# Patient Record
Sex: FEMALE | Race: WHITE | NOT HISPANIC OR LATINO | Employment: OTHER | URBAN - METROPOLITAN AREA
[De-identification: names, ages, dates, MRNs, and addresses within clinical notes are randomized per-mention and may not be internally consistent; named-entity substitution may affect disease eponyms.]

---

## 2017-01-31 DIAGNOSIS — F43.9 SITUATIONAL STRESS: ICD-10-CM

## 2017-01-31 RX ORDER — CITALOPRAM 20 MG/1
TABLET, FILM COATED ORAL
Qty: 90 TABLET | Refills: 3 | Status: SHIPPED | OUTPATIENT
Start: 2017-01-31 | End: 2018-02-15 | Stop reason: SDUPTHER

## 2017-02-08 RX ORDER — AMLODIPINE BESYLATE 5 MG/1
5 TABLET ORAL NIGHTLY
COMMUNITY
End: 2017-07-07 | Stop reason: SDUPTHER

## 2017-02-21 ENCOUNTER — PATIENT MESSAGE (OUTPATIENT)
Dept: RESEARCH | Facility: HOSPITAL | Age: 82
End: 2017-02-21

## 2017-03-06 ENCOUNTER — LAB VISIT (OUTPATIENT)
Dept: LAB | Facility: HOSPITAL | Age: 82
End: 2017-03-06
Attending: INTERNAL MEDICINE
Payer: MEDICARE

## 2017-03-06 DIAGNOSIS — F32.89 OTHER DEPRESSION: ICD-10-CM

## 2017-03-06 DIAGNOSIS — I70.0 AORTIC ATHEROSCLEROSIS: ICD-10-CM

## 2017-03-06 DIAGNOSIS — E78.00 PURE HYPERCHOLESTEROLEMIA: ICD-10-CM

## 2017-03-06 DIAGNOSIS — I10 HTN (HYPERTENSION), BENIGN: ICD-10-CM

## 2017-03-06 DIAGNOSIS — I25.10 CORONARY ARTERY DISEASE INVOLVING NATIVE CORONARY ARTERY OF NATIVE HEART WITHOUT ANGINA PECTORIS: ICD-10-CM

## 2017-03-06 DIAGNOSIS — R63.0 ANOREXIA: ICD-10-CM

## 2017-03-06 LAB
ALBUMIN SERPL BCP-MCNC: 3.7 G/DL
ALP SERPL-CCNC: 68 U/L
ALT SERPL W/O P-5'-P-CCNC: 28 U/L
ANION GAP SERPL CALC-SCNC: 7 MMOL/L
AST SERPL-CCNC: 25 U/L
BASOPHILS # BLD AUTO: 0.04 K/UL
BASOPHILS NFR BLD: 0.6 %
BILIRUB SERPL-MCNC: 0.8 MG/DL
BUN SERPL-MCNC: 13 MG/DL
CALCIUM SERPL-MCNC: 9.6 MG/DL
CHLORIDE SERPL-SCNC: 103 MMOL/L
CHOLEST/HDLC SERPL: 2.8 {RATIO}
CO2 SERPL-SCNC: 29 MMOL/L
CREAT SERPL-MCNC: 0.8 MG/DL
DIFFERENTIAL METHOD: ABNORMAL
EOSINOPHIL # BLD AUTO: 0.1 K/UL
EOSINOPHIL NFR BLD: 2.1 %
ERYTHROCYTE [DISTWIDTH] IN BLOOD BY AUTOMATED COUNT: 13.2 %
EST. GFR  (AFRICAN AMERICAN): >60 ML/MIN/1.73 M^2
EST. GFR  (NON AFRICAN AMERICAN): >60 ML/MIN/1.73 M^2
GLUCOSE SERPL-MCNC: 96 MG/DL
HCT VFR BLD AUTO: 40.3 %
HDL/CHOLESTEROL RATIO: 35.4 %
HDLC SERPL-MCNC: 175 MG/DL
HDLC SERPL-MCNC: 62 MG/DL
HGB BLD-MCNC: 13.4 G/DL
LDLC SERPL CALC-MCNC: 93.2 MG/DL
LYMPHOCYTES # BLD AUTO: 1.3 K/UL
LYMPHOCYTES NFR BLD: 20.8 %
MCH RBC QN AUTO: 31.6 PG
MCHC RBC AUTO-ENTMCNC: 33.3 %
MCV RBC AUTO: 95 FL
MONOCYTES # BLD AUTO: 0.5 K/UL
MONOCYTES NFR BLD: 8.1 %
NEUTROPHILS # BLD AUTO: 4.3 K/UL
NEUTROPHILS NFR BLD: 68.4 %
NONHDLC SERPL-MCNC: 113 MG/DL
PLATELET # BLD AUTO: 250 K/UL
PMV BLD AUTO: 10.6 FL
POTASSIUM SERPL-SCNC: 4.2 MMOL/L
PROT SERPL-MCNC: 6.9 G/DL
RBC # BLD AUTO: 4.24 M/UL
SODIUM SERPL-SCNC: 139 MMOL/L
TRIGL SERPL-MCNC: 99 MG/DL
WBC # BLD AUTO: 6.31 K/UL

## 2017-03-06 PROCEDURE — 80061 LIPID PANEL: CPT

## 2017-03-06 PROCEDURE — 80053 COMPREHEN METABOLIC PANEL: CPT

## 2017-03-06 PROCEDURE — 85025 COMPLETE CBC W/AUTO DIFF WBC: CPT

## 2017-03-06 PROCEDURE — 36415 COLL VENOUS BLD VENIPUNCTURE: CPT | Mod: PO

## 2017-03-07 ENCOUNTER — OFFICE VISIT (OUTPATIENT)
Dept: INTERNAL MEDICINE | Facility: CLINIC | Age: 82
End: 2017-03-07
Payer: MEDICARE

## 2017-03-07 VITALS
WEIGHT: 135.56 LBS | SYSTOLIC BLOOD PRESSURE: 110 MMHG | DIASTOLIC BLOOD PRESSURE: 60 MMHG | HEIGHT: 64 IN | BODY MASS INDEX: 23.14 KG/M2 | HEART RATE: 68 BPM

## 2017-03-07 DIAGNOSIS — I10 HTN (HYPERTENSION), BENIGN: Primary | ICD-10-CM

## 2017-03-07 DIAGNOSIS — I70.0 AORTIC ATHEROSCLEROSIS: ICD-10-CM

## 2017-03-07 DIAGNOSIS — F32.5 DEPRESSION, MAJOR, IN REMISSION: ICD-10-CM

## 2017-03-07 PROCEDURE — 1126F AMNT PAIN NOTED NONE PRSNT: CPT | Mod: S$GLB,,, | Performed by: INTERNAL MEDICINE

## 2017-03-07 PROCEDURE — 1159F MED LIST DOCD IN RCRD: CPT | Mod: S$GLB,,, | Performed by: INTERNAL MEDICINE

## 2017-03-07 PROCEDURE — 99214 OFFICE O/P EST MOD 30 MIN: CPT | Mod: S$GLB,,, | Performed by: INTERNAL MEDICINE

## 2017-03-07 PROCEDURE — 1160F RVW MEDS BY RX/DR IN RCRD: CPT | Mod: S$GLB,,, | Performed by: INTERNAL MEDICINE

## 2017-03-07 PROCEDURE — 99499 UNLISTED E&M SERVICE: CPT | Mod: S$GLB,,, | Performed by: INTERNAL MEDICINE

## 2017-03-07 PROCEDURE — 99999 PR PBB SHADOW E&M-EST. PATIENT-LVL III: CPT | Mod: PBBFAC,,, | Performed by: INTERNAL MEDICINE

## 2017-03-07 PROCEDURE — 1157F ADVNC CARE PLAN IN RCRD: CPT | Mod: S$GLB,,, | Performed by: INTERNAL MEDICINE

## 2017-03-10 NOTE — PROGRESS NOTES
Subjective:       Patient ID: Elen Peters is a 87 y.o. female.    Chief Complaint: Hypertension    Hypertension   This is a chronic problem. The problem is unchanged. The problem is controlled. Pertinent negatives include no chest pain, orthopnea, palpitations or shortness of breath. The current treatment provides significant improvement. There are no compliance problems.      Review of Systems   Respiratory: Negative for shortness of breath.    Cardiovascular: Negative for chest pain, palpitations and orthopnea.   Psychiatric/Behavioral: Negative for dysphoric mood. The patient is not nervous/anxious.        Objective:      Physical Exam   Constitutional: She is oriented to person, place, and time. She appears well-developed and well-nourished. No distress.   HENT:   Head: Normocephalic and atraumatic.   Mouth/Throat: Oropharynx is clear and moist.   Eyes: Conjunctivae are normal. Pupils are equal, round, and reactive to light.   Neck: Normal range of motion. Neck supple.   Cardiovascular: Normal rate, regular rhythm and normal heart sounds.    Pulmonary/Chest: Effort normal and breath sounds normal. She has no wheezes.   Abdominal: Soft. Bowel sounds are normal. There is no tenderness.   Musculoskeletal: Normal range of motion. She exhibits no edema or tenderness.   Neurological: She is alert and oriented to person, place, and time. No cranial nerve deficit.   Skin: No erythema.   Psychiatric: She has a normal mood and affect.   Vitals reviewed.      Assessment:       1. HTN (hypertension), benign    2. Depression, major, in remission    3. Aortic atherosclerosis        Plan:       Elen was seen today for hypertension.    Diagnoses and all orders for this visit:    HTN (hypertension), benign    Depression, major, in remission    Aortic atherosclerosis        Return in about 6 months (around 9/7/2017) for F/U APPOINTMENT WITH ME.

## 2017-04-07 ENCOUNTER — HOSPITAL ENCOUNTER (OUTPATIENT)
Dept: RADIOLOGY | Facility: HOSPITAL | Age: 82
Discharge: HOME | End: 2017-04-07
Attending: OBSTETRICS & GYNECOLOGY
Payer: MEDICARE

## 2017-04-07 ENCOUNTER — OFFICE VISIT (OUTPATIENT)
Dept: OBSTETRICS AND GYNECOLOGY | Facility: CLINIC | Age: 82
End: 2017-04-07
Payer: MEDICARE

## 2017-04-07 VITALS
BODY MASS INDEX: 23.63 KG/M2 | DIASTOLIC BLOOD PRESSURE: 70 MMHG | WEIGHT: 133.38 LBS | HEIGHT: 63 IN | SYSTOLIC BLOOD PRESSURE: 138 MMHG

## 2017-04-07 DIAGNOSIS — Z12.39 BREAST CANCER SCREENING: ICD-10-CM

## 2017-04-07 DIAGNOSIS — N95.2 ATROPHIC VAGINITIS: ICD-10-CM

## 2017-04-07 DIAGNOSIS — Z01.419 WELL FEMALE EXAM WITH ROUTINE GYNECOLOGICAL EXAM: Primary | ICD-10-CM

## 2017-04-07 DIAGNOSIS — R30.0 DYSURIA: ICD-10-CM

## 2017-04-07 LAB
BILIRUB UR QL STRIP: NEGATIVE
CLARITY UR REFRACT.AUTO: CLEAR
COLOR UR AUTO: YELLOW
GLUCOSE UR QL STRIP: NEGATIVE
HGB UR QL STRIP: NEGATIVE
KETONES UR QL STRIP: NEGATIVE
LEUKOCYTE ESTERASE UR QL STRIP: NEGATIVE
NITRITE UR QL STRIP: NEGATIVE
PH UR STRIP: 7 [PH] (ref 5–8)
PROT UR QL STRIP: NEGATIVE
SP GR UR STRIP: 1.01 (ref 1–1.03)
URN SPEC COLLECT METH UR: NORMAL
UROBILINOGEN UR STRIP-ACNC: NEGATIVE EU/DL

## 2017-04-07 PROCEDURE — 77067 SCR MAMMO BI INCL CAD: CPT | Mod: 26,,, | Performed by: RADIOLOGY

## 2017-04-07 PROCEDURE — 99999 PR PBB SHADOW E&M-EST. PATIENT-LVL III: CPT | Mod: PBBFAC,,, | Performed by: OBSTETRICS & GYNECOLOGY

## 2017-04-07 PROCEDURE — G0101 CA SCREEN;PELVIC/BREAST EXAM: HCPCS | Mod: S$GLB,,, | Performed by: OBSTETRICS & GYNECOLOGY

## 2017-04-07 PROCEDURE — 77067 SCR MAMMO BI INCL CAD: CPT | Mod: TC

## 2017-04-07 RX ORDER — ESTRADIOL 0.1 MG/G
1 CREAM VAGINAL
Qty: 42.5 G | Refills: 5 | Status: SHIPPED | OUTPATIENT
Start: 2017-04-10 | End: 2017-04-12

## 2017-04-07 NOTE — PROGRESS NOTES
SUBJECTIVE:   87 y.o. female   for routine gyn exam. No LMP recorded. Patient is postmenopausal..  She reports dysuria and vaginal burning.  No HRT.  No PMB.          Past Medical History:   Diagnosis Date    Abnormal stress test 2015    Arthritis     Bladder cancer     Cataract     Coronary artery disease involving native coronary artery 2016    Depression     GERD (gastroesophageal reflux disease)     HTN (hypertension), benign 2015    Hx of bladder infections     Hyperlipemia     OP (osteoporosis)     Renal cancer     Syncope 2016    Upper back pain 2015    Ureteral cancer     Villous adenoma of colon 2013     Past Surgical History:   Procedure Laterality Date    APPENDECTOMY      BACK SURGERY      CATARACT EXTRACTION W/  INTRAOCULAR LENS IMPLANT Left 3/16/15    kristy    CATARACT EXTRACTION W/  INTRAOCULAR LENS IMPLANT Right 4/6/15    kristy    COLONOSCOPY  10/21/2013    CYSTOSCOPY      NEPHRECTOMY      L    TONSILLECTOMY, ADENOIDECTOMY       Social History     Social History    Marital status:      Spouse name: N/A    Number of children: N/A    Years of education: N/A     Occupational History    retired      Social History Main Topics    Smoking status: Never Smoker    Smokeless tobacco: Never Used    Alcohol use Yes      Comment: vodka with orange juice; 1 glass of red wine 1 x month    Drug use: No    Sexual activity: Not Currently     Other Topics Concern    Not on file     Social History Narrative     Family History   Problem Relation Age of Onset    Leukemia Mother     Heart disease Mother     Heart attack Mother     Cancer Mother      leukemia    Goiter Mother     Leukemia Father     Cancer Father      leukemia    Heart disease Maternal Grandfather     No Known Problems Brother     No Known Problems Son     No Known Problems Son     No Known Problems Son     Cancer Son      throat    No Known Problems Son     No Known  Problems Son     No Known Problems Maternal Aunt     No Known Problems Maternal Uncle     No Known Problems Paternal Aunt     No Known Problems Paternal Uncle     No Known Problems Maternal Grandmother     No Known Problems Paternal Grandmother     No Known Problems Paternal Grandfather     No Known Problems Sister     Amblyopia Neg Hx     Blindness Neg Hx     Cataracts Neg Hx     Diabetes Neg Hx     Glaucoma Neg Hx     Hypertension Neg Hx     Macular degeneration Neg Hx     Retinal detachment Neg Hx     Strabismus Neg Hx     Stroke Neg Hx     Thyroid disease Neg Hx     Breast cancer Neg Hx      OB History    Para Term  AB SAB TAB Ectopic Multiple Living   6 6        6      # Outcome Date GA Lbr Candido/2nd Weight Sex Delivery Anes PTL Lv   6 Para            5 Para            4 Para            3 Para            2 Para            1 Para                     Current Outpatient Prescriptions   Medication Sig Dispense Refill    amlodipine (NORVASC) 5 MG tablet Take 5 mg by mouth every evening.      aspirin (ECOTRIN) 81 MG EC tablet Take 81 mg by mouth once daily.      atorvastatin (LIPITOR) 40 MG tablet Take 40 mg by mouth once daily. 1/2 tab daily      calcium-magnesium-zinc Tab Take by mouth 3 (three) times daily. 1 Tablet Oral Every day      carvedilol (COREG) 6.25 MG tablet Take 1 tablet (6.25 mg total) by mouth 2 (two) times daily with meals. One by mouth twice daily or as directed 180 tablet 3    citalopram (CELEXA) 20 MG tablet TAKE ONE TABLET BY MOUTH EVERY DAY 90 tablet 3    [START ON 4/10/2017] estradiol (ESTRACE) 0.01 % (0.1 mg/gram) vaginal cream Place 1 g vaginally twice a week. BIN# [id number] PCN# CN GRP# [id number] ID# [id number] 42.5 g 5    mv-min-folic acid-lutein (THERAGRAN-M ADVANCED 50 PLUS) 0.4-250 mg-mcg Tab Take by mouth. 1 Tablet Oral Every day      nitroGLYCERIN (NITROSTAT) 0.4 MG SL tablet Place 1 tablet (0.4 mg total) under the tongue every 5 (five) minutes  "as needed for Chest pain. 25 tablet 3    omega-3 fatty acids 1,000 mg Cap Take 1 capsule by mouth once daily. 2  Capsule Oral Every day      zolpidem (AMBIEN) 5 MG Tab TAKE 1 TABLET BY MOUTH EVERY NIGHT AT BEDTIME 30 tablet 3     No current facility-administered medications for this visit.      Allergies: Sulfa (sulfonamide antibiotics) and Codeine     ROS:  Constitutional: no weight loss, weight gain, fever, fatigue  Eyes:  No vision changes, glasses/contacts  ENT/Mouth: No ulcers, sinus problems, ears ringing, headache  Cardiovascular: No inability to lie flat, chest pain, exercise intolerance, swelling, heart palpitations  Respiratory: No wheezing, coughing blood, shortness of breath, or cough  Gastrointestinal: No diarrhea, bloody stool, nausea/vomiting, constipation, gas, hemorrhoids  Genitourinary: No blood in urine, painful urination, urgency of urination, frequency of urination, incomplete emptying, incontinence, abnormal bleeding, painful periods, heavy periods, vaginal discharge, vaginal odor, painful intercourse, sexual problems, bleeding after intercourse.  Musculoskeletal: No muscle weakness  Skin/Breast: No painful breasts, nipple discharge, masses, rash, ulcers  Neurological: No passing out, seizures, numbness, headache  Endocrine: No diabetes, hypothyroid, hyperthyroid, hot flashes, hair loss, abnormal hair growth, ance  Psychiatric: No depression, crying  Hematologic: No bruises, bleeding, swollen lymph nodes, anemia.      OBJECTIVE:   The patient appears well, alert, oriented x 3, in no distress.  /70  Ht 5' 3" (1.6 m)  Wt 60.5 kg (133 lb 6.1 oz)  BMI 23.63 kg/m2  NECK: no thyromegaly, trachea midline  SKIN: no acne, striae, hirsutism  CHEST: CTAB  CV: RRR  BREAST EXAM: breasts appear normal, no suspicious masses, no skin or nipple changes or axillary nodes  ABDOMEN: no hernias, masses, or hepatosplenomegaly  GENITALIA: normal external genitalia, no erythema, no discharge  URETHRA: " normal urethra, normal urethral meatus  VAGINA: Normal, atrophic  CERVIX: no lesions or cervical motion tenderness  UTERUS: normal size, contour, position, consistency, mobility, non-tender  ADNEXA: no mass, fullness, tenderness      ASSESSMENT:   1. Well female exam with routine gynecological exam     2. Dysuria  Urinalysis    Urine culture   3. Atrophic vaginitis         PLAN:   Orders Placed This Encounter    Urine culture    Urinalysis    estradiol (ESTRACE) 0.01 % (0.1 mg/gram) vaginal cream     Discussed atrophic vaginitis, recurrent UTI, dysuria.  Trial of Estrace cream. Discussed WHI.   Return to clinic in 1 year

## 2017-04-07 NOTE — MR AVS SNAPSHOT
[place] [facility]   [facility]  [place] [address]  Phone: [phone]                  Elen Peters   2017 1:30 PM   Office Visit    Description:  Female : 1929   Provider:  Lynne Duarte MD   Department:  [place] [facility]           Reason for Visit     Well Woman     Urinary Tract Infection                To Do List           Future Appointments        Provider Department Dept Phone    2017 2:00 PM Remi Weaver MD [facility] [phone]      Goals (5 Years of Data)     None      [facility]     [facility] On Call Nurse Care Line -  Assistance  Unless otherwise directed by your provider, please contact [facility], our nurse care line that is available for  assistance.     Registered nurses in the [facility] On Call Center provide: appointment scheduling, clinical advisement, health education, and other advisory services.  Call: [phone] (toll free)               Medications           Message regarding Medications     Verify the changes and/or additions to your medication regime listed below are the same as discussed with your clinician today.  If any of these changes or additions are incorrect, please notify your healthcare provider.             Verify that the below list of medications is an accurate representation of the medications you are currently taking.  If none reported, the list may be blank. If incorrect, please contact your healthcare provider. Carry this list with you in case of emergency.           Current Medications     amlodipine (NORVASC) 5 MG tablet Take 5 mg by mouth every evening.    aspirin (ECOTRIN) 81 MG EC tablet Take 81 mg by mouth once daily.    atorvastatin (LIPITOR) 40 MG tablet Take 40 mg by mouth once daily. 1/2 tab daily    calcium-magnesium-zinc Tab Take by mouth 3 (three) times daily. 1 Tablet Oral Every day    carvedilol (COREG) 6.25 MG tablet Take 1 tablet (6.25 mg total) by mouth 2  "(two) times daily with meals. One by mouth twice daily or as directed    citalopram (CELEXA) 20 MG tablet TAKE ONE TABLET BY MOUTH EVERY DAY    mv-min-folic acid-lutein (THERAGRAN-M ADVANCED 50 PLUS) 0.4-250 mg-mcg Tab Take by mouth. 1 Tablet Oral Every day    nitroGLYCERIN (NITROSTAT) 0.4 MG SL tablet Place 1 tablet (0.4 mg total) under the tongue every 5 (five) minutes as needed for Chest pain.    omega-3 fatty acids 1,000 mg Cap Take 1 capsule by mouth once daily. 2  Capsule Oral Every day    zolpidem (AMBIEN) 5 MG Tab TAKE 1 TABLET BY MOUTH EVERY NIGHT AT BEDTIME           Clinical Reference Information           Your Vitals Were     BP Height Weight BMI       138/70 5' 3" (1.6 m) 60.5 kg (133 lb 6.1 oz) 23.63 kg/m2       Blood Pressure          Most Recent Value    BP  138/70      Allergies as of 4/7/2017     Sulfa (Sulfonamide Antibiotics)    Codeine      Immunizations Administered on Date of Encounter - 4/7/2017     None      Language Assistance Services     ATTENTION: Language assistance services are available, free of charge. Please call [phone].      ATENCIÓN: Si jackie mahmood, tiene a adam disposición servicios gratuitos de asistencia lingüística. Llame al [phone].     ARANZA Ý: N?u b?n nói Ti?ng Vi?t, có các d?ch v? h? tr? ngôn ng? mi?n phí dành cho b?n. G?i s? [phone].         [facility] complies with applicable Federal civil rights laws and does not discriminate on the basis of race, color, national origin, age, disability, or sex.        "

## 2017-04-08 LAB — BACTERIA UR CULT: NO GROWTH

## 2017-04-12 ENCOUNTER — INITIAL CONSULT (OUTPATIENT)
Dept: CARDIOLOGY | Facility: CLINIC | Age: 82
End: 2017-04-12
Payer: MEDICARE

## 2017-04-12 VITALS
WEIGHT: 132.25 LBS | DIASTOLIC BLOOD PRESSURE: 70 MMHG | HEART RATE: 60 BPM | HEIGHT: 63 IN | BODY MASS INDEX: 23.43 KG/M2 | SYSTOLIC BLOOD PRESSURE: 130 MMHG

## 2017-04-12 DIAGNOSIS — R60.0 EDEMA OF LOWER EXTREMITY, UNSPECIFIED LATERALITY: ICD-10-CM

## 2017-04-12 DIAGNOSIS — I87.2 VENOUS INSUFFICIENCY: Primary | ICD-10-CM

## 2017-04-12 PROCEDURE — 1160F RVW MEDS BY RX/DR IN RCRD: CPT | Mod: S$GLB,,, | Performed by: INTERNAL MEDICINE

## 2017-04-12 PROCEDURE — 1125F AMNT PAIN NOTED PAIN PRSNT: CPT | Mod: S$GLB,,, | Performed by: INTERNAL MEDICINE

## 2017-04-12 PROCEDURE — 99213 OFFICE O/P EST LOW 20 MIN: CPT | Mod: S$GLB,,, | Performed by: INTERNAL MEDICINE

## 2017-04-12 PROCEDURE — 99999 PR PBB SHADOW E&M-EST. PATIENT-LVL III: CPT | Mod: PBBFAC,,, | Performed by: INTERNAL MEDICINE

## 2017-04-12 PROCEDURE — 1159F MED LIST DOCD IN RCRD: CPT | Mod: S$GLB,,, | Performed by: INTERNAL MEDICINE

## 2017-04-12 PROCEDURE — 1157F ADVNC CARE PLAN IN RCRD: CPT | Mod: S$GLB,,, | Performed by: INTERNAL MEDICINE

## 2017-04-12 NOTE — LETTER
April 12, 2017      Davidson Cartagena MD  [address]  [place] [facility] [address]           [facility]  [address]  [facility] [address]  Phone: [phone]          Patient: Elen Peters   MR Number: [id number]   YOB: 1929   Date of Visit: 4/12/2017       Dear Dr. Davidson Cartagena:    Thank you for referring Elen Peters to me for evaluation. Attached you will find relevant portions of my assessment and plan of care.    If you have questions, please do not hesitate to call me. I look forward to following Elen Peters along with you.    Sincerely,    Sayfransico Weaver MD    Enclosure  CC:  No Recipients    If you would like to receive this communication electronically, please contact [email or web address] or [phone] to request more information on [employer] Link access.    For providers and/or their staff who would like to refer a patient to [facility], please contact us through our one-stop-shop provider referral line, [facility], [place], at [phone].    If you feel you have received this communication in error or would no longer like to receive these types of communications, please e-mail [email or web address]

## 2017-04-12 NOTE — MR AVS SNAPSHOT
[facility]  [address]  [facility] [address]  Phone: [phone]                  Elen Peters   2017 2:00 PM   Initial consult    Description:  Female : 1929   Provider:  Remi Weaver MD   Department:  [facility]           Reason for Visit     Phlebitis           Diagnoses this Visit        Comments    Venous insufficiency    -  Primary     Edema of lower extremity, unspecified laterality                To Do List           Goals (5 Years of Data)     None      Follow-Up and Disposition     Return in about 6 months (around 10/12/2017).      [facility]     [facility] On Call Nurse Care Line -  Assistance  Unless otherwise directed by your provider, please contact [facility], our nurse care line that is available for  assistance.     Registered nurses in the [facility] Center provide: appointment scheduling, clinical advisement, health education, and other advisory services.  Call: [phone] (toll free)               Medications           Message regarding Medications     Verify the changes and/or additions to your medication regime listed below are the same as discussed with your clinician today.  If any of these changes or additions are incorrect, please notify your healthcare provider.        STOP taking these medications     estradiol (ESTRACE) 0.01 % (0.1 mg/gram) vaginal cream Place 1 g vaginally twice a week. BIN# [id number] PCN# CN GRP# [id number] ID# [id number]           Verify that the below list of medications is an accurate representation of the medications you are currently taking.  If none reported, the list may be blank. If incorrect, please contact your healthcare provider. Carry this list with you in case of emergency.           Current Medications     amlodipine (NORVASC) 5 MG tablet Take 5 mg by mouth every evening.    aspirin (ECOTRIN) 81 MG EC tablet Take 81 mg by mouth once daily.    atorvastatin  "(LIPITOR) 40 MG tablet Take 40 mg by mouth once daily. 1/2 tab daily    calcium-magnesium-zinc Tab Take by mouth 3 (three) times daily. 1 Tablet Oral Every day    carvedilol (COREG) 6.25 MG tablet Take 1 tablet (6.25 mg total) by mouth 2 (two) times daily with meals. One by mouth twice daily or as directed    citalopram (CELEXA) 20 MG tablet TAKE ONE TABLET BY MOUTH EVERY DAY    mv-min-folic acid-lutein (THERAGRAN-M ADVANCED 50 PLUS) 0.4-250 mg-mcg Tab Take by mouth. 1 Tablet Oral Every day    omega-3 fatty acids 1,000 mg Cap Take 1 capsule by mouth once daily. 2  Capsule Oral Every day    zolpidem (AMBIEN) 5 MG Tab TAKE 1 TABLET BY MOUTH EVERY NIGHT AT BEDTIME    nitroGLYCERIN (NITROSTAT) 0.4 MG SL tablet Place 1 tablet (0.4 mg total) under the tongue every 5 (five) minutes as needed for Chest pain.           Clinical Reference Information           Your Vitals Were     BP Pulse Height Weight BMI    130/70 (BP Location: Left arm, Patient Position: Sitting, BP Method: Manual) 60 5' 3" (1.6 m) 60 kg (132 lb 4.4 oz) 23.43 kg/m2      Blood Pressure          Most Recent Value    BP  130/70      Allergies as of 4/12/2017     Sulfa (Sulfonamide Antibiotics)    Codeine      Immunizations Administered on Date of Encounter - 4/12/2017     None      Language Assistance Services     ATTENTION: Language assistance services are available, free of charge. Please call [phone].      ATENCIÓN: Si habla ela, tiene a adam disposición servicios gratuitos de asistencia lingüística. Llame al [phone].     [facility] Ý: N?u b?n nói Ti?ng Vi?t, có các d?ch v? h? tr? ngôn ng? mi?n phí dành cho b?n. G?i s? [phone].         [facility] complies with applicable Federal civil rights laws and does not discriminate on the basis of race, color, national origin, age, disability, or sex.        "

## 2017-04-12 NOTE — PROGRESS NOTES
Subjective:    Patient ID:  Elen Peters is a 87 y.o. Y.o.female who presents for evaluation of venous stasis.      HPI: 87 year old female with PMH of HTN presents today for evaluation of chronic leg edema, that' started for the first time last year when she was in [place] for vacation 2016, she reports  assocated with redness, when she came home the swelling improves, few months later she has been having leg swelling on and off associated with leg swelling in [place] on and off with burning sensation.. She can walk with limitation. She has been suing the compression stocking on bust days only.    2016.  Impression:   Right Leg:  Color flow evaluation of the lower extremity demonstrates fully compressible and patent veins. There is no evidence of venous thrombosis in the deep or superficial veins.    Left Leg:  Color flow evaluation of the lower extremity demonstrates fully compressible and patent veins. There is no evidence of venous thrombosis in the deep or superficial veins.    Past Medical History:   Diagnosis Date    Abnormal stress test 2015    Arthritis     Bladder cancer     Cataract     Coronary artery disease involving native coronary artery 2016    Depression     GERD (gastroesophageal reflux disease)     HTN (hypertension), benign 2015    Hx of bladder infections     Hyperlipemia     OP (osteoporosis)     Renal cancer     Syncope 2016    Upper back pain 2015    Ureteral cancer     Villous adenoma of colon 2013       indicated that her mother is . She indicated that her father is . She indicated that the status of her sister is unknown. She indicated that her brother is . She indicated that the status of her maternal grandmother is unknown. She indicated that her maternal grandfather is . She indicated that the status of her paternal grandmother is unknown. She indicated that the status of her paternal grandfather is  unknown. She indicated that all of her six sons are alive. She indicated that the status of her maternal aunt is unknown. She indicated that the status of her maternal uncle is unknown. She indicated that the status of her paternal aunt is unknown. She indicated that the status of her paternal uncle is unknown. She indicated that the status of her neg hx is unknown.     Social History     Social History    Marital status:      Spouse name: N/A    Number of children: N/A    Years of education: N/A     Occupational History    retired      Social History Main Topics    Smoking status: Never Smoker    Smokeless tobacco: Never Used    Alcohol use Yes      Comment: vodka with orange juice; 1 glass of red wine 1 x month    Drug use: No    Sexual activity: Not Currently     Other Topics Concern    Not on file     Social History Narrative       Current Outpatient Prescriptions   Medication Sig    amlodipine (NORVASC) 5 MG tablet Take 5 mg by mouth every evening.    aspirin (ECOTRIN) 81 MG EC tablet Take 81 mg by mouth once daily.    atorvastatin (LIPITOR) 40 MG tablet Take 40 mg by mouth once daily. 1/2 tab daily    calcium-magnesium-zinc Tab Take by mouth 3 (three) times daily. 1 Tablet Oral Every day    carvedilol (COREG) 6.25 MG tablet Take 1 tablet (6.25 mg total) by mouth 2 (two) times daily with meals. One by mouth twice daily or as directed    citalopram (CELEXA) 20 MG tablet TAKE ONE TABLET BY MOUTH EVERY DAY    mv-min-folic acid-lutein (THERAGRAN-M ADVANCED 50 PLUS) 0.4-250 mg-mcg Tab Take by mouth. 1 Tablet Oral Every day    omega-3 fatty acids 1,000 mg Cap Take 1 capsule by mouth once daily. 2  Capsule Oral Every day    zolpidem (AMBIEN) 5 MG Tab TAKE 1 TABLET BY MOUTH EVERY NIGHT AT BEDTIME    nitroGLYCERIN (NITROSTAT) 0.4 MG SL tablet Place 1 tablet (0.4 mg total) under the tongue every 5 (five) minutes as needed for Chest pain.     No current facility-administered medications for this  visit.        CMP  Sodium   Date Value Ref Range Status   03/06/2017 139 136 - 145 mmol/L Final     Potassium   Date Value Ref Range Status   03/06/2017 4.2 3.5 - 5.1 mmol/L Final     Chloride   Date Value Ref Range Status   03/06/2017 103 95 - 110 mmol/L Final     CO2   Date Value Ref Range Status   03/06/2017 29 23 - 29 mmol/L Final     Glucose   Date Value Ref Range Status   03/06/2017 96 70 - 110 mg/dL Final     BUN, Bld   Date Value Ref Range Status   03/06/2017 13 8 - 23 mg/dL Final     Creatinine   Date Value Ref Range Status   03/06/2017 0.8 0.5 - 1.4 mg/dL Final     Calcium   Date Value Ref Range Status   03/06/2017 9.6 8.7 - 10.5 mg/dL Final     Total Protein   Date Value Ref Range Status   03/06/2017 6.9 6.0 - 8.4 g/dL Final     Albumin   Date Value Ref Range Status   03/06/2017 3.7 3.5 - 5.2 g/dL Final     Total Bilirubin   Date Value Ref Range Status   03/06/2017 0.8 0.1 - 1.0 mg/dL Final     Comment:     For infants and newborns, interpretation of results should be based  on gestational age, weight and in agreement with clinical  observations.  Premature Infant recommended reference ranges:  Up to 24 hours.............<8.0 mg/dL  Up to 48 hours............<12.0 mg/dL  3-5 days..................<15.0 mg/dL  6-29 days.................<15.0 mg/dL       Alkaline Phosphatase   Date Value Ref Range Status   03/06/2017 68 55 - 135 U/L Final     AST   Date Value Ref Range Status   03/06/2017 25 10 - 40 U/L Final     ALT   Date Value Ref Range Status   03/06/2017 28 10 - 44 U/L Final     Anion Gap   Date Value Ref Range Status   03/06/2017 7 (L) 8 - 16 mmol/L Final     eGFR if    Date Value Ref Range Status   03/06/2017 >60.0 >60 mL/min/1.73 m^2 Final     eGFR if non    Date Value Ref Range Status   03/06/2017 >60.0 >60 mL/min/1.73 m^2 Final     Comment:     Calculation used to obtain the estimated glomerular filtration  rate (eGFR) is the CKD-EPI equation. Since race is unknown  "  in our information system, the eGFR values for   -American and Non--American patients are given   for each creatinine result.         Lab Results   Component Value Date    WBC 6.31 03/06/2017    HGB 13.4 03/06/2017    HCT 40.3 03/06/2017    MCV 95 03/06/2017     03/06/2017       Review of Systems   Constitution: Negative for decreased appetite, fever and weight gain.   HENT: Negative.    Cardiovascular: Positive for leg swelling. Negative for chest pain, claudication and cyanosis.   Respiratory: Negative for cough, shortness of breath and wheezing.    Skin: Positive for color change. Negative for dry skin, itching, rash and suspicious lesions.   Musculoskeletal: Negative for arthritis, back pain, joint swelling and muscle weakness.   Gastrointestinal: Negative.    Genitourinary: Negative.    Neurological: Negative.  Negative for loss of balance, numbness and paresthesias.        Objective:   /70 (BP Location: Left arm, Patient Position: Sitting, BP Method: Manual)  Pulse 60  Ht 5' 3" (1.6 m)  Wt 60 kg (132 lb 4.4 oz)  BMI 23.43 kg/m2  Physical Exam   Constitutional: She is oriented to person, place, and time. She appears well-developed and well-nourished. No distress.   HENT:   Head: Normocephalic and atraumatic.   Eyes: Conjunctivae and EOM are normal. Pupils are equal, round, and reactive to light.   Neck: Normal range of motion. Neck supple. No JVD present.   Cardiovascular: Normal rate, regular rhythm and normal heart sounds.  Exam reveals no gallop and no friction rub.    No murmur heard.  Pulmonary/Chest: Effort normal and breath sounds normal. No respiratory distress. She has no wheezes.   Abdominal: Soft. Bowel sounds are normal.   Musculoskeletal: Normal range of motion. She exhibits no edema or tenderness.   Neurological: She is alert and oriented to person, place, and time.   Skin: Skin is warm. No rash noted. She is not diaphoretic. No erythema. No pallor.       "   .  Assessment:       1. Venous insufficiency     2. Edema of lower extremity, unspecified laterality          Plan:       1. Compression stocking 15-20 mmHg.  2. Walking exercise.  3. Suggest switching amlodipine to a different BP medications.  4. Gabapentin trial in future.    Remi Weaver

## 2017-05-10 ENCOUNTER — LAB VISIT (OUTPATIENT)
Dept: LAB | Facility: HOSPITAL | Age: 82
End: 2017-05-10
Attending: INTERNAL MEDICINE
Payer: MEDICARE

## 2017-05-10 DIAGNOSIS — E78.00 PURE HYPERCHOLESTEROLEMIA: ICD-10-CM

## 2017-05-10 DIAGNOSIS — I25.10 CORONARY ARTERY DISEASE INVOLVING NATIVE CORONARY ARTERY OF NATIVE HEART WITHOUT ANGINA PECTORIS: ICD-10-CM

## 2017-05-10 DIAGNOSIS — R63.0 ANOREXIA: ICD-10-CM

## 2017-05-10 DIAGNOSIS — I70.0 AORTIC ATHEROSCLEROSIS: ICD-10-CM

## 2017-05-10 DIAGNOSIS — I10 HTN (HYPERTENSION), BENIGN: ICD-10-CM

## 2017-05-10 DIAGNOSIS — F32.89 OTHER DEPRESSION: ICD-10-CM

## 2017-05-10 LAB
ALBUMIN SERPL BCP-MCNC: 3.6 G/DL
ALP SERPL-CCNC: 77 U/L
ALT SERPL W/O P-5'-P-CCNC: 18 U/L
ANION GAP SERPL CALC-SCNC: 9 MMOL/L
AST SERPL-CCNC: 19 U/L
BASOPHILS # BLD AUTO: 0.02 K/UL
BASOPHILS NFR BLD: 0.4 %
BILIRUB SERPL-MCNC: 0.8 MG/DL
BUN SERPL-MCNC: 16 MG/DL
CALCIUM SERPL-MCNC: 9.6 MG/DL
CHLORIDE SERPL-SCNC: 102 MMOL/L
CHOLEST/HDLC SERPL: 2.6 {RATIO}
CO2 SERPL-SCNC: 28 MMOL/L
CREAT SERPL-MCNC: 0.8 MG/DL
DIFFERENTIAL METHOD: ABNORMAL
EOSINOPHIL # BLD AUTO: 0.2 K/UL
EOSINOPHIL NFR BLD: 2.7 %
ERYTHROCYTE [DISTWIDTH] IN BLOOD BY AUTOMATED COUNT: 14.1 %
EST. GFR  (AFRICAN AMERICAN): >60 ML/MIN/1.73 M^2
EST. GFR  (NON AFRICAN AMERICAN): >60 ML/MIN/1.73 M^2
GLUCOSE SERPL-MCNC: 97 MG/DL
HCT VFR BLD AUTO: 37.9 %
HDL/CHOLESTEROL RATIO: 38.9 %
HDLC SERPL-MCNC: 162 MG/DL
HDLC SERPL-MCNC: 63 MG/DL
HGB BLD-MCNC: 12.6 G/DL
LDLC SERPL CALC-MCNC: 86 MG/DL
LYMPHOCYTES # BLD AUTO: 1.5 K/UL
LYMPHOCYTES NFR BLD: 26.9 %
MCH RBC QN AUTO: 31.7 PG
MCHC RBC AUTO-ENTMCNC: 33.2 %
MCV RBC AUTO: 96 FL
MONOCYTES # BLD AUTO: 0.5 K/UL
MONOCYTES NFR BLD: 9.1 %
NEUTROPHILS # BLD AUTO: 3.4 K/UL
NEUTROPHILS NFR BLD: 60.7 %
NONHDLC SERPL-MCNC: 99 MG/DL
PLATELET # BLD AUTO: 253 K/UL
PMV BLD AUTO: 10.2 FL
POTASSIUM SERPL-SCNC: 4.4 MMOL/L
PROT SERPL-MCNC: 6.8 G/DL
RBC # BLD AUTO: 3.97 M/UL
SODIUM SERPL-SCNC: 139 MMOL/L
TRIGL SERPL-MCNC: 65 MG/DL
WBC # BLD AUTO: 5.62 K/UL

## 2017-05-10 PROCEDURE — 80053 COMPREHEN METABOLIC PANEL: CPT

## 2017-05-10 PROCEDURE — 80061 LIPID PANEL: CPT

## 2017-05-10 PROCEDURE — 85025 COMPLETE CBC W/AUTO DIFF WBC: CPT

## 2017-05-10 PROCEDURE — 36415 COLL VENOUS BLD VENIPUNCTURE: CPT | Mod: PO

## 2017-06-07 ENCOUNTER — OFFICE VISIT (OUTPATIENT)
Dept: OPTOMETRY | Facility: CLINIC | Age: 82
End: 2017-06-07
Payer: MEDICARE

## 2017-06-07 ENCOUNTER — OFFICE VISIT (OUTPATIENT)
Dept: INTERNAL MEDICINE | Facility: CLINIC | Age: 82
End: 2017-06-07
Payer: MEDICARE

## 2017-06-07 VITALS
BODY MASS INDEX: 23.12 KG/M2 | RESPIRATION RATE: 18 BRPM | HEIGHT: 63 IN | WEIGHT: 130.5 LBS | HEART RATE: 68 BPM | SYSTOLIC BLOOD PRESSURE: 132 MMHG | DIASTOLIC BLOOD PRESSURE: 60 MMHG

## 2017-06-07 DIAGNOSIS — I25.10 CORONARY ARTERY DISEASE INVOLVING NATIVE CORONARY ARTERY OF NATIVE HEART WITHOUT ANGINA PECTORIS: ICD-10-CM

## 2017-06-07 DIAGNOSIS — I95.1 AUTONOMIC POSTURAL HYPOTENSION: ICD-10-CM

## 2017-06-07 DIAGNOSIS — E78.2 MIXED HYPERLIPIDEMIA: ICD-10-CM

## 2017-06-07 DIAGNOSIS — H52.223 REGULAR ASTIGMATISM OF BOTH EYES: ICD-10-CM

## 2017-06-07 DIAGNOSIS — I70.0 AORTIC ATHEROSCLEROSIS: ICD-10-CM

## 2017-06-07 DIAGNOSIS — H26.493 POSTERIOR CAPSULAR OPACIFICATION NON VISUALLY SIGNIFICANT OF BOTH EYES: Primary | ICD-10-CM

## 2017-06-07 DIAGNOSIS — Z00.00 ENCOUNTER FOR PREVENTIVE HEALTH EXAMINATION: Primary | ICD-10-CM

## 2017-06-07 DIAGNOSIS — Z98.41 S/P CATARACT SURGERY, RIGHT: ICD-10-CM

## 2017-06-07 DIAGNOSIS — Z98.42 S/P CATARACT SURGERY, LEFT: ICD-10-CM

## 2017-06-07 DIAGNOSIS — I10 HTN (HYPERTENSION), BENIGN: ICD-10-CM

## 2017-06-07 DIAGNOSIS — K21.9 GASTROESOPHAGEAL REFLUX DISEASE WITHOUT ESOPHAGITIS: ICD-10-CM

## 2017-06-07 DIAGNOSIS — M81.0 OSTEOPOROSIS, UNSPECIFIED OSTEOPOROSIS TYPE, UNSPECIFIED PATHOLOGICAL FRACTURE PRESENCE: ICD-10-CM

## 2017-06-07 DIAGNOSIS — Z96.1 PSEUDOPHAKIA OF BOTH EYES: ICD-10-CM

## 2017-06-07 DIAGNOSIS — F32.5 DEPRESSION, MAJOR, IN REMISSION: ICD-10-CM

## 2017-06-07 PROCEDURE — 99999 PR PBB SHADOW E&M-EST. PATIENT-LVL IV: CPT | Mod: PBBFAC,,, | Performed by: NURSE PRACTITIONER

## 2017-06-07 PROCEDURE — 99999 PR PBB SHADOW E&M-EST. PATIENT-LVL III: CPT | Mod: PBBFAC,,, | Performed by: OPTOMETRIST

## 2017-06-07 PROCEDURE — G0439 PPPS, SUBSEQ VISIT: HCPCS | Mod: S$GLB,,, | Performed by: NURSE PRACTITIONER

## 2017-06-07 PROCEDURE — 99499 UNLISTED E&M SERVICE: CPT | Mod: S$GLB,,, | Performed by: NURSE PRACTITIONER

## 2017-06-07 PROCEDURE — 92015 DETERMINE REFRACTIVE STATE: CPT | Mod: S$GLB,,, | Performed by: OPTOMETRIST

## 2017-06-07 PROCEDURE — 92014 COMPRE OPH EXAM EST PT 1/>: CPT | Mod: S$GLB,,, | Performed by: OPTOMETRIST

## 2017-06-07 NOTE — PATIENT INSTRUCTIONS
S/P cataract surgery in both eyes.    Mild to moderate clouding of intact posterior lens capsule in each eye.  No compelling need for YAG laser posterior capsulotomy in either eye at this time.  Astigmatic refractive error in each eye, with resultant monovision-type effect.  Very satisfactory correctable VA in each eye,   New spectacle lens Rx issued for use as desired, but Mrs. Peters perceives no need for spectacle lenses, as she is pleased with her unaided VA at distance and at near without correction.  Recheck in one year - or prior if any problems in the interim.

## 2017-06-07 NOTE — PATIENT INSTRUCTIONS
Counseling and Referral of Other Preventative  (Italic type indicates deductible and co-insurance are waived)    Patient Name: Elen Peters  Today's Date: 6/7/2017      SERVICE LIMITATIONS RECOMMENDATION    Vaccines    · Pneumococcal (once after 65)    · Influenza (annually)    · Hepatitis B (if medium/high risk)    · Prevnar 13      Hepatitis B medium/high risk factors:       - End-stage renal disease       - Hemophiliacs who received Factor VII or         IX concentrates       - Clients of institutions for the mentally             retarded       - Persons who live in the same house as          a HepB carrier       - Homosexual men       - Illicit injectable drug abusers     Pneumococcal: Done, no repeat [id number]/21/2014     Influenza: Done, repeat in one year09/22/2016     Hepatitis B: N/A     Prevnar 13: Done, no repeat necessary   08/1/2015    Mammogram (biennial age 50-74)  Annually (age 40 or over)  Last done 04/07/2017, recommend to repeat every 1  years    Pap (up to age 70 and after 70 if unknown history or abnormal study last 10 years)    N/A     The USPSTF recommends against screening for cervical cancer in women older than age 65 years who have had adequate prior screening and are not otherwise at high risk for cervical cancer.      Colorectal cancer screening (to age 75)    · Fecal occult blood test (annual)  · Flexible sigmoidoscopy (5y)  · Screening colonoscopy (10y)  · Barium enema   N/A     Last done 10/21/2013    Diabetes self-management training (no USPSTF recommendations)  Requires referral by treating physician for patient with diabetes or renal disease. 10 hours of initial DSMT sessions of no less than 30 minutes each in a continuous 12-month period. 2 hours of follow-up DSMT in subsequent years.  N/A    Bone mass measurements (age 65 & older, biennial)  Requires diagnosis related to osteoporosis or estrogen deficiency. Biennial benefit unless patient has history of long-term  glucocorticoid  Last done 04/07/2009, recommend to repeat every 2  years    Glaucoma screening (no USPSTF recommendation)  Diabetes mellitus, family history   , age 50 or over    American, age 65 or over  Last done 06/06/2016, recommend to repeat every 1  years    Medical nutrition therapy for diabetes or renal disease (no recommended schedule)  Requires referral by treating physician for patient with diabetes or renal disease or kidney transplant within the past 3 years.  Can be provided in same year as diabetes self-management training (DSMT), and CMS recommends medical nutrition therapy take place after DSMT. Up to 3 hours for initial year and 2 hours in subsequent years.  N/A    Cardiovascular screening blood tests (every 5 years)  · Fasting lipid panel  Order as a panel if possible  Last done 05/10/2017, recommend to repeat every 5  years    Diabetes screening tests (at least every 3 years, Medicare covers annually or at 6-month intervals for prediabetic patients)  · Fasting blood sugar (FBS) or glucose tolerance test (GTT)  Patient must be diagnosed with one of the following:       - Hypertension       - Dyslipidemia       - Obesity (BMI 30kg/m2)       - Previous elevated impaired FBS or GTT       ... or any two of the following:       - Overweight (BMI 25 but <30)       - Family history of diabetes       - Age 65 or older       - History of gestational diabetes or birth of baby weighing more than 9 pounds  Last done 05/10/2017, recommend to repeat every 3  years    Abdominal aortic aneurysm screening (once)  · Sonogram   Limited to patients who meet one of the following criteria:       - Men who are 65-75 years old and have smoked more than 100 cigarette in their lifetime       - Anyone with a family history of abdominal aortic aneurysm       - Anyone recommended for screening by the USPSTF  N/A    HIV screening (annually for increased risk patients)  · HIV-1 and HIV-2 by EIA, or  JUDY, rapid antibody test or oral mucosa transudate  Patients must be at increased risk for HIV infection per USPSTF guidelines or pregnant. Tests covered annually for patient at increased risk or as requested by the patient. Pregnant patients may receive up to 3 tests during pregnancy.  Risks discussed, screening is not recommended    Smoking cessation counseling (up to 8 sessions per year)  Patients must be asymptomatic of tobacco-related conditions to receive as a preventative service.  Non-smoker    Subsequent annual wellness visit  At least 12 months since last AWV  Return in one year     The following information is provided to all patients.  This information is to help you find resources for any of the problems found today that may be affecting your health:                Living healthy guide: www.[facility]      Understanding Diabetes: www.diabetes.org      Eating healthy: www.cdc.gov/healthyweight      CDC home safety checklist: www.cdc.gov/steadi/patient.html      Agency on Aging: www.goea.[place].[facility]      Alcoholics anonymous (AA): www.aa.org      Physical Activity: www.[place].nih.gov/af3cuow      Tobacco use: www.quitwithusla.org

## 2017-06-07 NOTE — PROGRESS NOTES
"Elen Peters presented for a  Medicare AWV and comprehensive Health Risk Assessment today. The following components were reviewed and updated:    · Medical history  · Family History  · Social history  · Allergies and Current Medications  · Health Risk Assessment  · Health Maintenance  · Care Team     ** See Completed Assessments for Annual Wellness Visit within the encounter summary.**       The following assessments were completed:  · Living Situation  · Depression Screening  · Timed Get Up and Go  · Whisper Test  · Cognitive Function Screening        · Nutrition Screening  · ADL Screening  · PAQ Screening    Vitals:    06/07/17 0952   BP: 132/60   BP Location: Right arm   Patient Position: Sitting   BP Method: Manual   Pulse: 68   Resp: 18   Weight: 59.2 kg (130 lb 8.2 oz)   Height: 5' 3" (1.6 m)     Body mass index is 23.12 kg/m².  Physical Exam   Constitutional: She is oriented to person, place, and time. She appears well-developed and well-nourished.   HENT:   Head: Normocephalic and atraumatic.   Mouth/Throat: Oropharynx is clear and moist.   Eyes: Pupils are equal, round, and reactive to light.   Neck: Normal range of motion. Neck supple.   Cardiovascular: Normal rate, regular rhythm, normal heart sounds and intact distal pulses.  Exam reveals no gallop and no friction rub.    No murmur heard.  Bilateral +1 ankle edema   Pulmonary/Chest: Effort normal and breath sounds normal. No respiratory distress. She has no wheezes.   Abdominal: Soft. Bowel sounds are normal. She exhibits no distension. There is no tenderness.   Musculoskeletal: Normal range of motion.   Neurological: She is alert and oriented to person, place, and time.   Skin: Skin is warm and dry.   Psychiatric: She has a normal mood and affect. Her behavior is normal.   Nursing note and vitals reviewed.        Diagnoses and health risks identified today and associated recommendations/orders:    1. Encounter for preventive health examination      2. " Osteoporosis, unspecified osteoporosis type, unspecified pathological fracture presence  Stable and controlled. Continue current treatment plan as previously prescribed by PCP.     - DXA Bone Density Spine And Hip; Future    3. Aortic atherosclerosis  Stable and controlled on daily statin and aspirin. Continue current treatment plan as previously prescribed by PCP.       4. Depression, major, in remission  Stable and controlled. Continue current treatment plan as previously prescribed by PCP.       5. HTN (hypertension), benign  Stable and controlled. Continue current treatment plan as previously prescribed by Cardiology; followed along with PCP.       6. Coronary artery disease involving native coronary artery of native heart without angina pectoris  Stable and controlled. Continue current treatment plan as previously prescribed by Cardiology.       7. Mixed hyperlipidemia  Stable and controlled. Continue current treatment plan as previously prescribed by Cardiology; followed along with PCP.       8. Gastroesophageal reflux disease without esophagitis  Stable and controlled. Continue current treatment plan as previously prescribed by PCP.       9. Autonomic postural hypotension  Stable and controlled. Continue current treatment plan as previously prescribed by Cardiology.         Provided Elen with a 5-10 year written screening schedule and personal prevention plan. Recommendations were developed using the USPSTF age appropriate recommendations. Education, counseling, and referrals were provided as needed. After Visit Summary printed and given to patient which includes a list of additional screenings\tests needed.    Return for HRA VISIT IN 1 YEAR.    FERNANDO Rodrigues

## 2017-06-07 NOTE — PROGRESS NOTES
"HPI     Concerns About Ocular Health    Additional comments: Eye exam and refraction.  General eye examination.     No ocular/vision problem.  Does not wear glasses at all 2/2 monovision   effect following cataract surgery            Comments   Patient's age: 87y.o. WF  Occupation: Re[employer]red  Approximate date of last eye examination: 06/06/16  Name of last eye doctor seen: Dr. Graham   City/State: [facility]  Wears glasses? No  Wears CLs?:  No  Headaches?  No  Eye pain/discomfort?  No                                                                                     Flashes?  No  Floaters?  No  Diplopia/Double vision?  No  Patient's Ocular History:         Any eye surgeries? Cataract SX OU - Monovision          Any eye injury?  No         Any treatment for eye disease?  No  Family history of eye disease?  No  Significant patient medical history:         1. Diabetes?  No   2. HBP?  No              3. Other (describe): none reported   ! OTC eyedrops currently using:  None   ! Prescription eye meds currently using:  None   ! Any history of allergy/adverse reaction to any eye meds used   previously?  No    ! Any history of allergy/adverse reaction to eyedrops used during prior   eye exam(s)? No    ! Any history of allergy/adverse reaction to Novacaine or similar meds?   No   ! Any history of allergy/adverse reaction to Epinephrine or similar meds?   No    ! Patient okay with use of anesthetic eyedrops to check eye pressure?    Yes        ! Patient okay with use of eyedrops to dilate pupils today?  Yes   !  Allergies/Medications/Medical History/Family History reviewed today?    Yes      PD =   65/61  Desired reading distance =  14"                                                                  Last edited by Tony Graham, OD on 6/7/2017 11:26 AM. (History)            Assessment /Plan     For exam results, see Encounter Report.    1. Posterior capsular opacification non visually significant of both eyes     2. S/P " cataract surgery, left     3. S/P cataract surgery, right     4. Pseudophakia of both eyes     5. Regular astigmatism of both eyes                  S/P cataract surgery in both eyes.    Mild to moderate clouding of intact posterior lens capsule in each eye.  No compelling need for YAG laser posterior capsulotomy in either eye at this time.  Astigmatic refractive error in each eye, with resultant monovision-type effect.  Very satisfactory correctable VA in each eye,   New spectacle lens Rx issued for use as desired, but Mrs. Peters perceives no need for spectacle lenses, as she is pleased with her unaided VA at distance and at near without correction.  Recheck in one year - or prior if any problems in the interim.

## 2017-06-08 ENCOUNTER — TELEPHONE (OUTPATIENT)
Dept: CARDIOLOGY | Facility: CLINIC | Age: 82
End: 2017-06-08

## 2017-06-08 NOTE — TELEPHONE ENCOUNTER
Returned pt's call. She wanted to clarify directions on Carvedilol and chol med. All questions were answered.

## 2017-06-08 NOTE — TELEPHONE ENCOUNTER
----- Message from Melita Cuba sent at 6/8/2017  8:11 AM CDT -----  Contact: patient  Please call pt at [phone]. Need to discuss medication concern and only want to speak to you     Thank you

## 2017-06-09 ENCOUNTER — TELEPHONE (OUTPATIENT)
Dept: INTERNAL MEDICINE | Facility: CLINIC | Age: 82
End: 2017-06-09

## 2017-06-09 NOTE — TELEPHONE ENCOUNTER
----- Message from Medina Jackson MA sent at 6/9/2017  7:44 AM CDT -----  Contact: self - [phone]  Requesting to speak with the MA regarding her feet burning and getting an appt with Dr Treadwell as soon as possible. Please call. Thanks!

## 2017-06-09 NOTE — TELEPHONE ENCOUNTER
Patient was just seen by cardiology and vascular lab was all studies normal. I see in the note she was told to wear support stocking daily which she admits she has not been doing. These sx's have been going on since last year. I explained that  She must try wearing these on a daily basis she agreed and will call next week to let me know if there is any improvement.

## 2017-06-12 ENCOUNTER — APPOINTMENT (OUTPATIENT)
Dept: RADIOLOGY | Facility: CLINIC | Age: 82
End: 2017-06-12
Attending: NURSE PRACTITIONER
Payer: MEDICARE

## 2017-06-12 DIAGNOSIS — M81.0 OSTEOPOROSIS, UNSPECIFIED OSTEOPOROSIS TYPE, UNSPECIFIED PATHOLOGICAL FRACTURE PRESENCE: ICD-10-CM

## 2017-06-12 PROCEDURE — 77080 DXA BONE DENSITY AXIAL: CPT | Mod: TC

## 2017-06-12 PROCEDURE — 77080 DXA BONE DENSITY AXIAL: CPT | Mod: 26,,, | Performed by: INTERNAL MEDICINE

## 2017-06-30 RX ORDER — CARVEDILOL 6.25 MG/1
6.25 TABLET ORAL 2 TIMES DAILY WITH MEALS
Qty: 180 TABLET | Refills: 3 | Status: SHIPPED | OUTPATIENT
Start: 2017-06-30 | End: 2018-06-25 | Stop reason: SDUPTHER

## 2017-06-30 RX ORDER — ATORVASTATIN CALCIUM 40 MG/1
40 TABLET, FILM COATED ORAL DAILY
Qty: 90 TABLET | Refills: 3 | Status: SHIPPED | OUTPATIENT
Start: 2017-06-30 | End: 2017-07-05 | Stop reason: SDUPTHER

## 2017-07-05 RX ORDER — ATORVASTATIN CALCIUM 40 MG/1
TABLET, FILM COATED ORAL
Qty: 90 TABLET | Refills: 3 | Status: SHIPPED | OUTPATIENT
Start: 2017-07-05 | End: 2018-08-11 | Stop reason: SDUPTHER

## 2017-07-07 RX ORDER — AMLODIPINE BESYLATE 5 MG/1
5 TABLET ORAL NIGHTLY
Qty: 90 TABLET | Refills: 3 | Status: SHIPPED | OUTPATIENT
Start: 2017-07-07 | End: 2017-09-07 | Stop reason: DRUGHIGH

## 2017-07-12 ENCOUNTER — TELEPHONE (OUTPATIENT)
Dept: INTERNAL MEDICINE | Facility: CLINIC | Age: 82
End: 2017-07-12

## 2017-07-12 DIAGNOSIS — M81.0 OSTEOPOROSIS, UNSPECIFIED OSTEOPOROSIS TYPE, UNSPECIFIED PATHOLOGICAL FRACTURE PRESENCE: Primary | ICD-10-CM

## 2017-07-12 NOTE — TELEPHONE ENCOUNTER
----- Message from FERNANDO Gagnon sent at 6/20/2017  9:29 AM CDT -----  Mariel Peters was seen for an HRA visit recently.  A DEXA screening was ordered as a part of this assessment.  I have forwarded the results to you for your review and for further treatment should you deem it appropriate.    Thank you for allowing me to participate in the care of this patient.    Sincerely,    FERNANDO Rodrigues    ----- Message -----  From: Interface, Rad Results In  Sent: 6/20/2017   9:28 AM  To: FERNANDO Gagnon

## 2017-07-12 NOTE — TELEPHONE ENCOUNTER
----- Message from Naima Torres sent at 7/12/2017 12:40 PM CDT -----  Contact: Self/ [phone]   Type: Returning a call    Who left a message? Dr. Treadwell     When did the practice call? Today     Comments: pt stated she miss a call from the office. Please call and advise     Thank you

## 2017-07-12 NOTE — TELEPHONE ENCOUNTER
Mrs Peters said you tried to call her she has a 2:00 appt today at the dentist can you please call he later?

## 2017-08-02 ENCOUNTER — HOSPITAL ENCOUNTER (OUTPATIENT)
Dept: UROLOGY | Facility: HOSPITAL | Age: 82
Discharge: HOME | End: 2017-08-02
Attending: UROLOGY
Payer: MEDICARE

## 2017-08-02 VITALS
DIASTOLIC BLOOD PRESSURE: 67 MMHG | TEMPERATURE: 98 F | BODY MASS INDEX: 23.04 KG/M2 | WEIGHT: 130.06 LBS | RESPIRATION RATE: 16 BRPM | HEIGHT: 63 IN | HEART RATE: 59 BPM | SYSTOLIC BLOOD PRESSURE: 144 MMHG

## 2017-08-02 DIAGNOSIS — C67.9 MALIGNANT NEOPLASM OF URINARY BLADDER, UNSPECIFIED SITE: ICD-10-CM

## 2017-08-02 PROCEDURE — 52000 CYSTOURETHROSCOPY: CPT | Mod: ,,, | Performed by: UROLOGY

## 2017-08-02 PROCEDURE — 52000 CYSTOURETHROSCOPY: CPT

## 2017-08-02 RX ORDER — DOXYCYCLINE HYCLATE 100 MG
100 TABLET ORAL
Status: COMPLETED | OUTPATIENT
Start: 2017-08-02 | End: 2017-08-02

## 2017-08-02 RX ORDER — LIDOCAINE HYDROCHLORIDE 20 MG/ML
10 JELLY TOPICAL
Status: COMPLETED | OUTPATIENT
Start: 2017-08-02 | End: 2017-08-02

## 2017-08-02 RX ADMIN — Medication 100 MG: at 10:08

## 2017-08-02 RX ADMIN — LIDOCAINE HYDROCHLORIDE 10 ML: 20 JELLY TOPICAL at 09:08

## 2017-08-02 NOTE — PATIENT INSTRUCTIONS
What to Expect After a Cystoscopy  For the next 24-48 hours, you may feel a mild burning when you urinate. This burning is normal and expected. Drink plenty of water to dilute the urine to help relieve the burning sensation. You may also see a small amount of blood in your urine after the procedure.    Unless you are already taking antibiotics, you may be given an antibiotic after the test to prevent infection.    Signs and Symptoms to Report  Call the [facility] at [phone] if you develop any of the following:  · Fever of 101 degrees or higher  · Chills or persistent bleeding  · Inability to urinate

## 2017-08-02 NOTE — PROCEDURES
Procedure: Flexible cysto-uretheroscopy    Pre Procedure Diagnosis:urothelial cell CA    Post Procedure Diagnosis:Normal cystoscopy    Surgeon: Gracia Becerra MD    Anesthesia: 2% uro-jet lidocaine jelly for local analgesia    Flexible cysto-urethroscopy was performed after consent was obtained.  The risks and benefits were explained.    2% lidocaine urojet was used for local analgesia.  The genitalia was prepped and draped in the sterile fashion with betadine.    The flexible scope was advanced into the urethra and into the bladder.  Bilateral ureteral orifice were evaluated and noted to be normal with clear efflux.  The bladder was completely surveyed in a systematic fashion.   No bladder tumors or lesions were seen. Trabeculations noted. Scar from previous surgery  No strictures were noted.    The patient tolerated the procedure well without complication.    They will follow up in 1 year with renal ultrasound and cystoscopy

## 2017-08-02 NOTE — H&P
Patient ID: Elen Peters is a 87 y.o. female.     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  Last cysto was 7/16. Ultrasound 6/16. All showed no recurrence of cancer.      No gross hematuria.   No weight loss.   No LUTS.     Past Medical History:   Diagnosis Date    Abnormal stress test 11/18/2015    Arthritis     Bladder cancer     Cataract     Coronary artery disease involving native coronary artery 1/6/2016    Depression     GERD (gastroesophageal reflux disease)     HTN (hypertension), benign 11/18/2015    Hx of bladder infections     Hyperlipemia     OP (osteoporosis)     Positive cardiac stress test 1/6/2016    Renal cancer     Syncope 1/6/2016    Upper back pain 12/1/2015    Ureteral cancer     Venous insufficiency 2017    Villous adenoma of colon 5/31/2013       Past Surgical History:   Procedure Laterality Date    APPENDECTOMY      BACK SURGERY      CATARACT EXTRACTION W/  INTRAOCULAR LENS IMPLANT Left 3/16/15    kristy    CATARACT EXTRACTION W/  INTRAOCULAR LENS IMPLANT Right 4/6/15    kristy    COLONOSCOPY  10/21/2013    CYSTOSCOPY      NEPHRECTOMY      L    TONSILLECTOMY, ADENOIDECTOMY         Family History   Problem Relation Age of Onset    Leukemia Mother     Heart disease Mother     Heart attack Mother     Cancer Mother      leukemia    Goiter Mother     Leukemia Father     Cancer Father      leukemia    Heart disease Maternal Grandfather     No Known Problems Brother     No Known Problems Son     No Known Problems Son     No Known Problems Son     Cancer Son      throat    No Known Problems Son     No Known Problems Son     No Known Problems Maternal Aunt     No Known Problems Maternal Uncle     No Known Problems Paternal Aunt     No Known Problems Paternal Uncle     No Known Problems Maternal Grandmother     No Known  Problems Paternal Grandmother     No Known Problems Paternal Grandfather     No Known Problems Sister     Amblyopia Neg Hx     Blindness Neg Hx     Cataracts Neg Hx     Diabetes Neg Hx     Glaucoma Neg Hx     Hypertension Neg Hx     Macular degeneration Neg Hx     Retinal detachment Neg Hx     Strabismus Neg Hx     Stroke Neg Hx     Thyroid disease Neg Hx     Breast cancer Neg Hx        Social History     Social History    Marital status:      Spouse name: N/A    Number of children: N/A    Years of education: N/A     Occupational History    retired      Social History Main Topics    Smoking status: Never Smoker    Smokeless tobacco: Never Used    Alcohol use Yes      Comment: vodka with orange juice; 1 glass of red wine 1 x month    Drug use: No    Sexual activity: Not Currently     Other Topics Concern    Not on file     Social History Narrative    No narrative on file       Allergies:  Sulfa (sulfonamide antibiotics) and Codeine    Medications:    Current Outpatient Prescriptions:     amlodipine (NORVASC) 5 MG tablet, Take 1 tablet (5 mg total) by mouth every evening., Disp: 90 tablet, Rfl: 3    aspirin (ECOTRIN) 81 MG EC tablet, Take 81 mg by mouth once daily., Disp: , Rfl:     atorvastatin (LIPITOR) 40 MG tablet, Take one tablet daily or as directed., Disp: 90 tablet, Rfl: 3    calcium-magnesium-zinc Tab, Take 2 tablets by mouth once daily at 6am. 1 Tablet Oral Every day, Disp: , Rfl:     carvedilol (COREG) 6.25 MG tablet, Take 1 tablet (6.25 mg total) by mouth 2 (two) times daily with meals. One by mouth twice daily or as directed, Disp: 180 tablet, Rfl: 3    citalopram (CELEXA) 20 MG tablet, TAKE ONE TABLET BY MOUTH EVERY DAY, Disp: 90 tablet, Rfl: 3    mv-min-folic acid-lutein (THERAGRAN-M ADVANCED 50 PLUS) 0.4-250 mg-mcg Tab, Take by mouth. 1 Tablet Oral Every day, Disp: , Rfl:     omega-3 fatty acids 1,000 mg Cap, Take 1 capsule by mouth once daily. 2  Capsule Oral  Every day, Disp: , Rfl:     zolpidem (AMBIEN) 5 MG Tab, TAKE 1 TABLET BY MOUTH EVERY NIGHT AT BEDTIME, Disp: 30 tablet, Rfl: 3    nitroGLYCERIN (NITROSTAT) 0.4 MG SL tablet, Place 1 tablet (0.4 mg total) under the tongue every 5 (five) minutes as needed for Chest pain., Disp: 25 tablet, Rfl: 3  No current facility-administered medications for this encounter.      Review of Systems   Constitutional: Negative for fever, chills and unexpected weight change.   HENT: Negative for nosebleeds.   Eyes: Negative for visual disturbance.   Respiratory: Negative for chest tightness.   Cardiovascular: Negative for chest pain.   Gastrointestinal: Negative for diarrhea.   Genitourinary: Negative for dysuria, urgency, frequency, hematuria and nocturia.   Musculoskeletal: Negative for myalgias.   Skin: Negative for rash.   Neurological:  Negative for seizures.   Hematological: Does not bruise/bleed easily.   Psychiatric/Behavioral: Negative for behavioral problems.       Objective:      Physical Exam   Vitals reviewed.  Constitutional: She is oriented to person, place, and time. She appears well-developed and well-nourished.   HENT:   Head: Normocephalic and atraumatic.   Eyes: No scleral icterus.   Cardiovascular: Normal rate and regular rhythm.   Pulmonary/Chest: Effort normal. No respiratory distress.   Abdominal: She exhibits no mass.   Musculoskeletal: She exhibits no tenderness.   Lymphadenopathy:   She has no cervical adenopathy.   Neurological: She is alert and oriented to person, place, and time.   Skin: Skin is warm and dry. No rash noted.     Skin tenting   Psychiatric: She has a normal mood and affect.     urine dip trace blood  Assessment:       1. Bladder cancer        Plan:     F/u 1 year with cysto, renal ultrasound.

## 2017-08-09 ENCOUNTER — HOSPITAL ENCOUNTER (OUTPATIENT)
Dept: RADIOLOGY | Facility: HOSPITAL | Age: 82
Discharge: HOME | End: 2017-08-09
Attending: UROLOGY
Payer: MEDICARE

## 2017-08-09 DIAGNOSIS — C67.9 MALIGNANT NEOPLASM OF URINARY BLADDER, UNSPECIFIED SITE: ICD-10-CM

## 2017-08-09 PROCEDURE — 76770 US EXAM ABDO BACK WALL COMP: CPT | Mod: 26,,, | Performed by: RADIOLOGY

## 2017-08-09 PROCEDURE — 76770 US EXAM ABDO BACK WALL COMP: CPT | Mod: TC

## 2017-08-11 ENCOUNTER — TELEPHONE (OUTPATIENT)
Dept: INTERNAL MEDICINE | Facility: CLINIC | Age: 82
End: 2017-08-11

## 2017-08-11 RX ORDER — ALENDRONATE SODIUM 70 MG/1
70 TABLET ORAL
Qty: 4 TABLET | Refills: 11 | Status: SHIPPED | OUTPATIENT
Start: 2017-08-11 | End: 2018-05-29 | Stop reason: SDUPTHER

## 2017-08-11 NOTE — TELEPHONE ENCOUNTER
----- Message from Naima Torres sent at 8/11/2017  8:57 AM CDT -----  Contact: Self/ [phone] after 12   Pt want to speak with someone in the office about receiving a doctor certificate to receive some insurance. Please call and advise     Thank you

## 2017-08-11 NOTE — TELEPHONE ENCOUNTER
Pt willing to try Fosamax-  Sent    Might need to change amlodipine due to leg swelling-  Will discuss at next visit-    Afraid to change meds because she feels so well

## 2017-08-14 ENCOUNTER — PATIENT MESSAGE (OUTPATIENT)
Dept: UROLOGY | Facility: CLINIC | Age: 82
End: 2017-08-14

## 2017-09-01 ENCOUNTER — TELEPHONE (OUTPATIENT)
Dept: INTERNAL MEDICINE | Facility: CLINIC | Age: 82
End: 2017-09-01

## 2017-09-01 DIAGNOSIS — R60.0 BILATERAL LEG EDEMA: Primary | ICD-10-CM

## 2017-09-01 DIAGNOSIS — Z51.81 MEDICATION MONITORING ENCOUNTER: ICD-10-CM

## 2017-09-01 NOTE — TELEPHONE ENCOUNTER
----- Message from Hal Salmon sent at 8/31/2017  3:47 PM CDT -----  Contact: self [phone]  Patient would like a call back from the office in regards to an appointment with her heart doctor   . Please call and advise , Thanks !

## 2017-09-05 NOTE — TELEPHONE ENCOUNTER
Should she see a new cardiologist was told to follow up the Dr is no longer here burning feet edema in ankles. Dr Cartagena said he doesn't handle this,

## 2017-09-06 NOTE — TELEPHONE ENCOUNTER
Spoke with patient stockings do not help and she exercises every day. Will try the change in medication.

## 2017-09-06 NOTE — TELEPHONE ENCOUNTER
They recommended compression stockings and exercise.  We can change her BP med (might help) as another alternative.

## 2017-09-07 RX ORDER — LISINOPRIL 10 MG/1
10 TABLET ORAL DAILY
Qty: 30 TABLET | Refills: 12 | Status: SHIPPED | OUTPATIENT
Start: 2017-09-07 | End: 2017-10-18 | Stop reason: SDUPTHER

## 2017-09-08 ENCOUNTER — TELEPHONE (OUTPATIENT)
Dept: INTERNAL MEDICINE | Facility: CLINIC | Age: 82
End: 2017-09-08

## 2017-09-08 ENCOUNTER — TELEPHONE (OUTPATIENT)
Dept: CARDIOLOGY | Facility: CLINIC | Age: 82
End: 2017-09-08

## 2017-09-08 NOTE — TELEPHONE ENCOUNTER
Dr. Cartagena, The pt says that she called Dr. Minor about her swollen and red ankles and he stopped amlodipine and started her on lisinopril 10 mg daily - wanted you to know. Also says that she reviewed her med list with Dr. Treadwell's nurse and the dose for carvedilol on her list is 6.25 mg twice a day - says that she is sure that you told her to take half of that dose - but is calling to make sure. Instructions say as directed. Please advise. Thanks, Lia

## 2017-09-08 NOTE — TELEPHONE ENCOUNTER
Patient notified to stop the amlodipine and start lisinopril patient repeated the directions and expressed understanding. Notify us if any problems prior to appt in Oct.

## 2017-09-08 NOTE — TELEPHONE ENCOUNTER
"Message given from Dr. Cartagena, - "all fine - can increase lisinopril if needed. Also if BP typically above 135/can also add back 1/2 amlodipine at night". Dr. Cartagena's message given to the pt - says that her BP has been good, but will call with any concerns.   "

## 2017-09-08 NOTE — TELEPHONE ENCOUNTER
----- Message from Suki Uribe MA sent at 9/8/2017  1:38 PM CDT -----  Contact: patient called  Lia please call the patient at home she need clarification on her medication Carvedilol. Last visit was on 11- . Thank you.

## 2017-09-11 ENCOUNTER — TELEPHONE (OUTPATIENT)
Dept: CARDIOLOGY | Facility: HOSPITAL | Age: 82
End: 2017-09-11

## 2017-09-11 ENCOUNTER — TELEPHONE (OUTPATIENT)
Dept: CARDIOLOGY | Facility: CLINIC | Age: 82
End: 2017-09-11

## 2017-09-11 NOTE — TELEPHONE ENCOUNTER
Edema better //70   Don't know what else to do.Said there is 2 new vasc Dr's 1 in [facility] on [facility] for the burning feet

## 2017-09-11 NOTE — TELEPHONE ENCOUNTER
Note      Patient notified to stop the amlodipine and start lisinopril patient repeated the directions and expressed understanding. Notify us if any problems prior to appt in Oct.              ----- Message from Naima Torres sent at 9/11/2017  3:43 PM CDT -----  Contact: Self/ [phone]   Pt want to let the doctor know her blood pressure went up. She want to talk to someone about it. Please call and advise     Thank you

## 2017-09-11 NOTE — TELEPHONE ENCOUNTER
Pt said she made a mistake,she meant to call her PCP and she was on the other line with her PCP when I called her so she said never mind.

## 2017-09-11 NOTE — TELEPHONE ENCOUNTER
----- Message from Noa Ng MA sent at 9/11/2017  2:17 PM CDT -----  Contact: self  Pt. was told to call if b/p goes over 135. Pt took b/p this pm 153/70,pulse 63. No other symptoms. States feet are burning. Please call [phone]  pt. Last visit 11-2-9-16.

## 2017-09-12 NOTE — TELEPHONE ENCOUNTER
----- Message from Li Christiansen RN sent at 9/11/2017  3:39 PM CDT -----  Regarding: Vascular Medicine Patient  Patient is past patient of Dr. Khalil looking for sooner appt with Dr. Price.    Thanks!  Li

## 2017-09-15 RX ORDER — ZOLPIDEM TARTRATE 5 MG/1
5 TABLET ORAL NIGHTLY
Qty: 30 TABLET | Refills: 3 | Status: SHIPPED | OUTPATIENT
Start: 2017-09-15 | End: 2018-11-17 | Stop reason: SDUPTHER

## 2017-09-18 ENCOUNTER — OFFICE VISIT (OUTPATIENT)
Dept: CARDIOLOGY | Facility: CLINIC | Age: 82
End: 2017-09-18
Payer: MEDICARE

## 2017-09-18 ENCOUNTER — TELEPHONE (OUTPATIENT)
Dept: INTERNAL MEDICINE | Facility: CLINIC | Age: 82
End: 2017-09-18

## 2017-09-18 VITALS
SYSTOLIC BLOOD PRESSURE: 150 MMHG | HEART RATE: 60 BPM | DIASTOLIC BLOOD PRESSURE: 70 MMHG | BODY MASS INDEX: 23.37 KG/M2 | WEIGHT: 127 LBS | HEIGHT: 62 IN

## 2017-09-18 DIAGNOSIS — M79.605 PAIN IN BOTH LOWER EXTREMITIES: Primary | ICD-10-CM

## 2017-09-18 DIAGNOSIS — I87.2 VENOUS INSUFFICIENCY OF BOTH LOWER EXTREMITIES: ICD-10-CM

## 2017-09-18 DIAGNOSIS — I25.10 CORONARY ARTERY DISEASE INVOLVING NATIVE CORONARY ARTERY OF NATIVE HEART WITHOUT ANGINA PECTORIS: ICD-10-CM

## 2017-09-18 DIAGNOSIS — E78.2 MIXED HYPERLIPIDEMIA: ICD-10-CM

## 2017-09-18 DIAGNOSIS — I10 HTN (HYPERTENSION), BENIGN: ICD-10-CM

## 2017-09-18 DIAGNOSIS — I70.0 AORTIC ATHEROSCLEROSIS: ICD-10-CM

## 2017-09-18 DIAGNOSIS — M79.89 LEG SWELLING: ICD-10-CM

## 2017-09-18 DIAGNOSIS — M79.604 PAIN IN BOTH LOWER EXTREMITIES: Primary | ICD-10-CM

## 2017-09-18 PROCEDURE — 3008F BODY MASS INDEX DOCD: CPT | Mod: S$GLB,,, | Performed by: INTERNAL MEDICINE

## 2017-09-18 PROCEDURE — 99999 PR PBB SHADOW E&M-EST. PATIENT-LVL III: CPT | Mod: PBBFAC,,, | Performed by: INTERNAL MEDICINE

## 2017-09-18 PROCEDURE — 1159F MED LIST DOCD IN RCRD: CPT | Mod: S$GLB,,, | Performed by: INTERNAL MEDICINE

## 2017-09-18 PROCEDURE — 99214 OFFICE O/P EST MOD 30 MIN: CPT | Mod: S$GLB,,, | Performed by: INTERNAL MEDICINE

## 2017-09-18 PROCEDURE — 99499 UNLISTED E&M SERVICE: CPT | Mod: S$GLB,,, | Performed by: INTERNAL MEDICINE

## 2017-09-18 PROCEDURE — 1126F AMNT PAIN NOTED NONE PRSNT: CPT | Mod: S$GLB,,, | Performed by: INTERNAL MEDICINE

## 2017-09-18 NOTE — TELEPHONE ENCOUNTER
----- Message from Carmen Terrell sent at 9/18/2017 10:07 AM CDT -----  Contact: Patient [phone]  Patient was calling to give you an update on her visit this morning with cardiology.    Please call and advise.    Thank You

## 2017-09-18 NOTE — PROGRESS NOTES
Subjective:   Patient ID:  Elen Peters is a 88 y.o. female who presents for follow up of limb pain; Chronic Venous Insufficiency; and Hypertension      HPI:     Elen Peters 88 y.o. female is here follow up and feeling well without any new complaints. She continues to complaint of bilateral leg pain with ambulation. She has edema as well after a long day. Overall edema is better after she stopped taking norvasc. Venous ultrasound in 2016 when her problem started did not reveal any VTE. There was no mention reflux disease. She wears stockings but continues to have pain. No narcotics. No ulcers.         Care team:      Dr. LILI Treadwell          Patient Active Problem List    Diagnosis Date Noted    Pain in both lower extremities 2017    Anorexia 2016    Leg swelling     Autonomic postural hypotension 2016    Coronary artery disease involving native coronary artery 2016    Gait instability 2015    HTN (hypertension), benign 2015    Aortic atherosclerosis 2015    Nuclear sclerosis 2015    Depression, major, in remission 2014     doing much better on Celexa-  now making managememt plans for ill       Cortical senile cataract - Both Eyes 2013    GERD (gastroesophageal reflux disease)     Mixed hyperlipidemia     OP (osteoporosis)            Right Arm BP - Sittin/70  Left Arm BP - Sittin/72        LABS    LAST HbA1c  Lab Results   Component Value Date    HGBA1C 5.8 2010       Lipid panel  Lab Results   Component Value Date    CHOL 162 05/10/2017    CHOL 175 2017    CHOL 174 2016     Lab Results   Component Value Date    HDL 63 05/10/2017    HDL 62 2017    HDL 68 2016     Lab Results   Component Value Date    LDLCALC 86.0 05/10/2017    LDLCALC 93.2 2017    LDLCALC 91.4 2016     Lab Results   Component Value Date    TRIG 65 05/10/2017    TRIG 99 2017    TRIG 73  11/23/2016     Lab Results   Component Value Date    CHOLHDL 38.9 05/10/2017    CHOLHDL 35.4 03/06/2017    CHOLHDL 39.1 11/23/2016            Review of Systems   Constitution: Negative for diaphoresis, weakness, night sweats, weight gain and weight loss.   HENT: Negative for congestion.    Eyes: Negative for blurred vision, discharge and double vision.   Cardiovascular: Positive for claudication and leg swelling. Negative for chest pain, cyanosis, dyspnea on exertion, irregular heartbeat, near-syncope, orthopnea, palpitations, paroxysmal nocturnal dyspnea and syncope.   Respiratory: Negative for cough, shortness of breath and wheezing.    Endocrine: Negative for cold intolerance, heat intolerance and polyphagia.   Hematologic/Lymphatic: Negative for adenopathy and bleeding problem. Does not bruise/bleed easily.   Skin: Negative for dry skin and nail changes.   Musculoskeletal: Negative for arthritis, back pain, falls, joint pain, myalgias and neck pain.   Gastrointestinal: Negative for bloating, abdominal pain, change in bowel habit and constipation.   Genitourinary: Negative for bladder incontinence, dysuria, flank pain, genital sores and missed menses.   Neurological: Positive for paresthesias (feet). Negative for aphonia, brief paralysis, difficulty with concentration and dizziness.   Psychiatric/Behavioral: Negative for altered mental status and memory loss. The patient does not have insomnia.    Allergic/Immunologic: Negative for environmental allergies.       Objective:   Physical Exam   Constitutional: She is oriented to person, place, and time. She appears well-developed and well-nourished. She is not intubated.   HENT:   Head: Normocephalic and atraumatic.   Right Ear: External ear normal.   Left Ear: External ear normal.   Mouth/Throat: Oropharynx is clear and moist.   Eyes: Conjunctivae and EOM are normal. Pupils are equal, round, and reactive to light. Right eye exhibits no discharge. Left eye exhibits  no discharge. No scleral icterus.   Neck: Normal range of motion. Neck supple. Normal carotid pulses, no hepatojugular reflux and no JVD present. Carotid bruit is not present. No tracheal deviation present. No thyromegaly present.   Cardiovascular: Normal rate, regular rhythm, S1 normal and S2 normal.   No extrasystoles are present. PMI is not displaced.  Exam reveals no gallop, no S3, no distant heart sounds, no friction rub and no midsystolic click.    No murmur heard.  Pulses:       Carotid pulses are 2+ on the right side, and 2+ on the left side.       Radial pulses are 2+ on the right side, and 2+ on the left side.        Femoral pulses are 2+ on the right side, and 2+ on the left side.       Popliteal pulses are 2+ on the right side, and 2+ on the left side.        Dorsalis pedis pulses are 1+ on the right side, and 1+ on the left side.        Posterior tibial pulses are 1+ on the right side, and 1+ on the left side.   Pulmonary/Chest: Effort normal and breath sounds normal. No accessory muscle usage or stridor. No apnea, no tachypnea and no bradypnea. She is not intubated. No respiratory distress. She has no decreased breath sounds. She has no wheezes. She has no rales. She exhibits no tenderness and no bony tenderness.   Abdominal: She exhibits no distension, no pulsatile liver, no abdominal bruit, no ascites, no pulsatile midline mass and no mass. There is no tenderness. There is no rebound and no guarding.   Musculoskeletal: Normal range of motion. She exhibits no edema or tenderness.   Lymphadenopathy:     She has no cervical adenopathy.   Neurological: She is alert and oriented to person, place, and time. She has normal reflexes. No cranial nerve deficit. Coordination normal.   Skin: Skin is warm. No rash noted. No erythema. No pallor.   Psychiatric: She has a normal mood and affect. Her behavior is normal. Judgment and thought content normal.       Assessment:     1. Pain in both lower extremities    2.  Mixed hyperlipidemia    3. Aortic atherosclerosis    4. HTN (hypertension), benign    5. Coronary artery disease involving native coronary artery of native heart without angina pectoris    6. Leg swelling    7. Venous insufficiency of both lower extremities        Plan:         Obtain a dedicated venous reflux ultrasound study   If she has superficial reflux disease she will benefit from EVLT or sclerotherapy      Compression stockings 20-30 mmHg  PCP and primary cardiology will continue to manage HTN       Continue with current medical plan and lifestyle changes.  Return sooner for concerns or questions. If symptoms persist go to the ED  I have reviewed all pertinent data on this patient       I have reviewed the patient's medical history in detail and updated the computerized patient record.    Orders Placed This Encounter   Procedures    US Lower Extremity Veins Bilateral Insuf     Standing Status:   Future     Standing Expiration Date:   9/18/2018     Order Specific Question:   May the Radiologist modify the order per protocol to meet the clinical needs of the patient?     Answer:   Yes       Follow up as scheduled. Return sooner for concerns or questions            She expressed verbal understanding and agreed with the plan        Patient's Medications   New Prescriptions    No medications on file   Previous Medications    ALENDRONATE (FOSAMAX) 70 MG TABLET    Take 1 tablet (70 mg total) by mouth every 7 days.    ASPIRIN (ECOTRIN) 81 MG EC TABLET    Take 81 mg by mouth once daily.    ATORVASTATIN (LIPITOR) 40 MG TABLET    Take one tablet daily or as directed.    CALCIUM-MAGNESIUM-ZINC TAB    Take 2 tablets by mouth once daily at 6am. 1 Tablet Oral Every day    CARVEDILOL (COREG) 6.25 MG TABLET    Take 1 tablet (6.25 mg total) by mouth 2 (two) times daily with meals. One by mouth twice daily or as directed    CITALOPRAM (CELEXA) 20 MG TABLET    TAKE ONE TABLET BY MOUTH EVERY DAY    LISINOPRIL 10 MG TABLET     Take 1 tablet (10 mg total) by mouth once daily.    MV-MIN-FOLIC ACID-LUTEIN (THERAGRAN-M ADVANCED 50 PLUS) 0.4-250 MG-MCG TAB    Take by mouth. 1 Tablet Oral Every day    NITROGLYCERIN (NITROSTAT) 0.4 MG SL TABLET    Place 1 tablet (0.4 mg total) under the tongue every 5 (five) minutes as needed for Chest pain.    OMEGA-3 FATTY ACIDS 1,000 MG CAP    Take 1 capsule by mouth once daily. 2  Capsule Oral Every day    ZOLPIDEM (AMBIEN) 5 MG TAB    Take 1 tablet (5 mg total) by mouth nightly.   Modified Medications    No medications on file   Discontinued Medications    No medications on file

## 2017-09-18 NOTE — PATIENT INSTRUCTIONS
Understanding Chronic Venous Insufficiency  Problems with the veins in the legs may lead to chronic venous insufficiency (CVI). CVI means that there is a long-term problem with the veins not being able to pump blood back to your heart. When this happens, blood stays in the legs and causes swelling and aching.   Two problems that may lead to chronic venous insufficiency are:  · Damaged valves. Valves keep blood flowing from the legs through the blood vessels and back to the heart. When the valves are damaged, blood does not flow as well.   · Deep vein thrombosis (DVT). Blood clots may form in the deep veins of the legs. This may cause pain, redness, and swelling in the legs. It may also block the flow of blood back to the heart. Seek immediate medical care if you have these symptoms.  · A blood clot in the leg can also break off and travel to the lungs. This is called pulmonary embolism (PE). In the lungs, the clot can cut off the flow of blood. This may cause chest pain, trouble breathing, sweating, a fast heartbeat, coughing (may cough up blood), and fainting. It is a medical emergency and may cause death. Call 911 if you have these symptoms.  · Healthcare providers call the two conditions, DVT and PE, venous thromboembolism (VTE).  CVI cant be cured, but you can control leg swelling to reduce the likelihood of ulcers (sores).  Recognizing the symptoms  Be aware of the following:  · If you stand or sit with your feet down for long periods, your legs may ache or feel heavy.  · Swollen ankles are possibly the most common symptom of CVI.  · As swelling increases, the skin over your ankles may show red spots or a brownish tinge. The skin may feel leathery or scaly, and may start to itch.  · If swelling is not controlled, an ulcer (open wound) may form.  What you can do  Reduce your risk of developing ulcers by doing the following:  · Increase blood flow back to your heart by elevating your legs, exercising daily,  and wearing elastic stockings.  · Boost blood flow in your legs by losing excess weight.  · If you must stand or sit in one place for a period of time, keep your blood moving by wiggling your toes, shifting your body position, and rising up on the balls of your feet.    Date Last Reviewed: 5/1/2016  © 0050-7414 [employer]. [address] [place] [address], [place] [address]. All rights reserved. This information is not intended as a substitute for professional medical care. Always follow your healthcare professional's instructions.

## 2017-09-18 NOTE — TELEPHONE ENCOUNTER
----- Message from Carmen Terrell sent at 9/18/2017 10:07 AM CDT -----  Contact: Patient [phone]  Patient was calling to give you an update on her visit this morning with cardiology.    Please call and advise.    Thank You

## 2017-09-22 ENCOUNTER — HOSPITAL ENCOUNTER (OUTPATIENT)
Dept: RADIOLOGY | Facility: HOSPITAL | Age: 82
Discharge: HOME | End: 2017-09-22
Attending: INTERNAL MEDICINE
Payer: MEDICARE

## 2017-09-22 DIAGNOSIS — M79.605 PAIN IN BOTH LOWER EXTREMITIES: ICD-10-CM

## 2017-09-22 DIAGNOSIS — I87.2 VENOUS INSUFFICIENCY OF BOTH LOWER EXTREMITIES: ICD-10-CM

## 2017-09-22 DIAGNOSIS — M79.604 PAIN IN BOTH LOWER EXTREMITIES: ICD-10-CM

## 2017-09-22 PROCEDURE — 93970 EXTREMITY STUDY: CPT | Mod: 26,,, | Performed by: RADIOLOGY

## 2017-09-22 PROCEDURE — 93970 EXTREMITY STUDY: CPT | Mod: TC

## 2017-09-29 ENCOUNTER — TELEPHONE (OUTPATIENT)
Dept: CARDIOLOGY | Facility: CLINIC | Age: 82
End: 2017-09-29

## 2017-09-29 NOTE — TELEPHONE ENCOUNTER
----- Message from Radha Blunt sent at 9/29/2017  8:19 AM CDT -----  Contact: [phone]/ self   Pt requesting ultrasound test results done 09/22/127. Please advise

## 2017-10-02 NOTE — PROGRESS NOTES
There was no evidence of either superficial venous reflux or deep venous thrombosis        Good news        Thanks        ZN

## 2017-10-09 ENCOUNTER — TELEPHONE (OUTPATIENT)
Dept: CARDIOLOGY | Facility: CLINIC | Age: 82
End: 2017-10-09

## 2017-10-09 NOTE — TELEPHONE ENCOUNTER
Returned pt's call. She req. appt in Nov ONLY WITH Dr. Cartagena. Does notb wantb anyone else. Appts were made and mailed.

## 2017-10-09 NOTE — TELEPHONE ENCOUNTER
----- Message from Melita Cuba sent at 10/9/2017 10:38 AM CDT -----  Contact: patient  Attn Lynda  Please call pt at [phone]. Patient has medical questions and also need an appt with Dr Cartagena only in Nov 2017 (recall letter).    Thank you

## 2017-10-10 DIAGNOSIS — I10 HYPERTENSION, UNSPECIFIED TYPE: Primary | ICD-10-CM

## 2017-10-10 DIAGNOSIS — E78.2 MIXED HYPERLIPIDEMIA: Primary | ICD-10-CM

## 2017-10-16 ENCOUNTER — LAB VISIT (OUTPATIENT)
Dept: LAB | Facility: HOSPITAL | Age: 82
End: 2017-10-16
Attending: INTERNAL MEDICINE
Payer: MEDICARE

## 2017-10-16 DIAGNOSIS — Z51.81 MEDICATION MONITORING ENCOUNTER: ICD-10-CM

## 2017-10-16 DIAGNOSIS — I10 HTN (HYPERTENSION), BENIGN: ICD-10-CM

## 2017-10-16 DIAGNOSIS — R63.0 ANOREXIA: ICD-10-CM

## 2017-10-16 DIAGNOSIS — E78.00 PURE HYPERCHOLESTEROLEMIA: ICD-10-CM

## 2017-10-16 DIAGNOSIS — E78.2 MIXED HYPERLIPIDEMIA: ICD-10-CM

## 2017-10-16 DIAGNOSIS — F32.89 OTHER DEPRESSION: ICD-10-CM

## 2017-10-16 DIAGNOSIS — I10 HYPERTENSION, UNSPECIFIED TYPE: ICD-10-CM

## 2017-10-16 DIAGNOSIS — I25.10 CORONARY ARTERY DISEASE INVOLVING NATIVE CORONARY ARTERY OF NATIVE HEART WITHOUT ANGINA PECTORIS: ICD-10-CM

## 2017-10-16 LAB
ALBUMIN SERPL BCP-MCNC: 3.7 G/DL
ALP SERPL-CCNC: 64 U/L
ALT SERPL W/O P-5'-P-CCNC: 15 U/L
ANION GAP SERPL CALC-SCNC: 7 MMOL/L
ANION GAP SERPL CALC-SCNC: 7 MMOL/L
AST SERPL-CCNC: 20 U/L
BASOPHILS # BLD AUTO: 0.04 K/UL
BASOPHILS NFR BLD: 0.7 %
BILIRUB SERPL-MCNC: 0.8 MG/DL
BUN SERPL-MCNC: 14 MG/DL
BUN SERPL-MCNC: 14 MG/DL
CALCIUM SERPL-MCNC: 9.6 MG/DL
CALCIUM SERPL-MCNC: 9.6 MG/DL
CHLORIDE SERPL-SCNC: 101 MMOL/L
CHLORIDE SERPL-SCNC: 101 MMOL/L
CHOLEST SERPL-MCNC: 153 MG/DL
CHOLEST/HDLC SERPL: 2.7 {RATIO}
CO2 SERPL-SCNC: 29 MMOL/L
CO2 SERPL-SCNC: 29 MMOL/L
CREAT SERPL-MCNC: 0.8 MG/DL
CREAT SERPL-MCNC: 0.8 MG/DL
DIFFERENTIAL METHOD: ABNORMAL
EOSINOPHIL # BLD AUTO: 0.1 K/UL
EOSINOPHIL NFR BLD: 2.3 %
ERYTHROCYTE [DISTWIDTH] IN BLOOD BY AUTOMATED COUNT: 13.6 %
EST. GFR  (AFRICAN AMERICAN): >60 ML/MIN/1.73 M^2
EST. GFR  (AFRICAN AMERICAN): >60 ML/MIN/1.73 M^2
EST. GFR  (NON AFRICAN AMERICAN): >60 ML/MIN/1.73 M^2
EST. GFR  (NON AFRICAN AMERICAN): >60 ML/MIN/1.73 M^2
GLUCOSE SERPL-MCNC: 91 MG/DL
GLUCOSE SERPL-MCNC: 91 MG/DL
HCT VFR BLD AUTO: 39.1 %
HDLC SERPL-MCNC: 56 MG/DL
HDLC SERPL: 36.6 %
HGB BLD-MCNC: 12.8 G/DL
IMM GRANULOCYTES # BLD AUTO: 0.02 K/UL
IMM GRANULOCYTES NFR BLD AUTO: 0.3 %
LDLC SERPL CALC-MCNC: 81 MG/DL
LYMPHOCYTES # BLD AUTO: 1.2 K/UL
LYMPHOCYTES NFR BLD: 19.4 %
MCH RBC QN AUTO: 31.2 PG
MCHC RBC AUTO-ENTMCNC: 32.7 G/DL
MCV RBC AUTO: 95 FL
MONOCYTES # BLD AUTO: 0.5 K/UL
MONOCYTES NFR BLD: 7.6 %
NEUTROPHILS # BLD AUTO: 4.2 K/UL
NEUTROPHILS NFR BLD: 69.7 %
NONHDLC SERPL-MCNC: 97 MG/DL
NRBC BLD-RTO: 0 /100 WBC
PLATELET # BLD AUTO: 226 K/UL
PMV BLD AUTO: 11.2 FL
POTASSIUM SERPL-SCNC: 4.1 MMOL/L
POTASSIUM SERPL-SCNC: 4.1 MMOL/L
PROT SERPL-MCNC: 6.8 G/DL
RBC # BLD AUTO: 4.1 M/UL
SODIUM SERPL-SCNC: 137 MMOL/L
SODIUM SERPL-SCNC: 137 MMOL/L
TRIGL SERPL-MCNC: 80 MG/DL
TSH SERPL DL<=0.005 MIU/L-ACNC: 1.48 UIU/ML
WBC # BLD AUTO: 6.04 K/UL

## 2017-10-16 PROCEDURE — 80061 LIPID PANEL: CPT

## 2017-10-16 PROCEDURE — 85025 COMPLETE CBC W/AUTO DIFF WBC: CPT

## 2017-10-16 PROCEDURE — 36415 COLL VENOUS BLD VENIPUNCTURE: CPT | Mod: PO

## 2017-10-16 PROCEDURE — 84443 ASSAY THYROID STIM HORMONE: CPT

## 2017-10-16 PROCEDURE — 80053 COMPREHEN METABOLIC PANEL: CPT

## 2017-10-17 ENCOUNTER — OFFICE VISIT (OUTPATIENT)
Dept: INTERNAL MEDICINE | Facility: CLINIC | Age: 82
End: 2017-10-17
Payer: MEDICARE

## 2017-10-17 ENCOUNTER — HOSPITAL ENCOUNTER (OUTPATIENT)
Dept: CARDIOLOGY | Facility: CLINIC | Age: 82
Discharge: HOME | End: 2017-10-17
Payer: MEDICARE

## 2017-10-17 VITALS
SYSTOLIC BLOOD PRESSURE: 120 MMHG | HEART RATE: 72 BPM | HEIGHT: 62 IN | BODY MASS INDEX: 24.18 KG/M2 | DIASTOLIC BLOOD PRESSURE: 68 MMHG | WEIGHT: 131.38 LBS

## 2017-10-17 DIAGNOSIS — M79.89 LEG SWELLING: Primary | ICD-10-CM

## 2017-10-17 DIAGNOSIS — T50.905D ADVERSE EFFECT OF DRUG, SUBSEQUENT ENCOUNTER: ICD-10-CM

## 2017-10-17 DIAGNOSIS — I25.10 CORONARY ARTERY DISEASE INVOLVING NATIVE CORONARY ARTERY OF NATIVE HEART WITHOUT ANGINA PECTORIS: ICD-10-CM

## 2017-10-17 DIAGNOSIS — I10 HYPERTENSION, UNSPECIFIED TYPE: ICD-10-CM

## 2017-10-17 DIAGNOSIS — E78.00 PURE HYPERCHOLESTEROLEMIA: ICD-10-CM

## 2017-10-17 DIAGNOSIS — M25.511 CHRONIC RIGHT SHOULDER PAIN: ICD-10-CM

## 2017-10-17 DIAGNOSIS — I70.0 AORTIC ATHEROSCLEROSIS: ICD-10-CM

## 2017-10-17 DIAGNOSIS — I10 HTN (HYPERTENSION), BENIGN: ICD-10-CM

## 2017-10-17 DIAGNOSIS — G89.29 CHRONIC RIGHT SHOULDER PAIN: ICD-10-CM

## 2017-10-17 PROCEDURE — 99999 PR PBB SHADOW E&M-EST. PATIENT-LVL III: CPT | Mod: PBBFAC,,, | Performed by: INTERNAL MEDICINE

## 2017-10-17 PROCEDURE — 99213 OFFICE O/P EST LOW 20 MIN: CPT | Mod: 25,S$GLB,, | Performed by: INTERNAL MEDICINE

## 2017-10-17 PROCEDURE — 20605 DRAIN/INJ JOINT/BURSA W/O US: CPT | Mod: RT,S$GLB,, | Performed by: INTERNAL MEDICINE

## 2017-10-17 PROCEDURE — 93000 ELECTROCARDIOGRAM COMPLETE: CPT | Mod: S$GLB,,, | Performed by: INTERNAL MEDICINE

## 2017-10-17 PROCEDURE — 99499 UNLISTED E&M SERVICE: CPT | Mod: S$GLB,,, | Performed by: INTERNAL MEDICINE

## 2017-10-17 RX ADMIN — TRIAMCINOLONE ACETONIDE 40 MG: 40 INJECTION, SUSPENSION INTRA-ARTICULAR; INTRAMUSCULAR at 10:10

## 2017-10-17 NOTE — PROGRESS NOTES
Subjective:   Patient ID:  Elen Peters is a 88 y.o. female who presents for follow-up of CVD    HPI: Patient is here for evaluation and treatment of hypertension/Dys/possible CAD-I was her  Edis's long-term doc.   The patient has no chest pain, SOB, TIA, palpitations, syncope or pre-syncope.  Patient currently exercises many times per week.BPs as low as 120s but most 140-160      Review of Systems   Constitution: Negative for chills, decreased appetite, diaphoresis, fever, weakness, malaise/fatigue, night sweats, weight gain and weight loss.   HENT: Negative for congestion, hoarse voice, nosebleeds, sore throat and tinnitus.    Eyes: Negative for blurred vision, double vision, vision loss in left eye, vision loss in right eye, visual disturbance and visual halos.   Cardiovascular: Negative for chest pain, claudication, cyanosis, dyspnea on exertion, irregular heartbeat, leg swelling, near-syncope, orthopnea, palpitations, paroxysmal nocturnal dyspnea and syncope.   Respiratory: Negative for cough, hemoptysis, shortness of breath, sleep disturbances due to breathing, snoring, sputum production and wheezing.    Endocrine: Negative for cold intolerance, heat intolerance, polydipsia, polyphagia and polyuria.   Hematologic/Lymphatic: Negative for adenopathy and bleeding problem. Does not bruise/bleed easily.   Skin: Negative for color change, dry skin, flushing, itching, nail changes, poor wound healing, rash, skin cancer, suspicious lesions and unusual hair distribution.   Musculoskeletal: Negative for arthritis, back pain, falls, gout, joint pain, joint swelling, muscle cramps, muscle weakness, myalgias and stiffness.   Gastrointestinal: Negative for abdominal pain, anorexia, change in bowel habit, constipation, diarrhea, dysphagia, heartburn, hematemesis, hematochezia, melena and vomiting.   Genitourinary: Negative for decreased libido, dysuria, hematuria, hesitancy and urgency.   Neurological: Negative for  "excessive daytime sleepiness, dizziness, focal weakness, headaches, light-headedness, loss of balance, numbness, paresthesias, seizures, sensory change, tremors and vertigo.   Psychiatric/Behavioral: Negative for altered mental status, depression, hallucinations, memory loss, substance abuse and suicidal ideas. The patient does not have insomnia and is not nervous/anxious.    Allergic/Immunologic: Negative for environmental allergies and hives.       Objective: BP (!) 160/72   Pulse 66   Ht 5' 3.5" (1.613 m)   Wt 60.7 kg (133 lb 13.1 oz)   BMI 23.33 kg/m²      Physical Exam   Constitutional: She is oriented to person, place, and time. She appears well-developed and well-nourished.   HENT:   Head: Normocephalic.   Eyes: EOM are normal. Pupils are equal, round, and reactive to light.   Neck: Normal range of motion. Normal carotid pulses, no hepatojugular reflux and no JVD present. Carotid bruit is not present. No thyromegaly present.   Cardiovascular: Normal rate, regular rhythm, normal heart sounds and intact distal pulses.  Exam reveals no gallop and no friction rub.    No murmur heard.  Pulmonary/Chest: Effort normal and breath sounds normal. No tachypnea. No respiratory distress. She has no wheezes. She has no rales. She exhibits no tenderness.   Abdominal: Soft. Bowel sounds are normal. She exhibits no distension and no mass. There is no tenderness. There is no rebound and no guarding.   Musculoskeletal: Normal range of motion. She exhibits no edema or tenderness.   Lymphadenopathy:     She has no cervical adenopathy.   Neurological: She is alert and oriented to person, place, and time. No cranial nerve deficit. Coordination normal.   Skin: Skin is warm. No rash noted. No erythema.   Psychiatric: She has a normal mood and affect. Her behavior is normal. Judgment and thought content normal.       Assessment:     1. HTN (hypertension), benign    2. Depression, major, in remission    3. Aortic atherosclerosis  "   4. Autonomic postural hypotension    5. Gait instability    6. Gastroesophageal reflux disease without esophagitis        Plan:   Discussed diet , achieving and maintaining ideal body weight, and exercise.   We reviewed meds in detail.  Reassured-discussed goals, options, plan.  Increase Lisinopril to 20 mg ( later could increase to 40 and/or raise carvedilol to whole and a half    Elen was seen today for hypertension.    Diagnoses and all orders for this visit:    HTN (hypertension), benign  -     lisinopril (PRINIVIL,ZESTRIL) 20 MG Tab; Take 1 tablet (20 mg total) by mouth once daily.  -     Lipid panel; Future; Expected date: 10/18/2018  -     Comprehensive metabolic panel; Future; Expected date: 10/18/2018  -     TSH; Future; Expected date: 10/18/2018  -     EKG 12-lead; Future; Expected date: 10/18/2018    Depression, major, in remission    Aortic atherosclerosis  -     Lipid panel; Future; Expected date: 10/18/2018  -     Comprehensive metabolic panel; Future; Expected date: 10/18/2018  -     TSH; Future; Expected date: 10/18/2018  -     EKG 12-lead; Future; Expected date: 10/18/2018    Autonomic postural hypotension    Gait instability    Gastroesophageal reflux disease without esophagitis            Return in about 1 year (around 10/18/2018) for with lab and ECG.

## 2017-10-18 ENCOUNTER — OFFICE VISIT (OUTPATIENT)
Dept: CARDIOLOGY | Facility: CLINIC | Age: 82
End: 2017-10-18
Payer: MEDICARE

## 2017-10-18 VITALS
SYSTOLIC BLOOD PRESSURE: 160 MMHG | HEIGHT: 64 IN | BODY MASS INDEX: 22.85 KG/M2 | WEIGHT: 133.81 LBS | DIASTOLIC BLOOD PRESSURE: 72 MMHG | HEART RATE: 66 BPM

## 2017-10-18 DIAGNOSIS — K21.9 GASTROESOPHAGEAL REFLUX DISEASE WITHOUT ESOPHAGITIS: ICD-10-CM

## 2017-10-18 DIAGNOSIS — I95.1 AUTONOMIC POSTURAL HYPOTENSION: ICD-10-CM

## 2017-10-18 DIAGNOSIS — R26.81 GAIT INSTABILITY: ICD-10-CM

## 2017-10-18 DIAGNOSIS — F32.5 DEPRESSION, MAJOR, IN REMISSION: ICD-10-CM

## 2017-10-18 DIAGNOSIS — I70.0 AORTIC ATHEROSCLEROSIS: ICD-10-CM

## 2017-10-18 DIAGNOSIS — I10 HTN (HYPERTENSION), BENIGN: Primary | ICD-10-CM

## 2017-10-18 PROCEDURE — 99999 PR PBB SHADOW E&M-EST. PATIENT-LVL III: CPT | Mod: PBBFAC,,, | Performed by: INTERNAL MEDICINE

## 2017-10-18 PROCEDURE — 99214 OFFICE O/P EST MOD 30 MIN: CPT | Mod: S$GLB,,, | Performed by: INTERNAL MEDICINE

## 2017-10-18 PROCEDURE — 99499 UNLISTED E&M SERVICE: CPT | Mod: S$GLB,,, | Performed by: INTERNAL MEDICINE

## 2017-10-18 NOTE — PATIENT INSTRUCTIONS
Discussed diet , achieving and maintaining ideal body weight, and exercise.   We reviewed meds in detail.  Reassured-discussed goals, options, plan.  Increase Lisinopril to 20 mg ( later could increase to 40 and/or raise carvedilol to whole and a half

## 2017-10-19 RX ORDER — TRIAMCINOLONE ACETONIDE 40 MG/ML
40 INJECTION, SUSPENSION INTRA-ARTICULAR; INTRAMUSCULAR
Status: DISCONTINUED | OUTPATIENT
Start: 2017-10-17 | End: 2017-10-19 | Stop reason: HOSPADM

## 2017-10-19 NOTE — PROGRESS NOTES
Subjective:       Patient ID: Elen Peters is a 88 y.o. female.    Chief Complaint: Hypertension (6 mth fu) and Shoulder Pain (R)    Hypertension   This is a chronic problem. The problem is unchanged. The problem is controlled (leg edema resolved after cessation of amlodipine). Pertinent negatives include no chest pain or shortness of breath. The current treatment provides significant improvement. There are no compliance problems.    Shoulder Pain    The pain is present in the right shoulder. This is a chronic problem. The current episode started more than 1 month ago. The problem occurs constantly. The problem has been waxing and waning. The quality of the pain is described as aching. Associated symptoms include a limited range of motion and stiffness.     Review of Systems   Respiratory: Negative for shortness of breath.    Cardiovascular: Negative for chest pain.   Musculoskeletal: Positive for stiffness.       Objective:      Physical Exam   Constitutional: She is oriented to person, place, and time. She appears well-developed and well-nourished. No distress.   HENT:   Head: Normocephalic and atraumatic.   Mouth/Throat: Oropharynx is clear and moist.   Eyes: Conjunctivae are normal. Pupils are equal, round, and reactive to light.   Neck: Normal range of motion. Neck supple.   Cardiovascular: Normal rate, regular rhythm and normal heart sounds.    Pulmonary/Chest: Effort normal and breath sounds normal. She has no wheezes.   Abdominal: Soft. Bowel sounds are normal. There is no tenderness.   Musculoskeletal: She exhibits no edema.        Right shoulder: She exhibits decreased range of motion and tenderness.   Neurological: She is alert and oriented to person, place, and time. No cranial nerve deficit.   Skin: No erythema.   Psychiatric: She has a normal mood and affect.   Vitals reviewed.      Assessment:       1. Leg swelling    2. Adverse effect of drug, subsequent encounter    3. Hypertension, unspecified  type    4. Chronic right shoulder pain        Plan:       Elen was seen today for hypertension and shoulder pain.    Diagnoses and all orders for this visit:    Leg swelling    Adverse effect of drug, subsequent encounter  Comments:  swelling resolved with removal of amlodipine    Hypertension, unspecified type    Chronic right shoulder pain  Comments:  shoulder injection   Orders:  -     Intermediate Joint Aspiration/Injection  -     triamcinolone acetonide injection 40 mg; Inject 40 mg into the articular space.        Return in about 6 months (around 4/17/2018) for F/U APPOINTMENT WITH ME.

## 2017-10-19 NOTE — PROCEDURES
Intermediate Joint Aspiration/Injection  Date/Time: 10/17/2017 10:00 AM  Performed by: JD DEL ROSARIO  Authorized by: JD DEL ROSARIO     Consent Done?:  Yes (Verbal)  Indications:  Pain  Site marked: The procedure site was marked    Timeout: Prior to procedure the correct patient, procedure, and site was verified      Location:  Shoulder  Site:  R acromioclavicular  Prep: Patient was prepped and draped in usual sterile fashion    Ultrasonic Guidance for needle placement: No  Needle size:  20 G  Medications:  40 mg triamcinolone acetonide 40 mg/mL  Patient tolerance:  Patient tolerated the procedure well with no immediate complications

## 2017-11-10 ENCOUNTER — OFFICE VISIT (OUTPATIENT)
Dept: INTERNAL MEDICINE | Facility: CLINIC | Age: 82
End: 2017-11-10
Payer: MEDICARE

## 2017-11-10 ENCOUNTER — TELEPHONE (OUTPATIENT)
Dept: INTERNAL MEDICINE | Facility: CLINIC | Age: 82
End: 2017-11-10

## 2017-11-10 VITALS
BODY MASS INDEX: 23.2 KG/M2 | SYSTOLIC BLOOD PRESSURE: 154 MMHG | DIASTOLIC BLOOD PRESSURE: 66 MMHG | HEART RATE: 70 BPM | HEIGHT: 63 IN | WEIGHT: 130.94 LBS

## 2017-11-10 DIAGNOSIS — R19.7 DIARRHEA, UNSPECIFIED TYPE: Primary | ICD-10-CM

## 2017-11-10 PROCEDURE — 99999 PR PBB SHADOW E&M-EST. PATIENT-LVL III: CPT | Mod: PBBFAC,,, | Performed by: FAMILY MEDICINE

## 2017-11-10 PROCEDURE — 99213 OFFICE O/P EST LOW 20 MIN: CPT | Mod: S$GLB,,, | Performed by: FAMILY MEDICINE

## 2017-11-10 NOTE — TELEPHONE ENCOUNTER
----- Message from Leslee Galeana sent at 11/10/2017  6:49 AM CST -----  Contact: pt  Pt need her dr or nurse to call her said she been having diareah since last Monday

## 2017-11-10 NOTE — PROGRESS NOTES
"S/  [facility] visit  PCP Jim Treadwell MD  C/o watery diarrhea for a week, started when in [place] visiting new grandchild. No antibiotics in recent past per pt  Had blood tests 10/16/17 with normal CMP incl K=4.1  No pain, just annoyance with the watery diarrhea and wants a medication to take  She other wise feels good  When seen 10/17/16 by PCP her weight was 131 lbs    O/  BP (!) 154/66   Pulse 70   Ht 5' 3" (1.6 m)   Wt 59.4 kg (130 lb 15.3 oz)   BMI 23.20 kg/m²    NAD, does not look sick or ill appearing  Tongue - appears dry - but weight is very lcose to what it was 3 weeks ago when she saw her PCP  Cor s1s2  Lungs CTA  Abd benign  no pedal edema      Elen was seen today for diarrhea.    Diagnoses and all orders for this visit:    Diarrhea, unspecified type - but temporally related to going to [place] last week to see her grandchild who is in  at times, so RSV is possible  - we discussed that given her age we could check BMP to assure the diarrhea has not messed up her electrolytes, but given how good she looks and similar weight to lst month, pt declines and I agree it does not appear critical to do so.   - we discussed getting stool tests done, but pt prefers nto doing it unless our treatments today are not working  - oral rehydration is most important. tongue is dry. Water loss thru diarrhea need replacement. She should drink a gallon a day for 2 days, then at least 2 quarts a day of fluids. We discussed fluid choices.   - we discussed food. Traditional BRAT diet for a few days but already had it for a week. She should have some protein by this time, does nto like meat, but can have peanut butter on bread.  - she want a medication for diarrea. We discussed options including lomotil and pepto bismol. She prefers the latter and will get a 240cc bottle of regular strength pepto bismol and use 30cc q 30min x 8 doses. She understands this turns the stool black.  - f/u if not resolving for CMP, stool " studies

## 2017-11-16 RX ORDER — OMEPRAZOLE 40 MG/1
CAPSULE, DELAYED RELEASE ORAL
Qty: 90 CAPSULE | Refills: 3 | Status: SHIPPED | OUTPATIENT
Start: 2017-11-16 | End: 2018-04-19

## 2017-11-22 DIAGNOSIS — I10 HTN (HYPERTENSION), BENIGN: ICD-10-CM

## 2017-11-22 DIAGNOSIS — I10 HYPERTENSION, UNSPECIFIED TYPE: Primary | ICD-10-CM

## 2017-11-22 RX ORDER — TRIAMTERENE AND HYDROCHLOROTHIAZIDE 37.5; 25 MG/1; MG/1
1 CAPSULE ORAL EVERY MORNING
Qty: 30 CAPSULE | Refills: 11 | Status: SHIPPED | OUTPATIENT
Start: 2017-11-22 | End: 2017-11-22 | Stop reason: DRUGHIGH

## 2017-11-22 RX ORDER — TRIAMTERENE/HYDROCHLOROTHIAZID 37.5-25 MG
TABLET ORAL
Qty: 90 TABLET | Refills: 11 | Status: SHIPPED | OUTPATIENT
Start: 2017-11-22 | End: 2017-12-02 | Stop reason: SINTOL

## 2017-11-22 RX ORDER — TRIAMTERENE/HYDROCHLOROTHIAZID 37.5-25 MG
0.5 TABLET ORAL DAILY
Qty: 15 TABLET | Refills: 11 | Status: SHIPPED | OUTPATIENT
Start: 2017-11-22 | End: 2017-11-28 | Stop reason: SDUPTHER

## 2017-11-22 NOTE — TELEPHONE ENCOUNTER
Pt states since her meds was changed last Monday  Lisinopril 20 g everyday, her BP has been going up. Today 175/85 pulse 75 and yesterday 200/85.  Please advise.

## 2017-11-22 NOTE — TELEPHONE ENCOUNTER
Patient notified of Dr Cartagena recommendations.BP way too high-let's increase the lisinopril to 40 mg plus add HCTZ/Triamterene 27/37.5 tab and take .5 pill in the am . Get chem 7 on this in 2 weeks. Labs scheduled for 12/7/17. Pt verbalized knowledge.

## 2017-11-22 NOTE — TELEPHONE ENCOUNTER
----- Message from Melita Cuba sent at 11/22/2017 10:56 AM CST -----  Contact: patient  Please call pt at [phone] or [phone]. Patient having some BP issues (staying in the high numbers) since started the Lisinopril 20 mg. Last seen Dr Cartagena 10/18/17    Thank you

## 2017-11-24 ENCOUNTER — TELEPHONE (OUTPATIENT)
Dept: CARDIOLOGY | Facility: CLINIC | Age: 82
End: 2017-11-24

## 2017-11-24 NOTE — TELEPHONE ENCOUNTER
----- Message from Suki Uribe MA sent at 11/24/2017  9:31 AM CST -----  Contact: patient called  Lia please call the patient at [phone] she need to talk to you about a medication she said that was going to be call in. Last visit was on 10 - . Thank you.

## 2017-11-24 NOTE — TELEPHONE ENCOUNTER
Spoke with the pt - called [facility] - they have the prescription Maxide ready for the pt - was prescribed for the pt on 11-22-17 per Dr. Cartagena. Pt will take and call the office with any questions or concerns.

## 2017-11-28 ENCOUNTER — TELEPHONE (OUTPATIENT)
Dept: CARDIOLOGY | Facility: CLINIC | Age: 82
End: 2017-11-28

## 2017-11-28 NOTE — TELEPHONE ENCOUNTER
Patient called to report elevated blood pressure. Today 162/91, yesterday 159/71, day before 151/74, Sat 156/67, and Fri  181/89. Takes two 20 mg Lisinopril tablets at night and is taking half tablet of maxzide daily. Wants to know what to do.

## 2017-11-28 NOTE — TELEPHONE ENCOUNTER
----- Message from Suki Uribe MA sent at 11/28/2017 12:32 PM CST -----  Contact: patient called  Lynda the patient would like to talk to you about her blood pressure 162/91. She want to talk to to you only. Thank you.

## 2017-12-02 ENCOUNTER — HOSPITAL ENCOUNTER (EMERGENCY)
Facility: OTHER | Age: 82
Discharge: HOME | End: 2017-12-02
Attending: EMERGENCY MEDICINE
Payer: MEDICARE

## 2017-12-02 VITALS
TEMPERATURE: 98 F | SYSTOLIC BLOOD PRESSURE: 158 MMHG | RESPIRATION RATE: 16 BRPM | DIASTOLIC BLOOD PRESSURE: 76 MMHG | WEIGHT: 130 LBS | OXYGEN SATURATION: 99 % | HEART RATE: 62 BPM | HEIGHT: 64 IN | BODY MASS INDEX: 22.2 KG/M2

## 2017-12-02 DIAGNOSIS — E87.1 HYPONATREMIA: Primary | ICD-10-CM

## 2017-12-02 LAB
ALBUMIN SERPL BCP-MCNC: 3.8 G/DL
ALP SERPL-CCNC: 60 U/L
ALT SERPL W/O P-5'-P-CCNC: 19 U/L
ANION GAP SERPL CALC-SCNC: 7 MMOL/L
AST SERPL-CCNC: 22 U/L
BASOPHILS # BLD AUTO: 0.01 K/UL
BASOPHILS NFR BLD: 0.2 %
BILIRUB SERPL-MCNC: 0.7 MG/DL
BILIRUB UR QL STRIP: NEGATIVE
BUN SERPL-MCNC: 14 MG/DL
CALCIUM SERPL-MCNC: 9.4 MG/DL
CHLORIDE SERPL-SCNC: 91 MMOL/L
CLARITY UR: CLEAR
CO2 SERPL-SCNC: 28 MMOL/L
COLOR UR: YELLOW
CREAT SERPL-MCNC: 0.7 MG/DL
DIFFERENTIAL METHOD: ABNORMAL
EOSINOPHIL # BLD AUTO: 0.2 K/UL
EOSINOPHIL NFR BLD: 2.4 %
ERYTHROCYTE [DISTWIDTH] IN BLOOD BY AUTOMATED COUNT: 13 %
EST. GFR  (AFRICAN AMERICAN): >60 ML/MIN/1.73 M^2
EST. GFR  (NON AFRICAN AMERICAN): >60 ML/MIN/1.73 M^2
GLUCOSE SERPL-MCNC: 92 MG/DL
GLUCOSE UR QL STRIP: NEGATIVE
HCT VFR BLD AUTO: 37.6 %
HGB BLD-MCNC: 12.9 G/DL
HGB UR QL STRIP: ABNORMAL
KETONES UR QL STRIP: NEGATIVE
LEUKOCYTE ESTERASE UR QL STRIP: NEGATIVE
LYMPHOCYTES # BLD AUTO: 1.5 K/UL
LYMPHOCYTES NFR BLD: 23.9 %
MCH RBC QN AUTO: 31.4 PG
MCHC RBC AUTO-ENTMCNC: 34.3 G/DL
MCV RBC AUTO: 92 FL
MONOCYTES # BLD AUTO: 0.6 K/UL
MONOCYTES NFR BLD: 9.8 %
NEUTROPHILS # BLD AUTO: 4 K/UL
NEUTROPHILS NFR BLD: 63.5 %
NITRITE UR QL STRIP: NEGATIVE
PH UR STRIP: 7 [PH] (ref 5–8)
PLATELET # BLD AUTO: 250 K/UL
PMV BLD AUTO: 10.1 FL
POTASSIUM SERPL-SCNC: 4.1 MMOL/L
PROT SERPL-MCNC: 6.9 G/DL
PROT UR QL STRIP: NEGATIVE
RBC # BLD AUTO: 4.11 M/UL
SODIUM SERPL-SCNC: 126 MMOL/L
SP GR UR STRIP: 1.01 (ref 1–1.03)
URN SPEC COLLECT METH UR: ABNORMAL
UROBILINOGEN UR STRIP-ACNC: NEGATIVE EU/DL
WBC # BLD AUTO: 6.31 K/UL

## 2017-12-02 PROCEDURE — 96374 THER/PROPH/DIAG INJ IV PUSH: CPT

## 2017-12-02 PROCEDURE — 81003 URINALYSIS AUTO W/O SCOPE: CPT

## 2017-12-02 PROCEDURE — 93010 ELECTROCARDIOGRAM REPORT: CPT | Mod: ,,, | Performed by: INTERNAL MEDICINE

## 2017-12-02 PROCEDURE — 93005 ELECTROCARDIOGRAM TRACING: CPT

## 2017-12-02 PROCEDURE — 85025 COMPLETE CBC W/AUTO DIFF WBC: CPT

## 2017-12-02 PROCEDURE — 63600175 PHARM REV CODE 636 W HCPCS: Performed by: EMERGENCY MEDICINE

## 2017-12-02 PROCEDURE — 80053 COMPREHEN METABOLIC PANEL: CPT

## 2017-12-02 PROCEDURE — 25000003 PHARM REV CODE 250: Performed by: EMERGENCY MEDICINE

## 2017-12-02 PROCEDURE — 99284 EMERGENCY DEPT VISIT MOD MDM: CPT | Mod: 25

## 2017-12-02 RX ORDER — ONDANSETRON 4 MG/1
4 TABLET, FILM COATED ORAL EVERY 6 HOURS
Qty: 12 TABLET | Refills: 0 | Status: SHIPPED | OUTPATIENT
Start: 2017-12-02 | End: 2018-10-22

## 2017-12-02 RX ORDER — ONDANSETRON 2 MG/ML
4 INJECTION INTRAMUSCULAR; INTRAVENOUS
Status: COMPLETED | OUTPATIENT
Start: 2017-12-02 | End: 2017-12-02

## 2017-12-02 RX ADMIN — SODIUM CHLORIDE 500 ML: 0.9 INJECTION, SOLUTION INTRAVENOUS at 02:12

## 2017-12-02 RX ADMIN — ONDANSETRON 4 MG: 2 INJECTION INTRAMUSCULAR; INTRAVENOUS at 02:12

## 2017-12-02 NOTE — ED PROVIDER NOTES
"Encounter Date: 12/2/2017    SCRIBE #1 NOTE: I, Oralia Joseph, am scribing for, and in the presence of,  Dr. Berrios. I have scribed the entire note.       History     Chief Complaint   Patient presents with    Nausea     pt with nausea and diarrhea x one mth. pt with food poisioning 3 weeks ago,.     Time seen by provider: 1:34 PM    This is a 88 y.o. female who presents with complaint of nausea for several weeks, since food poisoning incident. She was seen in this ED and was given Pepto Bismol with some relief from diarrhea, but pt states she has been "unwell" since. She still has loose bowels every morning and a lack of appetite since the food poisoning. She also c/o a sore throat, dry cough, and dizziness when getting up. She admits that she hit her head on the door 3 days ago, leaving a bruise, but denies any visual disturbance, HA, numbness, speech difficulty, weakness, and LOC. She also denies any urinary symptoms, vomiting, abdominal pain, blood in stool, fever, chill, rhinorrhea, and SOB. She sees a cardiologist, Dr. Davidson Cartagena, and a PCP, Dr. Treadwell. Pt went to urgent care and was told to come to the ED.      The history is provided by the patient and a friend.     Review of patient's allergies indicates:   Allergen Reactions    Sulfa (sulfonamide antibiotics)      Unknown as child    Amlodipine Swelling    Codeine      Other reaction(s): Unknown     Past Medical History:   Diagnosis Date    Abnormal stress test 11/18/2015    Arthritis     Bladder cancer     Cataract     Coronary artery disease involving native coronary artery 1/6/2016    Depression     GERD (gastroesophageal reflux disease)     HTN (hypertension), benign 11/18/2015    Hx of bladder infections     Hyperlipemia     OP (osteoporosis)     Positive cardiac stress test 1/6/2016    Renal cancer     Syncope 1/6/2016    Upper back pain 12/1/2015    Ureteral cancer     Venous insufficiency 2017    Villous adenoma of colon " 5/31/2013     Past Surgical History:   Procedure Laterality Date    APPENDECTOMY      BACK SURGERY      CATARACT EXTRACTION W/  INTRAOCULAR LENS IMPLANT Left 3/16/15    kristy    CATARACT EXTRACTION W/  INTRAOCULAR LENS IMPLANT Right 4/6/15    kristy    COLONOSCOPY  10/21/2013    CYSTOSCOPY      NEPHRECTOMY      L    TONSILLECTOMY, ADENOIDECTOMY       Family History   Problem Relation Age of Onset    Leukemia Mother     Heart disease Mother     Heart attack Mother     Cancer Mother      leukemia    Goiter Mother     Leukemia Father     Cancer Father      leukemia    Heart disease Maternal Grandfather     No Known Problems Brother     No Known Problems Son     No Known Problems Son     No Known Problems Son     Cancer Son      throat    No Known Problems Son     No Known Problems Son     No Known Problems Maternal Aunt     No Known Problems Maternal Uncle     No Known Problems Paternal Aunt     No Known Problems Paternal Uncle     No Known Problems Maternal Grandmother     No Known Problems Paternal Grandmother     No Known Problems Paternal Grandfather     No Known Problems Sister     Amblyopia Neg Hx     Blindness Neg Hx     Cataracts Neg Hx     Diabetes Neg Hx     Glaucoma Neg Hx     Hypertension Neg Hx     Macular degeneration Neg Hx     Retinal detachment Neg Hx     Strabismus Neg Hx     Stroke Neg Hx     Thyroid disease Neg Hx     Breast cancer Neg Hx      Social History   Substance Use Topics    Smoking status: Never Smoker    Smokeless tobacco: Never Used    Alcohol use No     Review of Systems   Constitutional: Positive for appetite change (decreased). Negative for chills and fever.   HENT: Positive for sore throat. Negative for congestion and rhinorrhea.         Forehead contusion.   Eyes: Negative for photophobia and visual disturbance.   Respiratory: Positive for cough. Negative for shortness of breath.    Cardiovascular: Negative for chest pain.    Gastrointestinal: Positive for diarrhea and nausea. Negative for abdominal pain, blood in stool and vomiting.   Endocrine: Negative for polyuria.   Genitourinary: Negative for decreased urine volume, difficulty urinating, dysuria and hematuria.   Musculoskeletal: Negative for back pain.   Skin: Negative for rash.   Allergic/Immunologic: Negative for immunocompromised state.   Neurological: Positive for dizziness. Negative for weakness.   Hematological: Does not bruise/bleed easily.   Psychiatric/Behavioral: Negative for confusion.       Physical Exam     Initial Vitals [12/02/17 1202]   BP Pulse Resp Temp SpO2   (!) 169/73 (!) 59 18 97.6 °F (36.4 °C) 98 %      MAP       105         Physical Exam    Nursing note and vitals reviewed.  Constitutional: She appears well-developed and well-nourished. She is not diaphoretic. No distress.   HENT:   Head: Normocephalic.   Right Ear: External ear normal.   Left Ear: External ear normal.   Contusion to the forehead.   Eyes: Right eye exhibits no discharge. Left eye exhibits no discharge.   Neck: Normal range of motion. Neck supple.   Cardiovascular: Normal rate, regular rhythm and normal heart sounds. Exam reveals no gallop and no friction rub.    No murmur heard.  Pulmonary/Chest: Breath sounds normal. No respiratory distress. She has no wheezes. She has no rhonchi. She has no rales.   Abdominal: Soft. Bowel sounds are normal. She exhibits no distension. There is no tenderness. There is no rebound and no guarding.   Musculoskeletal: Normal range of motion. She exhibits no edema or tenderness.   Lymphadenopathy:     She has no cervical adenopathy.   Neurological: She is alert and oriented to person, place, and time. She has normal strength. No sensory deficit.   Skin: Skin is warm and dry. No rash noted. No erythema.   Psychiatric: She has a normal mood and affect. Her behavior is normal.         ED Course   Procedures  Labs Reviewed   CBC W/ AUTO DIFFERENTIAL - Abnormal;  Notable for the following:        Result Value    MCH 31.4 (*)     All other components within normal limits   COMPREHENSIVE METABOLIC PANEL - Abnormal; Notable for the following:     Sodium 126 (*)     Chloride 91 (*)     Anion Gap 7 (*)     All other components within normal limits   URINALYSIS - Abnormal; Notable for the following:     Occult Blood UA Trace (*)     All other components within normal limits     EKG Readings: (Independently Interpreted)   SB at a rate of 56. No ischemic changes.       X-Rays:   Independently Interpreted Readings:   Chest X-Ray: No focal infiltrates. Mild cardiomegaly.       Imaging Results          X-Ray Chest PA And Lateral (Final result)  Result time 12/02/17 14:14:47    Final result by Padilla Richardson MD (12/02/17 14:14:47)                 Impression:        No acute cardiothoracic disease evident.       Electronically signed by: Dr. Padilla Richardson MD  Date:     12/02/17  Time:    14:14              Narrative:    TWO VIEW CHEST:     Comparison: 06/29/2016    Findings:    The cardiac silhouette and the pulmonary vasculature are normal in size.  The mediastinal and hilar contours are unremarkable.  There is no pneumothorax.  No pleural effusion.  The lungs are clear.  No acute bony abnormality.                             CT Head Without Contrast (Final result)  Result time 12/02/17 14:20:13    Final result by Stephie Posadas MD (12/02/17 14:20:13)                 Impression:        1.  No acute intracranial findings identified.    2.  Senescent changes as above.      Electronically signed by: STEPHIE POSADAS MD, MD  Date:     12/02/17  Time:    14:20              Narrative:    COMPARISON: Maxillofacial CT 5/12/08    FINDINGS: No evidence of hemorrhage, mass, midline shift or acute major vascular territory infarct.  Gray white interface appears intact.  There is age appropriate mild generalized cerebral atrophy.  There are patchy and confluent low attenuation changes throughout  the subcortical and periventricular white matter, nonspecific but can be seen with chronic small vessel ischemic disease.   The ventricles are midline without distortion by mass effect.  No extra-axial hemorrhage or mass. Skull base  vascular and bilateral basal ganglia calcifications are noted.     The extracranial structures are within normal limits.  Calvarium is intact.  Mild patchy mucosal thickening of the bilateral ethmoid air cells and right sphenoid sinus. Remaining imaged paranasal sinuses are clear.  Mastoid air cells are clear. Prior surgical change of the bilateral globes noted.                              Medical Decision Making:   Initial Assessment:   I will do a general workup for nausea with labs, urine, and chest xray. For the head injury and nausea, I will also do a head CT.  Independently Interpreted Test(s):   I have ordered and independently interpreted X-rays - see prior notes.  I have ordered and independently interpreted EKG Reading(s) - see prior notes  Clinical Tests:   Lab Tests: Ordered and Reviewed  Radiological Study: Ordered and Reviewed  Medical Tests: Ordered and Reviewed  ED Management:  3:30 PM - Head CT was negative, but low sodium of 126. I wonder if this is causing the symptoms. Also may be secondary to starting lisinopril 2 months ago and gradually increasing. I will check urine and discuss with Dr. Blum.    3:47 PM - I reviewed findings with pt. She has been taking hydrochlorothiazide for the past week. We will discontinue that and ask her to follow up with her doctors with a repeat sodium this week. I will send a message to Dr. Cartagena and Dr. Treadwell through [facility].            Scribe Attestation:   Scribe #1: I performed the above scribed service and the documentation accurately describes the services I performed. I attest to the accuracy of the note.    Attending Attestation:           Physician Attestation for Scribe:  Physician Attestation Statement for Scribe #1: I,  Dr. Berrios, reviewed documentation, as scribed by Oralia Joseph in my presence, and it is both accurate and complete.                 ED Course      Clinical Impression:     1. Hyponatremia        Disposition:   Disposition: Discharged  Condition: Stable                        Vinayak Berrios MD  12/02/17 [id number]

## 2017-12-02 NOTE — ED TRIAGE NOTES
"Pt states she had food poisoning about a month ago, states she was given pepto-bismol at another facility a month ago, "but her nausea never went away."  Denies any vomiting.  Reports "slight lower abd pain," worsening since yesterday.  "

## 2017-12-04 ENCOUNTER — TELEPHONE (OUTPATIENT)
Dept: INTERNAL MEDICINE | Facility: CLINIC | Age: 82
End: 2017-12-04

## 2017-12-04 DIAGNOSIS — E87.1 HYPONATREMIA: Primary | ICD-10-CM

## 2017-12-04 NOTE — TELEPHONE ENCOUNTER
----- Message from Siva Bess sent at 12/4/2017 10:34 AM CST -----  Contact: patient  Patient called in requesting to speak with Dr. Treadwell or Dr. Treadwell's nurse. Please contact patient at [phone]. Thank You.

## 2017-12-04 NOTE — TELEPHONE ENCOUNTER
----- Message from Vinayak Berrios MD sent at 12/2/2017  3:46 PM CST -----  Regarding: Hyponatremia  Chip,    I saw Ms Peters today with nausea. Her sodium was a bit low at 126.  I suspect this is due to starting HCTZ last week.      My plan was for her to stop this medication and contact you for a suitable replacement.  She has been taking Lisinopril.  She asked me to remind you that she has had significant leg edema when she takes Norvasc.    I also hope that she could get her sodium rechecked this week.  Maybe either you or Jim could arrange for this?    Thanks,    Vinayak

## 2017-12-04 NOTE — TELEPHONE ENCOUNTER
patient went to the ER very confused about the medications very upset. Appt booked with her for tomorrow see if the Dr can straighten this out

## 2017-12-05 ENCOUNTER — OFFICE VISIT (OUTPATIENT)
Dept: INTERNAL MEDICINE | Facility: CLINIC | Age: 82
End: 2017-12-05
Payer: MEDICARE

## 2017-12-05 VITALS
BODY MASS INDEX: 22.17 KG/M2 | SYSTOLIC BLOOD PRESSURE: 120 MMHG | DIASTOLIC BLOOD PRESSURE: 70 MMHG | HEIGHT: 64 IN | WEIGHT: 129.88 LBS | HEART RATE: 64 BPM

## 2017-12-05 DIAGNOSIS — I10 HYPERTENSION, UNSPECIFIED TYPE: ICD-10-CM

## 2017-12-05 DIAGNOSIS — E87.1 HYPONATREMIA: Primary | ICD-10-CM

## 2017-12-05 PROCEDURE — 99999 PR PBB SHADOW E&M-EST. PATIENT-LVL III: CPT | Mod: PBBFAC,,, | Performed by: INTERNAL MEDICINE

## 2017-12-05 PROCEDURE — 99213 OFFICE O/P EST LOW 20 MIN: CPT | Mod: S$GLB,,, | Performed by: INTERNAL MEDICINE

## 2017-12-05 PROCEDURE — 99499 UNLISTED E&M SERVICE: CPT | Mod: S$GLB,,, | Performed by: INTERNAL MEDICINE

## 2017-12-06 ENCOUNTER — LAB VISIT (OUTPATIENT)
Dept: LAB | Facility: HOSPITAL | Age: 82
End: 2017-12-06
Attending: INTERNAL MEDICINE
Payer: MEDICARE

## 2017-12-06 DIAGNOSIS — I10 HYPERTENSION, UNSPECIFIED TYPE: ICD-10-CM

## 2017-12-06 LAB
ANION GAP SERPL CALC-SCNC: 4 MMOL/L
BUN SERPL-MCNC: 10 MG/DL
CALCIUM SERPL-MCNC: 9.3 MG/DL
CHLORIDE SERPL-SCNC: 99 MMOL/L
CO2 SERPL-SCNC: 30 MMOL/L
CREAT SERPL-MCNC: 0.8 MG/DL
EST. GFR  (AFRICAN AMERICAN): >60 ML/MIN/1.73 M^2
EST. GFR  (NON AFRICAN AMERICAN): >60 ML/MIN/1.73 M^2
GLUCOSE SERPL-MCNC: 112 MG/DL
POTASSIUM SERPL-SCNC: 4.5 MMOL/L
SODIUM SERPL-SCNC: 133 MMOL/L

## 2017-12-06 PROCEDURE — 80048 BASIC METABOLIC PNL TOTAL CA: CPT

## 2017-12-06 PROCEDURE — 36415 COLL VENOUS BLD VENIPUNCTURE: CPT | Mod: PO

## 2017-12-13 ENCOUNTER — TELEPHONE (OUTPATIENT)
Dept: CARDIOLOGY | Facility: CLINIC | Age: 82
End: 2017-12-13

## 2017-12-14 NOTE — PROGRESS NOTES
Results of recent labs given to patient. She has not taken b/p lately but daughter will check in am. She will monitor for the next 4 days and call with readings on Monday.

## 2017-12-14 NOTE — TELEPHONE ENCOUNTER
Results given to patient. She has not taken b/p lately but daughter is going to her house tomorrow to check I. She will monitor for the next 4 days and call with readings on Monday 12/18.    Davidson Cartagena MD               Tell her sodium still slightly low but much better-how is BP?

## 2017-12-19 NOTE — PROGRESS NOTES
Subjective:       Patient ID: Elen Peters is a 88 y.o. female.    Chief Complaint: ER follow up and Fall (last week fell hit eye on cabinet door.)    Pt seen in ED- fell at home.  LAbs revelaed low sodium.      Fall   The accident occurred 5 to 7 days ago. The fall occurred while walking. The volume of blood lost was minimal. The point of impact was the face.     Review of Systems    Objective:      Physical Exam   Constitutional: She is oriented to person, place, and time. She appears well-developed and well-nourished. No distress.   HENT:   Head: Normocephalic and atraumatic.   Mouth/Throat: Oropharynx is clear and moist.   Eyes: Conjunctivae are normal. Pupils are equal, round, and reactive to light.   Neck: Normal range of motion. Neck supple.   Cardiovascular: Normal rate, regular rhythm and normal heart sounds.    Pulmonary/Chest: Effort normal and breath sounds normal. She has no wheezes.   Abdominal: Soft. Bowel sounds are normal. There is no tenderness.   Musculoskeletal: Normal range of motion. She exhibits no edema or tenderness.   Neurological: She is alert and oriented to person, place, and time. No cranial nerve deficit.   Skin: No erythema.   Psychiatric: She has a normal mood and affect.   Vitals reviewed.      Assessment:       1. Hyponatremia    2. Hypertension, unspecified type        Plan:       Elen was seen today for er follow up and fall.    Diagnoses and all orders for this visit:    Hyponatremia  Comments:  liklely effect of diuretic    Hypertension, unspecified type  Comments:  well-controlled on current regimen-  edema resolved with removal of amlodipine        Return in about 6 months (around 6/5/2018) for F/U APPOINTMENT WITH ME, WITH LAB BEFORE.

## 2018-02-07 ENCOUNTER — TELEPHONE (OUTPATIENT)
Dept: INTERNAL MEDICINE | Facility: CLINIC | Age: 83
End: 2018-02-07

## 2018-02-07 DIAGNOSIS — M25.511 RIGHT SHOULDER PAIN, UNSPECIFIED CHRONICITY: Primary | ICD-10-CM

## 2018-02-07 NOTE — TELEPHONE ENCOUNTER
Pt c/o R shoulder pain that Dr Treadwell has injected in the past. Still having issues. Ref to orthop per Dr Treadwell. appt booked and mailed

## 2018-02-07 NOTE — TELEPHONE ENCOUNTER
----- Message from Kristin Franklin sent at 2/7/2018 11:39 AM CST -----  Contact: [phone]  Patient would like to get medical advice.  Symptoms (please be specific):  Right shoulder pain  How long has patient had these symptoms:  Off and on

## 2018-02-14 ENCOUNTER — HOSPITAL ENCOUNTER (OUTPATIENT)
Dept: RADIOLOGY | Facility: HOSPITAL | Age: 83
Discharge: HOME | End: 2018-02-14
Attending: PHYSICIAN ASSISTANT
Payer: MEDICARE

## 2018-02-14 ENCOUNTER — OFFICE VISIT (OUTPATIENT)
Dept: ORTHOPEDICS | Facility: CLINIC | Age: 83
End: 2018-02-14
Payer: MEDICARE

## 2018-02-14 DIAGNOSIS — M25.511 ACUTE PAIN OF RIGHT SHOULDER: ICD-10-CM

## 2018-02-14 PROCEDURE — 3008F BODY MASS INDEX DOCD: CPT | Mod: S$GLB,,, | Performed by: PHYSICIAN ASSISTANT

## 2018-02-14 PROCEDURE — 1159F MED LIST DOCD IN RCRD: CPT | Mod: S$GLB,,, | Performed by: PHYSICIAN ASSISTANT

## 2018-02-14 PROCEDURE — 99203 OFFICE O/P NEW LOW 30 MIN: CPT | Mod: 25,S$GLB,, | Performed by: PHYSICIAN ASSISTANT

## 2018-02-14 PROCEDURE — 20610 DRAIN/INJ JOINT/BURSA W/O US: CPT | Mod: RT,S$GLB,, | Performed by: PHYSICIAN ASSISTANT

## 2018-02-14 PROCEDURE — 99999 PR PBB SHADOW E&M-EST. PATIENT-LVL III: CPT | Mod: PBBFAC,,, | Performed by: PHYSICIAN ASSISTANT

## 2018-02-14 PROCEDURE — 73030 X-RAY EXAM OF SHOULDER: CPT | Mod: TC,RT

## 2018-02-14 PROCEDURE — 73030 X-RAY EXAM OF SHOULDER: CPT | Mod: 26,RT,, | Performed by: RADIOLOGY

## 2018-02-14 RX ORDER — METHYLPREDNISOLONE ACETATE 80 MG/ML
80 INJECTION, SUSPENSION INTRA-ARTICULAR; INTRALESIONAL; INTRAMUSCULAR; SOFT TISSUE
Status: COMPLETED | OUTPATIENT
Start: 2018-02-14 | End: 2018-02-14

## 2018-02-14 RX ADMIN — METHYLPREDNISOLONE ACETATE 80 MG: 80 INJECTION, SUSPENSION INTRA-ARTICULAR; INTRALESIONAL; INTRAMUSCULAR; SOFT TISSUE at 02:02

## 2018-02-14 NOTE — PROGRESS NOTES
Subjective:      Patient ID: Elen Peters is a 88 y.o. female.    Chief Complaint: No chief complaint on file.    HPI  88 year old female presents with chief complaint of right shoulder pain x 5 months. She is RHD and doesn't recall trauma. Pain is worse with certain movements. She takes aleve prn with mild relief. She received a shoulder injection last October by her PCP but she is unsure if it helped. No PT has been done.   Review of Systems   Constitution: Negative for chills, fever and night sweats.   Cardiovascular: Negative for chest pain.   Respiratory: Negative for cough and shortness of breath.    Hematologic/Lymphatic: Does not bruise/bleed easily.   Skin: Negative for color change.   Gastrointestinal: Negative for heartburn.   Genitourinary: Negative for dysuria.   Neurological: Negative for numbness and paresthesias.   Psychiatric/Behavioral: Negative for altered mental status.   Allergic/Immunologic: Negative for persistent infections.         Objective:            General    Vitals reviewed.  Constitutional: She is oriented to person, place, and time. She appears well-developed and well-nourished.   Cardiovascular: Normal rate.    Neurological: She is alert and oriented to person, place, and time.         Right Shoulder Exam     Range of Motion   Active Abduction: normal   Forward Flexion: normal   External Rotation 0 degrees: normal   Internal Rotation 0 degrees: normal     Tests & Signs   Hawkin's test: positive  Impingement: positive  Speed's Test: positive    Other   Sensation: normal    Comments:  Mild pain with empty can test.     Muscle Strength   Right Upper Extremity   Shoulder Abduction: 5/5   Supraspinatus: 5/5/5   Biceps: 4/5/5     Vascular Exam     Right Pulses      Radial:                    2+          X-ray: ordered and reviewed by myself. Bones: No fracture.  No lytic or blastic lesion.  Cystic change noted at the rotator cuff footprint.  Osteopenia noted.  Joints: No evidence for  glenohumeral dislocation.  Mild acromioclavicular arthrosis noted.  Soft tissues: Unremarkable         Assessment:       Encounter Diagnosis   Name Primary?    Bursitis/tendonitis, shoulder- right Yes          Plan:       Discussed treatment options with patient. She would like to try another injection. She will take aleve BID x 2 weeks then prn. If pain does not improve, she will call for PT order. RTC prn.     PROCEDURE:  I have explained the risks, benefits, and alternatives of the procedure in detail.  The patient voices understanding and all questions have been answered.  The patient agrees to proceed as planned. So after I performed a sterile prep of the skin in the normal fashion the right shoulder is injected from the anterior approach using a 22 gauge needle with a combination of 4cc 1% plain lidocaine and 80 mg of depo medrol. The patient is cautioned and immediate relief of pain is secondary to the local anesthetic and will be temporary.  After the anesthetic wears off there may be a increase in pain that may last for a few hours or a few days and they should use ice to help alleviate this flair up of pain.

## 2018-02-14 NOTE — LETTER
February 14, 2018      Jim Treadwell MD  [address]  [facility] [address]           [facility]  [address]  [facility] [address]  Phone: [phone]          Patient: Elen ePters   MR Number: [id number]   YOB: 1929   Date of Visit: 2/14/2018       Dear Dr. Jim Treadwell:    Thank you for referring Elen Peters to me for evaluation. Attached you will find relevant portions of my assessment and plan of care.    If you have questions, please do not hesitate to call me. I look forward to following Elen Peters along with you.    Sincerely,    Candice Giles PA-C    Enclosure  CC:  No Recipients    If you would like to receive this communication electronically, please contact [email or web address] or [phone] to request more information on [employer] access.    For providers and/or their staff who would like to refer a patient to [facility], please contact us through our one-stop-shop provider referral line, [facility], at [phone].    If you feel you have received this communication in error or would no longer like to receive these types of communications, please e-mail [email or web address]

## 2018-02-15 DIAGNOSIS — F43.9 SITUATIONAL STRESS: ICD-10-CM

## 2018-02-15 RX ORDER — CITALOPRAM 20 MG/1
TABLET, FILM COATED ORAL
Qty: 90 TABLET | Refills: 3 | Status: SHIPPED | OUTPATIENT
Start: 2018-02-15 | End: 2018-05-29 | Stop reason: SDUPTHER

## 2018-03-21 DIAGNOSIS — I10 HTN (HYPERTENSION), BENIGN: ICD-10-CM

## 2018-03-22 ENCOUNTER — TELEPHONE (OUTPATIENT)
Dept: CARDIOLOGY | Facility: CLINIC | Age: 83
End: 2018-03-22

## 2018-03-22 DIAGNOSIS — I10 HYPERTENSION, UNSPECIFIED TYPE: Primary | ICD-10-CM

## 2018-03-22 RX ORDER — TRIAMTERENE/HYDROCHLOROTHIAZID 37.5-25 MG
1 TABLET ORAL DAILY
COMMUNITY
End: 2018-10-22

## 2018-03-23 ENCOUNTER — NURSE TRIAGE (OUTPATIENT)
Dept: ADMINISTRATIVE | Facility: CLINIC | Age: 83
End: 2018-03-23

## 2018-03-23 ENCOUNTER — HOSPITAL ENCOUNTER (EMERGENCY)
Facility: OTHER | Age: 83
Discharge: HOME | End: 2018-03-23
Attending: EMERGENCY MEDICINE
Payer: MEDICARE

## 2018-03-23 ENCOUNTER — TELEPHONE (OUTPATIENT)
Dept: INTERNAL MEDICINE | Facility: CLINIC | Age: 83
End: 2018-03-23

## 2018-03-23 VITALS
BODY MASS INDEX: 20.38 KG/M2 | HEART RATE: 58 BPM | TEMPERATURE: 98 F | HEIGHT: 63 IN | SYSTOLIC BLOOD PRESSURE: 151 MMHG | WEIGHT: 115 LBS | DIASTOLIC BLOOD PRESSURE: 65 MMHG | RESPIRATION RATE: 15 BRPM | OXYGEN SATURATION: 95 %

## 2018-03-23 DIAGNOSIS — R07.9 CHEST PAIN: ICD-10-CM

## 2018-03-23 DIAGNOSIS — R42 DIZZINESS: Primary | ICD-10-CM

## 2018-03-23 LAB
ANION GAP SERPL CALC-SCNC: 8 MMOL/L
BASOPHILS # BLD AUTO: 0.04 K/UL
BASOPHILS NFR BLD: 0.6 %
BUN SERPL-MCNC: 16 MG/DL
CALCIUM SERPL-MCNC: 10.2 MG/DL
CHLORIDE SERPL-SCNC: 97 MMOL/L
CO2 SERPL-SCNC: 31 MMOL/L
CREAT SERPL-MCNC: 0.7 MG/DL
DIFFERENTIAL METHOD: ABNORMAL
EOSINOPHIL # BLD AUTO: 0.1 K/UL
EOSINOPHIL NFR BLD: 1.5 %
ERYTHROCYTE [DISTWIDTH] IN BLOOD BY AUTOMATED COUNT: 13.8 %
EST. GFR  (AFRICAN AMERICAN): >60 ML/MIN/1.73 M^2
EST. GFR  (NON AFRICAN AMERICAN): >60 ML/MIN/1.73 M^2
GLUCOSE SERPL-MCNC: 88 MG/DL
HCT VFR BLD AUTO: 38.8 %
HGB BLD-MCNC: 13.2 G/DL
LYMPHOCYTES # BLD AUTO: 1.8 K/UL
LYMPHOCYTES NFR BLD: 25.2 %
MCH RBC QN AUTO: 32.2 PG
MCHC RBC AUTO-ENTMCNC: 34 G/DL
MCV RBC AUTO: 95 FL
MONOCYTES # BLD AUTO: 0.6 K/UL
MONOCYTES NFR BLD: 8.9 %
NEUTROPHILS # BLD AUTO: 4.5 K/UL
NEUTROPHILS NFR BLD: 63.7 %
PLATELET # BLD AUTO: 232 K/UL
PMV BLD AUTO: 10.4 FL
POTASSIUM SERPL-SCNC: 4 MMOL/L
RBC # BLD AUTO: 4.1 M/UL
SODIUM SERPL-SCNC: 136 MMOL/L
TROPONIN I SERPL DL<=0.01 NG/ML-MCNC: 0.01 NG/ML
TROPONIN I SERPL DL<=0.01 NG/ML-MCNC: 0.01 NG/ML
WBC # BLD AUTO: 7.1 K/UL

## 2018-03-23 PROCEDURE — 25000003 PHARM REV CODE 250: Performed by: EMERGENCY MEDICINE

## 2018-03-23 PROCEDURE — 80048 BASIC METABOLIC PNL TOTAL CA: CPT

## 2018-03-23 PROCEDURE — 85025 COMPLETE CBC W/AUTO DIFF WBC: CPT

## 2018-03-23 PROCEDURE — 99284 EMERGENCY DEPT VISIT MOD MDM: CPT | Mod: 25

## 2018-03-23 PROCEDURE — 84484 ASSAY OF TROPONIN QUANT: CPT | Mod: 91

## 2018-03-23 PROCEDURE — 93010 ELECTROCARDIOGRAM REPORT: CPT | Mod: ,,, | Performed by: INTERNAL MEDICINE

## 2018-03-23 PROCEDURE — 93005 ELECTROCARDIOGRAM TRACING: CPT

## 2018-03-23 RX ORDER — ASPIRIN 325 MG
325 TABLET ORAL
Status: COMPLETED | OUTPATIENT
Start: 2018-03-23 | End: 2018-03-23

## 2018-03-23 RX ADMIN — ASPIRIN 325 MG ORAL TABLET 325 MG: 325 PILL ORAL at 02:03

## 2018-03-23 NOTE — ED NOTES
Rounding completed on pt. Restroom needs addressed. Pt escorted to restroom. Bed in lowest, locked position, side rail sup x 2. Call light within reach. Call light within reach. Denies chest pain.

## 2018-03-23 NOTE — ED NOTES
Pt resting in stretcher in NAD with even, unlabored respirations. Pt denies any chest pain at this time. Pt reporting continued bilateral leg heaviness, no swelling or discoloration noted. Pt ambulated to restroom with no assistance needed but RN at side because patient reporting unsteadiness on feet. Pt and family updated on plan of care. Pt requesting something to eat and drink. Will discuss with MD.

## 2018-03-23 NOTE — TELEPHONE ENCOUNTER
Patient called yesterday and reported elevated b/p ( 150/ 68 - 168/85 - pulse 60 ). C/o fatigue and legs aching after short walks around the mall. Dr. Cartagena was notified and he instructed that patient start Triamterene/HCTZ 37.5 / 25 mg one half  tablet daily and do chem7 in 6 weeks. Patient agreed and new Rx was called in to her pharmacy.

## 2018-03-23 NOTE — TELEPHONE ENCOUNTER
Chest Pain     DEJA Peralta Staff 37 minutes ago (12:36 PM)      Ms. Peters states she is experiencing active chest pain that feels like heartburn and dizziness. Patient advised to call 911 due to risk factors. Ms. Peters has refused the 911 disposition. She is requesting to speak with Dr. Gilmore. Please advise.     Thank you   (Routing comment)         Marifer De Los Santos RN  Elen Peters 55 minutes ago (12:17 PM)      Marifer De Los Santos RN 55 minutes ago (12:17 PM)               Reason for Disposition   Chest pain lasting longer than 5 minutes and ANY of the following:* Over 50 years old* Over 30 years old and at least one cardiac risk factor (i.e., high blood pressure, diabetes, high cholesterol, obesity, smoker or strong family history of heart disease)* Pain is crushing, pressure-like, or heavy * Took nitroglycerin and chest pain was not relieved* History of heart disease (i.e., angina, heart attack, bypass surgery, angioplasty, CHF)    Protocols used: ST CHEST PAIN-A-OH     Ms. Peters states she is experiencing active chest pain that feels like heartburn and dizziness. Patient advised to call 911 due to risk factors. Ms. Peters has refused the 911 disposition. She is requesting to speak with Dr. Treadwell.         Documentation       Elen Peters [phone]  Joie Lujan 57 minutes ago (12:15 PM)         Disposition     Call  Now       What would patient/caregiver have done without intervention? Would have attempted to contact the Provider   Patient/Caregiver understands and will follow disposition? No, wishes to speak with PCP

## 2018-03-23 NOTE — ED NOTES
Patient is alert and oriented x4. Friend is at bedside. Patient reports her legs feel heavy. Patient ambulated independently and was able to get into bed with out assistance. Sensation is normal bilaterally. Denies any pain at this time. Call light in reach and educated on the use.

## 2018-03-23 NOTE — TELEPHONE ENCOUNTER
Reason for Disposition   Chest pain lasting longer than 5 minutes and ANY of the following:* Over 50 years old* Over 30 years old and at least one cardiac risk factor (i.e., high blood pressure, diabetes, high cholesterol, obesity, smoker or strong family history of heart disease)* Pain is crushing, pressure-like, or heavy * Took nitroglycerin and chest pain was not relieved* History of heart disease (i.e., angina, heart attack, bypass surgery, angioplasty, CHF)    Protocols used:  CHEST PAIN-A-OH    Ms. Peters states she is experiencing active chest pain that feels like heartburn and dizziness. Patient advised to call 911 due to risk factors. Ms. Peters has refused the 911 disposition. She is requesting to speak with Dr. Treadwell.

## 2018-03-23 NOTE — ED PROVIDER NOTES
"Encounter Date: 3/23/2018    SCRIBE #1 NOTE: I, Shabana García, am scribing for, and in the presence of, Dr. Diaz.       History     Chief Complaint   Patient presents with    Chest Pain     Chest pain, SOB, dizziness, fullness in ears and heaviness in legs, all started around [id number]     Time seen by provider: 2:22 PM    This is a 88 y.o. Female, with history of HTN, who presents with complaint of bilateral lower extremity heaviness that began approximately five hours ago. As the patient was walking through [place], she began to feel as if "weights were attached to her legs." The patient reports dizziness that is worsened by walking, SOB, heavy chest pain that is worsened with deep breaths, and ear pressure. She denies fever, chills, palpitations, diaphoresis, lower extremity swelling, lower extremity pain, numbness, weakness, cough, headache or abdominal pain. The patient notes that she has experienced the lower extremity heaviness, dizziness, and SOB that she says is due to anxiety, before, but it has never been this severe. She is compliant with her HTN medications.      The history is provided by the patient and a friend.     Review of patient's allergies indicates:   Allergen Reactions    Sulfa (sulfonamide antibiotics)      Unknown as child    Amlodipine Swelling    Codeine      Other reaction(s): Unknown    Maxzide [triamterene-hydrochlorothiazid]      Past Medical History:   Diagnosis Date    Abnormal stress test 11/18/2015    Arthritis     Bladder cancer     Cataract     Coronary artery disease involving native coronary artery 1/6/2016    Depression     GERD (gastroesophageal reflux disease)     HTN (hypertension), benign 11/18/2015    Hx of bladder infections     Hyperlipemia     OP (osteoporosis)     Positive cardiac stress test 1/6/2016    Renal cancer     Syncope 1/6/2016    Upper back pain 12/1/2015    Ureteral cancer     Venous insufficiency 2017    Pierre " adenoma of colon 5/31/2013     Past Surgical History:   Procedure Laterality Date    APPENDECTOMY      BACK SURGERY      CATARACT EXTRACTION W/  INTRAOCULAR LENS IMPLANT Left 3/16/15    kristy    CATARACT EXTRACTION W/  INTRAOCULAR LENS IMPLANT Right 4/6/15    kristy    COLONOSCOPY  10/21/2013    CYSTOSCOPY      NEPHRECTOMY      L    TONSILLECTOMY, ADENOIDECTOMY       Family History   Problem Relation Age of Onset    Leukemia Mother     Heart disease Mother     Heart attack Mother     Cancer Mother      leukemia    Goiter Mother     Leukemia Father     Cancer Father      leukemia    Heart disease Maternal Grandfather     No Known Problems Brother     No Known Problems Son     No Known Problems Son     No Known Problems Son     Cancer Son      throat    No Known Problems Son     No Known Problems Son     No Known Problems Maternal Aunt     No Known Problems Maternal Uncle     No Known Problems Paternal Aunt     No Known Problems Paternal Uncle     No Known Problems Maternal Grandmother     No Known Problems Paternal Grandmother     No Known Problems Paternal Grandfather     No Known Problems Sister     Amblyopia Neg Hx     Blindness Neg Hx     Cataracts Neg Hx     Diabetes Neg Hx     Glaucoma Neg Hx     Hypertension Neg Hx     Macular degeneration Neg Hx     Retinal detachment Neg Hx     Strabismus Neg Hx     Stroke Neg Hx     Thyroid disease Neg Hx     Breast cancer Neg Hx      Social History   Substance Use Topics    Smoking status: Never Smoker    Smokeless tobacco: Never Used    Alcohol use No     Review of Systems   Constitutional: Negative for chills, diaphoresis and fever.   HENT: Negative for sore throat.         Positive for ear pressure.   Respiratory: Positive for shortness of breath. Negative for cough.    Cardiovascular: Positive for chest pain (Pressure.). Negative for palpitations and leg swelling.   Gastrointestinal: Negative for abdominal pain and nausea.    Genitourinary: Negative for dysuria.   Musculoskeletal: Negative for back pain.        Negative for pain to the bilateral LE.   Skin: Negative for rash.   Neurological: Positive for dizziness. Negative for weakness, numbness and headaches.   Hematological: Does not bruise/bleed easily.   Psychiatric/Behavioral: The patient is nervous/anxious.        Physical Exam     Initial Vitals [03/23/18 1345]   BP Pulse Resp Temp SpO2   (!) 187/81 62 18 97.9 °F (36.6 °C) 99 %      MAP       116.33         Physical Exam    Nursing note and vitals reviewed.  Constitutional: She appears well-developed and well-nourished. She is not diaphoretic. No distress.   HENT:   Head: Normocephalic and atraumatic.   Mouth/Throat: Oropharynx is clear and moist.   Eyes: Conjunctivae are normal. Pupils are equal, round, and reactive to light.   Neck: Normal range of motion. Neck supple.   Cardiovascular: Normal rate, regular rhythm, normal heart sounds and intact distal pulses.   Pulmonary/Chest: Breath sounds normal. No respiratory distress. She has no wheezes. She has no rhonchi. She has no rales.   Abdominal: Soft. Bowel sounds are normal. She exhibits no distension. There is no tenderness.   Musculoskeletal: Normal range of motion. She exhibits no edema or tenderness.   Lymphadenopathy:     She has no cervical adenopathy.   Neurological: She is alert and oriented to person, place, and time. She has normal strength.   Skin: Skin is warm and dry.   Psychiatric: She has a normal mood and affect. Thought content normal.         ED Course   Procedures  Labs Reviewed   CBC W/ AUTO DIFFERENTIAL - Abnormal; Notable for the following:        Result Value    MCH 32.2 (*)     All other components within normal limits   BASIC METABOLIC PANEL - Abnormal; Notable for the following:     CO2 31 (*)     All other components within normal limits   TROPONIN I   TROPONIN I      Imaging Results          X-Ray Chest AP Portable (Final result)  Result time  "03/23/18 15:05:46    Final result by Davis Barnett MD (03/23/18 15:05:46)                 Impression:      Stable chronic findings, no acute cardiopulmonary process.      Electronically signed by: Davis Barnett MD  Date:    03/23/2018  Time:    15:05             Narrative:    EXAMINATION:  XR CHEST AP PORTABLE    CLINICAL HISTORY:  Chest pain, unspecified    TECHNIQUE:  Single frontal view of the chest was performed.    COMPARISON:  12/02/2017    FINDINGS:  The cardiomediastinal silhouette is enlarged, similar to the previous exam.  There is no pleural effusion.  The trachea is midline.  The lungs are symmetrically expanded bilaterally with coarse interstitial attenuation, similar to the previous exam noting biapical pleural thickening or scarring, and scattered regions of bandlike atelectasis.  No large focal consolidation seen.  There is no pneumothorax.  The osseous structures are remarkable for degenerative changes.                                EKG Readings: (Independently Interpreted)   Sinus Bradycardic at a rate of 59 bpm. Normal interval. Narrow QRS. No acute ST-T abnormalities. No change when compared to EKG from 12/2/2017.       X-Rays:   Independently Interpreted Readings:   Chest X-Ray: No acute findings.     Medical Decision Making:   Clinical Tests:   Lab Tests: Ordered and Reviewed  Radiological Study: Ordered and Reviewed  Medical Tests: Ordered and Reviewed  ED Management:  88-year-old female presents with leg heaviness, shortness of breath and dizziness after walking around today.  Patient states she's had a history of this for quite a while.  She presented to the emergency department today because she "told her family about it and then made me come in".  Differential could include ACS, electrolyte disturbances, dehydration, age.  We'll get labs, EKG and a chest x-ray.  No focal neurological deficits to suggest stroke.  As the heaviness is in the bilateral legs and there is no pain I do " not suspect DVT is no indication for ultrasound.    4:10 PM upon reevaluation patient is a symptomatic.  I updated her as well as her son who is at bedside on the normal blood work at this time.  We'll get a repeat troponin and if negative I feel comfortable that the patient be safely discharged home.    6:40 PM repeat troponin is normal.  At this point I feel comfortable discharge the patient home.  She is been a symptomatic in the emergency department.  She has been up ambulating with no issues.  I have discussed the patient and with shared medical decision making she also I would like to go home.  Return precautions given.    Additional MDM:   Heart Score:    History:          Slightly suspicious.  ECG:             Normal  Age:               >65 years  Risk factors: 1-2 risk factors  Troponin:       Less than or equal to normal limit  Final Score: 3             Scribe Attestation:   Scribe #1: I performed the above scribed service and the documentation accurately describes the services I performed. I attest to the accuracy of the note.    Attending Attestation:           Physician Attestation for Scribe:  Physician Attestation Statement for Scribe #1: I, Dr. Diaz, reviewed documentation, as scribed by Shabana García in my presence, and it is both accurate and complete.                    Clinical Impression:     1. Dizziness    2. Chest pain                                 Kervin Diaz, DO  03/23/18 [id number]

## 2018-04-02 ENCOUNTER — PES CALL (OUTPATIENT)
Dept: ADMINISTRATIVE | Facility: CLINIC | Age: 83
End: 2018-04-02

## 2018-04-04 ENCOUNTER — TELEPHONE (OUTPATIENT)
Dept: OBSTETRICS AND GYNECOLOGY | Facility: CLINIC | Age: 83
End: 2018-04-04

## 2018-04-04 DIAGNOSIS — Z12.31 VISIT FOR SCREENING MAMMOGRAM: Primary | ICD-10-CM

## 2018-04-11 ENCOUNTER — HOSPITAL ENCOUNTER (OUTPATIENT)
Dept: RADIOLOGY | Facility: OTHER | Age: 83
Discharge: HOME | End: 2018-04-11
Attending: OBSTETRICS & GYNECOLOGY
Payer: MEDICARE

## 2018-04-11 ENCOUNTER — OFFICE VISIT (OUTPATIENT)
Dept: OBSTETRICS AND GYNECOLOGY | Facility: CLINIC | Age: 83
End: 2018-04-11
Attending: OBSTETRICS & GYNECOLOGY
Payer: MEDICARE

## 2018-04-11 VITALS — HEIGHT: 59 IN | BODY MASS INDEX: 25.52 KG/M2 | WEIGHT: 126.56 LBS

## 2018-04-11 DIAGNOSIS — R42 DIZZINESS: ICD-10-CM

## 2018-04-11 DIAGNOSIS — Z01.419 WELL FEMALE EXAM WITH ROUTINE GYNECOLOGICAL EXAM: Primary | ICD-10-CM

## 2018-04-11 DIAGNOSIS — Z12.31 VISIT FOR SCREENING MAMMOGRAM: ICD-10-CM

## 2018-04-11 PROCEDURE — 77067 SCR MAMMO BI INCL CAD: CPT | Mod: 26,,, | Performed by: INTERNAL MEDICINE

## 2018-04-11 PROCEDURE — G0101 CA SCREEN;PELVIC/BREAST EXAM: HCPCS | Mod: S$GLB,,, | Performed by: OBSTETRICS & GYNECOLOGY

## 2018-04-11 PROCEDURE — 77063 BREAST TOMOSYNTHESIS BI: CPT | Mod: 26,,, | Performed by: INTERNAL MEDICINE

## 2018-04-11 PROCEDURE — 99999 PR PBB SHADOW E&M-EST. PATIENT-LVL III: CPT | Mod: PBBFAC,,, | Performed by: OBSTETRICS & GYNECOLOGY

## 2018-04-11 PROCEDURE — 77067 SCR MAMMO BI INCL CAD: CPT | Mod: TC

## 2018-04-14 NOTE — PROGRESS NOTES
SUBJECTIVE:   88 y.o. female   for gyn exam. No LMP recorded. Patient is postmenopausal..  Denies PMB.  No HRT.  She reports dizziness.  She went to the ED last month for this.  She has also seen her PCP.  She recently fell and hit her breast and nose.  She lives alone.  States her son is concerned about her.         Past Medical History:   Diagnosis Date    Abnormal stress test 2015    Arthritis     Bladder cancer     Cataract     Coronary artery disease involving native coronary artery 2016    Depression     GERD (gastroesophageal reflux disease)     HTN (hypertension), benign 2015    Hx of bladder infections     Hyperlipemia     OP (osteoporosis)     Positive cardiac stress test 2016    Renal cancer     Syncope 2016    Upper back pain 2015    Ureteral cancer     Venous insufficiency 2017    Villous adenoma of colon 2013     Past Surgical History:   Procedure Laterality Date    APPENDECTOMY      BACK SURGERY      CATARACT EXTRACTION W/  INTRAOCULAR LENS IMPLANT Left 3/16/15    kristy    CATARACT EXTRACTION W/  INTRAOCULAR LENS IMPLANT Right 4/6/15    kristy    COLONOSCOPY  10/21/2013    CYSTOSCOPY      NEPHRECTOMY      L    TONSILLECTOMY, ADENOIDECTOMY       Social History     Social History    Marital status:      Spouse name: N/A    Number of children: N/A    Years of education: N/A     Occupational History    retired      Social History Main Topics    Smoking status: Never Smoker    Smokeless tobacco: Never Used    Alcohol use No    Drug use: No    Sexual activity: Not Currently     Other Topics Concern    Not on file     Social History Narrative    No narrative on file     Family History   Problem Relation Age of Onset    Leukemia Mother     Heart disease Mother     Heart attack Mother     Cancer Mother      leukemia    Goiter Mother     Leukemia Father     Cancer Father      leukemia    Heart disease Maternal Grandfather      No Known Problems Brother     No Known Problems Son     No Known Problems Son     No Known Problems Son     Cancer Son      throat    No Known Problems Son     No Known Problems Son     No Known Problems Maternal Aunt     No Known Problems Maternal Uncle     No Known Problems Paternal Aunt     No Known Problems Paternal Uncle     No Known Problems Maternal Grandmother     No Known Problems Paternal Grandmother     No Known Problems Paternal Grandfather     No Known Problems Sister     Amblyopia Neg Hx     Blindness Neg Hx     Cataracts Neg Hx     Diabetes Neg Hx     Glaucoma Neg Hx     Hypertension Neg Hx     Macular degeneration Neg Hx     Retinal detachment Neg Hx     Strabismus Neg Hx     Stroke Neg Hx     Thyroid disease Neg Hx     Breast cancer Neg Hx      OB History    Para Term  AB Living   6 6 6     6   SAB TAB Ectopic Multiple Live Births                  # Outcome Date GA Lbr Candido/2nd Weight Sex Delivery Anes PTL Lv   6 Term            5 Term            4 Term            3 Term            2 Term            1 Term                     Current Outpatient Prescriptions   Medication Sig Dispense Refill    alendronate (FOSAMAX) 70 MG tablet Take 1 tablet (70 mg total) by mouth every 7 days. 4 tablet 11    aspirin (ECOTRIN) 81 MG EC tablet Take 81 mg by mouth once daily.      atorvastatin (LIPITOR) 40 MG tablet Take one tablet daily or as directed. (Patient taking differently: Pt taking one pill every other day and 1/2 pill every other day.) 90 tablet 3    calcium-magnesium-zinc Tab Take 2 tablets by mouth once daily at 6am. 1 Tablet Oral Every day      carvedilol (COREG) 6.25 MG tablet Take 1 tablet (6.25 mg total) by mouth 2 (two) times daily with meals. One by mouth twice daily or as directed (Patient taking differently: Take 6.25 mg by mouth 2 (two) times daily with meals. Pt taking 1/2 pill twice a day.) 180 tablet 3    citalopram (CELEXA) 20 MG tablet TAKE 1  TABLET BY MOUTH EVERY DAY 90 tablet 3    INV lisinopril (PRINIVIL,ZESTRIL) 20 MG Tab Take 2 tablets (40 mg total) by mouth once daily. 180 tablet 3    mv-min-folic acid-lutein (THERAGRAN-M ADVANCED 50 PLUS) 0.4-250 mg-mcg Tab Take by mouth. 1 Tablet Oral Every day      omega-3 fatty acids 1,000 mg Cap Take 1 capsule by mouth once daily. 2  Capsule Oral Every day      omeprazole (PRILOSEC) 40 MG capsule TAKE 1 CAPSULE ONE TIME DAILY 90 capsule 3    ondansetron (ZOFRAN) 4 MG tablet Take 1 tablet (4 mg total) by mouth every 6 (six) hours. 12 tablet 0    triamterene-hydrochlorothiazide 37.5-25 mg (MAXZIDE-25) 37.5-25 mg per tablet Take 1 tablet by mouth once daily. One half to one a day or as directed per Dr. Cartagena      zolpidem (AMBIEN) 5 MG Tab Take 1 tablet (5 mg total) by mouth nightly. 30 tablet 3    nitroGLYCERIN (NITROSTAT) 0.4 MG SL tablet Place 1 tablet (0.4 mg total) under the tongue every 5 (five) minutes as needed for Chest pain. 25 tablet 3     No current facility-administered medications for this visit.      Allergies: Sulfa (sulfonamide antibiotics); Amlodipine; Codeine; and Maxzide [triamterene-hydrochlorothiazid]     ROS:  Constitutional: no weight loss, weight gain, fever, fatigue  Eyes:  No vision changes, glasses/contacts  ENT/Mouth: No ulcers, sinus problems, ears ringing, headache  Cardiovascular: No inability to lie flat, chest pain, exercise intolerance, swelling, heart palpitations  Respiratory: No wheezing, coughing blood, shortness of breath, or cough  Gastrointestinal: No diarrhea, bloody stool, nausea/vomiting, constipation, gas, hemorrhoids  Genitourinary: No blood in urine, painful urination, urgency of urination, frequency of urination, incomplete emptying, incontinence, abnormal bleeding, painful periods, heavy periods, vaginal discharge, vaginal odor, painful intercourse, sexual problems, bleeding after intercourse.  Musculoskeletal: No muscle weakness  Skin/Breast: No painful  "breasts, nipple discharge, masses, rash, ulcers  Neurological: No passing out, seizures, numbness, headache  Endocrine: No diabetes, hypothyroid, hyperthyroid, hot flashes, hair loss, abnormal hair growth, ance  Psychiatric: No depression, crying  Hematologic: +bruises, no bleeding, swollen lymph nodes, anemia.      OBJECTIVE:   The patient appears well, alert, oriented x 3, in no distress.  Ht 4' 11" (1.499 m)   Wt 57.4 kg (126 lb 8.7 oz)   BMI 25.56 kg/m²   NECK: no thyromegaly, trachea midline  SKIN: no acne, striae, hirsutism  CHEST: CTAB  CV: RRR  BREAST EXAM: breasts appear normal, no suspicious masses, no skin or nipple changes or axillary nodes.  Bruise to left breast  ABDOMEN: no hernias, masses, or hepatosplenomegaly  GENITALIA: normal external genitalia, no erythema, no discharge  URETHRA: normal urethra, normal urethral meatus  VAGINA: Normal  CERVIX: no lesions or cervical motion tenderness  UTERUS: normal size, contour, position, consistency, mobility, non-tender  ADNEXA: no mass, fullness, tenderness      ASSESSMENT:   1. Well female exam with routine gynecological exam     2. Dizziness         PLAN:       Discussed dizziness.  May see ENT and PCP for further evaluation and tx.    Lives alone.  Refuses to wear a monitor on her neck.  Has cell phone. Discussed itouch watch that syncs with phone.  Could wear for safety.  Return to clinic in 1 year  "

## 2018-04-19 ENCOUNTER — OFFICE VISIT (OUTPATIENT)
Dept: INTERNAL MEDICINE | Facility: CLINIC | Age: 83
End: 2018-04-19
Payer: MEDICARE

## 2018-04-19 VITALS
WEIGHT: 129 LBS | TEMPERATURE: 99 F | SYSTOLIC BLOOD PRESSURE: 108 MMHG | HEIGHT: 59 IN | DIASTOLIC BLOOD PRESSURE: 54 MMHG | HEART RATE: 60 BPM | BODY MASS INDEX: 26 KG/M2

## 2018-04-19 DIAGNOSIS — R07.9 CHEST PAIN DUE TO GERD: ICD-10-CM

## 2018-04-19 DIAGNOSIS — K21.9 CHEST PAIN DUE TO GERD: ICD-10-CM

## 2018-04-19 DIAGNOSIS — I10 HTN (HYPERTENSION), BENIGN: Primary | ICD-10-CM

## 2018-04-19 DIAGNOSIS — R07.9 CHEST PAIN, UNSPECIFIED TYPE: ICD-10-CM

## 2018-04-19 DIAGNOSIS — F32.5 DEPRESSION, MAJOR, IN REMISSION: ICD-10-CM

## 2018-04-19 DIAGNOSIS — I70.0 AORTIC ATHEROSCLEROSIS: ICD-10-CM

## 2018-04-19 PROCEDURE — 99499 UNLISTED E&M SERVICE: CPT | Mod: S$GLB,,, | Performed by: INTERNAL MEDICINE

## 2018-04-19 PROCEDURE — 99999 PR PBB SHADOW E&M-EST. PATIENT-LVL III: CPT | Mod: PBBFAC,,, | Performed by: INTERNAL MEDICINE

## 2018-04-19 PROCEDURE — 99214 OFFICE O/P EST MOD 30 MIN: CPT | Mod: S$GLB,,, | Performed by: INTERNAL MEDICINE

## 2018-04-19 RX ORDER — ESOMEPRAZOLE MAGNESIUM 40 MG/1
40 CAPSULE, DELAYED RELEASE ORAL
Qty: 90 CAPSULE | Refills: 3 | Status: SHIPPED | OUTPATIENT
Start: 2018-04-19 | End: 2019-05-22 | Stop reason: SDUPTHER

## 2018-04-19 RX ORDER — ESOMEPRAZOLE MAGNESIUM 40 MG/1
40 CAPSULE, DELAYED RELEASE ORAL
Qty: 30 CAPSULE | Refills: 11 | Status: SHIPPED | OUTPATIENT
Start: 2018-04-19 | End: 2018-04-19 | Stop reason: SDUPTHER

## 2018-04-24 NOTE — PROGRESS NOTES
Subjective:       Patient ID: Elen Peters is a 88 y.o. female.    Chief Complaint: Hyperlipidemia; Hypertension; Diarrhea; and Cough    Hyperlipidemia   This is a chronic problem. The problem is controlled. Recent lipid tests were reviewed and are normal. Pertinent negatives include no chest pain or shortness of breath. The current treatment provides significant improvement of lipids. There are no compliance problems.    Hypertension   This is a chronic problem. The problem is controlled. Pertinent negatives include no chest pain, palpitations or shortness of breath. The current treatment provides significant improvement. There are no compliance problems.      Review of Systems   Constitutional: Negative for fatigue.   HENT: Positive for postnasal drip. Negative for sore throat.    Eyes: Negative for visual disturbance.   Respiratory: Negative for shortness of breath.    Cardiovascular: Negative for chest pain and palpitations.   Gastrointestinal: Positive for diarrhea (rare). Negative for abdominal pain.   Genitourinary: Negative for dysuria, frequency and hematuria.   Musculoskeletal: Negative for joint swelling.   Skin: Negative for rash.   Neurological: Negative for speech difficulty and weakness.   Psychiatric/Behavioral: Negative for dysphoric mood.       Objective:      Physical Exam   Constitutional: She is oriented to person, place, and time. She appears well-developed and well-nourished. No distress.   HENT:   Head: Normocephalic and atraumatic.   Mouth/Throat: Oropharynx is clear and moist.   Eyes: Conjunctivae are normal. Pupils are equal, round, and reactive to light.   Neck: Normal range of motion. Neck supple.   Cardiovascular: Normal rate, regular rhythm and normal heart sounds.    Pulmonary/Chest: Effort normal and breath sounds normal. She has no wheezes.   Abdominal: Soft. Bowel sounds are normal. There is no tenderness.   Musculoskeletal: Normal range of motion. She exhibits no edema or  tenderness.   Neurological: She is alert and oriented to person, place, and time. No cranial nerve deficit.   Skin: No erythema.   Psychiatric: She has a normal mood and affect.   Vitals reviewed.      Assessment:       1. HTN (hypertension), benign    2. Aortic atherosclerosis    3. Depression, major, in remission    4. Chest pain, unspecified type    5. Chest pain due to GERD        Plan:       Elen was seen today for hyperlipidemia, hypertension, diarrhea and cough.    Diagnoses and all orders for this visit:    HTN (hypertension), benign    Aortic atherosclerosis    Depression, major, in remission    Chest pain, unspecified type  Comments:  now resolved-  ED visit reviewed-  all symptoms resolved-  likely GERD-  will try Nexium for awhile  Orders:  -     esomeprazole (NEXIUM) 40 MG capsule; Take 1 capsule (40 mg total) by mouth before breakfast.    Chest pain due to GERD  -     esomeprazole (NEXIUM) 40 MG capsule; Take 1 capsule (40 mg total) by mouth before breakfast.    Other orders  -     Discontinue: esomeprazole (NEXIUM) 40 MG capsule; Take 1 capsule (40 mg total) by mouth before breakfast.        Follow-up in about 6 months (around 10/19/2018) for F/U APPOINTMENT WITH ME.

## 2018-05-01 ENCOUNTER — OFFICE VISIT (OUTPATIENT)
Dept: OTOLARYNGOLOGY | Facility: CLINIC | Age: 83
End: 2018-05-01
Payer: MEDICARE

## 2018-05-01 VITALS — SYSTOLIC BLOOD PRESSURE: 145 MMHG | DIASTOLIC BLOOD PRESSURE: 64 MMHG | TEMPERATURE: 97 F

## 2018-05-01 DIAGNOSIS — Z97.4 WEARS HEARING AID IN BOTH EARS: ICD-10-CM

## 2018-05-01 DIAGNOSIS — H61.23 BILATERAL IMPACTED CERUMEN: ICD-10-CM

## 2018-05-01 DIAGNOSIS — K08.109 MISSING TEETH, ACQUIRED: ICD-10-CM

## 2018-05-01 DIAGNOSIS — M26.4 MALOCCLUSION: ICD-10-CM

## 2018-05-01 DIAGNOSIS — H92.01 OTALGIA, RIGHT EAR: Primary | ICD-10-CM

## 2018-05-01 PROCEDURE — 99213 OFFICE O/P EST LOW 20 MIN: CPT | Mod: 25,S$GLB,, | Performed by: OTOLARYNGOLOGY

## 2018-05-01 PROCEDURE — 99999 PR PBB SHADOW E&M-EST. PATIENT-LVL II: CPT | Mod: PBBFAC,,, | Performed by: OTOLARYNGOLOGY

## 2018-05-01 PROCEDURE — 69210 REMOVE IMPACTED EAR WAX UNI: CPT | Mod: S$GLB,,, | Performed by: OTOLARYNGOLOGY

## 2018-05-01 NOTE — PATIENT INSTRUCTIONS
Cerumen impactions removed from both aecs  TMJ literature provided  Soft diet/mild heat/NSAID use may help; avoid gum chewing  Monitor hearing yearly; audiometry not performed today

## 2018-05-01 NOTE — PROGRESS NOTES
Subjective:       Patient ID: Elen Peters is a 88 y.o. female.    Chief Complaint: No chief complaint on file.    HPI: Ms. Peters is an 88-year-old  female wearing elegant BTE hearing aids.  [place]  She indicates some recent right periauricular pain requiring 2 Aleve pills for pain control periodically.  She had  2 episodes in 2 weeks.   She sought help from her dentist who ruled out any significant right inferior dentition  problems.  She admits to a nightly  ritual of cleaning her teeth with a device.  She does not wear dentures.  She does chew gum.  Her last audiometric study was completed in October 2016; it indicated a sloping mild to moderate to severe bilateral 1 through 8K sensorineural hearing loss.  Her SRT scores measured 15 dB for the right ear versus 20 for the left with essentially 80% discrimination scores indicated for both ears tested 85 dB hearing levels.      Past Medical History:   Diagnosis Date    Abnormal stress test 11/18/2015    Arthritis     Bladder cancer     Cataract     Coronary artery disease involving native coronary artery 1/6/2016    Depression     GERD (gastroesophageal reflux disease)     HTN (hypertension), benign 11/18/2015    Hx of bladder infections     Hyperlipemia     OP (osteoporosis)     Positive cardiac stress test 1/6/2016    Renal cancer     Syncope 1/6/2016    Upper back pain 12/1/2015    Ureteral cancer     Venous insufficiency 2017    Villous adenoma of colon 5/31/2013    ALLERGIES: Sulfa, codeine, Maxzide, amlodipine  Current Outpatient Prescriptions on File Prior to Visit   Medication Sig Dispense Refill    alendronate (FOSAMAX) 70 MG tablet Take 1 tablet (70 mg total) by mouth every 7 days. 4 tablet 11    aspirin (ECOTRIN) 81 MG EC tablet Take 81 mg by mouth once daily.      atorvastatin (LIPITOR) 40 MG tablet Take one tablet daily or as directed. (Patient taking differently: Pt taking one pill every other  day and 1/2 pill every other day.) 90 tablet 3    calcium-magnesium-zinc Tab Take 2 tablets by mouth once daily at 6am. 1 Tablet Oral Every day      carvedilol (COREG) 6.25 MG tablet Take 1 tablet (6.25 mg total) by mouth 2 (two) times daily with meals. One by mouth twice daily or as directed (Patient taking differently: Take 6.25 mg by mouth 2 (two) times daily with meals. ) 180 tablet 3    citalopram (CELEXA) 20 MG tablet TAKE 1 TABLET BY MOUTH EVERY DAY 90 tablet 3    esomeprazole (NEXIUM) 40 MG capsule Take 1 capsule (40 mg total) by mouth before breakfast. 90 capsule 3    INV lisinopril (PRINIVIL,ZESTRIL) 20 MG Tab Take 2 tablets (40 mg total) by mouth once daily. 180 tablet 3    mv-min-folic acid-lutein (THERAGRAN-M ADVANCED 50 PLUS) 0.4-250 mg-mcg Tab Take by mouth. 1 Tablet Oral Every day      omega-3 fatty acids 1,000 mg Cap Take 1 capsule by mouth once daily. 2  Capsule Oral Every day      ondansetron (ZOFRAN) 4 MG tablet Take 1 tablet (4 mg total) by mouth every 6 (six) hours. 12 tablet 0    triamterene-hydrochlorothiazide 37.5-25 mg (MAXZIDE-25) 37.5-25 mg per tablet Take 1 tablet by mouth once daily. One half to one a day or as directed per Dr. Cartagena      zolpidem (AMBIEN) 5 MG Tab Take 1 tablet (5 mg total) by mouth nightly. 30 tablet 3    nitroGLYCERIN (NITROSTAT) 0.4 MG SL tablet Place 1 tablet (0.4 mg total) under the tongue every 5 (five) minutes as needed for Chest pain. 25 tablet 3     No current facility-administered medications on file prior to visit.          Review of Systems        Objective:     Blood pressure 145/64 pulse 63 temperature 97.4   Gen.: Alert and oriented lady in no acute distress   Both ears were examined under the microscope in the micro-procedure room; the patient removes her hearing aids for the examinations an ear cleaning procedures.    Physical Exam   HENT:   Head:       Ears:    Mouth/Throat:           Assessment:       1. Otalgia, right ear ; probable TMJ  sx   2. Malocclusion    3. Bilateral impacted cerumen    4. Wears hearing aid in both ears    5. Missing teeth, acquired        Plan:     Cerumen impactions removed from both aecs  TMJ literature provided  Soft diet/mild heat/NSAID use may help; avoid gum chewing  Monitor hearing yearly; audiometry not performed today

## 2018-05-02 ENCOUNTER — LAB VISIT (OUTPATIENT)
Dept: LAB | Facility: HOSPITAL | Age: 83
End: 2018-05-02
Payer: MEDICARE

## 2018-05-02 DIAGNOSIS — I10 HYPERTENSION, UNSPECIFIED TYPE: ICD-10-CM

## 2018-05-02 LAB
ANION GAP SERPL CALC-SCNC: 5 MMOL/L
BUN SERPL-MCNC: 13 MG/DL
CALCIUM SERPL-MCNC: 9.9 MG/DL
CHLORIDE SERPL-SCNC: 96 MMOL/L
CO2 SERPL-SCNC: 30 MMOL/L
CREAT SERPL-MCNC: 0.8 MG/DL
EST. GFR  (AFRICAN AMERICAN): >60 ML/MIN/1.73 M^2
EST. GFR  (NON AFRICAN AMERICAN): >60 ML/MIN/1.73 M^2
GLUCOSE SERPL-MCNC: 92 MG/DL
POTASSIUM SERPL-SCNC: 4.5 MMOL/L
SODIUM SERPL-SCNC: 131 MMOL/L

## 2018-05-02 PROCEDURE — 36415 COLL VENOUS BLD VENIPUNCTURE: CPT

## 2018-05-02 PROCEDURE — 80048 BASIC METABOLIC PNL TOTAL CA: CPT

## 2018-05-04 ENCOUNTER — PATIENT MESSAGE (OUTPATIENT)
Dept: CARDIOLOGY | Facility: CLINIC | Age: 83
End: 2018-05-04

## 2018-05-04 ENCOUNTER — TELEPHONE (OUTPATIENT)
Dept: CARDIOLOGY | Facility: CLINIC | Age: 83
End: 2018-05-04

## 2018-05-21 ENCOUNTER — TELEPHONE (OUTPATIENT)
Dept: INTERNAL MEDICINE | Facility: CLINIC | Age: 83
End: 2018-05-21

## 2018-05-21 NOTE — TELEPHONE ENCOUNTER
----- Message from Lizzette Gomes sent at 5/21/2018 11:36 AM CDT -----  Contact: [phone]  Pt would like a call from nurse. Pt did not want to get in to great detail on what the call is regarding.

## 2018-05-29 DIAGNOSIS — M81.0 OSTEOPOROSIS, UNSPECIFIED OSTEOPOROSIS TYPE, UNSPECIFIED PATHOLOGICAL FRACTURE PRESENCE: ICD-10-CM

## 2018-05-29 DIAGNOSIS — F43.9 SITUATIONAL STRESS: ICD-10-CM

## 2018-05-29 RX ORDER — ALENDRONATE SODIUM 70 MG/1
70 TABLET ORAL
Qty: 4 TABLET | Refills: 11 | Status: SHIPPED | OUTPATIENT
Start: 2018-05-29 | End: 2018-10-22 | Stop reason: SINTOL

## 2018-05-29 RX ORDER — CITALOPRAM 20 MG/1
20 TABLET, FILM COATED ORAL DAILY
Qty: 90 TABLET | Refills: 3 | Status: SHIPPED | OUTPATIENT
Start: 2018-05-29 | End: 2019-07-29 | Stop reason: SDUPTHER

## 2018-05-29 NOTE — TELEPHONE ENCOUNTER
----- Message from Sharmin Villalobos sent at 5/29/2018  2:12 PM CDT -----  Contact: Danya @ [facility] [phone]   She's calling in regards to the renewal request for the following scripts alendronate (FOSAMAX) 70 MG tablet, citalopram (CELEXA) 20 MG tablet she said that a request was sent out three times last week and they always sent one out on 05/29/2018 and she would like to know if you have received it and if they're going to be approved or denied? She would like a call back at the number above.

## 2018-06-18 ENCOUNTER — OFFICE VISIT (OUTPATIENT)
Dept: PODIATRY | Facility: CLINIC | Age: 83
End: 2018-06-18
Payer: MEDICARE

## 2018-06-18 ENCOUNTER — TELEPHONE (OUTPATIENT)
Dept: PODIATRY | Facility: CLINIC | Age: 83
End: 2018-06-18

## 2018-06-18 VITALS — HEIGHT: 59 IN | BODY MASS INDEX: 25.8 KG/M2 | WEIGHT: 128 LBS

## 2018-06-18 DIAGNOSIS — B35.1 ONYCHOMYCOSIS DUE TO DERMATOPHYTE: Primary | ICD-10-CM

## 2018-06-18 PROCEDURE — 99203 OFFICE O/P NEW LOW 30 MIN: CPT | Mod: S$GLB,,,

## 2018-06-18 PROCEDURE — 99999 PR PBB SHADOW E&M-EST. PATIENT-LVL III: CPT | Mod: PBBFAC,,,

## 2018-06-18 NOTE — TELEPHONE ENCOUNTER
----- Message from Maris OLEKSANDR Hinojosa sent at 6/18/2018 10:11 AM CDT -----  Contact: PT  PT is calling regarding losing a toe nail and saying there's a thick skin over her toe and wants to get it checked out.     Callback: [phone]

## 2018-06-18 NOTE — TELEPHONE ENCOUNTER
Called pat back and she said her toenail is loose and there is some thick stuff underneath and she would like to have somebody to look at it. Scheduled appt to see Dr. Rangel in [place]

## 2018-06-18 NOTE — PROGRESS NOTES
Subjective:       Patient ID: Elen Peters is a 88 y.o. female.    Chief Complaint: Nail Problem (Left great toe)    HPI  Elen is a 88 y.o. female who presents to the clinic complaining of thick and discolored toenails on the left foot, states the great toenail fell off and is now yellow and thick. Elen is inquiring about treatment options.      Past Medical History:   Diagnosis Date    Abnormal stress test 11/18/2015    Arthritis     Bladder cancer     Cataract     Coronary artery disease involving native coronary artery 1/6/2016    Depression     GERD (gastroesophageal reflux disease)     HTN (hypertension), benign 11/18/2015    Hx of bladder infections     Hyperlipemia     OP (osteoporosis)     Positive cardiac stress test 1/6/2016    Renal cancer     Syncope 1/6/2016    Upper back pain 12/1/2015    Ureteral cancer     Venous insufficiency 2017    Villous adenoma of colon 5/31/2013       Past Surgical History:   Procedure Laterality Date    APPENDECTOMY      BACK SURGERY      CATARACT EXTRACTION W/  INTRAOCULAR LENS IMPLANT Left 3/16/15    kristy    CATARACT EXTRACTION W/  INTRAOCULAR LENS IMPLANT Right 4/6/15    kristy    COLONOSCOPY  10/21/2013    CYSTOSCOPY      NEPHRECTOMY      L    TONSILLECTOMY, ADENOIDECTOMY         Family History   Problem Relation Age of Onset    Leukemia Mother     Heart disease Mother     Heart attack Mother     Cancer Mother         leukemia    Goiter Mother     Leukemia Father     Cancer Father         leukemia    Heart disease Maternal Grandfather     No Known Problems Brother     No Known Problems Son     No Known Problems Son     No Known Problems Son     Cancer Son         throat    No Known Problems Son     No Known Problems Son     No Known Problems Maternal Aunt     No Known Problems Maternal Uncle     No Known Problems Paternal Aunt     No Known Problems Paternal Uncle     No Known Problems Maternal Grandmother     No Known  Problems Paternal Grandmother     No Known Problems Paternal Grandfather     No Known Problems Sister     Amblyopia Neg Hx     Blindness Neg Hx     Cataracts Neg Hx     Diabetes Neg Hx     Glaucoma Neg Hx     Hypertension Neg Hx     Macular degeneration Neg Hx     Retinal detachment Neg Hx     Strabismus Neg Hx     Stroke Neg Hx     Thyroid disease Neg Hx     Breast cancer Neg Hx        Social History     Social History    Marital status:      Spouse name: N/A    Number of children: N/A    Years of education: N/A     Occupational History    retired      Social History Main Topics    Smoking status: Never Smoker    Smokeless tobacco: Never Used    Alcohol use No    Drug use: No    Sexual activity: Not Currently     Other Topics Concern    None     Social History Narrative    None       Current Outpatient Prescriptions   Medication Sig Dispense Refill    alendronate (FOSAMAX) 70 MG tablet Take 1 tablet (70 mg total) by mouth every 7 days. 4 tablet 11    aspirin (ECOTRIN) 81 MG EC tablet Take 81 mg by mouth once daily.      atorvastatin (LIPITOR) 40 MG tablet Take one tablet daily or as directed. (Patient taking differently: Pt taking one pill every other day and 1/2 pill every other day.) 90 tablet 3    calcium-magnesium-zinc Tab Take 2 tablets by mouth once daily at 6am. 1 Tablet Oral Every day      carvedilol (COREG) 6.25 MG tablet Take 1 tablet (6.25 mg total) by mouth 2 (two) times daily with meals. One by mouth twice daily or as directed (Patient taking differently: Take 6.25 mg by mouth 2 (two) times daily with meals. ) 180 tablet 3    citalopram (CELEXA) 20 MG tablet Take 1 tablet (20 mg total) by mouth once daily. 90 tablet 3    esomeprazole (NEXIUM) 40 MG capsule Take 1 capsule (40 mg total) by mouth before breakfast. 90 capsule 3    INV lisinopril (PRINIVIL,ZESTRIL) 20 MG Tab Take 2 tablets (40 mg total) by mouth once daily. 180 tablet 3    mv-min-folic acid-lutein  (THERAGRAN-M ADVANCED 50 PLUS) 0.4-250 mg-mcg Tab Take by mouth. 1 Tablet Oral Every day      omega-3 fatty acids 1,000 mg Cap Take 1 capsule by mouth once daily. 2  Capsule Oral Every day      ondansetron (ZOFRAN) 4 MG tablet Take 1 tablet (4 mg total) by mouth every 6 (six) hours. 12 tablet 0    triamterene-hydrochlorothiazide 37.5-25 mg (MAXZIDE-25) 37.5-25 mg per tablet Take 1 tablet by mouth once daily. One half to one a day or as directed per Dr. Cartagena      zolpidem (AMBIEN) 5 MG Tab Take 1 tablet (5 mg total) by mouth nightly. 30 tablet 3    nitroGLYCERIN (NITROSTAT) 0.4 MG SL tablet Place 1 tablet (0.4 mg total) under the tongue every 5 (five) minutes as needed for Chest pain. 25 tablet 3     No current facility-administered medications for this visit.        Review of patient's allergies indicates:   Allergen Reactions    Sulfa (sulfonamide antibiotics)      Unknown as child    Amlodipine Swelling    Codeine      Other reaction(s): Unknown    Maxzide [triamterene-hydrochlorothiazid]        Review of Systems  ROS:  Constitution: Negative for chills, fever, weakness and malaise/fatigue.   HEENT: Negative for headaches.   Cardiovascular: Negative for chest pain and claudication.   Respiratory: Negative for cough and shortness of breath.   Musculoskeletal: - foot pain.  Negative for muscle cramps and muscle weakness.   Gastrointestinal: Negative for nausea and vomiting.   Neurological: Negative for numbness and paresthesias.   Dermatological: Negative for wound.        Objective:      Physical Exam  Constitutional:  Patient is oriented to person, place, and time. Vital signs are normal.  Appears well-developed and well-nourished.     Vascular:  Dorsalis pedis pulses are 2+ on the right side, and 2+ on the left side.   Posterior tibial pulses are 2+ on the right side, and 2+ on the left side.   + digital hair growth, capillary fill time to all toes <3 seconds, no swelling    Skin/Dermatological:  Skin  is warm and intact.  No cyanosis or clubbing.  No rashes noted.  No open wounds.   Left great toenails is     thickened, yellow and discolored with subungual debris.     Musculoskeletal:      Full unrestricted range of motion of midtarsal, subtalar joints.  (--)   restriction of ankle joint dorsiflexion or plantarflexion.   Forefoot to rearfoot well  aligned.   Negative anterior drawer and talar tilt, squeeze test.  No tenderness to   ankle/pedal tendons or ligaments.  No crepitus. No assymmetries.        Neurological:  No deficits to sharp/dull, light touch or vibratory sensation.   Muscle strength to tibialis anterior, extensor hallucis longus, extensor digitorum longus, peroneal muscles, flexor hallucis/digotorum longus, posterior tibial and gastrosoleal complex is 5/5, normal tone without assymmetry   Patellar reflexes are 2+ on the right side and 2+ on the left side.  Achilles reflexes are 2+ on the right side and 2+ on the left side.          Assessment:       1. Onychomycosis due to dermatophyte        Plan:       Onychomycosis due to dermatophyte          Onychomycosis:Keep feet clean and dry.  Vinegar soaks weekly (2 parts vinegar/1 part warm water).   Avoid cotton socks.  Powders to shoes, antiperspirants to feet daily.  Lysol spray to shoes twice weekly.   Kerasal to left great toenail daily.  RTc 3 months.

## 2018-06-25 RX ORDER — CARVEDILOL 6.25 MG/1
6.25 TABLET ORAL 2 TIMES DAILY WITH MEALS
Qty: 180 TABLET | Refills: 3 | Status: SHIPPED | OUTPATIENT
Start: 2018-06-25 | End: 2019-01-23

## 2018-08-01 ENCOUNTER — HOSPITAL ENCOUNTER (OUTPATIENT)
Dept: UROLOGY | Facility: HOSPITAL | Age: 83
Discharge: HOME | End: 2018-08-01
Attending: UROLOGY
Payer: MEDICARE

## 2018-08-01 ENCOUNTER — PATIENT MESSAGE (OUTPATIENT)
Dept: UROLOGY | Facility: CLINIC | Age: 83
End: 2018-08-01

## 2018-08-01 ENCOUNTER — HOSPITAL ENCOUNTER (OUTPATIENT)
Dept: RADIOLOGY | Facility: HOSPITAL | Age: 83
Discharge: HOME | End: 2018-08-01
Attending: UROLOGY
Payer: MEDICARE

## 2018-08-01 VITALS
HEIGHT: 61 IN | TEMPERATURE: 99 F | BODY MASS INDEX: 24.55 KG/M2 | DIASTOLIC BLOOD PRESSURE: 91 MMHG | WEIGHT: 130.06 LBS | RESPIRATION RATE: 18 BRPM | SYSTOLIC BLOOD PRESSURE: 195 MMHG | HEART RATE: 71 BPM

## 2018-08-01 DIAGNOSIS — C67.9 MALIGNANT NEOPLASM OF URINARY BLADDER, UNSPECIFIED SITE: Primary | ICD-10-CM

## 2018-08-01 DIAGNOSIS — C67.9 MALIGNANT NEOPLASM OF URINARY BLADDER, UNSPECIFIED SITE: ICD-10-CM

## 2018-08-01 PROCEDURE — 76770 US EXAM ABDO BACK WALL COMP: CPT | Mod: 26,,, | Performed by: RADIOLOGY

## 2018-08-01 PROCEDURE — 52000 CYSTOURETHROSCOPY: CPT | Mod: ,,, | Performed by: UROLOGY

## 2018-08-01 PROCEDURE — 76770 US EXAM ABDO BACK WALL COMP: CPT | Mod: TC

## 2018-08-01 PROCEDURE — 52000 CYSTOURETHROSCOPY: CPT

## 2018-08-01 RX ORDER — LIDOCAINE HYDROCHLORIDE 20 MG/ML
JELLY TOPICAL
Status: COMPLETED | OUTPATIENT
Start: 2018-08-01 | End: 2018-08-01

## 2018-08-01 RX ORDER — DOXYCYCLINE HYCLATE 100 MG
100 TABLET ORAL
Status: COMPLETED | OUTPATIENT
Start: 2018-08-01 | End: 2018-08-01

## 2018-08-01 RX ADMIN — LIDOCAINE HYDROCHLORIDE: 20 JELLY TOPICAL at 10:08

## 2018-08-01 RX ADMIN — Medication 100 MG: at 11:08

## 2018-08-01 NOTE — H&P
Patient ID: Elen Peters is a 88 y.o. female.     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  Last cysto was 8/17. Ultrasound today, pending . All showed no recurrence of cancer.      No gross hematuria.   No weight loss.   No LUTS.          Past Medical History:   Diagnosis Date    Abnormal stress test 11/18/2015    Arthritis      Bladder cancer      Cataract      Coronary artery disease involving native coronary artery 1/6/2016    Depression      GERD (gastroesophageal reflux disease)      HTN (hypertension), benign 11/18/2015    Hx of bladder infections      Hyperlipemia      OP (osteoporosis)      Positive cardiac stress test 1/6/2016    Renal cancer      Syncope 1/6/2016    Upper back pain 12/1/2015    Ureteral cancer      Venous insufficiency 2017    Villous adenoma of colon 5/31/2013               Past Surgical History:   Procedure Laterality Date    APPENDECTOMY        BACK SURGERY        CATARACT EXTRACTION W/  INTRAOCULAR LENS IMPLANT Left 3/16/15     kristy    CATARACT EXTRACTION W/  INTRAOCULAR LENS IMPLANT Right 4/6/15     kristy    COLONOSCOPY   10/21/2013    CYSTOSCOPY        NEPHRECTOMY         L    TONSILLECTOMY, ADENOIDECTOMY                    Family History   Problem Relation Age of Onset    Leukemia Mother      Heart disease Mother      Heart attack Mother      Cancer Mother         leukemia    Goiter Mother      Leukemia Father      Cancer Father         leukemia    Heart disease Maternal Grandfather      No Known Problems Brother      No Known Problems Son      No Known Problems Son      No Known Problems Son      Cancer Son         throat    No Known Problems Son      No Known Problems Son      No Known Problems Maternal Aunt      No Known Problems Maternal Uncle      No Known Problems Paternal Aunt      No Known  Problems Paternal Uncle      No Known Problems Maternal Grandmother      No Known Problems Paternal Grandmother      No Known Problems Paternal Grandfather      No Known Problems Sister      Amblyopia Neg Hx      Blindness Neg Hx      Cataracts Neg Hx      Diabetes Neg Hx      Glaucoma Neg Hx      Hypertension Neg Hx      Macular degeneration Neg Hx      Retinal detachment Neg Hx      Strabismus Neg Hx      Stroke Neg Hx      Thyroid disease Neg Hx      Breast cancer Neg Hx           Social History   Social History            Social History    Marital status:        Spouse name: N/A    Number of children: N/A    Years of education: N/A           Occupational History    retired               Social History Main Topics    Smoking status: Never Smoker    Smokeless tobacco: Never Used    Alcohol use Yes         Comment: vodka with orange juice; 1 glass of red wine 1 x month    Drug use: No    Sexual activity: Not Currently           Other Topics Concern    Not on file          Social History Narrative    No narrative on file            Allergies:  Sulfa (sulfonamide antibiotics) and Codeine     Medications:     Current Outpatient Prescriptions:     amlodipine (NORVASC) 5 MG tablet, Take 1 tablet (5 mg total) by mouth every evening., Disp: 90 tablet, Rfl: 3    aspirin (ECOTRIN) 81 MG EC tablet, Take 81 mg by mouth once daily., Disp: , Rfl:     atorvastatin (LIPITOR) 40 MG tablet, Take one tablet daily or as directed., Disp: 90 tablet, Rfl: 3    calcium-magnesium-zinc Tab, Take 2 tablets by mouth once daily at 6am. 1 Tablet Oral Every day, Disp: , Rfl:     carvedilol (COREG) 6.25 MG tablet, Take 1 tablet (6.25 mg total) by mouth 2 (two) times daily with meals. One by mouth twice daily or as directed, Disp: 180 tablet, Rfl: 3    citalopram (CELEXA) 20 MG tablet, TAKE ONE TABLET BY MOUTH EVERY DAY, Disp: 90 tablet, Rfl: 3    mv-min-folic acid-lutein (THERAGRAN-M ADVANCED 50 PLUS)  0.4-250 mg-mcg Tab, Take by mouth. 1 Tablet Oral Every day, Disp: , Rfl:     omega-3 fatty acids 1,000 mg Cap, Take 1 capsule by mouth once daily. 2  Capsule Oral Every day, Disp: , Rfl:     zolpidem (AMBIEN) 5 MG Tab, TAKE 1 TABLET BY MOUTH EVERY NIGHT AT BEDTIME, Disp: 30 tablet, Rfl: 3    nitroGLYCERIN (NITROSTAT) 0.4 MG SL tablet, Place 1 tablet (0.4 mg total) under the tongue every 5 (five) minutes as needed for Chest pain., Disp: 25 tablet, Rfl: 3  No current facility-administered medications for this encounter.      Review of Systems   Constitutional: Negative for fever, chills and unexpected weight change.   HENT: Negative for nosebleeds.   Eyes: Negative for visual disturbance.   Respiratory: Negative for chest tightness.   Cardiovascular: Negative for chest pain.   Gastrointestinal: Negative for diarrhea.   Genitourinary: Negative for dysuria, urgency, frequency, hematuria and nocturia.   Musculoskeletal: Negative for myalgias.   Skin: Negative for rash.   Neurological:  Negative for seizures.   Hematological: Does not bruise/bleed easily.   Psychiatric/Behavioral: Negative for behavioral problems.      Objective:      Physical Exam   Vitals reviewed.  Constitutional: She is oriented to person, place, and time. She appears well-developed and well-nourished.   HENT:   Head: Normocephalic and atraumatic.   Eyes: No scleral icterus.   Cardiovascular: Normal rate and regular rhythm.   Pulmonary/Chest: Effort normal. No respiratory distress.   Abdominal: She exhibits no mass.   Musculoskeletal: She exhibits no tenderness.   Lymphadenopathy:   She has no cervical adenopathy.   Neurological: She is alert and oriented to person, place, and time.   Skin: Skin is warm and dry. No rash noted.   Psychiatric: She has a normal mood and affect.       Assessment:      1. Bladder cancer       Plan:     F/u 1 year with cysto, renal ultrasound.

## 2018-08-01 NOTE — PROCEDURES
Procedure: Flexible cysto-uretheroscopy    Pre Procedure Diagnosis: bladder cancer    Post Procedure Diagnosis: same    Surgeon: Gracia Becerra MD    Anesthesia: 2% uro-jet lidocaine jelly for local analgesia    Flexible cysto-urethroscopy was performed after consent was obtained.  The risks and benefits were explained.    2% lidocaine urojet was used for local analgesia.  The genitalia was prepped and draped in the sterile fashion with betadine.    The flexible scope was advanced into the urethra and into the bladder.  Bilateral ureteral orifice were evaluated and noted to be normal with clear efflux.  The bladder was completely surveyed in a systematic fashion.   No bladder tumors or lesions were seen.  No strictures were noted.    The patient tolerated the procedure well without complication.    They will follow up in 1 year with cysto, renal ultrasound  Will f/u report from u/s today

## 2018-08-01 NOTE — PATIENT INSTRUCTIONS
What to Expect After a Cystoscopy  For the next 24-48 hours, you may feel a mild burning when you urinate. This burning is normal and expected. Drink plenty of water to dilute the urine to help relieve the burning sensation. You may also see a small amount of blood in your urine after the procedure.    Unless you are already taking antibiotics, you may be given an antibiotic after the test to prevent infection.    Signs and Symptoms to Report  Call the [facility] at [phone] if you develop any of the following:  · Fever of 101 degrees or higher  · Chills or persistent bleeding  · Inability to urinate

## 2018-08-02 ENCOUNTER — TELEPHONE (OUTPATIENT)
Dept: CARDIOLOGY | Facility: CLINIC | Age: 83
End: 2018-08-02

## 2018-08-02 NOTE — TELEPHONE ENCOUNTER
----- Message from Argenis Rodrigez sent at 8/2/2018  8:49 AM CDT -----  Contact: pt  Pt would like to talk to the nurse in ref to her blood pressure is going up and down.  LOV 10/18/17 Dr. Cartagena    Thanks

## 2018-08-02 NOTE — TELEPHONE ENCOUNTER
NIMO Mari, I spoke with the pt - says her BP has been running 140-160 and HR 60-70.  Reviewed her cardiac meds - is taking 6.25 mg twice a day, lisinopril two 20 mg tablets at night, and says has not been taking triamterene-HCTZ 37.5-25 mg one half to one tab daily - says that she thought that medication was for her bladder. Instructed the pt to take one half tablet and call med next Wednesday with daily BP readings. LV 10-18-17. Pt has a f/u scheduled with you on 10-22-18. Thanks, Lia

## 2018-08-09 NOTE — TELEPHONE ENCOUNTER
Physical Therapy Evaluation    Visit Count: 1  Plan of Care Dates: Initial: 8/9/2018 Through: 9/20/2018  Insurance Information: physical and speech therapy combined cap of $2010 per calendar year  Next Referring Provider Visit: 08/20/2018    Referred by: Josesito Garza MD  Medical Diagnosis (from order): R42 Dizziness   Treatment Diagnosis: Lightheadedness  Insurance: 1. AARP MEDICARE COMPLETE  2.     Date of Onset: June- end of the month   Diagnosis Precautions: none  Chart reviewed: Relevant co-morbidities, allergies, tests and medications:   Past Medical History:   Diagnosis Date   • CVA (cerebral infarction) 9/14    right frontal - arm weakness only   • HTN (hypertension)    • Hyperlipidemia    • PVD (peripheral vascular disease) (CMS/[facility])    Past history of benign paroxymal positional vertigo     SUBJECTIVE   Description of Problem/Mechanism of Injury: \"Increased dizziness since increase in dosage of lisinopril. Changes in laying down to sitting and from sitting to standing all make me feel lightheaded. No dizziness. I feel like my head is disconnected, like a cold when I have once in a while.\"    Current Symptoms:   Pain: Patient reports pain is not an issue/concern  Auditory Symptoms: None, some tinnitus    Affected Ear: Right  Visual Symptoms: some changes in the left eye because of torn retina  Corrective Lenses: cataract surgery  Recent change in medication: Yes   Vertigo Medication: None  Migraines/Headache/eye strain: Yes  History of concussion: No  Description of dizziness: Lightheadedness   Duration of Symptoms: 30-60 minutes    Function:  Limitations and exacerbating factors (patient reported): difficulty with transfers including low surfaces, transition from a period of sitting, stairs, looking down  Prior level (patient reported): independent with all activities of daily living and instrumental activities of daily living, pain free    Prior Treatment: outpatient physical therapy in the past year  Results of recent labs given to patient by phone and through [facility].    Notes recorded by Davidson Cartagena MD on 5/2/2018 at 3:42 PM CDT  Labs stable -sodiun low so reduce water intake by about a glass less per day   for current condition. Hospitalization, home health services or skilled nursing facility in the last 30 days: No, per patient.    Home Environment/Social Support: Patient lives with significant other.  Patient has assistance as needed from family/friends.      Safety:  Do you feel safe at home, work and/or school? yes, per patient  Falls: No    Patient Goals/Concerns:  \"To see what is going on.\"    OBJECTIVE   Posture/Observation:  Ocular alignment: slight right head tilt due to right eye slightly higher than the left  Cervical positioning/posture: Head forward posture    Special Tests:  • Modified Vertebral Artery Screen for vertebral artery insufficiency: negative bilaterally  • Alar Ligament Test (neutral, flexion, extension) for ligamentous instability at C1/C2: negative  • Sharp-Juan Carlos Test for transverse ligament instability: negative  • Compression/distraction in sitting for cervicogenic dizziness: Negative for provocation of symptoms of dizziness/nausea     Range of Motion (degrees)     Cervical                     Date Norm Initial   Flexion 80-90 40   Extension 70 42   Lateral Flexion Left  20-45    Lateral Flexion Right  20-45    Rotation Left  70-90    Rotation Right  70-90    standard testing positions unless otherwise noted; Key: ranges are reported in active range of motion unless noted as AA=active assistive or P=passive range of motion, * denotes pain   Comments: All motions within functional/normal limits except as noted.      Oculomotor Exam Result Comment   Spontaneous Nystagmus absent    Gaze Holding Nystagmus         Left absent        Right absent        Up absent        Down absent    Smooth Pursuits normal    Saccade normal      Vestibular Exam:   Vestibular Ocular Reflex: Positive    Positional Exam: (for nystagmus)   Hallpike-Perry Test   (anterior and posterior canal) Result       Left Negative       Right Negative   Roll Test (horizontal canal)        Left Negative       Right Negative    Comments:      Outcome Measures: (Outcome Scoring)  Dizziness Handicap Inventory: 20 (scored 0-100, higher score indicates higher impairment)      Initial Treatment   Initial evaluation completed.    ASSESSMENT   67 year old female presents to therapy with significant decline in prior level of function due to signs and symptoms consistent with lack of symptoms with testing at this time. She reports symptoms with transition of positions and was asked to monitor for orthostatic hypertension. At this time, no findings seen to treat.     Outcome:    Benefit from skilled therapy: no  Rehab potential is good due to positive factors age and negative factors not apparent at this time    Predicted patient presentation: Low (stable) - Patient comorbidities and complexities, as defined above, will have little effect on progress for prescribed plan of care.   Plan of care below to: improve balance/proprioception to address functional limitations listed above    Goals:  To be obtained by end of this plan of care:  1. Patient independent with modified and progressed home exercise program.  2. Symptoms: Complete elimination of dizziness/vertigo symptoms with previously provocative movements.    PLAN   Frequency/Duration: patient seen one time for dizziness but no symptoms seen, no further appointments indicated. She may return for 2 x week for 2 weeks if symptoms return and appropriate to treat  Skilled training and instruction for the following interventions:  Manual Therapy (19226)  Neuromuscular Re-Education (80634)  Therapeutic Activity (70332)  Therapeutic Exercise (62387)  Canalith Repositioning (93286)    The plan of care and goals were established with the patient who concurs.  Patient has been given attendance policy at time of initial evaluation.    Patient Education:  Who will be receiving education: patient  Are they ready to learn: yes  Preferred learning style: written, verbal, demonstration  Barriers to learning:  no barriers apparent at this time   Result of initial outlined education: Verbalizes understanding and Needs reinforcement    THERAPY DAILY BILLING   Primary Insurance: AARP MEDICARE COMPLETE  Secondary Insurance:     Evaluation Procedures:  Physical Therapy Evaluation: Low Complexity    Timed Procedures:  No timed codes were used on this date of service    Untimed Procedures:  No untimed codes were used on this date of service    Total Treatment Time: 40 minutes      G-Code:  G-Code Score ABN form  One time eval and D/C: report current, goal and discharge status with C modifier  : Current Change/Maintain Body Position Limitation,  CI - 1% to 19% impaired, limited or restricted  : Goal Change/Maintain Body Position Limitation,  CI - 1% to 19% impaired, limited or restricted  : D/C Change/Maintain Body Position Limitation,  CI - 1% to 19% impaired, limited or restricted  Modifier based on outcome measure(s)/functional testing/clinical judgement as listed above    The referring provider's electronic or written signature on the evaluation authorizes the therapy plan of care and certifies the need for these services, furnished under this plan of care while under their care.  Electronically sent for referring provider signature

## 2018-08-12 RX ORDER — ATORVASTATIN CALCIUM 40 MG/1
TABLET, FILM COATED ORAL
Qty: 90 TABLET | Refills: 3 | Status: SHIPPED | OUTPATIENT
Start: 2018-08-12 | End: 2019-12-20

## 2018-08-13 ENCOUNTER — TELEPHONE (OUTPATIENT)
Dept: INTERNAL MEDICINE | Facility: CLINIC | Age: 83
End: 2018-08-13

## 2018-08-13 NOTE — TELEPHONE ENCOUNTER
Pt states that she was diagnosed with TMJ by an ENT doctor. Over the weekend she had a flair up of the TMJ pain and has been using tylenol and aleve. She states that she is afraid to eat because she doesn't want another episode. She also states that she was not told how to manage the pain except to avoid chewing gum.

## 2018-08-13 NOTE — TELEPHONE ENCOUNTER
----- Message from Phu Michaels sent at 8/13/2018 10:47 AM CDT -----  Contact: Patient [phone]  Patient requesting a call back from office requesting to speak with Nurse stating is very important will discuss when get a call back would like one today if possible please.    Please call an advise  Thank you

## 2018-08-22 ENCOUNTER — TELEPHONE (OUTPATIENT)
Dept: INTERNAL MEDICINE | Facility: CLINIC | Age: 83
End: 2018-08-22

## 2018-08-22 NOTE — TELEPHONE ENCOUNTER
----- Message from Carissa Badillo sent at 8/22/2018  9:48 AM CDT -----  Contact: Elen Peters [phone]  Elen states after she eats her stomach never settles. Elen would like a call back.

## 2018-08-24 ENCOUNTER — HOSPITAL ENCOUNTER (OUTPATIENT)
Dept: RADIOLOGY | Facility: HOSPITAL | Age: 83
Discharge: HOME | End: 2018-08-24
Attending: INTERNAL MEDICINE
Payer: MEDICARE

## 2018-08-24 ENCOUNTER — OFFICE VISIT (OUTPATIENT)
Dept: INTERNAL MEDICINE | Facility: CLINIC | Age: 83
End: 2018-08-24
Payer: MEDICARE

## 2018-08-24 VITALS
OXYGEN SATURATION: 98 % | HEIGHT: 63 IN | BODY MASS INDEX: 21.71 KG/M2 | HEART RATE: 58 BPM | DIASTOLIC BLOOD PRESSURE: 72 MMHG | WEIGHT: 122.56 LBS | SYSTOLIC BLOOD PRESSURE: 128 MMHG | TEMPERATURE: 99 F

## 2018-08-24 DIAGNOSIS — R10.13 EPIGASTRIC PAIN: ICD-10-CM

## 2018-08-24 DIAGNOSIS — R30.0 DYSURIA: Primary | ICD-10-CM

## 2018-08-24 LAB
BILIRUB SERPL-MCNC: ABNORMAL MG/DL
BLOOD URINE, POC: ABNORMAL
COLOR, POC UA: YELLOW
GLUCOSE UR QL STRIP: NORMAL
KETONES UR QL STRIP: ABNORMAL
LEUKOCYTE ESTERASE URINE, POC: ABNORMAL
NITRITE, POC UA: ABNORMAL
PH, POC UA: 7
PROTEIN, POC: ABNORMAL
SPECIFIC GRAVITY, POC UA: 1
UROBILINOGEN, POC UA: NORMAL

## 2018-08-24 PROCEDURE — 76700 US EXAM ABDOM COMPLETE: CPT | Mod: TC

## 2018-08-24 PROCEDURE — 81002 URINALYSIS NONAUTO W/O SCOPE: CPT | Mod: S$GLB,,, | Performed by: INTERNAL MEDICINE

## 2018-08-24 PROCEDURE — 76700 US EXAM ABDOM COMPLETE: CPT | Mod: 26,,, | Performed by: RADIOLOGY

## 2018-08-24 PROCEDURE — 99214 OFFICE O/P EST MOD 30 MIN: CPT | Mod: 25,S$GLB,, | Performed by: INTERNAL MEDICINE

## 2018-08-24 PROCEDURE — 99999 PR PBB SHADOW E&M-EST. PATIENT-LVL IV: CPT | Mod: PBBFAC,,, | Performed by: INTERNAL MEDICINE

## 2018-08-24 RX ORDER — DICYCLOMINE HYDROCHLORIDE 10 MG/1
10 CAPSULE ORAL
Qty: 120 CAPSULE | Refills: 3 | Status: SHIPPED | OUTPATIENT
Start: 2018-08-24 | End: 2018-09-23

## 2018-08-24 NOTE — PROGRESS NOTES
Subjective:       Patient ID: Elen Peters is a 89 y.o. female.    Chief Complaint: Abdominal Pain ([place] no Appetite, UTI)    complains of epigastric cramping after eating anything, orange juice being the worst offender.  Has mild nausea but no vomiting.  Takes Nexium daily and has very little appetite      Review of Systems   Constitutional: Negative for activity change, chills, fatigue and fever.   HENT: Negative for congestion, ear pain, nosebleeds, postnasal drip, sinus pressure and sore throat.    Eyes: Negative.  Negative for visual disturbance.   Respiratory: Negative for cough, chest tightness, shortness of breath and wheezing.    Cardiovascular: Negative for chest pain.   Gastrointestinal: Negative for abdominal pain, diarrhea, nausea and vomiting.   Genitourinary: Negative for difficulty urinating, dysuria, frequency and urgency.   Musculoskeletal: Negative for arthralgias and neck stiffness.   Skin: Negative for rash.   Neurological: Negative for dizziness, weakness and headaches.   Psychiatric/Behavioral: Negative for sleep disturbance. The patient is not nervous/anxious.        Objective:      Physical Exam   Constitutional: She is oriented to person, place, and time. She appears well-developed and well-nourished.  Non-toxic appearance. No distress.   HENT:   Head: Normocephalic and atraumatic.   Right Ear: Tympanic membrane, external ear and ear canal normal.   Left Ear: Tympanic membrane, external ear and ear canal normal.   Eyes: EOM are normal. Pupils are equal, round, and reactive to light. No scleral icterus.   Neck: Normal range of motion. Neck supple. No thyromegaly present.   Cardiovascular: Normal rate, regular rhythm and normal heart sounds.   Pulmonary/Chest: Effort normal and breath sounds normal.   Abdominal: Soft. Bowel sounds are normal. She exhibits no mass. There is no tenderness. There is no rebound.   Musculoskeletal: Normal range of motion.   Lymphadenopathy:     She has no cervical  adenopathy.   Neurological: She is alert and oriented to person, place, and time. She has normal reflexes. She displays normal reflexes. No cranial nerve deficit. She exhibits normal muscle tone. Coordination normal.   Skin: Skin is warm and dry.   Psychiatric: She has a normal mood and affect. Her behavior is normal.       Assessment:       1. Dysuria    2. Epigastric pain        Plan:   Elen was seen today for abdominal pain.    Diagnoses and all orders for this visit:    Dysuria  -     POCT urine dipstick without microscope    Epigastric pain  -     CBC auto differential; Future  -     Comprehensive metabolic panel; Future  -     Amylase; Future  -     Lipase; Future  -     US Abdomen Complete; Future    Other orders  -     dicyclomine (BENTYL) 10 MG capsule; Take 1 capsule (10 mg total) by mouth 4 (four) times daily before meals and nightly.

## 2018-08-28 ENCOUNTER — NURSE TRIAGE (OUTPATIENT)
Dept: ADMINISTRATIVE | Facility: CLINIC | Age: 83
End: 2018-08-28

## 2018-08-28 ENCOUNTER — TELEPHONE (OUTPATIENT)
Dept: INTERNAL MEDICINE | Facility: CLINIC | Age: 83
End: 2018-08-28

## 2018-08-28 NOTE — TELEPHONE ENCOUNTER
Annette Khan RN 22 minutes ago (10:03 AM)               Reason for Disposition   Taking a medicine that could cause dizziness (e.g., blood pressure medications, diuretics)    Protocols used: ST DIZZINESS-A-OH     Pt c/o dizziness. States she is wondering if it is from bentyl.  Please call pt to advise.          Documentation

## 2018-08-28 NOTE — TELEPHONE ENCOUNTER
Reason for Disposition   Taking a medicine that could cause dizziness (e.g., blood pressure medications, diuretics)    Protocols used: ST DIZZINESS-A-OH    Pt c/o dizziness. States she is wondering if it is from bentyl.  Please call pt to advise.

## 2018-08-28 NOTE — TELEPHONE ENCOUNTER
Possible, although she has had dizziness before-  Hold the bentyl for 1 week and see if symptoms improve.  Notify of progress next week

## 2018-08-30 ENCOUNTER — OFFICE VISIT (OUTPATIENT)
Dept: INTERNAL MEDICINE | Facility: CLINIC | Age: 83
End: 2018-08-30
Payer: MEDICARE

## 2018-08-30 VITALS
DIASTOLIC BLOOD PRESSURE: 64 MMHG | WEIGHT: 130.06 LBS | OXYGEN SATURATION: 94 % | BODY MASS INDEX: 23.04 KG/M2 | HEART RATE: 60 BPM | HEIGHT: 63 IN | SYSTOLIC BLOOD PRESSURE: 110 MMHG | TEMPERATURE: 99 F

## 2018-08-30 DIAGNOSIS — H81.02 VERTIGO, LABYRINTHINE, LEFT: Primary | ICD-10-CM

## 2018-08-30 PROCEDURE — 99999 PR PBB SHADOW E&M-EST. PATIENT-LVL III: CPT | Mod: PBBFAC,,, | Performed by: INTERNAL MEDICINE

## 2018-08-30 PROCEDURE — 99213 OFFICE O/P EST LOW 20 MIN: CPT | Mod: S$GLB,,, | Performed by: INTERNAL MEDICINE

## 2018-08-30 RX ORDER — MECLIZINE HCL 12.5 MG 12.5 MG/1
12.5 TABLET ORAL 3 TIMES DAILY PRN
Qty: 20 TABLET | Refills: 0 | Status: SHIPPED | OUTPATIENT
Start: 2018-08-30 | End: 2018-10-22

## 2018-08-31 NOTE — PROGRESS NOTES
Subjective:       Patient ID: Elen Peters is a 89 y.o. female.    Chief Complaint: Dizziness    Complains of momentary dizziness when she rises for sitting to standing.  No pain, visual disturbance, .  She says she has no appetite and is losing weight.  Wants to see a nutritionist.  Says she is lonely      Review of Systems   Constitutional: Negative for activity change, chills, fatigue and fever.   HENT: Negative for congestion, ear pain, nosebleeds, postnasal drip, sinus pressure and sore throat.    Eyes: Negative.  Negative for visual disturbance.   Respiratory: Negative for cough, chest tightness, shortness of breath and wheezing.    Cardiovascular: Negative for chest pain.   Gastrointestinal: Negative for abdominal pain, diarrhea, nausea and vomiting.   Genitourinary: Negative for difficulty urinating, dysuria, frequency and urgency.   Musculoskeletal: Negative for arthralgias and neck stiffness.   Skin: Negative for rash.   Neurological: Negative for dizziness, weakness and headaches.   Psychiatric/Behavioral: Negative for sleep disturbance. The patient is not nervous/anxious.        Objective:      Physical Exam   Constitutional: She is oriented to person, place, and time. She appears well-developed and well-nourished.  Non-toxic appearance. No distress.   HENT:   Head: Normocephalic and atraumatic.   Right Ear: Tympanic membrane, external ear and ear canal normal.   Left Ear: Tympanic membrane, external ear and ear canal normal.   Eyes: Pupils are equal, round, and reactive to light. No scleral icterus. Right eye exhibits nystagmus. Left eye exhibits nystagmus.       Neck: Normal range of motion. Neck supple. No thyromegaly present.   Cardiovascular: Normal rate, regular rhythm and normal heart sounds.   Pulmonary/Chest: Effort normal and breath sounds normal.   Abdominal: Soft. Bowel sounds are normal. She exhibits no mass. There is no tenderness. There is no rebound.   Musculoskeletal: Normal range of  motion.   Lymphadenopathy:     She has no cervical adenopathy.   Neurological: She is alert and oriented to person, place, and time. She has normal reflexes. She displays normal reflexes. No cranial nerve deficit. She exhibits normal muscle tone. Coordination normal.   Skin: Skin is warm and dry.   Psychiatric: She has a normal mood and affect. Her behavior is normal.       Assessment:       1. Vertigo, labyrinthine, left        Plan:   Elen was seen today for dizziness.    Diagnoses and all orders for this visit:    Vertigo, labyrinthine, left    Other orders  -     meclizine (ANTIVERT) 12.5 mg tablet; Take 1 tablet (12.5 mg total) by mouth 3 (three) times daily as needed.

## 2018-09-04 ENCOUNTER — TELEPHONE (OUTPATIENT)
Dept: INTERNAL MEDICINE | Facility: CLINIC | Age: 83
End: 2018-09-04

## 2018-09-04 NOTE — TELEPHONE ENCOUNTER
----- Message from Angely Jones MD sent at 8/30/2018  7:33 PM CDT -----  Ivana - please help her with the loss of appetite  Thanks

## 2018-09-04 NOTE — TELEPHONE ENCOUNTER
..Patient referred by Dr. Jones for Health coaching (increase appetite).  Called patient and gave an explanation about Health Coaching program and invited participation.   Patient states she would like to participate.  Set appointment for 9/5/18 at 1300.   Gave direct contact number to call if he/ she has any questions [phone].   Ivana Fernandez RN  Health

## 2018-09-05 ENCOUNTER — CLINICAL SUPPORT (OUTPATIENT)
Dept: INTERNAL MEDICINE | Facility: CLINIC | Age: 83
End: 2018-09-05
Payer: MEDICARE

## 2018-09-05 VITALS — BODY MASS INDEX: 22.85 KG/M2 | WEIGHT: 129 LBS

## 2018-09-05 NOTE — PROGRESS NOTES
Date:  9/5/18                    Time: 1300  Initial Health  Contact - [x]Clinic visit  []  Phone Visit  ASSEMENT: Information obtained through combination of chart review prior to seeing patient, patient self-report.   Primary Referral diagnosis/reason for referral:    Increase appetite       (See physician progress note for complete list of diagnoses.)  PCP:    Dr. Treadwell    Referent :  [] PCP       [] Transition Navigator    []  BELINDA    []  ELVIN  [x]  Other: Dr. Jones     Supports:    6 sons and 22 grandchildren      Preferred Learning Style  (may be a combination)  [x]  Visual (pictures/ video)     [] Auditory/ Listening   [x]  Reading    [x]  Hands-on/ Demonstration   []  1:1    []  Group        [x]  Alone       []  No preference   []  Combination  []  Other:         Presenting issues from patients point of view:  [x]  Improve nutrition/increase healthy choices.                    [x]  Improve /maintain current functioning.  [x]  Obtain and maintain healthy blood pressure.                  []  Stop smoking.  []  Improve weight management.                                       []  Lower cholesterol.  []  Improve blood glucose management.                            []  Improve breathing.  [x]  Increase energy level.                                                      [x]  Increase/maintain independence.   []  Improve sleep.                                                                [x]  Decrease stress.  []  Improve pain management.                                             [x]  Improve mood.  []  Other:                  Pt. Education Level:     BS music     Disease specific education services attended:  Drug related classes for grandchildren       Patient Employed:  []yes    [x] No  Where _____________________  Days and Hours:                Current Self-help Behaviors  []  Checks glucose level        times a day.  [x]  Tries to modify meals so more healthy.  [x]  Exercises by   Swimming aerobic  classes 3 x week     [x]  Takes BP    1 day    times a       (insert frequency)  [x]  Weighs self    1-2 x week     (how often)  [x]  Takes all medications as prescribed.  [x]  Rarely misses health care appointments.  []  Sleeps 8 hours without waking more than twice.  [x]  Consistently wears/uses functioning aids as recommended  (ie:  Contacts [] , glasses [] , hearing  Aid [x] , prosthesis [] ,  Walker [] ,  Wheelchair []  etc.)  [x]  Regularly engages in stress management activities I.e.: read, mass  []  Quit smoking   [x]  Purposefully educates self about health issues.  []  Pt did bring glucose log with him/her.  []  Other  (explain)           []  Comments:       []  Reported Blood Sugar Readings:          Health screenings   Last PCP visit:   18                          MM18                                     DEXA: 17                       Colon: 10/21/13   Lipids: 10/16/17   CMP: 18   HgA1C: 2/24/10 (5.8)             Eye Exam:   17      Strengths:  [x]  Confident                       [x]  Determined/persistent           [x]  Enthusiastic         [x]  Good problem solving           [x]  Good self-control                   [x]  Hard working        [x]  Hopeful/optimistic                  [x]  Intelligent                                [x]  Self aware  []  Creative                                 [x]  Flexible          [x]  Sense of humor                     [x]  Open/willing          [x]  Spiritual                                  [x] Values health    [x]  Successful overcoming difficult challenges in the past.     [x]  Pos support network- Friends  [x]   Health literate (The degree to which individuals have the capacity to obtain, process, and    understand basic health information and services needed to make appropriate health decisions.- Dept. of Health and Human services.)  []   Other Comments:             Change Markers  Scale 0-10 with 10 representing most Ready 0  least ready, 10 most important 0 least important 10 most confident 0 least confident.     [x] NA at this time.   Readiness:     ;  Importance:     ;  Confidence:        INTERVENTIONS:  [x] Given an explanation about health coaching program and invited participation.  [x] Listened reflectively; provided support, encouragement, and validation; respected  and maintained patient self-direction; explored pros/cons of change; personalized health risks of maintaining the status quo; and facilitated recognition of discrepancy between current behaviors and patients goals and values.  [x] Assessed stage of change and employed appropriate motivational interviewing strategies to facilitate movement toward the next stage of change and healthy behavior modification.  [x] Normalized occurrence of relapse, helped patient recognize outcome of new self-learning as a result of the relapse such as triggers, and helped focus on factors to reduce chances of returning to previous behaviors .  [x] Used readiness scale ratings to guide assessment of importance and confidence of successfully reaching goals.  [x] Assisted patient to develop goal, action plan, and address potential barriers to goal     achievement.  [] Patient was given a new (insert glucose meter or other supplies), taught to use, and      successfully demonstrated ability to carry out essential steps of process.    [] Rx for ( monitor/supplies, pen needles, etc.) was obtained.  [x] Provided educational review, written materials/handouts (Meal Planning Counting Carbs, Healthy Plate, 10 Rules for Eating Safely for Life, 10 Tips to Cutting Your Cravings, ).   [x] Topics discussed/provided:    GI of foods and how it affects your metabolism and helps you burn fat, carb counting    [x] Facilitated completion of needed exams and screenings by reviewing Diabetic/Health Maintenance Report Card with patient.  [x] Provided pt with my business card.  [x] Informed of/reviewed how to  access health  and after hours assistance.  [x] Discussed, planned, and scheduled future contacts.  [x]Challenges/Barriers identified discussed as identified by patient.  [x] Needs identified by this Health :    Education and support     [] Other:            For additional care management support patient was referred to:        []  Community resources for                        []  Medication assistance programs               []  Dietician              []  Diabetes  [employer]                                        []  Mental health services                           []                  []  Diabetes [place]                                              []  Home health services                                []  Complex case management              []  PCP/covering provider due to                 []  Emergency Dept.                                  []  GYN              []  Optometrist/ Ophthalmologist                          []  Podiatrist                                                      [x]  N/A        []  Other:           RESPONSE/IMPRESSION  Behavior is consistent with the following stage of change:  []  Pre-contemplation  []  Contemplation  [x]  Preparation  []  Action  []  Maintenance  []  Relapse    Level of participation:  []  Refused to participate  []  Guarded / resistant  []  Passive participant  [x]  Active participant/interactive  [x]  Interested/receptive  [x]  Self-motivated  []  Other          Goal developed by patient today:   Menus for the week 10/10  Get protein drinks 10/10    Plan (needed actions to accomplish goal):          [x] Pt will work on action plan as stated above.        [x] Pt will follow up with Health  on   9/19/18 at 1300.     [x] Health  will remain available.  [x] Health  will maintain regular contacts with patient to educate, support, encourage; help problem-solve; assist with development of self-advocacy/increasing independent health  management; and provide resources as needed.  [] Pt has declined Health  Program at this time. Provided pt with Health  Program information should they decide to participate at a later time.        [] Pt to facilitate contact with Health        [] Health  to facilitate contact with patient.        [] Other:          Notes:   Met with patient she is 89 y/o female referred for loss of appetite.   She is interested in improving her nutrition, increasing healthy choices and maintaining her current function.   She goes to gym swimming water aerobics 3 x week.   She states she lost her  few years ago and so is just her in house alone. Feels her nutrition maybe lacking.   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  (6 sons and has 22 grandchildren some of them come on Sundays  )  Goals set by patient and will continue to work with her to assist to meet her goals.     Best Method of Contact:  [] email:   [x] Phone:   [] Mail  [] Office     Ivana Fernandez RN

## 2018-09-18 ENCOUNTER — OFFICE VISIT (OUTPATIENT)
Dept: PODIATRY | Facility: CLINIC | Age: 83
End: 2018-09-18
Payer: MEDICARE

## 2018-09-18 VITALS
WEIGHT: 128 LBS | BODY MASS INDEX: 22.68 KG/M2 | HEIGHT: 63 IN | DIASTOLIC BLOOD PRESSURE: 65 MMHG | SYSTOLIC BLOOD PRESSURE: 146 MMHG | HEART RATE: 69 BPM

## 2018-09-18 DIAGNOSIS — B35.1 ONYCHOMYCOSIS DUE TO DERMATOPHYTE: Primary | ICD-10-CM

## 2018-09-18 PROCEDURE — 99213 OFFICE O/P EST LOW 20 MIN: CPT | Mod: PBBFAC,PN

## 2018-09-18 PROCEDURE — 99999 PR PBB SHADOW E&M-EST. PATIENT-LVL III: CPT | Mod: PBBFAC,,,

## 2018-09-18 PROCEDURE — 99213 OFFICE O/P EST LOW 20 MIN: CPT | Mod: S$PBB,,,

## 2018-09-18 PROCEDURE — 1101F PT FALLS ASSESS-DOCD LE1/YR: CPT | Mod: CPTII,,,

## 2018-09-18 NOTE — PROGRESS NOTES
Subjective:       Patient ID: Elen Peters is a 89 y.o. female.    Chief Complaint: Follow-up (Nail fungus)    HPI  Elen is a 89 y.o. female who presents to the clinic complaining of thick and discolored toenails on the left foot, states the great toenail fell off and is now yellow and thick. Elen is inquiring about treatment options.      Past Medical History:   Diagnosis Date    Abnormal stress test 11/18/2015    Arthritis     Bladder cancer     Cataract     Coronary artery disease involving native coronary artery 1/6/2016    Depression     GERD (gastroesophageal reflux disease)     HTN (hypertension), benign 11/18/2015    Hx of bladder infections     Hyperlipemia     OP (osteoporosis)     Positive cardiac stress test 1/6/2016    Renal cancer     Syncope 1/6/2016    Upper back pain 12/1/2015    Ureteral cancer     Venous insufficiency 2017    Villous adenoma of colon 5/31/2013       Past Surgical History:   Procedure Laterality Date    APPENDECTOMY      BACK SURGERY      CATARACT EXTRACTION W/  INTRAOCULAR LENS IMPLANT Left 3/16/15    kristy    CATARACT EXTRACTION W/  INTRAOCULAR LENS IMPLANT Right 4/6/15    kristy    COLONOSCOPY  10/21/2013    COLONOSCOPY N/A 10/21/2013    [facility]    CYSTOSCOPY      INSERTION-INTRAOCULAR LENS (IOL) Right 4/6/2015    [facility] [place]    INSERTION-INTRAOCULAR LENS (IOL) Left 3/16/2015    [facility] [place]    NEPHRECTOMY      L    PHACOEMULSIFICATION-ASPIRATION-CATARACT Right 4/6/2015    [facility] [place]    PHACOEMULSIFICATION-ASPIRATION-CATARACT Left 3/16/2015    [facility] [place]    TONSILLECTOMY, ADENOIDECTOMY         Family History   Problem Relation Age of Onset    Leukemia Mother     Heart disease Mother     Heart attack Mother     Cancer Mother         leukemia    Goiter Mother     Leukemia  Father     Cancer Father         leukemia    Heart disease Maternal Grandfather     No Known Problems Brother     No Known Problems Son     No Known Problems Son     No Known Problems Son     Cancer Son         throat    No Known Problems Son     No Known Problems Son     No Known Problems Maternal Aunt     No Known Problems Maternal Uncle     No Known Problems Paternal Aunt     No Known Problems Paternal Uncle     No Known Problems Maternal Grandmother     No Known Problems Paternal Grandmother     No Known Problems Paternal Grandfather     No Known Problems Sister     Amblyopia Neg Hx     Blindness Neg Hx     Cataracts Neg Hx     Diabetes Neg Hx     Glaucoma Neg Hx     Hypertension Neg Hx     Macular degeneration Neg Hx     Retinal detachment Neg Hx     Strabismus Neg Hx     Stroke Neg Hx     Thyroid disease Neg Hx     Breast cancer Neg Hx        Social History     Socioeconomic History    Marital status:      Spouse name: None    Number of children: None    Years of education: None    Highest education level: None   Social Needs    Financial resource strain: None    Food insecurity - worry: None    Food insecurity - inability: None    Transportation needs - medical: None    Transportation needs - non-medical: None   Occupational History    Occupation: retired   Tobacco Use    Smoking status: Never Smoker    Smokeless tobacco: Never Used   Substance and Sexual Activity    Alcohol use: No    Drug use: No    Sexual activity: Not Currently   Other Topics Concern    Are you pregnant or think you may be? Not Asked    Breast-feeding Not Asked   Social History Narrative    None       Current Outpatient Medications   Medication Sig Dispense Refill    alendronate (FOSAMAX) 70 MG tablet Take 1 tablet (70 mg total) by mouth every 7 days. 4 tablet 11    aspirin (ECOTRIN) 81 MG EC tablet Take 81 mg by mouth once daily.      atorvastatin (LIPITOR) 40 MG tablet TAKE 1  TABLET EVERY DAY OR AS DIRECTED 90 tablet 3    calcium-magnesium-zinc Tab Take 2 tablets by mouth once daily at 6am. 1 Tablet Oral Every day      carvedilol (COREG) 6.25 MG tablet Take 1 tablet (6.25 mg total) by mouth 2 (two) times daily with meals. One by mouth twice daily or as directed 180 tablet 3    citalopram (CELEXA) 20 MG tablet Take 1 tablet (20 mg total) by mouth once daily. 90 tablet 3    dicyclomine (BENTYL) 10 MG capsule Take 1 capsule (10 mg total) by mouth 4 (four) times daily before meals and nightly. 120 capsule 3    esomeprazole (NEXIUM) 40 MG capsule Take 1 capsule (40 mg total) by mouth before breakfast. 90 capsule 3    INV lisinopril (PRINIVIL,ZESTRIL) 20 MG Tab Take 2 tablets (40 mg total) by mouth once daily. 180 tablet 3    meclizine (ANTIVERT) 12.5 mg tablet Take 1 tablet (12.5 mg total) by mouth 3 (three) times daily as needed. 20 tablet 0    mv-min-folic acid-lutein (THERAGRAN-M ADVANCED 50 PLUS) 0.4-250 mg-mcg Tab Take by mouth. 1 Tablet Oral Every day      omega-3 fatty acids 1,000 mg Cap Take 1 capsule by mouth once daily. 2  Capsule Oral Every day      ondansetron (ZOFRAN) 4 MG tablet Take 1 tablet (4 mg total) by mouth every 6 (six) hours. 12 tablet 0    triamterene-hydrochlorothiazide 37.5-25 mg (MAXZIDE-25) 37.5-25 mg per tablet Take 1 tablet by mouth once daily. One half to one a day or as directed per Dr. Cartagena      zolpidem (AMBIEN) 5 MG Tab Take 1 tablet (5 mg total) by mouth nightly. 30 tablet 3    nitroGLYCERIN (NITROSTAT) 0.4 MG SL tablet Place 1 tablet (0.4 mg total) under the tongue every 5 (five) minutes as needed for Chest pain. 25 tablet 3     No current facility-administered medications for this visit.        Review of patient's allergies indicates:   Allergen Reactions    Sulfa (sulfonamide antibiotics)      Unknown as child    Amlodipine Swelling    Codeine      Other reaction(s): Unknown    Maxzide [triamterene-hydrochlorothiazid]        Review of  Systems  ROS:  Constitution: Negative for chills, fever, weakness and malaise/fatigue.   HEENT: Negative for headaches.   Cardiovascular: Negative for chest pain and claudication.   Respiratory: Negative for cough and shortness of breath.   Musculoskeletal: - foot pain.  Negative for muscle cramps and muscle weakness.   Gastrointestinal: Negative for nausea and vomiting.   Neurological: Negative for numbness and paresthesias.   Dermatological: Negative for wound.        Objective:      Physical Exam  Constitutional:  Patient is oriented to person, place, and time. Vital signs are normal.  Appears well-developed and well-nourished.     Vascular:  Dorsalis pedis pulses are 2+ on the right side, and 2+ on the left side.   Posterior tibial pulses are 2+ on the right side, and 2+ on the left side.   + digital hair growth, capillary fill time to all toes <3 seconds, no swelling    Skin/Dermatological:  Skin is warm and intact.  No cyanosis or clubbing.  No rashes noted.  No open wounds.   B/l great thickened, yellow and discolored with subungual debris.     Musculoskeletal:      Full unrestricted range of motion of midtarsal, subtalar joints.  (--)   restriction of ankle joint dorsiflexion or plantarflexion.   Forefoot to rearfoot well  aligned.   Negative anterior drawer and talar tilt, squeeze test.  No tenderness to   ankle/pedal tendons or ligaments.  No crepitus. No assymmetries.        Neurological:  No deficits to sharp/dull, light touch or vibratory sensation.   Muscle strength to tibialis anterior, extensor hallucis longus, extensor digitorum longus, peroneal muscles, flexor hallucis/digotorum longus, posterior tibial and gastrosoleal complex is 5/5, normal tone without assymmetry   Patellar reflexes are 2+ on the right side and 2+ on the left side.  Achilles reflexes are 2+ on the right side and 2+ on the left side.          Assessment:       1. Onychomycosis due to dermatophyte        Plan:       Onychomycosis  due to dermatophyte          Onychomycosis:Keep feet clean and dry.  Vinegar soaks weekly (2 parts vinegar/1 part warm water).   Avoid cotton socks.  Powders to shoes, antiperspirants to feet daily.  Lysol spray to shoes twice weekly.   Kerasal to left great toenail daily.  RTC 3 months.

## 2018-09-26 ENCOUNTER — IMMUNIZATION (OUTPATIENT)
Dept: INTERNAL MEDICINE | Facility: CLINIC | Age: 83
End: 2018-09-26
Payer: MEDICARE

## 2018-09-26 ENCOUNTER — OFFICE VISIT (OUTPATIENT)
Dept: INTERNAL MEDICINE | Facility: CLINIC | Age: 83
End: 2018-09-26
Payer: MEDICARE

## 2018-09-26 VITALS
HEART RATE: 64 BPM | DIASTOLIC BLOOD PRESSURE: 70 MMHG | OXYGEN SATURATION: 99 % | HEIGHT: 63 IN | HEIGHT: 63 IN | BODY MASS INDEX: 22.68 KG/M2 | SYSTOLIC BLOOD PRESSURE: 120 MMHG | HEART RATE: 60 BPM | WEIGHT: 128 LBS | BODY MASS INDEX: 22.73 KG/M2 | WEIGHT: 128.31 LBS | DIASTOLIC BLOOD PRESSURE: 60 MMHG | SYSTOLIC BLOOD PRESSURE: 136 MMHG

## 2018-09-26 DIAGNOSIS — I95.1 AUTONOMIC POSTURAL HYPOTENSION: ICD-10-CM

## 2018-09-26 DIAGNOSIS — F32.5 DEPRESSION, MAJOR, IN REMISSION: ICD-10-CM

## 2018-09-26 DIAGNOSIS — I10 HTN (HYPERTENSION), BENIGN: ICD-10-CM

## 2018-09-26 DIAGNOSIS — Z85.528 PERSONAL HISTORY OF RENAL CELL CANCER: ICD-10-CM

## 2018-09-26 DIAGNOSIS — I10 HYPERTENSION, UNSPECIFIED TYPE: Primary | ICD-10-CM

## 2018-09-26 DIAGNOSIS — I70.0 AORTIC ATHEROSCLEROSIS: ICD-10-CM

## 2018-09-26 DIAGNOSIS — R63.0 ANOREXIA: ICD-10-CM

## 2018-09-26 DIAGNOSIS — R26.81 GAIT INSTABILITY: ICD-10-CM

## 2018-09-26 DIAGNOSIS — M81.0 AGE-RELATED OSTEOPOROSIS WITHOUT CURRENT PATHOLOGICAL FRACTURE: ICD-10-CM

## 2018-09-26 DIAGNOSIS — K21.9 GASTROESOPHAGEAL REFLUX DISEASE, ESOPHAGITIS PRESENCE NOT SPECIFIED: ICD-10-CM

## 2018-09-26 DIAGNOSIS — Z00.00 ENCOUNTER FOR PREVENTIVE HEALTH EXAMINATION: Primary | ICD-10-CM

## 2018-09-26 DIAGNOSIS — E78.2 MIXED HYPERLIPIDEMIA: ICD-10-CM

## 2018-09-26 DIAGNOSIS — I25.10 CORONARY ARTERY DISEASE INVOLVING NATIVE CORONARY ARTERY OF NATIVE HEART WITHOUT ANGINA PECTORIS: ICD-10-CM

## 2018-09-26 PROBLEM — M79.604 PAIN IN BOTH LOWER EXTREMITIES: Status: RESOLVED | Noted: 2017-09-18 | Resolved: 2018-09-26

## 2018-09-26 PROBLEM — M79.605 PAIN IN BOTH LOWER EXTREMITIES: Status: RESOLVED | Noted: 2017-09-18 | Resolved: 2018-09-26

## 2018-09-26 PROCEDURE — 1101F PT FALLS ASSESS-DOCD LE1/YR: CPT | Mod: CPTII,,, | Performed by: INTERNAL MEDICINE

## 2018-09-26 PROCEDURE — 99214 OFFICE O/P EST MOD 30 MIN: CPT | Mod: PBBFAC,25 | Performed by: NURSE PRACTITIONER

## 2018-09-26 PROCEDURE — 99999 PR PBB SHADOW E&M-EST. PATIENT-LVL IV: CPT | Mod: PBBFAC,,, | Performed by: NURSE PRACTITIONER

## 2018-09-26 PROCEDURE — 90662 IIV NO PRSV INCREASED AG IM: CPT | Mod: PBBFAC

## 2018-09-26 PROCEDURE — 99213 OFFICE O/P EST LOW 20 MIN: CPT | Mod: PBBFAC,25,27 | Performed by: INTERNAL MEDICINE

## 2018-09-26 PROCEDURE — G0439 PPPS, SUBSEQ VISIT: HCPCS | Mod: ,,, | Performed by: NURSE PRACTITIONER

## 2018-09-26 PROCEDURE — 99999 PR PBB SHADOW E&M-EST. PATIENT-LVL III: CPT | Mod: PBBFAC,,, | Performed by: INTERNAL MEDICINE

## 2018-09-26 PROCEDURE — 99214 OFFICE O/P EST MOD 30 MIN: CPT | Mod: S$PBB,,, | Performed by: INTERNAL MEDICINE

## 2018-09-26 NOTE — PROGRESS NOTES
"lEen Peters presented for a  Medicare AWV and comprehensive Health Risk Assessment today. The following components were reviewed and updated:    · Medical history  · Family History  · Social history  · Allergies and Current Medications  · Health Risk Assessment  · Health Maintenance  · Care Team     ** See Completed Assessments for Annual Wellness Visit within the encounter summary.**       The following assessments were completed:  · Living Situation  · CAGE  · Depression Screening  · Timed Get Up and Go  · Whisper Test  · Cognitive Function Screening  ·   ·   ·   · Nutrition Screening  · ADL Screening  · PAQ Screening    Vitals:    09/26/18 1312   BP: 136/70   BP Location: Left arm   Pulse: 64   Weight: 58.2 kg (128 lb 4.9 oz)   Height: 5' 3" (1.6 m)     Body mass index is 22.73 kg/m².  Physical Exam   Constitutional: She is oriented to person, place, and time. She appears well-developed and well-nourished.   Musculoskeletal: Normal range of motion.   Neurological: She is alert and oriented to person, place, and time.   Skin: Skin is warm and dry.   Psychiatric: She has a normal mood and affect.   Nursing note and vitals reviewed.        Diagnoses and health risks identified today and associated recommendations/orders:    1. Encounter for preventive health examination  Here for Health Risk Assessment/Annual Wellness Visit.  Health maintenance reviewed and updated. Follow up in one year.    2. HTN (hypertension), benign  Chronic, stable on current medications. Followed by PCP.    3. Autonomic postural hypotension  Chronic, stable.  Followed by PCP.    4. Aortic atherosclerosis  Chronic, stable on current medications. Noted CT abdomen/pelvis 7/07/14. Followed by PCP.    5. Coronary artery disease involving native coronary artery of native heart without angina pectoris  Chronic, stable on current medications. Followed by Cardiology.    6. Mixed hyperlipidemia  Chronic, stable on current medication. Followed by PCP, " Cardiology.    7. Depression, major, in remission  Chronic, stable on current medication. PHQ-2 score 0. Followed by PCP.    8. Gastroesophageal reflux disease, esophagitis presence not specified  Chronic, stable on current medication. Followed by PCP.    9. Age-related osteoporosis without current pathological fracture  Chronic, stable on current medications. Followed by PCP.    10. Personal history of renal cell cancer  Stable. Followed by Urology.     11. Anorexia  Chronic, reports poor appetite, weight stable last year, but  Decreased 8 pounds form HRA 2015. Last albumin 3.9. Followed by PCP.    12. Gait instability  Chronic, stable. Reports no recent falls.  Followed by PCP.      Provided Elen with a 5-10 year written screening schedule and personal prevention plan. Recommendations were developed using the USPSTF age appropriate recommendations. Education, counseling, and referrals were provided as needed. After Visit Summary printed and given to patient which includes a list of additional screenings\tests needed.      Follow up PCP: 9/26/2018    Cristy Sepulveda NP

## 2018-09-26 NOTE — PATIENT INSTRUCTIONS
Counseling and Referral of Other Preventative  (Italic type indicates deductible and co-insurance are waived)    Patient Name: Elen Peters  Today's Date: 9/26/2018    Health Maintenance       Date Due Completion Date    Influenza Vaccine 08/01/2018 9/22/2016    Lipid Panel 10/16/2022 10/16/2017    TETANUS VACCINE 08/25/2025 8/25/2015        No orders of the defined types were placed in this encounter.    The following information is provided to all patients.  This information is to help you find resources for any of the problems found today that may be affecting your health:                Living healthy guide: www.[facility].[place].[facility]      Understanding Diabetes: www.diabetes.org      Eating healthy: www.cdc.gov/healthyweight      CDC home safety checklist: www.cdc.gov/steadi/patient.html      Agency on Aging: www.goea.[place].[facility]      Alcoholics anonymous (AA): www.aa.org      Physical Activity: www.[place].nih.gov/me7tber      Tobacco use: www.quitwithusla.org

## 2018-10-01 NOTE — PROGRESS NOTES
Subjective:       Patient ID: Elen Peters is a 89 y.o. female.    Chief Complaint: Annual Exam    Hypertension   This is a chronic problem. The problem is unchanged. The problem is controlled. Pertinent negatives include no chest pain or shortness of breath. There are no compliance problems.      Review of Systems   Respiratory: Negative for shortness of breath.    Cardiovascular: Negative for chest pain.       Objective:      Physical Exam   Constitutional: She is oriented to person, place, and time. She appears well-developed and well-nourished. No distress.   HENT:   Head: Normocephalic and atraumatic.   Mouth/Throat: Oropharynx is clear and moist.   Eyes: Conjunctivae are normal. Pupils are equal, round, and reactive to light.   Neck: Normal range of motion. Neck supple.   Cardiovascular: Normal rate, regular rhythm and normal heart sounds.   Pulmonary/Chest: Effort normal and breath sounds normal. She has no wheezes.   Abdominal: Soft. Bowel sounds are normal. There is no tenderness.   Musculoskeletal: Normal range of motion. She exhibits no edema or tenderness.   Neurological: She is alert and oriented to person, place, and time. No cranial nerve deficit.   Skin: No erythema.   Psychiatric: She has a normal mood and affect.   Vitals reviewed.      Assessment:       1. Hypertension, unspecified type    2. Autonomic postural hypotension        Plan:       Elen was seen today for annual exam.    Diagnoses and all orders for this visit:    Hypertension, unspecified type  -     Hypertension Digital Medicine (HDMP) Enrollment Order  -     [facility] Assign Onboarding Questionnaires    Autonomic postural hypotension  Comments:  inactive recently        Follow-up in about 9 months (around 6/26/2019) for F/U APPOINTMENT WITH ME.

## 2018-10-03 ENCOUNTER — PATIENT MESSAGE (OUTPATIENT)
Dept: ADMINISTRATIVE | Facility: OTHER | Age: 83
End: 2018-10-03

## 2018-10-03 ENCOUNTER — CLINICAL SUPPORT (OUTPATIENT)
Dept: INTERNAL MEDICINE | Facility: CLINIC | Age: 83
End: 2018-10-03
Payer: MEDICARE

## 2018-10-03 VITALS — WEIGHT: 129 LBS | BODY MASS INDEX: 22.85 KG/M2

## 2018-10-03 NOTE — PROGRESS NOTES
Health  Follow-Up Note   [x] Office  [] Phone  Notes from previous session reviewed.   [x] Previous Session Goals unchanged.   [] Patient/Caregiver Change in Goals.  Goals added or changed by Patient/Caregiver since program participation:  1. Get set up with the Hypertension program today walked to [facility] to get cuff and instructions  2. Continue same plan      3. Go buy  to make smoothies     Additional/Changed support that patient/caregiver has experienced/sought?  (Indicate readiness, support from family, friends, others, community groups, etc)  1.  Family    Additional/Changed obstacles that could prevent patient/caregiver from reaching their goals?  1. Busy with family and son had triple bypass and granddaughter just had new baby     Feedback provided:  1.  Praised for great job maintain weight no change    Diagnostic values/Desriptors for follow-up as needed for chronic condition(s)   Weight: 58.5 kg 128.97 lb     Interventions:   1. Health  listened reflectively, validated thoughts and feelings, offered support and encouragement.   2. Allowed patient to express themselves in a non-biased atmosphere.  3. Health  assisted pt to problem-solve obstacles such as being in a challenging environment and dealing with these challenges.   4. Motivational Interviewed interventions utilized (OARS).   5. Patient responded favorably to interventions and remained actively engaged in the session.   6. Health  will remain available and connected for patient by phone and/or office visits.   7. Positive reinforcement, emotional support and encouragement provided.   8. Focused Education: MI    Plan:  [x] Pt will work on goals as stated above.   [x] Pt will contact Health  for any questions, concerns or needs.  [x] Pt will follow up with Health  in office on    12/4/18 at 1100.    [] Pt will follow up with Health  on phone in:        [x] Health  will remain available.   [] Health   will contact patient by phone in:        [] Health  will consult:        [] Health  will inform Provider via EPIC messaging.     Impression:  1. Behavior is consistent with   Action    Stage of Change.   2. Participation level:       [x] Receptive      [x] Interactive      [] Guarded and Resistant      [x] Self Motivated      [] Refused/Declined to participate   3. [x] Pt voiced understanding of all information presented.       [] Pt voiced needing further information/education. This will be arranged.       [x] Pt would benefit from further education/information as identified by this health . This will be arranged.     Ivana Fernandez RN HC

## 2018-10-16 ENCOUNTER — LAB VISIT (OUTPATIENT)
Dept: LAB | Facility: HOSPITAL | Age: 83
End: 2018-10-16
Attending: INTERNAL MEDICINE
Payer: MEDICARE

## 2018-10-16 DIAGNOSIS — I70.0 AORTIC ATHEROSCLEROSIS: ICD-10-CM

## 2018-10-16 DIAGNOSIS — I10 HTN (HYPERTENSION), BENIGN: ICD-10-CM

## 2018-10-16 LAB
ALBUMIN SERPL BCP-MCNC: 3.6 G/DL
ALP SERPL-CCNC: 46 U/L
ALT SERPL W/O P-5'-P-CCNC: 24 U/L
ANION GAP SERPL CALC-SCNC: 8 MMOL/L
AST SERPL-CCNC: 21 U/L
BILIRUB SERPL-MCNC: 0.6 MG/DL
BUN SERPL-MCNC: 14 MG/DL
CALCIUM SERPL-MCNC: 9.6 MG/DL
CHLORIDE SERPL-SCNC: 103 MMOL/L
CHOLEST SERPL-MCNC: 166 MG/DL
CHOLEST/HDLC SERPL: 3 {RATIO}
CO2 SERPL-SCNC: 26 MMOL/L
CREAT SERPL-MCNC: 0.8 MG/DL
EST. GFR  (AFRICAN AMERICAN): >60 ML/MIN/1.73 M^2
EST. GFR  (NON AFRICAN AMERICAN): >60 ML/MIN/1.73 M^2
GLUCOSE SERPL-MCNC: 93 MG/DL
HDLC SERPL-MCNC: 55 MG/DL
HDLC SERPL: 33.1 %
LDLC SERPL CALC-MCNC: 92.8 MG/DL
NONHDLC SERPL-MCNC: 111 MG/DL
POTASSIUM SERPL-SCNC: 4.4 MMOL/L
PROT SERPL-MCNC: 6.5 G/DL
SODIUM SERPL-SCNC: 137 MMOL/L
TRIGL SERPL-MCNC: 91 MG/DL
TSH SERPL DL<=0.005 MIU/L-ACNC: 1.33 UIU/ML

## 2018-10-16 PROCEDURE — 36415 COLL VENOUS BLD VENIPUNCTURE: CPT | Mod: PO

## 2018-10-16 PROCEDURE — 80053 COMPREHEN METABOLIC PANEL: CPT

## 2018-10-16 PROCEDURE — 84443 ASSAY THYROID STIM HORMONE: CPT

## 2018-10-16 PROCEDURE — 80061 LIPID PANEL: CPT

## 2018-10-17 ENCOUNTER — PATIENT MESSAGE (OUTPATIENT)
Dept: CARDIOLOGY | Facility: CLINIC | Age: 83
End: 2018-10-17

## 2018-10-17 ENCOUNTER — PATIENT OUTREACH (OUTPATIENT)
Dept: OTHER | Facility: OTHER | Age: 83
End: 2018-10-17

## 2018-10-17 NOTE — LETTER
Mar Mac, Jerod  [address] [place] [address]     Dear Elen Peters,    Welcome to the [facility]!           My name is Mar Mac PharmD and I am your dedicated Digital Medicine clinician.  As an expert in medication management, I will help ensure that the medications you are taking continue to provide you with the intended benefits.        I am Sharlene See and I will be your health  for the duration of the program.  My  job is to help you identify lifestyle changes to improve your blood pressure control.  We will talk about nutrition, exercise, and other ways that you may be able to adjust your current habits to better your health. Together, we will work to improve your overall health and encourage you to meet your goals for a healthier lifestyle.    What we expect from YOU:    You will need to take blood pressure readings multiple times a week and no less than one reading per week.   It is important that you take your measurements at different times during the day, when possible.     What you should expect from your Digital Medicine Care Team:   We will provide you with education about high blood pressure, including lifestyle changes that could help you to control your blood pressure.   We will review your weekly readings and provide you with monthly blood pressure progress reports after you have been in the program for more than 30 days.   We will send monthly progress reports on your blood pressure control to your physician so they can follow along with your progress as well.    You will be able to reach me by phone at [phone] or through your [facility] account by clicking my name under Care Team on the right side of the home screen.    I look forward to working with you to achieve your blood pressure goals!  Sincerely,    Mar Mac PharmD  Your personal clinician    Please visit [email or web address]  to learn more about high blood pressure and what you can do lower your blood pressure.                                                                                           Elen Peters  [address]  [facility] [address]

## 2018-10-17 NOTE — PROGRESS NOTES
Last 5 Patient Entered Readings                                      Current 30 Day Average: 156/71     Recent Readings 10/15/2018 10/14/2018 10/13/2018 10/13/2018 10/12/2018    SBP (mmHg) 151 151 160 191 157    DBP (mmHg) 63 71 72 81 69    Pulse 61 62 62 59 57        Patient requests call back later

## 2018-10-21 NOTE — PROGRESS NOTES
Subjective:   Patient ID:  Elen Peters is a 89 y.o. female who presents for follow-up of CVD    HPI: The patient is here for CAD risk factors.Patient is here for evaluation and treatment of hypertension.I was her late 's long-term doc and friends with several of her sons.     The patient has no chest pain, SOB, TIA, palpitations, syncope or pre-syncope.Patient currently exercises many times per week.BP fluctuates but mostly much better than today but still more mildly high        Review of Systems   Constitution: Negative for chills, decreased appetite, diaphoresis, fever, weakness, malaise/fatigue, night sweats, weight gain and weight loss.   HENT: Negative for congestion, hoarse voice, nosebleeds, sore throat and tinnitus.    Eyes: Negative for blurred vision, double vision, vision loss in left eye, vision loss in right eye, visual disturbance and visual halos.   Cardiovascular: Negative for chest pain, claudication, cyanosis, dyspnea on exertion, irregular heartbeat, leg swelling, near-syncope, orthopnea, palpitations, paroxysmal nocturnal dyspnea and syncope.   Respiratory: Negative for cough, hemoptysis, shortness of breath, sleep disturbances due to breathing, snoring, sputum production and wheezing.    Endocrine: Negative for cold intolerance, heat intolerance, polydipsia, polyphagia and polyuria.   Hematologic/Lymphatic: Negative for adenopathy and bleeding problem. Does not bruise/bleed easily.   Skin: Negative for color change, dry skin, flushing, itching, nail changes, poor wound healing, rash, skin cancer, suspicious lesions and unusual hair distribution.   Musculoskeletal: Negative for arthritis, back pain, falls, gout, joint pain, joint swelling, muscle cramps, muscle weakness, myalgias and stiffness.   Gastrointestinal: Negative for abdominal pain, anorexia, change in bowel habit, constipation, diarrhea, dysphagia, heartburn, hematemesis, hematochezia, melena and vomiting.   Genitourinary:  "Negative for decreased libido, dysuria, hematuria, hesitancy and urgency.   Neurological: Negative for excessive daytime sleepiness, dizziness, focal weakness, headaches, light-headedness, loss of balance, numbness, paresthesias, seizures, sensory change, tremors and vertigo.   Psychiatric/Behavioral: Negative for altered mental status, depression, hallucinations, memory loss, substance abuse and suicidal ideas. The patient does not have insomnia and is not nervous/anxious.    Allergic/Immunologic: Negative for environmental allergies and hives.       Objective: BP (!) 184/81 (BP Location: Left arm, Patient Position: Sitting, BP Method: Large (Automatic))   Pulse 60   Ht 5' 3.5" (1.613 m)   Wt 58.8 kg (129 lb 10.1 oz)   BMI 22.60 kg/m²      Physical Exam   Constitutional: She is oriented to person, place, and time. She appears well-developed and well-nourished.   HENT:   Head: Normocephalic.   Eyes: EOM are normal. Pupils are equal, round, and reactive to light.   Neck: Normal range of motion. Normal carotid pulses, no hepatojugular reflux and no JVD present. Carotid bruit is not present. No thyromegaly present.   Cardiovascular: Normal rate, regular rhythm, normal heart sounds and intact distal pulses. Exam reveals no gallop and no friction rub.   No murmur heard.  Pulmonary/Chest: Effort normal and breath sounds normal. No tachypnea. No respiratory distress. She has no wheezes. She has no rales. She exhibits no tenderness.   Abdominal: Soft. Bowel sounds are normal. She exhibits no distension and no mass. There is no tenderness. There is no rebound and no guarding.   Musculoskeletal: Normal range of motion. She exhibits no edema or tenderness.   Lymphadenopathy:     She has no cervical adenopathy.   Neurological: She is alert and oriented to person, place, and time. No cranial nerve deficit. Coordination normal.   Skin: Skin is warm. No rash noted. No erythema.   Psychiatric: She has a normal mood and affect. " Her behavior is normal. Judgment and thought content normal.       Assessment:     1. HTN (hypertension), benign    2. Mixed hyperlipidemia    3. Coronary artery disease involving native coronary artery of native heart without angina pectoris    4. Aortic atherosclerosis    5. Depression, major, in remission    6. Autonomic postural hypotension    7. Nonspecific abnormal electrocardiogram (ECG) (EKG)        Plan:   Discussed diet , achieving and maintaining ideal body weight, and exercise.   We reviewed meds in detail.  Reasurred-discussed goals, options, plan  She will go back on half of diuretic ( She said her side effect was that it made her tired)    Elen was seen today for htn (hypertension), benign.    Diagnoses and all orders for this visit:    HTN (hypertension), benign  -     Lipid panel; Future; Expected date: 12/22/2019  -     Comprehensive metabolic panel; Future; Expected date: 12/22/2019  -     TSH; Future; Expected date: 12/22/2019  -     Basic metabolic panel; Future; Expected date: 01/22/2019    Mixed hyperlipidemia  -     Lipid panel; Future; Expected date: 12/22/2019  -     Comprehensive metabolic panel; Future; Expected date: 12/22/2019  -     TSH; Future; Expected date: 12/22/2019  -     Basic metabolic panel; Future; Expected date: 01/22/2019    Coronary artery disease involving native coronary artery of native heart without angina pectoris  -     Comprehensive metabolic panel; Future; Expected date: 12/22/2019  -     TSH; Future; Expected date: 12/22/2019  -     Basic metabolic panel; Future; Expected date: 01/22/2019    Aortic atherosclerosis    Depression, major, in remission    Autonomic postural hypotension    Nonspecific abnormal electrocardiogram (ECG) (EKG)            Follow-up in about 15 months (around 1/22/2020) for with labs ; labs and nurse BP in 3 months and find out home BPs.

## 2018-10-22 ENCOUNTER — HOSPITAL ENCOUNTER (OUTPATIENT)
Dept: CARDIOLOGY | Facility: CLINIC | Age: 83
Discharge: HOME | End: 2018-10-22
Payer: MEDICARE

## 2018-10-22 ENCOUNTER — OFFICE VISIT (OUTPATIENT)
Dept: CARDIOLOGY | Facility: CLINIC | Age: 83
End: 2018-10-22
Payer: MEDICARE

## 2018-10-22 VITALS
SYSTOLIC BLOOD PRESSURE: 184 MMHG | DIASTOLIC BLOOD PRESSURE: 81 MMHG | WEIGHT: 129.63 LBS | HEART RATE: 60 BPM | HEIGHT: 64 IN | BODY MASS INDEX: 22.13 KG/M2

## 2018-10-22 DIAGNOSIS — F32.5 DEPRESSION, MAJOR, IN REMISSION: ICD-10-CM

## 2018-10-22 DIAGNOSIS — I70.0 AORTIC ATHEROSCLEROSIS: ICD-10-CM

## 2018-10-22 DIAGNOSIS — R94.31 NONSPECIFIC ABNORMAL ELECTROCARDIOGRAM (ECG) (EKG): ICD-10-CM

## 2018-10-22 DIAGNOSIS — E78.2 MIXED HYPERLIPIDEMIA: ICD-10-CM

## 2018-10-22 DIAGNOSIS — I95.1 AUTONOMIC POSTURAL HYPOTENSION: ICD-10-CM

## 2018-10-22 DIAGNOSIS — I10 HTN (HYPERTENSION), BENIGN: ICD-10-CM

## 2018-10-22 DIAGNOSIS — I25.10 CORONARY ARTERY DISEASE INVOLVING NATIVE CORONARY ARTERY OF NATIVE HEART WITHOUT ANGINA PECTORIS: ICD-10-CM

## 2018-10-22 DIAGNOSIS — I10 HTN (HYPERTENSION), BENIGN: Primary | ICD-10-CM

## 2018-10-22 PROCEDURE — 99999 PR PBB SHADOW E&M-EST. PATIENT-LVL IV: CPT | Mod: PBBFAC,,, | Performed by: INTERNAL MEDICINE

## 2018-10-22 PROCEDURE — 93010 ELECTROCARDIOGRAM REPORT: CPT | Mod: S$PBB,,, | Performed by: INTERNAL MEDICINE

## 2018-10-22 PROCEDURE — 1101F PT FALLS ASSESS-DOCD LE1/YR: CPT | Mod: CPTII,,, | Performed by: INTERNAL MEDICINE

## 2018-10-22 PROCEDURE — 99214 OFFICE O/P EST MOD 30 MIN: CPT | Mod: PBBFAC,25 | Performed by: INTERNAL MEDICINE

## 2018-10-22 PROCEDURE — 93005 ELECTROCARDIOGRAM TRACING: CPT | Mod: PBBFAC | Performed by: INTERNAL MEDICINE

## 2018-10-22 PROCEDURE — 99215 OFFICE O/P EST HI 40 MIN: CPT | Mod: S$PBB,,, | Performed by: INTERNAL MEDICINE

## 2018-10-22 NOTE — PROGRESS NOTES
Last 5 Patient Entered Readings                                      Current 30 Day Average: 158/71     Recent Readings 10/21/2018 10/20/2018 10/20/2018 10/19/2018 10/18/2018    SBP (mmHg) 157 153 167 176 164    DBP (mmHg) 70 71 70 77 72    Pulse 62 58 58 58 63        Received message that patient went to [facility] today and had questions/concerns about high readings. Called patient and LVM so she would have my phone number. WCB tomorrow morning as requested via IT support.

## 2018-10-22 NOTE — PATIENT INSTRUCTIONS
Discussed diet , achieving and maintaining ideal body weight, and exercise.   We reviewed meds in detail.  Reasurred-discussed goals, options, plan  She will go back on half of diuretic ( She said her side effect was that it made her tired)

## 2018-10-23 NOTE — PROGRESS NOTES
Last 5 Patient Entered Readings                                      Current 30 Day Average: 158/71     Recent Readings 10/21/2018 10/20/2018 10/20/2018 10/19/2018 10/18/2018    SBP (mmHg) 157 153 167 176 164    DBP (mmHg) 70 71 70 77 72    Pulse 62 58 58 58 63        Received message that patient went to [facility] today and had questions/concerns about high readings. Called patient as requested before 11am at home number. LVM

## 2018-10-23 NOTE — PROGRESS NOTES
Last 5 Patient Entered Readings                                      Current 30 Day Average: 158/71     Recent Readings 10/21/2018 10/20/2018 10/20/2018 10/19/2018 10/18/2018    SBP (mmHg) 157 153 167 176 164    DBP (mmHg) 70 71 70 77 72    Pulse 62 58 58 58 63          Patient calls and notes some concerns about how she has been taking her BP. Patient also notes TMJ and switching off between tylenol and aleve for the pain (concerned about taking too much of one or the other). Patient then realized that it was almost 11am, and that she had to leave. Patient will call me back later.

## 2018-10-26 NOTE — PROGRESS NOTES
Last 5 Patient Entered Readings                                      Current 30 Day Average: 160/72     Recent Readings 10/25/2018 10/23/2018 10/23/2018 10/21/2018 10/20/2018    SBP (mmHg) 180 174 180 157 153    DBP (mmHg) 73 76 82 70 71    Pulse 60 60 60 62 58        Digital Medicine: Health  Introduction Call     Left voicemail and requested call back in order to complete digital medicine health introduction call.

## 2018-10-30 ENCOUNTER — TELEPHONE (OUTPATIENT)
Dept: INTERNAL MEDICINE | Facility: CLINIC | Age: 83
End: 2018-10-30

## 2018-10-30 NOTE — TELEPHONE ENCOUNTER
----- Message from Sharmin Villalobos sent at 10/30/2018 10:19 AM CDT -----  Contact: Pt Home [phone]  Caller is requesting to schedule their annual screening mammogram appointment. Order is not listed in Epic.  Please enter order and contact patient to schedule.    Where would they like the mammogram performed?:    Additional information:    Caller is requesting a sooner appointment. Caller declined first available appointment listed below. Caller will not accept being placed on the wait list and is requesting a message be sent to the provider.    When is the next available appointment:  11/02/2018  Did you offer to schedule the next available appt and put the patient on the wait list?:   Yes  What visit type: Ep  Symptoms:  Laryngitis, cough.  Patient preference of timeframe to be scheduled:  A soon as possible.  What is the reason the patient is requesting a sooner appointment? (insurance terminating, changing jobs):  Laryngitis, cough, and she would like to speak with you about medications.   Would you prefer an answer via [employer]?:  No   Comments:  Patient would like a phone call please.

## 2018-10-30 NOTE — TELEPHONE ENCOUNTER
Patient states that you instructed her to call you to discuss a medication that you took her off of. Please advise.

## 2018-10-31 ENCOUNTER — OFFICE VISIT (OUTPATIENT)
Dept: INTERNAL MEDICINE | Facility: CLINIC | Age: 83
End: 2018-10-31
Payer: MEDICARE

## 2018-10-31 VITALS
BODY MASS INDEX: 22.77 KG/M2 | SYSTOLIC BLOOD PRESSURE: 108 MMHG | HEART RATE: 64 BPM | OXYGEN SATURATION: 95 % | HEIGHT: 63 IN | TEMPERATURE: 98 F | WEIGHT: 128.5 LBS | DIASTOLIC BLOOD PRESSURE: 60 MMHG

## 2018-10-31 DIAGNOSIS — J04.0 LARYNGITIS: ICD-10-CM

## 2018-10-31 DIAGNOSIS — R19.8 CHANGE IN BOWEL MOVEMENT: ICD-10-CM

## 2018-10-31 DIAGNOSIS — J06.9 UPPER RESPIRATORY TRACT INFECTION, UNSPECIFIED TYPE: ICD-10-CM

## 2018-10-31 DIAGNOSIS — R05.9 COUGH: Primary | ICD-10-CM

## 2018-10-31 PROCEDURE — 96372 THER/PROPH/DIAG INJ SC/IM: CPT | Mod: PBBFAC

## 2018-10-31 PROCEDURE — 99213 OFFICE O/P EST LOW 20 MIN: CPT | Mod: S$PBB,,, | Performed by: NURSE PRACTITIONER

## 2018-10-31 PROCEDURE — 99214 OFFICE O/P EST MOD 30 MIN: CPT | Mod: PBBFAC | Performed by: NURSE PRACTITIONER

## 2018-10-31 PROCEDURE — 99999 PR PBB SHADOW E&M-EST. PATIENT-LVL IV: CPT | Mod: PBBFAC,,, | Performed by: NURSE PRACTITIONER

## 2018-10-31 PROCEDURE — 1101F PT FALLS ASSESS-DOCD LE1/YR: CPT | Mod: CPTII,,, | Performed by: NURSE PRACTITIONER

## 2018-10-31 RX ORDER — BENZONATATE 200 MG/1
200 CAPSULE ORAL 3 TIMES DAILY PRN
Qty: 30 CAPSULE | Refills: 0 | Status: SHIPPED | OUTPATIENT
Start: 2018-10-31 | End: 2018-11-10

## 2018-10-31 RX ORDER — BETAMETHASONE SODIUM PHOSPHATE AND BETAMETHASONE ACETATE 3; 3 MG/ML; MG/ML
6 INJECTION, SUSPENSION INTRA-ARTICULAR; INTRALESIONAL; INTRAMUSCULAR; SOFT TISSUE
Status: COMPLETED | OUTPATIENT
Start: 2018-10-31 | End: 2018-10-31

## 2018-10-31 RX ADMIN — BETAMETHASONE ACETATE AND BETAMETHASONE SODIUM PHOSPHATE 6 MG: 3; 3 INJECTION, SUSPENSION INTRA-ARTICULAR; INTRALESIONAL; INTRAMUSCULAR; SOFT TISSUE at 05:10

## 2018-10-31 NOTE — PROGRESS NOTES
"Subjective:       Patient ID: Elen Peters is a 89 y.o. female.    Chief Complaint: Sore Throat (cough, loose stool)    HPI:  88 yo female that presents to clinic today with cough and hoarse voice.    States that symptoms have been going on for about 3 days.  States that she has been having "coughing spells" during the day.  States that she has been having clear mucus production.  Denies any fever, SOB,chest pain, n/v or dizziness.  States that her appetite and energy level are down.  She has not tried taking anything over the counter for this.    States that she has been having loose bowel movements.  Denies any abdominal pain or blood with bowel movements.    Review of Systems   Constitutional: Positive for appetite change and fatigue. Negative for activity change.   HENT: Positive for congestion, postnasal drip, rhinorrhea, sore throat and voice change. Negative for sinus pressure and sinus pain.    Respiratory: Negative for apnea, cough, shortness of breath and wheezing.    Cardiovascular: Negative for chest pain, palpitations and leg swelling.   Gastrointestinal: Negative for abdominal distention, abdominal pain, constipation, diarrhea, nausea and vomiting.   Musculoskeletal: Negative for arthralgias, back pain, myalgias, neck pain and neck stiffness.   Skin: Negative for color change and rash.   Neurological: Negative for dizziness, light-headedness, numbness and headaches.   Psychiatric/Behavioral: Negative for behavioral problems.       Objective:      Physical Exam   Constitutional: She is oriented to person, place, and time. She appears well-developed and well-nourished. No distress.   HENT:   Head: Normocephalic and atraumatic.   Right Ear: External ear normal.   Left Ear: External ear normal.   Nose: Rhinorrhea present. Right sinus exhibits no maxillary sinus tenderness and no frontal sinus tenderness. Left sinus exhibits no maxillary sinus tenderness and no frontal sinus tenderness.   + post nasal " drip and mild erythema to oropharynx.   Neck: Normal range of motion. Neck supple. No thyromegaly present.   Cardiovascular: Normal rate, regular rhythm, normal heart sounds and intact distal pulses.   No murmur heard.  Pulmonary/Chest: Effort normal and breath sounds normal. No stridor. No respiratory distress. She has no wheezes. She has no rales.   Abdominal: Soft. Bowel sounds are normal. She exhibits no distension and no mass. There is no tenderness.   Lymphadenopathy:     She has no cervical adenopathy.   Neurological: She is alert and oriented to person, place, and time. No cranial nerve deficit or sensory deficit.   Skin: Skin is warm and dry. No erythema.   Psychiatric: Her behavior is normal.       Assessment:       1. Cough    2. Upper respiratory tract infection, unspecified type    3. Laryngitis    4. Change in bowel movement        Plan:     1.  Cough  -Vitals are stable in clinic.  -Will give prescription for tessalon perles to help with cough.  If unable to fill prescription, can do over the counter delsym cough syrup or halls cough drops.  -Encouraged to increase water intake.    2.  URI/Laryngitis  -Appears viral.  -Will give 6mg IM of betamethasone in clinic today to help with symptom relief.  -Encouraged to increase water intake and get plenty of rest.  -Advil or tylenol for any fever, aches or pains.  -Encouraged the use of warm salt water gargles to help with throat relief.  -Recommended use of daily non sedating antihistamine.    3.  Change in bowel movement  -BRAT diet instructions given to patient.  -Encouraged to increase water intake and get plenty of rest.

## 2018-10-31 NOTE — TELEPHONE ENCOUNTER
----- Message from Lynvioleta Jones sent at 10/31/2018 10:03 AM CDT -----  Contact: Patient [phone]  Pt states that she is calling to speak with . When asked what the call is in regards to she stated that she just needed to speak with her. Please call back and advise.      Thanks

## 2018-10-31 NOTE — TELEPHONE ENCOUNTER
appt booked for UC this afternoon. Spoke with patient with cough/roman and loose stool. Also inquiring about periactin+  I will send a message to Dr Treadwell in regards to this.

## 2018-11-01 NOTE — PROGRESS NOTES
Last 5 Patient Entered Readings                                      Current 30 Day Average: 159/73     Recent Readings 10/30/2018 10/28/2018 10/27/2018 10/26/2018 10/25/2018    SBP (mmHg) 162 143 164 138 180    DBP (mmHg) 73 69 74 68 73    Pulse 59 65 61 69 60        Pressure at [facility] today was in the 140's SBP. Patient has lost her voice at this time, so we agreed to discuss BP technique and the digital medicine program further on Monday, 11/5

## 2018-11-03 ENCOUNTER — TELEPHONE (OUTPATIENT)
Dept: INTERNAL MEDICINE | Facility: CLINIC | Age: 83
End: 2018-11-03

## 2018-11-03 ENCOUNTER — NURSE TRIAGE (OUTPATIENT)
Dept: ADMINISTRATIVE | Facility: CLINIC | Age: 83
End: 2018-11-03

## 2018-11-03 RX ORDER — CIPROFLOXACIN HYDROCHLORIDE 3 MG/ML
SOLUTION/ DROPS OPHTHALMIC
Qty: 240 DROP | Refills: 0 | Status: SHIPPED | OUTPATIENT
Start: 2018-11-03 | End: 2019-03-20

## 2018-11-03 RX ORDER — AMOXICILLIN AND CLAVULANATE POTASSIUM 875; 125 MG/1; MG/1
1 TABLET, FILM COATED ORAL 2 TIMES DAILY
Qty: 14 TABLET | Refills: 0 | Status: SHIPPED | OUTPATIENT
Start: 2018-11-03 | End: 2018-11-10

## 2018-11-03 NOTE — TELEPHONE ENCOUNTER
"Saw Dr. Crabtree on 10/31. Has been having laryngitis for a week. Was given medication for cough. Voice has not come back.Diarrhea is gone.  Mucus was clear but now it is green and coughing is endless. Left eye got inflamed and full of pus. Wanting to know what medication to take. Advised will probably need to be reevaluated. States she is not going back to clinic. Wanting to talk to MD.  Slept yesterday and woke up this morning after coughing all night. Calling to speak with her PCP. ON call provider Dr. Shultz Contacted. MD will give pt a call.    Reason for Disposition   [1] Request for URGENT new prescription or refill of "essential" medication (i.e., likelihood of harm to patient if not taken) AND [2] triager unable to fill per unit policy    Protocols used: ST MEDICATION QUESTION CALL-A-AH      "

## 2018-11-03 NOTE — TELEPHONE ENCOUNTER
Patient is not improving.  She has had laryngitis for over a week.    She has pus in her eye and cannot stop coughing.  She reports that the pus in the eye began yesterday afternoon.  When she wipes it away, the pus comes right back.  She has tried hot compresses.    The cough is productive of green mucus.  No fevers or chills. No sinus pressure.  She has a headache back of her head.  Her teeth hurt, because she has TMJ.  No SOB.  She has a sore throat.  She has not had body aches.      She is happy with the cough medicine.    She was rude the entire call, which lasted 20 minutes.  Refused re-evaluation.

## 2018-11-05 NOTE — PROGRESS NOTES
Last 5 Patient Entered Readings                                      Current 30 Day Average: 160/72     Recent Readings 11/4/2018 11/3/2018 10/30/2018 10/28/2018 10/27/2018    SBP (mmHg) 150 157 162 143 164    DBP (mmHg) 68 68 73 69 74    Pulse 60 66 59 65 61        Patient is still sick and coughing today. Requests call back later this week

## 2018-11-05 NOTE — TELEPHONE ENCOUNTER
On call Doctor called amoxicillin and eye drops. No temp.Felling better today eye improving still a little cough. Has diarrhea now given instructions for bland diet/diary products etc. Instructed to take Imodium per Dr Treadwell.

## 2018-11-08 NOTE — PROGRESS NOTES
Last 5 Patient Entered Readings                                      Current 30 Day Average: 158/71     Recent Readings 11/6/2018 11/5/2018 11/4/2018 11/3/2018 10/30/2018    SBP (mmHg) 145 137 150 157 162    DBP (mmHg) 64 63 68 68 73    Pulse 70 75 60 66 59        Digital Medicine: Health  Introduction Call     Left voicemail and requested call back in order to complete digital medicine health introduction call.

## 2018-11-14 NOTE — PROGRESS NOTES
"Last 5 Patient Entered Readings                                      Current 30 Day Average: 151/70     Recent Readings 11/14/2018 11/11/2018 11/10/2018 11/8/2018 11/6/2018    SBP (mmHg) 122 125 128 136 145    DBP (mmHg) 62 63 66 69 64    Pulse 66 63 74 68 70        Digital Medicine: Health  Introduction    Introduced Ms. Elen Peters to [facility]. Discussed health  role and recommended lifestyle modifications.    Lifestyle Assessment:  Current Dietary Habits(i.e. low sodium, food labels, dining out):    Exercise:    Alcohol/Tobacco:    Medication Adherence: has been compliant with the medicaiton regimen Takes in PM    Other goals: Patient is not sure what has been decreasing readings. Has recently been sick, and patient has been on antibiotics.     Patient is constantly nauseated (persisting for years) and has a poor appetite. Patient feels that it has "ruined her life" because she cannot enjoy herself in restaurants/in social situations. Patient has been trying to drink enough water. Suggested getting referral from PCP for GI specialist. Patient agrees to call Dr. Treadwell's office.     Reviewed BP technique. Was sitting in bedroom, now sitting on chair with arm resting on a lower bookcase.     Helped patient to log into her [employer] account. She is unable to remember her email password, and I suggested talking to either [employer]'s tech support team, or apple store for assistance. Patient will try this    Reviewed AHA/AACE recommendations:  Limit sodium intake to <2000mg/day  Recommended CHO intake, 45-65% of daily caloric intake  Perform 150 minutes of physical activity per week    Reviewed the importance of self-monitoring, medication adherence, and that the health  can be used as a resource for lifestyle modifications to help reduce or maintain a healthy lifestyle.  Reviewed that the [facility] team is not available for emergencies and instructed the patient to call 911 or " [facility] ([phone] or [phone]) if one arises.

## 2018-11-19 RX ORDER — ZOLPIDEM TARTRATE 5 MG/1
TABLET ORAL
Qty: 30 TABLET | Refills: 3 | Status: SHIPPED | OUTPATIENT
Start: 2018-11-19 | End: 2019-07-19 | Stop reason: SDUPTHER

## 2018-11-26 ENCOUNTER — TELEPHONE (OUTPATIENT)
Dept: INTERNAL MEDICINE | Facility: CLINIC | Age: 83
End: 2018-11-26

## 2018-11-26 DIAGNOSIS — R11.0 NAUSEA: Primary | ICD-10-CM

## 2018-11-26 NOTE — TELEPHONE ENCOUNTER
Returned patients call states she feels nauseated all the time. States she feels like she wakes up with nausea and goes to bed with nausea.   She state she doesn't dwell on it but is never looking forward to next meal.   Do you think she would benefit from seeing GI doctor if so could you place referral. States she is taking her Nexium daily.   Thanks Ivana

## 2018-11-28 ENCOUNTER — TELEPHONE (OUTPATIENT)
Dept: INTERNAL MEDICINE | Facility: CLINIC | Age: 83
End: 2018-11-28

## 2018-11-28 NOTE — TELEPHONE ENCOUNTER
Called patient to inform her Dr. Treadwell had placed the referral for GI, request call back to give information.  ..Ivana Fernandez RN HC

## 2018-11-30 NOTE — TELEPHONE ENCOUNTER
Called patient to let her know Dr. Vizcaino placed referral for her GI appt. For her nausea.   She also states she had a small tinge of pink in toilet and she has had hemoroids in the past. States she is watching that hasn't had any further incident.  Will have checkout schedule her appt. Voiced understanding.   ..Ivana Fernandez RN HC

## 2018-12-04 ENCOUNTER — CLINICAL SUPPORT (OUTPATIENT)
Dept: INTERNAL MEDICINE | Facility: CLINIC | Age: 83
End: 2018-12-04
Payer: MEDICARE

## 2018-12-04 VITALS
WEIGHT: 127.75 LBS | BODY MASS INDEX: 22.63 KG/M2 | SYSTOLIC BLOOD PRESSURE: 130 MMHG | DIASTOLIC BLOOD PRESSURE: 66 MMHG

## 2018-12-04 PROCEDURE — 99999 PR PBB SHADOW E&M-EST. PATIENT-LVL I: CPT | Mod: PBBFAC,,,

## 2018-12-04 NOTE — PROGRESS NOTES
Health  Follow-Up Note   [x] Office  [] Phone  Notes from previous session reviewed.   [x] Previous Session Goals unchanged.   [] Patient/Caregiver Change in Goals.  Goals added or changed by Patient/Caregiver since program participation:  1. Continue same plan  2. See GI doctor in Feb    3. Message sent to Dr. Cartagena staff per instruction for nurse BP check in 3 months which will be in Jan when she has her BMP also done.        Additional/Changed support that patient/caregiver has experienced/sought?  (Indicate readiness, support from family, friends, others, community groups, etc)  1.  Family    Additional/Changed obstacles that could prevent patient/caregiver from reaching their goals?  1.  Holidays    Feedback provided:  1.  Praised for great job and continued effort     Diagnostic values/Desriptors for follow-up as needed for chronic condition(s)   Weight: 57.95 kg 127.76 lb  /66 in office today.     Interventions:   1. Health  listened reflectively, validated thoughts and feelings, offered support and encouragement.   2. Allowed patient to express themselves in a non-biased atmosphere.  3. Health  assisted pt to problem-solve obstacles such as being in a challenging environment and dealing with these challenges.   4. Motivational Interviewed interventions utilized (OARS).   5. Patient responded favorably to interventions and remained actively engaged in the session.   6. Health  will remain available and connected for patient by phone and/or office visits.   7. Positive reinforcement, emotional support and encouragement provided.   8. Focused Education: MI, alternative options     Plan:  [x] Pt will work on goals as stated above.   [x] Pt will contact Health  for any questions, concerns or needs.  [x] Pt will follow up with Health  in office on   2/11/19 at 1100.     [] Pt will follow up with Health  on phone in:        [x] Health  will remain available.   [] Health   will contact patient by phone in:        [] Health  will consult:        [] Health  will inform Provider via EPIC messaging.     Impression:  1. Behavior is consistent with   Action    Stage of Change.   2. Participation level:       [x] Receptive      [x] Interactive      [] Guarded and Resistant      [x] Self Motivated      [] Refused/Declined to participate   3. [x] Pt voiced understanding of all information presented.       [] Pt voiced needing further information/education. This will be arranged.       [x] Pt would benefit from further education/information as identified by this health . This will be arranged.     Ivana Fernandez RN HC

## 2018-12-10 ENCOUNTER — PATIENT OUTREACH (OUTPATIENT)
Dept: OTHER | Facility: OTHER | Age: 83
End: 2018-12-10

## 2018-12-10 NOTE — PROGRESS NOTES
Last 5 Patient Entered Readings                                      Current 30 Day Average: 148/69     Recent Readings 12/9/2018 12/7/2018 12/6/2018 12/5/2018 12/4/2018    SBP (mmHg) 156 152 151 132 136    DBP (mmHg) 67 69 64 64 69    Pulse 61 61 54 64 63        Finds it difficult to find the time to relax long enough before a reading. Suggested taking readings in the evenings, or waiting 30 minutes after a meal    Digital Medicine: Health  Follow Up    Lifestyle Modifications:    1.Dietary Modifications (Sodium intake <2,000mg/day, food labels, dining out):    Continues to have a poor appetite- forces herself to eat. Patient has persistent nausea and states that nobody has been able to tell her why. Has schedule GI appt in February. Patient wonders about if any of her medications have side effects     2.Physical Activity:     3.Medication Therapy: Patient has been compliant with the medication regimen.    4.Patient has the following medication side effects/concerns:   (Frequency/Alleviating factors/Precipitating factors, etc.)     Follow up with Ms. Elenlaura Peters completed. No further questions or concerns. Will continue to follow up to achieve health goals.

## 2018-12-17 ENCOUNTER — PATIENT OUTREACH (OUTPATIENT)
Dept: OTHER | Facility: OTHER | Age: 83
End: 2018-12-17

## 2018-12-17 DIAGNOSIS — I10 HTN (HYPERTENSION), BENIGN: Primary | ICD-10-CM

## 2018-12-17 RX ORDER — IRBESARTAN 300 MG/1
300 TABLET ORAL NIGHTLY
Qty: 30 TABLET | Refills: 3 | Status: SHIPPED | OUTPATIENT
Start: 2018-12-17 | End: 2018-12-27

## 2018-12-17 NOTE — PROGRESS NOTES
"Last 5 Patient Entered Readings                                      Current 30 Day Average: 152/70     Recent Readings 12/16/2018 12/14/2018 12/14/2018 12/14/2018 12/12/2018    SBP (mmHg) 165 150 155 175 169    DBP (mmHg) 71 68 71 81 76    Pulse 56 62 63 61 60        Called patient to introduce her to the Hypertension Digital Medicine Program.     Ms. Peters's BP average is above goal. She is unsure of what is contributing to higher BP, other than "older age." She does not eat much due to GI issues. Will stop lisinopril 40 mg QD and replace it with irbesartan 300 mg QD. She requests this go through [facility] mail order, so she will continue taking lisinopril until she receives irbesartan.     Reviewed patient's medications and verified allergies on file.     Hypertension Medications             carvedilol (COREG) 6.25 MG tablet Take 1 tablet (6.25 mg total) by mouth 2 (two) times daily with meals. One by mouth twice daily or as directed    irbesartan (AVAPRO) 300 MG tablet Take 1 tablet (300 mg total) by mouth every evening.    nitroGLYCERIN (NITROSTAT) 0.4 MG SL tablet Place 1 tablet (0.4 mg total) under the tongue every 5 (five) minutes as needed for Chest pain.        Explained that we expect her to obtain several blood pressures/week at random times of day. Also asked that the BP be taken at least 1 hour after taking BP medications.     Explained that our goal is to get her BP to consistently below 130/80mmHg.     Patient and I agreed that the patient will take her BP daily to every other day at varying times of the day.     Emailed patient link to [facility] webpage as well as my direct phone number in case she has any questions.         "

## 2018-12-26 ENCOUNTER — TELEPHONE (OUTPATIENT)
Dept: INTERNAL MEDICINE | Facility: CLINIC | Age: 83
End: 2018-12-26

## 2018-12-26 NOTE — TELEPHONE ENCOUNTER
----- Message from Antonia Hansen sent at 12/26/2018  9:58 AM CST -----  Contact: Patient [phone] or cell [phone]      ----- Message -----  From: Ivana Castillo  Sent: 12/26/2018   9:05 AM  To: Mile Fernandez Staff    Patient would like to get medical advice.    Symptoms (please be specific):  Extreme Nausea which is worse since changing medications - irbesartan (AVAPRO) 300 MG tablet    How long has patient had these symptoms:  3 days    Pharmacy name and phone # [facility] - [place], [place] - [address] [facility] [phone] (Phone) [phone] (Fax)        Comment Patient is not interested in seeing another physician.

## 2018-12-27 ENCOUNTER — PATIENT OUTREACH (OUTPATIENT)
Dept: OTHER | Facility: OTHER | Age: 83
End: 2018-12-27

## 2018-12-27 DIAGNOSIS — I10 HTN (HYPERTENSION), BENIGN: Primary | ICD-10-CM

## 2018-12-27 RX ORDER — IRBESARTAN 300 MG/1
150 TABLET ORAL NIGHTLY
Qty: 30 TABLET | Refills: 3
Start: 2018-12-27 | End: 2018-12-31

## 2018-12-27 NOTE — PROGRESS NOTES
Last 5 Patient Entered Readings                                      Current 30 Day Average: 152/70     Recent Readings 12/26/2018 12/25/2018 12/24/2018 12/23/2018 12/22/2018    SBP (mmHg) 157 159 147 171 133    DBP (mmHg) 70 71 61 74 68    Pulse 69 60 70 57 62        Mrs. Peters is experiencing more nausea since starting irbesartan. She does have nausea everyday, but feels the past 3-4 days have been worse. She cannot eat due to the nausea. She did not take any irbesartan last night. Will reduce dose to 150 mg QPM to see if she tolerates this better.     Will continue to monitor regularly. Will follow up in 2-3 weeks, sooner if BP begins to trend upward or downward.    Patient has my contact information and knows to call with any concerns or clinical changes.     Current HTN regimen:  Hypertension Medications             carvedilol (COREG) 6.25 MG tablet Take 1 tablet (6.25 mg total) by mouth 2 (two) times daily with meals. One by mouth twice daily or as directed    irbesartan (AVAPRO) 300 MG tablet Take 0.5 tablets (150 mg total) by mouth every evening.    nitroGLYCERIN (NITROSTAT) 0.4 MG SL tablet Place 1 tablet (0.4 mg total) under the tongue every 5 (five) minutes as needed for Chest pain.

## 2018-12-31 ENCOUNTER — TELEPHONE (OUTPATIENT)
Dept: INTERNAL MEDICINE | Facility: CLINIC | Age: 83
End: 2018-12-31

## 2018-12-31 ENCOUNTER — PATIENT OUTREACH (OUTPATIENT)
Dept: OTHER | Facility: OTHER | Age: 83
End: 2018-12-31

## 2018-12-31 DIAGNOSIS — R07.9 CHEST PAIN DUE TO GERD: ICD-10-CM

## 2018-12-31 DIAGNOSIS — K21.9 CHEST PAIN DUE TO GERD: ICD-10-CM

## 2018-12-31 DIAGNOSIS — I10 HTN (HYPERTENSION), BENIGN: ICD-10-CM

## 2018-12-31 DIAGNOSIS — R07.9 CHEST PAIN, UNSPECIFIED TYPE: ICD-10-CM

## 2018-12-31 RX ORDER — ESOMEPRAZOLE MAGNESIUM 40 MG/1
40 CAPSULE, DELAYED RELEASE ORAL
Qty: 90 CAPSULE | Refills: 3 | Status: CANCELLED | OUTPATIENT
Start: 2018-12-31 | End: 2019-12-31

## 2018-12-31 RX ORDER — IRBESARTAN 75 MG/1
75 TABLET ORAL NIGHTLY
Qty: 30 TABLET | Refills: 3
Start: 2018-12-31 | End: 2019-03-18 | Stop reason: DRUGHIGH

## 2018-12-31 NOTE — TELEPHONE ENCOUNTER
----- Message from Hal Salmon sent at 12/31/2018 12:28 PM CST -----  Contact: [phone]  Type: Rx    Name of medication(s):  esomeprazole (NEXIUM) 40 MG capsule    Is this a refill? New rx? Refill      Who prescribed medication?    Pharmacy Name, Phone, & Location: [facility]     Comments: please call and advise, Thanks

## 2018-12-31 NOTE — PROGRESS NOTES
Last 5 Patient Entered Readings                                      Current 30 Day Average: 150/70     Recent Readings 12/30/2018 12/29/2018 12/28/2018 12/27/2018 12/26/2018    SBP (mmHg) 132 124 164 173 157    DBP (mmHg) 65 60 81 77 70    Pulse 68 71 64 59 69        Ms. Olivarez BP has started to improve the past 2 days. She continues to experience nausea, worse than usual, even on lower dose of irbesartan. She will call Dr. Treadwell's office to see if she can get a PRN prescription for the nausea. Will reduce irbesartan to 75 mg QPM starting tonight.     Will continue to monitor regularly. Will follow up in 1-2 weeks, sooner if BP begins to trend upward or downward.    Patient has my contact information and knows to call with any concerns or clinical changes.     Current HTN regimen:  Hypertension Medications             carvedilol (COREG) 6.25 MG tablet Take 1 tablet (6.25 mg total) by mouth 2 (two) times daily with meals. One by mouth twice daily or as directed    irbesartan (AVAPRO) 75 MG tablet Take 1 tablet (75 mg total) by mouth every evening.    nitroGLYCERIN (NITROSTAT) 0.4 MG SL tablet Place 1 tablet (0.4 mg total) under the tongue every 5 (five) minutes as needed for Chest pain.

## 2019-01-02 ENCOUNTER — OFFICE VISIT (OUTPATIENT)
Dept: PODIATRY | Facility: CLINIC | Age: 84
End: 2019-01-02
Payer: MEDICARE

## 2019-01-02 VITALS — BODY MASS INDEX: 22.5 KG/M2 | HEIGHT: 63 IN | WEIGHT: 127 LBS

## 2019-01-02 DIAGNOSIS — B35.1 ONYCHOMYCOSIS DUE TO DERMATOPHYTE: Primary | ICD-10-CM

## 2019-01-02 PROCEDURE — 99999 PR PBB SHADOW E&M-EST. PATIENT-LVL III: ICD-10-PCS | Mod: PBBFAC,,,

## 2019-01-02 PROCEDURE — 1101F PR PT FALLS ASSESS DOC 0-1 FALLS W/OUT INJ PAST YR: ICD-10-PCS | Mod: CPTII,S$GLB,,

## 2019-01-02 PROCEDURE — 99999 PR PBB SHADOW E&M-EST. PATIENT-LVL III: CPT | Mod: PBBFAC,,,

## 2019-01-02 PROCEDURE — 99213 PR OFFICE/OUTPT VISIT, EST, LEVL III, 20-29 MIN: ICD-10-PCS | Mod: S$GLB,,,

## 2019-01-02 PROCEDURE — 1101F PT FALLS ASSESS-DOCD LE1/YR: CPT | Mod: CPTII,S$GLB,,

## 2019-01-02 PROCEDURE — 99213 OFFICE O/P EST LOW 20 MIN: CPT | Mod: S$GLB,,,

## 2019-01-02 NOTE — PROGRESS NOTES
Subjective:       Patient ID: Elen Peters is a 89 y.o. female.    Chief Complaint: Follow-up (Nail Fungus, Left foot)    HPI  Elen is a 89 y.o. female who presents to the clinic complaining of thick and discolored toenails on the left foot, states the great toenail fell off and is now yellow and thick. Elen is inquiring about treatment options.      Past Medical History:   Diagnosis Date    Abnormal stress test 11/18/2015    Arthritis     Bladder cancer     Cataract     Coronary artery disease involving native coronary artery 1/6/2016    Depression     GERD (gastroesophageal reflux disease)     HTN (hypertension), benign 11/18/2015    Hx of bladder infections     Hyperlipemia     OP (osteoporosis)     Positive cardiac stress test 1/6/2016    Renal cancer     Syncope 1/6/2016    Upper back pain 12/1/2015    Ureteral cancer     Venous insufficiency 2017    Villous adenoma of colon 5/31/2013       Past Surgical History:   Procedure Laterality Date    APPENDECTOMY      BACK SURGERY      CATARACT EXTRACTION W/  INTRAOCULAR LENS IMPLANT Left 3/16/15    kristy    CATARACT EXTRACTION W/  INTRAOCULAR LENS IMPLANT Right 4/6/15    kristy    COLONOSCOPY  10/21/2013    COLONOSCOPY N/A 10/21/2013    Performed by [facility] at [facility] (4[facility])    CYSTOSCOPY      EYE SURGERY      INSERTION-INTRAOCULAR LENS (IOL) Right 4/6/2015    [facility]    INSERTION-INTRAOCULAR LENS (IOL) Left 3/16/2015    [facility]    NEPHRECTOMY      L    PHACOEMULSIFICATION-ASPIRATION-CATARACT Right 4/6/2015    [facility]    PHACOEMULSIFICATION-ASPIRATION-CATARACT Left 3/16/2015    [facility]    SPINE SURGERY      TONSILLECTOMY, ADENOIDECTOMY         Family History   Problem Relation Age of Onset    Leukemia Mother     Heart disease Mother     Heart attack Mother     Cancer Mother          leukemia    Goiter Mother     Leukemia Father     Cancer Father         leukemia    Heart disease Maternal Grandfather     No Known Problems Brother     No Known Problems Son     No Known Problems Son     No Known Problems Son     Cancer Son         throat    No Known Problems Son     No Known Problems Son     No Known Problems Maternal Aunt     No Known Problems Maternal Uncle     No Known Problems Paternal Aunt     No Known Problems Paternal Uncle     No Known Problems Maternal Grandmother     No Known Problems Paternal Grandmother     No Known Problems Paternal Grandfather     No Known Problems Sister     Amblyopia Neg Hx     Blindness Neg Hx     Cataracts Neg Hx     Diabetes Neg Hx     Glaucoma Neg Hx     Hypertension Neg Hx     Macular degeneration Neg Hx     Retinal detachment Neg Hx     Strabismus Neg Hx     Stroke Neg Hx     Thyroid disease Neg Hx     Breast cancer Neg Hx        Social History     Socioeconomic History    Marital status:      Spouse name: None    Number of children: None    Years of education: None    Highest education level: None   Social Needs    Financial resource strain: None    Food insecurity - worry: None    Food insecurity - inability: None    Transportation needs - medical: None    Transportation needs - non-medical: None   Occupational History    Occupation: retired   Tobacco Use    Smoking status: Never Smoker    Smokeless tobacco: Never Used   Substance and Sexual Activity    Alcohol use: No    Drug use: No    Sexual activity: Not Currently   Other Topics Concern    Are you pregnant or think you may be? Not Asked    Breast-feeding Not Asked   Social History Narrative    None       Current Outpatient Medications   Medication Sig Dispense Refill    aspirin (ECOTRIN) 81 MG EC tablet Take 81 mg by mouth once daily.      atorvastatin (LIPITOR) 40 MG tablet TAKE 1 TABLET EVERY DAY OR AS DIRECTED (Patient taking  differently: TAKE 1 TABLET EVERY DAY OR AS DIRECTED - 1/2 PILL EVERY OTHER DAY) 90 tablet 3    calcium-magnesium-zinc Tab Take 2 tablets by mouth once daily at 6am. 1 Tablet Oral Every day      carvedilol (COREG) 6.25 MG tablet Take 1 tablet (6.25 mg total) by mouth 2 (two) times daily with meals. One by mouth twice daily or as directed 180 tablet 3    ciprofloxacin HCl (CILOXAN) 0.3 % ophthalmic solution One drop both eyes every 2 hours for two days, then every 4 hours x 5 days. 240 drop 0    citalopram (CELEXA) 20 MG tablet Take 1 tablet (20 mg total) by mouth once daily. 90 tablet 3    esomeprazole (NEXIUM) 40 MG capsule Take 1 capsule (40 mg total) by mouth before breakfast. 90 capsule 3    irbesartan (AVAPRO) 75 MG tablet Take 1 tablet (75 mg total) by mouth every evening. 30 tablet 3    mv-min-folic acid-lutein (THERAGRAN-M ADVANCED 50 PLUS) 0.4-250 mg-mcg Tab Take by mouth. 1 Tablet Oral Every day      omega-3 fatty acids 1,000 mg Cap Take 1 capsule by mouth once daily. 2  Capsule Oral Every day      zolpidem (AMBIEN) 5 MG Tab TAKE ONE TABLET BY MOUTH NIGHTLY 30 tablet 3    efinaconazole (JUBLIA) 10 % Addison Apply 1 application topically once daily. 8 mL 11    nitroGLYCERIN (NITROSTAT) 0.4 MG SL tablet Place 1 tablet (0.4 mg total) under the tongue every 5 (five) minutes as needed for Chest pain. 25 tablet 3     No current facility-administered medications for this visit.        Review of patient's allergies indicates:   Allergen Reactions    Sulfa (sulfonamide antibiotics)      Unknown as child    Amlodipine Swelling    Codeine      Other reaction(s): Unknown    Maxzide [triamterene-hydrochlorothiazid]        Review of Systems  ROS:  Constitution: Negative for chills, fever, weakness and malaise/fatigue.   HEENT: Negative for headaches.   Cardiovascular: Negative for chest pain and claudication.   Respiratory: Negative for cough and shortness of breath.   Musculoskeletal: - foot pain.  Negative  for muscle cramps and muscle weakness.   Gastrointestinal: Negative for nausea and vomiting.   Neurological: Negative for numbness and paresthesias.   Dermatological: Negative for wound.        Objective:      Physical Exam  Constitutional:  Patient is oriented to person, place, and time. Vital signs are normal.  Appears well-developed and well-nourished.     Vascular:  Dorsalis pedis pulses are 2+ on the right side, and 2+ on the left side.   Posterior tibial pulses are 2+ on the right side, and 2+ on the left side.   + digital hair growth, capillary fill time to all toes <3 seconds, no swelling    Skin/Dermatological:  Skin is warm and intact.  No cyanosis or clubbing.  No rashes noted.  No open wounds.   B/l great thickened, yellow and discolored with subungual debris.     Musculoskeletal:      Full unrestricted range of motion of midtarsal, subtalar joints.  (--)   restriction of ankle joint dorsiflexion or plantarflexion.   Forefoot to rearfoot well  aligned.   Negative anterior drawer and talar tilt, squeeze test.  No tenderness to   ankle/pedal tendons or ligaments.  No crepitus. No assymmetries.        Neurological:  No deficits to sharp/dull, light touch or vibratory sensation.   Muscle strength to tibialis anterior, extensor hallucis longus, extensor digitorum longus, peroneal muscles, flexor hallucis/digotorum longus, posterior tibial and gastrosoleal complex is 5/5, normal tone without assymmetry   Patellar reflexes are 2+ on the right side and 2+ on the left side.  Achilles reflexes are 2+ on the right side and 2+ on the left side.          Assessment:       1. Onychomycosis due to dermatophyte        Plan:       Onychomycosis due to dermatophyte  -     efinaconazole (JUBLIA) 10 % Addison; Apply 1 application topically once daily.  Dispense: 8 mL; Refill: 11          Onychomycosis:Keep feet clean and dry.  Vinegar soaks weekly (2 parts vinegar/1 part warm water).   Avoid cotton socks.  Powders to shoes,  antiperspirants to feet daily.  Lysol spray to shoes twice weekly.   Kerasal to left great toenail daily.  RTC 3 months.

## 2019-01-04 ENCOUNTER — OFFICE VISIT (OUTPATIENT)
Dept: GASTROENTEROLOGY | Facility: CLINIC | Age: 84
End: 2019-01-04
Payer: MEDICARE

## 2019-01-04 ENCOUNTER — HOSPITAL ENCOUNTER (EMERGENCY)
Facility: HOSPITAL | Age: 84
Discharge: HOME | End: 2019-01-04
Attending: EMERGENCY MEDICINE
Payer: MEDICARE

## 2019-01-04 VITALS
SYSTOLIC BLOOD PRESSURE: 150 MMHG | WEIGHT: 126 LBS | HEIGHT: 63 IN | DIASTOLIC BLOOD PRESSURE: 65 MMHG | BODY MASS INDEX: 22.32 KG/M2 | HEART RATE: 62 BPM

## 2019-01-04 VITALS
BODY MASS INDEX: 23.03 KG/M2 | SYSTOLIC BLOOD PRESSURE: 173 MMHG | OXYGEN SATURATION: 95 % | HEART RATE: 59 BPM | WEIGHT: 130 LBS | DIASTOLIC BLOOD PRESSURE: 74 MMHG | RESPIRATION RATE: 18 BRPM | TEMPERATURE: 99 F

## 2019-01-04 DIAGNOSIS — R11.0 NAUSEA: Primary | ICD-10-CM

## 2019-01-04 DIAGNOSIS — S09.90XA CLOSED HEAD INJURY, INITIAL ENCOUNTER: ICD-10-CM
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
## 2019-01-04 DIAGNOSIS — R63.0 DECREASED APPETITE: ICD-10-CM

## 2019-01-04 DIAGNOSIS — S01.01XA LACERATION OF SCALP, INITIAL ENCOUNTER: ICD-10-CM

## 2019-01-04 DIAGNOSIS — R68.81 EARLY SATIETY: ICD-10-CM

## 2019-01-04 PROCEDURE — 99204 OFFICE O/P NEW MOD 45 MIN: CPT | Mod: S$GLB,,, | Performed by: INTERNAL MEDICINE

## 2019-01-04 PROCEDURE — 99284 PR EMERGENCY DEPT VISIT,LEVEL IV: ICD-10-PCS | Mod: 25,,, | Performed by: PHYSICIAN ASSISTANT

## 2019-01-04 PROCEDURE — 96374 THER/PROPH/DIAG INJ IV PUSH: CPT

## 2019-01-04 PROCEDURE — 1101F PT FALLS ASSESS-DOCD LE1/YR: CPT | Mod: CPTII,S$GLB,, | Performed by: INTERNAL MEDICINE

## 2019-01-04 PROCEDURE — 12001 RPR S/N/AX/GEN/TRNK 2.5CM/<: CPT | Mod: ,,, | Performed by: PHYSICIAN ASSISTANT

## 2019-01-04 PROCEDURE — 99999 PR PBB SHADOW E&M-EST. PATIENT-LVL III: CPT | Mod: PBBFAC,,, | Performed by: INTERNAL MEDICINE

## 2019-01-04 PROCEDURE — 99284 EMERGENCY DEPT VISIT MOD MDM: CPT | Mod: 25

## 2019-01-04 PROCEDURE — 99999 PR PBB SHADOW E&M-EST. PATIENT-LVL III: ICD-10-PCS | Mod: PBBFAC,,, | Performed by: INTERNAL MEDICINE

## 2019-01-04 PROCEDURE — 63600175 PHARM REV CODE 636 W HCPCS: Performed by: PHYSICIAN ASSISTANT

## 2019-01-04 PROCEDURE — 12001 RPR S/N/AX/GEN/TRNK 2.5CM/<: CPT

## 2019-01-04 PROCEDURE — 25000003 PHARM REV CODE 250: Performed by: PHYSICIAN ASSISTANT

## 2019-01-04 PROCEDURE — 1101F PR PT FALLS ASSESS DOC 0-1 FALLS W/OUT INJ PAST YR: ICD-10-PCS | Mod: CPTII,S$GLB,, | Performed by: INTERNAL MEDICINE

## 2019-01-04 PROCEDURE — 99284 EMERGENCY DEPT VISIT MOD MDM: CPT | Mod: 25,,, | Performed by: PHYSICIAN ASSISTANT

## 2019-01-04 PROCEDURE — 12001 PR RESUPERF WND BODY <2.5CM: ICD-10-PCS | Mod: ,,, | Performed by: PHYSICIAN ASSISTANT

## 2019-01-04 PROCEDURE — 99204 PR OFFICE/OUTPT VISIT, NEW, LEVL IV, 45-59 MIN: ICD-10-PCS | Mod: S$GLB,,, | Performed by: INTERNAL MEDICINE

## 2019-01-04 RX ORDER — ACETAMINOPHEN 325 MG/1
650 TABLET ORAL
Status: COMPLETED | OUTPATIENT
Start: 2019-01-04 | End: 2019-01-04

## 2019-01-04 RX ORDER — BACITRACIN 500 [USP'U]/G
OINTMENT TOPICAL
Status: COMPLETED | OUTPATIENT
Start: 2019-01-04 | End: 2019-01-04

## 2019-01-04 RX ORDER — ONDANSETRON 2 MG/ML
4 INJECTION INTRAMUSCULAR; INTRAVENOUS
Status: COMPLETED | OUTPATIENT
Start: 2019-01-04 | End: 2019-01-04

## 2019-01-04 RX ORDER — BACITRACIN ZINC 500 UNIT/G
1 OINTMENT (GRAM) TOPICAL
Status: DISCONTINUED | OUTPATIENT
Start: 2019-01-04 | End: 2019-01-04

## 2019-01-04 RX ADMIN — BACITRACIN: 500 OINTMENT TOPICAL at 01:01

## 2019-01-04 RX ADMIN — ACETAMINOPHEN 650 MG: 325 TABLET ORAL at 11:01

## 2019-01-04 RX ADMIN — ONDANSETRON 4 MG: 2 INJECTION INTRAMUSCULAR; INTRAVENOUS at 11:01

## 2019-01-04 NOTE — ED PROVIDER NOTES
"Encounter Date: 1/4/2019    SCRIBE #1 NOTE: I, Barry Christianson, am scribing for, and in the presence of,  Anita Hernandez MD. I have scribed the following portions of the note - the APC attestation. Other sections scribed: Dispo.       History     Chief Complaint   Patient presents with    Fall     missed step, fall down 1 step. Hit back of head. No LOC. Not on blood thinners. AAOx3. Pt reports fall due to bilateral leg weakness and wet floor.      89-year-old female presents to the ED for evaluation after a fall.  Patient states the elevator in her building was broken so she took 14 flights of stairs to exit her building.  Patient was on one of the last steps when she slipped on some water on the landing.  Patient fell backwards striking the right side of her head.  Patient denies LOC.  She reports some mild discomfort in her neck.  Patient notes nausea, but reports that she stays nauseated" on a regular basis. She denies headache, vomiting, numbness or tingling, extremity weakness. Patient was able to ambulate after the fall.  She denies hip or back pain or injury to the upper body.          Review of patient's allergies indicates:   Allergen Reactions    Sulfa (sulfonamide antibiotics)      Unknown as child    Amlodipine Swelling    Codeine      Other reaction(s): Unknown    Maxzide [triamterene-hydrochlorothiazid]      Past Medical History:   Diagnosis Date    Abnormal stress test 11/18/2015    Arthritis     Bladder cancer     Cataract     Coronary artery disease involving native coronary artery 1/6/2016    Depression     GERD (gastroesophageal reflux disease)     HTN (hypertension), benign 11/18/2015    Hx of bladder infections     Hyperlipemia     OP (osteoporosis)     Positive cardiac stress test 1/6/2016    Renal cancer     Syncope 1/6/2016    Upper back pain 12/1/2015    Ureteral cancer     Venous insufficiency 2017    Villous adenoma of colon 5/31/2013     Past Surgical History: "   Procedure Laterality Date    APPENDECTOMY      BACK SURGERY      CATARACT EXTRACTION W/  INTRAOCULAR LENS IMPLANT Left 3/16/15    kristy    CATARACT EXTRACTION W/  INTRAOCULAR LENS IMPLANT Right 4/6/15    kristy    COLONOSCOPY  10/21/2013    COLONOSCOPY N/A 10/21/2013    Performed by David [facility] at [facility] ([facility])    CYSTOSCOPY      EYE SURGERY      INSERTION-INTRAOCULAR LENS (IOL) Right 4/6/2015    [facility] at [facility]    INSERTION-INTRAOCULAR LENS (IOL) Left 3/16/2015    [facility] at [facility]    NEPHRECTOMY      L    PHACOEMULSIFICATION-ASPIRATION-CATARACT Right 4/6/2015    [facility] at [facility]    PHACOEMULSIFICATION-ASPIRATION-CATARACT Left 3/16/2015    [facility] at [facility]    SPINE SURGERY      TONSILLECTOMY, ADENOIDECTOMY       Family History   Problem Relation Age of Onset    Leukemia Mother     Heart disease Mother     Heart attack Mother     Cancer Mother         leukemia    Goiter Mother     Leukemia Father     Cancer Father         leukemia    Heart disease Maternal Grandfather     No Known Problems Brother     No Known Problems Son     No Known Problems Son     No Known Problems Son     Cancer Son         throat    No Known Problems Son     No Known Problems Son     No Known Problems Maternal Aunt     No Known Problems Maternal Uncle     No Known Problems Paternal Aunt     No Known Problems Paternal Uncle     No Known Problems Maternal Grandmother     No Known Problems Paternal Grandmother     No Known Problems Paternal Grandfather     No Known Problems Sister     Amblyopia Neg Hx     Blindness Neg Hx     Cataracts Neg Hx     Diabetes Neg Hx     Glaucoma Neg Hx     Hypertension Neg Hx     Macular degeneration Neg Hx     Retinal detachment Neg Hx     Strabismus Neg Hx     Stroke Neg Hx     Thyroid disease Neg Hx     Breast cancer Neg Hx     Colon cancer Neg Hx      Esophageal cancer Neg Hx      Social History     Tobacco Use    Smoking status: Never Smoker    Smokeless tobacco: Never Used   Substance Use Topics    Alcohol use: No    Drug use: No     Review of Systems   Constitutional: Negative for fever.   HENT: Negative for sore throat.    Respiratory: Negative for shortness of breath.    Cardiovascular: Negative for chest pain.   Gastrointestinal: Positive for nausea (?Chronic). Negative for vomiting.   Genitourinary: Negative for dysuria.   Musculoskeletal: Positive for neck pain. Negative for arthralgias and back pain.   Skin: Positive for wound. Negative for rash.   Neurological: Negative for weakness and numbness.   Hematological: Does not bruise/bleed easily.       Physical Exam     Initial Vitals [01/04/19 0946]   BP Pulse Resp Temp SpO2   (!) 145/71 66 18 99.2 °F (37.3 °C) 100 %      MAP       --         Physical Exam    Nursing note and vitals reviewed.  Constitutional: She appears well-developed and well-nourished. She is not diaphoretic.  Non-toxic appearance. She does not appear ill. No distress.   HENT:   Head: Normocephalic. Head is with laceration.   0.5cm Scalp laceration to the right parietal scalp with mild oozing of blood.  Small hematoma. No TTP of the jaw. No facial ecchymosis or contusion.    Neck: Normal range of motion. Neck supple.   Mild midline C-spine tenderness over C6-C7   Cardiovascular: Normal rate and regular rhythm. Exam reveals no gallop and no friction rub.    No murmur heard.  Pulmonary/Chest: Effort normal and breath sounds normal. No accessory muscle usage. No tachypnea. No respiratory distress. She has no decreased breath sounds. She has no wheezes. She has no rhonchi. She has no rales.   Abdominal: She exhibits no distension.   Neurological: She is alert. She has normal strength.   No extremity drift.  No facial droop.   Skin: No rash noted.   Psychiatric: She has a normal mood and affect. Her behavior is normal.         ED  Course   Lac Repair  Date/Time: 1/4/2019 1:12 PM  Performed by: Ela Whyte PA-C  Authorized by: Anita Hernandez MD   Body area: head/neck  Location details: scalp  Laceration length: 0.5 cm        Labs Reviewed - No data to display       Imaging Results          CT Cervical Spine Without Contrast (Final result)  Result time 01/04/19 12:07:12    Final result by Du Paniagua MD (01/04/19 12:07:12)                 Impression:      Mild cervical spondylosis without evidence of traumatic malalignment or fracture.    Electronically signed by resident: Cele Hayes  Date:    01/04/2019  Time:    11:22    Electronically signed by: Du Paniagua MD  Date:    01/04/2019  Time:    12:07             Narrative:    EXAMINATION:  CT CERVICAL SPINE WITHOUT CONTRAST    CLINICAL HISTORY:  fall, neck pain;    TECHNIQUE:  Low dose axial CT images through the cervical spine, with sagittal and coronal reformations.  Contrast was not administered.    COMPARISON:  No direct comparisons.  Correlation is made with a CT chest abdomen pelvis 05/06/2015    FINDINGS:  The vertebral bodies are normal in height and morphology without evidence of fracture or osseous destructive process.  Stable hemangioma in the T2 vertebral body.  Prominent Schmorl's node at C6.  Sclerotic lesion within the transverse process of T3, likely enostosis    Normal sagittal alignment is preserved.  No spondylolisthesis.    Mild degenerative changes without evidence of bony spinal canal stenosis.  Scattered neural foraminal encroachment from uncovertebral and facet hypertrophy.    Limited evaluation of the intraspinal contents demonstrates no hematoma or mass.    Paraspinal soft tissues exhibit no acute abnormalities.There is fibrotic change within the bilateral lung apices.                               CT Head Without Contrast (Final result)  Result time 01/04/19 12:05:02    Final result by Du Paniagua MD (01/04/19 12:05:02)                  Impression:      No evidence of hemorrhage or other acute intracranial pathology.    Electronically signed by resident: Cele Hayes  Date:    01/04/2019  Time:    11:18    Electronically signed by: Du Paniagua MD  Date:    01/04/2019  Time:    12:05             Narrative:    EXAMINATION:  CT HEAD WITHOUT CONTRAST    CLINICAL HISTORY:  fall, head trauma;    TECHNIQUE:  Low dose axial CT images obtained throughout the head without intravenous contrast. Sagittal and coronal reconstructions were performed.    COMPARISON:  CT head without 12/02/2017    FINDINGS:  Intracranial compartment:    Modest chronic microvascular ischemic change.  No parenchymal mass, hemorrhage, edema or major vascular distribution infarct.    Ventricles stable in size.  No hydrocephalus.    No extra-axial blood or fluid collections.    Skull/extracranial contents (limited evaluation):    No calvarial fracture.  Mastoid air cells and paranasal sinuses are essentially clear.                                 Medical Decision Making:   History:   Old Medical Records: I decided to obtain old medical records.  Differential Diagnosis:   My differential diagnosis includes but is not limited to:  SDH, epidural hematoma, laceration, contusion, C-spine fracture, sprain, strain  Clinical Tests:   Radiological Study: Ordered and Reviewed       APC / Resident Notes:   89-year-old female presents to the ED with head injury after a mechanical fall today.  Patient is alert and conversing normally.  Nonfocal neuro exam.  Small laceration noted to the right parietal scalp.  Slight oozing of blood.  Overlying hematoma.  There is some mild midline cervical spine tenderness.    CT head and cervical spine are unremarkable for acute abnormalities.  Laceration was repaired as detailed in the procedure note.  Patient discharged in stable condition with wound care instructions and follow up with PCP or return to the ER in approximately 10 days for staple removal.   Strict return precautions given. I have reviewed the patient's records and discussed this case with my supervising physician.         Scribe Attestation:   Scribe #1: I performed the above scribed service and the documentation accurately describes the services I performed. I attest to the accuracy of the note.    Attending Attestation:     Physician Attestation Statement for NP/PA:   I have conducted a face to face encounter with this patient in addition to the NP/PA, due to Medical Complexity    Other NP/PA Attestation Additions:    History of Present Illness: 89 y/o female with past medical history of osteoporosis presenting with right parietal contusion and laceration after mechanical fall. Pt reports she walked down 14 flights of stairs today after elevator was broken and slipped on puddle, falling, and striking right side of head with no LOC. Pt ambulated from scene, now complaining of mild right sided HA and fatigue in bilateral lower legs which she attributes to the walk down stairs and only developed at the end. Pt notes she has chronic nausea for which she has GI appointment for this afternoon. Denies change in baseline nausea, no vision changes or dizzines, no neck or back pain, no chest pain, or SOB.     Physical Exam: On exam pt is alert and oriented, normal recent and remote memory, no focal deficits, PERRL, EOMI, 1 cm lac on right parietal with bleeding controlled, no max face TTP, malocclusions, or oral lacerations. Pt notes she has pain radiating to right side of jaw from preauricular area, which she contributes to her chronic TMJ. No CLTL mid spine TTP, normal CSM/ROM extremities.   Medical Decision Making: Plan CT head and C spine given mechanism, clean and staple head lac, no TTP or malocclusion of jaw concerning for facial fracture. Pt is not on anti-coagulants. No concern for syncope If negative imaging, plan for discharge.    I discussed the patient's care with Advanced Practice Clinician, and  did see this patient with the APC.  I reviewed their note and agree with the history, physical, assessment, diagnosis, treatment, and admission plan provided by the Advanced Practice Clinician.                     Clinical Impression:   The primary encounter diagnosis was Fall, initial encounter. Diagnoses of Closed head injury, initial encounter and Laceration of scalp, initial encounter were also pertinent to this visit.      Disposition:   Disposition: Discharged  Condition: Stable                        Ela Whyte PA-C  01/05/19 0780       Anita Hernandez MD  01/18/19 1110

## 2019-01-04 NOTE — LETTER
January 4, 2019      Jim Treadwell MD  [address]  [place] [address]           [facility]  [address]  [place] [address]  Phone: [phone]  Fax: [phone]          Patient: Elen Peters   MR Number: [id number]   YOB: 1929   Date of Visit: 1/4/2019       Dear Dr. Jim Treadwell:    Thank you for referring Elen Peters to me for evaluation. Attached you will find relevant portions of my assessment and plan of care.    If you have questions, please do not hesitate to call me. I look forward to following Elen Peters along with you.    Sincerely,    Diony Dey MD    Enclosure  CC:  No Recipients    If you would like to receive this communication electronically, please contact [email or web address] or [phone] to request more information on [employer] Link access.    For providers and/or their staff who would like to refer a patient to [facility], please contact us through our one-stop-shop provider referral line, [facility], at [phone].    If you feel you have received this communication in error or would no longer like to receive these types of communications, please e-mail [email or web address]

## 2019-01-04 NOTE — PROGRESS NOTES
REASON FOR VISIT:  Chronic nausea, lack of appetite.    HISTORY OF PRESENT ILLNESS:  Ms. Peters is an 89-year-old who has been   complaining of longstanding history of nausea with decreasing appetite.  She   denies any dysphagia or odynophagia.  Does have history of acid reflux for which   she is on Nexium 40 mg daily.  She does take aspirin 81 mg and occasional   Aleve.  Denies any hematemesis, vomiting or black stool.  She denies any   abdominal pain on a regular basis.  Denies any fevers or chills.  She has lost   some weight, probably 3-4 pounds over the past one year or so.  Today, she had a   fall and had to go to the Emergency Room where a CT of the head and neck were   done, which were unremarkable.  She has a stitch on her scalp.  Otherwise, no   new complaints.    PAST MEDICAL, SURGICAL, SOCIAL AND FAMILY HISTORY:  Reviewed.    MEDICATIONS AND ALLERGIES:  Reviewed.    REVIEW OF SYSTEMS:  CONSTITUTIONAL:  No fever, no chills, no weight gain.  Appetite diminished,   early satiety.  EYES:  No visual changes.  ENT:  No odynophagia or hoarseness of voice.  CARDIOVASCULAR:  No angina or palpitation.  RESPIRATORY:  No shortness of breath or wheezing.  GENITOURINARY:  No dysuria or frequency.  MUSCULOSKELETAL:  No myalgia, no arthralgia.  SKIN:  No pruritus or eczema.  NEUROLOGIC:  No headache, no seizure.  PSYCHIATRIC:  No anxiety or depression.  GASTROINTESTINAL:  See HPI.    PHYSICAL EXAMINATION:  VITAL SIGNS:  See EPIC.  Awake, alert, oriented x3, in no acute distress.  NECK:  Supple.  No carotid bruit.  No cervical adenopathy.  ABDOMEN:  Scaphoid, soft, nontender, nondistended.  No mass palpable.  No   hepatosplenomegaly appreciated.  Bowel sounds are normal.  No abdominal bruits   heard.  EYES: Conjunctivae anicteric.  ENT:  Oropharynx, mucosa moist.  CARDIOVASCULAR:  S1, S2 normal.  RESPIRATORY:  Bilateral air entry equal.  SKIN:  No palmar erythema or spider angiomata.  NEUROLOGIC:  No  asterixis.  PSYCHIATRY:  Affect appropriate.  LOWER EXTREMITY:  No pedal edema.    IMPRESSION:  Chronic nausea in the setting of gastroesophageal reflux with   diminished appetite and early satiety.    RECOMMENDATIONS:  Proceed with EGD, if the investigation is unrevealing we will   proceed with CT of the abdomen and pelvis with p.o. and IV contrast.      ACT/HN  dd: 01/04/2019 15:50:00 (CST)  td: 01/05/2019 04:23:31 (CST)  Doc ID   #[id number]  Job ID #[id number]    CC:

## 2019-01-04 NOTE — ED NOTES
I assume care for this patient. Pt noted on AHALL1. Pt AAOx3, resting in bed. No acute distress noted. Respirations even and unlabored. No current complaints voiced other than being hungry. Pt ambulated to bathroom with assistance, placed back in bed. Dried blood cleaned off face and neck. Side rails up x2. Family at bedside. Updated on POC. Will continue to monitor.

## 2019-01-04 NOTE — H&P (VIEW-ONLY)
REASON FOR VISIT:  Chronic nausea, lack of appetite.    HISTORY OF PRESENT ILLNESS:  Ms. Peters is an 89-year-old who has been   complaining of longstanding history of nausea with decreasing appetite.  She   denies any dysphagia or odynophagia.  Does have history of acid reflux for which   she is on Nexium 40 mg daily.  She does take aspirin 81 mg and occasional   Aleve.  Denies any hematemesis, vomiting or black stool.  She denies any   abdominal pain on a regular basis.  Denies any fevers or chills.  She has lost   some weight, probably 3-4 pounds over the past one year or so.  Today, she had a   fall and had to go to the Emergency Room where a CT of the head and neck were   done, which were unremarkable.  She has a stitch on her scalp.  Otherwise, no   new complaints.    PAST MEDICAL, SURGICAL, SOCIAL AND FAMILY HISTORY:  Reviewed.    MEDICATIONS AND ALLERGIES:  Reviewed.    REVIEW OF SYSTEMS:  CONSTITUTIONAL:  No fever, no chills, no weight gain.  Appetite diminished,   early satiety.  EYES:  No visual changes.  ENT:  No odynophagia or hoarseness of voice.  CARDIOVASCULAR:  No angina or palpitation.  RESPIRATORY:  No shortness of breath or wheezing.  GENITOURINARY:  No dysuria or frequency.  MUSCULOSKELETAL:  No myalgia, no arthralgia.  SKIN:  No pruritus or eczema.  NEUROLOGIC:  No headache, no seizure.  PSYCHIATRIC:  No anxiety or depression.  GASTROINTESTINAL:  See HPI.    PHYSICAL EXAMINATION:  VITAL SIGNS:  See EPIC.  Awake, alert, oriented x3, in no acute distress.  NECK:  Supple.  No carotid bruit.  No cervical adenopathy.  ABDOMEN:  Scaphoid, soft, nontender, nondistended.  No mass palpable.  No   hepatosplenomegaly appreciated.  Bowel sounds are normal.  No abdominal bruits   heard.  EYES: Conjunctivae anicteric.  ENT:  Oropharynx, mucosa moist.  CARDIOVASCULAR:  S1, S2 normal.  RESPIRATORY:  Bilateral air entry equal.  SKIN:  No palmar erythema or spider angiomata.  NEUROLOGIC:  No  asterixis.  PSYCHIATRY:  Affect appropriate.  LOWER EXTREMITY:  No pedal edema.    IMPRESSION:  Chronic nausea in the setting of gastroesophageal reflux with   diminished appetite and early satiety.    RECOMMENDATIONS:  Proceed with EGD, if the investigation is unrevealing we will   proceed with CT of the abdomen and pelvis with p.o. and IV contrast.      ACT/HN  dd: 01/04/2019 15:50:00 (CST)  td: 01/05/2019 04:23:31 (CST)  Doc ID   #[id number]  Job ID #[id number]    CC:

## 2019-01-04 NOTE — ED NOTES
Escort placed for patient- pt states she has a 3 pm appt in GI. Pt placed in WR to wait for escort. Pt given sandwich and OJ per request, stated she was hungry. Remains free of any distress. Respirations remained even and unlabored.

## 2019-01-04 NOTE — ED NOTES
Patient assisted to restroom without incident.  Patient changed into gown and belongings bagged and labeled and placed on stretcher with pt.  Fall risk band placed on patient

## 2019-01-04 NOTE — ED NOTES
"Patient states she was at her condo and the elevator to go downstairs was broken so she had to take the stairs.  States she went down 14 stories and slipped on the last step in a puddle of water and because her "legs got tired".  Denies LOC.  States she hit her head on the concrete step.  Hematoma and laceration noted to right posterior head.  Bleeding noted.  Denies any other pain but c/o bilateral leg weakness.   "

## 2019-01-04 NOTE — DISCHARGE INSTRUCTIONS
Wash the area gently with soap and water or shampoo.  You may apply antibiotic ointment, but do not use for more than 2-3 days. Follow up with your primary care provider, any urgent care, or return to the ER in 10 days for staple removal and reevaluation of the wound. Return to the ER immediately for any signs of infection including swelling, redness, warmth, or pus draining from the wound; fevers >100.4, severe headache, severe nausea or vomiting, changes in mental status, or any other concerning symptoms

## 2019-01-04 NOTE — ED NOTES
Dr. Hernandez seeing patient in [facility]

## 2019-01-07 ENCOUNTER — TELEPHONE (OUTPATIENT)
Dept: INTERNAL MEDICINE | Facility: CLINIC | Age: 84
End: 2019-01-07

## 2019-01-07 NOTE — TELEPHONE ENCOUNTER
----- Message from Phu Michaels sent at 1/7/2019 12:03 PM CST -----  Contact: Patient [phone]  Sooner appointment than the  can schedule.  Did you offer to schedule the next available appointment and put the patient on the wait list?:  Yes Declined  When is the first available appointment: 04/17/19  What is the nature of the appointment: Staple removed Follow UP  What visit type: EP  Patient preference of timeframe to be scheduled:  After next 9 Days    Comments: patient requesting for a call back asap     Please call an advise  Thank you

## 2019-01-07 NOTE — TELEPHONE ENCOUNTER
Mrs Myrick is very upset she can never see you/also with the digital HTN program very confused about their role in all this/ etc etc/ she needs to have staple removed from the recent fall on 01/14/19. Declined another provider. You are not here on the 14 th/ any other time you can see her?

## 2019-01-07 NOTE — TELEPHONE ENCOUNTER
Left message for patient that Dr Treadwell can see her on Tuesday the 15 th @ 8:30 and that's the only thing he can offer her. If she excepts please have Francia override. Thank you

## 2019-01-11 ENCOUNTER — PATIENT OUTREACH (OUTPATIENT)
Dept: OTHER | Facility: OTHER | Age: 84
End: 2019-01-11

## 2019-01-11 NOTE — PROGRESS NOTES
Last 5 Patient Entered Readings                                      Current 30 Day Average: 157/71     Recent Readings 1/14/2019 1/14/2019 1/13/2019 1/12/2019 1/11/2019    SBP (mmHg) 179 178 167 150 147    DBP (mmHg) 74 79 78 69 68    Pulse 64 59 64 62 74        Mrs. Peters's BP has been higher the past few days. She confirms she is taking irbesartan 75 mg QPM and is tolerating it better now. Her BP in clinic today was 148/80. She has not charged her BP monitor since enrolling in [facility]. She will charge her monitor today and then check a BP reading. If readings remain elevated will try to increase irbesartan or carvedilol dose as tolerated.     Patient denies s/s of hypertension (SOB, CP, severe headaches, changes in vision) associated with high readings. Instructed patient to go to the ED if BP > 180/110 and accompanied by hypertensive s/s, patient confirms understanding.    Patient has my contact information and knows to call with any concerns or clinical changes.     Current HTN regimen:  Hypertension Medications             carvedilol (COREG) 6.25 MG tablet Take 1 tablet (6.25 mg total) by mouth 2 (two) times daily with meals. One by mouth twice daily or as directed    irbesartan (AVAPRO) 75 MG tablet Take 1 tablet (75 mg total) by mouth every evening.    nitroGLYCERIN (NITROSTAT) 0.4 MG SL tablet Place 1 tablet (0.4 mg total) under the tongue every 5 (five) minutes as needed for Chest pain.

## 2019-01-15 ENCOUNTER — OFFICE VISIT (OUTPATIENT)
Dept: INTERNAL MEDICINE | Facility: CLINIC | Age: 84
End: 2019-01-15
Payer: MEDICARE

## 2019-01-15 VITALS
BODY MASS INDEX: 22.19 KG/M2 | WEIGHT: 125.25 LBS | OXYGEN SATURATION: 99 % | DIASTOLIC BLOOD PRESSURE: 80 MMHG | SYSTOLIC BLOOD PRESSURE: 148 MMHG | HEART RATE: 55 BPM | HEIGHT: 63 IN

## 2019-01-15 DIAGNOSIS — S01.01XD LACERATION OF SCALP, SUBSEQUENT ENCOUNTER: Primary | ICD-10-CM

## 2019-01-15 DIAGNOSIS — F32.5 DEPRESSION, MAJOR, IN REMISSION: ICD-10-CM

## 2019-01-15 DIAGNOSIS — I70.0 AORTIC ATHEROSCLEROSIS: ICD-10-CM

## 2019-01-15 PROCEDURE — 99999 PR PBB SHADOW E&M-EST. PATIENT-LVL III: ICD-10-PCS | Mod: PBBFAC,,, | Performed by: INTERNAL MEDICINE

## 2019-01-15 PROCEDURE — 1101F PT FALLS ASSESS-DOCD LE1/YR: CPT | Mod: CPTII,S$GLB,, | Performed by: INTERNAL MEDICINE

## 2019-01-15 PROCEDURE — 99213 OFFICE O/P EST LOW 20 MIN: CPT | Mod: S$GLB,,, | Performed by: INTERNAL MEDICINE

## 2019-01-15 PROCEDURE — 1101F PR PT FALLS ASSESS DOC 0-1 FALLS W/OUT INJ PAST YR: ICD-10-PCS | Mod: CPTII,S$GLB,, | Performed by: INTERNAL MEDICINE

## 2019-01-15 PROCEDURE — 99213 PR OFFICE/OUTPT VISIT, EST, LEVL III, 20-29 MIN: ICD-10-PCS | Mod: S$GLB,,, | Performed by: INTERNAL MEDICINE

## 2019-01-15 PROCEDURE — 99999 PR PBB SHADOW E&M-EST. PATIENT-LVL III: CPT | Mod: PBBFAC,,, | Performed by: INTERNAL MEDICINE

## 2019-01-15 NOTE — PROGRESS NOTES
Subjective:       Patient ID: Elen Peters is a 89 y.o. female.    Chief Complaint: Suture / Staple Removal    Laceration    The incident occurred 5 to 7 days ago. The laceration is located on the scalp. The laceration is 1 cm in size. The laceration mechanism was a blunt object. The patient is experiencing no pain.     Review of Systems    Objective:      Physical Exam   Skin:            Assessment:       1. Laceration of scalp, subsequent encounter    2. Aortic atherosclerosis    3. Depression, major, in remission        Plan:       Elen was seen today for suture / staple removal.    Diagnoses and all orders for this visit:    Laceration of scalp, subsequent encounter    Aortic atherosclerosis  Comments:  cont regimen    Depression, major, in remission  Comments:  more anxiety recently        Follow-up in about 6 months (around 7/15/2019).

## 2019-01-22 ENCOUNTER — LAB VISIT (OUTPATIENT)
Dept: LAB | Facility: HOSPITAL | Age: 84
End: 2019-01-22
Attending: INTERNAL MEDICINE
Payer: MEDICARE

## 2019-01-22 ENCOUNTER — PATIENT OUTREACH (OUTPATIENT)
Dept: OTHER | Facility: OTHER | Age: 84
End: 2019-01-22

## 2019-01-22 DIAGNOSIS — I25.10 CORONARY ARTERY DISEASE INVOLVING NATIVE CORONARY ARTERY OF NATIVE HEART WITHOUT ANGINA PECTORIS: ICD-10-CM

## 2019-01-22 DIAGNOSIS — I10 HTN (HYPERTENSION), BENIGN: ICD-10-CM

## 2019-01-22 DIAGNOSIS — E78.2 MIXED HYPERLIPIDEMIA: ICD-10-CM

## 2019-01-22 LAB
ANION GAP SERPL CALC-SCNC: 6 MMOL/L
BUN SERPL-MCNC: 13 MG/DL
CALCIUM SERPL-MCNC: 9.9 MG/DL
CHLORIDE SERPL-SCNC: 100 MMOL/L
CO2 SERPL-SCNC: 29 MMOL/L
CREAT SERPL-MCNC: 0.8 MG/DL
EST. GFR  (AFRICAN AMERICAN): >60 ML/MIN/1.73 M^2
EST. GFR  (NON AFRICAN AMERICAN): >60 ML/MIN/1.73 M^2
GLUCOSE SERPL-MCNC: 90 MG/DL
POTASSIUM SERPL-SCNC: 4.3 MMOL/L
SODIUM SERPL-SCNC: 135 MMOL/L

## 2019-01-22 PROCEDURE — 36415 COLL VENOUS BLD VENIPUNCTURE: CPT | Mod: PO

## 2019-01-22 PROCEDURE — 80048 BASIC METABOLIC PNL TOTAL CA: CPT

## 2019-01-22 NOTE — PROGRESS NOTES
Last 5 Patient Entered Readings                                      Current 30 Day Average: 159/72     Recent Readings 1/21/2019 1/20/2019 1/19/2019 1/18/2019 1/17/2019    SBP (mmHg) 152 166 163 168 150    DBP (mmHg) 68 81 75 74 67    Pulse 61 64 67 67 64

## 2019-01-23 ENCOUNTER — PATIENT OUTREACH (OUTPATIENT)
Dept: OTHER | Facility: OTHER | Age: 84
End: 2019-01-23

## 2019-01-23 DIAGNOSIS — I10 HTN (HYPERTENSION), BENIGN: Primary | ICD-10-CM

## 2019-01-23 RX ORDER — CARVEDILOL 6.25 MG/1
12.5 TABLET ORAL 2 TIMES DAILY WITH MEALS
Qty: 180 TABLET | Refills: 3
Start: 2019-01-23 | End: 2019-03-18

## 2019-01-23 NOTE — PROGRESS NOTES
"Last 5 Patient Entered Readings                                      Current 30 Day Average: 158/72     Recent Readings 1/22/2019 1/21/2019 1/20/2019 1/19/2019 1/18/2019    SBP (mmHg) 154 152 166 163 168    DBP (mmHg) 69 68 81 75 74    Pulse 59 61 64 67 67        Ms. Peters's nausea and appetite have improved. She states she is "actually hungry today." BP remains elevated on current regimen. In order to prevent further GI upset, will avoid increasing irbesartan at this time. Will increase carvedilol to 12.5 mg BID as HR averages low-mid 60s.     Patient's BP average is above goal of <130/80.     Will continue to monitor regularly. Will follow up in 2-3 weeks, sooner if BP begins to trend upward or downward.    Patient has my contact information and knows to call with any concerns or clinical changes.     Current HTN regimen:  Hypertension Medications             carvedilol (COREG) 6.25 MG tablet Take 2 tablets (12.5 mg total) by mouth 2 (two) times daily with meals.     irbesartan (AVAPRO) 75 MG tablet Take 1 tablet (75 mg total) by mouth every evening.    nitroGLYCERIN (NITROSTAT) 0.4 MG SL tablet Place 1 tablet (0.4 mg total) under the tongue every 5 (five) minutes as needed for Chest pain.              "

## 2019-01-30 ENCOUNTER — TELEPHONE (OUTPATIENT)
Dept: GASTROENTEROLOGY | Facility: CLINIC | Age: 84
End: 2019-01-30

## 2019-01-30 ENCOUNTER — ANESTHESIA EVENT (OUTPATIENT)
Dept: ENDOSCOPY | Facility: HOSPITAL | Age: 84
End: 2019-01-30
Payer: MEDICARE

## 2019-01-30 ENCOUNTER — ANESTHESIA (OUTPATIENT)
Dept: ENDOSCOPY | Facility: HOSPITAL | Age: 84
End: 2019-01-30
Payer: MEDICARE

## 2019-01-30 ENCOUNTER — HOSPITAL ENCOUNTER (OUTPATIENT)
Facility: HOSPITAL | Age: 84
Discharge: HOME | End: 2019-01-30
Attending: INTERNAL MEDICINE | Admitting: INTERNAL MEDICINE
Payer: MEDICARE

## 2019-01-30 ENCOUNTER — NURSE TRIAGE (OUTPATIENT)
Dept: ADMINISTRATIVE | Facility: CLINIC | Age: 84
End: 2019-01-30

## 2019-01-30 VITALS
HEART RATE: 58 BPM | TEMPERATURE: 99 F | HEIGHT: 63 IN | RESPIRATION RATE: 18 BRPM | SYSTOLIC BLOOD PRESSURE: 185 MMHG | BODY MASS INDEX: 23.04 KG/M2 | OXYGEN SATURATION: 96 % | WEIGHT: 130 LBS | DIASTOLIC BLOOD PRESSURE: 74 MMHG

## 2019-01-30 DIAGNOSIS — K21.9 GASTROESOPHAGEAL REFLUX DISEASE, ESOPHAGITIS PRESENCE NOT SPECIFIED: ICD-10-CM

## 2019-01-30 DIAGNOSIS — R11.0 NAUSEA: Primary | ICD-10-CM

## 2019-01-30 PROCEDURE — 88305 TISSUE EXAM BY PATHOLOGIST: CPT | Mod: 26,HCNC,, | Performed by: PATHOLOGY

## 2019-01-30 PROCEDURE — E9220 PRA ENDO ANESTHESIA: ICD-10-PCS | Mod: ,,, | Performed by: NURSE ANESTHETIST, CERTIFIED REGISTERED

## 2019-01-30 PROCEDURE — E9220 PRA ENDO ANESTHESIA: HCPCS | Mod: ,,, | Performed by: NURSE ANESTHETIST, CERTIFIED REGISTERED

## 2019-01-30 PROCEDURE — 88305 TISSUE EXAM BY PATHOLOGIST: CPT | Mod: HCNC | Performed by: PATHOLOGY

## 2019-01-30 PROCEDURE — 63600175 PHARM REV CODE 636 W HCPCS: Performed by: NURSE ANESTHETIST, CERTIFIED REGISTERED

## 2019-01-30 PROCEDURE — 37000008 HC ANESTHESIA 1ST 15 MINUTES: Performed by: INTERNAL MEDICINE

## 2019-01-30 PROCEDURE — 25000003 PHARM REV CODE 250: Performed by: INTERNAL MEDICINE

## 2019-01-30 PROCEDURE — 43239 PR EGD, FLEX, W/BIOPSY, SGL/MULTI: ICD-10-PCS | Mod: ,,, | Performed by: INTERNAL MEDICINE

## 2019-01-30 PROCEDURE — 43239 EGD BIOPSY SINGLE/MULTIPLE: CPT | Mod: ,,, | Performed by: INTERNAL MEDICINE

## 2019-01-30 PROCEDURE — 27201012 HC FORCEPS, HOT/COLD, DISP: Performed by: INTERNAL MEDICINE

## 2019-01-30 PROCEDURE — 43239 EGD BIOPSY SINGLE/MULTIPLE: CPT | Performed by: INTERNAL MEDICINE

## 2019-01-30 PROCEDURE — 37000009 HC ANESTHESIA EA ADD 15 MINS: Performed by: INTERNAL MEDICINE

## 2019-01-30 PROCEDURE — 88305 TISSUE SPECIMEN TO PATHOLOGY - SURGERY: ICD-10-PCS | Mod: 26,HCNC,, | Performed by: PATHOLOGY

## 2019-01-30 RX ORDER — SODIUM CHLORIDE 9 MG/ML
INJECTION, SOLUTION INTRAVENOUS CONTINUOUS
Status: DISCONTINUED | OUTPATIENT
Start: 2019-01-30 | End: 2019-01-30 | Stop reason: HOSPADM

## 2019-01-30 RX ORDER — SODIUM CHLORIDE 0.9 % (FLUSH) 0.9 %
3 SYRINGE (ML) INJECTION
Status: CANCELLED | OUTPATIENT
Start: 2019-01-30

## 2019-01-30 RX ORDER — LIDOCAINE HCL/PF 100 MG/5ML
SYRINGE (ML) INTRAVENOUS
Status: DISCONTINUED | OUTPATIENT
Start: 2019-01-30 | End: 2019-01-30

## 2019-01-30 RX ORDER — PROPOFOL 10 MG/ML
VIAL (ML) INTRAVENOUS
Status: DISCONTINUED | OUTPATIENT
Start: 2019-01-30 | End: 2019-01-30

## 2019-01-30 RX ADMIN — PROPOFOL 20 MG: 10 INJECTION, EMULSION INTRAVENOUS at 02:01

## 2019-01-30 RX ADMIN — SODIUM CHLORIDE: 0.9 INJECTION, SOLUTION INTRAVENOUS at 01:01

## 2019-01-30 RX ADMIN — LIDOCAINE HYDROCHLORIDE 100 MG: 20 INJECTION, SOLUTION INTRAVENOUS at 02:01

## 2019-01-30 RX ADMIN — PROPOFOL 60 MG: 10 INJECTION, EMULSION INTRAVENOUS at 02:01

## 2019-01-30 RX ADMIN — PROPOFOL 50 MG: 10 INJECTION, EMULSION INTRAVENOUS at 02:01

## 2019-01-30 NOTE — ANESTHESIA PREPROCEDURE EVALUATION
01/30/2019  Elen Peters is a 89 y.o., female.  Patient Active Problem List   Diagnosis    GERD (gastroesophageal reflux disease)    Mixed hyperlipidemia    OP (osteoporosis)    Depression, major, in remission    Aortic atherosclerosis    HTN (hypertension), benign    Gait instability    Coronary artery disease involving native coronary artery    Autonomic postural hypotension    Anorexia    Personal history of renal cell cancer    Nonspecific abnormal electrocardiogram (ECG) (EKG)     Past Surgical History:   Procedure Laterality Date    APPENDECTOMY      BACK SURGERY      CATARACT EXTRACTION W/  INTRAOCULAR LENS IMPLANT Left 3/16/15    kristy    CATARACT EXTRACTION W/  INTRAOCULAR LENS IMPLANT Right 4/6/15    kristy    COLONOSCOPY  10/21/2013    COLONOSCOPY N/A 10/21/2013    Performed by David [facility] at [facility] (4[facility] F[facility])    CYSTOSCOPY      EYE SURGERY      INSERTION-INTRAOCULAR LENS (IOL) Right 4/6/2015    Performed by J[facility] Chaevz [facility] at [facility]    INSERTION-INTRAOCULAR LENS (IOL) Left 3/16/2015    Performed by J[facility]ony Chavez MD at [facility]    NEPHRECTOMY      L    PHACOEMULSIFICATION-ASPIRATION-CATARACT Right 4/6/2015    Performed by [facility] Chavez MD at [facility]    PHACOEMULSIFICATION-ASPIRATION-CATARACT Left 3/16/2015    Performed by J[facility]ony Chavez MD at [facility]    SPINE SURGERY      TONSILLECTOMY, ADENOIDECTOMY       2015  CONCLUSIONS     1 - Mild left atrial enlargement.     2 - Eccentric hypertrophy.     3 - Normal left ventricular systolic function (EF 55-60%).   Anesthesia Evaluation    I have reviewed the Patient Summary Reports.    I have reviewed the Nursing Notes.   I have reviewed the Medications.     Review of Systems      Physical Exam  General:  Well nourished    Airway/Jaw/Neck:  Airway Findings: Mouth Opening: Normal General Airway Assessment: Adult   Mallampati: II  Improves to II with phonation.  Jaw/Neck Findings:  Limited Ability to Prognath  Neck ROM: Normal ROM     Eyes/Ears/Nose:  Eyes/Ears/Nose Findings:    Dental:  Dental Findings: In tact   Chest/Lungs:  Chest/Lungs Findings: Clear to auscultation, Normal Respiratory Rate     Heart/Vascular:  Heart Findings: Rate: Normal  Rhythm: Regular Rhythm  Sounds: Normal     Abdomen:  Abdomen Findings:  Normal     Musculoskeletal:  Musculoskeletal Findings:    Skin:  Skin Findings:     Mental Status:  Mental Status Findings:  Cooperative, Alert and Oriented         Anesthesia Plan  Type of Anesthesia, risks & benefits discussed:  Anesthesia Type:  general, MAC  Patient's Preference:   Intra-op Monitoring Plan:   Intra-op Monitoring Plan Comments:   Post Op Pain Control Plan:   Post Op Pain Control Plan Comments:   Induction:   IV  Beta Blocker:  Patient is not currently on a Beta-Blocker (No further documentation required).       Informed Consent: Patient understands risks and agrees with Anesthesia plan.  Questions answered. Anesthesia consent signed with patient.  ASA Score: 2     Day of Surgery Review of History & Physical:    H&P update referred to the surgeon.         Ready For Surgery From Anesthesia Perspective.

## 2019-01-30 NOTE — INTERVAL H&P NOTE
The patient has been examined and the H&P has been reviewed:    There is no interval changes since last encounter.  EGD: Nausea, GERD  Sedation: GA  ASA: Per anesthesia  Mallampati: Per anesthesia    Endoscopy risks, benefits and alternative options discussed and understood by patient/family.          Active Hospital Problems    Diagnosis  POA    Nausea [R11.0]  Yes      Resolved Hospital Problems   No resolved problems to display.

## 2019-01-30 NOTE — TRANSFER OF CARE
"Anesthesia Transfer of Care Note    Patient: Elen Peters    Procedure(s) Performed: Procedure(s) (LRB):  EGD (ESOPHAGOGASTRODUODENOSCOPY) (N/A)    Patient location: PACU    Anesthesia Type: general    Transport from OR: Transported from OR on room air with adequate spontaneous ventilation    Post pain: adequate analgesia    Post assessment: no apparent anesthetic complications    Post vital signs: stable    Level of consciousness: awake    Nausea/Vomiting: no nausea/vomiting    Complications: none    Transfer of care protocol was followed      Last vitals:   Visit Vitals  BP (!) 201/81 (BP Location: Right leg, Patient Position: Lying)   Pulse (!) 56   Temp 36.8 °C (98.2 °F) (Temporal)   Resp 18   Ht 5' 3" (1.6 m)   Wt 59 kg (130 lb)   SpO2 99%   Breastfeeding? No   BMI 23.03 kg/m²     "

## 2019-01-30 NOTE — [employer] PATIENT INSTRUCTIONS
Discharge Summary/Instructions after an Endoscopic Procedure  Patient Name: Elne Peters  Patient MRN: [id number]  Patient YOB: 1929 Wednesday, January 30, 2019  Diony Dey MD  RESTRICTIONS:  During your procedure today, you received medications for sedation.  These   medications may affect your judgment, balance and coordination.  Therefore,   for 24 hours, you have the following restrictions:   - DO NOT drive a car, operate machinery, make legal/financial decisions,   sign important papers or drink alcohol.    ACTIVITY:  Today: no heavy lifting, straining or running due to procedural   sedation/anesthesia.  The following day: return to full activity including work.  DIET:  Eat and drink normally unless instructed otherwise.     TREATMENT FOR COMMON SIDE EFFECTS:  - Mild abdominal pain, nausea, belching, bloating or excessive gas:  rest,   eat lightly and use a heating pad.  - Sore Throat: treat with throat lozenges and/or gargle with warm salt   water.  - Because air was used during the procedure, expelling large amounts of air   from your rectum or belching is normal.  - If a bowel prep was taken, you may not have a bowel movement for 1-3 days.    This is normal.  SYMPTOMS TO WATCH FOR AND REPORT TO YOUR PHYSICIAN:  1. Abdominal pain or bloating, other than gas cramps.  2. Chest pain.  3. Back pain.  4. Signs of infection such as: chills or fever occurring within 24 hours   after the procedure.  5. Rectal bleeding, which would show as bright red, maroon, or black stools.   (A tablespoon of blood from the rectum is not serious, especially if   hemorrhoids are present.)  6. Vomiting.  7. Weakness or dizziness.  GO DIRECTLY TO THE NEAREST EMERGENCY ROOM IF YOU HAVE ANY OF THE FOLLOWING:      Difficulty breathing              Chills and/or fever over 101 F   Persistent vomiting and/or vomiting blood   Severe abdominal pain   Severe chest pain   Black, tarry stools   Bleeding- more than one  tablespoon   Any other symptom or condition that you feel may need urgent attention  Your doctor recommends these additional instructions:  If any biopsies were taken, your doctors clinic will contact you in 1 to 2   weeks with any results.  - Patient has a contact number available for emergencies.  The signs and   symptoms of potential delayed complications were discussed with the   patient.  Return to normal activities tomorrow.  Written discharge   instructions were provided to the patient.   - Discharge patient to home.   - Resume previous diet.   - Continue present medications.   - Await pathology results.   For questions, problems or results please call your physician - Diony Dey MD at Work:  [phone].  [facility], EMERGENCY ROOM PHONE NUMBER: [phone]  IF A COMPLICATION OR EMERGENCY SITUATION ARISES AND YOU ARE UNABLE TO REACH   YOUR PHYSICIAN - GO DIRECTLY TO THE EMERGENCY ROOM.  Diony Dey MD  1/30/2019 2:15:39 PM  This report has been verified and signed electronically.  [employer]

## 2019-01-30 NOTE — TELEPHONE ENCOUNTER
----- Message from Diony Dey MD sent at 1/30/2019  2:18 PM CST -----  Please schedule a follow up in 6 weeks.

## 2019-01-31 ENCOUNTER — PATIENT OUTREACH (OUTPATIENT)
Dept: OTHER | Facility: OTHER | Age: 84
End: 2019-01-31

## 2019-01-31 NOTE — TELEPHONE ENCOUNTER
Patient called to report the following:     -has huge blood blister from dressing   -bleeding has stopped at this time   -denies difficulty breathing, fever, severe pain   -advised home care and when to call back   -verbalized understanding    Reason for Disposition   Skin tape (e.g., Steri-strips) removal, questions about    Protocols used: ST POST-OP INCISION SYMPTOMS-A-AH

## 2019-01-31 NOTE — PROGRESS NOTES
Last 5 Patient Entered Readings                                      Current 30 Day Average: 161/72     Recent Readings 1/29/2019 1/28/2019 1/27/2019 1/26/2019 1/25/2019    SBP (mmHg) 156 168 169 140 179    DBP (mmHg) 68 72 69 66 73    Pulse 57 56 59 62 58      HPI:  Called patient to follow up on a medication question. Patient endorses adherence to medication regimen.     Assessment:  Reviewed recent readings. Per 2017 ACC/ AHA HTN guidelines (goal of BP < 130/80), current 30-day average needs to be addressed more thoroughly today. We discussed her current medications and the FDA recall on ARB medications.    Plan:  Patient and I discussed contacting her pharmacy to assess if she is affected by the irbesartan recall. Continue current medication regimen unless confirmed that she is affected by the recall. Mar Mac will continue to monitor regularly and will follow-up as planned on 2/6. Patient has my contact information and knows to call with any questions or concerns, especially if she is affected by the ARB recall.    Current medication regimen:  Hypertension Medications             carvedilol (COREG) 6.25 MG tablet Take 2 tablets (12.5 mg total) by mouth 2 (two) times daily with meals. One by mouth twice daily or as directed    irbesartan (AVAPRO) 75 MG tablet Take 1 tablet (75 mg total) by mouth every evening.    nitroGLYCERIN (NITROSTAT) 0.4 MG SL tablet Place 1 tablet (0.4 mg total) under the tongue every 5 (five) minutes as needed for Chest pain.

## 2019-02-01 NOTE — ANESTHESIA POSTPROCEDURE EVALUATION
"Anesthesia Post Evaluation    Patient: Elen Peters    Procedure(s) Performed: Procedure(s) (LRB):  EGD (ESOPHAGOGASTRODUODENOSCOPY) (N/A)    Final Anesthesia Type: general  Patient location during evaluation: PACU  Patient participation: Yes- Able to Participate  Level of consciousness: awake and alert and oriented  Pain management: adequate  Airway patency: patent  PONV status at discharge: No PONV  Anesthetic complications: no      Cardiovascular status: blood pressure returned to baseline and hemodynamically stable  Respiratory status: unassisted  Hydration status: euvolemic  Follow-up not needed.        Visit Vitals  BP (!) 185/74 (BP Location: Left arm, Patient Position: Sitting)   Pulse (!) 58   Temp 37 °C (98.6 °F) (Temporal)   Resp 18   Ht 5' 3" (1.6 m)   Wt 59 kg (130 lb)   SpO2 96%   Breastfeeding? No   BMI 23.03 kg/m²       Pain/Coty Score: No Data Recorded      "

## 2019-02-01 NOTE — PROGRESS NOTES
Last 5 Patient Entered Readings                                      Current 30 Day Average: 161/72     Recent Readings 1/31/2019 1/29/2019 1/28/2019 1/27/2019 1/26/2019    SBP (mmHg) 171 156 168 169 140    DBP (mmHg) 71 68 72 69 66    Pulse 55 57 56 59 62        2/1- LVM to follow up from pharmD alert call

## 2019-02-04 ENCOUNTER — TELEPHONE (OUTPATIENT)
Dept: INTERNAL MEDICINE | Facility: CLINIC | Age: 84
End: 2019-02-04

## 2019-02-04 NOTE — TELEPHONE ENCOUNTER
Very discourage not any better after the colonoscopy and she doesn't have a fu until March. Same sx's. She wants to know what they are going to do for the H hernia????

## 2019-02-04 NOTE — TELEPHONE ENCOUNTER
EGD revealed some areas of abn-  Awaiting pathology-  Likely harmless, but next step to be determined by GI.  Hiatal hernia cannot be repaired without a surgery-  We typically manage with medication like Nexium. And dietary adjustment.  Its a product of aging-  No cure without surgery.

## 2019-02-04 NOTE — TELEPHONE ENCOUNTER
----- Message from Marycarmen Jones sent at 2/4/2019  3:57 PM CST -----  Contact: self/   Caller is requesting a sooner appointment. Caller declined first available appointment listed below. Caller will not accept being placed on the wait list and is requesting a message be sent to the provider.    When is the next available appointment:  5/3/19  Did you offer to schedule the next available appt and put the patient on the wait list?:     What visit type: ep  Symptoms:  F/u from endoscopy  Patient preference of timeframe to be scheduled:    What is the reason the patient is requesting a sooner appointment? (insurance terminating, changing jobs):    Would you prefer an answer via [employer]?:  no  Comments:

## 2019-02-06 ENCOUNTER — TELEPHONE (OUTPATIENT)
Dept: GASTROENTEROLOGY | Facility: CLINIC | Age: 84
End: 2019-02-06

## 2019-02-06 ENCOUNTER — TELEPHONE (OUTPATIENT)
Dept: ENDOSCOPY | Facility: HOSPITAL | Age: 84
End: 2019-02-06

## 2019-02-06 NOTE — TELEPHONE ENCOUNTER
----- Message from Diony Dey MD sent at 2/6/2019 10:56 AM CST -----  Biopsies look fine. Follow up with me in fellows clinic in 3 weeks.

## 2019-02-06 NOTE — TELEPHONE ENCOUNTER
----- Message from Lesley Darling sent at 2/6/2019  4:07 PM CST -----  Contact: Self- [phone]  Tiburcio- pt returning a missed call regarding biopsy results- please contact pt at [phone]

## 2019-02-11 ENCOUNTER — CLINICAL SUPPORT (OUTPATIENT)
Dept: INTERNAL MEDICINE | Facility: CLINIC | Age: 84
End: 2019-02-11
Payer: MEDICARE

## 2019-02-11 VITALS — WEIGHT: 126.75 LBS | BODY MASS INDEX: 22.46 KG/M2

## 2019-02-11 NOTE — PROGRESS NOTES
Health  Follow-Up Note   [x] Office  [] Phone  Notes from previous session reviewed.   [x] Previous Session Goals unchanged.   [] Patient/Caregiver Change in Goals.  Goals added or changed by Patient/Caregiver since program participation:  1. Exercise 2 x week       Additional/Changed support that patient/caregiver has experienced/sought?  (Indicate readiness, support from family, friends, others, community groups, etc)  1.  Family    Additional/Changed obstacles that could prevent patient/caregiver from reaching their goals?  1.  worries she not eating enough doesn't want to lose weight     Feedback provided:  1.  Praised for continued effort and determination  2. Praised for BMI is in normal good range, no need to worry    Diagnostic values/Desriptors for follow-up as needed for chronic condition(s)   Weight: 57.5 kg 126.76 lb down 1 lb     Interventions:   1. Health  listened reflectively, validated thoughts and feelings, offered support and encouragement.   2. Allowed patient to express themselves in a non-biased atmosphere.  3. Health  assisted pt to problem-solve obstacles such as being in a challenging environment and dealing with these challenges.   4. Motivational Interviewed interventions utilized (OARS).   5. Patient responded favorably to interventions and remained actively engaged in the session.   6. Health  will remain available and connected for patient by phone and/or office visits.   7. Positive reinforcement, emotional support and encouragement provided.   8. Focused Education: MI, Hiatal hernia, recipes    Plan:  [x] Pt will work on goals as stated above.   [x] Pt will contact Health  for any questions, concerns or needs.  [x] Pt will follow up with Health  in office on  4/8/19 at 1300.  [] Pt will follow up with Health  on phone in:        [x] Health  will remain available.   [] Health  will contact patient by phone in:        [] Health  will  consult:        [] Health  will inform Provider via EPIC messaging.     Impression:  1. Behavior is consistent with Action Stage of Change.   2. Participation level:       [x] Receptive      [x] Interactive      [] Guarded and Resistant      [x] Self Motivated      [] Refused/Declined to participate   3. [x] Pt voiced understanding of all information presented.       [] Pt voiced needing further information/education. This will be arranged.       [x] Pt would benefit from further education/information as identified by this health . This will be arranged.     Ivana Fernandez RN HC

## 2019-02-13 ENCOUNTER — PATIENT OUTREACH (OUTPATIENT)
Dept: OTHER | Facility: OTHER | Age: 84
End: 2019-02-13

## 2019-02-13 NOTE — PROGRESS NOTES
Last 5 Patient Entered Readings                                      Current 30 Day Average: 158/71     Recent Readings 2/12/2019 2/11/2019 2/10/2019 2/9/2019 2/6/2019    SBP (mmHg) 165 158 141 155 138    DBP (mmHg) 62 71 66 71 62    Pulse 55 56 62 53 57        Ms. Peters's BP has started to trend up again. She notices that it is higher in the evenings. Will have her start taking irbesartan in the morning to see if this helps. May increase irbesartan to 75 mg BID if BP remains elevated.    Patient's BP average is above goal of <130/80.     Will continue to monitor regularly. Will follow up in 2-3 weeks, sooner if BP begins to trend upward or downward.    Patient has my contact information and knows to call with any concerns or clinical changes.     Current HTN regimen:  Hypertension Medications             carvedilol (COREG) 6.25 MG tablet Take 2 tablets (12.5 mg total) by mouth 2 (two) times daily with meals. One by mouth twice daily or as directed    irbesartan (AVAPRO) 75 MG tablet Take 1 tablet (75 mg total) by mouth every evening.    nitroGLYCERIN (NITROSTAT) 0.4 MG SL tablet Place 1 tablet (0.4 mg total) under the tongue every 5 (five) minutes as needed for Chest pain.

## 2019-02-19 NOTE — PROGRESS NOTES
Last 5 Patient Entered Readings                                      Current 30 Day Average: 156/68     Recent Readings 2/18/2019 2/16/2019 2/14/2019 2/13/2019 2/12/2019    SBP (mmHg) 165 151 167 150 165    DBP (mmHg) 65 64 71 63 62    Pulse 56 61 56 63 55        Patient reports that her BP is always high at night. Confirmed that patient has been resting before both am and pm readings.     Digital Medicine: Health  Follow Up    Lifestyle Modifications:    1.Dietary Modifications (Sodium intake <2,000mg/day, food labels, dining out):    Patient states that her nausea has been improved recently. Patient bought atkins drinks that she drinks for lunch instead of cooking. States that she often doesn't feel like cooking, and usually eats a late breakfast    2.Physical Activity:    Does swimming exercises 3 days per week (MTW). Patient does her grocery shopping on Thursdays, and then does bridge class on Fridays.     3.Medication Therapy: Patient has been compliant with the medication regimen.   Patient switching irbesartan from pm to am to hopefully help with higher evening BP readings.     4.Patient has the following medication side effects/concerns:   (Frequency/Alleviating factors/Precipitating factors, etc.)     Follow up with MsJailene Elen VU Fran completed. No further questions or concerns. Will continue to follow up to achieve health goals.

## 2019-02-24 ENCOUNTER — OFFICE VISIT (OUTPATIENT)
Dept: URGENT CARE | Facility: CLINIC | Age: 84
End: 2019-02-24
Payer: MEDICARE

## 2019-02-24 VITALS
DIASTOLIC BLOOD PRESSURE: 58 MMHG | WEIGHT: 126 LBS | BODY MASS INDEX: 22.32 KG/M2 | SYSTOLIC BLOOD PRESSURE: 132 MMHG | OXYGEN SATURATION: 99 % | HEART RATE: 60 BPM | HEIGHT: 63 IN | RESPIRATION RATE: 16 BRPM | TEMPERATURE: 98 F

## 2019-02-24 DIAGNOSIS — K04.7 DENTAL ABSCESS: Primary | ICD-10-CM

## 2019-02-24 PROCEDURE — 99214 OFFICE O/P EST MOD 30 MIN: CPT | Mod: S$GLB,,, | Performed by: EMERGENCY MEDICINE

## 2019-02-24 PROCEDURE — 99214 PR OFFICE/OUTPT VISIT, EST, LEVL IV, 30-39 MIN: ICD-10-PCS | Mod: S$GLB,,, | Performed by: EMERGENCY MEDICINE

## 2019-02-24 PROCEDURE — 1101F PT FALLS ASSESS-DOCD LE1/YR: CPT | Mod: CPTII,S$GLB,, | Performed by: EMERGENCY MEDICINE

## 2019-02-24 PROCEDURE — 1101F PR PT FALLS ASSESS DOC 0-1 FALLS W/OUT INJ PAST YR: ICD-10-PCS | Mod: CPTII,S$GLB,, | Performed by: EMERGENCY MEDICINE

## 2019-02-24 RX ORDER — CLINDAMYCIN PHOSPHATE 150 MG/ML
600 INJECTION, SOLUTION INTRAVENOUS
Status: COMPLETED | OUTPATIENT
Start: 2019-02-24 | End: 2019-02-24

## 2019-02-24 RX ORDER — CLINDAMYCIN HYDROCHLORIDE 150 MG/1
300 CAPSULE ORAL EVERY 6 HOURS
Qty: 28 CAPSULE | Refills: 0 | Status: SHIPPED | OUTPATIENT
Start: 2019-02-24 | End: 2019-03-03

## 2019-02-24 RX ADMIN — CLINDAMYCIN PHOSPHATE 600 MG: 150 INJECTION, SOLUTION INTRAVENOUS at 02:02

## 2019-02-24 NOTE — PROGRESS NOTES
"Subjective:       Patient ID: Elen Peters is a 89 y.o. female.    Vitals:  height is 5' 3" (1.6 m) and weight is 57.2 kg (126 lb). Her temperature is 97.9 °F (36.6 °C). Her blood pressure is 132/58 (abnormal) and her pulse is 60. Her respiration is 16 and oxygen saturation is 99%.     Chief Complaint: Jaw Pain (right side )    Pt states she has TMG that she is able control but pt c/o swelling to the left side of face by jaw line   Onset yesterday       Edema   This is a new problem. The current episode started today. The problem occurs constantly. The problem has been gradually worsening. Pertinent negatives include no arthralgias, chest pain, chills, congestion, coughing, fatigue, fever, headaches, joint swelling, myalgias, nausea, rash, sore throat, vertigo, vomiting or weakness. Nothing aggravates the symptoms. She has tried acetaminophen for the symptoms. The treatment provided no relief.       Constitution: Negative for chills, fatigue and fever.   HENT: Positive for facial swelling. Negative for congestion and sore throat.    Neck: Negative for painful lymph nodes.   Cardiovascular: Negative for chest pain and leg swelling.   Eyes: Negative for double vision and blurred vision.   Respiratory: Negative for cough and shortness of breath.    Gastrointestinal: Negative for nausea, vomiting and diarrhea.   Genitourinary: Negative for dysuria, frequency, urgency and history of kidney stones.   Musculoskeletal: Negative for joint pain, joint swelling, muscle cramps and muscle ache.   Skin: Negative for color change, pale, rash and bruising.   Allergic/Immunologic: Negative for seasonal allergies.   Neurological: Negative for dizziness, history of vertigo, light-headedness, passing out and headaches.   Hematologic/Lymphatic: Negative for swollen lymph nodes.   Psychiatric/Behavioral: Negative for nervous/anxious, sleep disturbance and depression. The patient is not nervous/anxious.        Objective:      Physical " Exam   Constitutional: She is oriented to person, place, and time. She appears well-developed and well-nourished. She is cooperative.  Non-toxic appearance. She does not appear ill. No distress.   HENT:   Head: Normocephalic and atraumatic.   Right Ear: Hearing, tympanic membrane, external ear and ear canal normal.   Left Ear: Hearing, tympanic membrane, external ear and ear canal normal.   Nose: Nose normal. No mucosal edema, rhinorrhea or nasal deformity. No epistaxis. Right sinus exhibits no maxillary sinus tenderness and no frontal sinus tenderness. Left sinus exhibits no maxillary sinus tenderness and no frontal sinus tenderness.   Mouth/Throat: Uvula is midline, oropharynx is clear and moist and mucous membranes are normal. She does not have dentures. No trismus in the jaw. Normal dentition. Dental abscesses present. No uvula swelling or dental caries. No posterior oropharyngeal erythema.       Patient has widespread periodontal disease there is mild erythema to the skin and soft tissues to the right side of the mandible adjacent to the region of teeth 26-28,  there is no obvious gingival fluctuance there is tenderness to these teeth and the adjacent gingiva.  There is no reactive adenopathy.  Patient's posterior pharynx is clear there is no stridor there is no trismus patient has no meningismus.   Eyes: Conjunctivae and lids are normal. No scleral icterus.   Sclera clear bilat   Neck: Trachea normal, full passive range of motion without pain and phonation normal. Neck supple.   Cardiovascular: Normal rate, regular rhythm, normal heart sounds, intact distal pulses and normal pulses.   Pulmonary/Chest: Effort normal and breath sounds normal. No respiratory distress.   Abdominal: Soft. Normal appearance and bowel sounds are normal. She exhibits no distension. There is no tenderness.   Musculoskeletal: Normal range of motion. She exhibits no edema or deformity.   Neurological: She is alert and oriented to person,  place, and time. She exhibits normal muscle tone. Coordination normal.   Skin: Skin is warm, dry and intact. She is not diaphoretic. No pallor.   Psychiatric: She has a normal mood and affect. Her speech is normal and behavior is normal. Judgment and thought content normal. Cognition and memory are normal.   Nursing note and vitals reviewed.      Assessment:       1. Dental abscess        Plan:         Dental abscess    Other orders  -     clindamycin injection 600 mg  -     clindamycin (CLEOCIN HCL) 150 MG capsule; Take 2 capsules (300 mg total) by mouth every 6 (six) hours. for 7 days  Dispense: 28 capsule; Refill: 0      Patient Instructions     Go to the Emergency Room if symptoms or condition worsens in any way    Follow up with  Dentist     in 2-3 days    Tylenol 500mg 2 tabs by mouth every 6 hours Max=8 tabs/day        Dental Abscess    An abscess is a pocket of pus at the tip of a tooth root in your jaw bone. It is caused by an infection at the root of the tooth. It can cause pain and swelling of the gum, cheek, or jaw. Pain may spread from the tooth to your ear or the area of your jaw on the same side. If the abscess isnt treated, it appears as a bubble or swelling on the gum near the tooth. The pressure that builds in this swelling is the source of the pain. More serious infections cause your face to swell.  An abscess can be caused by a crack in the tooth, a cavity, a gum infection, or a combination of these. Once the pulp of the tooth is exposed, bacteria can spread down the roots to the tip. If the bacteria are not stopped, they can damage the bone and soft tissue, and an abscess can form.  Home care  Follow these guidelines when caring for yourself at home:  · Avoid hot and cold foods and drinks. Your tooth may be sensitive to changes in temperature. Dont chew on the side of the infected tooth.  · If your tooth is chipped or cracked, or if there is a large open cavity, put oil of cloves directly on  "the tooth to relieve pain. You can buy oil of cloves at drugstores. Some pharmacies carry an over-the-counter "toothache kit." This contains a paste that you can put on the exposed tooth to make it less sensitive.  · Put a cold pack on your jaw over the sore area to help reduce pain.  · You may use over-the-counter medicine to ease pain, unless another medicine was prescribed. If you have chronic liver or kidney disease, talk with your healthcare provider before using acetaminophen or ibuprofen. Also talk with your provider if youve had a stomach ulcer or GI bleeding.  · An antibiotic will be prescribed. Take it until finished, even if you are feeling better after a few days.  Follow-up care  Follow up with your dentist or an oral surgeon, or as advised. Once an infection occurs in a tooth, it will continue to be a problem until the infection is drained. This is done through surgery or a root canal. Or you may need to have your tooth pulled.  Call 911  Call 911 if any of these occur:  · Unusual drowsiness  · Headache or stiff neck  · Weakness or fainting  · Difficulty swallowing, breathing, or opening your mouth  · Swollen eyelids  When to seek medical advice  Call your healthcare provider right away if any of these occur:  · Your face becomes more swollen or red  · Pain gets worse or spreads to your neck  · Fever of 100.4º F (38.0º C) or higher, or as directed by your healthcare provider  · Pus drains from the tooth  Date Last Reviewed: 10/1/2016  © 1768-9839 [employer]. [address] [place] [address], [place] [address]. All rights reserved. This information is not intended as a substitute for professional medical care. Always follow your healthcare professional's instructions.               "

## 2019-02-24 NOTE — PATIENT INSTRUCTIONS
"  Go to the Emergency Room if symptoms or condition worsens in any way    Follow up with  Dentist     in 2-3 days    Tylenol 500mg 2 tabs by mouth every 6 hours Max=8 tabs/day        Dental Abscess    An abscess is a pocket of pus at the tip of a tooth root in your jaw bone. It is caused by an infection at the root of the tooth. It can cause pain and swelling of the gum, cheek, or jaw. Pain may spread from the tooth to your ear or the area of your jaw on the same side. If the abscess isnt treated, it appears as a bubble or swelling on the gum near the tooth. The pressure that builds in this swelling is the source of the pain. More serious infections cause your face to swell.  An abscess can be caused by a crack in the tooth, a cavity, a gum infection, or a combination of these. Once the pulp of the tooth is exposed, bacteria can spread down the roots to the tip. If the bacteria are not stopped, they can damage the bone and soft tissue, and an abscess can form.  Home care  Follow these guidelines when caring for yourself at home:  · Avoid hot and cold foods and drinks. Your tooth may be sensitive to changes in temperature. Dont chew on the side of the infected tooth.  · If your tooth is chipped or cracked, or if there is a large open cavity, put oil of cloves directly on the tooth to relieve pain. You can buy oil of cloves at drugstores. Some pharmacies carry an over-the-counter "toothache kit." This contains a paste that you can put on the exposed tooth to make it less sensitive.  · Put a cold pack on your jaw over the sore area to help reduce pain.  · You may use over-the-counter medicine to ease pain, unless another medicine was prescribed. If you have chronic liver or kidney disease, talk with your healthcare provider before using acetaminophen or ibuprofen. Also talk with your provider if youve had a stomach ulcer or GI bleeding.  · An antibiotic will be prescribed. Take it until finished, even if you are " feeling better after a few days.  Follow-up care  Follow up with your dentist or an oral surgeon, or as advised. Once an infection occurs in a tooth, it will continue to be a problem until the infection is drained. This is done through surgery or a root canal. Or you may need to have your tooth pulled.  Call 911  Call 911 if any of these occur:  · Unusual drowsiness  · Headache or stiff neck  · Weakness or fainting  · Difficulty swallowing, breathing, or opening your mouth  · Swollen eyelids  When to seek medical advice  Call your healthcare provider right away if any of these occur:  · Your face becomes more swollen or red  · Pain gets worse or spreads to your neck  · Fever of 100.4º F (38.0º C) or higher, or as directed by your healthcare provider  · Pus drains from the tooth  Date Last Reviewed: 10/1/2016  © 7687-4663 [employer]. [address] [place] [address], [place] [address]. All rights reserved. This information is not intended as a substitute for professional medical care. Always follow your healthcare professional's instructions.

## 2019-03-04 ENCOUNTER — PATIENT OUTREACH (OUTPATIENT)
Dept: OTHER | Facility: OTHER | Age: 84
End: 2019-03-04

## 2019-03-04 DIAGNOSIS — I10 HTN (HYPERTENSION), BENIGN: ICD-10-CM

## 2019-03-04 NOTE — PROGRESS NOTES
Last 5 Patient Entered Readings                                      Current 30 Day Average: 154/68     Recent Readings 3/17/2019 3/17/2019 3/16/2019 3/15/2019 3/13/2019    SBP (mmHg) 173 180 162 148 158    DBP (mmHg) 73 73 70 63 69    Pulse 59 58 59 62 57        Mrs. Peters's BP remains elevated. Reviewed medications with her today as there were duplications in her chart. She has not been taking reduced irbesartan dose of 75 mg QD. [employer] has continued to send in 300 mg tablets. She is tolerating this so will continue current dose. She has also not increased carvedilol to 12.5 mg BID, either. Given HR in the upper 50s, will not increase this dose either. Mrs. Peters currently takes irbesartan at 9pm before bed. Advised she move the timing up to 6pm when she eats dinner to determine if this will help BP stabilize. Also asked that she check her BP earlier in the day on different days to see how her BP runs over the course of the day. She verbalized understanding.     Per 30 day average, 154/68 mmHg, patient's BP is not at goal.     Will continue to monitor regularly. Will follow up in 2-3 weeks, sooner if BP begins to trend upward or downward.    Asked patient to call or message with questions or concerns.     Current HTN regimen:  Hypertension Medications             carvedilol (COREG) 6.25 MG tablet Take 1 tablet (6.25 mg total) by mouth 2 (two) times daily with meals.    irbesartan (AVAPRO) 300 MG tablet TAKE 1 TABLET (300 MG TOTAL) BY MOUTH EVERY EVENING.    nitroGLYCERIN (NITROSTAT) 0.4 MG SL tablet Place 1 tablet (0.4 mg total) under the tongue every 5 (five) minutes as needed for Chest pain.

## 2019-03-18 RX ORDER — IRBESARTAN 300 MG/1
TABLET ORAL
Qty: 90 TABLET | Refills: 3 | Status: SHIPPED | OUTPATIENT
Start: 2019-03-18 | End: 2019-04-25 | Stop reason: ALTCHOICE

## 2019-03-18 RX ORDER — CARVEDILOL 6.25 MG/1
6.25 TABLET ORAL 2 TIMES DAILY WITH MEALS
Qty: 180 TABLET | Refills: 3
Start: 2019-03-18 | End: 2019-07-29 | Stop reason: SDUPTHER

## 2019-03-19 ENCOUNTER — PATIENT OUTREACH (OUTPATIENT)
Dept: OTHER | Facility: OTHER | Age: 84
End: 2019-03-19

## 2019-03-19 NOTE — PROGRESS NOTES
Last 5 Patient Entered Readings                                      Current 30 Day Average: 154/68     Recent Readings 3/18/2019 3/17/2019 3/17/2019 3/16/2019 3/15/2019    SBP (mmHg) 151 173 180 162 148    DBP (mmHg) 69 73 73 70 63    Pulse 62 59 58 59 62        Patient is still busy, and agrees to call me back when she has time

## 2019-03-19 NOTE — PROGRESS NOTES
Last 5 Patient Entered Readings                                      Current 30 Day Average: 154/68     Recent Readings 3/18/2019 3/17/2019 3/17/2019 3/16/2019 3/15/2019    SBP (mmHg) 151 173 180 162 148    DBP (mmHg) 69 73 73 70 63    Pulse 62 59 58 59 62        Patient requests call back in 30 minutes

## 2019-03-20 ENCOUNTER — LAB VISIT (OUTPATIENT)
Dept: LAB | Facility: HOSPITAL | Age: 84
End: 2019-03-20
Payer: MEDICARE

## 2019-03-20 ENCOUNTER — OFFICE VISIT (OUTPATIENT)
Dept: GASTROENTEROLOGY | Facility: CLINIC | Age: 84
End: 2019-03-20
Payer: MEDICARE

## 2019-03-20 VITALS
HEIGHT: 63 IN | SYSTOLIC BLOOD PRESSURE: 144 MMHG | DIASTOLIC BLOOD PRESSURE: 63 MMHG | HEART RATE: 60 BPM | BODY MASS INDEX: 22.15 KG/M2 | WEIGHT: 125 LBS

## 2019-03-20 DIAGNOSIS — K44.9 HIATAL HERNIA: ICD-10-CM

## 2019-03-20 DIAGNOSIS — R63.0 POOR APPETITE: ICD-10-CM

## 2019-03-20 DIAGNOSIS — R63.4 UNINTENTIONAL WEIGHT LOSS: ICD-10-CM

## 2019-03-20 DIAGNOSIS — K21.9 GASTROESOPHAGEAL REFLUX DISEASE WITHOUT ESOPHAGITIS: Primary | ICD-10-CM

## 2019-03-20 LAB
CREAT SERPL-MCNC: 0.8 MG/DL
EST. GFR  (AFRICAN AMERICAN): >60 ML/MIN/1.73 M^2
EST. GFR  (NON AFRICAN AMERICAN): >60 ML/MIN/1.73 M^2

## 2019-03-20 PROCEDURE — 36415 COLL VENOUS BLD VENIPUNCTURE: CPT | Mod: HCNC

## 2019-03-20 PROCEDURE — 99999 PR PBB SHADOW E&M-EST. PATIENT-LVL IV: CPT | Mod: PBBFAC,HCNC,, | Performed by: PHYSICIAN ASSISTANT

## 2019-03-20 PROCEDURE — 99999 PR PBB SHADOW E&M-EST. PATIENT-LVL IV: ICD-10-PCS | Mod: PBBFAC,HCNC,, | Performed by: PHYSICIAN ASSISTANT

## 2019-03-20 PROCEDURE — 1101F PT FALLS ASSESS-DOCD LE1/YR: CPT | Mod: HCNC,CPTII,S$GLB, | Performed by: PHYSICIAN ASSISTANT

## 2019-03-20 PROCEDURE — 82565 ASSAY OF CREATININE: CPT | Mod: HCNC

## 2019-03-20 PROCEDURE — 99213 PR OFFICE/OUTPT VISIT, EST, LEVL III, 20-29 MIN: ICD-10-PCS | Mod: HCNC,S$GLB,, | Performed by: PHYSICIAN ASSISTANT

## 2019-03-20 PROCEDURE — 1101F PR PT FALLS ASSESS DOC 0-1 FALLS W/OUT INJ PAST YR: ICD-10-PCS | Mod: HCNC,CPTII,S$GLB, | Performed by: PHYSICIAN ASSISTANT

## 2019-03-20 PROCEDURE — 99213 OFFICE O/P EST LOW 20 MIN: CPT | Mod: HCNC,S$GLB,, | Performed by: PHYSICIAN ASSISTANT

## 2019-03-20 NOTE — PROGRESS NOTES
[facility] Consultation Note    Reason for Consult:  The primary encounter diagnosis was Gastroesophageal reflux disease without esophagitis. Diagnoses of Unintentional weight loss, Poor appetite, and Hiatal hernia were also pertinent to this visit.    PCP:   Jim Tredawell       Referring MD:  No referring provider defined for this encounter.    HPI:  This is a 89 y.o. female here to follow up on her chronic nausea, lack of appetite.    1/2019 EGD- Z-line irregular, 38 cm from the incisors,                         biopsied.- reactive                        - Medium-sized hiatal hernia.                        - Multiple gastric fundic gland polyps (benign).    Nausea and poor appetite comes and goes  Some improvement in appetite recently  Typically will have a Bm daily with eating   4lbs weight loss in 5 months  Taking Nexium every morning  Failed meds omeprazole    ROS:  Constitutional: No fevers, chills, + weight loss  ENT: No allergies  CV: No chest pain  Pulm: No cough, No shortness of breath  Ophtho: No vision changes  GI: see HPI  Derm: No rash  Heme: No lymphadenopathy, No bruising  MSK: No arthritis  : No dysuria, No hematuria  Endo: No hot or cold intolerance  Neuro: No syncope, No seizure, unstable gait  Psych: No anxiety, No depression    Medical History:  has a past medical history of Abnormal stress test (11/18/2015), Arthritis, Bladder cancer, Cataract, Coronary artery disease involving native coronary artery (1/6/2016), Depression, GERD (gastroesophageal reflux disease), HTN (hypertension), benign (11/18/2015), bladder infections, Hyperlipemia, OP (osteoporosis), Positive cardiac stress test (1/6/2016), Renal cancer, Syncope (1/6/2016), TMJ (dislocation of temporomandibular joint), Upper back pain (12/1/2015), Ureteral cancer, Venous insufficiency (2017), and Villous adenoma of colon (5/31/2013).    Surgical History:  has a past surgical history that includes Nephrectomy;  TONSILLECTOMY, ADENOIDECTOMY; Back surgery; Appendectomy; Colonoscopy (10/21/2013); Cystoscopy; Cataract extraction w/  intraocular lens implant (Left, 3/16/15); Cataract extraction w/  intraocular lens implant (Right, 4/6/15); Eye surgery; Spine surgery; and Esophagogastroduodenoscopy (N/A, 1/30/2019).    Family History: family history includes Cancer in her father, mother, and son; Goiter in her mother; Heart attack in her mother; Heart disease in her maternal grandfather and mother; Leukemia in her father and mother; No Known Problems in her brother, maternal aunt, maternal grandmother, maternal uncle, paternal aunt, paternal grandfather, paternal grandmother, paternal uncle, sister, son, son, son, son, and son..     Social History:  reports that she has quit smoking. she has never used smokeless tobacco. She reports that she does not drink alcohol or use drugs.    Review of patient's allergies indicates:   Allergen Reactions    Sulfa (sulfonamide antibiotics)      Unknown as child    Amlodipine Swelling    Codeine      Other reaction(s): Unknown    Maxzide [triamterene-hydrochlorothiazid]        Current Outpatient Medications on File Prior to Visit   Medication Sig Dispense Refill    aspirin (ECOTRIN) 81 MG EC tablet Take 81 mg by mouth once daily.      atorvastatin (LIPITOR) 40 MG tablet TAKE 1 TABLET EVERY DAY OR AS DIRECTED (Patient taking differently: TAKE 1 TABLET EVERY DAY OR AS DIRECTED - 1/2 PILL EVERY OTHER DAY) 90 tablet 3    calcium-magnesium-zinc Tab Take 2 tablets by mouth once daily at 6am. 1 Tablet Oral Every day      carvedilol (COREG) 6.25 MG tablet Take 1 tablet (6.25 mg total) by mouth 2 (two) times daily with meals. 180 tablet 3    citalopram (CELEXA) 20 MG tablet Take 1 tablet (20 mg total) by mouth once daily. 90 tablet 3    esomeprazole (NEXIUM) 40 MG capsule Take 1 capsule (40 mg total) by mouth before breakfast. 90 capsule 3    irbesartan (AVAPRO) 300 MG tablet TAKE 1 TABLET  "(300 MG TOTAL) BY MOUTH EVERY EVENING. 90 tablet 3    mv-min-folic acid-lutein (THERAGRAN-M ADVANCED 50 PLUS) 0.4-250 mg-mcg Tab Take by mouth. 1 Tablet Oral Every day      omega-3 fatty acids 1,000 mg Cap Take 1 capsule by mouth once daily. 2  Capsule Oral Every day      zolpidem (AMBIEN) 5 MG Tab TAKE ONE TABLET BY MOUTH NIGHTLY 30 tablet 3    nitroGLYCERIN (NITROSTAT) 0.4 MG SL tablet Place 1 tablet (0.4 mg total) under the tongue every 5 (five) minutes as needed for Chest pain. 25 tablet 3     No current facility-administered medications on file prior to visit.          Objective Findings:    Vital Signs:  BP (!) 144/63 Comment: pt take bp medication in the evening  Pulse 60   Ht 5' 3" (1.6 m)   Wt 56.7 kg (125 lb)   BMI 22.14 kg/m²   Body mass index is 22.14 kg/m².    Physical Exam:  General Appearance: Well appearing in no acute distress  Head:   Normocephalic, without obvious abnormality  Eyes:    No scleral icterus  ENT: Neck supple, Lips, mucosa, and tongue normal  Lungs: CTA bilaterally in anterior and posterior fields, no wheezes, no crackles.  Heart:  Regular rate and rhythm, S1, S2 normal, no murmurs heard  Abdomen: Soft, non tender, non distended with positive bowel sounds in all four quadrants.   Extremities: no edema  Skin: No rash  Neurologic: AAO x 3      Labs:  Lab Results   Component Value Date    WBC 6.87 08/24/2018    HGB 12.5 08/24/2018    HCT 36.8 (L) 08/24/2018     08/24/2018    CHOL 166 10/16/2018    TRIG 91 10/16/2018    HDL 55 10/16/2018    ALT 24 10/16/2018    AST 21 10/16/2018     (L) 01/22/2019    K 4.3 01/22/2019     01/22/2019    CREATININE 0.8 03/20/2019    BUN 13 01/22/2019    CO2 29 01/22/2019    TSH 1.335 10/16/2018    HGBA1C 5.8 02/24/2010       Imaging:    Endoscopy:    1/30/19 - Z-line irregular, 38 cm from the incisors,                         biopsied.- reactive                        - Medium-sized hiatal hernia.                        - Multiple " gastric fundic gland polyps (benign).    10/2013 colon- tics  Assessment:  1. Gastroesophageal reflux disease without esophagitis    2. Unintentional weight loss    3. Poor appetite    4. Hiatal hernia       88yo F with Med HH and GERD presents with nausea, weight loss and poor appetite. EGD was unrevealing    Recommendations:  1. CT ABD/pelvis pancreas protocol  2. Labs  3. Continue Nexium 40mg. Discussed lowering the dose, but she is not interested    If Neg then order GES  No Follow-up on file.      Order summary:  Orders Placed This Encounter    CT Abdomen Pelvis W Wo Contrast    Creatinine, serum         Thank you so much for allowing me to participate in the care of Elen VU Fran Alejo PA-C

## 2019-03-21 PROBLEM — K44.9 HIATAL HERNIA: Status: ACTIVE | Noted: 2019-03-21

## 2019-03-22 NOTE — PROGRESS NOTES
Last 5 Patient Entered Readings                                      Current 30 Day Average: 153/68     Recent Readings 3/21/2019 3/20/2019 3/19/2019 3/18/2019 3/17/2019    SBP (mmHg) 158 154 163 151 173    DBP (mmHg) 71 71 76 69 73    Pulse 60 62 60 62 59        Digital Medicine: Health  Follow Up    Patient feels that her BP readings have been rushed. Agrees to try to take her BP earlier in the day so she has more time.    Patient also admits that she has had family stress. Patient describes that she has 6 sons, 22 grandchildren, and multiple great grandchildren. Suggested relaxing more before readings (breathing exercises, meditation, calming music, etc).     Lifestyle Modifications:    1.Dietary Modifications (Sodium intake <2,000mg/day, food labels, dining out):     2.Physical Activity:    Patient continues to go exercise 3 days per week    3.Medication Therapy: Patient has been compliant with the medication regimen.    4.Patient has the following medication side effects/concerns:   (Frequency/Alleviating factors/Precipitating factors, etc.)     Follow up with MsJailene Elen VU Fran completed. No further questions or concerns. Will continue to follow up to achieve health goals.

## 2019-03-30 ENCOUNTER — HOSPITAL ENCOUNTER (OUTPATIENT)
Dept: RADIOLOGY | Facility: HOSPITAL | Age: 84
Discharge: HOME | End: 2019-03-30
Attending: PHYSICIAN ASSISTANT
Payer: MEDICARE

## 2019-03-30 DIAGNOSIS — R63.0 POOR APPETITE: ICD-10-CM

## 2019-03-30 DIAGNOSIS — R63.4 UNINTENTIONAL WEIGHT LOSS: ICD-10-CM

## 2019-03-30 PROCEDURE — 74177 CT ABD & PELVIS W/CONTRAST: CPT | Mod: 26,HCNC,, | Performed by: RADIOLOGY

## 2019-03-30 PROCEDURE — 74177 CT ABD & PELVIS W/CONTRAST: CPT | Mod: TC,HCNC

## 2019-03-30 PROCEDURE — 74177 CT ABDOMEN PELVIS WITH CONTRAST: ICD-10-PCS | Mod: 26,HCNC,, | Performed by: RADIOLOGY

## 2019-03-30 PROCEDURE — 25500020 PHARM REV CODE 255: Mod: HCNC | Performed by: PHYSICIAN ASSISTANT

## 2019-03-30 RX ADMIN — IOHEXOL 75 ML: 350 INJECTION, SOLUTION INTRAVENOUS at 08:03

## 2019-03-31 ENCOUNTER — TELEPHONE (OUTPATIENT)
Dept: GASTROENTEROLOGY | Facility: CLINIC | Age: 84
End: 2019-03-31

## 2019-03-31 DIAGNOSIS — I77.3 FIBROMUSCULAR DYSPLASIA OF RENAL ARTERY: ICD-10-CM

## 2019-04-01 PROBLEM — I77.3 FIBROMUSCULAR DYSPLASIA OF RENAL ARTERY: Status: ACTIVE | Noted: 2019-04-01

## 2019-04-03 ENCOUNTER — TELEPHONE (OUTPATIENT)
Dept: GASTROENTEROLOGY | Facility: CLINIC | Age: 84
End: 2019-04-03

## 2019-04-03 NOTE — TELEPHONE ENCOUNTER
Attempted to call pt to inform her that her results was given to her on 03/31/19 pt didn't answer left vm instructing pt to call back.

## 2019-04-03 NOTE — TELEPHONE ENCOUNTER
----- Message from Rossana Wheat MA sent at 4/3/2019 10:05 AM CDT -----  Contact: self       ----- Message -----  From: Kerrie Kidd  Sent: 4/3/2019   9:47 AM  To: Melo Alejo    Needs Advice    Reason for call: calling for ct results        Communication Preference:    Additional Information:

## 2019-04-04 ENCOUNTER — TELEPHONE (OUTPATIENT)
Dept: GASTROENTEROLOGY | Facility: CLINIC | Age: 84
End: 2019-04-04

## 2019-04-04 ENCOUNTER — TELEPHONE (OUTPATIENT)
Dept: INTERNAL MEDICINE | Facility: CLINIC | Age: 84
End: 2019-04-04

## 2019-04-04 DIAGNOSIS — I77.3 FIBROMUSCULAR DYSPLASIA OF RENAL ARTERY: Primary | ICD-10-CM

## 2019-04-04 NOTE — TELEPHONE ENCOUNTER
----- Message from Guillermina Espinoza sent at 4/4/2019  7:55 AM CDT -----  Contact: [phone]  Pt called in regards to talking to the dr about the gastro dr informed her that she need to talk to the dr about her right artery. She would like to get a call back ASAP.       Please advise

## 2019-04-04 NOTE — TELEPHONE ENCOUNTER
Pt aware and understanding CT results was given to her on 03/31/19   Pt stated she spoke with her PCP and he said he'll look over her CT results      ----- Message from Guillermina Espinoza sent at 4/4/2019  7:58 AM CDT -----  Contact: self/[phone]  Pt called in regards to a missed call from the MA about result.      Please advise

## 2019-04-06 ENCOUNTER — HOSPITAL ENCOUNTER (OUTPATIENT)
Dept: RADIOLOGY | Facility: HOSPITAL | Age: 84
Discharge: HOME | End: 2019-04-06
Attending: INTERNAL MEDICINE
Payer: MEDICARE

## 2019-04-06 DIAGNOSIS — I77.3 FIBROMUSCULAR DYSPLASIA OF RENAL ARTERY: ICD-10-CM

## 2019-04-06 PROCEDURE — 93975 VASCULAR STUDY: CPT | Mod: TC,HCNC

## 2019-04-06 PROCEDURE — 93975 VASCULAR STUDY: CPT | Mod: 26,HCNC,, | Performed by: RADIOLOGY

## 2019-04-06 PROCEDURE — 93975 US RENAL ARTERY STENOSIS HYPERTEN (XPD): ICD-10-PCS | Mod: 26,HCNC,, | Performed by: RADIOLOGY

## 2019-04-08 ENCOUNTER — TELEPHONE (OUTPATIENT)
Dept: INTERNAL MEDICINE | Facility: CLINIC | Age: 84
End: 2019-04-08

## 2019-04-08 ENCOUNTER — PATIENT OUTREACH (OUTPATIENT)
Dept: OTHER | Facility: OTHER | Age: 84
End: 2019-04-08

## 2019-04-08 NOTE — TELEPHONE ENCOUNTER
----- Message from Niurka Fernandez sent at 4/8/2019 10:13 AM CDT -----  Contact: self/[phone]  Patient called in regards needing to cancel her appointment from today. Patient said that she is not feeling well. Thank you

## 2019-04-08 NOTE — PROGRESS NOTES
"Last 5 Patient Entered Readings                                      Current 30 Day Average: 158/70     Recent Readings 4/7/2019 4/6/2019 4/5/2019 4/4/2019 4/3/2019    SBP (mmHg) 165 147 154 152 170    DBP (mmHg) 68 67 73 68 78    Pulse 60 58 66 60 62        Digital Medicine: Health  Follow Up    Patient reports that she is "very upset" that her BP won't come down. Describes that she is "disgusted" with her elevated BP, and isn't sure "what she is doing wrong". I assured patient that she is doing all the right things, and that sometimes elevated blood pressure management is an ongoing process. Reminded patient to stay patient, and that she has been doing well diligently checking her BP and staying on track with sodium reduction and PA.     Patient has an upcoming trip by herself to [place], and she states that while she isn't afraid of travelling alone, she would like her BP to be better controlled by then.     Lifestyle Modifications:    1.Dietary Modifications (Sodium intake <2,000mg/day, food labels, dining out):    Patient has been careful to cut down even more on her sodium intake. Patient tries to eat vegetables and "keep herself alive", but she continues to have a limited appetite.     2.Physical Activity:    Continues to maintain her physical activity routine [facility]    3.Medication Therapy: Patient has been compliant with the medication regimen.   Patient wonders if her celexa impacts her BP. Questions carvedilol as well, but I explained that this is considered a blood pressure medication. Informed patient that I would pass along her concerns to her clinical pharmacist, Mar.     4.Patient has the following medication side effects/concerns:   (Frequency/Alleviating factors/Precipitating factors, etc.)     Follow up with Ms. Elen Peters completed. No further questions or concerns. Will continue to follow up to achieve health goals.      "

## 2019-04-10 ENCOUNTER — PES CALL (OUTPATIENT)
Dept: ADMINISTRATIVE | Facility: CLINIC | Age: 84
End: 2019-04-10

## 2019-04-16 ENCOUNTER — PATIENT OUTREACH (OUTPATIENT)
Dept: OTHER | Facility: OTHER | Age: 84
End: 2019-04-16

## 2019-04-16 DIAGNOSIS — I10 HTN (HYPERTENSION), BENIGN: Primary | ICD-10-CM

## 2019-04-16 NOTE — PROGRESS NOTES
Last 5 Patient Entered Readings                                      Current 30 Day Average: 160/70     Recent Readings 4/23/2019 4/22/2019 4/22/2019 4/21/2019 4/20/2019    SBP (mmHg) 164 157 184 174 142    DBP (mmHg) 72 65 74 71 64    Pulse 60 66 65 64 75        Per 30 day average, 160/70 mmHg, patient's BP is not at goal.     Ms. Peters's BP remains elevated. Will change irbesartan to valsartan. If BP remains elevated, will increase carvedilol to 12.5 mg BID.     Will continue to monitor regularly. Will follow up in 2-3 weeks, sooner if BP begins to trend upward or downward.    Asked patient to call or message with questions or concerns.     Current HTN regimen:  Hypertension Medications             carvedilol (COREG) 6.25 MG tablet Take 1 tablet (6.25 mg total) by mouth 2 (two) times daily with meals.    nitroGLYCERIN (NITROSTAT) 0.4 MG SL tablet Place 1 tablet (0.4 mg total) under the tongue every 5 (five) minutes as needed for Chest pain.    valsartan (DIOVAN) 320 MG tablet Take 1 tablet (320 mg total) by mouth once daily.

## 2019-04-25 RX ORDER — VALSARTAN 320 MG/1
320 TABLET ORAL DAILY
Qty: 90 TABLET | Refills: 1 | Status: SHIPPED | OUTPATIENT
Start: 2019-04-25 | End: 2019-05-27 | Stop reason: ALTCHOICE

## 2019-05-02 ENCOUNTER — PATIENT OUTREACH (OUTPATIENT)
Dept: OTHER | Facility: OTHER | Age: 84
End: 2019-05-02

## 2019-05-02 NOTE — PROGRESS NOTES
Last 5 Patient Entered Readings                                      Current 30 Day Average: 161/70     Recent Readings 4/29/2019 4/28/2019 4/28/2019 4/27/2019 4/26/2019    SBP (mmHg) 163 169 191 148 161    DBP (mmHg) 61 66 75 68 70    Pulse 63 60 58 56 63        Ms. Peters has not received valsartan prescription yet. Verified that it was sent to [facility]. She will call to confirm they received it and request to have it sent out.     Per 30 day average, 161/70 mmHg, patient's BP is not at goal.     Will continue to monitor regularly. Will follow up in 2-3 weeks, sooner if BP begins to trend upward or downward.    Asked patient to call or message with questions or concerns.     Current HTN regimen:  Hypertension Medications             carvedilol (COREG) 6.25 MG tablet Take 1 tablet (6.25 mg total) by mouth 2 (two) times daily with meals.    nitroGLYCERIN (NITROSTAT) 0.4 MG SL tablet Place 1 tablet (0.4 mg total) under the tongue every 5 (five) minutes as needed for Chest pain.    valsartan (DIOVAN) 320 MG tablet Take 1 tablet (320 mg total) by mouth once daily.

## 2019-05-02 NOTE — PROGRESS NOTES
Last 5 Patient Entered Readings                                      Current 30 Day Average: 161/70     Recent Readings 4/29/2019 4/28/2019 4/28/2019 4/27/2019 4/26/2019    SBP (mmHg) 163 169 191 148 161    DBP (mmHg) 61 66 75 68 70    Pulse 63 60 58 56 63        Deferred call today due to pharmD follow up (patient called her )

## 2019-05-11 ENCOUNTER — OFFICE VISIT (OUTPATIENT)
Dept: URGENT CARE | Facility: CLINIC | Age: 84
End: 2019-05-11
Payer: MEDICARE

## 2019-05-11 VITALS
RESPIRATION RATE: 18 BRPM | OXYGEN SATURATION: 96 % | SYSTOLIC BLOOD PRESSURE: 164 MMHG | HEART RATE: 60 BPM | DIASTOLIC BLOOD PRESSURE: 70 MMHG | BODY MASS INDEX: 22.15 KG/M2 | WEIGHT: 125 LBS | HEIGHT: 63 IN | TEMPERATURE: 99 F

## 2019-05-11 DIAGNOSIS — N30.00 ACUTE CYSTITIS WITHOUT HEMATURIA: Primary | ICD-10-CM

## 2019-05-11 DIAGNOSIS — R35.0 FREQUENCY OF URINATION: ICD-10-CM

## 2019-05-11 LAB
BILIRUB UR QL STRIP: NEGATIVE
GLUCOSE UR QL STRIP: NEGATIVE
KETONES UR QL STRIP: NEGATIVE
LEUKOCYTE ESTERASE UR QL STRIP: NEGATIVE
PH, POC UA: 7
POC BLOOD, URINE: NEGATIVE
POC NITRATES, URINE: NEGATIVE
PROT UR QL STRIP: NEGATIVE
SP GR UR STRIP: 1.02 (ref 1–1.03)
UROBILINOGEN UR STRIP-ACNC: NORMAL (ref 0.1–1.1)

## 2019-05-11 PROCEDURE — 99214 OFFICE O/P EST MOD 30 MIN: CPT | Mod: 25,S$GLB,, | Performed by: NURSE PRACTITIONER

## 2019-05-11 PROCEDURE — 99214 PR OFFICE/OUTPT VISIT, EST, LEVL IV, 30-39 MIN: ICD-10-PCS | Mod: 25,S$GLB,, | Performed by: NURSE PRACTITIONER

## 2019-05-11 PROCEDURE — 81003 URINALYSIS AUTO W/O SCOPE: CPT | Mod: QW,S$GLB,, | Performed by: NURSE PRACTITIONER

## 2019-05-11 PROCEDURE — 81003 POCT URINALYSIS, DIPSTICK, AUTOMATED, W/O SCOPE: ICD-10-PCS | Mod: QW,S$GLB,, | Performed by: NURSE PRACTITIONER

## 2019-05-11 PROCEDURE — 87086 URINE CULTURE/COLONY COUNT: CPT | Mod: HCNC

## 2019-05-11 PROCEDURE — 1101F PR PT FALLS ASSESS DOC 0-1 FALLS W/OUT INJ PAST YR: ICD-10-PCS | Mod: CPTII,S$GLB,, | Performed by: NURSE PRACTITIONER

## 2019-05-11 PROCEDURE — 1101F PT FALLS ASSESS-DOCD LE1/YR: CPT | Mod: CPTII,S$GLB,, | Performed by: NURSE PRACTITIONER

## 2019-05-11 RX ORDER — CIPROFLOXACIN 500 MG/1
500 TABLET ORAL 2 TIMES DAILY
Qty: 14 TABLET | Refills: 0 | Status: SHIPPED | OUTPATIENT
Start: 2019-05-11 | End: 2019-05-18

## 2019-05-11 NOTE — PATIENT INSTRUCTIONS
"                                                                    UTI   If your condition worsens or fails to improve we recommend that you receive another evaluation at the ER immediately or contact your PCP to discuss your concerns or return here. You must understand that you've received an urgent care treatment only and that you may be released before all your medical problems are known or treated. You the patient will arrange for followup care as instructed.   If you had cultures done it will take 3-5 days to result. We will call you with the result.   If you are are female and on BCP use additional methods to prevent pregnancy while on the antibiotics and for one cycle after.   Cranberry juice may help. Get the 100% cranberry juice and mix 4 oz of juice with 4 oz of water and drink this 8 oz glass of liquid once a day.   Increase water intake to at least 8-10 glasses/day.  Do not wear contacts with Pyridium as it will stain them.  You may want to wear a panty liner with Pyridium as it may stain your underwear.  Avoid caffeine, alcohol, or spicy foods as they irritate the bladder.        Bladder Infection, Female (Adult)    Urine is normally doesn't have any bacteria in it. But bacteria can get into the urinary tract from the skin around the rectum. Or they can travel in the blood from elsewhere in the body. Once they are in your urinary tract, they can cause infection in the urethra (urethritis), the bladder (cystitis), or the kidneys (pyelonephritis).  The most common place for an infection is in the bladder. This is called a bladder infection. This is one of the most common infections in women. Most bladder infections are easily treated. They are not serious unless the infection spreads to the kidney.  The phrases "bladder infection," "UTI," and "cystitis" are often used to describe the same thing. But they are not always the same. Cystitis is an inflammation of the bladder. The most common cause of " cystitis is an infection.  Symptoms  The infection causes inflammation in the urethra and bladder. This causes many of the symptoms. The most common symptoms of a bladder infection are:  · Pain or burning when urinating  · Having to urinate more often than usual  · Urgent need to urinate  · Only a small amount of urine comes out  · Blood in urine  · Abdominal discomfort. This is usually in the lower abdomen above the pubic bone.  · Cloudy urine  · Strong- or bad-smelling urine  · Unable to urinate (urinary retention)  · Unable to hold urine in (urinary incontinence)  · Fever  · Loss of appetite  · Confusion (in older adults)  Causes  Bladder infections are not contagious. You can't get one from someone else, from a toilet seat, or from sharing a bath.  The most common cause of bladder infections is bacteria from the bowels. The bacteria get onto the skin around the opening of the urethra. From there, they can get into the urine and travel up to the bladder, causing inflammation and infection. This usually happens because of:  · Wiping improperly after urinating. Always wipe from front to back.  · Bowel incontinence  · Pregnancy  · Procedures such as having a catheter inserted  · Older age  · Not emptying your bladder. This can allow bacteria a chance to grow in your urine.  · Dehydration  · Constipation  · Sex  · Use of a diaphragm for birth control   Treatment  Bladder infections are diagnosed by a urine test. They are treated with antibiotics and usually clear up quickly without complications. Treatment helps prevent a more serious kidney infection.  Medicines  Medicines can help in the treatment of a bladder infection:  · Take antibiotics until they are used up, even if you feel better. It is important to finish them to make sure the infection has cleared.  · You can use acetaminophen or ibuprofen for pain, fever, or discomfort, unless another medicine was prescribed. If you have chronic liver or kidney disease,  talk with your healthcare provider before using these medicines. Also talk with your provider if you've ever had a stomach ulcer or gastrointestinal bleeding, or are taking blood-thinner medicines.  · If you are given phenazopydridine to reduce burning with urination, it will cause your urine to become a bright orange color. This can stain clothing.  Care and prevention  These self-care steps can help prevent future infections:  · Drink plenty of fluids to prevent dehydration and flush out your bladder. Do this unless you must restrict fluids for other health reasons, or your doctor told you not to.  · Proper cleaning after going to the bathroom is important. Wipe from front to back after using the toilet to prevent the spread of bacteria.  · Urinate more often. Don't try to hold urine in for a long time.  · Wear loose-fitting clothes and cotton underwear. Avoid tight-fitting pants.  · Improve your diet and prevent constipation. Eat more fresh fruit and vegetables, and fiber, and less junk and fatty foods.  · Avoid sex until your symptoms are gone.  · Avoid caffeine, alcohol, and spicy foods. These can irritate your bladder.  · Urinate right after intercourse to flush out your bladder.  · If you use birth control pills and have frequent bladder infections, discuss it with your doctor.  Follow-up care  Call your healthcare provider if all symptoms are not gone after 3 days of treatment. This is especially important if you have repeat infections.  If a culture was done, you will be told if your treatment needs to be changed. If directed, you can call to find out the results.  If X-rays were done, you will be told if the results will affect your treatment.  Call 911  Call 911 if any of the following occur:  · Trouble breathing  · Hard to wake up or confusion  · Fainting or loss of consciousness  · Rapid heart rate  When to seek medical advice  Call your healthcare provider right away if any of these occur:  · Fever of  100.4ºF (38.0ºC) or higher, or as directed by your healthcare provider  · Symptoms are not better by the third day of treatment  · Back or belly (abdominal) pain that gets worse  · Repeated vomiting, or unable to keep medicine down  · Weakness or dizziness  · Vaginal discharge  · Pain, redness, or swelling in the outer vaginal area (labia)  Date Last Reviewed: 10/1/2016  © 6080-2226 [employer]. [address] [place] [address], [place] [address]. All rights reserved. This information is not intended as a substitute for professional medical care. Always follow your healthcare professional's instructions.

## 2019-05-11 NOTE — PROGRESS NOTES
"Subjective:       Patient ID: Elen Peters is a 89 y.o. female.    Vitals:    05/11/19 1445   BP: (!) 164/70   Pulse: 60   Resp: 18   Temp: 98.7 °F (37.1 °C)   SpO2: 96%   Weight: 56.7 kg (125 lb)   Height: 5' 3" (1.6 m)       Chief Complaint: Urinary Frequency (1 week now ) and Back Pain (lower )    Patient presents with c/o urinary urgency, frequency, dysuria with back pain for over a week.  Patient states that she just realized today that she probably has a UTI.  No injury.  Denies numbness, weakness, loss of bowel/bladder control.  She is a swimmer and orignaly thought that maybe she just pulled something in her back but it wasn't getting better and she started to have the  symptoms.  Patient is requesting cipro.  No recent UTI.  Denies fever or hematuria.      Urinary Frequency    This is a new problem. The current episode started 1 to 4 weeks ago (1 week now ). The problem has been gradually worsening. The quality of the pain is described as aching. The pain is at a severity of 4/10. The patient is experiencing no pain. There has been no fever. She is not sexually active. Associated symptoms include frequency and urgency. Pertinent negatives include no behavior changes, chills, discharge, flank pain, hematuria, hesitancy, nausea, possible pregnancy, sweats, vomiting, weight loss, bubble bath use, constipation, rash or withholding. She has tried NSAIDs (1 aleve ) for the symptoms. The treatment provided no relief. Her past medical history is significant for hypertension. There is no history of recurrent UTIs or a single kidney.     Review of Systems   Constitution: Negative for chills, fever, night sweats and weight loss.   Skin: Negative for itching and rash.   Musculoskeletal: Positive for back pain.   Gastrointestinal: Negative for abdominal pain, constipation, nausea and vomiting.   Genitourinary: Positive for dysuria, frequency and urgency. Negative for flank pain, genital sores, hematuria, hesitancy, " missed menses and non-menstrual bleeding.       Objective:      Physical Exam   Constitutional: She is oriented to person, place, and time. She appears well-developed and well-nourished.   HENT:   Head: Normocephalic and atraumatic.   Right Ear: External ear normal.   Left Ear: External ear normal.   Nose: Nose normal. No nasal deformity. No epistaxis.   Mouth/Throat: Oropharynx is clear and moist and mucous membranes are normal.   Eyes: Conjunctivae and lids are normal.   Neck: Trachea normal, normal range of motion and phonation normal. Neck supple.   Cardiovascular: Normal rate, regular rhythm, normal heart sounds and normal pulses.   Pulmonary/Chest: Effort normal and breath sounds normal.   Abdominal: Soft. Normal appearance and bowel sounds are normal. She exhibits no distension and no mass. There is tenderness in the suprapubic area. There is no rigidity, no rebound, no guarding and no CVA tenderness.   TTP over the bladder   Neurological: She is alert and oriented to person, place, and time.   Skin: Skin is warm, dry and intact.   Psychiatric: She has a normal mood and affect. Her speech is normal and behavior is normal. Cognition and memory are normal.   Nursing note and vitals reviewed.      Results for orders placed or performed in visit on 05/11/19   POCT Urinalysis, Dipstick, Automated, W/O Scope   Result Value Ref Range    POC Blood, Urine Negative Negative    POC Bilirubin, Urine Negative Negative    POC Urobilinogen, Urine normal 0.1 - 1.1    POC Ketones, Urine Negative Negative    POC Protein, Urine Negative Negative    POC Nitrates, Urine Negative Negative    POC Glucose, Urine Negative Negative    pH, UA 7.0     POC Specific Gravity, Urine 1.020 1.003 - 1.029    POC Leukocytes, Urine Negative Negative     Assessment:       1. Acute cystitis without hematuria    2. Frequency of urination        Plan:       Elen was seen today for urinary frequency and back pain.    Diagnoses and all orders for  this visit:    Acute cystitis without hematuria  -     Urine culture  -     ciprofloxacin HCl (CIPRO) 500 MG tablet; Take 1 tablet (500 mg total) by mouth 2 (two) times daily. for 7 days    Frequency of urination  -     POCT Urinalysis, Dipstick, Automated, W/O Scope      Patient Instructions                                                                       UTI   If your condition worsens or fails to improve we recommend that you receive another evaluation at the ER immediately or contact your PCP to discuss your concerns or return here. You must understand that you've received an urgent care treatment only and that you may be released before all your medical problems are known or treated. You the patient will arrange for followup care as instructed.   If you had cultures done it will take 3-5 days to result. We will call you with the result.   If you are are female and on BCP use additional methods to prevent pregnancy while on the antibiotics and for one cycle after.   Cranberry juice may help. Get the 100% cranberry juice and mix 4 oz of juice with 4 oz of water and drink this 8 oz glass of liquid once a day.   Increase water intake to at least 8-10 glasses/day.  Do not wear contacts with Pyridium as it will stain them.  You may want to wear a panty liner with Pyridium as it may stain your underwear.  Avoid caffeine, alcohol, or spicy foods as they irritate the bladder.        Bladder Infection, Female (Adult)    Urine is normally doesn't have any bacteria in it. But bacteria can get into the urinary tract from the skin around the rectum. Or they can travel in the blood from elsewhere in the body. Once they are in your urinary tract, they can cause infection in the urethra (urethritis), the bladder (cystitis), or the kidneys (pyelonephritis).  The most common place for an infection is in the bladder. This is called a bladder infection. This is one of the most common infections in women. Most bladder  "infections are easily treated. They are not serious unless the infection spreads to the kidney.  The phrases "bladder infection," "UTI," and "cystitis" are often used to describe the same thing. But they are not always the same. Cystitis is an inflammation of the bladder. The most common cause of cystitis is an infection.  Symptoms  The infection causes inflammation in the urethra and bladder. This causes many of the symptoms. The most common symptoms of a bladder infection are:  · Pain or burning when urinating  · Having to urinate more often than usual  · Urgent need to urinate  · Only a small amount of urine comes out  · Blood in urine  · Abdominal discomfort. This is usually in the lower abdomen above the pubic bone.  · Cloudy urine  · Strong- or bad-smelling urine  · Unable to urinate (urinary retention)  · Unable to hold urine in (urinary incontinence)  · Fever  · Loss of appetite  · Confusion (in older adults)  Causes  Bladder infections are not contagious. You can't get one from someone else, from a toilet seat, or from sharing a bath.  The most common cause of bladder infections is bacteria from the bowels. The bacteria get onto the skin around the opening of the urethra. From there, they can get into the urine and travel up to the bladder, causing inflammation and infection. This usually happens because of:  · Wiping improperly after urinating. Always wipe from front to back.  · Bowel incontinence  · Pregnancy  · Procedures such as having a catheter inserted  · Older age  · Not emptying your bladder. This can allow bacteria a chance to grow in your urine.  · Dehydration  · Constipation  · Sex  · Use of a diaphragm for birth control   Treatment  Bladder infections are diagnosed by a urine test. They are treated with antibiotics and usually clear up quickly without complications. Treatment helps prevent a more serious kidney infection.  Medicines  Medicines can help in the treatment of a bladder " infection:  · Take antibiotics until they are used up, even if you feel better. It is important to finish them to make sure the infection has cleared.  · You can use acetaminophen or ibuprofen for pain, fever, or discomfort, unless another medicine was prescribed. If you have chronic liver or kidney disease, talk with your healthcare provider before using these medicines. Also talk with your provider if you've ever had a stomach ulcer or gastrointestinal bleeding, or are taking blood-thinner medicines.  · If you are given phenazopydridine to reduce burning with urination, it will cause your urine to become a bright orange color. This can stain clothing.  Care and prevention  These self-care steps can help prevent future infections:  · Drink plenty of fluids to prevent dehydration and flush out your bladder. Do this unless you must restrict fluids for other health reasons, or your doctor told you not to.  · Proper cleaning after going to the bathroom is important. Wipe from front to back after using the toilet to prevent the spread of bacteria.  · Urinate more often. Don't try to hold urine in for a long time.  · Wear loose-fitting clothes and cotton underwear. Avoid tight-fitting pants.  · Improve your diet and prevent constipation. Eat more fresh fruit and vegetables, and fiber, and less junk and fatty foods.  · Avoid sex until your symptoms are gone.  · Avoid caffeine, alcohol, and spicy foods. These can irritate your bladder.  · Urinate right after intercourse to flush out your bladder.  · If you use birth control pills and have frequent bladder infections, discuss it with your doctor.  Follow-up care  Call your healthcare provider if all symptoms are not gone after 3 days of treatment. This is especially important if you have repeat infections.  If a culture was done, you will be told if your treatment needs to be changed. If directed, you can call to find out the results.  If X-rays were done, you will be told  if the results will affect your treatment.  Call 911  Call 911 if any of the following occur:  · Trouble breathing  · Hard to wake up or confusion  · Fainting or loss of consciousness  · Rapid heart rate  When to seek medical advice  Call your healthcare provider right away if any of these occur:  · Fever of 100.4ºF (38.0ºC) or higher, or as directed by your healthcare provider  · Symptoms are not better by the third day of treatment  · Back or belly (abdominal) pain that gets worse  · Repeated vomiting, or unable to keep medicine down  · Weakness or dizziness  · Vaginal discharge  · Pain, redness, or swelling in the outer vaginal area (labia)  Date Last Reviewed: 10/1/2016  © 9670-8975 [employer]. [address] [place] [address], [place] [address]. All rights reserved. This information is not intended as a substitute for professional medical care. Always follow your healthcare professional's instructions.

## 2019-05-13 LAB
BACTERIA UR CULT: NORMAL
BACTERIA UR CULT: NORMAL

## 2019-05-14 ENCOUNTER — TELEPHONE (OUTPATIENT)
Dept: URGENT CARE | Facility: CLINIC | Age: 84
End: 2019-05-14

## 2019-05-14 DIAGNOSIS — M54.5 ACUTE LOW BACK PAIN, UNSPECIFIED BACK PAIN LATERALITY, WITH SCIATICA PRESENCE UNSPECIFIED: Primary | ICD-10-CM

## 2019-05-14 DIAGNOSIS — R30.0 DYSURIA: ICD-10-CM

## 2019-05-17 ENCOUNTER — CLINICAL SUPPORT (OUTPATIENT)
Dept: URGENT CARE | Facility: CLINIC | Age: 84
End: 2019-05-17
Payer: MEDICARE

## 2019-05-17 DIAGNOSIS — R30.0 DYSURIA: Primary | ICD-10-CM

## 2019-05-17 PROCEDURE — 99000 PR SPECIMEN HANDLING,DR OFF->LAB: ICD-10-PCS | Mod: S$GLB,,, | Performed by: EMERGENCY MEDICINE

## 2019-05-17 PROCEDURE — 87086 URINE CULTURE/COLONY COUNT: CPT | Mod: HCNC

## 2019-05-17 PROCEDURE — 99000 SPECIMEN HANDLING OFFICE-LAB: CPT | Mod: S$GLB,,, | Performed by: EMERGENCY MEDICINE

## 2019-05-17 NOTE — PROGRESS NOTES
Pt here for repeat urine cx. Pt states she came in to repeat urine cx on 5/14 and it was sent out. Lab states they never received the cx.

## 2019-05-19 LAB — BACTERIA UR CULT: NO GROWTH

## 2019-05-20 ENCOUNTER — PATIENT OUTREACH (OUTPATIENT)
Dept: OTHER | Facility: OTHER | Age: 84
End: 2019-05-20

## 2019-05-20 NOTE — PROGRESS NOTES
"Last 5 Patient Entered Readings                                      Current 30 Day Average: 164/71     Recent Readings 5/19/2019 5/18/2019 5/17/2019 5/16/2019 5/15/2019    SBP (mmHg) 178 158 152 164 164    DBP (mmHg) 81 68 66 71 66    Pulse 62 65 62 68 63        Digital Medicine: Health  Follow Up    Patient notices that she is in the habit of taking her BP at the same time in the evenings. Patient believes that her BP has "a lot to do with her personality". She notes that yesterday that she took that reading with a house full of people, and wasn't perfectly calm. Patient believes she is easily upset/excitable, but agrees to continue to work on relaxing before readings. Sent breathing exercises via mail. Patient complains that she feels that her BP is a "hopeless case", but I reminded her that her attitude and her expectations about her BP might be negatively impacting her reading. Encouragement provided    Patient discusses her excitement over her upcoming cruise in June to [place]. 6/1-6/13 scheduled hiatus    Lifestyle Modifications:    1.Dietary Modifications (Sodium intake <2,000mg/day, food labels, dining out):    Patient reports that she doesn't eat much, and never "gorges on salt".     2.Physical Activity:    Continues doing swimming on Monday-Wednesday mornings.     3.Medication Therapy: Patient has been compliant with the medication regimen.   Patient reports that she received her new medication and has been taking for about 2 weeks. States that she "hasn't notice much change".    4.Patient has the following medication side effects/concerns:   (Frequency/Alleviating factors/Precipitating factors, etc.)     Follow up with Ms. Elen Peters completed. No further questions or concerns. Will continue to follow up to achieve health goals.      "

## 2019-05-22 DIAGNOSIS — K21.9 CHEST PAIN DUE TO GERD: ICD-10-CM

## 2019-05-22 DIAGNOSIS — R07.9 CHEST PAIN, UNSPECIFIED TYPE: ICD-10-CM

## 2019-05-22 DIAGNOSIS — R07.9 CHEST PAIN DUE TO GERD: ICD-10-CM

## 2019-05-22 RX ORDER — ESOMEPRAZOLE MAGNESIUM 40 MG/1
CAPSULE, DELAYED RELEASE ORAL
Qty: 90 CAPSULE | Refills: 3 | Status: SHIPPED | OUTPATIENT
Start: 2019-05-22 | End: 2020-06-10 | Stop reason: SDUPTHER

## 2019-05-24 ENCOUNTER — TELEPHONE (OUTPATIENT)
Dept: URGENT CARE | Facility: CLINIC | Age: 84
End: 2019-05-24

## 2019-05-27 ENCOUNTER — PATIENT OUTREACH (OUTPATIENT)
Dept: OTHER | Facility: OTHER | Age: 84
End: 2019-05-27

## 2019-05-27 ENCOUNTER — TELEPHONE (OUTPATIENT)
Dept: INTERNAL MEDICINE | Facility: CLINIC | Age: 84
End: 2019-05-27

## 2019-05-27 DIAGNOSIS — I10 HTN (HYPERTENSION), BENIGN: Primary | ICD-10-CM

## 2019-05-27 RX ORDER — HYDRALAZINE HYDROCHLORIDE 25 MG/1
25 TABLET, FILM COATED ORAL EVERY 12 HOURS
Qty: 60 TABLET | Refills: 3 | Status: SHIPPED | OUTPATIENT
Start: 2019-05-27 | End: 2019-07-09 | Stop reason: ALTCHOICE

## 2019-05-27 NOTE — TELEPHONE ENCOUNTER
----- Message from Carmen Terrell sent at 5/27/2019 12:54 PM CDT -----  Contact: Patient [phone]  Requesting an earlier appt date or time    Next available appt: None    Nature of the appt: 6 month follow up    Does patient have appt scheduled?: No    Please contact patient to schedule earlier appt if available but notify patient either way.    Thank You

## 2019-05-27 NOTE — TELEPHONE ENCOUNTER
----- Message from Carmen Terrell sent at 5/27/2019 12:54 PM CDT -----  Contact: Patient [phone]  Requesting an earlier appt date or time    Next available appt: None    Nature of the appt: 6 month follow up    Does patient have appt scheduled?: No    Please contact patient to schedule earlier appt if available but notify patient either way.    Thank You

## 2019-05-27 NOTE — PROGRESS NOTES
Last 5 Patient Entered Readings                                      Current 30 Day Average: 163/71     Recent Readings 5/26/2019 5/25/2019 5/24/2019 5/23/2019 5/22/2019    SBP (mmHg) 170 136 150 183 147    DBP (mmHg) 79 60 67 76 65    Pulse 61 67 64 65 65        Ms. Olivarez BP is not responding to valsartan. She has stopped taking it as of 2 days ago because she says it is causing her back to hurt and frequent trips to urinate overnight. Will stop valsartan. Will not restart any ARB as BP does not seem to be responding. She could not tolerate amlodipine due to LALO. She will be leaving for [place] for 2 weeks this Saturday. Would like to start spironolactone, but due to her upcoming trip and inability to check potassium, will hold off and will start hydralazine. Will follow up prior to her trip on Saturday.    Per 30 day average, 163/71 mmHg, patient's BP is not at goal.     Patient denies s/s of hypotension (lightheadedness, dizziness, nausea, fatigue) associated with low readings. Instructed patient to inform me if this occurs, patient confirms understanding.      Patient denies s/s of hypertension (SOB, CP, severe headaches, changes in vision) associated with high readings. Instructed patient to go to the ED if BP > 180/110 and accompanied by hypertensive s/s, patient confirms understanding.    Will continue to monitor regularly. Will follow up in 2-3 weeks, sooner if BP begins to trend upward or downward.    Asked patient to call or message with questions or concerns.     Current HTN regimen:  Hypertension Medications             carvedilol (COREG) 6.25 MG tablet Take 1 tablet (6.25 mg total) by mouth 2 (two) times daily with meals.    hydrALAZINE (APRESOLINE) 25 MG tablet Take 1 tablet (25 mg total) by mouth every 12 (twelve) hours.    nitroGLYCERIN (NITROSTAT) 0.4 MG SL tablet Place 1 tablet (0.4 mg total) under the tongue every 5 (five) minutes as needed for Chest pain.

## 2019-05-30 ENCOUNTER — PATIENT OUTREACH (OUTPATIENT)
Dept: OTHER | Facility: OTHER | Age: 84
End: 2019-05-30

## 2019-05-30 DIAGNOSIS — I10 HTN (HYPERTENSION), BENIGN: Primary | ICD-10-CM

## 2019-05-30 NOTE — PROGRESS NOTES
Last 5 Patient Entered Readings                                      Current 30 Day Average: 163/72     Recent Readings 5/28/2019 5/27/2019 5/26/2019 5/25/2019 5/24/2019    SBP (mmHg) 144 167 170 136 150    DBP (mmHg) 68 68 79 60 67    Pulse 70 59 61 67 64        Deferring follow up until patient returns from her trip to [place]. PharmD follow up tomorrow

## 2019-05-30 NOTE — PROGRESS NOTES
Last 5 Patient Entered Readings                                      Current 30 Day Average: 163/72     Recent Readings 5/28/2019 5/27/2019 5/26/2019 5/25/2019 5/24/2019    SBP (mmHg) 144 167 170 136 150    DBP (mmHg) 68 68 79 60 67    Pulse 70 59 61 67 64        LVM to discuss BP readings and medications.

## 2019-06-13 ENCOUNTER — TELEPHONE (OUTPATIENT)
Dept: INTERNAL MEDICINE | Facility: CLINIC | Age: 84
End: 2019-06-13

## 2019-06-13 NOTE — TELEPHONE ENCOUNTER
----- Message from Lyn Jones sent at 6/13/2019  2:59 PM CDT -----  Contact: Patient [phone]  Pt states that she is calling to speak with Ms.Linda SCHULER. She states that she would like for you to give her a call. Please advise.      Thanks

## 2019-06-17 ENCOUNTER — TELEPHONE (OUTPATIENT)
Dept: INTERNAL MEDICINE | Facility: CLINIC | Age: 84
End: 2019-06-17

## 2019-06-17 NOTE — TELEPHONE ENCOUNTER
patient wanting to schedule appt with Dr Treadwell notified it is already scheduled. I will mail appt to her today.

## 2019-06-17 NOTE — TELEPHONE ENCOUNTER
----- Message from Lyn Jones sent at 6/17/2019 10:11 AM CDT -----  Contact: Patient [phone]   Type: Returning a call    Who left a message?Antonette Sung MA    When did the practice call?Today    Comments:Please call back.      Thanks

## 2019-06-18 RX ORDER — SPIRONOLACTONE 25 MG/1
25 TABLET ORAL DAILY
Qty: 90 TABLET | Refills: 1 | Status: SHIPPED | OUTPATIENT
Start: 2019-06-18 | End: 2019-08-29

## 2019-06-18 NOTE — PROGRESS NOTES
Last 5 Patient Entered Readings                                      Current 30 Day Average: 163/71     Recent Readings 6/17/2019 6/16/2019 6/16/2019 6/15/2019 6/13/2019    SBP (mmHg) 152 163 163 182 182    DBP (mmHg) 66 65 73 76 75    Pulse 64 62 61 60 71        Ms. Peters has returned from her trip to [place] as of last week. Her BP remains elevated on hydralazine. Will start spironolactone 25 mg QD and check BMP in 2-3 weeks. She will stop hydralazine once she receives spironolactone from mail order pharmacy.     Per 30 day average, 163/71 mmHg, patient's BP is not at goal.     Patient denies s/s of hypertension (SOB, CP, severe headaches, changes in vision) associated with high readings. Instructed patient to go to the ED if BP > 180/110 and accompanied by hypertensive s/s, patient confirms understanding.    Will continue to monitor regularly. Will follow up in 2-3 weeks, sooner if BP begins to trend upward or downward.    Asked patient to call or message with questions or concerns.     Current HTN regimen:  Hypertension Medications             carvedilol (COREG) 6.25 MG tablet Take 1 tablet (6.25 mg total) by mouth 2 (two) times daily with meals.    hydrALAZINE (APRESOLINE) 25 MG tablet Take 1 tablet (25 mg total) by mouth every 12 (twelve) hours.    nitroGLYCERIN (NITROSTAT) 0.4 MG SL tablet Place 1 tablet (0.4 mg total) under the tongue every 5 (five) minutes as needed for Chest pain.    spironolactone (ALDACTONE) 25 MG tablet Take 1 tablet (25 mg total) by mouth once daily.

## 2019-06-19 ENCOUNTER — PATIENT OUTREACH (OUTPATIENT)
Dept: OTHER | Facility: OTHER | Age: 84
End: 2019-06-19

## 2019-06-19 NOTE — PROGRESS NOTES
"Last 5 Patient Entered Readings                                      Current 30 Day Average: 162/70     Recent Readings 6/18/2019 6/17/2019 6/16/2019 6/16/2019 6/15/2019    SBP (mmHg) 157 152 163 163 182    DBP (mmHg) 66 66 65 73 76    Pulse 65 64 62 61 60        Digital Medicine: Health  Follow Up    Patient describes her recent cruise to [place].     Patient states that she is constantly anxious about her BP, and she feels like "she would be a different person" if she didn't know about her elevated BP. When asked about activities for stress relief, patient states that she is going back to [place] today, but is anxious about this as well. She describes that she has also tried doing a puzzle, but patient often takes naps. Encouraged patient to relax before readings.     Lifestyle Modifications:    1.Dietary Modifications (Sodium intake <2,000mg/day, food labels, dining out):    Patient states that she feels a lot of gas and bloating after eating. She takes a medication for the acid reflux, but still experiences stomach pain. Patient states, however, that the food was good on her vacation, and she had minimal pain. Patient states that she has anxiety about her food, and eating right in addition to anxiety about her BP.     2.Physical Activity:     3.Medication Therapy: Patient has been compliant with the medication regimen.    4.Patient has the following medication side effects/concerns:   (Frequency/Alleviating factors/Precipitating factors, etc.)     Follow up with Ms. Elen Peters completed. No further questions or concerns. Will continue to follow up to achieve health goals.      "

## 2019-06-20 ENCOUNTER — TELEPHONE (OUTPATIENT)
Dept: OBSTETRICS AND GYNECOLOGY | Facility: CLINIC | Age: 84
End: 2019-06-20

## 2019-06-20 DIAGNOSIS — Z12.31 BREAST CANCER SCREENING BY MAMMOGRAM: Primary | ICD-10-CM

## 2019-06-21 ENCOUNTER — HOSPITAL ENCOUNTER (OUTPATIENT)
Dept: RADIOLOGY | Facility: HOSPITAL | Age: 84
Discharge: HOME | End: 2019-06-21
Attending: OBSTETRICS & GYNECOLOGY
Payer: MEDICARE

## 2019-06-21 ENCOUNTER — PES CALL (OUTPATIENT)
Dept: ADMINISTRATIVE | Facility: CLINIC | Age: 84
End: 2019-06-21

## 2019-06-21 DIAGNOSIS — Z12.31 BREAST CANCER SCREENING BY MAMMOGRAM: ICD-10-CM

## 2019-06-21 PROCEDURE — 77067 MAMMO DIGITAL SCREENING BILAT WITH TOMOSYNTHESIS_CAD: ICD-10-PCS | Mod: 26,HCNC,, | Performed by: RADIOLOGY

## 2019-06-21 PROCEDURE — 77063 BREAST TOMOSYNTHESIS BI: CPT | Mod: 26,HCNC,, | Performed by: RADIOLOGY

## 2019-06-21 PROCEDURE — 77063 MAMMO DIGITAL SCREENING BILAT WITH TOMOSYNTHESIS_CAD: ICD-10-PCS | Mod: 26,HCNC,, | Performed by: RADIOLOGY

## 2019-06-21 PROCEDURE — 77067 SCR MAMMO BI INCL CAD: CPT | Mod: TC,HCNC,PO

## 2019-06-21 PROCEDURE — 77067 SCR MAMMO BI INCL CAD: CPT | Mod: 26,HCNC,, | Performed by: RADIOLOGY

## 2019-06-25 ENCOUNTER — OFFICE VISIT (OUTPATIENT)
Dept: PODIATRY | Facility: CLINIC | Age: 84
End: 2019-06-25
Payer: MEDICARE

## 2019-06-25 VITALS — DIASTOLIC BLOOD PRESSURE: 67 MMHG | SYSTOLIC BLOOD PRESSURE: 150 MMHG | HEART RATE: 67 BPM

## 2019-06-25 DIAGNOSIS — B35.1 DERMATOPHYTOSIS OF NAIL: Primary | ICD-10-CM

## 2019-06-25 PROCEDURE — 99213 PR OFFICE/OUTPT VISIT, EST, LEVL III, 20-29 MIN: ICD-10-PCS | Mod: HCNC,S$GLB,, | Performed by: PODIATRIST

## 2019-06-25 PROCEDURE — 1101F PR PT FALLS ASSESS DOC 0-1 FALLS W/OUT INJ PAST YR: ICD-10-PCS | Mod: HCNC,CPTII,S$GLB, | Performed by: PODIATRIST

## 2019-06-25 PROCEDURE — 1101F PT FALLS ASSESS-DOCD LE1/YR: CPT | Mod: HCNC,CPTII,S$GLB, | Performed by: PODIATRIST

## 2019-06-25 PROCEDURE — 99213 OFFICE O/P EST LOW 20 MIN: CPT | Mod: HCNC,S$GLB,, | Performed by: PODIATRIST

## 2019-06-25 PROCEDURE — 99999 PR PBB SHADOW E&M-EST. PATIENT-LVL III: ICD-10-PCS | Mod: PBBFAC,HCNC,, | Performed by: PODIATRIST

## 2019-06-25 PROCEDURE — 99999 PR PBB SHADOW E&M-EST. PATIENT-LVL III: CPT | Mod: PBBFAC,HCNC,, | Performed by: PODIATRIST

## 2019-06-25 NOTE — PROGRESS NOTES
Subjective:       Patient ID: Elen Peters is a 89 y.o. female.    Chief Complaint: Nail Problem (left foot, big toe fungus)      Elen is a 89 y.o. female who presents to the clinic complaining of thick and discolored toenails on the left foot, states the great toenail fell off and is now yellow and thick. Elen is inquiring about treatment options.      Past Medical History:   Diagnosis Date    Abnormal stress test 11/18/2015    Arthritis     Bladder cancer     Cataract     Coronary artery disease involving native coronary artery 1/6/2016    Depression     GERD (gastroesophageal reflux disease)     HTN (hypertension), benign 11/18/2015    Hx of bladder infections     Hyperlipemia     OP (osteoporosis)     Positive cardiac stress test 1/6/2016    Renal cancer     Syncope 1/6/2016    TMJ (dislocation of temporomandibular joint)     Upper back pain 12/1/2015    Ureteral cancer     Venous insufficiency 2017    Villous adenoma of colon 5/31/2013       Past Surgical History:   Procedure Laterality Date    APPENDECTOMY      BACK SURGERY      CATARACT EXTRACTION W/  INTRAOCULAR LENS IMPLANT Left 3/16/15    kristy    CATARACT EXTRACTION W/  INTRAOCULAR LENS IMPLANT Right 4/6/15    kristy    COLONOSCOPY  10/21/2013    COLONOSCOPY N/A 10/21/2013    [facility] ([facility])    CYSTOSCOPY      EGD (ESOPHAGOGASTRODUODENOSCOPY) N/A 1/30/2019    Per[facility] Dion[facility] Dey [facility] at [facility] ([facility]TH FLR)    EYE SURGERY      INSERTION-INTRAOCULAR LENS (IOL) Right 4/6/2015    [facility] at [facility]    INSERTION-INTRAOCULAR LENS (IOL) Left 3/16/2015    [facility] at [facility]    NEPHRECTOMY      L    PHACOEMULSIFICATION-ASPIRATION-CATARACT Right 4/6/2015    [facility] at [facility]    PHACOEMULSIFICATION-ASPIRATION-CATARACT Left 3/16/2015    [facility] at [facility]    SPINE SURGERY       TONSILLECTOMY, ADENOIDECTOMY         Family History   Problem Relation Age of Onset    Leukemia Mother     Heart disease Mother     Heart attack Mother     Cancer Mother         leukemia    Goiter Mother     Leukemia Father     Cancer Father         leukemia    Heart disease Maternal Grandfather     No Known Problems Brother     No Known Problems Son     No Known Problems Son     No Known Problems Son     Cancer Son         throat    No Known Problems Son     No Known Problems Son     No Known Problems Maternal Aunt     No Known Problems Maternal Uncle     No Known Problems Paternal Aunt     No Known Problems Paternal Uncle     No Known Problems Maternal Grandmother     No Known Problems Paternal Grandmother     No Known Problems Paternal Grandfather     No Known Problems Sister     Amblyopia Neg Hx     Blindness Neg Hx     Cataracts Neg Hx     Diabetes Neg Hx     Glaucoma Neg Hx     Hypertension Neg Hx     Macular degeneration Neg Hx     Retinal detachment Neg Hx     Strabismus Neg Hx     Stroke Neg Hx     Thyroid disease Neg Hx     Breast cancer Neg Hx     Colon cancer Neg Hx     Esophageal cancer Neg Hx        Social History     Socioeconomic History    Marital status:      Spouse name: Not on file    Number of children: Not on file    Years of education: Not on file    Highest education level: Not on file   Occupational History    Occupation: retired   Social Needs    Financial resource strain: Not on file    Food insecurity:     Worry: Not on file     Inability: Not on file    Transportation needs:     Medical: Not on file     Non-medical: Not on file   Tobacco Use    Smoking status: Former Smoker    Smokeless tobacco: Never Used    Tobacco comment: in college   Substance and Sexual Activity    Alcohol use: No    Drug use: No    Sexual activity: Not Currently   Lifestyle    Physical activity:     Days per week: Not on file     Minutes per session: Not  on file    Stress: Not on file   Relationships    Social connections:     Talks on phone: Not on file     Gets together: Not on file     Attends [employer] service: Not on file     Active member of club or organization: Not on file     Attends meetings of clubs or organizations: Not on file     Relationship status: Not on file   Other Topics Concern    Are you pregnant or think you may be? Not Asked    Breast-feeding Not Asked   Social History Narrative    Not on file       Current Outpatient Medications   Medication Sig Dispense Refill    aspirin (ECOTRIN) 81 MG EC tablet Take 81 mg by mouth once daily.      atorvastatin (LIPITOR) 40 MG tablet TAKE 1 TABLET EVERY DAY OR AS DIRECTED (Patient taking differently: TAKE 1 TABLET EVERY DAY OR AS DIRECTED - 1/2 PILL EVERY OTHER DAY) 90 tablet 3    calcium-magnesium-zinc Tab Take 2 tablets by mouth once daily at 6am. 1 Tablet Oral Every day      carvedilol (COREG) 6.25 MG tablet Take 1 tablet (6.25 mg total) by mouth 2 (two) times daily with meals. 180 tablet 3    citalopram (CELEXA) 20 MG tablet Take 1 tablet (20 mg total) by mouth once daily. 90 tablet 3    esomeprazole (NEXIUM) 40 MG capsule TAKE 1 CAPSULE BEFORE BREAKFAST 90 capsule 3    mv-min-folic acid-lutein (THERAGRAN-M ADVANCED 50 PLUS) 0.4-250 mg-mcg Tab Take by mouth. 1 Tablet Oral Every day      omega-3 fatty acids 1,000 mg Cap Take 1 capsule by mouth once daily. 2  Capsule Oral Every day      hydrALAZINE (APRESOLINE) 25 MG tablet Take 1 tablet (25 mg total) by mouth every 12 (twelve) hours. 60 tablet 3    nitroGLYCERIN (NITROSTAT) 0.4 MG SL tablet Place 1 tablet (0.4 mg total) under the tongue every 5 (five) minutes as needed for Chest pain. 25 tablet 3    spironolactone (ALDACTONE) 25 MG tablet Take 1 tablet (25 mg total) by mouth once daily. 90 tablet 1    zolpidem (AMBIEN) 5 MG Tab TAKE ONE TABLET BY MOUTH NIGHTLY 30 tablet 3     No current facility-administered medications for this visit.         Review of patient's allergies indicates:   Allergen Reactions    Sulfa (sulfonamide antibiotics)      Unknown as child    Amlodipine Swelling    Codeine      Other reaction(s): Unknown    Maxzide [triamterene-hydrochlorothiazid]        Review of Systems  ROS:  Constitution: Negative for chills, fever, weakness and malaise/fatigue.   HEENT: Negative for headaches.   Cardiovascular: Negative for chest pain and claudication.   Respiratory: Negative for cough and shortness of breath.   Musculoskeletal: - foot pain.  Negative for muscle cramps and muscle weakness.   Gastrointestinal: Negative for nausea and vomiting.   Neurological: Negative for numbness and paresthesias.   Dermatological: Negative for wound.        Objective:      Physical Exam  Constitutional:  Patient is oriented to person, place, and time. Vital signs are normal.  Appears well-developed and well-nourished.     Vascular:  Dorsalis pedis pulses are 2+ on the right side, and 2+ on the left side.   Posterior tibial pulses are 2+ on the right side, and 2+ on the left side.   + digital hair growth, capillary fill time to all toes <3 seconds, no swelling    Skin/Dermatological:  Skin is warm and intact.  No cyanosis or clubbing.  No rashes noted.  No open wounds.   B/l great thickened, yellow and discolored with subungual debris.     Musculoskeletal:      Full unrestricted range of motion of midtarsal, subtalar joints.  (--)   restriction of ankle joint dorsiflexion or plantarflexion.   Forefoot to rearfoot well  aligned.   Negative anterior drawer and talar tilt, squeeze test.  No tenderness to   ankle/pedal tendons or ligaments.  No crepitus. No assymmetries.        Neurological:  No deficits to sharp/dull, light touch or vibratory sensation.   Muscle strength to tibialis anterior, extensor hallucis longus, extensor digitorum longus, peroneal muscles, flexor hallucis/digotorum longus, posterior tibial and gastrosoleal complex is 5/5, normal tone  without assymmetry   Patellar reflexes are 2+ on the right side and 2+ on the left side.  Achilles reflexes are 2+ on the right side and 2+ on the left side.          Assessment:       1. Dermatophytosis of nail        Plan:       Dermatophytosis of nail          I counseled the patient on the patient's conditions, their implications and medical management.    Powders to shoes, antiperspirants to feet daily.  Lysol spray to shoes twice weekly.   Kerasal to left great toenail daily.  Call or return to clinic prn if these symptoms worsen or fail to improve as anticipated.

## 2019-07-05 ENCOUNTER — OFFICE VISIT (OUTPATIENT)
Dept: OPTOMETRY | Facility: CLINIC | Age: 84
End: 2019-07-05
Payer: MEDICARE

## 2019-07-05 DIAGNOSIS — H52.203 ASTIGMATISM OF BOTH EYES, UNSPECIFIED TYPE: ICD-10-CM

## 2019-07-05 DIAGNOSIS — H53.8 BLURRED VISION, BILATERAL: ICD-10-CM

## 2019-07-05 DIAGNOSIS — Z96.1 PSEUDOPHAKIA OF BOTH EYES: ICD-10-CM

## 2019-07-05 DIAGNOSIS — Z98.42 S/P BILATERAL CATARACT EXTRACTION: ICD-10-CM

## 2019-07-05 DIAGNOSIS — Z98.41 S/P BILATERAL CATARACT EXTRACTION: ICD-10-CM

## 2019-07-05 DIAGNOSIS — Z13.5 SCREENING FOR EYE CONDITION: ICD-10-CM

## 2019-07-05 DIAGNOSIS — H26.493 POSTERIOR CAPSULAR OPACIFICATION OF BOTH EYES, OBSCURING VISION: Primary | ICD-10-CM

## 2019-07-05 PROCEDURE — 92014 COMPRE OPH EXAM EST PT 1/>: CPT | Mod: HCNC,S$GLB,, | Performed by: OPTOMETRIST

## 2019-07-05 PROCEDURE — 99999 PR PBB SHADOW E&M-EST. PATIENT-LVL III: CPT | Mod: PBBFAC,HCNC,, | Performed by: OPTOMETRIST

## 2019-07-05 PROCEDURE — 92014 PR EYE EXAM, EST PATIENT,COMPREHESV: ICD-10-PCS | Mod: HCNC,S$GLB,, | Performed by: OPTOMETRIST

## 2019-07-05 PROCEDURE — 99999 PR PBB SHADOW E&M-EST. PATIENT-LVL III: ICD-10-PCS | Mod: PBBFAC,HCNC,, | Performed by: OPTOMETRIST

## 2019-07-05 PROCEDURE — 92015 DETERMINE REFRACTIVE STATE: CPT | Mod: HCNC,S$GLB,, | Performed by: OPTOMETRIST

## 2019-07-05 PROCEDURE — 92015 PR REFRACTION: ICD-10-PCS | Mod: HCNC,S$GLB,, | Performed by: OPTOMETRIST

## 2019-07-05 NOTE — PROGRESS NOTES
"HPI     Concerns About Ocular Health      Additional comments: Annual general eye exam and refraction.  Only problem she notes is that "I need more light".  "It's like a veil."              Comments     Patient's age: 89 y.o. WF  Occupation: Reitred  Approximate date of last eye examination: 06/07/2017  Name of last eye doctor seen: Dr. Graham   City/State: [place]  Wears glasses? No  Wears CLs?:  No  Headaches?  No  Eye pain/discomfort?  Patient stated she need more light to read                                                                                     Flashes?  No  Floaters?  No  Diplopia/Double vision?  No  Patient's Ocular History:         Any eye surgeries? Cataract SX OU - Monovision          Any eye injury?  No         Any treatment for eye disease?  No  Family history of eye disease?  No  Significant patient medical history:         1. Diabetes?  No   2. HBP?  No              3. Other (describe): none reported   ! OTC eyedrops currently using:  Yes    ! Prescription eye meds currently using:  None   ! Any history of allergy/adverse reaction to any eye meds used   previously?  No    ! Any history of allergy/adverse reaction to eyedrops used during prior   eye exam(s)? No    ! Any history of allergy/adverse reaction to Novacaine or similar meds?   No   ! Any history of allergy/adverse reaction to Epinephrine or similar meds?   No    ! Patient okay with use of anesthetic eyedrops to check eye pressure?    Yes        ! Patient okay with use of eyedrops to dilate pupils today?  Yes   !  Allergies/Medications/Medical History/Family History reviewed today?    Yes      PD =   65/61  Desired reading distance =  14"                                                                     Last edited by Tony Graham, OD on 7/5/2019 10:00 AM. (History)            Assessment /Plan     For exam results, see Encounter Report.    1. Posterior capsular opacification of both eyes, obscuring vision  Ambulatory " Referral to Ophthalmology   2. S/P bilateral cataract extraction     3. Pseudophakia of both eyes     4. Blurred vision, bilateral     5. Astigmatism of both eyes, unspecified type     6. Screening for eye condition                  S/P cataract surgery in both eyes, with bilateral posterior chamber intraocular lens.  Bilateral posterior capsular opacification, negatively impacting VA in each eye.  Otherwise, ocular health appears good in both eyes.     Defer new spectacle lens Rx.   Generate referral to Dr. Booker for evaluation of need for bilateral YAG posterior capsulotomy.  Return to me (Dr. Graham) approximately two weeks after procedure done for recheck of refraction.

## 2019-07-05 NOTE — PATIENT INSTRUCTIONS
S/P cataract surgery in both eyes, with bilateral posterior chamber intraocular lens.  Bilateral posterior capsular opacification, negatively impacting VA in each eye.  Otherwise, ocular health appears good in both eyes.     Defer new spectacle lens Rx.   Generate referral to Dr. Booker for evaluation of need for bilateral YAG posterior capsulotomy.  Return to me (Dr. Graham) approximately two weeks after procedure done for recheck of refraction.

## 2019-07-09 ENCOUNTER — PATIENT OUTREACH (OUTPATIENT)
Dept: OTHER | Facility: OTHER | Age: 84
End: 2019-07-09

## 2019-07-09 DIAGNOSIS — I10 HTN (HYPERTENSION), BENIGN: Primary | ICD-10-CM

## 2019-07-09 NOTE — PROGRESS NOTES
Last 5 Patient Entered Readings                                      Current 30 Day Average: 159/68     Recent Readings 7/8/2019 7/7/2019 7/6/2019 7/3/2019 7/2/2019    SBP (mmHg) 138 152 146 152 160    DBP (mmHg) 59 62 70 65 70    Pulse 59 58 62 64 56        Per 30 day average, 159/68 mmHg, patient's BP is not at goal.     Ms. Winns BP is starting to trend down since starting spironolactone. She has been taking it at night and it is causing her to wake up to urinate. She will switch the timing to the morning going forward. She will remain on this regimen for now. Will check BMP next week prior to her visit with Dr. Treadwell.    Will continue to monitor regularly. Will follow up in 2-3 weeks, sooner if BP begins to trend upward or downward.    Asked patient to call or message with questions or concerns.     Current HTN regimen:  Hypertension Medications             carvedilol (COREG) 6.25 MG tablet Take 1 tablet (6.25 mg total) by mouth 2 (two) times daily with meals.    nitroGLYCERIN (NITROSTAT) 0.4 MG SL tablet Place 1 tablet (0.4 mg total) under the tongue every 5 (five) minutes as needed for Chest pain.    spironolactone (ALDACTONE) 25 MG tablet Take 1 tablet (25 mg total) by mouth once daily.

## 2019-07-17 ENCOUNTER — TELEPHONE (OUTPATIENT)
Dept: INTERNAL MEDICINE | Facility: CLINIC | Age: 84
End: 2019-07-17

## 2019-07-17 ENCOUNTER — PATIENT OUTREACH (OUTPATIENT)
Dept: OTHER | Facility: OTHER | Age: 84
End: 2019-07-17

## 2019-07-17 NOTE — TELEPHONE ENCOUNTER
----- Message from Tangela Christianson sent at 7/17/2019  8:22 AM CDT -----  Contact: [phone]  Patient is requesting a call from the office concerning her appointment she has on Friday, she stated she will take the 1pm.  Please advise, thanks

## 2019-07-19 ENCOUNTER — OFFICE VISIT (OUTPATIENT)
Dept: INTERNAL MEDICINE | Facility: CLINIC | Age: 84
End: 2019-07-19
Payer: MEDICARE

## 2019-07-19 VITALS
HEIGHT: 63 IN | BODY MASS INDEX: 22.81 KG/M2 | HEART RATE: 68 BPM | SYSTOLIC BLOOD PRESSURE: 128 MMHG | DIASTOLIC BLOOD PRESSURE: 58 MMHG | WEIGHT: 128.75 LBS

## 2019-07-19 DIAGNOSIS — I10 HTN (HYPERTENSION), BENIGN: ICD-10-CM

## 2019-07-19 DIAGNOSIS — E78.2 MIXED HYPERLIPIDEMIA: ICD-10-CM

## 2019-07-19 DIAGNOSIS — G47.00 INSOMNIA, UNSPECIFIED TYPE: Primary | ICD-10-CM

## 2019-07-19 PROCEDURE — 99999 PR PBB SHADOW E&M-EST. PATIENT-LVL III: ICD-10-PCS | Mod: PBBFAC,HCNC,, | Performed by: INTERNAL MEDICINE

## 2019-07-19 PROCEDURE — 99214 PR OFFICE/OUTPT VISIT, EST, LEVL IV, 30-39 MIN: ICD-10-PCS | Mod: HCNC,S$GLB,, | Performed by: INTERNAL MEDICINE

## 2019-07-19 PROCEDURE — 1101F PT FALLS ASSESS-DOCD LE1/YR: CPT | Mod: HCNC,CPTII,S$GLB, | Performed by: INTERNAL MEDICINE

## 2019-07-19 PROCEDURE — 99214 OFFICE O/P EST MOD 30 MIN: CPT | Mod: HCNC,S$GLB,, | Performed by: INTERNAL MEDICINE

## 2019-07-19 PROCEDURE — 1101F PR PT FALLS ASSESS DOC 0-1 FALLS W/OUT INJ PAST YR: ICD-10-PCS | Mod: HCNC,CPTII,S$GLB, | Performed by: INTERNAL MEDICINE

## 2019-07-19 PROCEDURE — 99999 PR PBB SHADOW E&M-EST. PATIENT-LVL III: CPT | Mod: PBBFAC,HCNC,, | Performed by: INTERNAL MEDICINE

## 2019-07-19 RX ORDER — ZOLPIDEM TARTRATE 5 MG/1
5 TABLET ORAL NIGHTLY
Qty: 30 TABLET | Refills: 3 | Status: SHIPPED | OUTPATIENT
Start: 2019-07-19 | End: 2019-07-30 | Stop reason: SDUPTHER

## 2019-07-22 ENCOUNTER — TELEPHONE (OUTPATIENT)
Dept: INTERNAL MEDICINE | Facility: CLINIC | Age: 84
End: 2019-07-22

## 2019-07-22 NOTE — TELEPHONE ENCOUNTER
Elen Peters (Key: [id number])       Your information has been sent to [facility].

## 2019-07-22 NOTE — TELEPHONE ENCOUNTER
"----- Message from Dyan Cruz MA sent at 7/22/2019 12:57 PM CDT -----  Prior Authorization Needed    Rx: zolpidem (AMBIEN) 5 MG Tab    To submit the PA:    1: Go to " [email or web address] " and click "Enter a Key"    2. Enter the patient's last name and date of birth and the key.      KEY: [id number]    3. Complete the forms and click "send to Plan"    Note chart when prior authorization has been submitted.    Please notify pharmacy when prior authorization has been approved.    Thank You    "

## 2019-07-26 ENCOUNTER — PATIENT OUTREACH (OUTPATIENT)
Dept: OTHER | Facility: OTHER | Age: 84
End: 2019-07-26

## 2019-07-26 NOTE — PROGRESS NOTES
Last 5 Patient Entered Readings                                      Current 30 Day Average: 147/64     Recent Readings 7/25/2019 7/24/2019 7/23/2019 7/21/2019 7/20/2019    SBP (mmHg) 141 145 134 151 117    DBP (mmHg) 65 64 59 62 59    Pulse 58 60 62 56 72        Ms. Olivarez BP has improved since starting spironolactone. She does notice that she starts to wake up around 3 am in the morning to urinate, despite taking it at 10am. Advised she start taking it around 3/4pm in the evening to see if this pushes the time back on her having to urinate. She saw Dr. Treadwell regarding GI issues and he instructed her to take 2 citrucel tablets daily. She is doing this but it is causing her to have frequent loose bowel movements. She state she has so many BMs that she is sore. Advised she call Dr. Treadwell's office to discuss this. She did not take any citrucel today.     Per 30 day average, 147/64 mmHg, patient's BP is not at goal.     Will continue to monitor regularly. Will follow up in 4-6 weeks, sooner if BP begins to trend upward or downward.    Asked patient to call or message with questions or concerns.     Current HTN regimen:  Hypertension Medications             carvedilol (COREG) 6.25 MG tablet Take 1 tablet (6.25 mg total) by mouth 2 (two) times daily with meals.    nitroGLYCERIN (NITROSTAT) 0.4 MG SL tablet Place 1 tablet (0.4 mg total) under the tongue every 5 (five) minutes as needed for Chest pain.    spironolactone (ALDACTONE) 25 MG tablet Take 1 tablet (25 mg total) by mouth once daily.

## 2019-07-29 DIAGNOSIS — F43.9 SITUATIONAL STRESS: ICD-10-CM

## 2019-07-29 DIAGNOSIS — I10 HTN (HYPERTENSION), BENIGN: ICD-10-CM

## 2019-07-29 RX ORDER — CARVEDILOL 6.25 MG/1
TABLET ORAL
Qty: 180 TABLET | Refills: 3 | Status: SHIPPED | OUTPATIENT
Start: 2019-07-29 | End: 2019-12-30

## 2019-07-29 RX ORDER — CITALOPRAM 20 MG/1
TABLET, FILM COATED ORAL
Qty: 90 TABLET | Refills: 3 | Status: SHIPPED | OUTPATIENT
Start: 2019-07-29 | End: 2020-07-31

## 2019-07-30 ENCOUNTER — TELEPHONE (OUTPATIENT)
Dept: INTERNAL MEDICINE | Facility: CLINIC | Age: 84
End: 2019-07-30

## 2019-07-30 DIAGNOSIS — G47.00 INSOMNIA, UNSPECIFIED TYPE: ICD-10-CM

## 2019-07-30 NOTE — TELEPHONE ENCOUNTER
----- Message from Celine Garcia sent at 7/30/2019 12:47 PM CDT -----  Contact: Self [phone]  Patient is calling for an RX refill or new RX.  Is this a refill or new RX:  New   RX name and strength: Zolpidem Tartrate  Directions (copy/paste from chart):    Is this a 30 day or 90 day RX:  90 day   Local pharmacy or mail order pharmacy:  Mail order  Pharmacy name and phone # (copy/paste from chart):   [facility] - [place], [place] - [address] [place] [address] [phone] (Phone)  [phone] (Fax)  Comments:  Patient states that [employer] insurance company okay Zolpidem Tartrate

## 2019-07-31 ENCOUNTER — OFFICE VISIT (OUTPATIENT)
Dept: INTERNAL MEDICINE | Facility: CLINIC | Age: 84
End: 2019-07-31
Payer: MEDICARE

## 2019-07-31 VITALS
HEIGHT: 63 IN | BODY MASS INDEX: 22.66 KG/M2 | DIASTOLIC BLOOD PRESSURE: 62 MMHG | HEART RATE: 56 BPM | SYSTOLIC BLOOD PRESSURE: 130 MMHG | WEIGHT: 127.88 LBS

## 2019-07-31 DIAGNOSIS — I95.1 AUTONOMIC POSTURAL HYPOTENSION: ICD-10-CM

## 2019-07-31 DIAGNOSIS — M81.0 AGE-RELATED OSTEOPOROSIS WITHOUT CURRENT PATHOLOGICAL FRACTURE: ICD-10-CM

## 2019-07-31 DIAGNOSIS — Z00.00 ENCOUNTER FOR PREVENTIVE HEALTH EXAMINATION: Primary | ICD-10-CM

## 2019-07-31 DIAGNOSIS — I70.0 AORTIC ATHEROSCLEROSIS: ICD-10-CM

## 2019-07-31 DIAGNOSIS — I10 HTN (HYPERTENSION), BENIGN: ICD-10-CM

## 2019-07-31 DIAGNOSIS — E78.2 MIXED HYPERLIPIDEMIA: ICD-10-CM

## 2019-07-31 DIAGNOSIS — Z85.528 PERSONAL HISTORY OF RENAL CELL CANCER: ICD-10-CM

## 2019-07-31 DIAGNOSIS — I25.10 CORONARY ARTERY DISEASE INVOLVING NATIVE CORONARY ARTERY OF NATIVE HEART WITHOUT ANGINA PECTORIS: ICD-10-CM

## 2019-07-31 DIAGNOSIS — F32.5 DEPRESSION, MAJOR, IN REMISSION: ICD-10-CM

## 2019-07-31 DIAGNOSIS — R63.0 ANOREXIA: ICD-10-CM

## 2019-07-31 DIAGNOSIS — K44.9 HIATAL HERNIA: ICD-10-CM

## 2019-07-31 DIAGNOSIS — I77.3 FIBROMUSCULAR DYSPLASIA OF RENAL ARTERY: ICD-10-CM

## 2019-07-31 DIAGNOSIS — K21.9 GASTROESOPHAGEAL REFLUX DISEASE, ESOPHAGITIS PRESENCE NOT SPECIFIED: ICD-10-CM

## 2019-07-31 DIAGNOSIS — R26.81 GAIT INSTABILITY: ICD-10-CM

## 2019-07-31 PROBLEM — R11.0 NAUSEA: Status: RESOLVED | Noted: 2019-01-30 | Resolved: 2019-07-31

## 2019-07-31 PROCEDURE — 99999 PR PBB SHADOW E&M-EST. PATIENT-LVL IV: ICD-10-PCS | Mod: PBBFAC,HCNC,, | Performed by: NURSE PRACTITIONER

## 2019-07-31 PROCEDURE — 99499 UNLISTED E&M SERVICE: CPT | Mod: HCNC,S$GLB,, | Performed by: NURSE PRACTITIONER

## 2019-07-31 PROCEDURE — G0439 PR MEDICARE ANNUAL WELLNESS SUBSEQUENT VISIT: ICD-10-PCS | Mod: HCNC,S$GLB,, | Performed by: NURSE PRACTITIONER

## 2019-07-31 PROCEDURE — 99999 PR PBB SHADOW E&M-EST. PATIENT-LVL IV: CPT | Mod: PBBFAC,HCNC,, | Performed by: NURSE PRACTITIONER

## 2019-07-31 PROCEDURE — 99499 RISK ADDL DX/OHS AUDIT: ICD-10-PCS | Mod: HCNC,S$GLB,, | Performed by: NURSE PRACTITIONER

## 2019-07-31 PROCEDURE — G0439 PPPS, SUBSEQ VISIT: HCPCS | Mod: HCNC,S$GLB,, | Performed by: NURSE PRACTITIONER

## 2019-07-31 NOTE — PROGRESS NOTES
I offered to discuss end of life issues, including information on how to make advance directives that the patient could use to name someone who would make medical decisions on their behalf if they became too ill to make themselves.    _X_Patient declined - sons are attorneys and will make decisions.  ___Patient is interested, I provided paper work and offered to discuss.

## 2019-07-31 NOTE — PATIENT INSTRUCTIONS
Counseling and Referral of Other Preventative  (Italic type indicates deductible and co-insurance are waived)    Patient Name: Elen Peters  Today's Date: 7/31/2019    Health Maintenance       Date Due Completion Date    Shingles Vaccine (2 of 3) 03/31/2015 2/3/2015- Obtain new shingles vaccine - SHINGRIX - when available    Influenza Vaccine 08/01/2019 9/26/2018    Lipid Panel 10/16/2019 10/16/2018    Aspirin/Antiplatelet Therapy 07/19/2020 7/19/2019    TETANUS VACCINE 08/25/2025 8/25/2015        No orders of the defined types were placed in this encounter.    The following information is provided to all patients.  This information is to help you find resources for any of the problems found today that may be affecting your health:                Living healthy guide: www.[facility].[place].gov      Understanding Diabetes: www.diabetes.org      Eating healthy: www.cdc.gov/healthyweight      CDC home safety checklist: www.cdc.gov/steadi/patient.html      Agency on Aging: www.goea.[place].[facility]      Alcoholics anonymous (AA): www.aa.org      Physical Activity: www.[place].nih.gov/od7pyth      Tobacco use: www.quitwithusla.org

## 2019-07-31 NOTE — PROGRESS NOTES
"Elen Peters presented for a  Medicare AWV and comprehensive Health Risk Assessment today. The following components were reviewed and updated:    · Medical history  · Family History  · Social history  · Allergies and Current Medications  · Health Risk Assessment  · Health Maintenance  · Care Team     ** See Completed Assessments for Annual Wellness Visit within the encounter summary.**       The following assessments were completed:  · Living Situation  · CAGE  · Depression Screening  · Timed Get Up and Go  · Whisper Test  · Cognitive Function Screening  ·   ·   ·   · Nutrition Screening  · ADL Screening  · PAQ Screening    Vitals:    07/31/19 0834   BP: 130/62   BP Location: Right arm   Pulse: (!) 56   Weight: 58 kg (127 lb 13.9 oz)   Height: 5' 3" (1.6 m)     Body mass index is 22.65 kg/m².  Physical Exam   Constitutional: She is oriented to person, place, and time. She appears well-developed and well-nourished.   HENT:   Head: Normocephalic.   Cardiovascular: Normal rate and regular rhythm.   Pulmonary/Chest: Effort normal and breath sounds normal.   Abdominal: Soft. Bowel sounds are normal.   Musculoskeletal: Normal range of motion. She exhibits no edema.   Neurological: She is alert and oriented to person, place, and time.   Skin: Skin is warm and dry.   Psychiatric: She has a normal mood and affect.   Nursing note and vitals reviewed.        Diagnoses and health risks identified today and associated recommendations/orders:    1. Encounter for preventive health examination  Here for Health Risk Assessment/Annual Wellness Visit.  Health maintenance reviewed and updated. Follow up in one year.  Discussed Shingrix    2. HTN (hypertension), benign  Chronic, stable on current medications. Reporting some diarrhea with aldactone. Followed by PCP, Digital Hypertension    3. Aortic atherosclerosis  Chronic, stable on current medications. Noted CT abdomen/pelvis 7/07/14. Followed by PCP.    4. Autonomic postural " hypotension  Chronic, stable. Followed by PCP, Digital Hypertenison.    5. Coronary artery disease involving native coronary artery of native heart without angina pectoris  Chronic, stable on current medications. Followed by PCP, Cardiology.    6. Fibromuscular dysplasia of renal artery  Chronic, stable on current medications. Followed by PCP, Cardiology.    7. Mixed hyperlipidemia  Chronic, stable on current medication. Followed by PCP, Cardiology.    8. Depression, major, in remission  Chronic, stable on current medication. PHQ-2 score 0. Followed by PCP.    9. Personal history of renal cell cancer  Stable. Followed by PCP, Urology.    10. Gastroesophageal reflux disease, esophagitis presence not specified  Chronic, stable on current medication. Followed by PCP.    11. Hiatal hernia  Chronic, stable on current medication. Followed by PCP.    12. Anorexia  Chronic, reports continued poor appetite. Weight stable. Followed by PCP.    13. Age-related osteoporosis without current pathological fracture  Chronic, stable on current medication. Followed by PCP.    14. Gait instability  Chronic, gait antalgic, reports no recent falls. Followed by PCP.      Provided Elen with a 5-10 year written screening schedule and personal prevention plan. Recommendations were developed using the USPSTF age appropriate recommendations. Education, counseling, and referrals were provided as needed. After Visit Summary printed and given to patient which includes a list of additional screenings\tests needed.    Follow up in about 1 year (around 7/16/2020).    Cristy Sepulveda NP

## 2019-08-01 ENCOUNTER — PATIENT OUTREACH (OUTPATIENT)
Dept: OTHER | Facility: OTHER | Age: 84
End: 2019-08-01

## 2019-08-01 RX ORDER — ZOLPIDEM TARTRATE 5 MG/1
5 TABLET ORAL NIGHTLY
Qty: 30 TABLET | Refills: 3 | Status: SHIPPED | OUTPATIENT
Start: 2019-08-01 | End: 2020-07-14

## 2019-08-01 NOTE — PROGRESS NOTES
Last 5 Patient Entered Readings                                      Current 30 Day Average: 145/63     Recent Readings 7/30/2019 7/29/2019 7/28/2019 7/26/2019 7/25/2019    SBP (mmHg) 156 127 143 129 141    DBP (mmHg) 67 57 58 64 65    Pulse 59 62 60 67 58        Ms. Peters has been having diarrhea for a few weeks. She is starting to think it is due to spironolactone. Instructed her to skip spironolactone for the next few days to see if diarrhea resolves. If so, will stop it permanently and try a different medication to treat her BP.    Per 30 day average, 144/62 mmHg, patient's BP is not at goal.     Will continue to monitor regularly. Will follow up in 2-3 weeks, sooner if BP begins to trend upward or downward.    Asked patient to call or message with questions or concerns.     Current HTN regimen:  Hypertension Medications             carvedilol (COREG) 6.25 MG tablet TAKE 1 TABLET TWICE DAILY WITH MEALS  OR AS DIRECTED    nitroGLYCERIN (NITROSTAT) 0.4 MG SL tablet Place 1 tablet (0.4 mg total) under the tongue every 5 (five) minutes as needed for Chest pain.    spironolactone (ALDACTONE) 25 MG tablet Take 1 tablet (25 mg total) by mouth once daily.

## 2019-08-02 ENCOUNTER — TELEPHONE (OUTPATIENT)
Dept: INTERNAL MEDICINE | Facility: CLINIC | Age: 84
End: 2019-08-02

## 2019-08-02 NOTE — TELEPHONE ENCOUNTER
----- Message from Tangela Christianson sent at 8/2/2019  8:59 AM CDT -----  Contact: [phone]  Patient is requesting a call from the office in regards to her getting sleeping pills. She said that her insurance company said they will cover it.  Please advise, thanks

## 2019-08-02 NOTE — TELEPHONE ENCOUNTER
Elen Peters (Key: [id number])       This request has been approved.  Please note any additional information provided by [employer] at the bottom of your screen.

## 2019-08-07 ENCOUNTER — PATIENT OUTREACH (OUTPATIENT)
Dept: OTHER | Facility: OTHER | Age: 84
End: 2019-08-07

## 2019-08-07 NOTE — PROGRESS NOTES
Last 5 Patient Entered Readings                                      Current 30 Day Average: 143/62     Recent Readings 8/6/2019 8/5/2019 8/4/2019 8/3/2019 8/1/2019    SBP (mmHg) 127 165 145 132 145    DBP (mmHg) 57 67 63 58 64    Pulse 65 58 54 65 58        Deferred outreach today due to pharmD follow up on med change

## 2019-08-07 NOTE — PROGRESS NOTES
"Last 5 Patient Entered Readings                                      Current 30 Day Average: 143/62     Recent Readings 8/6/2019 8/5/2019 8/4/2019 8/3/2019 8/1/2019    SBP (mmHg) 127 165 145 132 145    DBP (mmHg) 57 67 63 58 64    Pulse 65 58 54 65 58        Ms. Peters did not stop spironolactone as we discussed. She continued to take it and states the diarrhea resolved. She has stopped the "pill for my bowels" that Dr. Treadwell instructed her to take. Her BP is stable.     Per 30 day average, 143/62 mmHg, patient's BP is not at goal.     Patient denies s/s of hypotension (lightheadedness, dizziness, nausea, fatigue) associated with low readings. Instructed patient to inform me if this occurs, patient confirms understanding.      Patient denies s/s of hypertension (SOB, CP, severe headaches, changes in vision) associated with high readings. Instructed patient to go to the ED if BP > 180/110 and accompanied by hypertensive s/s, patient confirms understanding.    Will continue to monitor regularly. Will follow up in 2-3 weeks, sooner if BP begins to trend upward or downward.    Asked patient to call or message with questions or concerns.     Current HTN regimen:  Hypertension Medications             carvedilol (COREG) 6.25 MG tablet TAKE 1 TABLET TWICE DAILY WITH MEALS  OR AS DIRECTED    nitroGLYCERIN (NITROSTAT) 0.4 MG SL tablet Place 1 tablet (0.4 mg total) under the tongue every 5 (five) minutes as needed for Chest pain.    spironolactone (ALDACTONE) 25 MG tablet Take 1 tablet (25 mg total) by mouth once daily.                          "

## 2019-08-08 ENCOUNTER — HOSPITAL ENCOUNTER (OUTPATIENT)
Dept: RADIOLOGY | Facility: HOSPITAL | Age: 84
Discharge: HOME | End: 2019-08-08
Attending: UROLOGY
Payer: MEDICARE

## 2019-08-08 DIAGNOSIS — C67.9 MALIGNANT NEOPLASM OF URINARY BLADDER, UNSPECIFIED SITE: ICD-10-CM

## 2019-08-08 PROCEDURE — 76770 US RETROPERITONEAL COMPLETE: ICD-10-PCS | Mod: 26,HCNC,, | Performed by: RADIOLOGY

## 2019-08-08 PROCEDURE — 76770 US EXAM ABDO BACK WALL COMP: CPT | Mod: TC,HCNC

## 2019-08-08 PROCEDURE — 76770 US EXAM ABDO BACK WALL COMP: CPT | Mod: 26,HCNC,, | Performed by: RADIOLOGY

## 2019-08-12 NOTE — PROGRESS NOTES
Subjective:       Patient ID: Elen Peters is a 89 y.o. female.    Chief Complaint: Hyperlipidemia; Hypertension; Coronary Artery Disease; Dizziness; and Diarrhea (loose)    Hyperlipidemia   This is a chronic problem. The problem is controlled. Current antihyperlipidemic treatment includes statins. The current treatment provides significant improvement of lipids.   Hypertension   This is a chronic problem. The current treatment provides significant improvement. There are no compliance problems.    Dizziness:   Chronicity:  Chronic    Review of Systems   Gastrointestinal: Positive for diarrhea (occ, stable).   Neurological: Positive for dizziness.       Objective:      Physical Exam   Constitutional: She is oriented to person, place, and time. She appears well-developed and well-nourished. No distress.   HENT:   Head: Normocephalic and atraumatic.   Mouth/Throat: Oropharynx is clear and moist.   Eyes: Pupils are equal, round, and reactive to light. Conjunctivae are normal.   Neck: Normal range of motion. Neck supple.   Cardiovascular: Normal rate, regular rhythm and normal heart sounds.   Pulmonary/Chest: Effort normal and breath sounds normal. She has no wheezes.   Abdominal: Soft. Bowel sounds are normal. There is no tenderness.   Musculoskeletal: Normal range of motion. She exhibits no edema or tenderness.   Neurological: She is alert and oriented to person, place, and time. No cranial nerve deficit.   Skin: No erythema.   Psychiatric: She has a normal mood and affect.   Vitals reviewed.      Assessment:       1. Insomnia, unspecified type    2. HTN (hypertension), benign    3. Mixed hyperlipidemia        Plan:       Elen was seen today for hyperlipidemia, hypertension, coronary artery disease, dizziness and diarrhea.    Diagnoses and all orders for this visit:    Insomnia, unspecified type  -     Discontinue: zolpidem (AMBIEN) 5 MG Tab; Take 1 tablet (5 mg total) by mouth nightly.    HTN (hypertension),  benign    Mixed hyperlipidemia        Follow up in about 9 months (around 4/19/2020) for F/U APPOINTMENT WITH ME.

## 2019-08-19 NOTE — PROGRESS NOTES
Last 5 Patient Entered Readings                                      Current 30 Day Average: 138/61     Recent Readings 8/18/2019 8/15/2019 8/13/2019 8/12/2019 8/11/2019    SBP (mmHg) 131 130 131 123 130    DBP (mmHg) 58 58 66 55 61    Pulse 62 66 66 64 62        Unable to LVM- patient answers the phone but she is unable to hear me. Made a second attempt that did not go through.

## 2019-08-21 ENCOUNTER — HOSPITAL ENCOUNTER (OUTPATIENT)
Dept: UROLOGY | Facility: HOSPITAL | Age: 84
Discharge: HOME | End: 2019-08-21
Attending: UROLOGY
Payer: MEDICARE

## 2019-08-21 VITALS
BODY MASS INDEX: 22.58 KG/M2 | HEIGHT: 63 IN | HEART RATE: 63 BPM | WEIGHT: 127.44 LBS | DIASTOLIC BLOOD PRESSURE: 71 MMHG | RESPIRATION RATE: 17 BRPM | TEMPERATURE: 98 F | SYSTOLIC BLOOD PRESSURE: 162 MMHG

## 2019-08-21 DIAGNOSIS — C67.9 MALIGNANT NEOPLASM OF URINARY BLADDER, UNSPECIFIED SITE: ICD-10-CM

## 2019-08-21 PROCEDURE — 52000 CYSTOURETHROSCOPY: CPT | Mod: HCNC

## 2019-08-21 PROCEDURE — 52000 CYSTOURETHROSCOPY: CPT | Mod: HCNC,,, | Performed by: UROLOGY

## 2019-08-21 PROCEDURE — 52000 PR CYSTOURETHROSCOPY: ICD-10-PCS | Mod: HCNC,,, | Performed by: UROLOGY

## 2019-08-21 RX ORDER — LIDOCAINE HYDROCHLORIDE 20 MG/ML
JELLY TOPICAL
Status: COMPLETED | OUTPATIENT
Start: 2019-08-21 | End: 2019-08-21

## 2019-08-21 RX ORDER — DOXYCYCLINE HYCLATE 100 MG
100 TABLET ORAL
Status: COMPLETED | OUTPATIENT
Start: 2019-08-21 | End: 2019-08-21

## 2019-08-21 RX ADMIN — LIDOCAINE HYDROCHLORIDE: 20 JELLY TOPICAL at 09:08

## 2019-08-21 RX ADMIN — Medication 100 MG: at 09:08

## 2019-08-21 NOTE — PATIENT INSTRUCTIONS
What to Expect After a Cystoscopy  For the next 24-48 hours, you may feel a mild burning when you urinate. This burning is normal and expected. Drink plenty of water to dilute the urine to help relieve the burning sensation. You may also see a small amount of blood in your urine after the procedure.    Unless you are already taking antibiotics, you may be given an antibiotic after the test to prevent infection.    Signs and Symptoms to Report  Call the [facility] at [phone] if you develop any of the following:  · Fever of 101 degrees or higher  · Chills or persistent bleeding  · Inability to urinate

## 2019-08-21 NOTE — PROCEDURES
Procedure: Flexible cysto-uretheroscopy    Pre Procedure Diagnosis:bladder cancer    Post Procedure Diagnosis: no recurrence    Surgeon: Gracia Becerra MD    Anesthesia: 2% uro-jet lidocaine jelly for local analgesia    Flexible cysto-urethroscopy was performed after consent was obtained.  The risks and benefits were explained.    2% lidocaine urojet was used for local analgesia.  The genitalia was prepped and draped in the sterile fashion with betadine.    The flexible scope was advanced into the urethra and into the bladder.  Bilateral ureteral orifice were evaluated and noted to be normal with clear efflux.  The bladder was completely surveyed in a systematic fashion.   No bladder tumors or lesions were seen.  No strictures were noted.    The patient tolerated the procedure well without complication.    They will follow up in 1 year for cystoscopy

## 2019-08-21 NOTE — PROCEDURES
Procedure: Flexible cysto-uretheroscopy    Pre Procedure Diagnosis:bladder cancer    Post Procedure Diagnosis: same    Surgeon: Gracia Becerra MD    Anesthesia: 2% uro-jet lidocaine jelly for local analgesia    Flexible cysto-urethroscopy was performed after consent was obtained.  The risks and benefits were explained.    2% lidocaine urojet was used for local analgesia.  The genitalia was prepped and draped in the sterile fashion with betadine    The flexible scope was advanced into the urethra and into the bladder.  Bilateral ureteral orifice were evaluated and noted to be normal with clear efflux.  The bladder was completely surveyed in a systematic fashion.   No bladder tumors or lesions were seen.  No strictures were noted.  Replens. Discussed vaginal estrogen. Will consider in the future.   The patient tolerated the procedure well without complication.    They will follow up in 1 year with cysto

## 2019-08-21 NOTE — H&P
Patient ID: Elen Peters is a 89 y.o. female.     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  Last cysto was 8/18. . All showed no recurrence of cancer. Has a cystic lesion on left near aorta, possible lymphocele.      No gross hematuria.   Some weight loss.   No LUTS.             Past Medical History:   Diagnosis Date    Abnormal stress test 11/18/2015    Arthritis      Bladder cancer      Cataract      Coronary artery disease involving native coronary artery 1/6/2016    Depression      GERD (gastroesophageal reflux disease)      HTN (hypertension), benign 11/18/2015    Hx of bladder infections      Hyperlipemia      OP (osteoporosis)      Positive cardiac stress test 1/6/2016    Renal cancer      Syncope 1/6/2016    Upper back pain 12/1/2015    Ureteral cancer      Venous insufficiency 2017    Villous adenoma of colon 5/31/2013                   Past Surgical History:   Procedure Laterality Date    APPENDECTOMY        BACK SURGERY        CATARACT EXTRACTION W/  INTRAOCULAR LENS IMPLANT Left 3/16/15     kristy    CATARACT EXTRACTION W/  INTRAOCULAR LENS IMPLANT Right 4/6/15     kristy    COLONOSCOPY   10/21/2013    CYSTOSCOPY        NEPHRECTOMY         L    TONSILLECTOMY, ADENOIDECTOMY                         Family History   Problem Relation Age of Onset    Leukemia Mother      Heart disease Mother      Heart attack Mother      Cancer Mother         leukemia    Goiter Mother      Leukemia Father      Cancer Father         leukemia    Heart disease Maternal Grandfather      No Known Problems Brother      No Known Problems Son      No Known Problems Son      No Known Problems Son      Cancer Son         throat    No Known Problems Son      No Known Problems Son      No Known Problems Maternal Aunt      No Known Problems Maternal Uncle      No  Known Problems Paternal Aunt      No Known Problems Paternal Uncle      No Known Problems Maternal Grandmother      No Known Problems Paternal Grandmother      No Known Problems Paternal Grandfather      No Known Problems Sister      Amblyopia Neg Hx      Blindness Neg Hx      Cataracts Neg Hx      Diabetes Neg Hx      Glaucoma Neg Hx      Hypertension Neg Hx      Macular degeneration Neg Hx      Retinal detachment Neg Hx      Strabismus Neg Hx      Stroke Neg Hx      Thyroid disease Neg Hx      Breast cancer Neg Hx           Social History   Social History                Social History    Marital status:        Spouse name: N/A    Number of children: N/A    Years of education: N/A              Occupational History    retired                    Social History Main Topics    Smoking status: Never Smoker    Smokeless tobacco: Never Used    Alcohol use Yes         Comment: vodka with orange juice; 1 glass of red wine 1 x month    Drug use: No    Sexual activity: Not Currently              Other Topics Concern    Not on file            Social History Narrative    No narrative on file            Allergies:  Sulfa (sulfonamide antibiotics) and Codeine     Medications:     Current Outpatient Prescriptions:     amlodipine (NORVASC) 5 MG tablet, Take 1 tablet (5 mg total) by mouth every evening., Disp: 90 tablet, Rfl: 3    aspirin (ECOTRIN) 81 MG EC tablet, Take 81 mg by mouth once daily., Disp: , Rfl:     atorvastatin (LIPITOR) 40 MG tablet, Take one tablet daily or as directed., Disp: 90 tablet, Rfl: 3    calcium-magnesium-zinc Tab, Take 2 tablets by mouth once daily at 6am. 1 Tablet Oral Every day, Disp: , Rfl:     carvedilol (COREG) 6.25 MG tablet, Take 1 tablet (6.25 mg total) by mouth 2 (two) times daily with meals. One by mouth twice daily or as directed, Disp: 180 tablet, Rfl: 3    citalopram (CELEXA) 20 MG tablet, TAKE ONE TABLET BY MOUTH EVERY DAY, Disp: 90 tablet, Rfl: 3     mv-min-folic acid-lutein (THERAGRAN-M ADVANCED 50 PLUS) 0.4-250 mg-mcg Tab, Take by mouth. 1 Tablet Oral Every day, Disp: , Rfl:     omega-3 fatty acids 1,000 mg Cap, Take 1 capsule by mouth once daily. 2  Capsule Oral Every day, Disp: , Rfl:     zolpidem (AMBIEN) 5 MG Tab, TAKE 1 TABLET BY MOUTH EVERY NIGHT AT BEDTIME, Disp: 30 tablet, Rfl: 3    nitroGLYCERIN (NITROSTAT) 0.4 MG SL tablet, Place 1 tablet (0.4 mg total) under the tongue every 5 (five) minutes as needed for Chest pain., Disp: 25 tablet, Rfl: 3  No current facility-administered medications for this encounter.      Review of Systems   Constitutional: Negative for fever, chills and unexpected weight change.   HENT: Negative for nosebleeds.   Eyes: Negative for visual disturbance.   Respiratory: Negative for chest tightness.   Cardiovascular: Negative for chest pain.   Gastrointestinal: Negative for diarrhea.   Genitourinary: Negative for dysuria, urgency, frequency, hematuria and nocturia.   Musculoskeletal: Negative for myalgias.   Skin: Negative for rash.   Neurological:  Negative for seizures.   Hematological: Does not bruise/bleed easily.   Psychiatric/Behavioral: Negative for behavioral problems.      Objective:      Physical Exam   Vitals reviewed.  Constitutional: She is oriented to person, place, and time. She appears well-developed and well-nourished.   HENT:   Head: Normocephalic and atraumatic.   Eyes: No scleral icterus.   Cardiovascular: Normal rate and regular rhythm.   Pulmonary/Chest: Effort normal. No respiratory distress.   Abdominal: She exhibits no mass.   Musculoskeletal: She exhibits no tenderness.   Lymphadenopathy:   She has no cervical adenopathy.   Neurological: She is alert and oriented to person, place, and time.   Skin: Skin is warm and dry. No rash noted.   Psychiatric: She has a normal mood and affect.       Assessment:      1. Bladder cancer       Plan:    cysto today

## 2019-08-23 ENCOUNTER — OFFICE VISIT (OUTPATIENT)
Dept: OPHTHALMOLOGY | Facility: CLINIC | Age: 84
End: 2019-08-23
Payer: MEDICARE

## 2019-08-23 VITALS — SYSTOLIC BLOOD PRESSURE: 142 MMHG | HEART RATE: 58 BPM | DIASTOLIC BLOOD PRESSURE: 67 MMHG

## 2019-08-23 DIAGNOSIS — H26.493 POSTERIOR CAPSULAR OPACIFICATION, BILATERAL: Primary | ICD-10-CM

## 2019-08-23 DIAGNOSIS — H35.30 AMD (AGE-RELATED MACULAR DEGENERATION), BILATERAL: ICD-10-CM

## 2019-08-23 PROCEDURE — 99999 PR PBB SHADOW E&M-EST. PATIENT-LVL III: ICD-10-PCS | Mod: PBBFAC,HCNC,, | Performed by: OPHTHALMOLOGY

## 2019-08-23 PROCEDURE — 99999 PR PBB SHADOW E&M-EST. PATIENT-LVL III: CPT | Mod: PBBFAC,HCNC,, | Performed by: OPHTHALMOLOGY

## 2019-08-23 PROCEDURE — 92012 PR EYE EXAM, EST PATIENT,INTERMED: ICD-10-PCS | Mod: HCNC,57,S$GLB, | Performed by: OPHTHALMOLOGY

## 2019-08-23 PROCEDURE — 66821 PR DISCISSION,2ND CATARACT,LASER: ICD-10-PCS | Mod: 50,HCNC,S$GLB, | Performed by: OPHTHALMOLOGY

## 2019-08-23 PROCEDURE — 66821 AFTER CATARACT LASER SURGERY: CPT | Mod: 50,HCNC,S$GLB, | Performed by: OPHTHALMOLOGY

## 2019-08-23 PROCEDURE — 92012 INTRM OPH EXAM EST PATIENT: CPT | Mod: HCNC,57,S$GLB, | Performed by: OPHTHALMOLOGY

## 2019-08-23 NOTE — PROGRESS NOTES
HPI     S/P Phaco w IOL OS (3/16/15)  S/P Phaco w IOL OD (4/6/15).    Pt was referred back to Dr. Chavez from Dr. Graham for yag cap OU.     No flashes or floaters OU. No pain or discomfort OU. No eye gtts at this   time.     Last edited by Adilene Grace on 8/23/2019  2:20 PM. (History)            Assessment /Plan     For exam results, see Encounter Report.    Posterior capsular opacification, bilateral    AMD (age-related macular degeneration), bilateral      Drusen OU.  Visually significant posterior capsular opacity present.  Discussed risks, benefits, and alternatives to laser surgery.  YAG laser capsulotomy Procedure Note:   Informed consent obtained and correct eye(s) verified with patient.  1 drop of topical Proparacaine and Iopidine instilled, and eye(s) dilated with 1% Tropicamide 2.5% Phenylephrine.  YAG laser applied to posterior capsule in cruciate pattern OU  Patient tolerated procedure well. No complications. Follow up in 1 month/PRN.

## 2019-08-24 ENCOUNTER — HOSPITAL ENCOUNTER (EMERGENCY)
Facility: HOSPITAL | Age: 84
Discharge: HOME | End: 2019-08-24
Attending: EMERGENCY MEDICINE
Payer: MEDICARE

## 2019-08-24 VITALS
TEMPERATURE: 98 F | HEIGHT: 64 IN | RESPIRATION RATE: 16 BRPM | OXYGEN SATURATION: 97 % | HEART RATE: 64 BPM | SYSTOLIC BLOOD PRESSURE: 150 MMHG | DIASTOLIC BLOOD PRESSURE: 67 MMHG | BODY MASS INDEX: 21.51 KG/M2 | WEIGHT: 126 LBS

## 2019-08-24 DIAGNOSIS — H43.392 VITREOUS FLOATERS OF LEFT EYE: Primary | ICD-10-CM

## 2019-08-24 PROCEDURE — 99281 EMR DPT VST MAYX REQ PHY/QHP: CPT | Mod: HCNC

## 2019-08-24 PROCEDURE — 99284 EMERGENCY DEPT VISIT MOD MDM: CPT | Mod: HCNC,,, | Performed by: PHYSICIAN ASSISTANT

## 2019-08-24 PROCEDURE — 99284 PR EMERGENCY DEPT VISIT,LEVEL IV: ICD-10-PCS | Mod: HCNC,,, | Performed by: PHYSICIAN ASSISTANT

## 2019-08-24 NOTE — DISCHARGE INSTRUCTIONS
Please schedule an appointment with your ophthalmology doctor for follow-up. If you start to have any new or worsening symptoms, please come back to the emergency department.

## 2019-08-24 NOTE — CONSULTS
[facility]  Ophthalmology  Consult Note    Patient Name: Elen Peters  MRN: [id number]  Admission Date: 8/24/2019  Hospital Length of Stay: 0 days  Attending Provider: No att. providers found   Primary Care Physician: Jim Treadwell MD  Principal Problem:<principal problem not specified>    Inpatient consult to Ophthalmology  Consult performed by: Tomeka Feliz MD  Consult ordered by: Dora Swan PA-C        Subjective:     Chief Complaint:  New floaters    HPI: Patient is 89yo female s/p YAG OU on 8/23/19 presenting with new floaters in left eye.  Pt denies vision changes/flashes of light/curtain/veil. She states floaters started yesterday after the procedure in clinic. She denies changes since that time but states they haven't improved either.   Discussed this over the phone with pt and stated I would like to see her in the ED to rule out RD.       Base Eye Exam     Visual Acuity (Snellen - Linear)       Right Left    Near sc 20/70 20/20-2    Near cc 20/20    No glasses, however vision improves with +2.5           Tonometry (11:35 AM)       Right Left    Pressure STP STP          Pupils       Dark Light Shape React APD    Right 4 3 Round Brisk None    Left 4 3 Round Brisk None          Visual Fields       Right Left     Full Full          Extraocular Movement       Right Left     Full, Ortho Full, Ortho          Neuro/Psych     Oriented x3:  Yes    Mood/Affect:  Normal            Slit Lamp and Fundus Exam     External Exam       Right Left    External dermatochalasis consistent with age dermatochalasis consistent with age          Slit Lamp Exam       Right Left    Lids/Lashes dermatochalasis.  Otherwise, unremarkable with upper lid eversion dermatochalasis.  Otherwise, unremarkable with upper lid eversion    Conjunctiva/Sclera senile scleral changes senile scleral changes    Cornea Arcus, Clear incision Arcus, Clear incision    Anterior Chamber Deep and quiet trace cell    Iris  normal normal    Lens Posterior chamber intraocular lens, no PCO in central vision Posterior chamber intraocular lens, no PCO in central vision    Vitreous Vitreous syneresis, no almas's Vitreous syneresis, no almas's           Fundus Exam       Right Left    Disc Normal Normal    Macula drusen drusen    Vessels Normal Normal    Periphery Normal, no RD or tears no RD or Tears              Assessment and Plan:     No notes have been filed under this hospital service.  Service: Ophthalmology    New Floaters OS  - s/p YAG OU 8/24/19  - good IOP, Vision improved  - DFE WNL, PCO cleared across visual access  - return precautions discussed at length including flashes of light/vision changes/curtain/veils.   - offered reassurance  -F/U with Dr. Chavez in 1month or PRN changes    Tomeka Feliz MD  Ophthalmology  [facility]

## 2019-08-24 NOTE — ED PROVIDER NOTES
Encounter Date: 8/24/2019       History     Chief Complaint   Patient presents with    Post-op Problem     had lasix surgery yesterday- now seeing floaters - instructed by opthalmology to report to ER and they would see her here.      89-year-old female with multiple comorbidities including CAD, hypertension, renal cancer, cataracts and drusen of bilateral eyes presents for dark colored floaters in her left eye s/p laser eye surgery yesterday. (Dr. Chavez).  Floaters are intermittent and cover her entire visual field.  She denies any eye pain, photophobia, discharge, redness, diplopia or halos.  She was instructed to come to the ED by her ophthalmologist.        Review of patient's allergies indicates:   Allergen Reactions    Sulfa (sulfonamide antibiotics)      Unknown as child    Amlodipine Swelling    Codeine      Other reaction(s): Unknown    Maxzide [triamterene-hydrochlorothiazid]      Past Medical History:   Diagnosis Date    Abnormal stress test 11/18/2015    Arthritis     Bladder cancer     Cataract     Coronary artery disease involving native coronary artery 1/6/2016    Depression     GERD (gastroesophageal reflux disease)     HTN (hypertension), benign 11/18/2015    Hx of bladder infections     Hyperlipemia     OP (osteoporosis)     Positive cardiac stress test 1/6/2016    Renal cancer     Syncope 1/6/2016    TMJ (dislocation of temporomandibular joint)     Upper back pain 12/1/2015    Ureteral cancer     Venous insufficiency 2017    Villous adenoma of colon 5/31/2013     Past Surgical History:   Procedure Laterality Date    APPENDECTOMY      BACK SURGERY      CATARACT EXTRACTION W/  INTRAOCULAR LENS IMPLANT Left 3/16/15    kristy    CATARACT EXTRACTION W/  INTRAOCULAR LENS IMPLANT Right 4/6/15    kristy    COLONOSCOPY  10/21/2013    COLONOSCOPY N/A 10/21/2013    Performed by [facility] at [facility] ([facility] F[facility])    CYSTOSCOPY      EGD (ESOPHAGOGASTRODUODENOSCOPY) N/A  1/30/2019    Performed by Diony Dey MD at [facility] (4TH FLR)    EYE SURGERY      INSERTION-INTRAOCULAR LENS (IOL) Right 4/6/2015    [facility] at [facility] OR    INSERTION-INTRAOCULAR LENS (IOL) Left 3/16/2015    [facility] at [facility] OR    NEPHRECTOMY      L    PHACOEMULSIFICATION-ASPIRATION-CATARACT Right 4/6/2015    [facility] at [facility] OR    PHACOEMULSIFICATION-ASPIRATION-CATARACT Left 3/16/2015    [facility] at [facility] OR    SPINE SURGERY      TONSILLECTOMY, ADENOIDECTOMY       Family History   Problem Relation Age of Onset    Leukemia Mother     Heart disease Mother     Heart attack Mother     Cancer Mother         leukemia    Goiter Mother     Leukemia Father     Cancer Father         leukemia    Heart disease Maternal Grandfather     No Known Problems Brother     No Known Problems Son     No Known Problems Son     No Known Problems Son     Cancer Son         throat    No Known Problems Son     No Known Problems Son     No Known Problems Maternal Aunt     No Known Problems Maternal Uncle     No Known Problems Paternal Aunt     No Known Problems Paternal Uncle     No Known Problems Maternal Grandmother     No Known Problems Paternal Grandmother     No Known Problems Paternal Grandfather     No Known Problems Sister     Amblyopia Neg Hx     Blindness Neg Hx     Cataracts Neg Hx     Diabetes Neg Hx     Glaucoma Neg Hx     Hypertension Neg Hx     Macular degeneration Neg Hx     Retinal detachment Neg Hx     Strabismus Neg Hx     Stroke Neg Hx     Thyroid disease Neg Hx     Breast cancer Neg Hx     Colon cancer Neg Hx     Esophageal cancer Neg Hx      Social History     Tobacco Use    Smoking status: Former Smoker    Smokeless tobacco: Never Used    Tobacco comment: in college   Substance Use Topics    Alcohol use: No    Drug use: No     Review of Systems   Constitutional: Negative for  chills, diaphoresis, fatigue and fever.   Eyes: Positive for visual disturbance. Negative for photophobia, pain, discharge, redness and itching.   Respiratory: Negative for cough and shortness of breath.    Cardiovascular: Negative for chest pain and palpitations.   Gastrointestinal: Negative for nausea and vomiting.   Musculoskeletal: Negative for back pain and neck pain.   Skin: Negative for color change and wound.   Allergic/Immunologic: Negative for environmental allergies and immunocompromised state.   Neurological: Negative for dizziness, light-headedness and headaches.   Hematological: Negative for adenopathy. Does not bruise/bleed easily.       Physical Exam     Initial Vitals [08/24/19 1031]   BP Pulse Resp Temp SpO2   (!) 150/67 64 16 98.2 °F (36.8 °C) 97 %      MAP       --         Physical Exam    Vitals reviewed.  Constitutional: She appears well-developed and well-nourished.   HENT:   Head: Normocephalic and atraumatic.   Eyes: Conjunctivae and EOM are normal. Pupils are equal, round, and reactive to light. Right eye exhibits no discharge. Left eye exhibits no discharge.   Neck: Normal range of motion. Neck supple.   Cardiovascular: Normal rate, regular rhythm, normal heart sounds and intact distal pulses.   Pulmonary/Chest: Breath sounds normal.   Neurological: She is alert and oriented to person, place, and time.   Skin: Skin is warm and dry.   Psychiatric: She has a normal mood and affect.         ED Course   Procedures  Labs Reviewed - No data to display       Imaging Results    None          Medical Decision Making:   History:   Old Medical Records: I decided to obtain old medical records.  Old Records Summarized: records from clinic visits.       <> Summary of Records: Patient seen yesterday in ophthalmology clinic or visually significant drusen.  Initial Assessment:   89-year-old female presenting for floaters in her left visual field the POD #1  S/p laser eye surgery.  She denies any pain,  discharge, itching, photophobia or contralateral visual changes.  Differential Diagnosis:       ED Management:  89-year-old female presenting for floaters in her left eye s/p laser eye surgery yesterday.  She has no other complaints.  She is mildly hypertensive with normal vitals.  The eye appears normal, visual acuity 20/20 OD, 20/50 OS.  Otherwise normal exam.  Suspect that this is a normal postop complication.  Will consult Ophthalmology and reassess.    Patient seen and evaluated at bedside by Ophthalmology.  Linda is a normal postop palpitation recommend discharge with follow-up with Ophthalmology in 1 month. Stressed the importance of follow-up, strict ED return precautions given.  Patient voiced understanding and is comfortable with discharge. I discussed this patient with my supervising physician.    Dora Swan PA-C    Other:   I have discussed this case with another health care provider.       <> Summary of the Discussion: Ophthalmology consult due to postop complication.              Attending Attestation:     Physician Attestation Statement for NP/PA:   I discussed this assessment and plan of this patient with the NP/PA, but I did not personally examine the patient. The face to face encounter was performed by the NP/PA.                     Clinical Impression:       ICD-10-CM ICD-9-CM   1. Vitreous floaters of left eye H43.392 379.24         Disposition:   Disposition: Discharged  Condition: Stable                        Dora Swan PA-C  08/24/19 1306       Speedy Warner MD  08/25/19 [id number]

## 2019-08-24 NOTE — ED TRIAGE NOTES
"Pt had Lasik surgery yesterday on eyes bilaterally. Today pt reports having "floaters" in her left eye. Pt describes little black dots in her field of vision. Pt denies any eye pain. Pt denies shortness of breath or chest pain.   "

## 2019-08-29 ENCOUNTER — PATIENT OUTREACH (OUTPATIENT)
Dept: OTHER | Facility: OTHER | Age: 84
End: 2019-08-29

## 2019-08-29 NOTE — PROGRESS NOTES
Last 5 Patient Entered Readings                                      Current 30 Day Average: 143/64     Recent Readings 8/28/2019 8/28/2019 8/27/2019 8/26/2019 8/26/2019    SBP (mmHg) 152 174 142 165 173    DBP (mmHg) 71 76 61 68 72    Pulse 62 58 57 59 54        Mrs. Peters has stopped spironolactone as of 14 days ago. She states she didn't take it starting on her birthday and has felt great ever since. She has her appetite back and is in good spirits. She will remain off of it for now as BP is fairly stable. She has the occasional PM spike, but it usually improves on repeat check. She will remain on carvedilol.    Per 30 day average, 143/64 mmHg, patient's BP is not at goal.     Will continue to monitor regularly. Will follow up in 2-3 weeks, sooner if BP begins to trend upward or downward.    Asked patient to call or message with questions or concerns.     Current HTN regimen:  Hypertension Medications             carvedilol (COREG) 6.25 MG tablet TAKE 1 TABLET TWICE DAILY WITH MEALS  OR AS DIRECTED    nitroGLYCERIN (NITROSTAT) 0.4 MG SL tablet Place 1 tablet (0.4 mg total) under the tongue every 5 (five) minutes as needed for Chest pain.

## 2019-08-30 NOTE — PROGRESS NOTES
Last 5 Patient Entered Readings                                      Current 30 Day Average: 143/64     Recent Readings 8/29/2019 8/28/2019 8/28/2019 8/27/2019 8/26/2019    SBP (mmHg) 171 152 174 142 165    DBP (mmHg) 69 71 76 61 68    Pulse 59 62 58 57 59        Deferred due to pharmD follow up yesterday

## 2019-09-10 NOTE — PROGRESS NOTES
"Digital Medicine: Health  Follow-Up    Patient works on managing her stress, and states that "everyone has a problem" with a big family. She tries not to worry as much about her BP      Hypertension       Follow Up  Follow-up reason(s): reading review      Readings are trending up due to No longer taking any BP medications per pharmD instruction. Denies any symptoms of hypertension, and denies any changes in lifestyle.            Physical Activity:   When asked if exercising, patient responded: yes3 day(s) a week.      Patient participates in the following activities: swimming/water aerobics    Patient enjoys her PA routine      [facility]    Intervention/Plan    There are no preventive care reminders to display for this patient.    Last 5 Patient Entered Readings                                      Current 30 Day Average: 147/66     Recent Readings 9/9/2019 9/9/2019 9/8/2019 9/8/2019 9/7/2019    SBP (mmHg) 161 171 159 171 157    DBP (mmHg) 64 71 67 68 70    Pulse 62 64 61 56 70                "

## 2019-09-24 ENCOUNTER — PATIENT OUTREACH (OUTPATIENT)
Dept: ADMINISTRATIVE | Facility: OTHER | Age: 84
End: 2019-09-24

## 2019-09-25 ENCOUNTER — OFFICE VISIT (OUTPATIENT)
Dept: OPHTHALMOLOGY | Facility: CLINIC | Age: 84
End: 2019-09-25
Payer: MEDICARE

## 2019-09-25 DIAGNOSIS — H35.30 AMD (AGE-RELATED MACULAR DEGENERATION), BILATERAL: Primary | ICD-10-CM

## 2019-09-25 PROCEDURE — 92014 COMPRE OPH EXAM EST PT 1/>: CPT | Mod: HCNC,S$GLB,, | Performed by: OPHTHALMOLOGY

## 2019-09-25 PROCEDURE — 92014 PR EYE EXAM, EST PATIENT,COMPREHESV: ICD-10-PCS | Mod: HCNC,S$GLB,, | Performed by: OPHTHALMOLOGY

## 2019-09-25 PROCEDURE — 99999 PR PBB SHADOW E&M-EST. PATIENT-LVL II: ICD-10-PCS | Mod: PBBFAC,HCNC,, | Performed by: OPHTHALMOLOGY

## 2019-09-25 PROCEDURE — 99999 PR PBB SHADOW E&M-EST. PATIENT-LVL II: CPT | Mod: PBBFAC,HCNC,, | Performed by: OPHTHALMOLOGY

## 2019-09-25 NOTE — PROGRESS NOTES
"HPI     Post-op Evaluation      Additional comments: YAG CAP OU 08/23/2019              Spots and/or Floaters      Additional comments: seeing them since the yag              Comments     S/P Yag Cap OU 08/23/2019  Patients reports: that since having the  YAG OU vision has definitely   improved at distance the flaoters that she was experiencing after the YAG   has not fully went away says that the have decreased some but occasionally   will see them, today she is not seeing them. Patient C/O dryness and   itching Recommended AT"S and / or  Zaditor for allergies           Last edited by Darius York on 9/25/2019 10:09 AM. (History)            Assessment /Plan     For exam results, see Encounter Report.    AMD (age-related macular degeneration), bilateral      PCIOL stable,  Excellent distance and near (monovision)   AMD OU with some atrophy OS, so get retina consult for FA/OCT                   "

## 2019-10-07 ENCOUNTER — PATIENT OUTREACH (OUTPATIENT)
Dept: OTHER | Facility: OTHER | Age: 84
End: 2019-10-07

## 2019-10-07 NOTE — PROGRESS NOTES
LVM to discuss BP readings and medications.     Last 5 Patient Entered Readings                                      Current 30 Day Average: 157/68     Recent Readings 10/6/2019 10/6/2019 10/5/2019 10/4/2019 10/4/2019    SBP (mmHg) 155 175 134 184 160    DBP (mmHg) 69 72 59 71 66    Pulse 57 58 69 61 55

## 2019-10-11 ENCOUNTER — PATIENT OUTREACH (OUTPATIENT)
Dept: OTHER | Facility: OTHER | Age: 84
End: 2019-10-11
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
## 2019-10-14 ENCOUNTER — TELEPHONE (OUTPATIENT)
Dept: INTERNAL MEDICINE | Facility: CLINIC | Age: 84
End: 2019-10-14

## 2019-10-14 ENCOUNTER — HOSPITAL ENCOUNTER (EMERGENCY)
Facility: HOSPITAL | Age: 84
Discharge: HOME | End: 2019-10-14
Attending: EMERGENCY MEDICINE
Payer: MEDICARE

## 2019-10-14 ENCOUNTER — NURSE TRIAGE (OUTPATIENT)
Dept: ADMINISTRATIVE | Facility: CLINIC | Age: 84
End: 2019-10-14

## 2019-10-14 VITALS
TEMPERATURE: 98 F | HEART RATE: 70 BPM | RESPIRATION RATE: 18 BRPM | HEIGHT: 64 IN | WEIGHT: 125 LBS | SYSTOLIC BLOOD PRESSURE: 180 MMHG | OXYGEN SATURATION: 96 % | DIASTOLIC BLOOD PRESSURE: 78 MMHG | BODY MASS INDEX: 21.34 KG/M2
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
## 2019-10-14 DIAGNOSIS — R07.9 CHEST PAIN: ICD-10-CM

## 2019-10-14 DIAGNOSIS — M94.0 COSTOCHONDRITIS, ACUTE: Primary | ICD-10-CM

## 2019-10-14 DIAGNOSIS — J30.89 ALLERGIC RHINITIS DUE TO OTHER ALLERGIC TRIGGER, UNSPECIFIED SEASONALITY: ICD-10-CM

## 2019-10-14 DIAGNOSIS — S16.1XXA CERVICAL STRAIN, ACUTE, INITIAL ENCOUNTER: ICD-10-CM
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
## 2019-10-14 PROCEDURE — 99284 EMERGENCY DEPT VISIT MOD MDM: CPT | Mod: HCNC,,, | Performed by: EMERGENCY MEDICINE

## 2019-10-14 PROCEDURE — 93010 EKG 12-LEAD: ICD-10-PCS | Mod: HCNC,,, | Performed by: INTERNAL MEDICINE

## 2019-10-14 PROCEDURE — 93005 ELECTROCARDIOGRAM TRACING: CPT | Mod: HCNC

## 2019-10-14 PROCEDURE — 99284 PR EMERGENCY DEPT VISIT,LEVEL IV: ICD-10-PCS | Mod: HCNC,,, | Performed by: EMERGENCY MEDICINE

## 2019-10-14 PROCEDURE — 25000003 PHARM REV CODE 250: Mod: HCNC | Performed by: EMERGENCY MEDICINE

## 2019-10-14 PROCEDURE — 93010 ELECTROCARDIOGRAM REPORT: CPT | Mod: HCNC,,, | Performed by: INTERNAL MEDICINE

## 2019-10-14 PROCEDURE — 99284 EMERGENCY DEPT VISIT MOD MDM: CPT | Mod: 25,HCNC

## 2019-10-14 RX ORDER — ACETAMINOPHEN 325 MG/1
650 TABLET ORAL
Status: COMPLETED | OUTPATIENT
Start: 2019-10-14 | End: 2019-10-14

## 2019-10-14 RX ADMIN — ACETAMINOPHEN 650 MG: 325 TABLET ORAL at 02:10
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
## 2019-10-14 NOTE — DISCHARGE INSTRUCTIONS
Our goal in the emergency department is to always give you outstanding care and exceptional service. You may receive a survey by mail or e-mail in the next week regarding your experience in our ED. We would greatly appreciate your completing and returning the survey. Your feedback provides us with a way to recognize our staff who give very good care and it helps us learn how to improve when your experience was below our aspiration of excellence.     Take Tylenol as needed for pain.  You may use heat to her chest wall.  You may take Zyrtec daily as needed for stuffiness in your ears.
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
## 2019-10-15 ENCOUNTER — IMMUNIZATION (OUTPATIENT)
Dept: PHARMACY | Facility: CLINIC | Age: 84
End: 2019-10-15
Payer: MEDICARE

## 2019-10-15 ENCOUNTER — TELEPHONE (OUTPATIENT)
Dept: INTERNAL MEDICINE | Facility: CLINIC | Age: 84
End: 2019-10-15

## 2019-10-15 NOTE — TELEPHONE ENCOUNTER
----- Message from Lianna Bliss sent at 10/15/2019 11:43 AM CDT -----  Contact: Patient [phone]  Caller is requesting an earlier appt than we can schedule.  Caller declined first available appointment listed below. Caller will not accept being placed on the wait list and is requesting a message be sent to the provider.  When is the next available appointment:  12/02/2019  Did you offer to schedule the next available appt and put the patient on the wait list?:     What visit type: hospital follow up  Symptoms:    Patient preference of timeframe to be scheduled:  This month  What is the reason the patient is requesting a sooner appointment? (insurance terminating, changing jobs):    Would the patient rather a call back or a response via [facility]?:  Call back  Comments:
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
## 2019-10-18 ENCOUNTER — OFFICE VISIT (OUTPATIENT)
Dept: INTERNAL MEDICINE | Facility: CLINIC | Age: 84
End: 2019-10-18
Payer: MEDICARE

## 2019-10-18 VITALS
SYSTOLIC BLOOD PRESSURE: 120 MMHG | WEIGHT: 128.69 LBS | DIASTOLIC BLOOD PRESSURE: 76 MMHG | BODY MASS INDEX: 21.97 KG/M2 | HEART RATE: 58 BPM | HEIGHT: 64 IN

## 2019-10-18 DIAGNOSIS — R07.89 CHEST WALL PAIN: Primary | ICD-10-CM
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
## 2019-10-18 PROCEDURE — 99214 PR OFFICE/OUTPT VISIT, EST, LEVL IV, 30-39 MIN: ICD-10-PCS | Mod: HCNC,S$GLB,, | Performed by: INTERNAL MEDICINE

## 2019-10-18 PROCEDURE — 1101F PT FALLS ASSESS-DOCD LE1/YR: CPT | Mod: HCNC,CPTII,S$GLB, | Performed by: INTERNAL MEDICINE

## 2019-10-18 PROCEDURE — 1101F PR PT FALLS ASSESS DOC 0-1 FALLS W/OUT INJ PAST YR: ICD-10-PCS | Mod: HCNC,CPTII,S$GLB, | Performed by: INTERNAL MEDICINE

## 2019-10-18 PROCEDURE — 99214 OFFICE O/P EST MOD 30 MIN: CPT | Mod: HCNC,S$GLB,, | Performed by: INTERNAL MEDICINE

## 2019-10-18 PROCEDURE — 99999 PR PBB SHADOW E&M-EST. PATIENT-LVL III: ICD-10-PCS | Mod: PBBFAC,HCNC,, | Performed by: INTERNAL MEDICINE

## 2019-10-18 PROCEDURE — 99999 PR PBB SHADOW E&M-EST. PATIENT-LVL III: CPT | Mod: PBBFAC,HCNC,, | Performed by: INTERNAL MEDICINE

## 2019-10-28 ENCOUNTER — OFFICE VISIT (OUTPATIENT)
Dept: DERMATOLOGY | Facility: CLINIC | Age: 84
End: 2019-10-28
Payer: MEDICARE

## 2019-10-28 DIAGNOSIS — L82.1 SEBORRHEIC KERATOSIS: Primary | ICD-10-CM

## 2019-10-28 PROCEDURE — 99212 OFFICE O/P EST SF 10 MIN: CPT | Mod: HCNC,24,S$GLB, | Performed by: DERMATOLOGY

## 2019-10-28 PROCEDURE — 1101F PR PT FALLS ASSESS DOC 0-1 FALLS W/OUT INJ PAST YR: ICD-10-PCS | Mod: HCNC,CPTII,S$GLB, | Performed by: DERMATOLOGY

## 2019-10-28 PROCEDURE — 99999 PR PBB SHADOW E&M-EST. PATIENT-LVL III: CPT | Mod: PBBFAC,HCNC,,

## 2019-10-28 PROCEDURE — 99212 PR OFFICE/OUTPT VISIT, EST, LEVL II, 10-19 MIN: ICD-10-PCS | Mod: HCNC,24,S$GLB, | Performed by: DERMATOLOGY

## 2019-10-28 PROCEDURE — 1101F PT FALLS ASSESS-DOCD LE1/YR: CPT | Mod: HCNC,CPTII,S$GLB, | Performed by: DERMATOLOGY

## 2019-10-28 PROCEDURE — 99999 PR PBB SHADOW E&M-EST. PATIENT-LVL III: ICD-10-PCS | Mod: PBBFAC,HCNC,,

## 2019-10-28 NOTE — PATIENT INSTRUCTIONS
Understanding Seborrheic Keratosis  Seborrheic keratoses are wart-like growths on the skin. These growths are not cancer. Many people get them, especially after age 30.  How to say it  [id number]   What causes seborrheic keratoses?  Doctors do not know what causes seborrheic keratoses. They may run in families. In addition, they become more common as people get older.  What are the symptoms of seborrheic keratoses?  Here is what seborrheic keratoses often look like:  · They tend to be round or oval in shape. They can be very small or about as big across as a quarter.  · They can appear singly or in clusters.  · They tend to be tan, brown, or black in color.  · The edges may be scalloped or uneven-looking.  · They can have a waxy or scaly look.  · They can be a bit raised, appearing to be stuck on the skin.  · They may become red and irritated if scratched or rubbed by clothing  Sebhorreic keratoses are not painful, but they may be itchy. They can become irritated if they are continually rubbed by skin or clothing. Seborrheic keratoses most often appear on the face, arms, chest, back, or belly.  How are seborrheic keratoses treated?  Seborrheic keratoses dont need to be treated unless they bother you. You may choose to have them removed because you find them unattractive. You may also want them removed because they get irritated and uncomfortable. A healthcare provider can remove them in an office visit. Ways that seborrheic keratoses can be removed include:  · Freezing them off with liquid nitrogen  · Cutting them off with a scalpel  · Burning them off with electricity  What are the complications of seborrheic keratoses?  Seborrheic keratoses are not cancer, but they can look like some types of skin cancer. So its a good idea to ask your healthcare provider to check any new skin growths. Ask your healthcare provider about any skin problem that concerns you.  When should I call my healthcare  provider?  Call your healthcare provider right away if any of these occur:  · You develop a lot of seborrheic keratoses very quickly  · You have a sore that does not heal within a few weeks, or heals and then comes back  · You have a mole or skin growth that is changing in size, shape, or color  · You have a mole or skin growth that looks different on one side from the other  · You have a mole or skin growth that is not the same color throughout   Date Last Reviewed: 5/1/2016  © 4797-2914 [employer]. [address] [place] [address]. All rights reserved. This information is not intended as a substitute for professional medical care. Always follow your healthcare professional's instructions.

## 2019-10-28 NOTE — PROGRESS NOTES
Subjective:       Elen Peters is a 89 y.o. female who presents today because she wants some moles removed. Patient states she has had spots on her left and right neck for years and states she scratches them and they are irritating. She wants them removed. Denies bleeding, growing/changing, nonhealing. Has not had them previously treated. She also complains of a spot under her breast which she scratches occasionally. She states she wants that removed as well. She is not sure how long it has been present. Denies bleeding, growing/changing, nonhealing. Has not had that spot previously treated.     The following portions of the patient's history were reviewed and updated as appropriate: allergies, current medications, past family history, past medical history, past social history, past surgical history and problem list.     Review of Systems  Pertinent items are noted in HPI.      Objective:      There were no vitals taken for this visit.  Physical Exam    General:  alert, appears stated age and cooperative   HEENT:  sclera clear, anicteric   Skin:   pigmented stuck on papule R neck with pseudohorned cysts  Cluster of pigmented stuck on papules L neck             Assessment:     1) Seborrheic Keratosis     Plan:     1) benign, reassurance  2) offered cosmetic removal but patient does not want to pursue at this time    RTC GIOVANNY Reyez MD  [facility]
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
## 2019-11-04 ENCOUNTER — PATIENT OUTREACH (OUTPATIENT)
Dept: OTHER | Facility: OTHER | Age: 84
End: 2019-11-04

## 2019-11-04 DIAGNOSIS — I10 HTN (HYPERTENSION), BENIGN: Primary | ICD-10-CM

## 2019-11-04 NOTE — PROGRESS NOTES
LVM to discuss elevated BP readings.     Last 5 Patient Entered Readings                                      Current 30 Day Average: 161/71     Recent Readings 11/3/2019 11/2/2019 11/1/2019 11/1/2019 10/31/2019    SBP (mmHg) 175 169 182 177 192    DBP (mmHg) 72 69 76 70 75    Pulse 62 61 60 60 62
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
## 2019-11-06 RX ORDER — HYDRALAZINE HYDROCHLORIDE 25 MG/1
25 TABLET, FILM COATED ORAL EVERY 12 HOURS
Qty: 60 TABLET | Refills: 3
Start: 2019-11-06 | End: 2019-11-15

## 2019-11-06 NOTE — PROGRESS NOTES
"Digital Medicine: Clinician Follow-Up        Follow Up  Follow-up reason(s): reading review and medication change      Alert received.   Care Team received high BP alert.  Patient is not experiencing symptoms.  Medication Change: new med  Patient continues to have spikes in BP. She states it is always due to "aggravation about something." BP has trended back down.       INTERVENTION(S)  recommended med change    PLAN  patient amenable to changes    Per 30 day average, 162/71 mmHg, patient's BP is not at goal.     Will restart hydralazine 25 mg BID. She tolerated this well previously, but it was stopped to start spironolactone, which she did not tolerate well.     Will continue to monitor regularly. Will follow up in 2-3 weeks, sooner if BP begins to trend upward or downward.    Asked patient to call or message with questions or concerns.         Last 5 Patient Entered Readings                                      Current 30 Day Average: 162/71     Recent Readings 11/5/2019 11/4/2019 11/4/2019 11/4/2019 11/3/2019    SBP (mmHg) 148 154 165 193 175    DBP (mmHg) 67 73 80 79 72    Pulse 62 61 61 57 62             Hypertension Medications             carvedilol (COREG) 6.25 MG tablet TAKE 1 TABLET TWICE DAILY WITH MEALS  OR AS DIRECTED    hydrALAZINE (APRESOLINE) 25 MG tablet Take 1 tablet (25 mg total) by mouth every 12 (twelve) hours.    nitroGLYCERIN (NITROSTAT) 0.4 MG SL tablet Place 1 tablet (0.4 mg total) under the tongue every 5 (five) minutes as needed for Chest pain.                   "

## 2019-11-11 ENCOUNTER — PATIENT OUTREACH (OUTPATIENT)
Dept: OTHER | Facility: OTHER | Age: 84
End: 2019-11-11

## 2019-11-11 NOTE — PROGRESS NOTES
Digital Medicine: Clinician Follow-Up        Follow Up  Follow-up reason(s): reading review    Patient had some confusion over the weekend about her medications. She restarted spironolactone instead of hydralazine.       INTERVENTION(S)  reviewed appropriate dose schedule    PLAN  patient verbalizes understanding    Per 30 day average, 164/71 mmHg, patient's BP is not at goal.     Reviewed medication regimen. Reiterated that she should not be taking spironolactone, but that she should be taking hydralazine BID when she takes carvedilol. She verbalized understanding.     Will continue to monitor regularly. Will follow up in 2-3 weeks, sooner if BP begins to trend upward or downward.    Asked patient to call or message with questions or concerns.         Last 5 Patient Entered Readings                                      Current 30 Day Average: 164/71     Recent Readings 11/10/2019 11/9/2019 11/9/2019 11/8/2019 11/8/2019    SBP (mmHg) 173 169 184 165 177    DBP (mmHg) 72 71 73 71 70    Pulse 57 59 59 61 62             Hypertension Medications             carvedilol (COREG) 6.25 MG tablet TAKE 1 TABLET TWICE DAILY WITH MEALS  OR AS DIRECTED    hydrALAZINE (APRESOLINE) 25 MG tablet Take 1 tablet (25 mg total) by mouth every 12 (twelve) hours.    nitroGLYCERIN (NITROSTAT) 0.4 MG SL tablet Place 1 tablet (0.4 mg total) under the tongue every 5 (five) minutes as needed for Chest pain.

## 2019-11-11 NOTE — PROGRESS NOTES
Patient LVM with concerns on how to take her medications. Called to clarify. LVM asking for a return call.     Last 5 Patient Entered Readings                                      Current 30 Day Average: 164/71     Recent Readings 11/10/2019 11/9/2019 11/9/2019 11/8/2019 11/8/2019    SBP (mmHg) 173 169 184 165 177    DBP (mmHg) 72 71 73 71 70    Pulse 57 59 59 61 62

## 2019-11-15 ENCOUNTER — PATIENT OUTREACH (OUTPATIENT)
Dept: OTHER | Facility: OTHER | Age: 84
End: 2019-11-15

## 2019-11-15 NOTE — PROGRESS NOTES
Digital Medicine: Clinician Follow-Up        Follow Up  Follow-up reason(s): reading review and medication change follow-up      Patient started new medication.    Is patient tolerating med change?:  Joshua called because she has been constipated since starting hydralazine.           Per 30 day average, 161/71 mmHg, patient's BP is not at goal.     Instructed patient to stop hydralazine due to constipation. Encouraged high fiber foods and adequate hydration to help with bowel movements.     Asked patient to call or message with questions or concerns.         Last 5 Patient Entered Readings                                      Current 30 Day Average: 161/71     Recent Readings 11/14/2019 11/14/2019 11/13/2019 11/12/2019 11/11/2019    SBP (mmHg) 156 169 139 157 132    DBP (mmHg) 75 75 60 64 67    Pulse 73 65 63 63 65             Hypertension Medications             carvedilol (COREG) 6.25 MG tablet TAKE 1 TABLET TWICE DAILY WITH MEALS  OR AS DIRECTED    hydrALAZINE (APRESOLINE) 25 MG tablet Take 1 tablet (25 mg total) by mouth every 12 (twelve) hours.    nitroGLYCERIN (NITROSTAT) 0.4 MG SL tablet Place 1 tablet (0.4 mg total) under the tongue every 5 (five) minutes as needed for Chest pain.

## 2019-11-18 ENCOUNTER — PATIENT OUTREACH (OUTPATIENT)
Dept: OTHER | Facility: OTHER | Age: 84
End: 2019-11-18

## 2019-11-18 NOTE — PROGRESS NOTES
Digital Medicine: Clinician Follow-Up        Follow Up  Follow-up reason(s): reading review      Alert received.   Care Team received high BP alert.  Patient is not experiencing symptoms.Patient had lower BP over the weekend, except for yesterday, when she had a high BP alert. She states that Sundays are stressful because she has a lot of family over. She denies CP, SOB, HA, changes in vision.     Patient has not had a complete bowel movement yet, despite stopping hydralazine. She states that she has taken OTC Citrucel for the past 2-3 days.           Per 30 day average, 161/71 mmHg, patient's BP is not at goal.     Will not start any new medications until current issue of constipation resolves. Advised patient try OTC Miralax to help with constipation.     Will continue to monitor regularly. Will follow up in 2-3 weeks, sooner if BP begins to trend upward or downward.    Asked patient to call or message with questions or concerns.       Last 5 Patient Entered Readings                                      Current 30 Day Average: 161/71     Recent Readings 11/17/2019 11/17/2019 11/16/2019 11/15/2019 11/14/2019    SBP (mmHg) 176 184 144 132 156    DBP (mmHg) 72 76 64 56 75    Pulse 58 57 65 70 73             Hypertension Medications             carvedilol (COREG) 6.25 MG tablet TAKE 1 TABLET TWICE DAILY WITH MEALS  OR AS DIRECTED    nitroGLYCERIN (NITROSTAT) 0.4 MG SL tablet Place 1 tablet (0.4 mg total) under the tongue every 5 (five) minutes as needed for Chest pain.

## 2019-11-26 ENCOUNTER — PATIENT OUTREACH (OUTPATIENT)
Dept: OTHER | Facility: OTHER | Age: 84
End: 2019-11-26

## 2019-11-26 NOTE — PROGRESS NOTES
"Digital Medicine: Clinician Follow-Up        Follow Up  Follow-up reason(s): reading review      Alert received.   Care Team received high BP alert.  Patient is not experiencing symptoms.Patient checked BP during the [place] game on Sunday. She will not do this going forward. She also has family over on Sundays and states that this gets her "worked up." BP was improved yesterday.     She continues to exercise regularly.           Per 30 day average, 158/70 mmHg, patient's BP is not at goal.     No medication changes required at this time. Patient will avoid checking BP while doing things that "excite" her.     Will continue to monitor regularly.     Asked patient to call or message with questions or concerns.       Last 5 Patient Entered Readings                                      Current 30 Day Average: 158/70     Recent Readings 11/25/2019 11/24/2019 11/24/2019 11/23/2019 11/22/2019    SBP (mmHg) 147 180 163 133 145    DBP (mmHg) 67 72 68 58 60    Pulse 57 56 55 71 64             Hypertension Medications             carvedilol (COREG) 6.25 MG tablet TAKE 1 TABLET TWICE DAILY WITH MEALS  OR AS DIRECTED    nitroGLYCERIN (NITROSTAT) 0.4 MG SL tablet Place 1 tablet (0.4 mg total) under the tongue every 5 (five) minutes as needed for Chest pain.                 "

## 2019-12-03 ENCOUNTER — TELEPHONE (OUTPATIENT)
Dept: INTERNAL MEDICINE | Facility: CLINIC | Age: 84
End: 2019-12-03

## 2019-12-03 DIAGNOSIS — M54.2 NECK AND SHOULDER PAIN: Primary | ICD-10-CM

## 2019-12-03 DIAGNOSIS — R29.898 WEAKNESS OF LOWER EXTREMITY, UNSPECIFIED LATERALITY: ICD-10-CM

## 2019-12-03 DIAGNOSIS — M25.519 NECK AND SHOULDER PAIN: Primary | ICD-10-CM

## 2019-12-03 NOTE — TELEPHONE ENCOUNTER
----- Message from Guillermina Cuellar sent at 12/3/2019  1:31 PM CST -----  Contact: patient [phone] cell phone  Patient is returning a phone call.  Who left a message for the patient: Antonette  Does patient know what this is regarding:  Would not say  Comments:

## 2019-12-03 NOTE — TELEPHONE ENCOUNTER
----- Message from Hal Salmon sent at 12/3/2019 12:50 PM CST -----  Contact: [phone]  Type: Returning a call     Who left a message? Domonique     When did the practice call? 12/03      Comments: please call and advise, Thanks

## 2019-12-03 NOTE — TELEPHONE ENCOUNTER
----- Message from Marycarmen Jones sent at 12/3/2019  9:50 AM CST -----  Contact: self/   Please call patient about a previous appointment she had.

## 2019-12-03 NOTE — TELEPHONE ENCOUNTER
Offered to scheduled the pt and urgent care appt with NP she declined and stated that she will call back on tomorrow to speak with MA.

## 2019-12-04 NOTE — TELEPHONE ENCOUNTER
Message left on home recorder/mailbox full on cell. Orders for PT placed and she will receive a call to schedule appointment and let me know if she doesn't hear back from them.

## 2019-12-10 ENCOUNTER — PATIENT OUTREACH (OUTPATIENT)
Dept: OTHER | Facility: OTHER | Age: 84
End: 2019-12-10

## 2019-12-20 RX ORDER — ATORVASTATIN CALCIUM 40 MG/1
TABLET, FILM COATED ORAL
Qty: 90 TABLET | Refills: 3 | Status: SHIPPED | OUTPATIENT
Start: 2019-12-20 | End: 2020-12-28

## 2019-12-27 ENCOUNTER — CLINICAL SUPPORT (OUTPATIENT)
Dept: REHABILITATION | Facility: HOSPITAL | Age: 84
End: 2019-12-27
Attending: INTERNAL MEDICINE
Payer: MEDICARE

## 2019-12-27 DIAGNOSIS — M25.611 DECREASED RIGHT SHOULDER RANGE OF MOTION: ICD-10-CM

## 2019-12-27 PROCEDURE — 97163 PT EVAL HIGH COMPLEX 45 MIN: CPT | Mod: HCNC,PO
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
## 2019-12-30 ENCOUNTER — PATIENT OUTREACH (OUTPATIENT)
Dept: OTHER | Facility: OTHER | Age: 84
End: 2019-12-30

## 2019-12-30 DIAGNOSIS — I10 HTN (HYPERTENSION), BENIGN: ICD-10-CM

## 2019-12-30 RX ORDER — CARVEDILOL 6.25 MG/1
12.5 TABLET ORAL 2 TIMES DAILY WITH MEALS
Qty: 180 TABLET | Refills: 1
Start: 2019-12-30 | End: 2020-03-16 | Stop reason: SDUPTHER

## 2019-12-30 NOTE — PROGRESS NOTES
"Digital Medicine: Clinician Follow-Up        Follow Up  Follow-up reason(s): reading review and medication change      Medication Change: dose increase  Patient's BP continues to fluctuate. She states that it is higher when she has the "stress/pressure" of having family and friends over for holidays and gatherings. Most recent readings have improved.           Per 30 day average, 160/71 mmHg, patient's BP is not at goal.     Will attempt to increase carvedilol to 6.25 mg BID and monitor HR closely. Patient cannot tolerate amlodipine due LALO, ARBs, hydralazine, and spironolactone caused worsening GI symptoms.    Will continue to monitor regularly. Will follow up in 2-3 weeks, sooner if BP begins to trend upward or downward.    Asked patient to call or message with questions or concerns.         Last 5 Patient Entered Readings                                      Current 30 Day Average: 160/71     Recent Readings 12/29/2019 12/28/2019 12/27/2019 12/26/2019 12/25/2019    SBP (mmHg) 152 127 141 171 174    DBP (mmHg) 67 56 67 72 73    Pulse 60 71 66 65 61               Hypertension Medications             carvedilol (COREG) 6.25 MG tablet Take 2 tablets (12.5 mg total) by mouth 2 (two) times daily with meals.    nitroGLYCERIN (NITROSTAT) 0.4 MG SL tablet Place 1 tablet (0.4 mg total) under the tongue every 5 (five) minutes as needed for Chest pain.                     "

## 2019-12-31 ENCOUNTER — CLINICAL SUPPORT (OUTPATIENT)
Dept: REHABILITATION | Facility: HOSPITAL | Age: 84
End: 2019-12-31
Attending: INTERNAL MEDICINE
Payer: MEDICARE

## 2019-12-31 DIAGNOSIS — M25.611 DECREASED RIGHT SHOULDER RANGE OF MOTION: ICD-10-CM

## 2019-12-31 PROCEDURE — 97110 THERAPEUTIC EXERCISES: CPT | Mod: HCNC,PO

## 2019-12-31 PROCEDURE — 97140 MANUAL THERAPY 1/> REGIONS: CPT | Mod: HCNC,PO

## 2019-12-31 NOTE — PROGRESS NOTES
Physical Therapy Daily Treatment Note     Name: Elen Peters  Clinic Number: [id number]    Therapy Diagnosis:   Encounter Diagnosis   Name Primary?    Decreased right shoulder range of motion      Physician: Jim Treadwell MD    Visit Date: 12/31/2019    Physician Orders: PT Eval and Treat  Medical Diagnosis: Neck and shoulder pain (M54.2, M25.519), Weakness of lower extremity, unspecified laterality (R29.898)  Evaluation Date: 12/27/2019  Authorization Period Expiration: 12/30/2020 for neck and shoulder, 12/03/2020 for LE  Plan of Care Certification Period: 12/27/2019 - 03/27/2020  Visit #/Visits authorized: 1/ 20     Time In: 10:07 am  Time Out: 10:58 am  Total Billable Time: 45 minutes    Precautions: Standard and Fall    Subjective     Pt reports: she was very sore after last appointment and has had some trouble with her HEP.  Patient denies pain upon arrival today.   She was somewhat compliant with home exercise program.  Response to previous treatment: Patient reported soreness after IE  Functional change: none    Pain: 0/10  Location: right neck  and shoulder      Objective     Elen received therapeutic exercises to develop strength, endurance, ROM, flexibility and posture for 32 minutes including:    Pulleys 3 min scaption   Upper trap stretch 2x 30 seconds each  Levator scap stretch 2x 30 seconds each  Scapular retraction 20x 3 second hold  Cervical chin tuck with towel roll 20x 3 second hold  Standing Rows with OTB 2x 10   Standing I's with OTB 2x 10  Supine cervical rotation 20x  Supine serratus punches 2x 10 each  Burggers with OTB 2x 10     Elen received the following manual therapy techniques: Myofacial release and Soft tissue Mobilization were applied to the: R neck and shoulder for 13 minutes, including: levator scap, cervical paraspinals, pecs and upper trap.         Home Exercises Provided and Patient Education Provided     Education provided:   - HEP review  - new exercise    Written Home  Exercises Provided: Patient instructed to cont prior HEP.  Exercises were reviewed and Elen was able to demonstrate them prior to the end of the session.  Elen demonstrated good  understanding of the education provided.     See EMR under Patient Instructions for exercises provided prior visit.    Assessment     Patient did well with all exercises today.  Mod-Max tactile and verbal cueing was provided for proper muscle activation for scapular retraction with exercises.  Continue with scapular strengthening to improve functional mobility.      Elen is progressing well towards her goals.   Pt prognosis is Fair.     Pt will continue to benefit from skilled outpatient physical therapy to address the deficits listed in the problem list box on initial evaluation, provide pt/family education and to maximize pt's level of independence in the home and community environment.     Pt's spiritual, cultural and educational needs considered and pt agreeable to plan of care and goals.     Anticipated barriers to physical therapy: age    Goals:     Short Term Goals (6 Weeks):   1. Pt will be independent with HEP to supplement PT in improving pain free cervical mobility  2. Pt will improve cervical AROM  To 50% in all planes to improve cervical mobility for driving  3. Pt will improve UE MMTs by 1/2 grade in all planes to improve strength for lifting and carrying tasks.  4. Pt will demonstrate improved sitting posture to decrease pain experienced in head and neck.  Long Term Goals (12 Weeks):   1. Pt will improve FOTO to </=25% limitation to improve perceived limitation with changing and maintaining mobility.  2. Pt will improve cervical AROM to WNL in all planes to improve cervical mobility for driving   3. Pt will improve UE MMTs 1 grade in all planes to improve strength for lifting and carrying tasks.  4. Pt will report no pain with lifting 5 lbs to promote physical activity.   5. Pt will report no pain with cervical AROM in  all planes to promote QOL.    Plan     Continue with established PT POC and progress per pt tolerance.     Yue Bueno, PT

## 2020-01-07 ENCOUNTER — CLINICAL SUPPORT (OUTPATIENT)
Dept: REHABILITATION | Facility: HOSPITAL | Age: 85
End: 2020-01-07
Attending: INTERNAL MEDICINE
Payer: MEDICARE

## 2020-01-07 ENCOUNTER — PATIENT OUTREACH (OUTPATIENT)
Dept: OTHER | Facility: OTHER | Age: 85
End: 2020-01-07

## 2020-01-07 DIAGNOSIS — M25.611 DECREASED RIGHT SHOULDER RANGE OF MOTION: ICD-10-CM

## 2020-01-07 PROCEDURE — 97110 THERAPEUTIC EXERCISES: CPT | Mod: HCNC,PO

## 2020-01-07 NOTE — PROGRESS NOTES
Physical Therapy Daily Treatment Note     Name: Elen Peters  Clinic Number: [id number]    Therapy Diagnosis:   No diagnosis found.  Physician: Jmi Treadwell MD    Visit Date: 1/7/2020    Physician Orders: PT Eval and Treat  Medical Diagnosis: Neck and shoulder pain (M54.2, M25.519), Weakness of lower extremity, unspecified laterality (R29.898)  Evaluation Date: 12/27/2019  Authorization Period Expiration: 12/30/2020 for neck and shoulder, 12/03/2020 for LE  Plan of Care Certification Period: 12/27/2019 - 03/27/2020  Visit #/Visits authorized: 2/ 20     Time In: 3:10pm  Time Out: 3:50pm  Total Billable Time: 20 minutes (1 TE)     Precautions: Standard and Fall    Subjective     Pt reports: she did not do too many of her exercises but she has been feeling a bit better. She got in the pool yesterday.     She was somewhat compliant with home exercise program.  Response to previous treatment: Patient reported soreness after IE  Functional change: none    Pain: 0/10  Location: right neck  and shoulder      Objective     Elen received therapeutic exercises to develop strength, endurance, ROM, flexibility and posture for 35 minutes including:    Pulleys 4 min flexion   Upper trap stretch 2x 30 seconds each  Levator scap stretch 2x 30 seconds each  Scapular retraction 20x 3 second hold  Cervical chin tuck sitting x30  Standing Rows with OTB 2x 10   Standing I's with OTB 2x 10  Supine cervical rotation 20x  Supine serratus punches 2x 10 each - v/c to perform exercises correctly   Burggers with OTB 2x 10     Elen received the following manual therapy techniques: Myofacial release and Soft tissue Mobilization were applied to the: R neck and shoulder for 5 minutes, including: levator scap, cervical paraspinals, pecs and upper trap.         Home Exercises Provided and Patient Education Provided     Education provided:   - HEP review  - new exercise    Written Home Exercises Provided: Patient instructed to cont prior  HEP.  Exercises were reviewed and Elen was able to demonstrate them prior to the end of the session.  Elen demonstrated good  understanding of the education provided.     See EMR under Patient Instructions for exercises provided prior visit.    Assessment     Patient was able to perform all exercises without complaints of discomfort or pain. Pt requires constant verbal cueing for proper performance of exercises.      Elen is progressing well towards her goals.   Pt prognosis is Fair.     Pt will continue to benefit from skilled outpatient physical therapy to address the deficits listed in the problem list box on initial evaluation, provide pt/family education and to maximize pt's level of independence in the home and community environment.     Pt's spiritual, cultural and educational needs considered and pt agreeable to plan of care and goals.     Anticipated barriers to physical therapy: age    Goals:     Short Term Goals (6 Weeks):   1. Pt will be independent with HEP to supplement PT in improving pain free cervical mobility  2. Pt will improve cervical AROM  To 50% in all planes to improve cervical mobility for driving  3. Pt will improve UE MMTs by 1/2 grade in all planes to improve strength for lifting and carrying tasks.  4. Pt will demonstrate improved sitting posture to decrease pain experienced in head and neck.  Long Term Goals (12 Weeks):   1. Pt will improve FOTO to </=25% limitation to improve perceived limitation with changing and maintaining mobility.  2. Pt will improve cervical AROM to WNL in all planes to improve cervical mobility for driving   3. Pt will improve UE MMTs 1 grade in all planes to improve strength for lifting and carrying tasks.  4. Pt will report no pain with lifting 5 lbs to promote physical activity.   5. Pt will report no pain with cervical AROM in all planes to promote QOL.    Plan     Continue with established PT POC and progress per pt tolerance.     Giovani Jeong, PT

## 2020-01-10 ENCOUNTER — PATIENT OUTREACH (OUTPATIENT)
Dept: OTHER | Facility: OTHER | Age: 85
End: 2020-01-10

## 2020-01-10 NOTE — PROGRESS NOTES
"Digital Medicine: Clinician Follow-Up        Follow Up  Follow-up reason(s): medication change follow-up      Patient started new medication.    Is patient tolerating med change?:  YesPatient states she feels "great". She confirms higher dose of carvedilol with no issues. HR is within acceptable range.    She does not feel the last 3 BP readings are accurate as her BP monitor was dying. She has charged it overnight and will check a reading some time today.           Per 30 day average, 160/71 mmHg, patient's BP is not at goal.     Will monitor BP on current regimen.     Will continue to monitor regularly.     Asked patient to call or message with questions or concerns.           Last 5 Patient Entered Readings                                      Current 30 Day Average: 160/71     Recent Readings 1/6/2020 1/5/2020 1/4/2020 1/4/2020 1/3/2020    SBP (mmHg) 172 173 175 159 166    DBP (mmHg) 74 70 74 70 74    Pulse 57 55 58 61 56             Hypertension Medications             carvedilol (COREG) 6.25 MG tablet Take 2 tablets (12.5 mg total) by mouth 2 (two) times daily with meals.    nitroGLYCERIN (NITROSTAT) 0.4 MG SL tablet Place 1 tablet (0.4 mg total) under the tongue every 5 (five) minutes as needed for Chest pain.                 Screenings  "

## 2020-01-14 ENCOUNTER — CLINICAL SUPPORT (OUTPATIENT)
Dept: REHABILITATION | Facility: HOSPITAL | Age: 85
End: 2020-01-14
Attending: INTERNAL MEDICINE
Payer: MEDICARE

## 2020-01-14 DIAGNOSIS — M25.611 DECREASED RIGHT SHOULDER RANGE OF MOTION: ICD-10-CM

## 2020-01-14 PROCEDURE — 97110 THERAPEUTIC EXERCISES: CPT | Mod: HCNC,PO

## 2020-01-14 NOTE — PROGRESS NOTES
Physical Therapy Daily Treatment Note     Name: Elen Peters  Clinic Number: [id number]    Therapy Diagnosis:   Encounter Diagnosis   Name Primary?    Decreased right shoulder range of motion      Physician: Jim Treadwell MD    Visit Date: 1/14/2020    Physician Orders: PT Eval and Treat  Medical Diagnosis: Neck and shoulder pain (M54.2, M25.519), Weakness of lower extremity, unspecified laterality (R29.898)  Evaluation Date: 12/27/2019  Authorization Period Expiration: 12/30/2020 for neck and shoulder, 12/03/2020 for LE  Plan of Care Certification Period: 12/27/2019 - 03/27/2020  Visit #/Visits authorized: 3/ 20     Time In: 10:08m  Time Out: 11:00am  Total Billable Time: 26 minutes (2 TE)     Precautions: Standard and Fall    Subjective     Pt reports: she did not do too many of her exercises but she has been feeling a bit better. She got in the pool yesterday.     She was somewhat compliant with home exercise program.  Response to previous treatment: Patient reported soreness after IE  Functional change: none    Pain: 0/10  Location: right neck  and shoulder      Objective     Elen received therapeutic exercises to develop strength, endurance, ROM, flexibility and posture for 47 minutes including:    Pulleys 4 min flexion 4 min scaption  Upper trap stretch 2x 30 seconds each  Levator scap stretch 2x 30 seconds each  Scapular retraction 20x 3 second hold  Cervical chin tuck supine x30  Standing Rows with OTB 2x 10   Standing I's with OTB 2x 10  Supine cervical rotation 20x  Supine serratus punches 2x 10 each - v/c to perform exercises correctly   Burggers with OTB 2x 10     Elen received the following manual therapy techniques: Myofacial release and Soft tissue Mobilization were applied to the: R neck and shoulder for 5 minutes, including: levator scap, cervical paraspinals, pecs and upper trap.         Home Exercises Provided and Patient Education Provided     Education provided:   - HEP review  - new  exercise    Written Home Exercises Provided: Patient instructed to cont prior HEP.  Exercises were reviewed and Elen was able to demonstrate them prior to the end of the session.  Elen demonstrated good  understanding of the education provided.     See EMR under Patient Instructions for exercises provided prior visit.    Assessment     Patient tolerates treatment session well. She continues to get relief of symptoms with treatment but carryover is fair. Pt needs moderate verbal cueing for exercises.     Elen is progressing well towards her goals.   Pt prognosis is Fair.     Pt will continue to benefit from skilled outpatient physical therapy to address the deficits listed in the problem list box on initial evaluation, provide pt/family education and to maximize pt's level of independence in the home and community environment.     Pt's spiritual, cultural and educational needs considered and pt agreeable to plan of care and goals.     Anticipated barriers to physical therapy: age    Goals:     Short Term Goals (6 Weeks):   1. Pt will be independent with HEP to supplement PT in improving pain free cervical mobility  2. Pt will improve cervical AROM  To 50% in all planes to improve cervical mobility for driving  3. Pt will improve UE MMTs by 1/2 grade in all planes to improve strength for lifting and carrying tasks.  4. Pt will demonstrate improved sitting posture to decrease pain experienced in head and neck.  Long Term Goals (12 Weeks):   1. Pt will improve FOTO to </=25% limitation to improve perceived limitation with changing and maintaining mobility.  2. Pt will improve cervical AROM to WNL in all planes to improve cervical mobility for driving   3. Pt will improve UE MMTs 1 grade in all planes to improve strength for lifting and carrying tasks.  4. Pt will report no pain with lifting 5 lbs to promote physical activity.   5. Pt will report no pain with cervical AROM in all planes to promote QOL.    Plan      Continue with established PT POC and progress per pt tolerance.     Giovani Jeong, PT

## 2020-01-21 ENCOUNTER — CLINICAL SUPPORT (OUTPATIENT)
Dept: REHABILITATION | Facility: HOSPITAL | Age: 85
End: 2020-01-21
Attending: INTERNAL MEDICINE
Payer: MEDICARE

## 2020-01-21 DIAGNOSIS — M25.611 DECREASED RIGHT SHOULDER RANGE OF MOTION: ICD-10-CM

## 2020-01-21 PROCEDURE — 97140 MANUAL THERAPY 1/> REGIONS: CPT | Mod: HCNC,PO

## 2020-01-21 PROCEDURE — 97110 THERAPEUTIC EXERCISES: CPT | Mod: HCNC,PO

## 2020-01-21 NOTE — PROGRESS NOTES
Physical Therapy Daily Treatment Note     Name: Elen Peters  Clinic Number: [id number]    Therapy Diagnosis:   Encounter Diagnosis   Name Primary?    Decreased right shoulder range of motion      Physician: Jim Treadwell MD    Visit Date: 1/21/2020    Physician Orders: PT Eval and Treat  Medical Diagnosis: Neck and shoulder pain (M54.2, M25.519), Weakness of lower extremity, unspecified laterality (R29.898)  Evaluation Date: 12/27/2019  Authorization Period Expiration: 12/30/2020 for neck and shoulder, 12/03/2020 for LE  Plan of Care Certification Period: 12/27/2019 - 03/27/2020  Visit #/Visits authorized: 4/ 20     Time In: 1005am  Time Out: 10:55am  Total Billable Time: 50 minutes (2 TE 1 man)    Precautions: Standard and Fall    Subjective     Pt reports: that she is feeling pretty good. She went to swimming yesterday but not today because she had to come here.     She was somewhat compliant with home exercise program.  Response to previous treatment: Patient reported soreness after IE  Functional change: none    Pain: 0/10  Location: right neck  and shoulder      Objective     Elen received therapeutic exercises to develop strength, endurance, ROM, flexibility and posture for 45 minutes including:    Pulleys 4 min flexion 4 min scaption  Upper trap stretch 2x 30 seconds each  Levator scap stretch 2x 30 seconds each  Scapular retraction 20x 3 second hold  Cervical chin tuck supine x30  Standing Rows with OTB 2x 10   Standing I's with OTB 2x 10  Supine cervical rotation 20x  Supine serratus punches 2x 10 each - v/c to perform exercises correctly   Bruggers with OTB 2x 10     Elen received the following manual therapy techniques: Myofacial release and Soft tissue Mobilization were applied to the: R neck and shoulder for 10 minutes, including: levator scap, cervical paraspinals, pecs and upper trap.         Home Exercises Provided and Patient Education Provided     Education provided:   - HEP review  - new  exercise    Written Home Exercises Provided: Patient instructed to cont prior HEP.  Exercises were reviewed and Elen was able to demonstrate them prior to the end of the session.  Elen demonstrated good  understanding of the education provided.     See EMR under Patient Instructions for exercises provided prior visit.    Assessment     Patient requires moderate verbal cueing to perform exercises correctly. Pt without complaints of pain today. Pt tolerates exercises well as she nears discharge     Elen is progressing well towards her goals.   Pt prognosis is Fair.     Pt will continue to benefit from skilled outpatient physical therapy to address the deficits listed in the problem list box on initial evaluation, provide pt/family education and to maximize pt's level of independence in the home and community environment.     Pt's spiritual, cultural and educational needs considered and pt agreeable to plan of care and goals.     Anticipated barriers to physical therapy: age    Goals:     Short Term Goals (6 Weeks):   1. Pt will be independent with HEP to supplement PT in improving pain free cervical mobility  2. Pt will improve cervical AROM  To 50% in all planes to improve cervical mobility for driving  3. Pt will improve UE MMTs by 1/2 grade in all planes to improve strength for lifting and carrying tasks.  4. Pt will demonstrate improved sitting posture to decrease pain experienced in head and neck.  Long Term Goals (12 Weeks):   1. Pt will improve FOTO to </=25% limitation to improve perceived limitation with changing and maintaining mobility.  2. Pt will improve cervical AROM to WNL in all planes to improve cervical mobility for driving   3. Pt will improve UE MMTs 1 grade in all planes to improve strength for lifting and carrying tasks.  4. Pt will report no pain with lifting 5 lbs to promote physical activity.   5. Pt will report no pain with cervical AROM in all planes to promote QOL.    Plan      Continue with established PT POC and progress per pt tolerance.     Giovani Jeong, PT

## 2020-01-23 NOTE — PROGRESS NOTES
"[facility]: Health  Follow-Up        Follow Up  Patient reports that whenever her BP is high on the top it is because she is watching tv. She reports that otherwise "she won't check it at all". Encouraged patient to rest as much as possible before BP readings. She is pleased with her most recent reading.       INTERVENTION(S)  encouragement/support    PLAN  continue monitoring          Topic    Lipid (Cholesterol) Test        Last 5 Patient Entered Readings                                      Current 30 Day Average: 153/68     Recent Readings 1/22/2020 1/21/2020 1/20/2020 1/19/2020 1/18/2020    SBP (mmHg) 134 149 167 167 157    DBP (mmHg) 60 72 71 75 65    Pulse 60 57 56 55 60                  Screenings    SDOH  "

## 2020-01-28 ENCOUNTER — CLINICAL SUPPORT (OUTPATIENT)
Dept: REHABILITATION | Facility: HOSPITAL | Age: 85
End: 2020-01-28
Attending: INTERNAL MEDICINE
Payer: MEDICARE

## 2020-01-28 ENCOUNTER — PATIENT OUTREACH (OUTPATIENT)
Dept: OTHER | Facility: OTHER | Age: 85
End: 2020-01-28

## 2020-01-28 DIAGNOSIS — M25.611 DECREASED RIGHT SHOULDER RANGE OF MOTION: ICD-10-CM

## 2020-01-28 PROCEDURE — 97110 THERAPEUTIC EXERCISES: CPT | Mod: HCNC,PO

## 2020-01-28 NOTE — PROGRESS NOTES
Physical Therapy Daily Treatment Note     Name: Elen Peters  Clinic Number: [id number]    Therapy Diagnosis:   Encounter Diagnosis   Name Primary?    Decreased right shoulder range of motion      Physician: Jim Treadwell MD    Visit Date: 1/28/2020    Physician Orders: PT Eval and Treat  Medical Diagnosis: Neck and shoulder pain (M54.2, M25.519), Weakness of lower extremity, unspecified laterality (R29.898)  Evaluation Date: 12/27/2019  Authorization Period Expiration: 12/30/2020 for neck and shoulder, 12/03/2020 for LE  Plan of Care Certification Period: 12/27/2019 - 03/27/2020  Visit #/Visits authorized: 4/ 20     Time In: 1000am  Time Out: 1040  Total Billable Time: 20 (1TE)    Precautions: Standard and Fall    Subjective     Pt reports: that she feels like it is a miracle and she feels great. No pain in the neck reported     She was somewhat compliant with home exercise program.  Response to previous treatment: Patient reported soreness after IE  Functional change: none    Pain: 0/10  Location: right neck  and shoulder      Objective     Observation: pleasant demeanor, hard of hearing, bad historian     Posture: forward head               Shoulder rotation at rest: rounded      Cervical Range of Motion:     Degrees Pain   Flexion 75% N      Extension 50% N      Right Rotation  N      Left Rotation 50% N      Right Side Bending 50% N   Left Side Bending 50% N      Cervical Special Tests:  Compression: -  Spurling's: -  ULTT: NT     Active/Passive Range of Motion (degrees):   Shoulder Right Left   Flexion 170 170   Abduction 170 170   ER at 45 WFL WFL   ER at 0 WFL WFL                 Upper Extremity Strength  (R) UE   (L) UE     Elbow flexion: 4/5 Elbow flexion: 4/5   Elbow extension: 4+/5 Elbow extension: 4+/5   Shoulder elevation: 5/5 Shoulder elevation: 5/5   Shoulder flexion: 4/5 Shoulder flexion: 4/5   Shoulder Abduction: 4/5 Shoulder abduction: 4/5   Shoulder ER 4/5 Shoulder ER 4/5   Shoulder IR 4/5  Shoulder IR 4/5   Lower Trap NT Lower Trap NT   Middle Trap NT Middle Trap NT   Rhomboids NT Rhomboids NT      Special Tests:     Impingement    Right Left   Neer's + -   Avelar-Ricardo + -         Joint Mobility: moderately hypomobile R shoulder              Cervicothoracic: hypomobile      Palpation:  NO TTP B UT, periscapular musculature, cervical musculature, anterior shoulder                    Sensation: intact BUE      Flexibility: min- mod restriction B UT       Elen received therapeutic exercises to develop strength, endurance, ROM, flexibility and posture for 40 minutes including:    Pulleys 4 min flexion 4 min scaption  Upper trap stretch 2x 30 seconds each  Levator scap stretch 2x 30 seconds each  Scapular retraction 20x 3 second hold  Cervical chin tuck supine x30  Standing Rows with OTB 2x 10   Standing I's with OTB 2x 10  Supine cervical rotation 20x  Supine serratus punches 2x 10 each - v/c to perform exercises correctly   Bruggers with OTB 2x 10              Home Exercises Provided and Patient Education Provided     Education provided:   - HEP review      Written Home Exercises Provided: Patient instructed to cont prior HEP.  Exercises were reviewed and Elen was able to demonstrate them prior to the end of the session.  Elen demonstrated good  understanding of the education provided.     See EMR under Patient Instructions for exercises provided prior visit.    Assessment     Patient presents to discharge with no pain reported. Pt feels like she is doing much better, and that when she does the stretches in the morning then she has more motion and less pain. Pt showed significant improvements in both cervical and shoulder range of motion without pain. Pt met 7/9 goals and due to plateau in range of motion improvements and lack of pain and dysfunction, PT finds it appropriate for pt to be discharged from skilled PT services. Pt expresses understanding and agreement. PT also discussed the  importance of continued HEP performance to maintain progress.     Elen is progressing well towards her goals.   Pt prognosis is Fair.     Pt will continue to benefit from skilled outpatient physical therapy to address the deficits listed in the problem list box on initial evaluation, provide pt/family education and to maximize pt's level of independence in the home and community environment.     Pt's spiritual, cultural and educational needs considered and pt agreeable to plan of care and goals.     Anticipated barriers to physical therapy: age    Goals:     Short Term Goals (6 Weeks):   1. Pt will be independent with HEP to supplement PT in improving pain free cervical mobility (MET 1/30)  2. Pt will improve cervical AROM  To 50% in all planes to improve cervical mobility for driving  (MET 1/30)  3. Pt will improve UE MMTs by 1/2 grade in all planes to improve strength for lifting and carrying tasks.  (MET 1/30)  4. Pt will demonstrate improved sitting posture to decrease pain experienced in head and neck.  (MET 1/30)  Long Term Goals (12 Weeks):   1. Pt will improve FOTO to </=25% limitation to improve perceived limitation with changing and maintaining mobility. NOT MET  2. Pt will improve cervical AROM to WNL in all planes to improve cervical mobility for driving (Partially met)  3. Pt will improve UE MMTs 1 grade in all planes to improve strength for lifting and carrying tasks.  (MET 1/30)  4. Pt will report no pain with lifting 5 lbs to promote physical activity.   (MET 1/30)  5. Pt will report no pain with cervical AROM in all planes to promote QOL.  (MET 1/30)    Plan     Pt will be discharged from skilled PT services (see assessment above)    Giovani Jeong PT

## 2020-01-28 NOTE — PROGRESS NOTES
Left voicemail requesting call back  Per last health  note, patient insists on taking BP while watching TV  Home average 154/68 mmHg  Variable office readings, last 10/18/19 was 120/76 mmHg  ACE-I previously changed to irbesartan which patient did not tolerate (nausea)  Other past agents include amlodipine, hydralazine, spironolactone, and triamterene-hctz    Hypertension Medications             carvedilol (COREG) 6.25 MG tablet Take 2 tablets (12.5 mg total) by mouth 2 (two) times daily with meals.    nitroGLYCERIN (NITROSTAT) 0.4 MG SL tablet Place 1 tablet (0.4 mg total) under the tongue every 5 (five) minutes as needed for Chest pain.

## 2020-01-30 ENCOUNTER — DOCUMENTATION ONLY (OUTPATIENT)
Dept: AUDIOLOGY | Facility: CLINIC | Age: 85
End: 2020-01-30

## 2020-01-30 PROBLEM — M25.611 DECREASED RIGHT SHOULDER RANGE OF MOTION: Status: RESOLVED | Noted: 2019-12-27 | Resolved: 2020-01-30

## 2020-01-30 NOTE — PROGRESS NOTES
Patient brought in hearing aids to be cleaned and checked. She stated that her right hearing aid wasn't working. I informed her that I can check the  to see if that was the problem. Cost for out of warranty  and also the cost if it needed to be sent off for repair was given to patient. Patient did not want me to check right hearing aid at all. I did replace the filter, dome and battery in both hearing aids.    Patient also purchased 1 pack of wax filters.

## 2020-02-04 ENCOUNTER — PATIENT OUTREACH (OUTPATIENT)
Dept: OTHER | Facility: OTHER | Age: 85
End: 2020-02-04

## 2020-02-04 ENCOUNTER — TELEPHONE (OUTPATIENT)
Dept: INTERNAL MEDICINE | Facility: CLINIC | Age: 85
End: 2020-02-04

## 2020-02-04 NOTE — PROGRESS NOTES
"[facility]: Health  Follow-Up        Follow Up  Follow-up reason(s): reading review    Patient is feeling unwell at the moment. This may be connected to BP elevation. Patient acknowledges that her emotions and her stress impact her readings.       INTERVENTION(S)  encouragement/support    PLAN  continue monitoring          Topic    Lipid (Cholesterol) Test        Last 5 Patient Entered Readings                                      Current 30 Day Average: 154/68     Recent Readings 2/3/2020 2/2/2020 2/2/2020 2/1/2020 1/31/2020    SBP (mmHg) 160 169 159 158 142    DBP (mmHg) 70 66 70 62 76    Pulse 61 56 56 56 58                      Medication Adherence Screening       Patient describes that she ran out of carvedilol and since restarting she has been having challenges with constipation. She took a fiber pill as recommended by her doctor, and it has not helped. She has been drinking "hydration multiplier" powder to help with dehydration as recommended by her son.     Sent message to patient's PCP staff requesting they call her with OTC recommendations.     Consulted with alternate coverage clinical pharmacist and confirmed with patient to continue taking carvedilol as prescribed.       SDOH  "

## 2020-02-04 NOTE — TELEPHONE ENCOUNTER
----- Message from Sharlene See sent at 2/4/2020  1:03 PM CST -----  Regarding: Patient Inquiry  Hi Dr. Treadwell,  I just spoke with Ms. Myrick this afternoon and she is complaining of ongoing constipation. She states she was unable to get in touch with your office, and she's wondering about some OTC recommendations. I'm unable to advise on any medications. Would you mind calling her to follow up with some suggestions?    Thanks so much for your help!  Sharlene See, MPH  [facility]   [phone]

## 2020-02-04 NOTE — PROGRESS NOTES
Would not expect constipation with carvedilol  Missing doses of carvedilol may cause rebound htn and tachycardia   Would continue with current dosing and assess BP with more time back on medications  BP average 154/68 mmHg    Hypertension Medications             carvedilol (COREG) 6.25 MG tablet Take 2 tablets (12.5 mg total) by mouth 2 (two) times daily with meals.    nitroGLYCERIN (NITROSTAT) 0.4 MG SL tablet Place 1 tablet (0.4 mg total) under the tongue every 5 (five) minutes as needed for Chest pain.

## 2020-02-07 NOTE — PROGRESS NOTES
"Digital Medicine: Clinician Follow-Up    Patient states elevated BP related to watching TV and getting excited  Patient reports she has started eating "more lightly" (ie. Fruit). She finds going to grocery store cumbersome.  She was out of carvedilol about 3 days and resumed 2/4/20.  She took 1 tablet of carvedilol this morning to see if constipation would improve.    The history is provided by the patient.     Follow Up  Follow-up reason(s): reading review      Alert received.   Care Team received high BP alert.  Patient is not experiencing symptoms.      INTERVENTION(S)  encouragement/support and denied questions    PLAN  patient verbalizes understanding and continue monitoring    Discussed would not expect carvedilol to cause constipation. Patient states she will take advised dose.  Suspect interruption in carvedilol and inaccurate technique (taking BP while watching TV) contributing to BP elevations.  Reinforced proper resting BP measurement technique. Discussed decreasing monitoring frequency to 2-3 readings per week if it would improve technique.  Reviewed Dr. Treadwell's recommendation of Miralax as needed for constipation.        Last 5 Patient Entered Readings                                      Current 30 Day Average: 154/68     Recent Readings 2/6/2020 2/5/2020 2/4/2020 2/4/2020 2/3/2020    SBP (mmHg) 184 154 174 165 160    DBP (mmHg) 78 62 74 69 70    Pulse 57 55 56 57 61             Hypertension Medications             carvedilol (COREG) 6.25 MG tablet Take 2 tablets (12.5 mg total) by mouth 2 (two) times daily with meals.    nitroGLYCERIN (NITROSTAT) 0.4 MG SL tablet Place 1 tablet (0.4 mg total) under the tongue every 5 (five) minutes as needed for Chest pain.                 Screenings  "

## 2020-02-12 ENCOUNTER — DOCUMENTATION ONLY (OUTPATIENT)
Dept: AUDIOLOGY | Facility: CLINIC | Age: 85
End: 2020-02-12

## 2020-02-12 NOTE — PROGRESS NOTES
Patient brought in right hearing aid to be checked. She stated for years her hearing aids have not worked. I did tell her the cost of replacing the  (if that's the issue) vs the cost of the repair w/ warranty.  I replaced the  and the battery but it still didn't work. Patient requested to see an audiologist instead to have them check it.  Appointment w/ Clive was scheduled in 2 weeks.   Patient did refuse to have a hearing test at that visit.

## 2020-02-13 ENCOUNTER — OFFICE VISIT (OUTPATIENT)
Dept: DERMATOLOGY | Facility: CLINIC | Age: 85
End: 2020-02-13
Payer: MEDICARE

## 2020-02-13 VITALS — BODY MASS INDEX: 22.14 KG/M2 | WEIGHT: 128 LBS

## 2020-02-13 DIAGNOSIS — D18.01 CHERRY ANGIOMA: ICD-10-CM

## 2020-02-13 DIAGNOSIS — L82.1 SEBORRHEIC KERATOSES: Primary | ICD-10-CM

## 2020-02-13 PROCEDURE — 1126F AMNT PAIN NOTED NONE PRSNT: CPT | Mod: HCNC,S$GLB,, | Performed by: DERMATOLOGY

## 2020-02-13 PROCEDURE — 1159F MED LIST DOCD IN RCRD: CPT | Mod: HCNC,S$GLB,, | Performed by: DERMATOLOGY

## 2020-02-13 PROCEDURE — 1159F PR MEDICATION LIST DOCUMENTED IN MEDICAL RECORD: ICD-10-PCS | Mod: HCNC,S$GLB,, | Performed by: DERMATOLOGY

## 2020-02-13 PROCEDURE — 1126F PR PAIN SEVERITY QUANTIFIED, NO PAIN PRESENT: ICD-10-PCS | Mod: HCNC,S$GLB,, | Performed by: DERMATOLOGY

## 2020-02-13 PROCEDURE — 99213 OFFICE O/P EST LOW 20 MIN: CPT | Mod: HCNC,S$GLB,, | Performed by: DERMATOLOGY

## 2020-02-13 PROCEDURE — 99213 PR OFFICE/OUTPT VISIT, EST, LEVL III, 20-29 MIN: ICD-10-PCS | Mod: HCNC,S$GLB,, | Performed by: DERMATOLOGY

## 2020-02-13 PROCEDURE — 1101F PR PT FALLS ASSESS DOC 0-1 FALLS W/OUT INJ PAST YR: ICD-10-PCS | Mod: HCNC,CPTII,S$GLB, | Performed by: DERMATOLOGY

## 2020-02-13 PROCEDURE — 99999 PR PBB SHADOW E&M-EST. PATIENT-LVL II: CPT | Mod: PBBFAC,HCNC,, | Performed by: DERMATOLOGY

## 2020-02-13 PROCEDURE — 1101F PT FALLS ASSESS-DOCD LE1/YR: CPT | Mod: HCNC,CPTII,S$GLB, | Performed by: DERMATOLOGY

## 2020-02-13 PROCEDURE — 99999 PR PBB SHADOW E&M-EST. PATIENT-LVL II: ICD-10-PCS | Mod: PBBFAC,HCNC,, | Performed by: DERMATOLOGY

## 2020-02-13 NOTE — PROGRESS NOTES
Subjective:       Patient ID:  Elen Peters is a 89 y.o. female who presents for   Chief Complaint   Patient presents with    Mole     around neck      Mole  - Initial  Affected locations: neck, chest, face, torso, abdomen and back  Signs / symptoms: itching  Aggravated by: nothing  Relieving factors/Treatments tried: nothing        Review of Systems   Constitutional: Negative for fever, chills, weight loss, weight gain, fatigue, night sweats and malaise.   Skin: Negative for daily sunscreen use, activity-related sunscreen use and wears hat.   Hematologic/Lymphatic: Bruises/bleeds easily.        Objective:    Physical Exam   Constitutional: She appears well-developed and well-nourished. No distress.   Neurological: She is alert and oriented to person, place, and time. She is not disoriented.   Psychiatric: She has a normal mood and affect.   Skin:   Areas Examined (abnormalities noted in diagram):   Head / Face Inspection Performed  Neck Inspection Performed  Chest / Axilla Inspection Performed  Back Inspection Performed  RUE Inspected  LUE Inspection Performed                   Diagram Legend     Erythematous scaling macule/papule c/w actinic keratosis       Vascular papule c/w angioma      Pigmented verrucoid papule/plaque c/w seborrheic keratosis      Yellow umbilicated papule c/w sebaceous hyperplasia      Irregularly shaped tan macule c/w lentigo     1-2 mm smooth white papules consistent with Milia      Movable subcutaneous cyst with punctum c/w epidermal inclusion cyst      Subcutaneous movable cyst c/w pilar cyst      Firm pink to brown papule c/w dermatofibroma      Pedunculated fleshy papule(s) c/w skin tag(s)      Evenly pigmented macule c/w junctional nevus     Mildly variegated pigmented, slightly irregular-bordered macule c/w mildly atypical nevus      Flesh colored to evenly pigmented papule c/w intradermal nevus       Pink pearly papule/plaque c/w basal cell carcinoma      Erythematous  hyperkeratotic cursted plaque c/w SCC      Surgical scar with no sign of skin cancer recurrence      Open and closed comedones      Inflammatory papules and pustules      Verrucoid papule consistent consistent with wart     Erythematous eczematous patches and plaques     Dystrophic onycholytic nail with subungual debris c/w onychomycosis     Umbilicated papule    Erythematous-base heme-crusted tan verrucoid plaque consistent with inflamed seborrheic keratosis     Erythematous Silvery Scaling Plaque c/w Psoriasis     See annotation      Assessment / Plan:        Seborrheic keratoses  reassurance      Cherry angioma  reassurance             Follow up if symptoms worsen or fail to improve.

## 2020-02-17 ENCOUNTER — TELEPHONE (OUTPATIENT)
Dept: AUDIOLOGY | Facility: CLINIC | Age: 85
End: 2020-02-17

## 2020-02-17 NOTE — TELEPHONE ENCOUNTER
----- Message from Sammy Jay sent at 2/17/2020 10:51 AM CST -----  Contact: pt   Pt is calling to see if there is a sooner date available for her hearing aid follow up. Please give pt a call back at [phone] .

## 2020-02-18 ENCOUNTER — PATIENT OUTREACH (OUTPATIENT)
Dept: OTHER | Facility: OTHER | Age: 85
End: 2020-02-18

## 2020-02-19 NOTE — PROGRESS NOTES
Digital Medicine: Health  Follow-Up        Follow Up  Patient has been having trouble with an infected tooth. She has been feeling poorly, and knows that this has impacted her BP. Patient has been taking an antibiotic and then pain is beginning to improve      INTERVENTION(S)  encouragement/support    PLAN  await resolution of current disease process and continue monitoring          Topic    Lipid (Cholesterol) Test        Last 5 Patient Entered Readings                                      Current 30 Day Average: 159/69     Recent Readings 2/18/2020 2/17/2020 2/13/2020 2/11/2020 2/10/2020    SBP (mmHg) 162 162 185 153 164    DBP (mmHg) 69 75 80 66 70    Pulse 57 60 53 56 54                      Diet Screening       Barriers to a Healthy Diet: dental problems    Patient has been eating soft food such as oatmeal, but she notes that her pain is bad.       SDOH

## 2020-02-19 NOTE — PROGRESS NOTES
Health  completed outreach today, 2/19/20  Patient reporting dental infection and pain, contributing to higher BP readings  Continue to monitor  Would likely not increase carvedilol dose due to HR in 50s  Per prior notes, Patient cannot tolerate amlodipine due LALO, ARBs, hydralazine, and spironolactone caused worsening GI symptoms.  Could consider changing carvedilol to labetalol  BP average 159/69 mmHg    Hypertension Medications             carvedilol (COREG) 6.25 MG tablet Take 2 tablets (12.5 mg total) by mouth 2 (two) times daily with meals.    nitroGLYCERIN (NITROSTAT) 0.4 MG SL tablet Place 1 tablet (0.4 mg total) under the tongue every 5 (five) minutes as needed for Chest pain.

## 2020-03-11 ENCOUNTER — PATIENT OUTREACH (OUTPATIENT)
Dept: OTHER | Facility: OTHER | Age: 85
End: 2020-03-11

## 2020-03-11 ENCOUNTER — PES CALL (OUTPATIENT)
Dept: ADMINISTRATIVE | Facility: CLINIC | Age: 85
End: 2020-03-11

## 2020-03-11 NOTE — PROGRESS NOTES
Left voicemail requesting call back  BP remains elevated, average 166/71 mmHg  Reactions with multiple other htn agents in past. History of hyponatremia so would not recommend starting thiazide diuretic.  Possibly increase carvedilol or change to labetalol, and monitor pulse    Hypertension Medications             carvedilol (COREG) 6.25 MG tablet Take 2 tablets (12.5 mg total) by mouth 2 (two) times daily with meals.    nitroGLYCERIN (NITROSTAT) 0.4 MG SL tablet Place 1 tablet (0.4 mg total) under the tongue every 5 (five) minutes as needed for Chest pain.

## 2020-03-16 DIAGNOSIS — I10 HTN (HYPERTENSION), BENIGN: ICD-10-CM

## 2020-03-16 RX ORDER — CARVEDILOL 6.25 MG/1
12.5 TABLET ORAL 2 TIMES DAILY WITH MEALS
Qty: 120 TABLET | Refills: 3 | Status: SHIPPED | OUTPATIENT
Start: 2020-03-16 | End: 2020-07-24

## 2020-03-16 NOTE — TELEPHONE ENCOUNTER
----- Message from Brenda Zapien sent at 3/16/2020  2:45 PM CDT -----  Contact: Pt called   Pt need a new script  on medication carvedilol (COREG) 6.25 MG tablet and send to [facility] - [place], [place] - [address] [phone] (Phone)  [phone] (Fax). Lv 10/22/18 Dr. Cartagena. Thank you.

## 2020-03-18 ENCOUNTER — PATIENT OUTREACH (OUTPATIENT)
Dept: OTHER | Facility: OTHER | Age: 85
End: 2020-03-18

## 2020-03-18 NOTE — PROGRESS NOTES
Digital Medicine: Health  Follow-Up        Follow Up  Discussed patient's concerns about the covid-19 outbreak, and encouraged prevention strategies. Reminded patient that digital medicine team is available for questions or concerns.     Patient expresses some confusion about her current care team. Reminded her that her clinical pharmD, Mar, is out on temporary leave, and that a secondary clinical pharmD has been working with her. She writes down my number if she has any further questions.         INTERVENTION(S)  encouragement/support    PLAN  continue monitoring          Topic    Lipid (Cholesterol) Test        Last 5 Patient Entered Readings                                      Current 30 Day Average: 161/69     Recent Readings 3/17/2020 3/16/2020 3/15/2020 3/14/2020 3/13/2020    SBP (mmHg) 158 141 170 153 163    DBP (mmHg) 70 59 78 65 64    Pulse 59 61 56 59 53                      Physical Activity Screening   When asked if exercising, patient responded: yes    Patient participates in the following activities: walking    Medication Adherence Screening       Patient has been careful to stay home, and her son went to the pharmacy to refill her carvedilol.       SDOH

## 2020-03-24 NOTE — PROGRESS NOTES
Digital Medicine: Clinician Follow-Up    Patient unable to explain differences in BP readings, states maybe it is because she is sitting at home due to COVID-19  Patient typically takes BP after supper and is not resting prior to measurement  She takes carvedilol after breakfast and about 1 hour after supper    The history is provided by the patient.     Follow Up  Follow-up reason(s): reading review          INTERVENTION(S)  reviewed appropriate dose schedule, reviewed monitoring technique, encouragement/support and denied questions    PLAN  patient verbalizes understanding and continue monitoring    BP readings variable  Discussed SBP in low 140s mmHg likely acceptable in 89 yo patient, she agrees  She will move carvedilol dose to take with supper and take BP measurement at least 45 minutes after dose  Reviewed to rest 5 minutes prior to measurement          Topic    Lipid (Cholesterol) Test        Last 5 Patient Entered Readings                                      Current 30 Day Average: 159/68     Recent Readings 3/21/2020 3/19/2020 3/17/2020 3/16/2020 3/15/2020    SBP (mmHg) 146 168 158 141 170    DBP (mmHg) 62 73 70 59 78    Pulse 59 55 59 61 56             Hypertension Medications             carvediloL (COREG) 6.25 MG tablet Take 2 tablets (12.5 mg total) by mouth 2 (two) times daily with meals.    nitroGLYCERIN (NITROSTAT) 0.4 MG SL tablet Place 1 tablet (0.4 mg total) under the tongue every 5 (five) minutes as needed for Chest pain.                             Medication Adherence Screening     Patient knows purpose of medications.

## 2020-03-25 ENCOUNTER — TELEPHONE (OUTPATIENT)
Dept: INTERNAL MEDICINE | Facility: CLINIC | Age: 85
End: 2020-03-25

## 2020-03-25 NOTE — TELEPHONE ENCOUNTER
----- Message from Carmen Terrell sent at 3/25/2020 12:18 PM CDT -----  Lab Orders Needed    I have scheduled the above patients annual physical and lab appointments. Lab orders need to be placed and linked.    Date of Annual Physical: 05/27/2020    Date of Lab appt: 05/22/2020    Thank You

## 2020-03-25 NOTE — TELEPHONE ENCOUNTER
Letter sent she does not need lab prior to seeing Dr Treadwell she already has lab scheduled for Dr Cartagena.

## 2020-04-03 ENCOUNTER — PATIENT OUTREACH (OUTPATIENT)
Dept: OTHER | Facility: OTHER | Age: 85
End: 2020-04-03

## 2020-04-03 NOTE — PROGRESS NOTES
Digital Medicine: Clinician Follow-Up    Conversation limited due to patient not having her hearing aid in  She contributes high BP readings to eating late and rushing around. States she knew it would be high and she is certain she would have lower readings if she took them earlier in the day and rested prior to measurement.  She denies s/s of hypertension.    The history is provided by the patient.     Follow Up  Follow-up reason(s): reading review      Alert received.   Care Team received high BP alert.  Patient is not experiencing symptoms.      INTERVENTION(S)  reviewed monitoring technique, encouragement/support and denied questions    PLAN  patient verbalizes understanding and continue monitoring    BP elevated  Reinforced proper BP measurement technique  Have room to increase carvedilol, however, pulse trends < 60 bpm          Last 5 Patient Entered Readings                                      Current 30 Day Average: 159/68     Recent Readings 4/2/2020 4/2/2020 4/1/2020 4/1/2020 3/30/2020    SBP (mmHg) 188 182 177 183 165    DBP (mmHg) 69 71 72 79 64    Pulse 58 59 58 56 59             Hypertension Medications             carvediloL (COREG) 6.25 MG tablet Take 2 tablets (12.5 mg total) by mouth 2 (two) times daily with meals.    nitroGLYCERIN (NITROSTAT) 0.4 MG SL tablet Place 1 tablet (0.4 mg total) under the tongue every 5 (five) minutes as needed for Chest pain.                             Medication Adherence Screening     Patient knows purpose of medications.

## 2020-04-07 ENCOUNTER — PATIENT OUTREACH (OUTPATIENT)
Dept: OTHER | Facility: OTHER | Age: 85
End: 2020-04-07

## 2020-04-07 NOTE — PROGRESS NOTES
Digital Medicine: Health  Follow-Up        Follow Up  Follow-up reason(s): reading review      Readings are trending down due to improved technique.  Patient notes that she was rushing in the past, but is now making an effort to relax while checking her BP. Congratulated her on this progress.       INTERVENTION(S)  encouragement/support    PLAN  continue monitoring          Topic    Lipid (Cholesterol) Test        Last 5 Patient Entered Readings                                      Current 30 Day Average: 157/67     Recent Readings 4/6/2020 4/3/2020 4/2/2020 4/2/2020 4/1/2020    SBP (mmHg) 150 148 188 182 177    DBP (mmHg) 66 65 69 71 72    Pulse 59 58 58 59 58                      Physical Activity Screening   When asked if exercising, patient responded: yes    Patient participates in the following activities: yard/housework    Patient has been busy dusting and washing dishes.       SDOH

## 2020-05-04 ENCOUNTER — LAB VISIT (OUTPATIENT)
Dept: LAB | Facility: HOSPITAL | Age: 85
End: 2020-05-04
Attending: INTERNAL MEDICINE
Payer: MEDICARE

## 2020-05-04 ENCOUNTER — PATIENT MESSAGE (OUTPATIENT)
Dept: CARDIOLOGY | Facility: CLINIC | Age: 85
End: 2020-05-04

## 2020-05-04 DIAGNOSIS — I25.10 CORONARY ARTERY DISEASE INVOLVING NATIVE CORONARY ARTERY OF NATIVE HEART WITHOUT ANGINA PECTORIS: ICD-10-CM

## 2020-05-04 DIAGNOSIS — E78.2 MIXED HYPERLIPIDEMIA: ICD-10-CM

## 2020-05-04 DIAGNOSIS — I10 HTN (HYPERTENSION), BENIGN: ICD-10-CM

## 2020-05-04 LAB
ALBUMIN SERPL BCP-MCNC: 3.7 G/DL (ref 3.5–5.2)
ALP SERPL-CCNC: 61 U/L (ref 55–135)
ALT SERPL W/O P-5'-P-CCNC: 20 U/L (ref 10–44)
ANION GAP SERPL CALC-SCNC: 8 MMOL/L (ref 8–16)
AST SERPL-CCNC: 23 U/L (ref 10–40)
BILIRUB SERPL-MCNC: 0.7 MG/DL (ref 0.1–1)
BUN SERPL-MCNC: 14 MG/DL (ref 8–23)
CALCIUM SERPL-MCNC: 9.3 MG/DL (ref 8.7–10.5)
CHLORIDE SERPL-SCNC: 100 MMOL/L (ref 95–110)
CHOLEST SERPL-MCNC: 175 MG/DL (ref 120–199)
CHOLEST/HDLC SERPL: 2.4 {RATIO} (ref 2–5)
CO2 SERPL-SCNC: 28 MMOL/L (ref 23–29)
CREAT SERPL-MCNC: 0.8 MG/DL (ref 0.5–1.4)
EST. GFR  (AFRICAN AMERICAN): >60 ML/MIN/1.73 M^2
EST. GFR  (NON AFRICAN AMERICAN): >60 ML/MIN/1.73 M^2
GLUCOSE SERPL-MCNC: 97 MG/DL (ref 70–110)
HDLC SERPL-MCNC: 72 MG/DL (ref 40–75)
HDLC SERPL: 41.1 % (ref 20–50)
LDLC SERPL CALC-MCNC: 88.4 MG/DL (ref 63–159)
NONHDLC SERPL-MCNC: 103 MG/DL
POTASSIUM SERPL-SCNC: 4.3 MMOL/L (ref 3.5–5.1)
PROT SERPL-MCNC: 6.9 G/DL (ref 6–8.4)
SODIUM SERPL-SCNC: 136 MMOL/L (ref 136–145)
TRIGL SERPL-MCNC: 73 MG/DL (ref 30–150)
TSH SERPL DL<=0.005 MIU/L-ACNC: 1.92 UIU/ML (ref 0.4–4)

## 2020-05-04 PROCEDURE — 80053 COMPREHEN METABOLIC PANEL: CPT | Mod: HCNC

## 2020-05-04 PROCEDURE — 36415 COLL VENOUS BLD VENIPUNCTURE: CPT | Mod: HCNC

## 2020-05-04 PROCEDURE — 84443 ASSAY THYROID STIM HORMONE: CPT | Mod: HCNC

## 2020-05-04 PROCEDURE — 80061 LIPID PANEL: CPT | Mod: HCNC

## 2020-05-05 ENCOUNTER — PATIENT OUTREACH (OUTPATIENT)
Dept: OTHER | Facility: OTHER | Age: 85
End: 2020-05-05

## 2020-05-05 NOTE — PROGRESS NOTES
Digital Medicine: Health  Follow-Up        Follow Up  Follow-up reason(s): reading review      Readings are trending up Patient states that she isn't worried about her BP, and feels well, but isn't sure why her BP has trended up. Patient complains that it is difficult to breathe when she is wearing a mask in public. Patient just recently started going out again- when to the doctor yesterday for a blood test. She describes that she was stressed and did not enjoy going out.     Encouraged patient to avoid checking her BP when she is stressed.       INTERVENTION(S)  encouragement/support    PLAN  continue monitoring      There are no preventive care reminders to display for this patient.    Last 5 Patient Entered Readings                                      Current 30 Day Average: 162/69     Recent Readings 5/4/2020 5/3/2020 5/2/2020 5/1/2020 4/30/2020    SBP (mmHg) 158 176 141 170 150    DBP (mmHg) 71 69 57 73 66    Pulse 58 57 62 62 62                  Screenings    SDOH

## 2020-05-21 ENCOUNTER — PES CALL (OUTPATIENT)
Dept: ADMINISTRATIVE | Facility: CLINIC | Age: 85
End: 2020-05-21

## 2020-05-21 RX ORDER — AZITHROMYCIN 250 MG/1
TABLET, FILM COATED ORAL
Status: ON HOLD | COMMUNITY
Start: 2020-05-14 | End: 2020-06-29 | Stop reason: HOSPADM

## 2020-05-21 RX ORDER — NAPROXEN SODIUM 550 MG/1
TABLET ORAL
COMMUNITY
Start: 2020-05-14 | End: 2020-05-22 | Stop reason: ALTCHOICE

## 2020-05-22 ENCOUNTER — OFFICE VISIT (OUTPATIENT)
Dept: INTERNAL MEDICINE | Facility: CLINIC | Age: 85
End: 2020-05-22
Payer: MEDICARE

## 2020-05-22 VITALS
SYSTOLIC BLOOD PRESSURE: 128 MMHG | HEIGHT: 64 IN | DIASTOLIC BLOOD PRESSURE: 80 MMHG | TEMPERATURE: 99 F | WEIGHT: 131.81 LBS | HEART RATE: 60 BPM | BODY MASS INDEX: 22.5 KG/M2

## 2020-05-22 DIAGNOSIS — I10 HTN (HYPERTENSION), BENIGN: Primary | ICD-10-CM

## 2020-05-22 DIAGNOSIS — I70.0 AORTIC ATHEROSCLEROSIS: ICD-10-CM

## 2020-05-22 DIAGNOSIS — F32.5 DEPRESSION, MAJOR, IN REMISSION: ICD-10-CM

## 2020-05-22 DIAGNOSIS — D69.2 SENILE PURPURA: ICD-10-CM

## 2020-05-22 PROCEDURE — 99214 PR OFFICE/OUTPT VISIT, EST, LEVL IV, 30-39 MIN: ICD-10-PCS | Mod: HCNC,S$GLB,, | Performed by: INTERNAL MEDICINE

## 2020-05-22 PROCEDURE — 1126F AMNT PAIN NOTED NONE PRSNT: CPT | Mod: HCNC,S$GLB,, | Performed by: INTERNAL MEDICINE

## 2020-05-22 PROCEDURE — 99214 OFFICE O/P EST MOD 30 MIN: CPT | Mod: HCNC,S$GLB,, | Performed by: INTERNAL MEDICINE

## 2020-05-22 PROCEDURE — 1101F PR PT FALLS ASSESS DOC 0-1 FALLS W/OUT INJ PAST YR: ICD-10-PCS | Mod: HCNC,CPTII,S$GLB, | Performed by: INTERNAL MEDICINE

## 2020-05-22 PROCEDURE — 1159F PR MEDICATION LIST DOCUMENTED IN MEDICAL RECORD: ICD-10-PCS | Mod: HCNC,S$GLB,, | Performed by: INTERNAL MEDICINE

## 2020-05-22 PROCEDURE — 1159F MED LIST DOCD IN RCRD: CPT | Mod: HCNC,S$GLB,, | Performed by: INTERNAL MEDICINE

## 2020-05-22 PROCEDURE — 1126F PR PAIN SEVERITY QUANTIFIED, NO PAIN PRESENT: ICD-10-PCS | Mod: HCNC,S$GLB,, | Performed by: INTERNAL MEDICINE

## 2020-05-22 PROCEDURE — 99499 UNLISTED E&M SERVICE: CPT | Mod: HCNC,S$GLB,, | Performed by: INTERNAL MEDICINE

## 2020-05-22 PROCEDURE — 1101F PT FALLS ASSESS-DOCD LE1/YR: CPT | Mod: HCNC,CPTII,S$GLB, | Performed by: INTERNAL MEDICINE

## 2020-05-22 PROCEDURE — 99999 PR PBB SHADOW E&M-EST. PATIENT-LVL III: ICD-10-PCS | Mod: PBBFAC,HCNC,, | Performed by: INTERNAL MEDICINE

## 2020-05-22 PROCEDURE — 99499 RISK ADDL DX/OHS AUDIT: ICD-10-PCS | Mod: HCNC,S$GLB,, | Performed by: INTERNAL MEDICINE

## 2020-05-22 PROCEDURE — 99999 PR PBB SHADOW E&M-EST. PATIENT-LVL III: CPT | Mod: PBBFAC,HCNC,, | Performed by: INTERNAL MEDICINE

## 2020-05-22 NOTE — PROGRESS NOTES
Subjective:       Patient ID: Elen Peters is a 89 y.o. female.    Chief Complaint: Annual Exam; Abdominal Pain (discomfort when she eats); Hypertension; Hyperlipidemia; and Eye Problem (R eye bruised does not know how it happened)    Abdominal Pain   This is a chronic problem. The onset quality is undetermined. The problem has been unchanged. The pain is located in the generalized abdominal region. The pain is mild. The quality of the pain is aching. The abdominal pain does not radiate. Associated symptoms include nausea. Pertinent negatives include no vomiting or weight loss.   Hypertension   This is a chronic problem. The problem is unchanged. The problem is controlled.   Hyperlipidemia   This is a chronic problem. The problem is controlled. Recent lipid tests were reviewed and are normal.   Eye Problem    The right (small purpura right lower lid laterally-  no trauma) eye is affected. The current episode started yesterday. The injury mechanism is unknown. Associated symptoms include nausea. Pertinent negatives include no vomiting.     Review of Systems   Constitutional: Negative for weight loss.   Gastrointestinal: Positive for abdominal pain and nausea. Negative for vomiting.       Objective:      Physical Exam   Constitutional: She is oriented to person, place, and time. She appears well-developed and well-nourished. No distress.   HENT:   Head: Normocephalic and atraumatic.   Mouth/Throat: Oropharynx is clear and moist.   Eyes: Pupils are equal, round, and reactive to light. Conjunctivae are normal.       Neck: Normal range of motion. Neck supple.   Cardiovascular: Normal rate, regular rhythm and normal heart sounds.   Pulmonary/Chest: Effort normal and breath sounds normal. She has no wheezes.   Abdominal: Soft. Bowel sounds are normal. There is no tenderness.   Musculoskeletal: Normal range of motion. She exhibits no edema or tenderness.   Neurological: She is alert and oriented to person, place, and  time. No cranial nerve deficit.   Skin: No erythema.   Psychiatric: She has a normal mood and affect.   Vitals reviewed.      Assessment:       1. HTN (hypertension), benign    2. Depression, major, in remission    3. Aortic atherosclerosis    4. Senile purpura        Plan:       Elen was seen today for annual exam, abdominal pain, hypertension, hyperlipidemia and eye problem.    Diagnoses and all orders for this visit:    HTN (hypertension), benign    Depression, major, in remission  Comments:  mood ok on current regimen    Aortic atherosclerosis  Comments:  asa daily    Senile purpura  Comments:  small purpura right lower lid        Follow up in about 9 months (around 2/22/2021) for F/U APPOINTMENT WITH ME.

## 2020-06-10 ENCOUNTER — PES CALL (OUTPATIENT)
Dept: ADMINISTRATIVE | Facility: CLINIC | Age: 85
End: 2020-06-10

## 2020-06-10 DIAGNOSIS — K21.9 CHEST PAIN DUE TO GERD: ICD-10-CM

## 2020-06-10 DIAGNOSIS — R07.9 CHEST PAIN DUE TO GERD: ICD-10-CM

## 2020-06-10 DIAGNOSIS — R07.9 CHEST PAIN, UNSPECIFIED TYPE: ICD-10-CM

## 2020-06-10 NOTE — TELEPHONE ENCOUNTER
----- Message from Shyla iDor sent at 6/9/2020  2:54 PM CDT -----  Contact: Liz quezada [phone]  Requesting an RX refill or new RX.  Is this a refill or new RX:    RX name and strength: esomeprazole (NEXIUM) 40 MG capsule  Directions (copy/paste from chart):  TAKE 1 CAPSULE BEFORE BREAKFAST  Is this a 30 day or 90 day RX:    Local pharmacy or mail order pharmacy:    Pharmacy name and phone # (copy/paste from chart):   [id number] [place] [address] [address] AT [address] [phone] (Phone)  [phone] (Fax)      Comments:

## 2020-06-11 RX ORDER — ESOMEPRAZOLE MAGNESIUM 40 MG/1
40 CAPSULE, DELAYED RELEASE ORAL
Qty: 90 CAPSULE | Refills: 3 | Status: SHIPPED | OUTPATIENT
Start: 2020-06-11 | End: 2021-06-16

## 2020-06-16 ENCOUNTER — PATIENT OUTREACH (OUTPATIENT)
Dept: OTHER | Facility: OTHER | Age: 85
End: 2020-06-16

## 2020-06-16 NOTE — PROGRESS NOTES
Digital Medicine: Health  Follow-Up      Follow Up  Follow-up reason(s): reading review      Readings are trending up Patient believes recent elevation due to running out of a medication, but she has since had it refilled. She was not feeling well for about 3 days, but is now doing better. She has acid reflux and stomach problems- feels that this is her biggest problem.       INTERVENTION(S)  encouragement/support    PLAN  continue monitoring      There are no preventive care reminders to display for this patient.    Last 5 Patient Entered Readings                                      Current 30 Day Average: 159/68     Recent Readings 6/14/2020 6/13/2020 6/12/2020 6/9/2020 5/30/2020    SBP (mmHg) 167 180 163 152 138    DBP (mmHg) 69 77 68 67 59    Pulse 59 57 56 61 54                  Screenings    SDOH

## 2020-06-25 ENCOUNTER — OFFICE VISIT (OUTPATIENT)
Dept: URGENT CARE | Facility: CLINIC | Age: 85
End: 2020-06-25
Payer: MEDICARE

## 2020-06-25 VITALS
BODY MASS INDEX: 22.02 KG/M2 | WEIGHT: 129 LBS | HEART RATE: 71 BPM | DIASTOLIC BLOOD PRESSURE: 71 MMHG | TEMPERATURE: 99 F | RESPIRATION RATE: 16 BRPM | SYSTOLIC BLOOD PRESSURE: 152 MMHG | OXYGEN SATURATION: 93 % | HEIGHT: 64 IN

## 2020-06-25 DIAGNOSIS — S50.02XA CONTUSION OF LEFT ELBOW, INITIAL ENCOUNTER: ICD-10-CM
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
## 2020-06-25 DIAGNOSIS — S70.12XA CONTUSION OF LEFT HIP AND THIGH, INITIAL ENCOUNTER: ICD-10-CM

## 2020-06-25 DIAGNOSIS — S70.02XA CONTUSION OF LEFT HIP AND THIGH, INITIAL ENCOUNTER: ICD-10-CM

## 2020-06-25 PROCEDURE — 73080 X-RAY EXAM OF ELBOW: CPT | Mod: LT,S$GLB,, | Performed by: RADIOLOGY

## 2020-06-25 PROCEDURE — 73552 X-RAY EXAM OF FEMUR 2/>: CPT | Mod: LT,S$GLB,, | Performed by: RADIOLOGY

## 2020-06-25 PROCEDURE — 73080 XR ELBOW COMPLETE 3 VIEW LEFT: ICD-10-PCS | Mod: LT,S$GLB,, | Performed by: RADIOLOGY

## 2020-06-25 PROCEDURE — 99214 PR OFFICE/OUTPT VISIT, EST, LEVL IV, 30-39 MIN: ICD-10-PCS | Mod: S$GLB,,, | Performed by: INTERNAL MEDICINE

## 2020-06-25 PROCEDURE — 73502 XR HIP 2 VIEW LEFT: ICD-10-PCS | Mod: LT,S$GLB,, | Performed by: RADIOLOGY

## 2020-06-25 PROCEDURE — 73030 X-RAY EXAM OF SHOULDER: CPT | Mod: LT,S$GLB,, | Performed by: RADIOLOGY

## 2020-06-25 PROCEDURE — 99214 OFFICE O/P EST MOD 30 MIN: CPT | Mod: S$GLB,,, | Performed by: INTERNAL MEDICINE

## 2020-06-25 PROCEDURE — 73552 XR FEMUR 2 VIEW LEFT: ICD-10-PCS | Mod: LT,S$GLB,, | Performed by: RADIOLOGY

## 2020-06-25 PROCEDURE — 73030 XR SHOULDER COMPLETE 2 OR MORE VIEWS LEFT: ICD-10-PCS | Mod: LT,S$GLB,, | Performed by: RADIOLOGY

## 2020-06-25 PROCEDURE — 73502 X-RAY EXAM HIP UNI 2-3 VIEWS: CPT | Mod: LT,S$GLB,, | Performed by: RADIOLOGY

## 2020-06-25 RX ORDER — DICLOFENAC SODIUM 10 MG/G
2 GEL TOPICAL 3 TIMES DAILY
Qty: 100 G | Refills: 0 | Status: SHIPPED | OUTPATIENT
Start: 2020-06-25 | End: 2020-11-20

## 2020-06-25 NOTE — PATIENT INSTRUCTIONS
Elbow Bruise  You have a bruise (contusion) of your elbow. A bruise causes local pain, swelling, and sometimes bruising. There are no broken bones. This injury takes a few days to a few weeks to heal. You may be given a sling for comfort and arm support.  You may notice color changes over the skin. It may change from reddish to bluish to greenish or yellowish before the bruising fades. The skin will then go back to its normal color.  Home care  Follow these guidelines when caring for yourself at home.  · Keep your arm elevated to reduce pain and swelling. This is most important during the first 2 days (48 hours) after the injury.  · Put an ice pack on the injured area. Do this for 20 minutes every 1 to 2 hours the first day. You can make an ice pack by wrapping a plastic bag of ice in a thin towel. You should continue to use the ice pack 3 to 4 times a day for the next 2 days. Then use the ice pack as needed to ease pain and swelling.  · Dont use a heating pad.  · Dont stick a needle into the contusion or bruising to drain it.  · You may use acetaminophen or ibuprofen to control pain, unless another pain medicine was prescribed. If you have chronic liver or kidney disease, talk with your healthcare provider before using these medicines. Also talk with your provider if youve had a stomach ulcer or gastrointestinal bleeding.  · If a sling was provided, you may take it off to shower or bathe. Dont wear it for more than 1 week or it may cause joint stiffness.  Follow-up care  Follow up with your healthcare provider, or as advised, if you are not starting to get better within the next 3 days.  When to seek medical advice  Call your healthcare provider right away if any of these occur:  · Pain or swelling gets worse  · The back of your elbow becomes very swollen where it almost looks like a gold ball or egg-like mass is growing there. This is a sign of olecrenon bursitis or septic bursitis which may need immediate  treatment.  · Redness, red streaks down the arm, warmth, or drainage from the bruise  · Hand or fingers becomes cold, blue, numb, or tingly  · New bruises, and you dont know what caused them  · Contusion doesnt heal  Date Last Reviewed: 2/1/2017  © 0684-9525 [employer]. [address] [place] [address], [place] [address]. All rights reserved. This information is not intended as a substitute for professional medical care. Always follow your healthcare professional's instructions.        Hip Contusion    A contusion is another word for a bruise. It happens when small blood vessels break open and leak blood into the nearby area. A hip contusion can result from a bump, hit, or fall. Symptoms of a contusion often include changes in skin color (bruising), swelling, and pain. It may take several hours for a deep bruise to show up. If the injury is severe, you may need an X-ray to check for broken bones. Swelling should decrease in a few days. Bruising and pain may take several weeks to go away.  Home care  · Unless another medicine was prescribed, you may take acetaminophen, ibuprofen, or naproxen to help relieve pain and swelling. If needed, stronger pain medicines may be prescribed. Take all medicines exactly as directed.  · Ice the bruised area to help reduce pain and swelling. Wrap a cold source (ice pack or ice cubes in a plastic bag) in a thin towel. Apply the cold source to the bruised area for 20 minutes every 1 to 2 hours the first day. Continue this 3 to 4 times a day until the pain and swelling goes away.  · If walking causes pain, use crutches or a walker until you can walk without pain. These items can be rented at most pharmacies and orthopedic supply stores.  · If your injury is keeping you from moving around or caring for yourself properly, you may qualify for services such as home healthcare. Check with your doctor and insurance company to see if this type of care is covered.  Follow-up  Follow up  with your healthcare provider, or as advised.  When to seek medical advice   Call your healthcare provider right away if any of these occur:  · Increased pain, bruising, or swelling near the injured area  · Decreased ability to bear weight on the injured side  · Pain or swelling develops below the knee  · Chest pain or shortness of breath  Date Last Reviewed: 4/1/2017 © 2000-2017 [employer]. [address] [place] [address], [place] [address]. All rights reserved. This information is not intended as a substitute for professional medical care. Always follow your healthcare professional's instructions.      - Rest, Ice, Compression, Elevation  - Drink plenty of fluids  - Take Tylenol every 4 hours as needed for pain/fever/chills/body aches (Do NOT exceed taking 4000mg/day of Tylenol)  - Apply Voltaren to area as needed for pain/swelling  - Follow up with your primary care doctor or orthopedics as directed in the next 1-2 weeks if not improved or as needed. I have placed an ortho referral in our system for you, so they should be contacting you shortly to schedule an appointment for you. If you don't hear from them, you can call [phone] to schedule an appointment with the appropriate provider.    - Please go to the Emergency Department if your symptoms worsen.

## 2020-06-25 NOTE — PROGRESS NOTES
"Subjective:       Patient ID: Elen Peters is a 89 y.o. female.    Vitals:  height is 5' 4" (1.626 m) and weight is 58.5 kg (129 lb). Her tympanic temperature is 98.5 °F (36.9 °C). Her blood pressure is 152/71 (abnormal) and her pulse is 71. Her respiration is 16 and oxygen saturation is 93% (abnormal).     Chief Complaint: Hip Pain (left Hip) and Elbow Injury (Left elbow)    89 y/o female with multiple medical problems presents to urgent care c/o acute left elbow and left hip pain that started after she fell and landed on her left side while sweeping her floors at 9 am this morning. Pain is constant, 9/10, and worse with movement and palpation. Pt denies hitting her head, LOC, and states that she does not take aspirin anymore. Pt states she normally walks slow while using her walker but it seems to be a bit more painful than normal. Pt states she took an old pain pill she had at home but can't remember what the name of it is. Pt denies head trauma/LOC, fever, chills, cough, SOB/MATOS, chest/rib pain, N/V/D, abdominal pain, back pain, neck pain, headache, vision changes, numbness, or focal weakness.      Hip Pain   The incident occurred at home. The injury mechanism was a fall. The pain is present in the left hip. The pain is at a severity of 9/10. Associated symptoms include an inability to bear weight. The symptoms are aggravated by movement and weight bearing. Treatments tried: Patient took pain pill but dosn't remember name. The treatment provided mild relief.   Elbow Injury  This is a new problem. The current episode started today. The problem occurs constantly. The problem has been unchanged. Pertinent negatives include no abdominal pain, fatigue, joint swelling, vertigo or weakness. Treatments tried: patient took a pain pill but dosn't remember name. The treatment provided mild relief.       Constitution: Negative for fatigue.   HENT: Negative for facial swelling and facial trauma.    Neck: Negative for neck " stiffness.   Cardiovascular: Negative for chest trauma.   Eyes: Negative for eye trauma, double vision and blurred vision.   Gastrointestinal: Negative for abdominal trauma, abdominal pain and rectal bleeding.   Genitourinary: Negative for hematuria, missed menses, genital trauma and pelvic pain.   Musculoskeletal: Positive for pain (left ebow and left hip) and trauma. Negative for joint swelling and abnormal ROM of joint.   Skin: Negative for color change, wound, abrasion, laceration and bruising.   Neurological: Negative for dizziness, history of vertigo, light-headedness, coordination disturbances, altered mental status and loss of consciousness.   Hematologic/Lymphatic: Negative for history of bleeding disorder.   Psychiatric/Behavioral: Negative for altered mental status.       Objective:      Physical Exam   Constitutional: She is oriented to person, place, and time. She appears well-developed. She is cooperative.  Non-toxic appearance. She does not appear ill. No distress.   HENT:   Head: Normocephalic and atraumatic. Head is without abrasion, without contusion and without laceration.   Right Ear: Hearing, tympanic membrane, external ear and ear canal normal. No hemotympanum.   Left Ear: Hearing, tympanic membrane, external ear and ear canal normal. No hemotympanum.   Nose: Nose normal. No mucosal edema, rhinorrhea or nasal deformity. No epistaxis. Right sinus exhibits no maxillary sinus tenderness and no frontal sinus tenderness. Left sinus exhibits no maxillary sinus tenderness and no frontal sinus tenderness.   Mouth/Throat: Uvula is midline, oropharynx is clear and moist and mucous membranes are normal. No trismus in the jaw. Normal dentition. No uvula swelling. No posterior oropharyngeal edema or posterior oropharyngeal erythema.   Eyes: Pupils are equal, round, and reactive to light. Conjunctivae, EOM and lids are normal. Right eye exhibits no discharge. Left eye exhibits no discharge. No scleral  icterus.   Neck: Trachea normal, normal range of motion, full passive range of motion without pain and phonation normal. Neck supple. No spinous process tenderness and no muscular tenderness present. No neck rigidity. No tracheal deviation present.   No midline cervical/thoracic/lumbar spinal tenderness or bony stepoffs.   Cardiovascular: Normal rate, regular rhythm, normal heart sounds and normal pulses.   Pulmonary/Chest: Effort normal and breath sounds normal. No respiratory distress. She has no wheezes. She exhibits no tenderness.    Comments: pt speaking full sentences without pause, does not appear SOB. Initial resting SpO2 93%, repeat SpO2 95%, ambulatory SpO2 94-95%. Normal RR 16.    Abdominal: Soft. Normal appearance and bowel sounds are normal. She exhibits no distension, no pulsatile midline mass and no mass. There is no abdominal tenderness. There is no rebound and no guarding.   Musculoskeletal: Normal range of motion.         General: Swelling, tenderness and signs of injury present. No deformity.      Left elbow: She exhibits swelling. She exhibits normal range of motion, no effusion and no deformity. Tenderness found. Olecranon process tenderness noted. No radial head, no medial epicondyle and no lateral epicondyle tenderness noted.      Right lower leg: No edema.      Left lower leg: No edema.      Comments: Moderate ecchymosis, swelling, and TTP to left olecranon. Normal ROM. Radial/brachial pulses 2+/2+. Strength and sensation intact.   Moderate TTP and swelling over left greater trochanter. No external or internal rotation. Slightly decreased ROM on hip flexion due to pain. Both legs equal lengths. Femoral pulses 2+/2+ and equal. Strength and sensation intact.   Neurological: She is alert and oriented to person, place, and time. She has normal strength. No cranial nerve deficit or sensory deficit. She exhibits normal muscle tone. She displays no seizure activity. Coordination normal. GCS eye  subscore is 4. GCS verbal subscore is 5. GCS motor subscore is 6.   normal EOM, no nystagmus, normal sensation to face and extremities, CN II-XII intact,  and extremity strength equal bilaterally, no pronator drift. Pt uses walker at baseline.   Skin: Skin is warm, dry, intact and not diaphoretic. Capillary refill takes less than 2 seconds. abrasion, burn and ecchymosis  Psychiatric: Her speech is normal and behavior is normal. Mood, judgment and thought content normal.   Nursing note and vitals reviewed.        Assessment:       1. Fall, initial encounter    2. Contusion of left hip and thigh, initial encounter    3. Contusion of left elbow, initial encounter        Plan:         Fall, initial encounter  -     XR ELBOW COMPLETE 3 VIEW LEFT; Future; Expected date: 06/25/2020  -     XR SHOULDER COMPLETE 2 OR MORE VIEWS LEFT; Future; Expected date: 06/25/2020  -     XR HIP 2 VIEW LEFT; Future; Expected date: 06/25/2020  -     XR FEMUR 2 VIEW LEFT; Future; Expected date: 06/25/2020  -     diclofenac sodium (VOLTAREN) 1 % Gel; Apply 2 g topically 3 (three) times daily.  Dispense: 100 g; Refill: 0    Contusion of left hip and thigh, initial encounter  -     XR HIP 2 VIEW LEFT; Future; Expected date: 06/25/2020  -     XR FEMUR 2 VIEW LEFT; Future; Expected date: 06/25/2020  -     diclofenac sodium (VOLTAREN) 1 % Gel; Apply 2 g topically 3 (three) times daily.  Dispense: 100 g; Refill: 0    Contusion of left elbow, initial encounter  -     XR ELBOW COMPLETE 3 VIEW LEFT; Future; Expected date: 06/25/2020  -     XR SHOULDER COMPLETE 2 OR MORE VIEWS LEFT; Future; Expected date: 06/25/2020  -     diclofenac sodium (VOLTAREN) 1 % Gel; Apply 2 g topically 3 (three) times daily.  Dispense: 100 g; Refill: 0    Xr Elbow Complete 3 View Left    Result Date: 6/25/2020  EXAMINATION: XR ELBOW COMPLETE 3 VIEW LEFT CLINICAL HISTORY: Unspecified fall, initial encounter TECHNIQUE: AP, lateral, and oblique views of the left elbow were  performed. COMPARISON: None FINDINGS: No acute fractures or dislocations.  There is no joint effusion.  There is soft tissue swelling over the proximal olecranon process along the dorsal aspect.     1. No displaced acute fractures or joint effusion. 2. Soft tissue swelling as above. Electronically signed by: Nadeem Franco MD Date:    06/25/2020 Time:    14:29    Xr Hip 2 View Left    Result Date: 6/25/2020  EXAMINATION: XR HIP 2 VIEW LEFT CLINICAL HISTORY: Unspecified fall, initial encounter TECHNIQUE: AP view of the pelvis and frog leg lateral view of the left hip were performed. COMPARISON: None FINDINGS: There is osteopenia.  No definite signs for acute fractures or dislocations.  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  The visualized left hemipelvis also demonstrates no acute fractures.  Soft tissues are unremarkable. There are phleboliths in the pelvis.  The included portions of the right hip in the right pelvis demonstrates no acute fractures.     1. No acute fractures. 2. DJD of the hip joints. Electronically signed by: Nadeem Franco MD Date:    06/25/2020 Time:    14:32    Xr Femur 2 View Left    Result Date: 6/25/2020  EXAMINATION: XR FEMUR 2 VIEW LEFT CLINICAL HISTORY: Unspecified fall, initial encounter TECHNIQUE: AP and lateral views of the left femur were performed. COMPARISON: None} FINDINGS: There is no acute femoral fractures.  No dislocations.  Mild DJD of the left hip joint and DJD of the knee joint.  Soft tissues are unremarkable.     No displaced acute fractures Electronically signed by: Nadeem Franco MD Date:    06/25/2020 Time:    14:33    Xr Shoulder Complete 2 Or More Views Left    Result Date: 6/25/2020  EXAMINATION: XR SHOULDER COMPLETE 2 OR MORE VIEWS LEFT CLINICAL HISTORY: Unspecified fall, initial encounter TECHNIQUE: Two or three views of the left shoulder were performed. COMPARISON: None FINDINGS: There is DJD of the glenohumeral joint and the acromioclavicular joint. There is a rim-like  calcification in the subacromial space, likely representing calcific changes from rotator cuff tendinitis.  No definite signs for acute fractures or dislocations.  Visualized left ribs demonstrate no significant abnormalities.  Soft tissues are unremarkable.     1. No acute fractures. 2. DJD of the left shoulder joint. 3. Findings suspicious for calcific tendinitis involving the rotator cuff tendons. Electronically signed by: Nadeem Franco MD Date:    06/25/2020 Time:    14:31        *89 y/o female with multiple medical problems presents to urgent care with acute left elbow and left hip pain that started after she fell and landed on her left side while sweeping her floors at 9 am this morning. Pain is constant, 9/10, and worse with movement and palpation. Pt denies hitting her head, LOC, and states that she does not take aspirin anymore. Pt states she normally walks slow while using her walker but it seems to be a bit more painful than normal. Denies SOB/MATOS, chest/rib pain. PE as above. All Xrays were read negative by radiologist as above. I independently interpreted Xrays and do not see any signs of fracture. Discussed results/diagnosis/plan with patient in clinic. Answered all of patients questions. Patient was given strict ED instructions. Patient verbally understood and agreed with treatment plan.         Patient Instructions     Elbow Bruise  You have a bruise (contusion) of your elbow. A bruise causes local pain, swelling, and sometimes bruising. There are no broken bones. This injury takes a few days to a few weeks to heal. You may be given a sling for comfort and arm support.  You may notice color changes over the skin. It may change from reddish to bluish to greenish or yellowish before the bruising fades. The skin will then go back to its normal color.  Home care  Follow these guidelines when caring for yourself at home.  · Keep your arm elevated to reduce pain and swelling. This is most important during  the first 2 days (48 hours) after the injury.  · Put an ice pack on the injured area. Do this for 20 minutes every 1 to 2 hours the first day. You can make an ice pack by wrapping a plastic bag of ice in a thin towel. You should continue to use the ice pack 3 to 4 times a day for the next 2 days. Then use the ice pack as needed to ease pain and swelling.  · Dont use a heating pad.  · Dont stick a needle into the contusion or bruising to drain it.  · You may use acetaminophen or ibuprofen to control pain, unless another pain medicine was prescribed. If you have chronic liver or kidney disease, talk with your healthcare provider before using these medicines. Also talk with your provider if youve had a stomach ulcer or gastrointestinal bleeding.  · If a sling was provided, you may take it off to shower or bathe. Dont wear it for more than 1 week or it may cause joint stiffness.  Follow-up care  Follow up with your healthcare provider, or as advised, if you are not starting to get better within the next 3 days.  When to seek medical advice  Call your healthcare provider right away if any of these occur:  · Pain or swelling gets worse  · The back of your elbow becomes very swollen where it almost looks like a gold ball or egg-like mass is growing there. This is a sign of olecrenon bursitis or septic bursitis which may need immediate treatment.  · Redness, red streaks down the arm, warmth, or drainage from the bruise  · Hand or fingers becomes cold, blue, numb, or tingly  · New bruises, and you dont know what caused them  · Contusion doesnt heal  Date Last Reviewed: 2/1/2017 © 2000-2017 [employer]. [address] [place] [id number]. All rights reserved. This information is not intended as a substitute for professional medical care. Always follow your healthcare professional's instructions.        Hip Contusion    A contusion is another word for a bruise. It happens when small blood vessels  break open and leak blood into the nearby area. A hip contusion can result from a bump, hit, or fall. Symptoms of a contusion often include changes in skin color (bruising), swelling, and pain. It may take several hours for a deep bruise to show up. If the injury is severe, you may need an X-ray to check for broken bones. Swelling should decrease in a few days. Bruising and pain may take several weeks to go away.  Home care  · Unless another medicine was prescribed, you may take acetaminophen, ibuprofen, or naproxen to help relieve pain and swelling. If needed, stronger pain medicines may be prescribed. Take all medicines exactly as directed.  · Ice the bruised area to help reduce pain and swelling. Wrap a cold source (ice pack or ice cubes in a plastic bag) in a thin towel. Apply the cold source to the bruised area for 20 minutes every 1 to 2 hours the first day. Continue this 3 to 4 times a day until the pain and swelling goes away.  · If walking causes pain, use crutches or a walker until you can walk without pain. These items can be rented at most pharmacies and orthopedic supply stores.  · If your injury is keeping you from moving around or caring for yourself properly, you may qualify for services such as home healthcare. Check with your doctor and insurance company to see if this type of care is covered.  Follow-up  Follow up with your healthcare provider, or as advised.  When to seek medical advice   Call your healthcare provider right away if any of these occur:  · Increased pain, bruising, or swelling near the injured area  · Decreased ability to bear weight on the injured side  · Pain or swelling develops below the knee  · Chest pain or shortness of breath  Date Last Reviewed: 4/1/2017  © 9468-7727 [employer]. [address] [place] [address], [place] [address]. All rights reserved. This information is not intended as a substitute for professional medical care. Always follow your healthcare  professional's instructions.      - Rest, Ice, Compression, Elevation  - Drink plenty of fluids  - Take Tylenol every 4 hours as needed for pain/fever/chills/body aches (Do NOT exceed taking 4000mg/day of Tylenol)  - Apply Voltaren to area as needed for pain/swelling  - Follow up with your primary care doctor or orthopedics as directed in the next 1-2 weeks if not improved or as needed. I have placed an ortho referral in our system for you, so they should be contacting you shortly to schedule an appointment for you. If you don't hear from them, you can call [phone] to schedule an appointment with the appropriate provider.    - Please go to the Emergency Department if your symptoms worsen.

## 2020-06-26 ENCOUNTER — TELEPHONE (OUTPATIENT)
Dept: URGENT CARE | Facility: CLINIC | Age: 85
End: 2020-06-26

## 2020-06-26 NOTE — TELEPHONE ENCOUNTER
Patient and patient's has been called on 2 separate occasions today.  Returning their call however neither answered at this time.  Will try again later.

## 2020-06-27 ENCOUNTER — NURSE TRIAGE (OUTPATIENT)
Dept: ADMINISTRATIVE | Facility: CLINIC | Age: 85
End: 2020-06-27

## 2020-06-27 NOTE — TELEPHONE ENCOUNTER
Speaking to Brooklyn Peters (daughter in law) on behalf of pt    Had fall x last Wednesday, discharged home with Voltaren ointment and instructed to take ibuprofen/tylenol. Pt c/o of 10/10 left hip pain. Able to use walker but pain increases. Admits large 8 inch bruising to left hip.     Pt requesting that I reach out to on call provider. Advised family to bring to ED. Brooklyn, verb understanding. Unsure if pt will agree to go to ED.     Please call pt's mobile number or [phone] (Brooklyn Peters).     Reason for Disposition   Followed a hip injury   [1] SEVERE pain AND [2] not improved 2 hours after pain medicine/ice packs    Additional Information   Negative: Looks like a broken bone or dislocated joint (e.g., crooked or deformed)   Negative: Sounds like a life-threatening emergency to the triager   Negative: Serious injury with multiple fractures (broken bones)   Negative: [1] Major bleeding (e.g., actively dripping or spurting) AND [2] can't be stopped   Negative: Bullet wound, stabbed by knife, or other serious penetrating wound   Negative: Looks like a dislocated joint (crooked or deformed)   Negative: Can't stand (bear weight) or walk   Negative: Sounds like a life-threatening emergency to the triager   Negative: Wound looks infected   Negative: Puncture wound of hip area   Negative: Skin is split open or gaping (or length > 1/2 inch or 12 mm)   Negative: [1] Bleeding AND [2] won't stop after 10 minutes of direct pressure (using correct technique)   Negative: [1] Dirt in the wound AND [2] not removed with 15 minutes of scrubbing   Negative: Sounds like a serious injury to the triager    Protocols used: HIP PAIN-A-AH, HIP INJURY-A-AH

## 2020-06-28 ENCOUNTER — HOSPITAL ENCOUNTER (OUTPATIENT)
Facility: HOSPITAL | Age: 85
Discharge: HOME HEALTH | End: 2020-06-30
Attending: EMERGENCY MEDICINE | Admitting: EMERGENCY MEDICINE
Payer: MEDICARE

## 2020-06-28 DIAGNOSIS — M25.552 LEFT HIP PAIN: Primary | ICD-10-CM

## 2020-06-28 DIAGNOSIS — S32.502A CLOSED FRACTURE OF LEFT PUBIS, UNSPECIFIED PORTION OF PUBIS, INITIAL ENCOUNTER: ICD-10-CM

## 2020-06-28 PROBLEM — S32.810A CLOSED PELVIC RING FRACTURE: Status: ACTIVE | Noted: 2020-06-28

## 2020-06-28 PROBLEM — E87.1 HYPONATREMIA: Status: ACTIVE | Noted: 2020-06-28

## 2020-06-28 PROBLEM — S32.592A CLOSED FRACTURE OF RAMUS OF LEFT PUBIS: Status: ACTIVE | Noted: 2020-06-28

## 2020-06-28 LAB
25(OH)D3+25(OH)D2 SERPL-MCNC: 65 NG/ML (ref 30–96)
ALBUMIN SERPL BCP-MCNC: 3.3 G/DL (ref 3.5–5.2)
ALP SERPL-CCNC: 56 U/L (ref 55–135)
ALT SERPL W/O P-5'-P-CCNC: 33 U/L (ref 10–44)
ANION GAP SERPL CALC-SCNC: 8 MMOL/L (ref 8–16)
AST SERPL-CCNC: 32 U/L (ref 10–40)
BACTERIA #/AREA URNS AUTO: ABNORMAL /HPF
BASOPHILS # BLD AUTO: 0.03 K/UL (ref 0–0.2)
BASOPHILS NFR BLD: 0.4 % (ref 0–1.9)
BILIRUB SERPL-MCNC: 0.8 MG/DL (ref 0.1–1)
BILIRUB UR QL STRIP: NEGATIVE
BNP SERPL-MCNC: 537 PG/ML (ref 0–99)
BUN SERPL-MCNC: 13 MG/DL (ref 8–23)
CALCIUM SERPL-MCNC: 9.3 MG/DL (ref 8.7–10.5)
CHLORIDE SERPL-SCNC: 96 MMOL/L (ref 95–110)
CLARITY UR REFRACT.AUTO: ABNORMAL
CO2 SERPL-SCNC: 24 MMOL/L (ref 23–29)
COLOR UR AUTO: YELLOW
CREAT SERPL-MCNC: 0.7 MG/DL (ref 0.5–1.4)
DIFFERENTIAL METHOD: ABNORMAL
EOSINOPHIL # BLD AUTO: 0.2 K/UL (ref 0–0.5)
EOSINOPHIL NFR BLD: 3.1 % (ref 0–8)
ERYTHROCYTE [DISTWIDTH] IN BLOOD BY AUTOMATED COUNT: 13.7 % (ref 11.5–14.5)
EST. GFR  (AFRICAN AMERICAN): >60 ML/MIN/1.73 M^2
EST. GFR  (NON AFRICAN AMERICAN): >60 ML/MIN/1.73 M^2
GLUCOSE SERPL-MCNC: 98 MG/DL (ref 70–110)
GLUCOSE UR QL STRIP: NEGATIVE
HCT VFR BLD AUTO: 34.3 % (ref 37–48.5)
HGB BLD-MCNC: 11.7 G/DL (ref 12–16)
HGB UR QL STRIP: ABNORMAL
IMM GRANULOCYTES # BLD AUTO: 0.03 K/UL (ref 0–0.04)
IMM GRANULOCYTES NFR BLD AUTO: 0.4 % (ref 0–0.5)
INR PPP: 0.9 (ref 0.8–1.2)
KETONES UR QL STRIP: NEGATIVE
LEUKOCYTE ESTERASE UR QL STRIP: ABNORMAL
LYMPHOCYTES # BLD AUTO: 1 K/UL (ref 1–4.8)
LYMPHOCYTES NFR BLD: 12.8 % (ref 18–48)
MCH RBC QN AUTO: 32.5 PG (ref 27–31)
MCHC RBC AUTO-ENTMCNC: 34.1 G/DL (ref 32–36)
MCV RBC AUTO: 95 FL (ref 82–98)
MICROSCOPIC COMMENT: ABNORMAL
MONOCYTES # BLD AUTO: 0.8 K/UL (ref 0.3–1)
MONOCYTES NFR BLD: 10.6 % (ref 4–15)
NEUTROPHILS # BLD AUTO: 5.4 K/UL (ref 1.8–7.7)
NEUTROPHILS NFR BLD: 72.7 % (ref 38–73)
NITRITE UR QL STRIP: NEGATIVE
NRBC BLD-RTO: 0 /100 WBC
PH UR STRIP: 7 [PH] (ref 5–8)
PLATELET # BLD AUTO: 147 K/UL (ref 150–350)
PMV BLD AUTO: 10.8 FL (ref 9.2–12.9)
POTASSIUM SERPL-SCNC: 4.8 MMOL/L (ref 3.5–5.1)
PROT SERPL-MCNC: 6.3 G/DL (ref 6–8.4)
PROT UR QL STRIP: NEGATIVE
PROTHROMBIN TIME: 9.9 SEC (ref 9–12.5)
RBC # BLD AUTO: 3.6 M/UL (ref 4–5.4)
RBC #/AREA URNS AUTO: 4 /HPF (ref 0–4)
SARS-COV-2 RDRP RESP QL NAA+PROBE: NEGATIVE
SODIUM SERPL-SCNC: 128 MMOL/L (ref 136–145)
SP GR UR STRIP: 1.01 (ref 1–1.03)
SQUAMOUS #/AREA URNS AUTO: 0 /HPF
URN SPEC COLLECT METH UR: ABNORMAL
WBC # BLD AUTO: 7.48 K/UL (ref 3.9–12.7)
WBC #/AREA URNS AUTO: 7 /HPF (ref 0–5)

## 2020-06-28 PROCEDURE — 83880 ASSAY OF NATRIURETIC PEPTIDE: CPT | Mod: HCNC

## 2020-06-28 PROCEDURE — G0378 HOSPITAL OBSERVATION PER HR: HCPCS | Mod: HCNC

## 2020-06-28 PROCEDURE — 81001 URINALYSIS AUTO W/SCOPE: CPT | Mod: HCNC

## 2020-06-28 PROCEDURE — 85025 COMPLETE CBC W/AUTO DIFF WBC: CPT | Mod: HCNC

## 2020-06-28 PROCEDURE — 80053 COMPREHEN METABOLIC PANEL: CPT | Mod: HCNC

## 2020-06-28 PROCEDURE — 99220 PR INITIAL OBSERVATION CARE,LEVL III: CPT | Mod: HCNC,,, | Performed by: HOSPITALIST

## 2020-06-28 PROCEDURE — 63600175 PHARM REV CODE 636 W HCPCS: Mod: HCNC | Performed by: EMERGENCY MEDICINE

## 2020-06-28 PROCEDURE — 99284 PR EMERGENCY DEPT VISIT,LEVEL IV: ICD-10-PCS | Mod: ,,, | Performed by: EMERGENCY MEDICINE

## 2020-06-28 PROCEDURE — 96374 THER/PROPH/DIAG INJ IV PUSH: CPT | Mod: HCNC

## 2020-06-28 PROCEDURE — 99220 PR INITIAL OBSERVATION CARE,LEVL III: ICD-10-PCS | Mod: HCNC,,, | Performed by: HOSPITALIST

## 2020-06-28 PROCEDURE — 99285 EMERGENCY DEPT VISIT HI MDM: CPT | Mod: 25,HCNC

## 2020-06-28 PROCEDURE — 85610 PROTHROMBIN TIME: CPT | Mod: HCNC

## 2020-06-28 PROCEDURE — 82306 VITAMIN D 25 HYDROXY: CPT | Mod: HCNC

## 2020-06-28 PROCEDURE — 25000003 PHARM REV CODE 250: Mod: HCNC | Performed by: HOSPITALIST

## 2020-06-28 PROCEDURE — 25000003 PHARM REV CODE 250: Mod: HCNC | Performed by: EMERGENCY MEDICINE

## 2020-06-28 PROCEDURE — U0002 COVID-19 LAB TEST NON-CDC: HCPCS | Mod: HCNC

## 2020-06-28 PROCEDURE — 99284 EMERGENCY DEPT VISIT MOD MDM: CPT | Mod: ,,, | Performed by: EMERGENCY MEDICINE

## 2020-06-28 RX ORDER — IBUPROFEN 200 MG
24 TABLET ORAL
Status: DISCONTINUED | OUTPATIENT
Start: 2020-06-28 | End: 2020-06-30 | Stop reason: HOSPADM

## 2020-06-28 RX ORDER — ACETAMINOPHEN 325 MG/1
650 TABLET ORAL EVERY 4 HOURS PRN
Status: DISCONTINUED | OUTPATIENT
Start: 2020-06-28 | End: 2020-06-30 | Stop reason: HOSPADM

## 2020-06-28 RX ORDER — MORPHINE SULFATE 2 MG/ML
2 INJECTION, SOLUTION INTRAMUSCULAR; INTRAVENOUS
Status: COMPLETED | OUTPATIENT
Start: 2020-06-28 | End: 2020-06-28

## 2020-06-28 RX ORDER — GLUCAGON 1 MG
1 KIT INJECTION
Status: DISCONTINUED | OUTPATIENT
Start: 2020-06-28 | End: 2020-06-30 | Stop reason: HOSPADM

## 2020-06-28 RX ORDER — AMOXICILLIN 250 MG
1 CAPSULE ORAL 2 TIMES DAILY PRN
Status: DISCONTINUED | OUTPATIENT
Start: 2020-06-28 | End: 2020-06-30 | Stop reason: HOSPADM

## 2020-06-28 RX ORDER — HYDROCODONE BITARTRATE AND ACETAMINOPHEN 5; 325 MG/1; MG/1
1 TABLET ORAL EVERY 4 HOURS PRN
Status: DISCONTINUED | OUTPATIENT
Start: 2020-06-28 | End: 2020-06-29

## 2020-06-28 RX ORDER — PREGABALIN 75 MG/1
75 CAPSULE ORAL NIGHTLY
Status: DISCONTINUED | OUTPATIENT
Start: 2020-06-28 | End: 2020-06-30 | Stop reason: HOSPADM

## 2020-06-28 RX ORDER — CITALOPRAM 20 MG/1
20 TABLET, FILM COATED ORAL DAILY
Status: DISCONTINUED | OUTPATIENT
Start: 2020-06-29 | End: 2020-06-30 | Stop reason: HOSPADM

## 2020-06-28 RX ORDER — HYDROCODONE BITARTRATE AND ACETAMINOPHEN 10; 325 MG/1; MG/1
1 TABLET ORAL EVERY 4 HOURS PRN
Status: DISCONTINUED | OUTPATIENT
Start: 2020-06-28 | End: 2020-06-29

## 2020-06-28 RX ORDER — DEXTROSE 50 % IN WATER (D50W) INTRAVENOUS SYRINGE
12.5
Status: DISCONTINUED | OUTPATIENT
Start: 2020-06-28 | End: 2020-06-30 | Stop reason: HOSPADM

## 2020-06-28 RX ORDER — ACETAMINOPHEN 500 MG
1000 TABLET ORAL 3 TIMES DAILY
Status: DISCONTINUED | OUTPATIENT
Start: 2020-06-28 | End: 2020-06-30 | Stop reason: HOSPADM

## 2020-06-28 RX ORDER — ONDANSETRON 2 MG/ML
8 INJECTION INTRAMUSCULAR; INTRAVENOUS EVERY 8 HOURS PRN
Status: DISCONTINUED | OUTPATIENT
Start: 2020-06-28 | End: 2020-06-30 | Stop reason: HOSPADM

## 2020-06-28 RX ORDER — DEXTROSE 50 % IN WATER (D50W) INTRAVENOUS SYRINGE
25
Status: DISCONTINUED | OUTPATIENT
Start: 2020-06-28 | End: 2020-06-30 | Stop reason: HOSPADM

## 2020-06-28 RX ORDER — ZOLPIDEM TARTRATE 5 MG/1
5 TABLET ORAL NIGHTLY
Status: DISCONTINUED | OUTPATIENT
Start: 2020-06-28 | End: 2020-06-30 | Stop reason: HOSPADM

## 2020-06-28 RX ORDER — TALC
6 POWDER (GRAM) TOPICAL NIGHTLY PRN
Status: DISCONTINUED | OUTPATIENT
Start: 2020-06-28 | End: 2020-06-30 | Stop reason: HOSPADM

## 2020-06-28 RX ORDER — CARVEDILOL 12.5 MG/1
12.5 TABLET ORAL 2 TIMES DAILY WITH MEALS
Status: DISCONTINUED | OUTPATIENT
Start: 2020-06-28 | End: 2020-06-30 | Stop reason: HOSPADM

## 2020-06-28 RX ORDER — ONDANSETRON 4 MG/1
4 TABLET, ORALLY DISINTEGRATING ORAL
Status: COMPLETED | OUTPATIENT
Start: 2020-06-28 | End: 2020-06-28

## 2020-06-28 RX ORDER — SODIUM CHLORIDE 0.9 % (FLUSH) 0.9 %
5 SYRINGE (ML) INJECTION
Status: DISCONTINUED | OUTPATIENT
Start: 2020-06-28 | End: 2020-06-30 | Stop reason: HOSPADM

## 2020-06-28 RX ORDER — SODIUM CHLORIDE 9 MG/ML
INJECTION, SOLUTION INTRAVENOUS CONTINUOUS
Status: ACTIVE | OUTPATIENT
Start: 2020-06-28 | End: 2020-06-29

## 2020-06-28 RX ORDER — ATORVASTATIN CALCIUM 40 MG/1
40 TABLET, FILM COATED ORAL DAILY
Status: DISCONTINUED | OUTPATIENT
Start: 2020-06-29 | End: 2020-06-30 | Stop reason: HOSPADM

## 2020-06-28 RX ORDER — PANTOPRAZOLE SODIUM 40 MG/1
40 TABLET, DELAYED RELEASE ORAL DAILY
Status: DISCONTINUED | OUTPATIENT
Start: 2020-06-29 | End: 2020-06-30 | Stop reason: HOSPADM

## 2020-06-28 RX ORDER — IBUPROFEN 200 MG
16 TABLET ORAL
Status: DISCONTINUED | OUTPATIENT
Start: 2020-06-28 | End: 2020-06-30 | Stop reason: HOSPADM

## 2020-06-28 RX ADMIN — SODIUM CHLORIDE: 0.9 INJECTION, SOLUTION INTRAVENOUS at 04:06

## 2020-06-28 RX ADMIN — PREGABALIN 75 MG: 75 CAPSULE ORAL at 09:06

## 2020-06-28 RX ADMIN — HYDROCODONE BITARTRATE AND ACETAMINOPHEN 1 TABLET: 5; 325 TABLET ORAL at 06:06

## 2020-06-28 RX ADMIN — ONDANSETRON 4 MG: 4 TABLET, ORALLY DISINTEGRATING ORAL at 01:06

## 2020-06-28 RX ADMIN — MORPHINE SULFATE 2 MG: 2 INJECTION, SOLUTION INTRAMUSCULAR; INTRAVENOUS at 01:06

## 2020-06-28 RX ADMIN — CARVEDILOL 12.5 MG: 12.5 TABLET, FILM COATED ORAL at 05:06

## 2020-06-28 RX ADMIN — ACETAMINOPHEN 1000 MG: 500 TABLET ORAL at 09:06

## 2020-06-28 NOTE — ED NOTES
Patient on continuous cardiac monitor, automatic blood pressure cuff and continuous pulse oximeter. Patient updated on plan of care. VSS. AAOx3. Patient's son at bedside. Side rails up and bed in lowest position. Call light in reach. Instructed patient to call staff for assistance with mobility. Will continue to monitor.

## 2020-06-28 NOTE — PROVIDER PROGRESS NOTES - EMERGENCY DEPT.
Encounter Date: 6/28/2020    ED Physician Progress Notes        Physician Note:   I assumed care from Dr. Armendariz at change of shift. Briefly, this is a 89yF who fell earlier this week, ahs had neg XR at an urgent care, and now has progressive difficulty ambulating due to pain, still a lot of pain with ranging of hip.  She feels okay whenever she is lying flat in bed.  :  CT Hip Without Contrast by my independent interpretation demonstrates Acute nondisplaced fracture of the left superior pubic ramus abutting the left acetabulum. Acute minimally displaced fracture of the left pubic symphysis, and a Left sacral insufficiency fracture.    I discussed the results with the patient and her son at bedside, as well as with Orthopedic surgery.  They recommended non operative management, weight-bearing as tolerated the left lower extremity.  Given her significant pain and risk of falling, I think the patient benefit from observation in the hospital with PT OT and pain control overnight.  She is agreeable to plan and was placed in observation in good condition.    Final diagnoses:  (M25.552) Left hip pain (Primary)  (S32.502A) Closed fracture of left pubis, unspecified portion of pubis, initial encounter

## 2020-06-28 NOTE — HPI
Elen Peters is a 89 y.o. female with PMH osteoporosis, HTN, CAD, depression, HLD, renal cancer who presents with left sided pelvic pain.  Patient states that she had a fall from standing 1 week ago.  She has been able to walk on both legs since injury with a walker.  Before the fall, she did not require any devices for ambulation.  She is an active swimmer even at her age.  She states that it is very hard to bear weight currently due to left-sided pelvic pain.  She denies any numbness or tingling.  She has no history of pelvic fractures or surgery.  She takes a 81mg aspirin at baseline.  She denies any other MSK pains or paresthesias.

## 2020-06-28 NOTE — H&P
Hospital Medicine  History and Physical  [facility]      Patient Name: Elen Peters  MRN:  [id number]  Hospital Medicine Team: [facility] [place] Mar Kay MD  Date of Admission:  6/28/2020     Principal Problem:  Closed fracture of ramus of left pubis   Primary Care Physician: Jim Treadwell MD       History of Present Illness:    Ms. Elen Peters is a 89 y.o. female with osteoporosis, who presents to the ER for evaluation of a fall.  She mentions that while sweeping the porch on 6/24, she had a fall landing on her left hip and left elbow. She went to an Urgent Care the next day, where she had XR that were negative.  She was discharged home with Tylenol and Voltaren gel with no relief in her hip pain.  Since then, she has had decreased ambulation even with a walker.  While attempting to ambulate, she had a near fall so family called EMS.  Upon arrival to the ER, imaging was notable for an acute, nondisplaced left superior pubic ramus abutting the left acetabulum, left pubic symphysis fracture, and left sacral insufficiency fracture with a possible hematoma.  She was evaluated by Ortho, who deemed her to be WBAT and to admit to Hospital Medicine for PT/OT evaluation.        Review of Systems:  Constitutional: Negative for chills, fever, fatigue, weakness  HENT: Negative for sore throat, trouble swallowing.    Eyes: Negative for photophobia, visual disturbance.   Respiratory: Negative for cough, shortness of breath.    Cardiovascular: Negative for chest pain, palpitations, leg swelling.   Gastrointestinal: Negative for abdominal pain, nausea, vomiting, diarrhea, constipation  Endocrine: Negative for cold intolerance, heat intolerance.   Genitourinary: Negative for dysuria, frequency.   Musculoskeletal: + hip pain  Skin: Negative for rash, wound, erythema   Neurological: Negative for numbness, paresthesias  Psychiatric/Behavioral: Negative for confusion, hallucinations, anxiety  All other  systems reviewed and are negative.      Past Medical History: Patient has a past medical history of Abnormal stress test (11/18/2015), Arthritis, Bladder cancer, Cataract, Coronary artery disease involving native coronary artery (1/6/2016), Depression, GERD (gastroesophageal reflux disease), HTN (hypertension), benign (11/18/2015), bladder infections, Hyperlipemia, OP (osteoporosis), Positive cardiac stress test (1/6/2016), Renal cancer, Syncope (1/6/2016), TMJ (dislocation of temporomandibular joint), Upper back pain (12/1/2015), Ureteral cancer, Venous insufficiency (2017), and Villous adenoma of colon (5/31/2013).      Past Surgical History: Patient has a past surgical history that includes Nephrectomy; TONSILLECTOMY, ADENOIDECTOMY; Back surgery; Appendectomy; Colonoscopy (10/21/2013); Cystoscopy; Cataract extraction w/  intraocular lens implant (Left, 3/16/15); Cataract extraction w/  intraocular lens implant (Right, 4/6/15); Eye surgery; Spine surgery; and Esophagogastroduodenoscopy (N/A, 1/30/2019).      Social History: Patient reports that she quit smoking about 71 years ago. She has never used smokeless tobacco. She reports that she does not drink alcohol or use drugs.      Family History: Patient's family history includes Cancer in her father, mother, and son; Goiter in her mother; Heart attack in her mother; Heart disease in her maternal grandfather and mother; Leukemia in her father and mother; No Known Problems in her brother, maternal aunt, maternal grandmother, maternal uncle, paternal aunt, paternal grandfather, paternal grandmother, paternal uncle, sister, son, son, son, son, and son.      Medications: Scheduled Meds:   acetaminophen  1,000 mg Oral TID    [START ON 6/29/2020] atorvastatin  40 mg Oral Daily    carvediloL  12.5 mg Oral BID WM    [START ON 6/29/2020] citalopram  20 mg Oral Daily    [START ON 6/29/2020] pantoprazole  40 mg Oral Daily    pregabalin  75 mg Oral QHS    zolpidem  5 mg  Oral Nightly     Continuous Infusions:   sodium chloride 0.9%       PRN Meds:.acetaminophen, dextrose 50%, dextrose 50%, glucagon (human recombinant), glucose, glucose, HYDROcodone-acetaminophen, HYDROcodone-acetaminophen, melatonin, ondansetron, promethazine (PHENERGAN) IVPB, senna-docusate 8.6-50 mg, sodium chloride 0.9%      Allergies: Patient is allergic to sulfa (sulfonamide antibiotics); amlodipine; codeine; and maxzide [triamterene-hydrochlorothiazid].      Physical Exam:    Temp:  [98.6 °F (37 °C)]   Pulse:  [59-68]   Resp:  [16-19]   BP: (146-174)/(60-77)   SpO2:  [90 %-99 %]     Constitutional: Appears well developed and well nourished.  Head: Normocephalic and atraumatic.   Mouth/Throat: Oropharynx is clear and moist.   Eyes: EOM are normal. Pupils are equal, round, and reactive to light. No scleral icterus.   Neck: Normal range of motion. Neck supple.   Cardiovascular: Normal heart rate.  Regular heart rhythm.  No murmur heard.  Pulmonary/Chest: Effort normal. No respiratory distress. No wheezes, rales, or rhonchi  Abdominal: Soft. Bowel sounds are normal.  No distension.  No tenderness  Musculoskeletal: Bruising to left elbow.  Bruising to left hip.  Neurological: Alert and oriented to person, place, and time.   Skin: Skin is warm and dry.   Psychiatric: Normal mood and affect. Behavior is normal.       No intake or output data in the 24 hours ending 06/28/20 1629  Recent Labs   Lab 06/28/20  1334   WBC 7.48   HGB 11.7*   HCT 34.3*   *     Recent Labs   Lab 06/28/20  1334   *   K 4.8   CL 96   CO2 24   BUN 13   CREATININE 0.7   GLU 98   CALCIUM 9.3     Recent Labs   Lab 06/28/20  1334   ALKPHOS 56   ALT 33   AST 32   ALBUMIN 3.3*   PROT 6.3   BILITOT 0.8   INR 0.9      No results for input(s): LACTATE in the last 72 hours.   No results for input(s): CPK, CPKMB, MB, TROPONINI in the last 72 hours.      Assessment and Plan:    Ms. Elen Peters is a 89 y.o. female who presented to [facility]  on 6/28/2020 with a pelvic fracture    Closed Pubic Ramus Fracture  Age Related Osteoporosis with Current Pathological Fracture  · Imaging notable for an acute, nondisplaced left superior pubic ramus abutting the left acetabulum, left pubic symphysis fracture, and left sacral insufficiency fracture with a possible hematoma  · Ortho consulted  · WBAT  · PT/OT eval  · Pain control with Lyrica 75mg PO qHS, Tylenol 1g TID, and prn Norco  · Check Vitamin D level    Hyponatremia  · Sodium 128 on admission, baseline in the 130s  · Could be 2/2 Celexa, but possibly from decreased PO intake  · Trial of IVF overnight before stopping Celexa    CAD  · Chronic and stable  · Continue Lipitor 40mg PO daily  · Continue Coreg 6.25mg PO BID  · Not on ARB  · Hold Aspirin with hematoma in case of surgery    HLD  · Chronic and stable  · Continue Lipitor 40mg PO daily    HTN  · Chronic and stable  · Continue Lipitor 40mg PO daily  · Continue Coreg 6.25mg PO BID    GERD  · Chronic and stable  · Continue PPI    MDD  · Chronic and stable  · Continue Celexa 20mg PO daily     Diet:  Cardiac  VTE PPx:  SCD  Goals of Care:  Full      Disposition:  Pending PT/OT recs  Discharge Needs:  JANELLE Kay MD  [facility]  Cell:  [phone]  Spectra:  [phone]

## 2020-06-28 NOTE — CONSULTS
[facility]  Orthopedics  Consult Note    Patient Name: Elen Peters  MRN: [id number]  Admission Date: 6/28/2020  Hospital Length of Stay: 0 days  Attending Provider: Tawnya Webster MD  Primary Care Provider: Jim Treadwell MD        Inpatient consult to Orthopedic Surgery  Consult performed by: Riki Villegas MD  Consult ordered by: Sharlene Madison MD        Subjective:     Principal Problem:Closed pelvic ring fracture    Chief Complaint:   Chief Complaint   Patient presents with    Fall     arrived via [facility] from home with c/o fall on thursday, c/o left hip pain, seen at  and discharged, c/o increased pain        HPI: Elen Peters is a 89 y.o. female with PMH osteoporosis, HTN, CAD, depression, HLD, renal cancer who presents with left sided pelvic pain.  Patient states that she had a fall from standing 1 week ago.  She has been able to walk on both legs since injury with a walker.  Before the fall, she did not require any devices for ambulation.  She is an active swimmer even at her age.  She states that it is very hard to bear weight currently due to left-sided pelvic pain.  She denies any numbness or tingling.  She has no history of pelvic fractures or surgery.  She takes a 81mg aspirin at baseline.  She denies any other MSK pains or paresthesias.    Past Medical History:   Diagnosis Date    Abnormal stress test 11/18/2015    Arthritis     Bladder cancer     Cataract     Coronary artery disease involving native coronary artery 1/6/2016    Depression     GERD (gastroesophageal reflux disease)     HTN (hypertension), benign 11/18/2015    Hx of bladder infections     Hyperlipemia     OP (osteoporosis)     Positive cardiac stress test 1/6/2016    Renal cancer     Syncope 1/6/2016    TMJ (dislocation of temporomandibular joint)     Upper back pain 12/1/2015    Ureteral cancer     Venous insufficiency 2017    Villous adenoma of colon 5/31/2013       Past  Surgical History:   Procedure Laterality Date    APPENDECTOMY      BACK SURGERY      CATARACT EXTRACTION W/  INTRAOCULAR LENS IMPLANT Left 3/16/15    kristy    CATARACT EXTRACTION W/  INTRAOCULAR LENS IMPLANT Right 4/6/15    kristy    COLONOSCOPY  10/21/2013    CYSTOSCOPY      ESOPHAGOGASTRODUODENOSCOPY N/A 1/30/2019    Procedure: EGD (ESOPHAGOGASTRODUODENOSCOPY);  Surgeon: Diony Dey MD;  Location: [address]);  Service: Endoscopy;  Laterality: N/A;    EYE SURGERY      NEPHRECTOMY      L    SPINE SURGERY      TONSILLECTOMY, ADENOIDECTOMY         Review of patient's allergies indicates:   Allergen Reactions    Sulfa (sulfonamide antibiotics)      Unknown as child    Amlodipine Swelling    Codeine      Other reaction(s): Unknown    Maxzide [triamterene-hydrochlorothiazid]        Current Facility-Administered Medications   Medication    0.9%  NaCl infusion    acetaminophen tablet 1,000 mg    acetaminophen tablet 650 mg    [START ON 6/29/2020] atorvastatin tablet 40 mg    carvediloL tablet 12.5 mg    [START ON 6/29/2020] citalopram tablet 20 mg    dextrose 50 % in water (D50W) injection 12.5 g    dextrose 50 % in water (D50W) injection 25 g    glucagon (human recombinant) injection 1 mg    glucose chewable tablet 16 g    glucose chewable tablet 24 g    HYDROcodone-acetaminophen  mg per tablet 1 tablet    HYDROcodone-acetaminophen 5-325 mg per tablet 1 tablet    melatonin tablet 6 mg    ondansetron injection 8 mg    [START ON 6/29/2020] pantoprazole EC tablet 40 mg    pregabalin capsule 75 mg    promethazine (PHENERGAN) 25 mg in dextrose 5 % 50 mL IVPB    senna-docusate 8.6-50 mg per tablet 1 tablet    sodium chloride 0.9% flush 5 mL    zolpidem tablet 5 mg     Family History     Problem Relation (Age of Onset)    Cancer Mother, Father, Son    Goiter Mother    Heart attack Mother    Heart disease Mother, Maternal Grandfather    Leukemia Mother, Father    No Known  "Problems Brother, Son, Son, Son, Son, Son, Maternal Aunt, Maternal Uncle, Paternal Aunt, Paternal Uncle, Maternal Grandmother, Paternal Grandmother, Paternal Grandfather, Sister        Tobacco Use    Smoking status: Former Smoker     Quit date: 9     Years since quittin.5    Smokeless tobacco: Never Used    Tobacco comment: in college   Substance and Sexual Activity    Alcohol use: No    Drug use: No    Sexual activity: Not Currently     ROS   Per primary team note   Objective:     Vital Signs (Most Recent):  Temp: 97.2 °F (36.2 °C) (20)  Pulse: 60 (20)  Resp: 18 (20)  BP: (!) 178/74 (20)  SpO2: (!) 93 % (20) Vital Signs (24h Range):  Temp:  [97.2 °F (36.2 °C)-98.6 °F (37 °C)] 97.2 °F (36.2 °C)  Pulse:  [59-68] 60  Resp:  [16-20] 18  SpO2:  [90 %-99 %] 93 %  BP: (146-178)/(60-77) 178/74     Weight: 58.5 kg (129 lb)  Height: 5' 4" (162.6 cm)  Body mass index is 22.14 kg/m².      Ortho/SPM Exam   No decubitus ulcers  No bony spine pain to palpation  Posterior pelvic pain on the left side with palpation  Pain with axial loading of the left leg  No pain with axial loading of the right leg  Pain with pelvic compression    LLE:  Skin intact throughout  No swelling  No ecchymosis  No tenderness to palpation of proximal, middle, or distal aspects of femur, tibia, fibula  No tenderness to palpation of foot  Compartments soft and compressible  Painless ROM at knee, and ankle  Pelvic pain with ROM at hip  SILT T/SP/DP/Salguero/Sa   Motor intact T/SP/DP   2+ PT/DP pulses   Brisk capillary refill      RLE:  Skin intact throughout  No swelling  No ecchymosis  No tenderness to palpation of proximal, middle, or distal aspects of femur, tibia, fibula  No tenderness to palpation of foot  Compartments soft and compressible  Painless ROM at hip, knee, and ankle  SILT T/SP/DP/Salguero/Sa   Motor intact T/SP/DP   2+ PT/DP pulses   Brisk capillary refill      LUE:  Skin intact " throughout  No swelling  No ecchymosis  No tenderness to palpation of proximal, middle, or distal aspects of clavicle, humerus, ulna, radius, hand  Compartments soft and compressible  Painless ROM at shoulder, elbow, wrist  SILT throughout   Motor intact throughout  2+ R/U pulses   Brisk capillary refill      RUE:  Skin intact throughout  No swelling  No ecchymosis  No tenderness to palpation of proximal, middle, or distal aspects of clavicle, humerus, ulna, radius, hand  Compartments soft and compressible  Painless ROM at shoulder, elbow, wrist  SILT throughout   Motor intact throughout  2+ R/U pulses   Brisk capillary refill      Significant Labs:   CBC:   Recent Labs   Lab 06/28/20  1334   WBC 7.48   HGB 11.7*   HCT 34.3*   *     CMP:   Recent Labs   Lab 06/28/20  1334   *   K 4.8   CL 96   CO2 24   GLU 98   BUN 13   CREATININE 0.7   CALCIUM 9.3   PROT 6.3   ALBUMIN 3.3*   BILITOT 0.8   ALKPHOS 56   AST 32   ALT 33   ANIONGAP 8   EGFRNONAA >60.0       Significant Imaging: I have reviewed all pertinent imaging results/findings.   X-ray and CT of the pelvis showing left-sided zone 1 sacral insufficiency fracture as well as left superior pubic ramus fracture and left pubic symphysis fracture    Assessment/Plan:     * Closed pelvic ring fracture  Elen Peters is a 89 y.o. female with left zone 1 sacral insufficiency fracture, left superior pubic ramus fracture, left pubic symphysis fracture, closed, NVI.  Discussed with patient in detail her injuries.  Her fractures appear stable.  There is no for seen surgery needed for her fractures.  She will be admitted to Hospital Medicine for PT and pain control. WBAT.  Post mobilization films not necessary as she has already been walking on these fractures.  Orthopedics will continue to follow.            Riki Villegas MD  Orthopedics  [facility]

## 2020-06-28 NOTE — ASSESSMENT & PLAN NOTE
Elen Peters is a 89 y.o. female with left zone 1 sacral insufficiency fracture, left superior pubic ramus fracture, left pubic symphysis fracture, closed, NVI.  Discussed with patient in detail her injuries.  Her fractures appear stable.  There is no for seen surgery needed for her fractures.  She will be admitted to Hospital Medicine for PT and pain control. WBAT.  Post mobilization films not necessary as she has already been walking on these fractures.  Orthopedics will continue to follow.

## 2020-06-28 NOTE — ED PROVIDER NOTES
Encounter Date: 6/28/2020       History     Chief Complaint   Patient presents with    Fall     arrived via [facility] from home with c/o fall on thursday, c/o left hip pain, seen at  and discharged, c/o increased pain     Elen Peters 89 F history of hypertension, GERD, depression, osteoporosis presenting with left-sided hip pain status post fall on Wednesday June 24th.  She was seen in urgent care on the 25th with negative x-rays.  Since the fall she has reported decreased ambulation secondary to pain which is described as constant, aching pain with occasional sharp stabbing pain.  She has tried Tylenol and Voltaren gel without relief.  She was brought in by EMS today for weakness and near fall.  No head injury or confusion.  She did report left elbow pain after fall with subsequent hematoma but denies any pain with range of motion.        Review of patient's allergies indicates:   Allergen Reactions    Sulfa (sulfonamide antibiotics)      Unknown as child    Amlodipine Swelling    Codeine      Other reaction(s): Unknown    Maxzide [triamterene-hydrochlorothiazid]      Past Medical History:   Diagnosis Date    Abnormal stress test 11/18/2015    Arthritis     Bladder cancer     Cataract     Coronary artery disease involving native coronary artery 1/6/2016    Depression     GERD (gastroesophageal reflux disease)     HTN (hypertension), benign 11/18/2015    Hx of bladder infections     Hyperlipemia     OP (osteoporosis)     Positive cardiac stress test 1/6/2016    Renal cancer     Syncope 1/6/2016    TMJ (dislocation of temporomandibular joint)     Upper back pain 12/1/2015    Ureteral cancer     Venous insufficiency 2017    Villous adenoma of colon 5/31/2013     Past Surgical History:   Procedure Laterality Date    APPENDECTOMY      BACK SURGERY      CATARACT EXTRACTION W/  INTRAOCULAR LENS IMPLANT Left 3/16/15    kristy    CATARACT EXTRACTION W/  INTRAOCULAR LENS IMPLANT Right 4/6/15    kristy     COLONOSCOPY  10/21/2013    CYSTOSCOPY      ESOPHAGOGASTRODUODENOSCOPY N/A 2019    Procedure: EGD (ESOPHAGOGASTRODUODENOSCOPY);  Surgeon: Diony Dey MD;  Location: [address];  Service: Endoscopy;  Laterality: N/A;    EYE SURGERY      NEPHRECTOMY      L    SPINE SURGERY      TONSILLECTOMY, ADENOIDECTOMY       Family History   Problem Relation Age of Onset    Leukemia Mother     Heart disease Mother     Heart attack Mother     Cancer Mother         leukemia    Goiter Mother     Leukemia Father     Cancer Father         leukemia    Heart disease Maternal Grandfather     No Known Problems Brother     No Known Problems Son     No Known Problems Son     No Known Problems Son     Cancer Son         throat    No Known Problems Son     No Known Problems Son     No Known Problems Maternal Aunt     No Known Problems Maternal Uncle     No Known Problems Paternal Aunt     No Known Problems Paternal Uncle     No Known Problems Maternal Grandmother     No Known Problems Paternal Grandmother     No Known Problems Paternal Grandfather     No Known Problems Sister     Amblyopia Neg Hx     Blindness Neg Hx     Cataracts Neg Hx     Diabetes Neg Hx     Glaucoma Neg Hx     Hypertension Neg Hx     Macular degeneration Neg Hx     Retinal detachment Neg Hx     Strabismus Neg Hx     Stroke Neg Hx     Thyroid disease Neg Hx     Breast cancer Neg Hx     Colon cancer Neg Hx     Esophageal cancer Neg Hx      Social History     Tobacco Use    Smoking status: Former Smoker     Quit date:      Years since quittin.5    Smokeless tobacco: Never Used    Tobacco comment: in college   Substance Use Topics    Alcohol use: No    Drug use: No     Review of Systems   Constitutional: Negative for chills and fever.   Respiratory: Negative for cough and shortness of breath.    Cardiovascular: Negative for chest pain and palpitations.   Gastrointestinal: Negative for diarrhea and  nausea.   Genitourinary: Negative for dysuria.   Musculoskeletal: Positive for arthralgias, gait problem and myalgias.   Neurological: Positive for weakness. Negative for numbness.       Physical Exam     Initial Vitals [06/28/20 1231]   BP Pulse Resp Temp SpO2   (!) 146/60 60 19 98.6 °F (37 °C) 99 %      MAP       --         Physical Exam    Nursing note and vitals reviewed.  Constitutional: She appears well-developed and well-nourished. No distress.   HENT:   Mouth/Throat: Oropharynx is clear and moist.   Eyes: Conjunctivae are normal.   Neck: Neck supple.   Cardiovascular: Normal rate, regular rhythm and normal heart sounds.   Pulmonary/Chest: Breath sounds normal. She has no wheezes.   Abdominal: Soft. Bowel sounds are normal. There is no abdominal tenderness.   Musculoskeletal: No edema.      Comments: (+) left hip hematoma, limited ROM on inversion and eversion due to pain.  Unable to lift leg.  DP pulses intact   Neurological: She is alert and oriented to person, place, and time. No sensory deficit.   Skin: Skin is warm.         ED Course   Procedures  Labs Reviewed   CBC W/ AUTO DIFFERENTIAL - Abnormal; Notable for the following components:       Result Value    RBC 3.60 (*)     Hemoglobin 11.7 (*)     Hematocrit 34.3 (*)     Mean Corpuscular Hemoglobin 32.5 (*)     Platelets 147 (*)     Lymph% 12.8 (*)     All other components within normal limits          Imaging Results          CT Hip Without Contrast Left (In process)                  Medical Decision Making:   History:   Old Medical Records: I decided to obtain old medical records.  Initial Assessment:   Emergent evaluation of 89-year-old female presenting with persistent left hip pain and decreased ambulation status post fall five days ago.  Vital signs stable.  Limited range of motion of the left hip due to pain.  Neurovascularly intact  Differential Diagnosis:   Occult hip fracture, contusion, sprain, strain, hematoma, low suspicion for septic  arthritis  Clinical Tests:   Lab Tests: Ordered and Reviewed  Radiological Study: Ordered and Reviewed  ED Management:  2:30 PM  Hemoglobin today 11.7 down from 12.5.  CT of the hip pending    Patient signed out in stable condition pending imaging, ambulation and final disposition                                 Clinical Impression:       ICD-10-CM ICD-9-CM   1. Left hip pain  M25.552 719.45                                Lori Armendariz MD  06/28/20 1433

## 2020-06-28 NOTE — ED TRIAGE NOTES
Patient is a 89 year old female that presents to ED with c/o fall injury. Patient states fell on Wednesday with no LOC and was seen at urgent care, x-rays were done and patient was sent home. Son at bedside states patient had another fall due to the left leg pain from previous fall. Patient states 8 out of 0-10 pain scale. Large hematoma noted to outer left thigh. Bruising noted to left arm. Patient is AAOx3. Denies LOC and hitting head with both falls. Not on blood thinners.

## 2020-06-28 NOTE — NURSING
Notified Dr Webster pt is on 4l nasal cannula and sat at 96%. no apparent distress/sob. just a FYI. does not wear oxygen at home. BP also 178/74. Will continue to monitor.

## 2020-06-28 NOTE — SUBJECTIVE & OBJECTIVE
Past Medical History:   Diagnosis Date    Abnormal stress test 11/18/2015    Arthritis     Bladder cancer     Cataract     Coronary artery disease involving native coronary artery 1/6/2016    Depression     GERD (gastroesophageal reflux disease)     HTN (hypertension), benign 11/18/2015    Hx of bladder infections     Hyperlipemia     OP (osteoporosis)     Positive cardiac stress test 1/6/2016    Renal cancer     Syncope 1/6/2016    TMJ (dislocation of temporomandibular joint)     Upper back pain 12/1/2015    Ureteral cancer     Venous insufficiency 2017    Villous adenoma of colon 5/31/2013       Past Surgical History:   Procedure Laterality Date    APPENDECTOMY      BACK SURGERY      CATARACT EXTRACTION W/  INTRAOCULAR LENS IMPLANT Left 3/16/15    wagner    CATARACT EXTRACTION W/  INTRAOCULAR LENS IMPLANT Right 4/6/15    wagner    COLONOSCOPY  10/21/2013    CYSTOSCOPY      ESOPHAGOGASTRODUODENOSCOPY N/A 1/30/2019    Procedure: EGD (ESOPHAGOGASTRODUODENOSCOPY);  Surgeon: Diony Dey MD;  Location: [address];  Service: Endoscopy;  Laterality: N/A;    EYE SURGERY      NEPHRECTOMY      L    SPINE SURGERY      TONSILLECTOMY, ADENOIDECTOMY         Review of patient's allergies indicates:   Allergen Reactions    Sulfa (sulfonamide antibiotics)      Unknown as child    Amlodipine Swelling    Codeine      Other reaction(s): Unknown    Maxzide [triamterene-hydrochlorothiazid]        Current Facility-Administered Medications   Medication    0.9%  NaCl infusion    acetaminophen tablet 1,000 mg    acetaminophen tablet 650 mg    [START ON 6/29/2020] atorvastatin tablet 40 mg    carvediloL tablet 12.5 mg    [START ON 6/29/2020] citalopram tablet 20 mg    dextrose 50 % in water (D50W) injection 12.5 g    dextrose 50 % in water (D50W) injection 25 g    glucagon (human recombinant) injection 1 mg    glucose chewable tablet 16 g    glucose chewable tablet 24 g     "HYDROcodone-acetaminophen  mg per tablet 1 tablet    HYDROcodone-acetaminophen 5-325 mg per tablet 1 tablet    melatonin tablet 6 mg    ondansetron injection 8 mg    [START ON 2020] pantoprazole EC tablet 40 mg    pregabalin capsule 75 mg    promethazine (PHENERGAN) 25 mg in dextrose 5 % 50 mL IVPB    senna-docusate 8.6-50 mg per tablet 1 tablet    sodium chloride 0.9% flush 5 mL    zolpidem tablet 5 mg     Family History     Problem Relation (Age of Onset)    Cancer Mother, Father, Son    Goiter Mother    Heart attack Mother    Heart disease Mother, Maternal Grandfather    Leukemia Mother, Father    No Known Problems Brother, Son, Son, Son, Son, Son, Maternal Aunt, Maternal Uncle, Paternal Aunt, Paternal Uncle, Maternal Grandmother, Paternal Grandmother, Paternal Grandfather, Sister        Tobacco Use    Smoking status: Former Smoker     Quit date:      Years since quittin.5    Smokeless tobacco: Never Used    Tobacco comment: in college   Substance and Sexual Activity    Alcohol use: No    Drug use: No    Sexual activity: Not Currently     ROS   Per primary team note   Objective:     Vital Signs (Most Recent):  Temp: 97.2 °F (36.2 °C) (20)  Pulse: 60 (20)  Resp: 18 (20)  BP: (!) 178/74 (20)  SpO2: (!) 93 % (20) Vital Signs (24h Range):  Temp:  [97.2 °F (36.2 °C)-98.6 °F (37 °C)] 97.2 °F (36.2 °C)  Pulse:  [59-68] 60  Resp:  [16-20] 18  SpO2:  [90 %-99 %] 93 %  BP: (146-178)/(60-77) 178/74     Weight: 58.5 kg (129 lb)  Height: 5' 4" (162.6 cm)  Body mass index is 22.14 kg/m².      Ortho/SPM Exam   No decubitus ulcers  No bony spine pain to palpation  Posterior pelvic pain on the left side with palpation  Pain with axial loading of the left leg  No pain with axial loading of the right leg  Pain with pelvic compression    LLE:  Skin intact throughout  No swelling  No ecchymosis  No tenderness to palpation of proximal, middle, or " distal aspects of femur, tibia, fibula  No tenderness to palpation of foot  Compartments soft and compressible  Painless ROM at knee, and ankle  Pelvic pain with ROM at hip  SILT T/SP/DP/[place]/Sa   Motor intact T/SP/DP   2+ PT/DP pulses   Brisk capillary refill      RLE:  Skin intact throughout  No swelling  No ecchymosis  No tenderness to palpation of proximal, middle, or distal aspects of femur, tibia, fibula  No tenderness to palpation of foot  Compartments soft and compressible  Painless ROM at hip, knee, and ankle  SILT T/SP/DP/Salguero/Sa   Motor intact T/SP/DP   2+ PT/DP pulses   Brisk capillary refill      LUE:  Skin intact throughout  No swelling  No ecchymosis  No tenderness to palpation of proximal, middle, or distal aspects of clavicle, humerus, ulna, radius, hand  Compartments soft and compressible  Painless ROM at shoulder, elbow, wrist  SILT throughout   Motor intact throughout  2+ R/U pulses   Brisk capillary refill      RUE:  Skin intact throughout  No swelling  No ecchymosis  No tenderness to palpation of proximal, middle, or distal aspects of clavicle, humerus, ulna, radius, hand  Compartments soft and compressible  Painless ROM at shoulder, elbow, wrist  SILT throughout   Motor intact throughout  2+ R/U pulses   Brisk capillary refill      Significant Labs:   CBC:   Recent Labs   Lab 06/28/20  1334   WBC 7.48   HGB 11.7*   HCT 34.3*   *     CMP:   Recent Labs   Lab 06/28/20  1334   *   K 4.8   CL 96   CO2 24   GLU 98   BUN 13   CREATININE 0.7   CALCIUM 9.3   PROT 6.3   ALBUMIN 3.3*   BILITOT 0.8   ALKPHOS 56   AST 32   ALT 33   ANIONGAP 8   EGFRNONAA >60.0       Significant Imaging: I have reviewed all pertinent imaging results/findings.   X-ray and CT of the pelvis showing left-sided zone 1 sacral insufficiency fracture as well as left superior pubic ramus fracture and left pubic symphysis fracture

## 2020-06-29 LAB
ALBUMIN SERPL BCP-MCNC: 2.9 G/DL (ref 3.5–5.2)
ALP SERPL-CCNC: 52 U/L (ref 55–135)
ALT SERPL W/O P-5'-P-CCNC: 25 U/L (ref 10–44)
ANION GAP SERPL CALC-SCNC: 6 MMOL/L (ref 8–16)
AST SERPL-CCNC: 22 U/L (ref 10–40)
BASOPHILS # BLD AUTO: 0.05 K/UL (ref 0–0.2)
BASOPHILS NFR BLD: 0.9 % (ref 0–1.9)
BILIRUB SERPL-MCNC: 0.6 MG/DL (ref 0.1–1)
BUN SERPL-MCNC: 10 MG/DL (ref 8–23)
CALCIUM SERPL-MCNC: 8.5 MG/DL (ref 8.7–10.5)
CHLORIDE SERPL-SCNC: 100 MMOL/L (ref 95–110)
CO2 SERPL-SCNC: 25 MMOL/L (ref 23–29)
CREAT SERPL-MCNC: 0.7 MG/DL (ref 0.5–1.4)
DIFFERENTIAL METHOD: ABNORMAL
EOSINOPHIL # BLD AUTO: 0.4 K/UL (ref 0–0.5)
EOSINOPHIL NFR BLD: 6.9 % (ref 0–8)
ERYTHROCYTE [DISTWIDTH] IN BLOOD BY AUTOMATED COUNT: 13.8 % (ref 11.5–14.5)
EST. GFR  (AFRICAN AMERICAN): >60 ML/MIN/1.73 M^2
EST. GFR  (NON AFRICAN AMERICAN): >60 ML/MIN/1.73 M^2
GLUCOSE SERPL-MCNC: 92 MG/DL (ref 70–110)
HCT VFR BLD AUTO: 32.3 % (ref 37–48.5)
HGB BLD-MCNC: 10.6 G/DL (ref 12–16)
IMM GRANULOCYTES # BLD AUTO: 0.02 K/UL (ref 0–0.04)
IMM GRANULOCYTES NFR BLD AUTO: 0.4 % (ref 0–0.5)
LYMPHOCYTES # BLD AUTO: 0.9 K/UL (ref 1–4.8)
LYMPHOCYTES NFR BLD: 16.2 % (ref 18–48)
MAGNESIUM SERPL-MCNC: 1.9 MG/DL (ref 1.6–2.6)
MCH RBC QN AUTO: 32 PG (ref 27–31)
MCHC RBC AUTO-ENTMCNC: 32.8 G/DL (ref 32–36)
MCV RBC AUTO: 98 FL (ref 82–98)
MONOCYTES # BLD AUTO: 0.7 K/UL (ref 0.3–1)
MONOCYTES NFR BLD: 12.9 % (ref 4–15)
NEUTROPHILS # BLD AUTO: 3.6 K/UL (ref 1.8–7.7)
NEUTROPHILS NFR BLD: 62.7 % (ref 38–73)
NRBC BLD-RTO: 0 /100 WBC
PHOSPHATE SERPL-MCNC: 3.2 MG/DL (ref 2.7–4.5)
PLATELET # BLD AUTO: 158 K/UL (ref 150–350)
PMV BLD AUTO: 10.7 FL (ref 9.2–12.9)
POTASSIUM SERPL-SCNC: 4.4 MMOL/L (ref 3.5–5.1)
PROT SERPL-MCNC: 5.7 G/DL (ref 6–8.4)
RBC # BLD AUTO: 3.31 M/UL (ref 4–5.4)
SODIUM SERPL-SCNC: 131 MMOL/L (ref 136–145)
WBC # BLD AUTO: 5.68 K/UL (ref 3.9–12.7)

## 2020-06-29 PROCEDURE — 84100 ASSAY OF PHOSPHORUS: CPT | Mod: HCNC

## 2020-06-29 PROCEDURE — 97535 SELF CARE MNGMENT TRAINING: CPT | Mod: HCNC

## 2020-06-29 PROCEDURE — G0378 HOSPITAL OBSERVATION PER HR: HCPCS | Mod: HCNC

## 2020-06-29 PROCEDURE — 27000221 HC OXYGEN, UP TO 24 HOURS: Mod: HCNC

## 2020-06-29 PROCEDURE — 80053 COMPREHEN METABOLIC PANEL: CPT | Mod: HCNC

## 2020-06-29 PROCEDURE — 25000003 PHARM REV CODE 250: Mod: HCNC | Performed by: HOSPITALIST

## 2020-06-29 PROCEDURE — 99900035 HC TECH TIME PER 15 MIN (STAT): Mod: HCNC

## 2020-06-29 PROCEDURE — 99225 PR SUBSEQUENT OBSERVATION CARE,LEVEL II: CPT | Mod: HCNC,,, | Performed by: HOSPITALIST

## 2020-06-29 PROCEDURE — 36415 COLL VENOUS BLD VENIPUNCTURE: CPT | Mod: HCNC

## 2020-06-29 PROCEDURE — 94761 N-INVAS EAR/PLS OXIMETRY MLT: CPT | Mod: HCNC

## 2020-06-29 PROCEDURE — 99225 PR SUBSEQUENT OBSERVATION CARE,LEVEL II: ICD-10-PCS | Mod: HCNC,,, | Performed by: HOSPITALIST

## 2020-06-29 PROCEDURE — 97116 GAIT TRAINING THERAPY: CPT | Mod: HCNC

## 2020-06-29 PROCEDURE — 97165 OT EVAL LOW COMPLEX 30 MIN: CPT | Mod: HCNC

## 2020-06-29 PROCEDURE — 85025 COMPLETE CBC W/AUTO DIFF WBC: CPT | Mod: HCNC

## 2020-06-29 PROCEDURE — 83735 ASSAY OF MAGNESIUM: CPT | Mod: HCNC

## 2020-06-29 PROCEDURE — 97161 PT EVAL LOW COMPLEX 20 MIN: CPT | Mod: HCNC

## 2020-06-29 PROCEDURE — 94799 UNLISTED PULMONARY SVC/PX: CPT | Mod: HCNC

## 2020-06-29 RX ORDER — ACETAMINOPHEN 500 MG
500 TABLET ORAL 3 TIMES DAILY
Refills: 0
Start: 2020-06-29 | End: 2021-04-26

## 2020-06-29 RX ORDER — HYDROCODONE BITARTRATE AND ACETAMINOPHEN 5; 325 MG/1; MG/1
1 TABLET ORAL EVERY 4 HOURS PRN
Status: DISCONTINUED | OUTPATIENT
Start: 2020-06-29 | End: 2020-06-30 | Stop reason: HOSPADM

## 2020-06-29 RX ORDER — TRAMADOL HYDROCHLORIDE 50 MG/1
50 TABLET ORAL EVERY 6 HOURS PRN
Qty: 40 TABLET | Refills: 0 | Status: SHIPPED | OUTPATIENT
Start: 2020-06-29 | End: 2020-07-14 | Stop reason: SDUPTHER

## 2020-06-29 RX ORDER — PREGABALIN 75 MG/1
75 CAPSULE ORAL NIGHTLY
Qty: 30 CAPSULE | Refills: 1 | Status: SHIPPED | OUTPATIENT
Start: 2020-06-29 | End: 2020-07-14

## 2020-06-29 RX ORDER — HYDRALAZINE HYDROCHLORIDE 25 MG/1
25 TABLET, FILM COATED ORAL EVERY 8 HOURS
Qty: 90 TABLET | Refills: 2 | Status: SHIPPED | OUTPATIENT
Start: 2020-06-29 | End: 2020-11-20

## 2020-06-29 RX ORDER — AMOXICILLIN 250 MG
1 CAPSULE ORAL 2 TIMES DAILY PRN
Qty: 30 TABLET | Refills: 0 | Status: SHIPPED | OUTPATIENT
Start: 2020-06-29 | End: 2021-04-26

## 2020-06-29 RX ORDER — METHOCARBAMOL 500 MG/1
500 TABLET, FILM COATED ORAL 4 TIMES DAILY
Qty: 60 TABLET | Refills: 1 | Status: SHIPPED | OUTPATIENT
Start: 2020-06-29 | End: 2020-07-28 | Stop reason: SDUPTHER

## 2020-06-29 RX ORDER — METHOCARBAMOL 500 MG/1
500 TABLET, FILM COATED ORAL 4 TIMES DAILY
Status: DISCONTINUED | OUTPATIENT
Start: 2020-06-29 | End: 2020-06-30 | Stop reason: HOSPADM

## 2020-06-29 RX ORDER — TRAMADOL HYDROCHLORIDE 50 MG/1
50 TABLET ORAL EVERY 6 HOURS PRN
Status: DISCONTINUED | OUTPATIENT
Start: 2020-06-29 | End: 2020-06-30 | Stop reason: HOSPADM

## 2020-06-29 RX ORDER — HYDROCODONE BITARTRATE AND ACETAMINOPHEN 5; 325 MG/1; MG/1
1 TABLET ORAL EVERY 4 HOURS PRN
Qty: 15 TABLET | Refills: 0 | Status: SHIPPED | OUTPATIENT
Start: 2020-06-29 | End: 2020-07-07 | Stop reason: SDUPTHER

## 2020-06-29 RX ORDER — HYDRALAZINE HYDROCHLORIDE 25 MG/1
25 TABLET, FILM COATED ORAL EVERY 8 HOURS
Status: DISCONTINUED | OUTPATIENT
Start: 2020-06-29 | End: 2020-06-30

## 2020-06-29 RX ADMIN — METHOCARBAMOL TABLETS 500 MG: 500 TABLET, COATED ORAL at 08:06

## 2020-06-29 RX ADMIN — HYDROCODONE BITARTRATE AND ACETAMINOPHEN 1 TABLET: 5; 325 TABLET ORAL at 03:06

## 2020-06-29 RX ADMIN — HYDRALAZINE HYDROCHLORIDE 25 MG: 25 TABLET, FILM COATED ORAL at 04:06

## 2020-06-29 RX ADMIN — CITALOPRAM HYDROBROMIDE 20 MG: 20 TABLET, FILM COATED ORAL at 09:06

## 2020-06-29 RX ADMIN — PREGABALIN 75 MG: 75 CAPSULE ORAL at 08:06

## 2020-06-29 RX ADMIN — PANTOPRAZOLE SODIUM 40 MG: 40 TABLET, DELAYED RELEASE ORAL at 09:06

## 2020-06-29 RX ADMIN — METHOCARBAMOL TABLETS 500 MG: 500 TABLET, COATED ORAL at 09:06

## 2020-06-29 RX ADMIN — METHOCARBAMOL TABLETS 500 MG: 500 TABLET, COATED ORAL at 04:06

## 2020-06-29 RX ADMIN — ACETAMINOPHEN 1000 MG: 500 TABLET ORAL at 08:06

## 2020-06-29 RX ADMIN — HYDRALAZINE HYDROCHLORIDE 25 MG: 25 TABLET, FILM COATED ORAL at 10:06

## 2020-06-29 RX ADMIN — CARVEDILOL 12.5 MG: 12.5 TABLET, FILM COATED ORAL at 05:06

## 2020-06-29 RX ADMIN — CARVEDILOL 12.5 MG: 12.5 TABLET, FILM COATED ORAL at 09:06

## 2020-06-29 RX ADMIN — ATORVASTATIN CALCIUM 40 MG: 40 TABLET, FILM COATED ORAL at 09:06

## 2020-06-29 RX ADMIN — ACETAMINOPHEN 1000 MG: 500 TABLET ORAL at 09:06

## 2020-06-29 NOTE — NURSING
Pt remains neurologically intact and bedrest. PIV intact and infusing, no complaints of pain at this time. Following all safety precautions at this time with call light in reach. TOlerating PO intake.

## 2020-06-29 NOTE — PT/OT/SLP EVAL
Physical Therapy  Evaluation and Treatment    Elen Peters   [id number]    Time Tracking:     PT Received On: 06/29/20   PT Start Time: 1050   PT Stop Time: 1110   PT Total Time (min): 20 min    Billable Minutes: Evaluation 1 procedure and Gait Training 9 minutes      Recommendations:     Discharge recommendations: Home with HHPT and OT     Equipment recommendations: None (has rolling walker and 3-in-1 commode)    Barriers to Discharge: None (lives alone but states she has 5 sons who can assist and supervise at home)    Patient Information:     Recent Surgery: * No surgery found *      Diagnosis: Closed pelvic ring fracture    Length of Stay: 1 day    General Precautions: Standard, fall  Orthopedic Precautions: LLE WBAT   Brace: None    Assessment:     Elen Peters is a 89 y.o. female admitted to [facility] on 6/28/2020 for Closed pelvic ring fracture. Fracture deemed stable, no surgical intervention necessary. Elen Peters tolerated evaluation well today. She reports improving pain compared to prior week; no pain at rest, increases to 3/10 at L lateral hip with mobility and/or weightbearing. Able to stand from EOB with stand-by assist. Ambulates 100 ft in room and hallways (wearing mask) with rolling walker and stand-by assistance, on room air. Gait is steady without any losses of balance today. Participates in standing ADL's at sink with stand-by assist, OOBTC comfortable with  present at end of session. Discussed PT role, POC, goals and recommendations (Home with HH PT/OT, no DME needs; recommend increased family supervision upon return home as she typically lives alone) with patient; verbalized understanding. Elen Peters would benefit from acute PT services to promote mobility during this admission and improve return to OF.    Problem List: weakness, decreased endurance, impaired self-care skills, impaired mobility, decreased sitting or standing balance, gait instability and pain    Rehab Prognosis:  Good; patient would benefit from acute skilled PT services to address these deficits and reach maximum level of function.    Plan:     Patient to be seen 3 x/week to address the above listed problems via gait training, therapeutic activities, therapeutic exercises    Plan of Care Expires: 07/29/20  Plan of Care reviewed with: patient    Subjective:     Communicated with RN prior to evaluation, appropriate to see for evaluation.    Pt found supine in bed (HOB elevated) upon PT entry to room, agreeable to evaluation.    Does this patient have any cultural, spiritual, Tenriism conflicts given the current situation? Patient has no barriers to learning. Patient verbalizes understanding of his/her program and goals and demonstrates them correctly. No cultural, spiritual, or educational needs identified.    Past Medical History:   Diagnosis Date    Abnormal stress test 11/18/2015    Arthritis     Bladder cancer     Cataract     Coronary artery disease involving native coronary artery 1/6/2016    Depression     GERD (gastroesophageal reflux disease)     HTN (hypertension), benign 11/18/2015    Hx of bladder infections     Hyperlipemia     OP (osteoporosis)     Positive cardiac stress test 1/6/2016    Renal cancer     Syncope 1/6/2016    TMJ (dislocation of temporomandibular joint)     Upper back pain 12/1/2015    Ureteral cancer     Venous insufficiency 2017    Villous adenoma of colon 5/31/2013      Past Surgical History:   Procedure Laterality Date    APPENDECTOMY      BACK SURGERY      CATARACT EXTRACTION W/  INTRAOCULAR LENS IMPLANT Left 3/16/15    kristy    CATARACT EXTRACTION W/  INTRAOCULAR LENS IMPLANT Right 4/6/15    kristy    COLONOSCOPY  10/21/2013    CYSTOSCOPY      ESOPHAGOGASTRODUODENOSCOPY N/A 1/30/2019    Procedure: EGD (ESOPHAGOGASTRODUODENOSCOPY);  Surgeon: Diony Dey MD;  Location: [address];  Service: Endoscopy;  Laterality: N/A;    EYE SURGERY      NEPHRECTOMY       L    SPINE SURGERY      TONSILLECTOMY, ADENOIDECTOMY       Living Environment:  Pt lives alone in a [address] [facility] with elevator access. She uses a walk-in shower, has a 3-in-1 commode set-up in shower.    PLOF:  Prior to fall on 6/24, patient was independent with mobility and ADL's without use of AD, even driving herself. Since fall on 6/24, increased pain at L hip has limited activity. Has been ambulating with rolling walker with pain and then using 3-in-1 commode for sitting to shower with supervision from family.    DME:  Patient owns or has access to the following DME: Rolling Walker and 3-in-1 commode    Upon discharge, patient will have assistance from family (states she has 5 sons and dtrs-in-law who live nearby).    Objective:     Patient found with: bed alarm, oxygen (2L)    Pain:  Pain Rating 1: 0/10 at rest  Pain Rating Post-Intervention 1: 3/10 at L lateral hip with standing/walking    Cognitive Exam:  Patient is oriented to Person, Place, Time and Situation.  Patient follows 100% of single-step commands.    Sensation:   Intact    Lower Extremity Range of Motion:  Right Lower Extremity: WFL actively  Left Lower Extremity: WFL actively; pain with hip flex    Lower Extremity Strength:  Right Lower Extremity: WFL  Left Lower Extremity: grossly 3+/5 via MMT, limited due to pain    Functional Mobility:    · Bed Mobility:  · Supine to Sitting: SBA with use of bed rail    · Transfers:  · Sit to Stand: Stand-by assist from EOB with no AD x 1 trial(s)    · Gait:  · ~100 feet in room and hallways (wearing mask) with rolling walker and stand-by assistance, on room air. Gait is steady without any losses of balance today    · Assist level: Stand-By Assist  · Device: Rolling walker    · Balance:  · Static Sit: Independent at EOB    · Static Stand: Stand-By Assist with Rolling walker    Additional Therapeutic Activity/Exercises:     1. Participates in standing ADL's at sink with stand-by assist, OOBTC  comfortable with  present at end of session.    2. Discussed PT role, POC, goals and recommendations (Home with HH PT/OT, no DME needs; recommend increased family supervision upon return home as she typically lives alone) with patient; verbalized understanding    3. Whiteboard was updated.    AM-PAC 6 CLICK MOBILITY  Turning over in bed (including adjusting bedclothes, sheets and blankets)?: 4  Sitting down on and standing up from a chair with arms (e.g., wheelchair, bedside commode, etc.): 3  Moving from lying on back to sitting on the side of the bed?: 4  Moving to and from a bed to a chair (including a wheelchair)?: 3  Need to walk in hospital room?: 3  Climbing 3-5 steps with a railing?: 2  Basic Mobility Total Score: 19    Patient was left reclined in bedside chair with all lines intact, call button in reach and  present.    Clinical Decision Making for Evaluation Complexity:  1. Body System(s) Examination: 1-2  2. Clinical Presentation: Evolving  3. Evaluation Complexity: Low    GOALS:   Multidisciplinary Problems     Physical Therapy Goals        Problem: Physical Therapy Goal    Goal Priority Disciplines Outcome Goal Variances Interventions   Physical Therapy Goal     PT, PT/OT      Description: Goals to be met by: 20     Patient will increase functional independence with mobility by performin. Supine to sit with [place] - Not met  2. Sit to supine with [place] - Not met  3. Sit to stand transfer with Modified [place] with RW - Not met  4. Gait  x 200 feet with Supervision using Rolling Walker - Not met  5. Lower extremity exercise program x 20 reps per handout, with independence - Not met                 Tyler Sotelo, PT  2020

## 2020-06-29 NOTE — SUBJECTIVE & OBJECTIVE
"Principal Problem:Closed pelvic ring fracture    Principal Orthopedic Problem: same    Interval History: Patient seen and examined at bedside. No acute events overnight. She had some HTN issues yesterday that is likely related to pain. She is otherwise doing well. To work with PT today and follow their recs.     Review of patient's allergies indicates:   Allergen Reactions    Sulfa (sulfonamide antibiotics)      Unknown as child    Amlodipine Swelling    Codeine      Other reaction(s): Unknown    Maxzide [triamterene-hydrochlorothiazid]        Current Facility-Administered Medications   Medication    acetaminophen tablet 1,000 mg    acetaminophen tablet 650 mg    atorvastatin tablet 40 mg    carvediloL tablet 12.5 mg    citalopram tablet 20 mg    dextrose 50 % in water (D50W) injection 12.5 g    dextrose 50 % in water (D50W) injection 25 g    glucagon (human recombinant) injection 1 mg    glucose chewable tablet 16 g    glucose chewable tablet 24 g    hydrALAZINE tablet 25 mg    HYDROcodone-acetaminophen 5-325 mg per tablet 1 tablet    melatonin tablet 6 mg    methocarbamoL tablet 500 mg    ondansetron injection 8 mg    pantoprazole EC tablet 40 mg    pregabalin capsule 75 mg    promethazine (PHENERGAN) 25 mg in dextrose 5 % 50 mL IVPB    senna-docusate 8.6-50 mg per tablet 1 tablet    sodium chloride 0.9% flush 5 mL    traMADoL tablet 50 mg    zolpidem tablet 5 mg     Objective:     Vital Signs (Most Recent):  Temp: 96.9 °F (36.1 °C) (06/29/20 0725)  Pulse: (!) 58 (06/29/20 0725)  Resp: 16 (06/29/20 0725)  BP: (!) 192/82 (06/29/20 0725)  SpO2: 97 % (06/29/20 0725) Vital Signs (24h Range):  Temp:  [96.9 °F (36.1 °C)-98.6 °F (37 °C)] 96.9 °F (36.1 °C)  Pulse:  [57-68] 58  Resp:  [16-20] 16  SpO2:  [90 %-100 %] 97 %  BP: (146-192)/(60-82) 192/82     Weight: 58.5 kg (129 lb)  Height: 5' 4" (162.6 cm)  Body mass index is 22.14 kg/m².      Intake/Output Summary (Last 24 hours) at 6/29/2020 " 1013  Last data filed at 6/29/2020 0542  Gross per 24 hour   Intake 996.67 ml   Output 925 ml   Net 71.67 ml       Ortho/SPM Exam  LLE:  Skin intact throughout  No swelling  No ecchymosis  No tenderness to palpation of proximal, middle, or distal aspects of femur, tibia, fibula  No tenderness to palpation of foot  Compartments soft and compressible  Painless ROM at knee, and ankle  Pelvic pain with ROM at hip  SILT T/SP/DP/Salguero/Sa   Motor intact T/SP/DP   2+ PT/DP pulses   Brisk capillary refill      Significant Labs: All pertinent labs within the past 24 hours have been reviewed.    Significant Imaging: I have reviewed all pertinent imaging results/findings.

## 2020-06-29 NOTE — PLAN OF CARE
06/29/20 1237   Post-Acute Status   Post-Acute Authorization Home Health   Home Health Status Referrals Sent       Nicky Hernandez LMSW  [facility]  [id number]

## 2020-06-29 NOTE — PLAN OF CARE
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       Future Appointments   Date Time Provider Department Center   7/6/2020  9:20 AM Memo Ceballos II, MD [facility] IM Ruben Moyer PCW   7/9/2020 10:00 AM Davidson Cartagena MD [facility] CARDIO Ruben Moyer           I have seen and examined this patient face to face today. My clinical findings that support the need for the home health skilled services and home bound status are the following:  Weakness/numbness causing balance and gait disturbance due to Fracture and Weakness/Debility making it taxing to leave home.  Requiring assistive device to leave home due to unsteady gait caused by  Fracture and Weakness/Debility.    Allergies:  Review of patient's allergies indicates:   Allergen Reactions    Sulfa (sulfonamide antibiotics)      Unknown as child    Amlodipine Swelling    Codeine      Other reaction(s): Unknown    Maxzide [triamterene-hydrochlorothiazid]        Diet: cardiac diet    Activities: activity as tolerated    Nursing:   SN to complete comprehensive assessment including routine  vital signs. Instruct on disease process and s/s of complications to report to MD. Review/verify medication list sent home with the patient at time of discharge  and instruct patient/caregiver as needed. Frequency may be adjusted depending on start of care date.    Notify MD if SBP > 160 or < 90; DBP > 90 or < 50; HR > 120 or < 50; Temp > 101; Other:         CONSULTS:    Physical Therapy to evaluate and treat. Evaluate for home safety and equipment needs; Establish/upgrade home exercise program. Perform / instruct on therapeutic exercises, gait training, transfer training, and Range of Motion.  Occupational Therapy to evaluate and treat. Evaluate home environment for safety and equipment needs. Perform/Instruct on transfers, ADL training, ROM, and therapeutic exercises.  Aide to provide assistance with personal care, ADLs, and vital signs.    MISCELLANEOUS CARE:  Routine Skin for Bedridden Patients: Instruct patient/caregiver to apply moisture barrier cream to all skin folds and wet areas in perineal area daily and after baths and all bowel movements.    WOUND CARE ORDERS  n/a      Medications: Review discharge medications with patient and family and provide education.      Current Discharge Medication List      START taking these medications    Details   acetaminophen (TYLENOL) 500 MG tablet Take 1 tablet (500 mg total) by mouth 3 (three) times daily.  Refills: 0      hydrALAZINE (APRESOLINE) 25 MG tablet Take 1 tablet (25 mg total) by mouth every 8 (eight) hours.  Qty: 90 tablet, Refills: 2    Comments: .      HYDROcodone-acetaminophen (NORCO) 5-325 mg per tablet Take 1 tablet by mouth every 4 (four) hours as needed.  Qty: 15 tablet, Refills: 0    Comments: Quantity prescribed more than 7 day supply? No      methocarbamoL (ROBAXIN) 500 MG Tab Take 1 tablet (500 mg total) by mouth 4 (four) times daily.  Qty: 60 tablet, Refills: 1      pregabalin (LYRICA) 75 MG capsule Take 1 capsule (75 mg total) by mouth every  evening.  Qty: 30 capsule, Refills: 1      senna-docusate 8.6-50 mg (PERICOLACE) 8.6-50 mg per tablet Take 1 tablet by mouth 2 (two) times daily as needed for Constipation.  Qty: 30 tablet, Refills: 0      traMADoL (ULTRAM) 50 mg tablet Take 1 tablet (50 mg total) by mouth every 6 (six) hours as needed.  Qty: 40 tablet, Refills: 0    Comments: Quantity prescribed more than 7 day supply? No         CONTINUE these medications which have NOT CHANGED    Details   aspirin (ECOTRIN) 81 MG EC tablet Take 81 mg by mouth once daily.      atorvastatin (LIPITOR) 40 MG tablet TAKE 1 TABLET EVERY DAY OR AS DIRECTED  Qty: 90 tablet, Refills: 3      calcium-magnesium-zinc Tab Take 2 tablets by mouth once daily at 6am. 1 Tablet Oral Every day      carvediloL (COREG) 6.25 MG tablet Take 2 tablets (12.5 mg total) by mouth 2 (two) times daily with meals.  Qty: 120 tablet, Refills: 3    Comments: Needs follow up appt  Associated Diagnoses: HTN (hypertension), benign      citalopram (CELEXA) 20 MG tablet TAKE 1 TABLET ONE TIME DAILY  Qty: 90 tablet, Refills: 3    Associated Diagnoses: Situational stress      diclofenac sodium (VOLTAREN) 1 % Gel Apply 2 g topically 3 (three) times daily.  Qty: 100 g, Refills: 0    Associated Diagnoses: Fall, initial encounter      esomeprazole (NEXIUM) 40 MG capsule Take 1 capsule (40 mg total) by mouth before breakfast.  Qty: 90 capsule, Refills: 3    Associated Diagnoses: Chest pain due to GERD      mv-min-folic acid-lutein (THERAGRAN-M ADVANCED 50 PLUS) 0.4-250 mg-mcg Tab Take by mouth. 1 Tablet Oral Every day      nitroGLYCERIN (NITROSTAT) 0.4 MG SL tablet Place 1 tablet (0.4 mg total) under the tongue every 5 (five) minutes as needed for Chest pain.  Qty: 25 tablet, Refills: 3    Associated Diagnoses: Abnormal stress test      omega-3 fatty acids 1,000 mg Cap Take 1 capsule by mouth once daily. 2  Capsule Oral Every day      zolpidem (AMBIEN) 5 MG Tab Take 1 tablet (5 mg total) by mouth  nightly.  Qty: 30 tablet, Refills: 3    Associated Diagnoses: Insomnia, unspecified type         STOP taking these medications       azithromycin (Z-VINAYAK) 250 MG tablet Comments:   Reason for Stopping:               I certify that this patient is confined to her home and needs intermittent skilled nursing care, physical therapy and occupational therapy.

## 2020-06-29 NOTE — PT/OT/SLP EVAL
Occupational Therapy   Evaluation & Treatment     Name: Elen Peters  MRN: [id number]  Admitting Diagnosis:  Closed pelvic ring fracture      Recommendations:     Discharge Recommendations: home with home health  Discharge Equipment Recommendations:  none  Barriers to discharge:  None    Assessment:     Elen Peters is a 89 y.o. female with a medical diagnosis of Closed pelvic ring fracture.  She presents with impairments listed below. Pt did well to tolerate and participate in the session. Pt is functioning close to PLOF, but is still requiring increased assist for ADLs and mobility compared to baseline.  Pt displayed global deconditioning requiring increased assist for ADLs and mobility at this time. Pt would benefit from skilled OT services to improve independence and overall occupational functioning.     Performance deficits affecting function: weakness, impaired endurance, impaired self care skills, impaired functional mobilty, gait instability, decreased lower extremity function, pain.      Rehab Prognosis: Good; patient would benefit from acute skilled OT services to address these deficits and reach maximum level of function.       Plan:     Patient to be seen 3 x/week to address the above listed problems via self-care/home management, therapeutic activities, therapeutic exercises  · Plan of Care Expires: 07/29/20  · Plan of Care Reviewed with: patient    Subjective     Chief Complaint: No complaints   Patient/Family Comments/goals: return home.    Occupational Profile:  Living Environment: Pt lives on the 13th floor of a [employer] complex w/ elevator access.   Previous level of function: Indep for ADLs and mobility.   Roles and Routines: N/A  Equipment Used at Home:  shower chair, walker, rolling  Assistance upon Discharge: Pt has assistance upon D/C.     Pain/Comfort:  · Pain Rating 1: 0/10  · Location - Side 1: Left  · Location - Orientation 1: generalized  · Location 1: hip  · Pain Addressed 1: Distraction,  Reposition, Cessation of Activity  · Pain Rating Post-Intervention 1: 3/10    Patients cultural, spiritual, Anabaptism conflicts given the current situation:      Objective:     Communicated with: RN prior to session.  Patient found HOB elevated with bed alarm upon OT entry to room.    General Precautions: Standard, fall   Orthopedic Precautions:LLE weight bearing as tolerated   Braces: N/A     Occupational Performance:    Bed Mobility:    · Patient completed Scooting/Bridging with stand by assistance  · Patient completed Supine to Sit with stand by assistance    Functional Mobility/Transfers:  · Patient completed Sit <> Stand Transfer with contact guard assistance  with  rolling walker   · Patient completed Bed <> Chair Transfer using Step Transfer technique with stand by assistance with rolling walker  · Functional Mobility: Pt ambulated into the hallway and in the room at [facility] w/ RW.     Activities of Daily Living:  · Grooming: stand by assistance pt compelted hand and face hygiene while standing at the sink   · Lower Body Dressing: minimum assistance donned underwear and pants initially in sitting and compelted in standing    Cognitive/Visual Perceptual:  Cognitive/Psychosocial Skills:     -       Oriented to: Person, Place, Time and Situation   -       Follows Commands/attention:Follows multistep  commands  -       Communication: clear/fluent  -       Memory: No Deficits noted  -       Safety awareness/insight to disability: intact   -       Mood/Affect/Coping skills/emotional control: Appropriate to situation  Visual/Perceptual:      -Intact      Physical Exam:  Balance:    -       sba for ambualtion w/ RW  Postural examination/scapula alignment:    -       Rounded shoulders  Skin integrity: Visible skin intact  Upper Extremity Range of Motion:     -       Right Upper Extremity: WFL  -       Left Upper Extremity: WFL  Upper Extremity Strength:    -       Right Upper Extremity: WFL  -       Left Upper Extremity:  WFL   Strength:    -       Right Upper Extremity: WFL  -       Left Upper Extremity: WFL  Fine Motor Coordination:    -       Intact  Gross motor coordination:   WFL        Treatment & Education:  Pt educated on POC, LBD techniques, safety w/ ADLs, DME, and overall coordination of care.   Education:    Patient left up in chair with all lines intact and call button in reach    GOALS:   Multidisciplinary Problems     Occupational Therapy Goals        Problem: Occupational Therapy Goal    Goal Priority Disciplines Outcome Interventions   Occupational Therapy Goal     OT, PT/OT Ongoing, Progressing    Description: Goals to be met by: 7/5/2020     Patient will increase functional independence with ADLs by performing:    UE Dressing with Supervision.  LE Dressing with Supervision.  Grooming while standing at sink with Supervision.  Toileting from toilet with Supervision for hygiene and clothing management.   Toilet transfer to toilet with Supervision.                     History:     Past Medical History:   Diagnosis Date    Abnormal stress test 11/18/2015    Arthritis     Bladder cancer     Cataract     Coronary artery disease involving native coronary artery 1/6/2016    Depression     GERD (gastroesophageal reflux disease)     HTN (hypertension), benign 11/18/2015    Hx of bladder infections     Hyperlipemia     OP (osteoporosis)     Positive cardiac stress test 1/6/2016    Renal cancer     Syncope 1/6/2016    TMJ (dislocation of temporomandibular joint)     Upper back pain 12/1/2015    Ureteral cancer     Venous insufficiency 2017    Villous adenoma of colon 5/31/2013       Past Surgical History:   Procedure Laterality Date    APPENDECTOMY      BACK SURGERY      CATARACT EXTRACTION W/  INTRAOCULAR LENS IMPLANT Left 3/16/15    kristy    CATARACT EXTRACTION W/  INTRAOCULAR LENS IMPLANT Right 4/6/15    kristy    COLONOSCOPY  10/21/2013    CYSTOSCOPY      ESOPHAGOGASTRODUODENOSCOPY N/A 1/30/2019     Procedure: EGD (ESOPHAGOGASTRODUODENOSCOPY);  Surgeon: Diony Dey MD;  Location: [facility] ([address] [place] [address]);  Service: Endoscopy;  Laterality: N/A;    EYE SURGERY      NEPHRECTOMY      L    SPINE SURGERY      TONSILLECTOMY, ADENOIDECTOMY         Time Tracking:     OT Date of Treatment: 06/29/20  OT Start Time: 1050  OT Stop Time: 1110  OT Total Time (min): 20 min    Billable Minutes:Evaluation 12 minutes  Self Care/Home Management 8 minutes    Mateus Ashley, OT  6/29/2020

## 2020-06-29 NOTE — PLAN OF CARE
06/29/20 1035   Discharge Assessment   Assessment Type Discharge Planning Assessment   Confirmed/corrected address and phone number on facesheet? Yes   Assessment information obtained from? Patient   Prior to hospitilization cognitive status: Alert/Oriented   Prior to hospitalization functional status: Independent;Assistive Equipment  (Rolling  Walker)   Current cognitive status: Alert/Oriented   Current Functional Status: Independent;Assistive Equipment   Lives With alone   Able to Return to Prior Arrangements yes   Is patient able to care for self after discharge? Unable to determine at this time (comments)   Patient currently being followed by outpatient case management? No   Patient currently receives any other outside agency services? No   Equipment Currently Used at Home walker, rolling;shower chair   Do you have any problems affording any of your prescribed medications? No   Is the patient taking medications as prescribed? yes   Does the patient have transportation home? Yes   Transportation Anticipated family or friend will provide   Discharge Plan A Home;Home Health;Other  (Pending OT/PT assessment)   Discharge Plan B Rehab   DME Needed Upon Discharge  other (see comments)  (TBD)     Jim Treadwell MD   [address] [facility] [id number]       [id number] - [place], [place] - [address] AT [facility] [address]  [facility] [address]  Phone: [phone] Fax: [phone]    [facility] - [facility] [address] [facility]  [address] [facility] [address]  Phone: [phone] Fax: [phone]     Payor: HU[employer]NA MANAGED MEDICARE / Plan: HUMANA TOTAL CARE ADVANTAGE / Product Type: Medicare Advantage /     PCP F/U scheduled 7/6/20 @ 9:20 with Tucker UREÑA due to scheduling with Mile UREÑA

## 2020-06-29 NOTE — PLAN OF CARE
Elen Peters is a 89 y.o. female admitted to [facility] on 2020 for Closed pelvic ring fracture. Fracture deemed stable, no surgical intervention necessary. Elen Peters tolerated evaluation well today. She reports improving pain compared to prior week; no pain at rest, increases to 3/10 at L lateral hip with mobility and/or weightbearing. Able to stand from EOB with stand-by assist. Ambulates 100 ft in room and hallways (wearing mask) with rolling walker and stand-by assistance, on room air. Gait is steady without any losses of balance today. Participates in standing ADL's at sink with stand-by assist, OOBTC comfortable with  present at end of session. Discussed PT role, POC, goals and recommendations (Home with HH PT/OT, no DME needs; recommend increased family supervision upon return home as she typically lives alone) with patient; verbalized understanding. Elen Peters would benefit from acute PT services to promote mobility during this admission and improve return to PLOF.    Problem: Physical Therapy Goal  Goal: Physical Therapy Goal  Description: Goals to be met by: 20     Patient will increase functional independence with mobility by performin. Supine to sit with Crook - Not met  2. Sit to supine with Crook - Not met  3. Sit to stand transfer with Modified Crook with RW - Not met  4. Gait  x 200 feet with Supervision using Rolling Walker - Not met  5. Lower extremity exercise program x 20 reps per handout, with independence - Not met  Outcome: Ongoing, Progressing    Tyler Sotelo, PT  2020

## 2020-06-29 NOTE — PLAN OF CARE
Problem: Occupational Therapy Goal  Goal: Occupational Therapy Goal  Description: Goals to be met by: 7/5/2020     Patient will increase functional independence with ADLs by performing:    UE Dressing with Supervision.  LE Dressing with Supervision.  Grooming while standing at sink with Supervision.  Toileting from toilet with Supervision for hygiene and clothing management.   Toilet transfer to toilet with Supervision.    Outcome: Ongoing, Progressing    Mateus Ashley OTR/L  6/29/2020

## 2020-06-29 NOTE — PLAN OF CARE
Patient accepted to [facility].       06/29/20 3841   Post-Acute Status   Post-Acute Authorization Home Health   Home Health Status Set-up Complete       Nicky Hernandez LMSW  [facility]  [id number]

## 2020-06-29 NOTE — PROGRESS NOTES
Hospital Medicine  Progress Note  [facility]      Patient Name: Elen Peters  MRN:  [id number]  Hospital Medicine Team: [facility]OS[facility] MED K Tawnya Webster MD  Date of Admission:  6/28/2020     Principal Problem:  Closed pelvic ring fracture   Primary Care Physician: Jim Treadwell MD       History of Present Illness:    Ms. Elen Peters is a 89 y.o. female with osteoporosis, who presents to the ER for evaluation of a fall.  She mentions that while sweeping the porch on 6/24, she had a fall landing on her left hip and left elbow. She went to an Urgent Care the next day, where she had XR that were negative.  She was discharged home with Tylenol and Voltaren gel with no relief in her hip pain.  Since then, she has had decreased ambulation even with a walker.  While attempting to ambulate, she had a near fall so family called EMS.  Upon arrival to the ER, imaging was notable for an acute, nondisplaced left superior pubic ramus abutting the left acetabulum, left pubic symphysis fracture, and left sacral insufficiency fracture with a possible hematoma.  She was evaluated by Ortho, who deemed her to be WBAT and to admit to Hospital Medicine for PT/OT evaluation.        Review of Systems:  Constitutional: Negative for chills, fever, fatigue, weakness  HENT: Negative for sore throat, trouble swallowing.    Eyes: Negative for photophobia, visual disturbance.   Respiratory: Negative for cough, shortness of breath.    Cardiovascular: Negative for chest pain, palpitations, leg swelling.   Gastrointestinal: Negative for abdominal pain, nausea, vomiting, diarrhea, constipation  Endocrine: Negative for cold intolerance, heat intolerance.   Genitourinary: Negative for dysuria, frequency.   Musculoskeletal: + hip pain  Skin: Negative for rash, wound, erythema   Neurological: Negative for numbness, paresthesias  Psychiatric/Behavioral: Negative for confusion, hallucinations, anxiety  All other systems reviewed  and are negative.      Interval History:    She reports sleeping okay without pain, really feels pain when she gets up and moves around prior to admit. BP ws elevated, on chart review cheryl has been on hydralazine and aldactone in the past and has done home monitoring via a remote system that has noted many spikes at home at times, other times has been 120s with pcp. She reports it spikes when she is stressed at home in the past to the home BP monitoring Nurses. Will add hydralazine now as has tolerated in the past, suspect pain is probaly at play also so will ttirate this med as needs today, discussed with her. Added robaxin for better control today, has abhay tylenol, and tramadol for moderate prn and norco for severe PRNs. No surgical plans per ortho team.   Ordered iS again for some decreaed o2 sats, she denies symptoms of it. CXR showing some chronic scarring and atelectasis,  but patient does not appear overlodaed on exam, denies dyspnea, has some diastolic dysfunction on echo years ago but not on diuretics, no signs on exam. If has further issues, may consider lasix x 1 or echo, but suspect using IS for atelctaiss will help. She said shes not taking any deep beaths because her whole pelvis huts, likely is splinting    dispo pending pain control and PT recs, likely will watch today and overnight again I suspect to gain control after PT sessions, if she is a HH candidate and pain is controlled after a session may be ready tomorrow buf if has issues with either, may need to readdrses later post PT or if needs a -SNF        Past Medical History: Patient has a past medical history of Abnormal stress test (11/18/2015), Arthritis, Bladder cancer, Cataract, Coronary artery disease involving native coronary artery (1/6/2016), Depression, GERD (gastroesophageal reflux disease), HTN (hypertension), benign (11/18/2015), bladder infections, Hyperlipemia, OP (osteoporosis), Positive cardiac stress test (1/6/2016),  Renal cancer, Syncope (1/6/2016), TMJ (dislocation of temporomandibular joint), Upper back pain (12/1/2015), Ureteral cancer, Venous insufficiency (2017), and Villous adenoma of colon (5/31/2013).      Past Surgical History: Patient has a past surgical history that includes Nephrectomy; TONSILLECTOMY, ADENOIDECTOMY; Back surgery; Appendectomy; Colonoscopy (10/21/2013); Cystoscopy; Cataract extraction w/  intraocular lens implant (Left, 3/16/15); Cataract extraction w/  intraocular lens implant (Right, 4/6/15); Eye surgery; Spine surgery; and Esophagogastroduodenoscopy (N/A, 1/30/2019).      Social History: Patient reports that she quit smoking about 71 years ago. She has never used smokeless tobacco. She reports that she does not drink alcohol or use drugs.      Family History: Patient's family history includes Cancer in her father, mother, and son; Goiter in her mother; Heart attack in her mother; Heart disease in her maternal grandfather and mother; Leukemia in her father and mother; No Known Problems in her brother, maternal aunt, maternal grandmother, maternal uncle, paternal aunt, paternal grandfather, paternal grandmother, paternal uncle, sister, son, son, son, son, and son.      Medications: Scheduled Meds:   acetaminophen  1,000 mg Oral TID    atorvastatin  40 mg Oral Daily    carvediloL  12.5 mg Oral BID WM    citalopram  20 mg Oral Daily    hydrALAZINE  25 mg Oral Q8H    methocarbamoL  500 mg Oral QID    pantoprazole  40 mg Oral Daily    pregabalin  75 mg Oral QHS    zolpidem  5 mg Oral Nightly     Continuous Infusions:    PRN Meds:.acetaminophen, dextrose 50 % in water (D50W), dextrose 50 % in water (D50W), glucagon (human recombinant), glucose, glucose, HYDROcodone-acetaminophen, melatonin, ondansetron, promethazine (PHENERGAN) IVPB, senna-docusate 8.6-50 mg, sodium chloride 0.9%, traMADoL      Allergies: Patient is allergic to sulfa (sulfonamide antibiotics); amlodipine; codeine; and maxzide  [triamterene-hydrochlorothiazid].      Physical Exam:    Temp:  [96.9 °F (36.1 °C)-98.6 °F (37 °C)]   Pulse:  [57-68]   Resp:  [16-20]   BP: (146-192)/(60-82)   SpO2:  [90 %-100 %]     Constitutional: Appears well developed and well nourished.   Head: Normocephalic and atraumatic.   Mouth/Throat: Oropharynx is clear and moist.   Eyes: EOM are normal. Pupils are equal, round, and reactive to light. No scleral icterus.   Neck: Normal range of motion. Neck supple.   Cardiovascular: Normal heart rate.  Regular heart rhythm.  No murmur heard.  Pulmonary/Chest: Effort normal. No respiratory distress. No wheezes, rales, or rhonchi. On oxygen. Splinting at times from pain in pelvis, shallow breaths. No increased WOB  Abdominal: Soft. Bowel sounds are normal.  No distension.  No tenderness  Musculoskeletal: Bruising to left elbow.  Bruising to left hip. Pain with deep palpaion, ROM, none at rest  Neurological: Alert and oriented to person, place, and time.   Skin: Skin is warm and dry.   Psychiatric: Normal mood and affect. Behavior is normal.         Intake/Output Summary (Last 24 hours) at 6/29/2020 1004  Last data filed at 6/29/2020 0542  Gross per 24 hour   Intake 996.67 ml   Output 925 ml   Net 71.67 ml     Recent Labs   Lab 06/28/20  1334 06/29/20  0511   WBC 7.48 5.68   HGB 11.7* 10.6*   HCT 34.3* 32.3*   * 158     Recent Labs   Lab 06/28/20  1334 06/29/20  0511   * 131*   K 4.8 4.4   CL 96 100   CO2 24 25   BUN 13 10   CREATININE 0.7 0.7   GLU 98 92   CALCIUM 9.3 8.5*   MG  --  1.9   PHOS  --  3.2     Recent Labs   Lab 06/28/20  1334 06/29/20  0511   ALKPHOS 56 52*   ALT 33 25   AST 32 22   ALBUMIN 3.3* 2.9*   PROT 6.3 5.7*   BILITOT 0.8 0.6   INR 0.9  --       No results for input(s): LACTATE in the last 72 hours.   No results for input(s): CPK, CPKMB, MB, TROPONINI in the last 72 hours.      Assessment and Plan:    Ms. Elen Peters is a 89 y.o. female who presented to [facility] on 6/28/2020 with a  pelvic fracture    Left superior pubic ramus fracture  Left pubic symphysis fracture  Left sacral insufficiency fracture  Age Related Osteoporosis with Current Pathological Fracture  · Imaging notable for an acute, nondisplaced left superior pubic ramus abutting the left acetabulum, left pubic symphysis fracture, and left sacral insufficiency fracture with a possible hematoma  · Ortho consulted- non OP plans with WBAT   · WBAT  · PT/OT eval to assess safety, pain control, DME, HH vs SNF  · Pain control with Lyrica 75mg PO qHS, Tylenol 1g TID, and prn Norco, PRN tramadol for pain scals, schedule robaxin today  · Vit d at goal of 65    Hyponatremia  · Sodium 128 on admission, baseline in the 130s  · Could be 2/2 Celexa, but possibly from decreased PO intake as well, trial of IVF on admit with improvement to 131.   · Continue to trend, appears at euvolemia now    CAD  · Chronic and stable  · Continue Lipitor 40mg PO daily  · Continue Coreg 6.25mg PO BID  · Not on ARB  · Hold Aspirin with hematoma in case of surgery to trend Hg now, appears stable at 11, will resume for DC    HLD  · Chronic and stable  · Continue Lipitor 40mg PO daily    HTN  · Chronic and stable  · Continue Lipitor 40mg PO daily  · Continue Coreg 6.25mg PO BID  · -has been on hydralazine and aldactone in past and has spikes at home monitoring with nursing notes remote system saying its from stressors, will restart hydralazine for better control now, pain likely also contirbutor I suspect    GERD  · Chronic and stable  · Continue PPI    MDD  · Chronic and stable  · Continue Celexa 20mg PO daily    Acute hypoxemic respiratory failure  -likely splinting on exam, says isnt taking deep breaths because of pain on left side. On low flow 2-3L now  -IS ordered last night and again this Am as not at bedside to use, CXR with chronic scaring and atelectasis  - but looks euvolemic on exam, doesn't look like neds diuretics to me now, not on at home, will  trend her exam, titrate down as tolerates      Diet:  Low sodium  VTE PPx:  SCD  Goals of Care:  Full      Disposition:  Pending PT/OT recs, pain control  Discharge Needs:  TBD

## 2020-06-30 ENCOUNTER — NURSE TRIAGE (OUTPATIENT)
Dept: ADMINISTRATIVE | Facility: CLINIC | Age: 85
End: 2020-06-30

## 2020-06-30 VITALS
WEIGHT: 129 LBS | BODY MASS INDEX: 22.02 KG/M2 | RESPIRATION RATE: 16 BRPM | OXYGEN SATURATION: 92 % | TEMPERATURE: 98 F | HEIGHT: 64 IN | SYSTOLIC BLOOD PRESSURE: 182 MMHG | DIASTOLIC BLOOD PRESSURE: 79 MMHG | HEART RATE: 65 BPM

## 2020-06-30 LAB
ALBUMIN SERPL BCP-MCNC: 2.9 G/DL (ref 3.5–5.2)
ALP SERPL-CCNC: 54 U/L (ref 55–135)
ALT SERPL W/O P-5'-P-CCNC: 30 U/L (ref 10–44)
ANION GAP SERPL CALC-SCNC: 7 MMOL/L (ref 8–16)
AST SERPL-CCNC: 26 U/L (ref 10–40)
BASOPHILS # BLD AUTO: 0.07 K/UL (ref 0–0.2)
BASOPHILS NFR BLD: 1.3 % (ref 0–1.9)
BILIRUB SERPL-MCNC: 0.6 MG/DL (ref 0.1–1)
BUN SERPL-MCNC: 13 MG/DL (ref 8–23)
CALCIUM SERPL-MCNC: 8.8 MG/DL (ref 8.7–10.5)
CHLORIDE SERPL-SCNC: 102 MMOL/L (ref 95–110)
CO2 SERPL-SCNC: 25 MMOL/L (ref 23–29)
CREAT SERPL-MCNC: 0.7 MG/DL (ref 0.5–1.4)
DIFFERENTIAL METHOD: ABNORMAL
EOSINOPHIL # BLD AUTO: 0.3 K/UL (ref 0–0.5)
EOSINOPHIL NFR BLD: 5.7 % (ref 0–8)
ERYTHROCYTE [DISTWIDTH] IN BLOOD BY AUTOMATED COUNT: 13.6 % (ref 11.5–14.5)
EST. GFR  (AFRICAN AMERICAN): >60 ML/MIN/1.73 M^2
EST. GFR  (NON AFRICAN AMERICAN): >60 ML/MIN/1.73 M^2
GLUCOSE SERPL-MCNC: 88 MG/DL (ref 70–110)
HCT VFR BLD AUTO: 34.1 % (ref 37–48.5)
HGB BLD-MCNC: 11.1 G/DL (ref 12–16)
IMM GRANULOCYTES # BLD AUTO: 0.02 K/UL (ref 0–0.04)
IMM GRANULOCYTES NFR BLD AUTO: 0.4 % (ref 0–0.5)
LYMPHOCYTES # BLD AUTO: 1 K/UL (ref 1–4.8)
LYMPHOCYTES NFR BLD: 19.6 % (ref 18–48)
MAGNESIUM SERPL-MCNC: 1.9 MG/DL (ref 1.6–2.6)
MCH RBC QN AUTO: 32.1 PG (ref 27–31)
MCHC RBC AUTO-ENTMCNC: 32.6 G/DL (ref 32–36)
MCV RBC AUTO: 99 FL (ref 82–98)
MONOCYTES # BLD AUTO: 0.6 K/UL (ref 0.3–1)
MONOCYTES NFR BLD: 12.2 % (ref 4–15)
NEUTROPHILS # BLD AUTO: 3.2 K/UL (ref 1.8–7.7)
NEUTROPHILS NFR BLD: 60.8 % (ref 38–73)
NRBC BLD-RTO: 0 /100 WBC
PHOSPHATE SERPL-MCNC: 3 MG/DL (ref 2.7–4.5)
PLATELET # BLD AUTO: 183 K/UL (ref 150–350)
PMV BLD AUTO: 10.2 FL (ref 9.2–12.9)
POTASSIUM SERPL-SCNC: 4.6 MMOL/L (ref 3.5–5.1)
PROT SERPL-MCNC: 5.9 G/DL (ref 6–8.4)
RBC # BLD AUTO: 3.46 M/UL (ref 4–5.4)
SODIUM SERPL-SCNC: 134 MMOL/L (ref 136–145)
WBC # BLD AUTO: 5.26 K/UL (ref 3.9–12.7)

## 2020-06-30 PROCEDURE — 84100 ASSAY OF PHOSPHORUS: CPT | Mod: HCNC

## 2020-06-30 PROCEDURE — 94761 N-INVAS EAR/PLS OXIMETRY MLT: CPT | Mod: HCNC

## 2020-06-30 PROCEDURE — 80053 COMPREHEN METABOLIC PANEL: CPT | Mod: HCNC

## 2020-06-30 PROCEDURE — 25000003 PHARM REV CODE 250: Mod: HCNC | Performed by: HOSPITALIST

## 2020-06-30 PROCEDURE — 99217 PR OBSERVATION CARE DISCHARGE: ICD-10-PCS | Mod: HCNC,,, | Performed by: HOSPITALIST

## 2020-06-30 PROCEDURE — 99900035 HC TECH TIME PER 15 MIN (STAT): Mod: HCNC

## 2020-06-30 PROCEDURE — 99217 PR OBSERVATION CARE DISCHARGE: CPT | Mod: HCNC,,, | Performed by: HOSPITALIST

## 2020-06-30 PROCEDURE — 85025 COMPLETE CBC W/AUTO DIFF WBC: CPT | Mod: HCNC

## 2020-06-30 PROCEDURE — 94799 UNLISTED PULMONARY SVC/PX: CPT | Mod: HCNC

## 2020-06-30 PROCEDURE — 36415 COLL VENOUS BLD VENIPUNCTURE: CPT | Mod: HCNC

## 2020-06-30 PROCEDURE — G0378 HOSPITAL OBSERVATION PER HR: HCPCS | Mod: HCNC

## 2020-06-30 PROCEDURE — 83735 ASSAY OF MAGNESIUM: CPT | Mod: HCNC

## 2020-06-30 PROCEDURE — 27000221 HC OXYGEN, UP TO 24 HOURS: Mod: HCNC

## 2020-06-30 RX ORDER — ASPIRIN 81 MG/1
81 TABLET ORAL DAILY
Status: DISCONTINUED | OUTPATIENT
Start: 2020-06-30 | End: 2020-06-30 | Stop reason: HOSPADM

## 2020-06-30 RX ORDER — HYDRALAZINE HYDROCHLORIDE 25 MG/1
50 TABLET, FILM COATED ORAL EVERY 8 HOURS
Status: DISCONTINUED | OUTPATIENT
Start: 2020-06-30 | End: 2020-06-30 | Stop reason: HOSPADM

## 2020-06-30 RX ADMIN — CARVEDILOL 12.5 MG: 12.5 TABLET, FILM COATED ORAL at 09:06

## 2020-06-30 RX ADMIN — METHOCARBAMOL TABLETS 500 MG: 500 TABLET, COATED ORAL at 09:06

## 2020-06-30 RX ADMIN — ASPIRIN 81 MG: 81 TABLET, COATED ORAL at 12:06

## 2020-06-30 RX ADMIN — HYDRALAZINE HYDROCHLORIDE 50 MG: 25 TABLET, FILM COATED ORAL at 01:06

## 2020-06-30 RX ADMIN — PANTOPRAZOLE SODIUM 40 MG: 40 TABLET, DELAYED RELEASE ORAL at 09:06

## 2020-06-30 RX ADMIN — METHOCARBAMOL TABLETS 500 MG: 500 TABLET, COATED ORAL at 12:06

## 2020-06-30 RX ADMIN — HYDRALAZINE HYDROCHLORIDE 25 MG: 25 TABLET, FILM COATED ORAL at 05:06

## 2020-06-30 RX ADMIN — ACETAMINOPHEN 1000 MG: 500 TABLET ORAL at 09:06

## 2020-06-30 NOTE — PLAN OF CARE
06/30/20 1306   Final Note   Assessment Type Final Discharge Note   Anticipated Discharge Disposition Home-Health   What phone number can be called within the next 1-3 days to see how you are doing after discharge? [phone]   Right Care Referral Info   Post Acute Recommendation Home-care   Facility Name [facility]   Post-Acute Status   Post-Acute Authorization Home Health   Home Health Status Set-up Complete     Future Appointments   Date Time Provider Department Center   7/6/2020  9:20 AM Memo Ceballos II, MD [facility]   7/9/2020 10:00 AM Davidson Cartagena MD [facility]

## 2020-06-30 NOTE — PLAN OF CARE
Patient awake, alert and responsive.  Oriented x 4.  Vitals stable.   Able to use bedside commode with assistance.  Assisted patient with meals.  Complained of left hip and left arm pain, managed with scheduled and PRN pain medication.  Bed alarm on, call light in reach.  Reviewed plan of care with patient, verbalized understanding.

## 2020-06-30 NOTE — PLAN OF CARE
Problem: Fall Injury Risk  Goal: Absence of Fall and Fall-Related Injury  Outcome: Ongoing, Progressing     Problem: Adult Inpatient Plan of Care  Goal: Patient-Specific Goal (Individualization)  Outcome: Ongoing, Progressing     Problem: Adult Inpatient Plan of Care  Goal: Absence of Hospital-Acquired Illness or Injury  Outcome: Ongoing, Progressing     Problem: Adult Inpatient Plan of Care  Goal: Optimal Comfort and Wellbeing  Outcome: Ongoing, Progressing     Problem: Skin Injury Risk Increased  Goal: Skin Health and Integrity  Outcome: Ongoing, Progressing     Problem: Wound  Goal: Optimal Wound Healing  Outcome: Ongoing, Progressing    PT is AAOX4, no acute changes noted during shift, pt is up with max asst and is repositioned with asst q2, no skin breakdown noted, hourly rounds made during shift,  VSS. No falls noted. Fall precautions remain. Pain assessed. Pain controlled with PO meds. Pt resting comfortably in bed. Call light in reach. Will continue to monitor.

## 2020-06-30 NOTE — DISCHARGE SUMMARY
DISCHARGE SUMMARY  Hospital Medicine    [facility]: [facility]    Patient Name: Elen Peters  YOB: 1929    Admit Date: 6/28/2020    Discharge Date: 06/30/2020    Discharge Attending Physician: Tawnya Webster MD     Principal Diagnoses:  Active Hospital Problems    Diagnosis  POA    *Closed pelvic ring fracture [S32.810A]  Yes    Hyponatremia [E87.1]  Yes    Coronary artery disease involving native coronary artery [I25.10]  Yes    HTN (hypertension), benign [I10]  Yes    Depression, major, in remission [F32.5]  Yes     doing much better on Celexa-  now making managememt plans for ill       GERD (gastroesophageal reflux disease) [K21.9]  Yes    Mixed hyperlipidemia [E78.2]  Yes    Age-related osteoporosis with current pathological fracture [M80.00XA]  Yes      Resolved Hospital Problems   No resolved problems to display.       Discharged Condition: impoved     Interval history: reports pain controlled fairly well at rest, still has some in the left groin when she moves but tolerable, was able to move herself to C yesteray night without issue, she has some in the left arm too (bruises) and is splining, and in chest when she breaths deep, she is trying to do it more and use IS now, she had xrays at OSH of arm, shoulder also she states and all there was neg for fx, bruising on arm but able to move and ROM all without issue in LUE. Titrated off oxygen without issue now, discussed plan for pain control at home, robaxin, moderate pain tramadol and severe norco for her and abhay tylenol. She can take IS and we reviewed its use with nursing at bedside this AM also.  She called her family and we discussed plan, we planed for another PT/OT session and then discharge later today once that completed.  We put in ortho f/u for her, resumed ASA at home (home med)  She has lots of help at home, home sitters.  was set up for her yesterday.    Temp:  [96.2 °F (35.7 °C)-98.4 °F (36.9 °C)]   Pulse:   [59-70]   Resp:  [15-24]   BP: (147-182)/(67-81)   SpO2:  [91 %-98 %]        Constitutional: Appears well developed and well nourished.   Head: Normocephalic and atraumatic.   Mouth/Throat: Oropharynx is clear and moist.   Eyes: EOM are normal. Pupils are equal, round, and reactive to light. No scleral icterus.   Neck: Normal range of motion. Neck supple.   Cardiovascular: Normal heart rate.  Regular heart rhythm.  No murmur heard.  Pulmonary/Chest: Effort normal. No respiratory distress. No wheezes, rales, or rhonchi. On room air. Splinting at times from pain in pelvis, deeper breaths today No increased WOB  Abdominal: Soft. Bowel sounds are normal.  No distension.  No tenderness  Musculoskeletal: Bruising to left elbow, ROM left arm without pain normal..  Bruising to left hip. Pain with deep palpaion, ROM, none at rest  Neurological: Alert and oriented to person, place, and time.   Skin: Skin is warm and dry.   Psychiatric: Normal mood and affect. Behavior is normal.       HOSPITAL COURSE:      Initial Presentation:    Ms. Elen Peters is a 89 y.o. female with osteoporosis, who presents to the ER for evaluation of a fall.  She mentions that while sweeping the porch on 6/24, she had a fall landing on her left hip and left elbow. She went to an Urgent Care the next day, where she had XR that were negative.  She was discharged home with Tylenol and Voltaren gel with no relief in her hip pain.  Since then, she has had decreased ambulation even with a walker.  While attempting to ambulate, she had a near fall so family called EMS.  Upon arrival to the ER, imaging was notable for an acute, nondisplaced left superior pubic ramus abutting the left acetabulum, left pubic symphysis fracture, and left sacral insufficiency fracture with a possible hematoma.  She was evaluated by Ortho, who deemed her to be WBAT and to admit to Hospital Medicine for PT/OT evaluation.    Course of Principle Problem for Admission:    Left  superior pubic ramus fracture  Left pubic symphysis fracture  Left sacral insufficiency fracture  Age Related Osteoporosis with Current Pathological Fracture  · Imaging notable for an acute, nondisplaced left superior pubic ramus abutting the left acetabulum, left pubic symphysis fracture, and left sacral insufficiency fracture with a possible hematoma  · Ortho consulted- non OP plans with WBAT   · WBAT  · PT/OT- did well with pain control, hsa appropriate DME, hH PT/OT. Has supportive family, care at home, so safe for dc at home.   · Pain control with Lyrica 75mg PO qHS, Tylenol 1g TID, and prn Norco, PRN tramadol for pain scals, schedule robaxin. This regimen worked well, would continu for next month as fractures heal, bedside delivery on dc  · Vit d at goal of 65  · Ortho f/u referral placed    Other Medical Problems Addressed in the Hospital:    Hyponatremia  · Sodium 128 on admission, baseline in the 130s  · Could be 2/2 Celexa, but possibly from decreased PO intake as well, trial of IVF on admit with improvement to 131 and then 134 on day of discharge so likely more from oral intake, okay to cont celexa, stay hydrated on dc  · Repeat cmp OP with PCP to ensure stil at goals in future     CAD  · Chronic and stable  · Continue Lipitor 40mg PO daily  · Continue Coreg 6.25mg PO BID  · Held asa here for hematoma, hg stable, resume on dc     HLD  · Chronic and stable  · Continue Lipitor 40mg PO daily     HTN  · Chronic and stable  · Continue Lipitor 40mg PO daily  · Continue Coreg 6.25mg PO BID  · -has been on hydralazine and aldactone in past and has spikes at home monitoring with nursing notes remote system saying its from stressors, will restart hydralazine for better control now, pain likely also contirbutor I suspect  · BP stil a little higher here in 150s, pain likely at play so will dc on the hydralazine 25, can titrate at home if up when pain better or titrate off if only pain was contributing but given highs  at home monitoring system at home, may requiring this agent shes been on before added back again long term.     GERD  · Chronic and stable  · Continue PPI     MDD  · Chronic and stable  · Continue Celexa 20mg PO daily     Acute hypoxemic respiratory failure  -likely splinting on exam, says isnt taking deep breaths because of pain on left side. On low flow 2-3L now  -IS ordered last night and again this Am as not at bedside to use, CXR with chronic scaring and atelectasis  - but looks euvolemic on exam, doesn't look like neds diuretics to me now, not on at home, will trend her exam, titrate down as tolerates   6/30: day of discharge was off oxygen on exam, sats stable in 90s, likely is splinting, she has some rib pain and left arm pain from the falls (bruised, reports xrays at OSH neg and ROM wnl in LUE), using IS, improved, no symptoms. scontinue to use IS at home, and instructed on use by nursing      Consults: {ortho    Last CBC/BMP:    CBC/Anemia Labs: Coags:    Recent Labs   Lab 06/28/20  1334 06/29/20  0511 06/30/20  0602   WBC 7.48 5.68 5.26   HGB 11.7* 10.6* 11.1*   HCT 34.3* 32.3* 34.1*   * 158 183   MCV 95 98 99*   RDW 13.7 13.8 13.6    Recent Labs   Lab 06/28/20  1334   INR 0.9        Chemistries:   Recent Labs   Lab 06/28/20  1334 06/29/20  0511 06/30/20  0602   * 131* 134*   K 4.8 4.4 4.6   CL 96 100 102   CO2 24 25 25   BUN 13 10 13   CREATININE 0.7 0.7 0.7   CALCIUM 9.3 8.5* 8.8   PROT 6.3 5.7* 5.9*   BILITOT 0.8 0.6 0.6   ALKPHOS 56 52* 54*   ALT 33 25 30   AST 32 22 26   MG  --  1.9 1.9   PHOS  --  3.2 3.0              Special Treatments/Procedures:   * No surgery found *     Disposition: Home-Health Care [facility]      Future Scheduled Appointments:  Future Appointments   Date Time Provider Department Center   7/6/2020  9:20 AM Memo Ceballos II, MD [facility] IM [facility] PCW   7/9/2020 10:00 AM Davidson Cartagena MD [facility] CARDIO [facility]         Discharge Medication List:       Elen Peters    Home Medication Instructions [facility]:[id number]    Printed on:06/30/20 1143   Medication Information                      acetaminophen (TYLENOL) 500 MG tablet  Take 1 tablet (500 mg total) by mouth 3 (three) times daily.             aspirin (ECOTRIN) 81 MG EC tablet  Take 81 mg by mouth once daily.             atorvastatin (LIPITOR) 40 MG tablet  TAKE 1 TABLET EVERY DAY OR AS DIRECTED             calcium-magnesium-zinc Tab  Take 2 tablets by mouth once daily at 6am. 1 Tablet Oral Every day             carvediloL (COREG) 6.25 MG tablet  Take 2 tablets (12.5 mg total) by mouth 2 (two) times daily with meals.             citalopram (CELEXA) 20 MG tablet  TAKE 1 TABLET ONE TIME DAILY             diclofenac sodium (VOLTAREN) 1 % Gel  Apply 2 g topically 3 (three) times daily.             esomeprazole (NEXIUM) 40 MG capsule  Take 1 capsule (40 mg total) by mouth before breakfast.             hydrALAZINE (APRESOLINE) 25 MG tablet  Take 1 tablet (25 mg total) by mouth every 8 (eight) hours.             HYDROcodone-acetaminophen (NORCO) 5-325 mg per tablet  Take 1 tablet by mouth every 4 (four) hours as needed.             methocarbamoL (ROBAXIN) 500 MG Tab  Take 1 tablet (500 mg total) by mouth 4 (four) times daily.             mv-min-folic acid-lutein (THERAGRAN-M ADVANCED 50 PLUS) 0.4-250 mg-mcg Tab  Take by mouth. 1 Tablet Oral Every day             nitroGLYCERIN (NITROSTAT) 0.4 MG SL tablet  Place 1 tablet (0.4 mg total) under the tongue every 5 (five) minutes as needed for Chest pain.             omega-3 fatty acids 1,000 mg Cap  Take 1 capsule by mouth once daily. 2  Capsule Oral Every day             pregabalin (LYRICA) 75 MG capsule  Take 1 capsule (75 mg total) by mouth every evening.             senna-docusate 8.6-50 mg (PERICOLACE) 8.6-50 mg per tablet  Take 1 tablet by mouth 2 (two) times daily as needed for Constipation.             traMADoL (ULTRAM) 50 mg tablet  Take 1 tablet (50 mg total) by mouth every 6  (six) hours as needed.             zolpidem (AMBIEN) 5 MG Tab  Take 1 tablet (5 mg total) by mouth nightly.                 Patient Instructions:  Discharge Procedure Orders   Ambulatory referral/consult to Orthopedics   Standing Status: Future   Referral Priority: Routine Referral Type: Consultation   Requested Specialty: Orthopedic Surgery   Number of Visits Requested: 1     Notify your health care provider if you experience any of the following:  severe uncontrolled pain     Notify your health care provider if you experience any of the following:  difficulty breathing or increased cough     Activity as tolerated       At the time of discharge patient was told to take all medications as prescribed, to keep all followup appointments, and to call their primary care physician or return to the emergency room if they have any worsening or concerning symptoms.    Signing Physician:  Tawnya Webster MD

## 2020-07-01 ENCOUNTER — TELEPHONE (OUTPATIENT)
Dept: INTERNAL MEDICINE | Facility: CLINIC | Age: 85
End: 2020-07-01

## 2020-07-01 PROCEDURE — G0180 MD CERTIFICATION HHA PATIENT: HCPCS | Mod: ,,, | Performed by: HOSPITALIST

## 2020-07-01 PROCEDURE — G0180 PR HOME HEALTH MD CERTIFICATION: ICD-10-PCS | Mod: ,,, | Performed by: HOSPITALIST

## 2020-07-01 NOTE — TELEPHONE ENCOUNTER
Reason for Disposition   Caller has medication question, adult has minor symptoms, caller declines triage, AND triager answers question    Protocols used: MEDICATION QUESTION CALL-A-AH    Elen hasn't been able to fill her hydrocodone for pain yet. She had a fall with hip fracture (no surgery). She is wondering if she can take advil. Advised as long as she has never been told she wasn't supposed to take advil she can take it to help with pain control. She will fill her pain medications written for her tomorrow.

## 2020-07-01 NOTE — TELEPHONE ENCOUNTER
----- Message from Phu Michaels sent at 7/1/2020 11:31 AM CDT -----  Regarding: Rx refill  Contact: [facility] [phone]  Pharmacy is calling to clarify an RX.  RX name:  traMADoL (ULTRAM) 50 mg tablet  What do they need to clarify:  needing clarification on Rx   Comments:     Please call an advise  Thank you

## 2020-07-01 NOTE — PLAN OF CARE
Patient's VSS, AAO with reorientation throughout shift of POC. Meds tolerated well, and pain relief with Tylenol and positional changes, up @ bedside commode with assistance and encouragement. Sammy Dsouza arrived and with patient @ 1015, discharged paperwork reviewed with patient and sitter@ bedside, verbalized understanding. IV removed, catheter intact with no complications. HH set up with CM, family notified from attending provider. Patient discharged with sitter via wheelchair with transport.

## 2020-07-06 NOTE — PROGRESS NOTES
Subjective:   Patient ID:  Elen Peters is a 89 y.o. female who presents for follow-up of CVD    HPI:The patient is here for CAD risk factors. I was her late 's doc sylvia johnson and friends with sons.    The patient has no chest pain, SOB, TIA, palpitations, syncope or pre-syncope.Patient currently exercises many times per week before fall 2 weeks ago.BP mostly 140-160 occasionally 130 but never 120s.        Review of Systems   Constitution: Negative for chills, decreased appetite, diaphoresis, fever, malaise/fatigue, night sweats, weight gain and weight loss.   HENT: Negative for congestion, hoarse voice, nosebleeds, sore throat and tinnitus.    Eyes: Negative for blurred vision, double vision, vision loss in left eye, vision loss in right eye, visual disturbance and visual halos.   Cardiovascular: Negative for chest pain, claudication, cyanosis, dyspnea on exertion, irregular heartbeat, leg swelling, near-syncope, orthopnea, palpitations, paroxysmal nocturnal dyspnea and syncope.   Respiratory: Negative for cough, hemoptysis, shortness of breath, sleep disturbances due to breathing, snoring, sputum production and wheezing.    Endocrine: Negative for cold intolerance, heat intolerance, polydipsia, polyphagia and polyuria.   Hematologic/Lymphatic: Negative for adenopathy and bleeding problem. Does not bruise/bleed easily.   Skin: Negative for color change, dry skin, flushing, itching, nail changes, poor wound healing, rash, skin cancer, suspicious lesions and unusual hair distribution.   Musculoskeletal: Negative for arthritis, back pain, falls, gout, joint pain, joint swelling, muscle cramps, muscle weakness, myalgias and stiffness.   Gastrointestinal: Negative for abdominal pain, anorexia, change in bowel habit, constipation, diarrhea, dysphagia, heartburn, hematemesis, hematochezia, melena and vomiting.   Genitourinary: Negative for decreased libido, dysuria, hematuria, hesitancy and urgency.  "  Neurological: Negative for excessive daytime sleepiness, dizziness, focal weakness, headaches, light-headedness, loss of balance, numbness, paresthesias, seizures, sensory change, tremors, vertigo and weakness.   Psychiatric/Behavioral: Negative for altered mental status, depression, hallucinations, memory loss, substance abuse and suicidal ideas. The patient does not have insomnia and is not nervous/anxious.    Allergic/Immunologic: Negative for environmental allergies and hives.       Objective: BP (!) 148/67 (BP Location: Left arm, Patient Position: Sitting, BP Method: Medium (Automatic))   Pulse 63   Ht 5' 3" (1.6 m)   Wt 58.6 kg (129 lb 3 oz)   BMI 22.88 kg/m²      Physical Exam   Constitutional: She is oriented to person, place, and time. She appears well-developed and well-nourished.   HENT:   Head: Normocephalic.   Eyes: Pupils are equal, round, and reactive to light. EOM are normal.   Neck: Normal range of motion. Normal carotid pulses, no hepatojugular reflux and no JVD present. Carotid bruit is not present. No thyromegaly present.   Cardiovascular: Normal rate, regular rhythm, normal heart sounds and intact distal pulses. Exam reveals no gallop and no friction rub.   No murmur heard.  Pulmonary/Chest: Effort normal and breath sounds normal. No tachypnea. No respiratory distress. She has no wheezes. She has no rales. She exhibits no tenderness.   Abdominal: Soft. Bowel sounds are normal. She exhibits no distension and no mass. There is no abdominal tenderness. There is no rebound and no guarding.   Musculoskeletal: Normal range of motion.         General: No tenderness or edema.   Lymphadenopathy:     She has no cervical adenopathy.   Neurological: She is alert and oriented to person, place, and time. No cranial nerve deficit. Coordination normal.   Skin: Skin is warm. No rash noted. No erythema.   Psychiatric: She has a normal mood and affect. Her behavior is normal. Judgment and thought content " normal.       Assessment:     1. HTN (hypertension), benign    2. Mixed hyperlipidemia    3. Nonspecific abnormal electrocardiogram (ECG) (EKG)    4. Coronary artery disease involving native coronary artery of native heart without angina pectoris    5. Aortic atherosclerosis    6. Gait instability    7. Gastroesophageal reflux disease without esophagitis        Plan:   Discussed diet , achieving and maintaining ideal body weight, and exercise.   We reviewed meds in detail.  Reassured-Discussed goals, options plan  Add 2.5 mg amlodipine ( either whole 2.5 or half of the 5 mg)    Elen was seen today for htn (hypertension), benign.    Diagnoses and all orders for this visit:    HTN (hypertension), benign  -     Lipid Panel; Future; Expected date: 04/09/2021  -     Comprehensive metabolic panel; Future; Expected date: 04/09/2021  -     TSH; Future; Expected date: 04/09/2021  -     EKG 12-lead; Future; Expected date: 04/09/2021    Mixed hyperlipidemia  -     Lipid Panel; Future; Expected date: 04/09/2021  -     Comprehensive metabolic panel; Future; Expected date: 04/09/2021  -     TSH; Future; Expected date: 04/09/2021  -     EKG 12-lead; Future; Expected date: 04/09/2021    Nonspecific abnormal electrocardiogram (ECG) (EKG)  -     EKG 12-lead; Future; Expected date: 04/09/2021    Coronary artery disease involving native coronary artery of native heart without angina pectoris  -     Lipid Panel; Future; Expected date: 04/09/2021  -     Comprehensive metabolic panel; Future; Expected date: 04/09/2021  -     TSH; Future; Expected date: 04/09/2021  -     EKG 12-lead; Future; Expected date: 04/09/2021    Aortic atherosclerosis    Gait instability    Gastroesophageal reflux disease without esophagitis    Other orders  -     amLODIPine (NORVASC) 5 MG tablet; Take 1 tablet (5 mg total) by mouth once daily. One-2 per day or as directed            Follow up in about 10 months (around 5/9/2021) for with ECG and labs.

## 2020-07-07 RX ORDER — HYDROCODONE BITARTRATE AND ACETAMINOPHEN 5; 325 MG/1; MG/1
1 TABLET ORAL EVERY 4 HOURS PRN
Qty: 15 TABLET | Refills: 0 | Status: SHIPPED | OUTPATIENT
Start: 2020-07-07 | End: 2020-07-14

## 2020-07-07 RX ORDER — HYDROCODONE BITARTRATE AND ACETAMINOPHEN 5; 325 MG/1; MG/1
1 TABLET ORAL EVERY 4 HOURS PRN
Qty: 15 TABLET | Refills: 0 | Status: SHIPPED | OUTPATIENT
Start: 2020-07-07 | End: 2020-07-07

## 2020-07-07 RX ORDER — POLYETHYLENE GLYCOL 3350 17 G/17G
17 POWDER, FOR SOLUTION ORAL DAILY
Qty: 30 PACKET | Refills: 1 | Status: SHIPPED | OUTPATIENT
Start: 2020-07-07 | End: 2022-07-14

## 2020-07-07 NOTE — PROGRESS NOTES
Patient called follow up nurse for hospital stay, said she did not fill her norco which was given and printed day of discharge with instructions for scheduled robaxin, scheduled tylenol with tramadol for moderate pain and norco for severe pain.   Will reorder to [facility] with token and put in glycolax for constipation  She should call PCP for these in future as I dont work every day and cant always see these reuests I advised home health advisors     yes

## 2020-07-09 ENCOUNTER — OFFICE VISIT (OUTPATIENT)
Dept: CARDIOLOGY | Facility: CLINIC | Age: 85
End: 2020-07-09
Payer: MEDICARE

## 2020-07-09 VITALS
SYSTOLIC BLOOD PRESSURE: 148 MMHG | BODY MASS INDEX: 22.89 KG/M2 | DIASTOLIC BLOOD PRESSURE: 67 MMHG | HEART RATE: 63 BPM | WEIGHT: 129.19 LBS | HEIGHT: 63 IN

## 2020-07-09 DIAGNOSIS — E78.2 MIXED HYPERLIPIDEMIA: ICD-10-CM

## 2020-07-09 DIAGNOSIS — R94.31 NONSPECIFIC ABNORMAL ELECTROCARDIOGRAM (ECG) (EKG): ICD-10-CM

## 2020-07-09 DIAGNOSIS — I10 HTN (HYPERTENSION), BENIGN: Primary | ICD-10-CM

## 2020-07-09 DIAGNOSIS — K21.9 GASTROESOPHAGEAL REFLUX DISEASE WITHOUT ESOPHAGITIS: ICD-10-CM

## 2020-07-09 DIAGNOSIS — I25.10 CORONARY ARTERY DISEASE INVOLVING NATIVE CORONARY ARTERY OF NATIVE HEART WITHOUT ANGINA PECTORIS: ICD-10-CM

## 2020-07-09 DIAGNOSIS — R26.81 GAIT INSTABILITY: ICD-10-CM

## 2020-07-09 DIAGNOSIS — I70.0 AORTIC ATHEROSCLEROSIS: ICD-10-CM

## 2020-07-09 PROCEDURE — 99999 PR PBB SHADOW E&M-EST. PATIENT-LVL III: ICD-10-PCS | Mod: PBBFAC,HCNC,, | Performed by: INTERNAL MEDICINE

## 2020-07-09 PROCEDURE — 99215 PR OFFICE/OUTPT VISIT, EST, LEVL V, 40-54 MIN: ICD-10-PCS | Mod: HCNC,S$GLB,, | Performed by: INTERNAL MEDICINE

## 2020-07-09 PROCEDURE — 1101F PT FALLS ASSESS-DOCD LE1/YR: CPT | Mod: HCNC,CPTII,S$GLB, | Performed by: INTERNAL MEDICINE

## 2020-07-09 PROCEDURE — 99215 OFFICE O/P EST HI 40 MIN: CPT | Mod: HCNC,S$GLB,, | Performed by: INTERNAL MEDICINE

## 2020-07-09 PROCEDURE — 1126F PR PAIN SEVERITY QUANTIFIED, NO PAIN PRESENT: ICD-10-PCS | Mod: HCNC,S$GLB,, | Performed by: INTERNAL MEDICINE

## 2020-07-09 PROCEDURE — 1126F AMNT PAIN NOTED NONE PRSNT: CPT | Mod: HCNC,S$GLB,, | Performed by: INTERNAL MEDICINE

## 2020-07-09 PROCEDURE — 99999 PR PBB SHADOW E&M-EST. PATIENT-LVL III: CPT | Mod: PBBFAC,HCNC,, | Performed by: INTERNAL MEDICINE

## 2020-07-09 PROCEDURE — 1101F PR PT FALLS ASSESS DOC 0-1 FALLS W/OUT INJ PAST YR: ICD-10-PCS | Mod: HCNC,CPTII,S$GLB, | Performed by: INTERNAL MEDICINE

## 2020-07-09 PROCEDURE — 1159F PR MEDICATION LIST DOCUMENTED IN MEDICAL RECORD: ICD-10-PCS | Mod: HCNC,S$GLB,, | Performed by: INTERNAL MEDICINE

## 2020-07-09 PROCEDURE — 1159F MED LIST DOCD IN RCRD: CPT | Mod: HCNC,S$GLB,, | Performed by: INTERNAL MEDICINE

## 2020-07-09 RX ORDER — AMLODIPINE BESYLATE 5 MG/1
5 TABLET ORAL DAILY
Qty: 90 TABLET | Refills: 3 | Status: SHIPPED | OUTPATIENT
Start: 2020-07-09 | End: 2021-06-29 | Stop reason: SDUPTHER

## 2020-07-09 NOTE — PATIENT INSTRUCTIONS
Discussed diet , achieving and maintaining ideal body weight, and exercise.   We reviewed meds in detail.  Reassured-Discussed goals, options plan  Add 2.5 mg amlodipine ( either whole 2.5 or half of the 5 mg) at nite--if leg swelling even with this, could use ACEI/ARB

## 2020-07-09 NOTE — LETTER
July 9, 2020      Angely Jones MD  [address]  [facility] [address]           [facility] - Cardiology  [address]  [place] [address]  Phone: [phone]          Patient: Elen Peters   MR Number: [id number]   YOB: 1929   Date of Visit: 7/9/2020       Dear Dr. Angely Jones:    Thank you for referring Elen Peters to me for evaluation. Attached you will find relevant portions of my assessment and plan of care.    If you have questions, please do not hesitate to call me. I look forward to following Elen Peters along with you.    Sincerely,    Davidson Cartagena MD    Enclosure  CC:  No Recipients    If you would like to receive this communication electronically, please contact [email or web address] or [phone] to request more information on Directly Link access.    For providers and/or their staff who would like to refer a patient to [facility], please contact us through our one-stop-shop provider referral line, [facility] , at [phone].    If you feel you have received this communication in error or would no longer like to receive these types of communications, please e-mail [email or web address]

## 2020-07-13 ENCOUNTER — TELEPHONE (OUTPATIENT)
Dept: ORTHOPEDICS | Facility: CLINIC | Age: 85
End: 2020-07-13

## 2020-07-13 NOTE — TELEPHONE ENCOUNTER
----- Message from Sammy Hay sent at 7/13/2020 12:12 PM CDT -----  Contact: [place]/pt caregiver  Please call [place] at [phone]    Patient caregiver is requesting an early appt time for 07/14/2020 (after 1030 am)    Thank you

## 2020-07-14 ENCOUNTER — EXTERNAL HOME HEALTH (OUTPATIENT)
Dept: HOME HEALTH SERVICES | Facility: HOSPITAL | Age: 85
End: 2020-07-14
Payer: MEDICARE

## 2020-07-14 ENCOUNTER — OFFICE VISIT (OUTPATIENT)
Dept: INTERNAL MEDICINE | Facility: CLINIC | Age: 85
End: 2020-07-14
Payer: MEDICARE

## 2020-07-14 ENCOUNTER — OFFICE VISIT (OUTPATIENT)
Dept: ORTHOPEDICS | Facility: CLINIC | Age: 85
End: 2020-07-14
Payer: MEDICARE

## 2020-07-14 ENCOUNTER — HOSPITAL ENCOUNTER (OUTPATIENT)
Dept: RADIOLOGY | Facility: HOSPITAL | Age: 85
Discharge: HOME | End: 2020-07-14
Attending: NURSE PRACTITIONER
Payer: MEDICARE

## 2020-07-14 VITALS
DIASTOLIC BLOOD PRESSURE: 60 MMHG | HEART RATE: 60 BPM | BODY MASS INDEX: 22.66 KG/M2 | SYSTOLIC BLOOD PRESSURE: 140 MMHG | OXYGEN SATURATION: 97 % | HEIGHT: 63 IN | WEIGHT: 127.88 LBS

## 2020-07-14 VITALS
WEIGHT: 127 LBS | RESPIRATION RATE: 18 BRPM | DIASTOLIC BLOOD PRESSURE: 66 MMHG | HEART RATE: 65 BPM | SYSTOLIC BLOOD PRESSURE: 135 MMHG | TEMPERATURE: 99 F | HEIGHT: 63 IN | BODY MASS INDEX: 22.5 KG/M2

## 2020-07-14 DIAGNOSIS — R10.2 VAGINAL PAIN: ICD-10-CM

## 2020-07-14 DIAGNOSIS — M25.552 PAIN OF LEFT HIP JOINT: ICD-10-CM

## 2020-07-14 DIAGNOSIS — K59.00 CONSTIPATION, UNSPECIFIED CONSTIPATION TYPE: ICD-10-CM

## 2020-07-14 DIAGNOSIS — R52 PAIN: ICD-10-CM

## 2020-07-14 DIAGNOSIS — I10 HTN (HYPERTENSION), BENIGN: ICD-10-CM

## 2020-07-14 DIAGNOSIS — S32.810D CLOSED PELVIC RING FRACTURE WITH ROUTINE HEALING, SUBSEQUENT ENCOUNTER: ICD-10-CM

## 2020-07-14 DIAGNOSIS — S32.810A CLOSED PELVIC RING FRACTURE, INITIAL ENCOUNTER: Primary | ICD-10-CM

## 2020-07-14 DIAGNOSIS — M80.00XD AGE-RELATED OSTEOPOROSIS WITH CURRENT PATHOLOGICAL FRACTURE WITH ROUTINE HEALING, SUBSEQUENT ENCOUNTER: Primary | ICD-10-CM

## 2020-07-14 DIAGNOSIS — E87.1 HYPONATREMIA: ICD-10-CM

## 2020-07-14 PROCEDURE — 99499 RISK ADDL DX/OHS AUDIT: ICD-10-PCS | Mod: HCNC,S$GLB,, | Performed by: INTERNAL MEDICINE

## 2020-07-14 PROCEDURE — 99214 PR OFFICE/OUTPT VISIT, EST, LEVL IV, 30-39 MIN: ICD-10-PCS | Mod: HCNC,S$GLB,, | Performed by: INTERNAL MEDICINE

## 2020-07-14 PROCEDURE — 1101F PR PT FALLS ASSESS DOC 0-1 FALLS W/OUT INJ PAST YR: ICD-10-PCS | Mod: HCNC,CPTII,S$GLB, | Performed by: NURSE PRACTITIONER

## 2020-07-14 PROCEDURE — 99999 PR PBB SHADOW E&M-EST. PATIENT-LVL V: ICD-10-PCS | Mod: PBBFAC,HCNC,, | Performed by: NURSE PRACTITIONER

## 2020-07-14 PROCEDURE — 1159F MED LIST DOCD IN RCRD: CPT | Mod: HCNC,S$GLB,, | Performed by: NURSE PRACTITIONER

## 2020-07-14 PROCEDURE — 72190 X-RAY EXAM OF PELVIS: CPT | Mod: 26,HCNC,, | Performed by: RADIOLOGY

## 2020-07-14 PROCEDURE — 1159F PR MEDICATION LIST DOCUMENTED IN MEDICAL RECORD: ICD-10-PCS | Mod: HCNC,S$GLB,, | Performed by: INTERNAL MEDICINE

## 2020-07-14 PROCEDURE — 99999 PR PBB SHADOW E&M-EST. PATIENT-LVL V: CPT | Mod: PBBFAC,HCNC,, | Performed by: NURSE PRACTITIONER

## 2020-07-14 PROCEDURE — 1101F PT FALLS ASSESS-DOCD LE1/YR: CPT | Mod: HCNC,CPTII,S$GLB, | Performed by: INTERNAL MEDICINE

## 2020-07-14 PROCEDURE — 99999 PR PBB SHADOW E&M-EST. PATIENT-LVL V: ICD-10-PCS | Mod: PBBFAC,HCNC,, | Performed by: INTERNAL MEDICINE

## 2020-07-14 PROCEDURE — 1101F PT FALLS ASSESS-DOCD LE1/YR: CPT | Mod: HCNC,CPTII,S$GLB, | Performed by: NURSE PRACTITIONER

## 2020-07-14 PROCEDURE — 99214 OFFICE O/P EST MOD 30 MIN: CPT | Mod: HCNC,S$GLB,, | Performed by: NURSE PRACTITIONER

## 2020-07-14 PROCEDURE — 1159F MED LIST DOCD IN RCRD: CPT | Mod: HCNC,S$GLB,, | Performed by: INTERNAL MEDICINE

## 2020-07-14 PROCEDURE — 1125F AMNT PAIN NOTED PAIN PRSNT: CPT | Mod: HCNC,S$GLB,, | Performed by: INTERNAL MEDICINE

## 2020-07-14 PROCEDURE — 99214 OFFICE O/P EST MOD 30 MIN: CPT | Mod: HCNC,S$GLB,, | Performed by: INTERNAL MEDICINE

## 2020-07-14 PROCEDURE — 1125F PR PAIN SEVERITY QUANTIFIED, PAIN PRESENT: ICD-10-PCS | Mod: HCNC,S$GLB,, | Performed by: NURSE PRACTITIONER

## 2020-07-14 PROCEDURE — 1159F PR MEDICATION LIST DOCUMENTED IN MEDICAL RECORD: ICD-10-PCS | Mod: HCNC,S$GLB,, | Performed by: NURSE PRACTITIONER

## 2020-07-14 PROCEDURE — 1101F PR PT FALLS ASSESS DOC 0-1 FALLS W/OUT INJ PAST YR: ICD-10-PCS | Mod: HCNC,CPTII,S$GLB, | Performed by: INTERNAL MEDICINE

## 2020-07-14 PROCEDURE — 99499 UNLISTED E&M SERVICE: CPT | Mod: HCNC,S$GLB,, | Performed by: INTERNAL MEDICINE

## 2020-07-14 PROCEDURE — 99214 PR OFFICE/OUTPT VISIT, EST, LEVL IV, 30-39 MIN: ICD-10-PCS | Mod: HCNC,S$GLB,, | Performed by: NURSE PRACTITIONER

## 2020-07-14 PROCEDURE — 1125F AMNT PAIN NOTED PAIN PRSNT: CPT | Mod: HCNC,S$GLB,, | Performed by: NURSE PRACTITIONER

## 2020-07-14 PROCEDURE — 72190 X-RAY EXAM OF PELVIS: CPT | Mod: TC,HCNC

## 2020-07-14 PROCEDURE — 99999 PR PBB SHADOW E&M-EST. PATIENT-LVL V: CPT | Mod: PBBFAC,HCNC,, | Performed by: INTERNAL MEDICINE

## 2020-07-14 PROCEDURE — 72190 XR PELVIS AP INLET AND OUTLET: ICD-10-PCS | Mod: 26,HCNC,, | Performed by: RADIOLOGY

## 2020-07-14 PROCEDURE — 1125F PR PAIN SEVERITY QUANTIFIED, PAIN PRESENT: ICD-10-PCS | Mod: HCNC,S$GLB,, | Performed by: INTERNAL MEDICINE

## 2020-07-14 RX ORDER — TRAMADOL HYDROCHLORIDE 50 MG/1
50 TABLET ORAL EVERY 8 HOURS PRN
Qty: 45 TABLET | Refills: 0 | Status: SHIPPED | OUTPATIENT
Start: 2020-07-14 | End: 2020-09-09

## 2020-07-14 NOTE — PROGRESS NOTES
Subjective:       Patient ID: Elen Peters is a 89 y.o. female.    Chief Complaint:   Hospital Follow Up    HPI - Pt of Dr. Treadwell's, upset that she's not seeing him!  She was admitted 6/28-6/30 after a fall with a hip fracture.  She also was noted to have hyponatremia that normalized during hospitalization.  Since discharge, she has had difficulty with pain.  Only taking tramadol once per day (bedtime) with tylenol.  Says that helps.  Her attendant expressed some frustration that she wasn't taking it more often. Sees ortho later today for hip fracture f/u. Has a bowel regimen and is not constipated right now.  She also has some vaginal pain without discharge.  No fevers.    PMH/Meds:  Reviewed and reconciled in EPIC with patient during visit today.    Review of Systems   Constitutional: Negative for fever.   HENT: Negative for congestion.    Respiratory: Negative for shortness of breath.    Cardiovascular: Negative for chest pain.   Gastrointestinal: Negative for abdominal pain.   Genitourinary: Positive for vaginal pain.   Musculoskeletal: Positive for arthralgias and gait problem.   Skin: Negative for rash.   Neurological: Negative for headaches.   Psychiatric/Behavioral: Negative for sleep disturbance.       Objective:      Physical Exam  Vitals signs and nursing note reviewed.   Constitutional:       Appearance: Normal appearance. She is normal weight.      Comments: Anxious woman, seated in wheelchair.  Attendant present   Neurological:      General: No focal deficit present.      Mental Status: She is alert.   Psychiatric:         Mood and Affect: Mood normal.         Assessment:       1. Age-related osteoporosis with current pathological fracture with routine healing, subsequent encounter    2. HTN (hypertension), benign    3. Closed pelvic ring fracture with routine healing, subsequent encounter    4. Hyponatremia    5. Pain of left hip joint    6. Constipation, unspecified constipation type    7.  Vaginal pain        Plan:       Elen was seen today for hospital follow up.    Diagnoses and all orders for this visit:    Age-related osteoporosis with current pathological fracture with routine healing, subsequent encounter - seems to be progressing OK, despite pain complaints.  Encouraged more frequent tramadol use    HTN (hypertension), benign - elevated reading today, but she is in pain and 89 years old    Closed pelvic ring fracture with routine healing, subsequent encounter    Hyponatremia - will recheck labs  -     Comprehensive metabolic panel; Future    Pain of left hip joint - unstable.  Encouraged use of tramadol three times daily with tylenol for pain.  May skip a dose if not in pain  -     traMADoL (ULTRAM) 50 mg tablet; Take 1 tablet (50 mg total) by mouth every 8 (eight) hours as needed.    Constipation, unspecified constipation type - continue current bowel regimen, which seems to be working    Vaginal pain - trial of estrogen cream to see if this helps  -     conjugated estrogens (PREMARIN) vaginal cream; Place 1 g vaginally once daily.    rtc prn.  Copy to Dr. Mile Ceballos MD MPH  Staff Internist

## 2020-07-15 ENCOUNTER — DOCUMENT SCAN (OUTPATIENT)
Dept: HOME HEALTH SERVICES | Facility: HOSPITAL | Age: 85
End: 2020-07-15
Payer: MEDICARE

## 2020-07-15 ENCOUNTER — TELEPHONE (OUTPATIENT)
Dept: CARDIOLOGY | Facility: CLINIC | Age: 85
End: 2020-07-15

## 2020-07-15 NOTE — TELEPHONE ENCOUNTER
Called patient and gave her instructions as per Dr Cartagena's note below. She verbalized understanding and repeated all back. Will schedule lab (BMP ) in 6 weeks - Pt requests [place].       Davidson Cartagena MD   7/15/2020  7:56 AM      Release-her sodium is low so she needs to increase salt and cut back on 1-2 less glasses of fluid per day.Chem 7 in 6 weeks

## 2020-07-15 NOTE — PROGRESS NOTES
Subjective:      Patient ID: Elen Peters is a 89 y.o. female.    Chief Complaint: Pain of the Pelvis    Patient is a 89 y/o female who presents today for complaints of left hip pain.  She reports she was sweeping her porch and sustained a fall from standing position thus landing on her left side.  She reports she went to an urgent care and was told she had fractured something in her hip and was directed to the ED.  She proceeded to the ED and underwent a CT of her hip the same day.  Findings showed Acute nondisplaced fracture of the left superior pubic ramus abutting the left acetabulum, Acute minimally displaced fracture of the left pubic symphysis, and a Left sacral insufficiency fracture.  She was admitted to the hospital and orthopedics was consulted.  Per records, recommendations were weight bear as tolerated, no surgical interventions required and to follow up with Orthopedics.      She presents today for follow up for her pelvic ring fracture as recommended.  She is accompanied with a sitter currently in a wheelchair.  She reports she is back home and getting therapy with [facility].  She endorses intermittent pain to the lateral aspect of her hip, more noticeable with ambulation.  She denies any additional falls or injuries since her discharge.  She is using Tramadol PRN and supplementing with Tylenol PRN.  She reports this method help kept the pain at bay.  She reports prior to her fall, she was independent in all activities.      Review of Systems   Musculoskeletal: Positive for falls and joint pain.   All other systems reviewed and are negative.        Objective:            General    Vitals reviewed.  Constitutional: She is oriented to person, place, and time. She appears well-developed and well-nourished. No distress.   HENT:   Head: Normocephalic and atraumatic.   Nose: Nose normal.   Eyes: Pupils are equal, round, and reactive to light.   Neck: Neck supple.   Cardiovascular: Intact distal  pulses.    Pulmonary/Chest: Effort normal.   Neurological: She is alert and oriented to person, place, and time.   Psychiatric: She has a normal mood and affect. Her behavior is normal. Judgment and thought content normal.     General Musculoskeletal Exam   Gait: antalgic   Pelvic Obliquity: none        Right Hip Exam   Right hip exam is normal.   Left Hip Exam     Inspection   No deformity of hip.  Quadriceps Atrophy:  negative    Tenderness   The patient tender to palpation of the pubic symphysis.    Range of Motion   External rotation: abnormal   Internal rotation: abnormal     Other   Sensation: normal    Comments:  Tenderness over left trochanteric site.          Muscle Strength   Left Lower Extremity   Hip Abduction: 3/5   Hip Adduction: 3/5   Hip Flexion: 4/5     Vascular Exam       Left Pulses  Dorsalis Pedis:      2+  Posterior Tibial:      2+        Edema  Left Upper Leg: absent    RADS: Pelvic with inlet and outlet x-ray obtained and personally reviewed by me.  Fracture to the pubic rami appears in good position and unchanged from prior study.    CT of Pelvis dated 6/28/2020 personally reviewed by me and discussed with Dr. Salinas.  She appears to have a left superior pubic ramus fracture near the acetabulum that appears stable.      Assessment:       Encounter Diagnosis   Name Primary?    Closed pelvic ring fracture, initial encounter Yes          Plan:       Elen was seen today for pain.    Diagnoses and all orders for this visit:    Pain  -     X-Ray Pelvis AP Inlet And Outlet; Future      -X-ray findings and treatment viewed and discussed with Dr. Salinas.  Can treat non-operative.  -Recommendation is to continue PT/OT with [facility].  Weight bear as tolerated.  -Advised patient she can expect some pain for 2-4 weeks but cautioned her should her pain worsen, develops groin pain, she falls, or she develops numbness to her to lower leg she needs to seek urgent medical care.    -Recommend  ice PRN and continue with Tramadol and Tylenol as she is currently using.  -I will see her back in 4 weeks at which time she will need repeat x-ray of her pelvis with inlet and outlet views.  -Call for questions or concerns.    Future Appointments   Date Time Provider Department Center   8/11/2020  1:30 PM Jeffy Savage NP [facility]

## 2020-07-16 DIAGNOSIS — I10 HTN (HYPERTENSION), BENIGN: Primary | ICD-10-CM

## 2020-07-17 ENCOUNTER — TELEPHONE (OUTPATIENT)
Dept: INTERNAL MEDICINE | Facility: CLINIC | Age: 85
End: 2020-07-17

## 2020-07-17 ENCOUNTER — DOCUMENT SCAN (OUTPATIENT)
Dept: HOME HEALTH SERVICES | Facility: HOSPITAL | Age: 85
End: 2020-07-17
Payer: MEDICARE

## 2020-07-17 NOTE — TELEPHONE ENCOUNTER
----- Message from Michelle Carrasco sent at 7/17/2020 12:28 PM CDT -----  Contact: Yajaira with [facility]  @  [phone]  Patient has been complaining about burning when urinating and pain on the inside. Can they get an order to get a urine sample? Will need an order for a nurse visit. She ask to send this message to Dr Ceballos and refused Dr Treadwell.      [facility]  [phone] /  Codeine and sulfa

## 2020-07-17 NOTE — TELEPHONE ENCOUNTER
Awesome.  Please give a verbal for a nurse visit go collect a urine sample and run a urinalysis.    Thanks.

## 2020-07-17 NOTE — TELEPHONE ENCOUNTER
Called Yajaira with [facility] and they need an order for a nurse visit. ( see msg) can take verbal. Wants you to order and not  Dr Treadwell.    Jen

## 2020-07-18 ENCOUNTER — NURSE TRIAGE (OUTPATIENT)
Dept: ADMINISTRATIVE | Facility: CLINIC | Age: 85
End: 2020-07-18

## 2020-07-18 NOTE — TELEPHONE ENCOUNTER
Spoke with daughter in law: last time UTI over a year ago.       Did not collect a urine due no container. S/s started around Thursday and worsened today.      Has fractured pelvis, uses walker. Burning with urination, burning is to vaginal area all the time, no vaginal drainage,urgency.  No fevers.     Daughter in law to utilize [facility].       Reason for Disposition   Urinating more frequently than usual (i.e., frequency)   Bad or foul-smelling urine    Additional Information   Negative: Shock suspected (e.g., cold/pale/clammy skin, too weak to stand, low BP, rapid pulse)   Negative: Sounds like a life-threatening emergency to the triager   Negative: [1] Unable to urinate (or only a few drops) > 4 hours AND     [2] bladder feels very full (e.g., palpable bladder or strong urge to urinate)   Negative: [1] Decreased urination and [2] drinking very little AND [2] dehydration suspected (e.g., dark urine, no urine > 12 hours, very dry mouth, very lightheaded)   Negative: Patient sounds very sick or weak to the triager   Negative: Fever > 100.5 F (38.1 C)   Negative: Side (flank) or lower back pain present   Negative: [1] Can't control passage of urine (i.e., urinary incontinence) AND [2] new onset (< 2 weeks) or worsening    Protocols used: ST URINARY SYMPTOMS-A-

## 2020-07-20 ENCOUNTER — TELEPHONE (OUTPATIENT)
Dept: INTERNAL MEDICINE | Facility: CLINIC | Age: 85
End: 2020-07-20

## 2020-07-20 RX ORDER — CIPROFLOXACIN 250 MG/1
1 TABLET, FILM COATED ORAL
COMMUNITY
Start: 2020-07-18 | End: 2021-04-26

## 2020-07-20 NOTE — TELEPHONE ENCOUNTER
----- Message from Michelle Carrasco sent at 7/20/2020  2:50 PM CDT -----  Contact: Gertrudis with [facility] [phone]  Patient is not comfortable taking estrogen and would like another Rx called in instead.        Gertrudis with [facility] [phone]

## 2020-07-20 NOTE — TELEPHONE ENCOUNTER
Please call Ms Peters.  There is no option to estrogen for her pelvic symptoms.  If she doesn't want to use that cream, she does not have to do so.    Thanks.  D

## 2020-07-21 ENCOUNTER — TELEPHONE (OUTPATIENT)
Dept: INTERNAL MEDICINE | Facility: CLINIC | Age: 85
End: 2020-07-21

## 2020-07-21 ENCOUNTER — TELEPHONE (OUTPATIENT)
Dept: OBSTETRICS AND GYNECOLOGY | Facility: CLINIC | Age: 85
End: 2020-07-21

## 2020-07-21 NOTE — TELEPHONE ENCOUNTER
----- Message from Phu Michaels sent at 7/21/2020 11:53 AM CDT -----  Regarding: Advice  Contact: Patient [phone]  Patient would like to get medical advice.    Comments: patient stating is in a lot of pain would like a call back ASAP to discuss, no other information given.      Please call an advise  Thank you

## 2020-07-21 NOTE — TELEPHONE ENCOUNTER
Patient called today requesting a medication to treat UTI symptoms. She states a Home health nurse will come to her home Wednesday to collect a urine specimen. She do not want to wait to be treated.

## 2020-07-21 NOTE — TELEPHONE ENCOUNTER
Pt states that she is very upset that a prescription that was prescribed by Dr Ceballos for vaginal dryness has estrogen in it. She does not want to use estrogen due to her cancer history.  She wants a different rx for vaginal dryness. She states that she is in a lot of pain from the vaginal dryness.

## 2020-07-21 NOTE — TELEPHONE ENCOUNTER
Pt is not happy with this recommendation. She states that she can barely walk. She walk with a walker. She ask me to ask Dr Ceballos does he have any other recommendations for a cream without estrogen.

## 2020-07-21 NOTE — TELEPHONE ENCOUNTER
I spoke to her at length.  I will cancel the medication b/c estrogen just scared her too much.  She will need to discuss this further with her gynecologist or her urologist to determine if something else can be used.    Tucker

## 2020-07-22 ENCOUNTER — LAB VISIT (OUTPATIENT)
Dept: LAB | Facility: HOSPITAL | Age: 85
End: 2020-07-22
Attending: INTERNAL MEDICINE
Payer: MEDICARE

## 2020-07-22 ENCOUNTER — TELEPHONE (OUTPATIENT)
Dept: INTERNAL MEDICINE | Facility: CLINIC | Age: 85
End: 2020-07-22

## 2020-07-22 DIAGNOSIS — S32.9XXD CLOSED NONDISPLACED FRACTURE OF PELVIS WITH ROUTINE HEALING, UNSPECIFIED PART OF PELVIS, SUBSEQUENT ENCOUNTER: Primary | ICD-10-CM

## 2020-07-22 DIAGNOSIS — R30.0 DYSURIA: ICD-10-CM

## 2020-07-22 DIAGNOSIS — M80.052D AGE-RELATED OSTEOPOROSIS WITH CURRENT PATHOLOGICAL FRACTURE OF LEFT FEMUR WITH ROUTINE HEALING: Primary | ICD-10-CM

## 2020-07-22 LAB
AMORPH CRY UR QL COMP ASSIST: NORMAL
BACTERIA #/AREA URNS AUTO: NORMAL /HPF
BILIRUB UR QL STRIP: NEGATIVE
CLARITY UR REFRACT.AUTO: ABNORMAL
COLOR UR AUTO: YELLOW
GLUCOSE UR QL STRIP: NEGATIVE
HGB UR QL STRIP: NEGATIVE
KETONES UR QL STRIP: NEGATIVE
LEUKOCYTE ESTERASE UR QL STRIP: NEGATIVE
MICROSCOPIC COMMENT: NORMAL
NITRITE UR QL STRIP: NEGATIVE
PH UR STRIP: 7 [PH] (ref 5–8)
PROT UR QL STRIP: NEGATIVE
RBC #/AREA URNS AUTO: 2 /HPF (ref 0–4)
SP GR UR STRIP: 1.01 (ref 1–1.03)
URN SPEC COLLECT METH UR: ABNORMAL

## 2020-07-22 PROCEDURE — 81001 URINALYSIS AUTO W/SCOPE: CPT | Mod: HCNC

## 2020-07-22 PROCEDURE — 87086 URINE CULTURE/COLONY COUNT: CPT | Mod: HCNC

## 2020-07-22 PROCEDURE — 87088 URINE BACTERIA CULTURE: CPT | Mod: HCNC

## 2020-07-22 NOTE — TELEPHONE ENCOUNTER
----- Message from Didi Boothe sent at 7/22/2020 11:35 AM CDT -----  Regarding: Self  She is needing to speak to you in regards to getting an order for a home health nurse she says this is an urgent matter and needs a call back as soon as possible.

## 2020-07-23 ENCOUNTER — PATIENT OUTREACH (OUTPATIENT)
Dept: OTHER | Facility: OTHER | Age: 85
End: 2020-07-23

## 2020-07-23 ENCOUNTER — TELEPHONE (OUTPATIENT)
Dept: INTERNAL MEDICINE | Facility: CLINIC | Age: 85
End: 2020-07-23

## 2020-07-23 ENCOUNTER — DOCUMENT SCAN (OUTPATIENT)
Dept: HOME HEALTH SERVICES | Facility: HOSPITAL | Age: 85
End: 2020-07-23
Payer: MEDICARE

## 2020-07-23 NOTE — TELEPHONE ENCOUNTER
----- Message from Guillermina Cuellar sent at 7/23/2020  8:39 AM CDT -----  Contact: patient [phone]  Patient is calling for results of a specimen she brought in. Please have Antonette doe pt.

## 2020-07-24 LAB — BACTERIA UR CULT: ABNORMAL

## 2020-07-28 ENCOUNTER — DOCUMENT SCAN (OUTPATIENT)
Dept: HOME HEALTH SERVICES | Facility: HOSPITAL | Age: 85
End: 2020-07-28
Payer: MEDICARE

## 2020-07-28 RX ORDER — METHOCARBAMOL 500 MG/1
500 TABLET, FILM COATED ORAL 4 TIMES DAILY
Qty: 60 TABLET | Refills: 1 | Status: SHIPPED | OUTPATIENT
Start: 2020-07-28 | End: 2020-11-20 | Stop reason: ALTCHOICE

## 2020-07-28 NOTE — TELEPHONE ENCOUNTER
----- Message from Roula Rothman sent at 7/28/2020  8:34 AM CDT -----  Contact: Chely ([facility]) [phone]  Chely is requesting a call back from the nurse in regards to a current med. Please call and advise

## 2020-07-28 NOTE — TELEPHONE ENCOUNTER
Chely wants to know can Mrs Peters refill Robaxin and if so she wants it refilled at [facility] on file. She was given Robaxin when she went to the ED for a fall and was given 1 refill which she has already used.

## 2020-07-31 ENCOUNTER — TELEPHONE (OUTPATIENT)
Dept: OBSTETRICS AND GYNECOLOGY | Facility: CLINIC | Age: 85
End: 2020-07-31

## 2020-07-31 NOTE — TELEPHONE ENCOUNTER
----- Message from David Yi sent at 7/31/2020  1:58 PM CDT -----  Regarding: mammo orders        The Pt would like for you to send her Mammo order over and contact the Pt after so that she will know to call us back to schedule the appt.    Phone # [phone]

## 2020-08-03 ENCOUNTER — TELEPHONE (OUTPATIENT)
Dept: INTERNAL MEDICINE | Facility: CLINIC | Age: 85
End: 2020-08-03

## 2020-08-03 NOTE — TELEPHONE ENCOUNTER
----- Message from Betty Chen sent at 8/3/2020  9:33 AM CDT -----  Needs Advice    Reason for call:      Continue Home Health PT due to her balance is still not stable     Communication Preference: Geraldine-- Home Health -- [phone]    Additional Information:  Please call with a verbal Per Geraldine

## 2020-08-03 NOTE — TELEPHONE ENCOUNTER
----- Message from Kalie Shannon sent at 8/3/2020 10:44 AM CDT -----  Contact: oral brown    Patient is returning a phone call.  Who left a message for the patient: loren  Does patient know what this is regarding:    Comments:   Oral states pt is progressing well. Oral wants to get pt safely off walker before ending hh. Oral states to leave a message if not able to answer please. Thank you

## 2020-08-04 ENCOUNTER — TELEPHONE (OUTPATIENT)
Dept: ORTHOPEDICS | Facility: CLINIC | Age: 85
End: 2020-08-04

## 2020-08-04 ENCOUNTER — TELEPHONE (OUTPATIENT)
Dept: OBSTETRICS AND GYNECOLOGY | Facility: CLINIC | Age: 85
End: 2020-08-04

## 2020-08-04 ENCOUNTER — OFFICE VISIT (OUTPATIENT)
Dept: OBSTETRICS AND GYNECOLOGY | Facility: CLINIC | Age: 85
End: 2020-08-04
Payer: MEDICARE

## 2020-08-04 VITALS
HEIGHT: 63 IN | WEIGHT: 129.63 LBS | BODY MASS INDEX: 22.97 KG/M2 | DIASTOLIC BLOOD PRESSURE: 66 MMHG | SYSTOLIC BLOOD PRESSURE: 130 MMHG

## 2020-08-04 DIAGNOSIS — N95.2 ATROPHIC VAGINITIS: Primary | ICD-10-CM

## 2020-08-04 DIAGNOSIS — Z12.39 BREAST CANCER SCREENING: Primary | ICD-10-CM

## 2020-08-04 PROCEDURE — 1125F AMNT PAIN NOTED PAIN PRSNT: CPT | Mod: S$GLB,,, | Performed by: PHYSICIAN ASSISTANT

## 2020-08-04 PROCEDURE — 1159F PR MEDICATION LIST DOCUMENTED IN MEDICAL RECORD: ICD-10-PCS | Mod: S$GLB,,, | Performed by: PHYSICIAN ASSISTANT

## 2020-08-04 PROCEDURE — 1100F PTFALLS ASSESS-DOCD GE2>/YR: CPT | Mod: CPTII,S$GLB,, | Performed by: PHYSICIAN ASSISTANT

## 2020-08-04 PROCEDURE — 1159F MED LIST DOCD IN RCRD: CPT | Mod: S$GLB,,, | Performed by: PHYSICIAN ASSISTANT

## 2020-08-04 PROCEDURE — 3288F PR FALLS RISK ASSESSMENT DOCUMENTED: ICD-10-PCS | Mod: CPTII,S$GLB,, | Performed by: PHYSICIAN ASSISTANT

## 2020-08-04 PROCEDURE — 1100F PR PT FALLS ASSESS DOC 2+ FALLS/FALL W/INJURY/YR: ICD-10-PCS | Mod: CPTII,S$GLB,, | Performed by: PHYSICIAN ASSISTANT

## 2020-08-04 PROCEDURE — 87510 GARDNER VAG DNA DIR PROBE: CPT | Mod: HCNC

## 2020-08-04 PROCEDURE — 99213 OFFICE O/P EST LOW 20 MIN: CPT | Mod: S$GLB,,, | Performed by: PHYSICIAN ASSISTANT

## 2020-08-04 PROCEDURE — 87480 CANDIDA DNA DIR PROBE: CPT | Mod: HCNC

## 2020-08-04 PROCEDURE — 99213 PR OFFICE/OUTPT VISIT, EST, LEVL III, 20-29 MIN: ICD-10-PCS | Mod: S$GLB,,, | Performed by: PHYSICIAN ASSISTANT

## 2020-08-04 PROCEDURE — 1125F PR PAIN SEVERITY QUANTIFIED, PAIN PRESENT: ICD-10-PCS | Mod: S$GLB,,, | Performed by: PHYSICIAN ASSISTANT

## 2020-08-04 PROCEDURE — 3288F FALL RISK ASSESSMENT DOCD: CPT | Mod: CPTII,S$GLB,, | Performed by: PHYSICIAN ASSISTANT

## 2020-08-04 RX ORDER — ESTRADIOL 0.1 MG/G
CREAM VAGINAL
Qty: 42.5 G | Refills: 1 | Status: SHIPPED | OUTPATIENT
Start: 2020-08-04 | End: 2020-08-04

## 2020-08-04 NOTE — TELEPHONE ENCOUNTER
Called and spoke with pt's therapist in regards to pt being seen early due to hip pain. Scheduled pt for 08/05/2020 @ 2:45pm. Pt's therapist was satisfied with appt. date/time.

## 2020-08-04 NOTE — TELEPHONE ENCOUNTER
----- Message from Argenis Ayoub MA sent at 8/4/2020 10:36 AM CDT -----  Regarding: FW: Pt    ----- Message -----  From: Gem Moreno  Sent: 8/4/2020   9:50 AM CDT  To: [facility] Staff  Subject: Pt                                               Reason: Mammo Order Needed    Communication: [phone]

## 2020-08-04 NOTE — TELEPHONE ENCOUNTER
----- Message from Lynne Duarte MD sent at 8/4/2020 12:13 PM CDT -----  Regarding: RE: Pt  I placed the order  I really think she can stop mammograms at this point in her life.  ----- Message -----  From: Argenis Ayoub MA  Sent: 8/4/2020  10:36 AM CDT  To: Lynne Duarte MD  Subject: FW: Pt                                             ----- Message -----  From: Gem Moreno  Sent: 8/4/2020   9:50 AM CDT  To: Gerald [facility] Staff  Subject: Pt                                               Reason: Mammo Order Needed    Communication: [phone]

## 2020-08-04 NOTE — PROGRESS NOTES
Subjective:      Elen Peters is a 89 y.o. female who presents for vaginal pain and burning. Has had symptoms x 1 year. Saw PCP who gave her premarin but did not want to use. Has used otc products with applicators, but cause significant pain with using. PCP advised Vagisil, but did not help and caused burning. Has had multiple UTIs and working with urology. Scheduled for cystocopy next month. Just completed tx with Cipro. Denies vaginal discharge or history of of cervical cancer. In 6/2020 she fell and fractured her pelvis. In significant pain with this. Using a walker and had home health helping her.    Lab Visit on 07/22/2020   Component Date Value Ref Range Status    Specimen UA 07/22/2020 Urine, Unspecified   Final    Color, UA 07/22/2020 Yellow  Yellow, Straw, Sania Final    Appearance, UA 07/22/2020 Cloudy* Clear Final    pH, UA 07/22/2020 7.0  5.0 - 8.0 Final    Specific Gravity, UA 07/22/2020 1.015  1.005 - 1.030 Final    Protein, UA 07/22/2020 Negative  Negative Final    Glucose, UA 07/22/2020 Negative  Negative Final    Ketones, UA 07/22/2020 Negative  Negative Final    Bilirubin (UA) 07/22/2020 Negative  Negative Final    Occult Blood UA 07/22/2020 Negative  Negative Final    Nitrite, UA 07/22/2020 Negative  Negative Final    Leukocytes, UA 07/22/2020 Negative  Negative Final    Urine Culture, Routine 07/22/2020 *  Final                    Value:COAGULASE-NEGATIVE STAPHYLOCOCCUS SPECIES  10,000 - 49,999 cfu/ml  Susceptibility testing not routinely performed.  No other significant isolate      RBC, UA 07/22/2020 2  0 - 4 /hpf Final    Bacteria 07/22/2020 Rare  None-Occ /hpf Final    Amorphous, UA 07/22/2020 Moderate  None-Moderate Final    Microscopic Comment 07/22/2020 SEE COMMENT   Final   Lab Visit on 07/14/2020   Component Date Value Ref Range Status    Sodium 07/14/2020 129* 136 - 145 mmol/L Final    Potassium 07/14/2020 4.8  3.5 - 5.1 mmol/L Final    Chloride 07/14/2020 96  95 -  110 mmol/L Final    CO2 07/14/2020 27  23 - 29 mmol/L Final    Glucose 07/14/2020 98  70 - 110 mg/dL Final    BUN, Bld 07/14/2020 11  8 - 23 mg/dL Final    Creatinine 07/14/2020 0.7  0.5 - 1.4 mg/dL Final    Calcium 07/14/2020 10.0  8.7 - 10.5 mg/dL Final    Total Protein 07/14/2020 6.7  6.0 - 8.4 g/dL Final    Albumin 07/14/2020 3.5  3.5 - 5.2 g/dL Final    Total Bilirubin 07/14/2020 0.5  0.1 - 1.0 mg/dL Final    Alkaline Phosphatase 07/14/2020 178* 55 - 135 U/L Final    AST 07/14/2020 26  10 - 40 U/L Final    ALT 07/14/2020 28  10 - 44 U/L Final    Anion Gap 07/14/2020 6* 8 - 16 mmol/L Final    eGFR if African American 07/14/2020 >60.0  >60 mL/min/1.73 m^2 Final    eGFR if non African American 07/14/2020 >60.0  >60 mL/min/1.73 m^2 Final   Admission on 06/28/2020, Discharged on 06/30/2020   Component Date Value Ref Range Status    WBC 06/28/2020 7.48  3.90 - 12.70 K/uL Final    RBC 06/28/2020 3.60* 4.00 - 5.40 M/uL Final    Hemoglobin 06/28/2020 11.7* 12.0 - 16.0 g/dL Final    Hematocrit 06/28/2020 34.3* 37.0 - 48.5 % Final    Mean Corpuscular Volume 06/28/2020 95  82 - 98 fL Final    Mean Corpuscular Hemoglobin 06/28/2020 32.5* 27.0 - 31.0 pg Final    Mean Corpuscular Hemoglobin Conc 06/28/2020 34.1  32.0 - 36.0 g/dL Final    RDW 06/28/2020 13.7  11.5 - 14.5 % Final    Platelets 06/28/2020 147* 150 - 350 K/uL Final    MPV 06/28/2020 10.8  9.2 - 12.9 fL Final    Immature Granulocytes 06/28/2020 0.4  0.0 - 0.5 % Final    Gran # (ANC) 06/28/2020 5.4  1.8 - 7.7 K/uL Final    Immature Grans (Abs) 06/28/2020 0.03  0.00 - 0.04 K/uL Final    Lymph # 06/28/2020 1.0  1.0 - 4.8 K/uL Final    Mono # 06/28/2020 0.8  0.3 - 1.0 K/uL Final    Eos # 06/28/2020 0.2  0.0 - 0.5 K/uL Final    Baso # 06/28/2020 0.03  0.00 - 0.20 K/uL Final    nRBC 06/28/2020 0  0 /100 WBC Final    Gran% 06/28/2020 72.7  38.0 - 73.0 % Final    Lymph% 06/28/2020 12.8* 18.0 - 48.0 % Final    Mono% 06/28/2020 10.6  4.0 -  15.0 % Final    Eosinophil% 06/28/2020 3.1  0.0 - 8.0 % Final    Basophil% 06/28/2020 0.4  0.0 - 1.9 % Final    Differential Method 06/28/2020 Automated   Final    Specimen UA 06/28/2020 Urine, Clean Catch   Final    Color, UA 06/28/2020 Yellow  Yellow, Straw, Sania Final    Appearance, UA 06/28/2020 Cloudy* Clear Final    pH, UA 06/28/2020 7.0  5.0 - 8.0 Final    Specific [place], UA 06/28/2020 1.010  1.005 - 1.030 Final    Protein, UA 06/28/2020 Negative  Negative Final    Glucose, UA 06/28/2020 Negative  Negative Final    Ketones, UA 06/28/2020 Negative  Negative Final    Bilirubin (UA) 06/28/2020 Negative  Negative Final    Occult Blood UA 06/28/2020 2+* Negative Final    Nitrite, UA 06/28/2020 Negative  Negative Final    Leukocytes, UA 06/28/2020 1+* Negative Final    SARS-CoV-2 RNA, Amplification, Qual 06/28/2020 Negative  Negative Final    Prothrombin Time 06/28/2020 9.9  9.0 - 12.5 sec Final    INR 06/28/2020 0.9  0.8 - 1.2 Final    Sodium 06/28/2020 128* 136 - 145 mmol/L Final    Potassium 06/28/2020 4.8  3.5 - 5.1 mmol/L Final    Chloride 06/28/2020 96  95 - 110 mmol/L Final    CO2 06/28/2020 24  23 - 29 mmol/L Final    Glucose 06/28/2020 98  70 - 110 mg/dL Final    BUN, Bld 06/28/2020 13  8 - 23 mg/dL Final    Creatinine 06/28/2020 0.7  0.5 - 1.4 mg/dL Final    Calcium 06/28/2020 9.3  8.7 - 10.5 mg/dL Final    Total Protein 06/28/2020 6.3  6.0 - 8.4 g/dL Final    Albumin 06/28/2020 3.3* 3.5 - 5.2 g/dL Final    Total Bilirubin 06/28/2020 0.8  0.1 - 1.0 mg/dL Final    Alkaline Phosphatase 06/28/2020 56  55 - 135 U/L Final    AST 06/28/2020 32  10 - 40 U/L Final    ALT 06/28/2020 33  10 - 44 U/L Final    Anion Gap 06/28/2020 8  8 - 16 mmol/L Final    eGFR if African American 06/28/2020 >60.0  >60 mL/min/1.73 m^2 Final    eGFR if non African American 06/28/2020 >60.0  >60 mL/min/1.73 m^2 Final    Vit D, 25-Hydroxy 06/28/2020 65  30 - 96 ng/mL Final    RBC, UA 06/28/2020 4   0 - 4 /hpf Final    WBC, UA 06/28/2020 7* 0 - 5 /hpf Final    Bacteria 06/28/2020 Rare  None-Occ /hpf Final    Squam Epithel, UA 06/28/2020 0  /hpf Final    Microscopic Comment 06/28/2020 SEE COMMENT   Final    BNP 06/28/2020 537* 0 - 99 pg/mL Final    Sodium 06/29/2020 131* 136 - 145 mmol/L Final    Potassium 06/29/2020 4.4  3.5 - 5.1 mmol/L Final    Chloride 06/29/2020 100  95 - 110 mmol/L Final    CO2 06/29/2020 25  23 - 29 mmol/L Final    Glucose 06/29/2020 92  70 - 110 mg/dL Final    BUN, Bld 06/29/2020 10  8 - 23 mg/dL Final    Creatinine 06/29/2020 0.7  0.5 - 1.4 mg/dL Final    Calcium 06/29/2020 8.5* 8.7 - 10.5 mg/dL Final    Total Protein 06/29/2020 5.7* 6.0 - 8.4 g/dL Final    Albumin 06/29/2020 2.9* 3.5 - 5.2 g/dL Final    Total Bilirubin 06/29/2020 0.6  0.1 - 1.0 mg/dL Final    Alkaline Phosphatase 06/29/2020 52* 55 - 135 U/L Final    AST 06/29/2020 22  10 - 40 U/L Final    ALT 06/29/2020 25  10 - 44 U/L Final    Anion Gap 06/29/2020 6* 8 - 16 mmol/L Final    eGFR if African American 06/29/2020 >60.0  >60 mL/min/1.73 m^2 Final    eGFR if non African American 06/29/2020 >60.0  >60 mL/min/1.73 m^2 Final    Magnesium 06/29/2020 1.9  1.6 - 2.6 mg/dL Final    Phosphorus 06/29/2020 3.2  2.7 - 4.5 mg/dL Final    WBC 06/29/2020 5.68  3.90 - 12.70 K/uL Final    RBC 06/29/2020 3.31* 4.00 - 5.40 M/uL Final    Hemoglobin 06/29/2020 10.6* 12.0 - 16.0 g/dL Final    Hematocrit 06/29/2020 32.3* 37.0 - 48.5 % Final    Mean Corpuscular Volume 06/29/2020 98  82 - 98 fL Final    Mean Corpuscular Hemoglobin 06/29/2020 32.0* 27.0 - 31.0 pg Final    Mean Corpuscular Hemoglobin Conc 06/29/2020 32.8  32.0 - 36.0 g/dL Final    RDW 06/29/2020 13.8  11.5 - 14.5 % Final    Platelets 06/29/2020 158  150 - 350 K/uL Final    MPV 06/29/2020 10.7  9.2 - 12.9 fL Final    Immature Granulocytes 06/29/2020 0.4  0.0 - 0.5 % Final    Gran # (ANC) 06/29/2020 3.6  1.8 - 7.7 K/uL Final    Immature Grans (Abs)  06/29/2020 0.02  0.00 - 0.04 K/uL Final    Lymph # 06/29/2020 0.9* 1.0 - 4.8 K/uL Final    Mono # 06/29/2020 0.7  0.3 - 1.0 K/uL Final    Eos # 06/29/2020 0.4  0.0 - 0.5 K/uL Final    Baso # 06/29/2020 0.05  0.00 - 0.20 K/uL Final    nRBC 06/29/2020 0  0 /100 WBC Final    Gran% 06/29/2020 62.7  38.0 - 73.0 % Final    Lymph% 06/29/2020 16.2* 18.0 - 48.0 % Final    Mono% 06/29/2020 12.9  4.0 - 15.0 % Final    Eosinophil% 06/29/2020 6.9  0.0 - 8.0 % Final    Basophil% 06/29/2020 0.9  0.0 - 1.9 % Final    Differential Method 06/29/2020 Automated   Final    Sodium 06/30/2020 134* 136 - 145 mmol/L Final    Potassium 06/30/2020 4.6  3.5 - 5.1 mmol/L Final    Chloride 06/30/2020 102  95 - 110 mmol/L Final    CO2 06/30/2020 25  23 - 29 mmol/L Final    Glucose 06/30/2020 88  70 - 110 mg/dL Final    BUN, Bld 06/30/2020 13  8 - 23 mg/dL Final    Creatinine 06/30/2020 0.7  0.5 - 1.4 mg/dL Final    Calcium 06/30/2020 8.8  8.7 - 10.5 mg/dL Final    Total Protein 06/30/2020 5.9* 6.0 - 8.4 g/dL Final    Albumin 06/30/2020 2.9* 3.5 - 5.2 g/dL Final    Total Bilirubin 06/30/2020 0.6  0.1 - 1.0 mg/dL Final    Alkaline Phosphatase 06/30/2020 54* 55 - 135 U/L Final    AST 06/30/2020 26  10 - 40 U/L Final    ALT 06/30/2020 30  10 - 44 U/L Final    Anion Gap 06/30/2020 7* 8 - 16 mmol/L Final    eGFR if African American 06/30/2020 >60.0  >60 mL/min/1.73 m^2 Final    eGFR if non African American 06/30/2020 >60.0  >60 mL/min/1.73 m^2 Final    Magnesium 06/30/2020 1.9  1.6 - 2.6 mg/dL Final    Phosphorus 06/30/2020 3.0  2.7 - 4.5 mg/dL Final    WBC 06/30/2020 5.26  3.90 - 12.70 K/uL Final    RBC 06/30/2020 3.46* 4.00 - 5.40 M/uL Final    Hemoglobin 06/30/2020 11.1* 12.0 - 16.0 g/dL Final    Hematocrit 06/30/2020 34.1* 37.0 - 48.5 % Final    Mean Corpuscular Volume 06/30/2020 99* 82 - 98 fL Final    Mean Corpuscular Hemoglobin 06/30/2020 32.1* 27.0 - 31.0 pg Final    Mean Corpuscular Hemoglobin Conc  06/30/2020 32.6  32.0 - 36.0 g/dL Final    RDW 06/30/2020 13.6  11.5 - 14.5 % Final    Platelets 06/30/2020 183  150 - 350 K/uL Final    MPV 06/30/2020 10.2  9.2 - 12.9 fL Final    Immature Granulocytes 06/30/2020 0.4  0.0 - 0.5 % Final    Gran # (ANC) 06/30/2020 3.2  1.8 - 7.7 K/uL Final    Immature Grans (Abs) 06/30/2020 0.02  0.00 - 0.04 K/uL Final    Lymph # 06/30/2020 1.0  1.0 - 4.8 K/uL Final    Mono # 06/30/2020 0.6  0.3 - 1.0 K/uL Final    Eos # 06/30/2020 0.3  0.0 - 0.5 K/uL Final    Baso # 06/30/2020 0.07  0.00 - 0.20 K/uL Final    nRBC 06/30/2020 0  0 /100 WBC Final    Gran% 06/30/2020 60.8  38.0 - 73.0 % Final    Lymph% 06/30/2020 19.6  18.0 - 48.0 % Final    Mono% 06/30/2020 12.2  4.0 - 15.0 % Final    Eosinophil% 06/30/2020 5.7  0.0 - 8.0 % Final    Basophil% 06/30/2020 1.3  0.0 - 1.9 % Final    Differential Method 06/30/2020 Automated   Final       Past Medical History:   Diagnosis Date    Abnormal stress test 11/18/2015    Arthritis     Bladder cancer     Cataract     Coronary artery disease involving native coronary artery 1/6/2016    Depression     GERD (gastroesophageal reflux disease)     HTN (hypertension), benign 11/18/2015    Hx of bladder infections     Hyperlipemia     OP (osteoporosis)     Positive cardiac stress test 1/6/2016    Renal cancer     Syncope 1/6/2016    TMJ (dislocation of temporomandibular joint)     Upper back pain 12/1/2015    Ureteral cancer     Venous insufficiency 2017    Villous adenoma of colon 5/31/2013     Past Surgical History:   Procedure Laterality Date    APPENDECTOMY      BACK SURGERY      CATARACT EXTRACTION W/  INTRAOCULAR LENS IMPLANT Left 3/16/15    kristy    CATARACT EXTRACTION W/  INTRAOCULAR LENS IMPLANT Right 4/6/15    kristy    COLONOSCOPY  10/21/2013    CYSTOSCOPY      ESOPHAGOGASTRODUODENOSCOPY N/A 1/30/2019    Procedure: EGD (ESOPHAGOGASTRODUODENOSCOPY);  Surgeon: Diony Dey MD;  Location: [address]  [place]);  Service: Endoscopy;  Laterality: N/A;    EYE SURGERY      NEPHRECTOMY      L    SPINE SURGERY      TONSILLECTOMY, ADENOIDECTOMY       Social History     Tobacco Use    Smoking status: Former Smoker     Quit date:      Years since quittin.6    Smokeless tobacco: Never Used    Tobacco comment: in college   Substance Use Topics    Alcohol use: No    Drug use: No     Family History   Problem Relation Age of Onset    Leukemia Mother     Heart disease Mother     Heart attack Mother     Cancer Mother         leukemia    Goiter Mother     Leukemia Father     Cancer Father         leukemia    Heart disease Maternal Grandfather     No Known Problems Brother     No Known Problems Son     No Known Problems Son     No Known Problems Son     Cancer Son         throat    No Known Problems Son     No Known Problems Son     No Known Problems Maternal Aunt     No Known Problems Maternal Uncle     No Known Problems Paternal Aunt     No Known Problems Paternal Uncle     No Known Problems Maternal Grandmother     No Known Problems Paternal Grandmother     No Known Problems Paternal Grandfather     No Known Problems Sister     Amblyopia Neg Hx     Blindness Neg Hx     Cataracts Neg Hx     Diabetes Neg Hx     Glaucoma Neg Hx     Hypertension Neg Hx     Macular degeneration Neg Hx     Retinal detachment Neg Hx     Strabismus Neg Hx     Stroke Neg Hx     Thyroid disease Neg Hx     Breast cancer Neg Hx     Colon cancer Neg Hx     Esophageal cancer Neg Hx      OB History    Para Term  AB Living   6 6 6     6   SAB TAB Ectopic Multiple Live Births                  # Outcome Date GA Lbr Candido/2nd Weight Sex Delivery Anes PTL Lv   6 Term            5 Term            4 Term            3 Term            2 Term            1 Term                Current Outpatient Medications:     acetaminophen (TYLENOL) 500 MG tablet, Take 1 tablet (500 mg total) by mouth 3 (three) times  daily., Disp: , Rfl: 0    amLODIPine (NORVASC) 5 MG tablet, Take 1 tablet (5 mg total) by mouth once daily. One-2 per day or as directed, Disp: 90 tablet, Rfl: 3    aspirin (ECOTRIN) 81 MG EC tablet, Take 81 mg by mouth once daily., Disp: , Rfl:     atorvastatin (LIPITOR) 40 MG tablet, TAKE 1 TABLET EVERY DAY OR AS DIRECTED, Disp: 90 tablet, Rfl: 3    calcium-magnesium-zinc Tab, Take 2 tablets by mouth once daily at 6am. 1 Tablet Oral Every day, Disp: , Rfl:     carvediloL (COREG) 6.25 MG tablet, TAKE 1 TABLET TWICE DAILY WITH MEALS  OR AS DIRECTED, Disp: 180 tablet, Rfl: 3    ciprofloxacin HCl (CIPRO) 250 MG tablet, 1 tablet., Disp: , Rfl:     citalopram (CELEXA) 20 MG tablet, TAKE 1 TABLET EVERY DAY, Disp: 90 tablet, Rfl: 3    diclofenac sodium (VOLTAREN) 1 % Gel, Apply 2 g topically 3 (three) times daily., Disp: 100 g, Rfl: 0    esomeprazole (NEXIUM) 40 MG capsule, Take 1 capsule (40 mg total) by mouth before breakfast., Disp: 90 capsule, Rfl: 3    hydrALAZINE (APRESOLINE) 25 MG tablet, Take 1 tablet (25 mg total) by mouth every 8 (eight) hours., Disp: 90 tablet, Rfl: 2    methocarbamoL (ROBAXIN) 500 MG Tab, Take 1 tablet (500 mg total) by mouth 4 (four) times daily., Disp: 60 tablet, Rfl: 1    mv-min-folic acid-lutein (THERAGRAN-M ADVANCED 50 PLUS) 0.4-250 mg-mcg Tab, Take by mouth. 1 Tablet Oral Every day, Disp: , Rfl:     omega-3 fatty acids 1,000 mg Cap, Take 1 capsule by mouth once daily. 2  Capsule Oral Every day, Disp: , Rfl:     polyethylene glycol (GLYCOLAX) 17 gram PwPk, Take 17 g by mouth once daily., Disp: 30 packet, Rfl: 1    senna-docusate 8.6-50 mg (PERICOLACE) 8.6-50 mg per tablet, Take 1 tablet by mouth 2 (two) times daily as needed for Constipation., Disp: 30 tablet, Rfl: 0    traMADoL (ULTRAM) 50 mg tablet, Take 1 tablet (50 mg total) by mouth every 8 (eight) hours as needed., Disp: 45 tablet, Rfl: 0    nitroGLYCERIN (NITROSTAT) 0.4 MG SL tablet, Place 1 tablet (0.4 mg total)  "under the tongue every 5 (five) minutes as needed for Chest pain., Disp: 25 tablet, Rfl: 3    Review of Systems:  General: No fever, chills, or weight loss.  Chest: No chest pain, shortness of breath, or palpitations.  Breast: No pain, masses, or nipple discharge.  Vulva: No pain, lesions, or itching.  Vagina: +pain, burning  Abdomen: No pain, nausea, vomiting, diarrhea, or constipation.  Urinary: No incontinence, nocturia, frequency, or dysuria.  Extremities:  No leg cramps, edema, or calf pain. +pelvic pain  Neurologic: No headaches, dizziness, or visual changes.    Objective:     Vitals:    08/04/20 1443   BP: 130/66   Weight: 58.8 kg (129 lb 10.1 oz)   Height: 5' 3" (1.6 m)   PainSc:   6   PainLoc: Hip     Body mass index is 22.96 kg/m².    PHYSICAL EXAM:  APPEARANCE: Well nourished, well developed, in no acute distress.  AFFECT: WNL, alert and oriented x 3  PELVIC: Erythema and tenderness at bilateral labia minora and vaginal wall.   EXTREMITIES: No edema.    Assessment:      Atrophic vaginitis: Pain likely due atrophy. Marked pain with exam and limited. Unclear if due to vaginal symptoms or pelvic fracture. Was given premarin cream that she never used and expensive. Will use this first since has and expensive.  -     Vaginosis Screen by DNA Probe        Plan:   Send vaginosis screen, treat pending.  Apply premarin cream QHS externally as directed x 2 weeks. Do not use applicator.  She will call in 2 weeks to check in and see how she is doing.   Decreased mobility so will try to limit OV if possible.    Instructed patient to call if she experiences any side effects or has any questions.  I spent 20 minutes with this patient today, >50% counseling.      "

## 2020-08-05 ENCOUNTER — HOSPITAL ENCOUNTER (OUTPATIENT)
Dept: RADIOLOGY | Facility: HOSPITAL | Age: 85
Discharge: HOME | End: 2020-08-05
Attending: NURSE PRACTITIONER
Payer: MEDICARE

## 2020-08-05 ENCOUNTER — OFFICE VISIT (OUTPATIENT)
Dept: ORTHOPEDICS | Facility: CLINIC | Age: 85
End: 2020-08-05
Payer: MEDICARE

## 2020-08-05 VITALS
SYSTOLIC BLOOD PRESSURE: 161 MMHG | DIASTOLIC BLOOD PRESSURE: 60 MMHG | WEIGHT: 129.63 LBS | HEIGHT: 63 IN | HEART RATE: 60 BPM | BODY MASS INDEX: 22.97 KG/M2

## 2020-08-05 DIAGNOSIS — R10.2 ACUTE PAIN IN FEMALE PELVIS: ICD-10-CM

## 2020-08-05 DIAGNOSIS — S32.810D CLOSED PELVIC RING FRACTURE WITH ROUTINE HEALING, SUBSEQUENT ENCOUNTER: Primary | ICD-10-CM

## 2020-08-05 DIAGNOSIS — R10.2 ACUTE PAIN IN FEMALE PELVIS: Primary | ICD-10-CM

## 2020-08-05 PROCEDURE — 3288F FALL RISK ASSESSMENT DOCD: CPT | Mod: HCNC,CPTII,S$GLB, | Performed by: NURSE PRACTITIONER

## 2020-08-05 PROCEDURE — 72190 X-RAY EXAM OF PELVIS: CPT | Mod: TC,HCNC

## 2020-08-05 PROCEDURE — 1100F PR PT FALLS ASSESS DOC 2+ FALLS/FALL W/INJURY/YR: ICD-10-PCS | Mod: HCNC,CPTII,S$GLB, | Performed by: NURSE PRACTITIONER

## 2020-08-05 PROCEDURE — 99213 PR OFFICE/OUTPT VISIT, EST, LEVL III, 20-29 MIN: ICD-10-PCS | Mod: HCNC,S$GLB,, | Performed by: NURSE PRACTITIONER

## 2020-08-05 PROCEDURE — 1159F MED LIST DOCD IN RCRD: CPT | Mod: HCNC,S$GLB,, | Performed by: NURSE PRACTITIONER

## 2020-08-05 PROCEDURE — 1125F PR PAIN SEVERITY QUANTIFIED, PAIN PRESENT: ICD-10-PCS | Mod: HCNC,S$GLB,, | Performed by: NURSE PRACTITIONER

## 2020-08-05 PROCEDURE — 3288F PR FALLS RISK ASSESSMENT DOCUMENTED: ICD-10-PCS | Mod: HCNC,CPTII,S$GLB, | Performed by: NURSE PRACTITIONER

## 2020-08-05 PROCEDURE — 1159F PR MEDICATION LIST DOCUMENTED IN MEDICAL RECORD: ICD-10-PCS | Mod: HCNC,S$GLB,, | Performed by: NURSE PRACTITIONER

## 2020-08-05 PROCEDURE — 1100F PTFALLS ASSESS-DOCD GE2>/YR: CPT | Mod: HCNC,CPTII,S$GLB, | Performed by: NURSE PRACTITIONER

## 2020-08-05 PROCEDURE — 99213 OFFICE O/P EST LOW 20 MIN: CPT | Mod: HCNC,S$GLB,, | Performed by: NURSE PRACTITIONER

## 2020-08-05 PROCEDURE — 99999 PR PBB SHADOW E&M-EST. PATIENT-LVL IV: ICD-10-PCS | Mod: PBBFAC,HCNC,, | Performed by: NURSE PRACTITIONER

## 2020-08-05 PROCEDURE — 72190 X-RAY EXAM OF PELVIS: CPT | Mod: 26,HCNC,, | Performed by: RADIOLOGY

## 2020-08-05 PROCEDURE — 99999 PR PBB SHADOW E&M-EST. PATIENT-LVL IV: CPT | Mod: PBBFAC,HCNC,, | Performed by: NURSE PRACTITIONER

## 2020-08-05 PROCEDURE — 72190 XR PELVIS AP INLET AND OUTLET: ICD-10-PCS | Mod: 26,HCNC,, | Performed by: RADIOLOGY

## 2020-08-05 PROCEDURE — 1125F AMNT PAIN NOTED PAIN PRSNT: CPT | Mod: HCNC,S$GLB,, | Performed by: NURSE PRACTITIONER

## 2020-08-05 NOTE — PROGRESS NOTES
Subjective:      Patient ID: Elen Peters is a 89 y.o. female.    Chief Complaint: Pain of the Pelvis    Patient is a 89 y/o female who presents today for follow up for her left pubic ramus fracture.  I saw her last on 7/13/2020 at which time it was recommended she weight bear as tolerated and start therapy.  She is currently reporting intermittent pain to her left hip, mostly at night.  She reports she has used tramadol at night but finds it does not help then therefore she has been taking Norco with good relief.  She is getting home health therapy through [facility].  Therapist was concerned about her continued reports of pain when working with them so they asked her to be re-evaluated.  Patent denies any falls or injuries since last seen.        Review of Systems   Musculoskeletal: Positive for falls and joint pain.   All other systems reviewed and are negative.        Objective:      General    Vitals reviewed.  Constitutional: She is oriented to person, place, and time. She appears well-developed and well-nourished. No distress.   HENT:   Head: Normocephalic and atraumatic.   Nose: Nose normal.   Eyes: Conjunctivae are normal. Pupils are equal, round, and reactive to light.   Neck: Neck supple.   Cardiovascular: Intact distal pulses.    Pulmonary/Chest: Effort normal.   Neurological: She is alert and oriented to person, place, and time.   Psychiatric: She has a normal mood and affect. Her behavior is normal. Judgment and thought content normal.     General Musculoskeletal Exam   Gait: antalgic   Pelvic Obliquity: none        Right Hip Exam   Right hip exam is normal.   Left Hip Exam     Inspection   No deformity of hip.  Quadriceps Atrophy:  negative    Tenderness   The patient tender to palpation of the pubic symphysis.    Range of Motion   External rotation: abnormal   Internal rotation: abnormal     Other   Sensation: normal    Comments:  Tenderness over left trochanteric site.          Muscle Strength   Left  Lower Extremity   Hip Abduction: 3/5   Hip Adduction: 3/5   Hip Flexion: 4/5     Vascular Exam       Left Pulses  Dorsalis Pedis:      2+  Posterior Tibial:      2+          RADS: Pelvic with inlet and outlet x-ray obtained and personally reviewed by me.  Fracture to the left superior and inferior pubic rami appears in good position and unchanged from prior study.        Assessment:       Encounter Diagnosis   Name Primary?    Closed pelvic ring fracture with routine healing, subsequent encounter Yes          Plan:       Elen was seen today for pain.    Diagnoses and all orders for this visit:    Closed pelvic ring fracture with routine healing, subsequent encounter      -X-ray findings unchanged from prior study.  -Continue non-operative treatment.  -Recommendation is to continue PT/OT with [facility].  Weight bear as tolerated.  -Advised patient she can expect some pain but cautioned her should her pain worsen, develops groin pain, she falls, or she develops numbness to her to lower leg she needs to seek urgent medical care.    -Recommend ice PRN and continue with Tramadol and Tylenol as she is currently using.  Would limit use of Norco and reserve for severe pain.  -I will see her back in 4 weeks at which time she will need repeat x-ray of her pelvis with inlet and outlet views.  -Call for questions or concerns.    Future Appointments   Date Time Provider Department Center   8/12/2020  2:40 PM [facility] [place]   8/26/2020 10:30 AM LAB, [place] [facility] [place]   9/2/2020  9:30 AM PROCEDURE, UROLOGY ROOM 1 [facility]OLO[facility]   9/2/2020  3:15 PM Jeffy Savage NP [facility] ORTHO [facility]

## 2020-08-06 ENCOUNTER — DOCUMENT SCAN (OUTPATIENT)
Dept: HOME HEALTH SERVICES | Facility: HOSPITAL | Age: 85
End: 2020-08-06
Payer: MEDICARE

## 2020-08-06 LAB
CANDIDA RRNA VAG QL PROBE: NOT DETECTED
G VAGINALIS RRNA GENITAL QL PROBE: NOT DETECTED
T VAGINALIS RRNA GENITAL QL PROBE: NOT DETECTED

## 2020-08-07 ENCOUNTER — TELEPHONE (OUTPATIENT)
Dept: ORTHOPEDICS | Facility: CLINIC | Age: 85
End: 2020-08-07

## 2020-08-07 ENCOUNTER — TELEPHONE (OUTPATIENT)
Dept: UROLOGY | Facility: CLINIC | Age: 85
End: 2020-08-07

## 2020-08-07 ENCOUNTER — TELEPHONE (OUTPATIENT)
Dept: OBSTETRICS AND GYNECOLOGY | Facility: CLINIC | Age: 85
End: 2020-08-07

## 2020-08-07 DIAGNOSIS — N95.2 ATROPHIC VAGINITIS: Primary | ICD-10-CM

## 2020-08-07 NOTE — TELEPHONE ENCOUNTER
----- Message from Caroline Witt sent at 8/7/2020 10:25 AM CDT -----  [facility]     What is the request in detail: Would like to inform provider that the patient is having intense burning when she applies the cream. She would like to know if the patient should wait a few more days or if the medication is just not working. Please contact to further discuss and advise      What Number to Call Back: [phone] or call patient at [phone]

## 2020-08-07 NOTE — TELEPHONE ENCOUNTER
Spoke with patient whom stated she's still experiencing some burning when applying premarin cream. Patient was informed that this is normal (per condition) and should improve in a couple of days. Message will be forwarded to provider for discussion and treatment options. Patient has no further questions or complaints.

## 2020-08-07 NOTE — TELEPHONE ENCOUNTER
Called and spoke with Geraldine from home health in regards to getting clarification on what pt stated to her. Geraldine said pt stated that Jeffy told her to hold off on PT due to pain she was having and wanted to know was it a suggestion or was he putting in new PT orders. Informed Geraldine that it was a suggesting and pt can continue PT. Geraldine verbally understood and was satisfied

## 2020-08-07 NOTE — TELEPHONE ENCOUNTER
Patient would like to know if she needs to cancel her appointment on 9/2/2020. She stated that she has vaginal atropy and she is now taking premarin cream. She stated the gyn told her she is red and has irritation. Should she cancel her appt? Message has been sent to Dr. Becerra.     Xin      ----- Message from Sammy Hay sent at 8/7/2020 10:53 AM CDT -----  Contact: pt  Please call pt at [phone]    Patient would like to reschedule her procedure scheduled on 09/02/2020    Thank you

## 2020-08-10 ENCOUNTER — TELEPHONE (OUTPATIENT)
Dept: INTERNAL MEDICINE | Facility: CLINIC | Age: 85
End: 2020-08-10

## 2020-08-10 DIAGNOSIS — S32.810D CLOSED PELVIC RING FRACTURE WITH ROUTINE HEALING, SUBSEQUENT ENCOUNTER: Primary | ICD-10-CM

## 2020-08-10 RX ORDER — CLOBETASOL PROPIONATE 0.5 MG/G
OINTMENT TOPICAL
Qty: 15 G | Refills: 0 | Status: SHIPPED | OUTPATIENT
Start: 2020-08-10 | End: 2020-08-24 | Stop reason: SDUPTHER

## 2020-08-10 RX ORDER — HYDROCODONE BITARTRATE AND ACETAMINOPHEN 5; 325 MG/1; MG/1
1 TABLET ORAL EVERY 6 HOURS PRN
Qty: 50 TABLET | Refills: 0 | Status: SHIPPED | OUTPATIENT
Start: 2020-08-10 | End: 2020-11-20 | Stop reason: ALTCHOICE

## 2020-08-10 NOTE — TELEPHONE ENCOUNTER
She wants a refill on Narco, she was given 15 tab prescribed by porfirio but was told to call you for refill.states cannot sleep without out it due to the pain.

## 2020-08-10 NOTE — TELEPHONE ENCOUNTER
----- Message from Carmen Terrell sent at 8/10/2020  7:31 AM CDT -----  Contact: Patient [phone]  Prescription Request:     Name of medication: HYDROcodone-acetaminophen (NORCO) 5-325 mg per tablet     Reason for request: Refill    Pharmacy: [id number] [place] [address] [address] AT [facility] [address]    Please advise.    Thank You

## 2020-08-10 NOTE — TELEPHONE ENCOUNTER
Burning with applying premarin cream. Not using applicator. Sending in temovate ointment to use BID to help with irritation. Continue Premarin cream. Touch base in 1 week or sooner if symptoms worsen.

## 2020-08-13 NOTE — PROGRESS NOTES
Digital Medicine: Health  Follow-Up    The history is provided by the patient.                 Patient needs assistance troubleshooting: tech support needed.    Additional Follow-up details: Patient was unable to log into her [facility] [employer] [facility] last week. I informed her that many other patients were experiencing problems, but that they have largely been resolved by this point. Requested that she try again to take a reading this afternoon and she will let me know if it doesn't work.    Wished patient an early happy bday.         Diet-Not assessed          Physical Activity-Not assessed    Medication Adherence-Medication Adherence not addressed.      Substance, Sleep, Stress-Not assessed      Additional monitoring needed.       Patient verbalizes understanding.          There are no preventive care reminders to display for this patient.    Last 5 Patient Entered Readings                                      Current 30 Day Average: 175/68     Recent Readings 8/1/2020 7/9/2020 7/9/2020 7/5/2020 7/4/2020    SBP (mmHg) 175 175 172 182 155    DBP (mmHg) 68 72 73 69 64    Pulse 60 61 60 64 68

## 2020-08-17 ENCOUNTER — PATIENT MESSAGE (OUTPATIENT)
Dept: OBSTETRICS AND GYNECOLOGY | Facility: CLINIC | Age: 85
End: 2020-08-17

## 2020-08-17 NOTE — TELEPHONE ENCOUNTER
Spoke with patient. Thinks she is doing better, pain is improving. However, she would like to be re-evaluated to make sure. Scheduled to follow up this week.

## 2020-08-19 ENCOUNTER — TELEPHONE (OUTPATIENT)
Dept: INTERNAL MEDICINE | Facility: CLINIC | Age: 85
End: 2020-08-19

## 2020-08-19 NOTE — TELEPHONE ENCOUNTER
----- Message from Shyla Rader sent at 8/19/2020  2:31 PM CDT -----  Contact: Geraldine rios/ MYRANDA   Geraldine is requesting for HH to be extended. Pt is just starting to show improvement. Please advise

## 2020-08-20 ENCOUNTER — DOCUMENT SCAN (OUTPATIENT)
Dept: HOME HEALTH SERVICES | Facility: HOSPITAL | Age: 85
End: 2020-08-20
Payer: MEDICARE

## 2020-08-20 ENCOUNTER — OFFICE VISIT (OUTPATIENT)
Dept: OBSTETRICS AND GYNECOLOGY | Facility: CLINIC | Age: 85
End: 2020-08-20
Payer: MEDICARE

## 2020-08-20 VITALS
HEIGHT: 63 IN | BODY MASS INDEX: 22.5 KG/M2 | SYSTOLIC BLOOD PRESSURE: 144 MMHG | WEIGHT: 127 LBS | DIASTOLIC BLOOD PRESSURE: 72 MMHG

## 2020-08-20 DIAGNOSIS — N39.0 URINARY TRACT INFECTION WITHOUT HEMATURIA, SITE UNSPECIFIED: ICD-10-CM

## 2020-08-20 DIAGNOSIS — N95.2 ATROPHIC VAGINITIS: Primary | ICD-10-CM

## 2020-08-20 LAB
AMORPH CRY UR QL COMP ASSIST: NORMAL
BACTERIA #/AREA URNS AUTO: NORMAL /HPF
BILIRUB UR QL STRIP: NEGATIVE
CLARITY UR REFRACT.AUTO: ABNORMAL
COLOR UR AUTO: YELLOW
GLUCOSE UR QL STRIP: NEGATIVE
HGB UR QL STRIP: ABNORMAL
KETONES UR QL STRIP: NEGATIVE
LEUKOCYTE ESTERASE UR QL STRIP: NEGATIVE
MICROSCOPIC COMMENT: NORMAL
NITRITE UR QL STRIP: NEGATIVE
PH UR STRIP: 7 [PH] (ref 5–8)
PROT UR QL STRIP: NEGATIVE
RBC #/AREA URNS AUTO: 4 /HPF (ref 0–4)
SP GR UR STRIP: 1.01 (ref 1–1.03)
SQUAMOUS #/AREA URNS AUTO: 16 /HPF
URN SPEC COLLECT METH UR: ABNORMAL
WBC #/AREA URNS AUTO: 4 /HPF (ref 0–5)

## 2020-08-20 PROCEDURE — 1100F PR PT FALLS ASSESS DOC 2+ FALLS/FALL W/INJURY/YR: ICD-10-PCS | Mod: CPTII,S$GLB,, | Performed by: PHYSICIAN ASSISTANT

## 2020-08-20 PROCEDURE — 81001 URINALYSIS AUTO W/SCOPE: CPT | Mod: HCNC

## 2020-08-20 PROCEDURE — 1159F MED LIST DOCD IN RCRD: CPT | Mod: S$GLB,,, | Performed by: PHYSICIAN ASSISTANT

## 2020-08-20 PROCEDURE — 87086 URINE CULTURE/COLONY COUNT: CPT | Mod: HCNC

## 2020-08-20 PROCEDURE — 99213 OFFICE O/P EST LOW 20 MIN: CPT | Mod: S$GLB,,, | Performed by: PHYSICIAN ASSISTANT

## 2020-08-20 PROCEDURE — 3288F PR FALLS RISK ASSESSMENT DOCUMENTED: ICD-10-PCS | Mod: CPTII,S$GLB,, | Performed by: PHYSICIAN ASSISTANT

## 2020-08-20 PROCEDURE — 1100F PTFALLS ASSESS-DOCD GE2>/YR: CPT | Mod: CPTII,S$GLB,, | Performed by: PHYSICIAN ASSISTANT

## 2020-08-20 PROCEDURE — 3288F FALL RISK ASSESSMENT DOCD: CPT | Mod: CPTII,S$GLB,, | Performed by: PHYSICIAN ASSISTANT

## 2020-08-20 PROCEDURE — 1159F PR MEDICATION LIST DOCUMENTED IN MEDICAL RECORD: ICD-10-PCS | Mod: S$GLB,,, | Performed by: PHYSICIAN ASSISTANT

## 2020-08-20 PROCEDURE — 1126F PR PAIN SEVERITY QUANTIFIED, NO PAIN PRESENT: ICD-10-PCS | Mod: S$GLB,,, | Performed by: PHYSICIAN ASSISTANT

## 2020-08-20 PROCEDURE — 99213 PR OFFICE/OUTPT VISIT, EST, LEVL III, 20-29 MIN: ICD-10-PCS | Mod: S$GLB,,, | Performed by: PHYSICIAN ASSISTANT

## 2020-08-20 PROCEDURE — 1126F AMNT PAIN NOTED NONE PRSNT: CPT | Mod: S$GLB,,, | Performed by: PHYSICIAN ASSISTANT

## 2020-08-20 RX ORDER — FLUCONAZOLE 150 MG/1
TABLET ORAL
Qty: 2 TABLET | Refills: 0 | Status: SHIPPED | OUTPATIENT
Start: 2020-08-20 | End: 2021-04-26

## 2020-08-20 NOTE — PROGRESS NOTES
"Subjective:      Elen Peters is a 89 y.o. female who presents follow up of atrophic vaginitis. Using temovate externally QD and premarin cream with applicatior QD. Symptoms are improving but still having "burning." Burning is in the vagina. Had UTI with PCP in July. Treated with cipro but did not feel better after 3 doses so stopped. States that home health did a urine analysis that was clear. Burning at the end of urination. Denies fever, chills or night sweats. Some nausea but this has been on going.     Office Visit on 08/04/2020   Component Date Value Ref Range Status    Trichomonas vaginalis 08/04/2020 NOT DETECTED   Final    Ruth sp 08/04/2020 NOT DETECTED   Final    Gardnerella vaginalis 08/04/2020 NOT DETECTED   Final   Lab Visit on 07/22/2020   Component Date Value Ref Range Status    Specimen UA 07/22/2020 Urine, Unspecified   Final    Color, UA 07/22/2020 Yellow  Yellow, Straw, Sania Final    Appearance, UA 07/22/2020 Cloudy* Clear Final    pH, UA 07/22/2020 7.0  5.0 - 8.0 Final    Specific Gravity, UA 07/22/2020 1.015  1.005 - 1.030 Final    Protein, UA 07/22/2020 Negative  Negative Final    Glucose, UA 07/22/2020 Negative  Negative Final    Ketones, UA 07/22/2020 Negative  Negative Final    Bilirubin (UA) 07/22/2020 Negative  Negative Final    Occult Blood UA 07/22/2020 Negative  Negative Final    Nitrite, UA 07/22/2020 Negative  Negative Final    Leukocytes, UA 07/22/2020 Negative  Negative Final    Urine Culture, Routine 07/22/2020 *  Final                    Value:COAGULASE-NEGATIVE STAPHYLOCOCCUS SPECIES  10,000 - 49,999 cfu/ml  Susceptibility testing not routinely performed.  No other significant isolate      RBC, UA 07/22/2020 2  0 - 4 /hpf Final    Bacteria 07/22/2020 Rare  None-Occ /hpf Final    Amorphous, UA 07/22/2020 Moderate  None-Moderate Final    Microscopic Comment 07/22/2020 SEE COMMENT   Final   Lab Visit on 07/14/2020   Component Date Value Ref Range Status "    Sodium 07/14/2020 129* 136 - 145 mmol/L Final    Potassium 07/14/2020 4.8  3.5 - 5.1 mmol/L Final    Chloride 07/14/2020 96  95 - 110 mmol/L Final    CO2 07/14/2020 27  23 - 29 mmol/L Final    Glucose 07/14/2020 98  70 - 110 mg/dL Final    BUN, Bld 07/14/2020 11  8 - 23 mg/dL Final    Creatinine 07/14/2020 0.7  0.5 - 1.4 mg/dL Final    Calcium 07/14/2020 10.0  8.7 - 10.5 mg/dL Final    Total Protein 07/14/2020 6.7  6.0 - 8.4 g/dL Final    Albumin 07/14/2020 3.5  3.5 - 5.2 g/dL Final    Total Bilirubin 07/14/2020 0.5  0.1 - 1.0 mg/dL Final    Alkaline Phosphatase 07/14/2020 178* 55 - 135 U/L Final    AST 07/14/2020 26  10 - 40 U/L Final    ALT 07/14/2020 28  10 - 44 U/L Final    Anion Gap 07/14/2020 6* 8 - 16 mmol/L Final    eGFR if African American 07/14/2020 >60.0  >60 mL/min/1.73 m^2 Final    eGFR if non African American 07/14/2020 >60.0  >60 mL/min/1.73 m^2 Final   Admission on 06/28/2020, Discharged on 06/30/2020   Component Date Value Ref Range Status    WBC 06/28/2020 7.48  3.90 - 12.70 K/uL Final    RBC 06/28/2020 3.60* 4.00 - 5.40 M/uL Final    Hemoglobin 06/28/2020 11.7* 12.0 - 16.0 g/dL Final    Hematocrit 06/28/2020 34.3* 37.0 - 48.5 % Final    Mean Corpuscular Volume 06/28/2020 95  82 - 98 fL Final    Mean Corpuscular Hemoglobin 06/28/2020 32.5* 27.0 - 31.0 pg Final    Mean Corpuscular Hemoglobin Conc 06/28/2020 34.1  32.0 - 36.0 g/dL Final    RDW 06/28/2020 13.7  11.5 - 14.5 % Final    Platelets 06/28/2020 147* 150 - 350 K/uL Final    MPV 06/28/2020 10.8  9.2 - 12.9 fL Final    Immature Granulocytes 06/28/2020 0.4  0.0 - 0.5 % Final    Gran # (ANC) 06/28/2020 5.4  1.8 - 7.7 K/uL Final    Immature Grans (Abs) 06/28/2020 0.03  0.00 - 0.04 K/uL Final    Lymph # 06/28/2020 1.0  1.0 - 4.8 K/uL Final    Mono # 06/28/2020 0.8  0.3 - 1.0 K/uL Final    Eos # 06/28/2020 0.2  0.0 - 0.5 K/uL Final    Baso # 06/28/2020 0.03  0.00 - 0.20 K/uL Final    nRBC 06/28/2020 0  0 /100  WBC Final    Gran% 06/28/2020 72.7  38.0 - 73.0 % Final    Lymph% 06/28/2020 12.8* 18.0 - 48.0 % Final    Mono% 06/28/2020 10.6  4.0 - 15.0 % Final    Eosinophil% 06/28/2020 3.1  0.0 - 8.0 % Final    Basophil% 06/28/2020 0.4  0.0 - 1.9 % Final    Differential Method 06/28/2020 Automated   Final    Specimen UA 06/28/2020 Urine, Clean Catch   Final    Color, UA 06/28/2020 Yellow  Yellow, Straw, Sania Final    Appearance, UA 06/28/2020 Cloudy* Clear Final    pH, UA 06/28/2020 7.0  5.0 - 8.0 Final    Specific [place], UA 06/28/2020 1.010  1.005 - 1.030 Final    Protein, UA 06/28/2020 Negative  Negative Final    Glucose, UA 06/28/2020 Negative  Negative Final    Ketones, UA 06/28/2020 Negative  Negative Final    Bilirubin (UA) 06/28/2020 Negative  Negative Final    Occult Blood UA 06/28/2020 2+* Negative Final    Nitrite, UA 06/28/2020 Negative  Negative Final    Leukocytes, UA 06/28/2020 1+* Negative Final    SARS-CoV-2 RNA, Amplification, Qual 06/28/2020 Negative  Negative Final    Prothrombin Time 06/28/2020 9.9  9.0 - 12.5 sec Final    INR 06/28/2020 0.9  0.8 - 1.2 Final    Sodium 06/28/2020 128* 136 - 145 mmol/L Final    Potassium 06/28/2020 4.8  3.5 - 5.1 mmol/L Final    Chloride 06/28/2020 96  95 - 110 mmol/L Final    CO2 06/28/2020 24  23 - 29 mmol/L Final    Glucose 06/28/2020 98  70 - 110 mg/dL Final    BUN, Bld 06/28/2020 13  8 - 23 mg/dL Final    Creatinine 06/28/2020 0.7  0.5 - 1.4 mg/dL Final    Calcium 06/28/2020 9.3  8.7 - 10.5 mg/dL Final    Total Protein 06/28/2020 6.3  6.0 - 8.4 g/dL Final    Albumin 06/28/2020 3.3* 3.5 - 5.2 g/dL Final    Total Bilirubin 06/28/2020 0.8  0.1 - 1.0 mg/dL Final    Alkaline Phosphatase 06/28/2020 56  55 - 135 U/L Final    AST 06/28/2020 32  10 - 40 U/L Final    ALT 06/28/2020 33  10 - 44 U/L Final    Anion Gap 06/28/2020 8  8 - 16 mmol/L Final    eGFR if African American 06/28/2020 >60.0  >60 mL/min/1.73 m^2 Final    eGFR if non   06/28/2020 >60.0  >60 mL/min/1.73 m^2 Final    Vit D, 25-Hydroxy 06/28/2020 65  30 - 96 ng/mL Final    RBC, UA 06/28/2020 4  0 - 4 /hpf Final    WBC, UA 06/28/2020 7* 0 - 5 /hpf Final    Bacteria 06/28/2020 Rare  None-Occ /hpf Final    Squam Epithel, UA 06/28/2020 0  /hpf Final    Microscopic Comment 06/28/2020 SEE COMMENT   Final    BNP 06/28/2020 537* 0 - 99 pg/mL Final    Sodium 06/29/2020 131* 136 - 145 mmol/L Final    Potassium 06/29/2020 4.4  3.5 - 5.1 mmol/L Final    Chloride 06/29/2020 100  95 - 110 mmol/L Final    CO2 06/29/2020 25  23 - 29 mmol/L Final    Glucose 06/29/2020 92  70 - 110 mg/dL Final    BUN, Bld 06/29/2020 10  8 - 23 mg/dL Final    Creatinine 06/29/2020 0.7  0.5 - 1.4 mg/dL Final    Calcium 06/29/2020 8.5* 8.7 - 10.5 mg/dL Final    Total Protein 06/29/2020 5.7* 6.0 - 8.4 g/dL Final    Albumin 06/29/2020 2.9* 3.5 - 5.2 g/dL Final    Total Bilirubin 06/29/2020 0.6  0.1 - 1.0 mg/dL Final    Alkaline Phosphatase 06/29/2020 52* 55 - 135 U/L Final    AST 06/29/2020 22  10 - 40 U/L Final    ALT 06/29/2020 25  10 - 44 U/L Final    Anion Gap 06/29/2020 6* 8 - 16 mmol/L Final    eGFR if African American 06/29/2020 >60.0  >60 mL/min/1.73 m^2 Final    eGFR if non African American 06/29/2020 >60.0  >60 mL/min/1.73 m^2 Final    Magnesium 06/29/2020 1.9  1.6 - 2.6 mg/dL Final    Phosphorus 06/29/2020 3.2  2.7 - 4.5 mg/dL Final    WBC 06/29/2020 5.68  3.90 - 12.70 K/uL Final    RBC 06/29/2020 3.31* 4.00 - 5.40 M/uL Final    Hemoglobin 06/29/2020 10.6* 12.0 - 16.0 g/dL Final    Hematocrit 06/29/2020 32.3* 37.0 - 48.5 % Final    Mean Corpuscular Volume 06/29/2020 98  82 - 98 fL Final    Mean Corpuscular Hemoglobin 06/29/2020 32.0* 27.0 - 31.0 pg Final    Mean Corpuscular Hemoglobin Conc 06/29/2020 32.8  32.0 - 36.0 g/dL Final    RDW 06/29/2020 13.8  11.5 - 14.5 % Final    Platelets 06/29/2020 158  150 - 350 K/uL Final    MPV 06/29/2020 10.7  9.2 - 12.9 fL  Final    Immature Granulocytes 06/29/2020 0.4  0.0 - 0.5 % Final    Gran # (ANC) 06/29/2020 3.6  1.8 - 7.7 K/uL Final    Immature Grans (Abs) 06/29/2020 0.02  0.00 - 0.04 K/uL Final    Lymph # 06/29/2020 0.9* 1.0 - 4.8 K/uL Final    Mono # 06/29/2020 0.7  0.3 - 1.0 K/uL Final    Eos # 06/29/2020 0.4  0.0 - 0.5 K/uL Final    Baso # 06/29/2020 0.05  0.00 - 0.20 K/uL Final    nRBC 06/29/2020 0  0 /100 WBC Final    Gran% 06/29/2020 62.7  38.0 - 73.0 % Final    Lymph% 06/29/2020 16.2* 18.0 - 48.0 % Final    Mono% 06/29/2020 12.9  4.0 - 15.0 % Final    Eosinophil% 06/29/2020 6.9  0.0 - 8.0 % Final    Basophil% 06/29/2020 0.9  0.0 - 1.9 % Final    Differential Method 06/29/2020 Automated   Final    Sodium 06/30/2020 134* 136 - 145 mmol/L Final    Potassium 06/30/2020 4.6  3.5 - 5.1 mmol/L Final    Chloride 06/30/2020 102  95 - 110 mmol/L Final    CO2 06/30/2020 25  23 - 29 mmol/L Final    Glucose 06/30/2020 88  70 - 110 mg/dL Final    BUN, Bld 06/30/2020 13  8 - 23 mg/dL Final    Creatinine 06/30/2020 0.7  0.5 - 1.4 mg/dL Final    Calcium 06/30/2020 8.8  8.7 - 10.5 mg/dL Final    Total Protein 06/30/2020 5.9* 6.0 - 8.4 g/dL Final    Albumin 06/30/2020 2.9* 3.5 - 5.2 g/dL Final    Total Bilirubin 06/30/2020 0.6  0.1 - 1.0 mg/dL Final    Alkaline Phosphatase 06/30/2020 54* 55 - 135 U/L Final    AST 06/30/2020 26  10 - 40 U/L Final    ALT 06/30/2020 30  10 - 44 U/L Final    Anion Gap 06/30/2020 7* 8 - 16 mmol/L Final    eGFR if African American 06/30/2020 >60.0  >60 mL/min/1.73 m^2 Final    eGFR if non African American 06/30/2020 >60.0  >60 mL/min/1.73 m^2 Final    Magnesium 06/30/2020 1.9  1.6 - 2.6 mg/dL Final    Phosphorus 06/30/2020 3.0  2.7 - 4.5 mg/dL Final    WBC 06/30/2020 5.26  3.90 - 12.70 K/uL Final    RBC 06/30/2020 3.46* 4.00 - 5.40 M/uL Final    Hemoglobin 06/30/2020 11.1* 12.0 - 16.0 g/dL Final    Hematocrit 06/30/2020 34.1* 37.0 - 48.5 % Final    Mean Corpuscular Volume  06/30/2020 99* 82 - 98 fL Final    Mean Corpuscular Hemoglobin 06/30/2020 32.1* 27.0 - 31.0 pg Final    Mean Corpuscular Hemoglobin Conc 06/30/2020 32.6  32.0 - 36.0 g/dL Final    RDW 06/30/2020 13.6  11.5 - 14.5 % Final    Platelets 06/30/2020 183  150 - 350 K/uL Final    MPV 06/30/2020 10.2  9.2 - 12.9 fL Final    Immature Granulocytes 06/30/2020 0.4  0.0 - 0.5 % Final    Gran # (ANC) 06/30/2020 3.2  1.8 - 7.7 K/uL Final    Immature Grans (Abs) 06/30/2020 0.02  0.00 - 0.04 K/uL Final    Lymph # 06/30/2020 1.0  1.0 - 4.8 K/uL Final    Mono # 06/30/2020 0.6  0.3 - 1.0 K/uL Final    Eos # 06/30/2020 0.3  0.0 - 0.5 K/uL Final    Baso # 06/30/2020 0.07  0.00 - 0.20 K/uL Final    nRBC 06/30/2020 0  0 /100 WBC Final    Gran% 06/30/2020 60.8  38.0 - 73.0 % Final    Lymph% 06/30/2020 19.6  18.0 - 48.0 % Final    Mono% 06/30/2020 12.2  4.0 - 15.0 % Final    Eosinophil% 06/30/2020 5.7  0.0 - 8.0 % Final    Basophil% 06/30/2020 1.3  0.0 - 1.9 % Final    Differential Method 06/30/2020 Automated   Final       Past Medical History:   Diagnosis Date    Abnormal stress test 11/18/2015    Arthritis     Bladder cancer     Cataract     Coronary artery disease involving native coronary artery 1/6/2016    Depression     GERD (gastroesophageal reflux disease)     HTN (hypertension), benign 11/18/2015    Hx of bladder infections     Hyperlipemia     OP (osteoporosis)     Positive cardiac stress test 1/6/2016    Renal cancer     Syncope 1/6/2016    TMJ (dislocation of temporomandibular joint)     Upper back pain 12/1/2015    Ureteral cancer     Venous insufficiency 2017    Villous adenoma of colon 5/31/2013     Past Surgical History:   Procedure Laterality Date    APPENDECTOMY      BACK SURGERY      CATARACT EXTRACTION W/  INTRAOCULAR LENS IMPLANT Left 3/16/15    kristy    CATARACT EXTRACTION W/  INTRAOCULAR LENS IMPLANT Right 4/6/15    kristy    COLONOSCOPY  10/21/2013    CYSTOSCOPY       ESOPHAGOGASTRODUODENOSCOPY N/A 2019    Procedure: EGD (ESOPHAGOGASTRODUODENOSCOPY);  Surgeon: Diony Dey MD;  Location: [address];  Service: Endoscopy;  Laterality: N/A;    EYE SURGERY      NEPHRECTOMY      L    SPINE SURGERY      TONSILLECTOMY, ADENOIDECTOMY       Social History     Tobacco Use    Smoking status: Former Smoker     Quit date:      Years since quittin.6    Smokeless tobacco: Never Used    Tobacco comment: in college   Substance Use Topics    Alcohol use: No    Drug use: No     Family History   Problem Relation Age of Onset    Leukemia Mother     Heart disease Mother     Heart attack Mother     Cancer Mother         leukemia    Goiter Mother     Leukemia Father     Cancer Father         leukemia    Heart disease Maternal Grandfather     No Known Problems Brother     No Known Problems Son     No Known Problems Son     No Known Problems Son     Cancer Son         throat    No Known Problems Son     No Known Problems Son     No Known Problems Maternal Aunt     No Known Problems Maternal Uncle     No Known Problems Paternal Aunt     No Known Problems Paternal Uncle     No Known Problems Maternal Grandmother     No Known Problems Paternal Grandmother     No Known Problems Paternal Grandfather     No Known Problems Sister     Amblyopia Neg Hx     Blindness Neg Hx     Cataracts Neg Hx     Diabetes Neg Hx     Glaucoma Neg Hx     Hypertension Neg Hx     Macular degeneration Neg Hx     Retinal detachment Neg Hx     Strabismus Neg Hx     Stroke Neg Hx     Thyroid disease Neg Hx     Breast cancer Neg Hx     Colon cancer Neg Hx     Esophageal cancer Neg Hx      OB History    Para Term  AB Living   6 6 6     6   SAB TAB Ectopic Multiple Live Births                  # Outcome Date GA Lbr Candido/2nd Weight Sex Delivery Anes PTL Lv   6 Term            5 Term            4 Term            3 Term            2 Term            1 Term                 Current Outpatient Medications:     acetaminophen (TYLENOL) 500 MG tablet, Take 1 tablet (500 mg total) by mouth 3 (three) times daily., Disp: , Rfl: 0    amLODIPine (NORVASC) 5 MG tablet, Take 1 tablet (5 mg total) by mouth once daily. One-2 per day or as directed, Disp: 90 tablet, Rfl: 3    aspirin (ECOTRIN) 81 MG EC tablet, Take 81 mg by mouth once daily., Disp: , Rfl:     atorvastatin (LIPITOR) 40 MG tablet, TAKE 1 TABLET EVERY DAY OR AS DIRECTED, Disp: 90 tablet, Rfl: 3    calcium-magnesium-zinc Tab, Take 2 tablets by mouth once daily at 6am. 1 Tablet Oral Every day, Disp: , Rfl:     carvediloL (COREG) 6.25 MG tablet, TAKE 1 TABLET TWICE DAILY WITH MEALS  OR AS DIRECTED, Disp: 180 tablet, Rfl: 3    ciprofloxacin HCl (CIPRO) 250 MG tablet, 1 tablet., Disp: , Rfl:     citalopram (CELEXA) 20 MG tablet, TAKE 1 TABLET EVERY DAY, Disp: 90 tablet, Rfl: 3    clobetasol 0.05% (TEMOVATE) 0.05 % Oint, Apply externally as directed x 2 weeks, Disp: 15 g, Rfl: 0    diclofenac sodium (VOLTAREN) 1 % Gel, Apply 2 g topically 3 (three) times daily., Disp: 100 g, Rfl: 0    esomeprazole (NEXIUM) 40 MG capsule, Take 1 capsule (40 mg total) by mouth before breakfast., Disp: 90 capsule, Rfl: 3    hydrALAZINE (APRESOLINE) 25 MG tablet, Take 1 tablet (25 mg total) by mouth every 8 (eight) hours., Disp: 90 tablet, Rfl: 2    HYDROcodone-acetaminophen (NORCO) 5-325 mg per tablet, Take 1 tablet by mouth every 6 (six) hours as needed for Pain., Disp: 50 tablet, Rfl: 0    methocarbamoL (ROBAXIN) 500 MG Tab, Take 1 tablet (500 mg total) by mouth 4 (four) times daily., Disp: 60 tablet, Rfl: 1    mv-min-folic acid-lutein (THERAGRAN-M ADVANCED 50 PLUS) 0.4-250 mg-mcg Tab, Take by mouth. 1 Tablet Oral Every day, Disp: , Rfl:     nitroGLYCERIN (NITROSTAT) 0.4 MG SL tablet, Place 1 tablet (0.4 mg total) under the tongue every 5 (five) minutes as needed for Chest pain., Disp: 25 tablet, Rfl: 3    omega-3 fatty acids  "1,000 mg Cap, Take 1 capsule by mouth once daily. 2  Capsule Oral Every day, Disp: , Rfl:     polyethylene glycol (GLYCOLAX) 17 gram PwPk, Take 17 g by mouth once daily., Disp: 30 packet, Rfl: 1    senna-docusate 8.6-50 mg (PERICOLACE) 8.6-50 mg per tablet, Take 1 tablet by mouth 2 (two) times daily as needed for Constipation. (Patient not taking: Reported on 8/5/2020), Disp: 30 tablet, Rfl: 0    traMADoL (ULTRAM) 50 mg tablet, Take 1 tablet (50 mg total) by mouth every 8 (eight) hours as needed., Disp: 45 tablet, Rfl: 0    Review of Systems:  General: No fever, chills, or weight loss.  Chest: No chest pain, shortness of breath, or palpitations.  Breast: No pain, masses, or nipple discharge.  Vulva: No pain, lesions, or itching.  Vagina: No relaxation, itching, discharge, or lesions. +"burning"  Abdomen: No pain, nausea, vomiting, diarrhea, or constipation.  Urinary: No incontinence, nocturia, frequency, or dysuria.  Extremities:  No leg cramps, edema, or calf pain.  Neurologic: No headaches, dizziness, or visual changes.    Objective:     Vitals:    08/20/20 0902   BP: (!) 144/72   Weight: 57.6 kg (126 lb 15.8 oz)   Height: 5' 3" (1.6 m)   PainSc: 0-No pain     Body mass index is 22.49 kg/m².    PHYSICAL EXAM:  APPEARANCE: Well nourished, well developed, in no acute distress.  AFFECT: WNL, alert and oriented x 3  ABDOMEN: Soft.  No tenderness or masses.  No hepatosplenomegaly.  No hernias. No CVA tenderness bilaterally.  BREASTS: Symmetrical, no skin changes or visible lesions.  No palpable masses, nipple discharge bilaterally.  PELVIC: Normal external genitalia without lesions.  Normal hair distribution. +slight erythema on bilateral labia minora and vaginal wall. No discharge.  EXTREMITIES: No edema.    Assessment:    Atrophic vaginitis: Much improved on exam since last visit. Possibly some underlying yeast. Vaginosis screen negative at last visit, but will treat with diflucan based on symptoms. Physical exam " limited due to recent pelvic fracture.  -     fluconazole (DIFLUCAN) 150 MG Tab; 1 PO QD x1, repeat in 3 days  Dispense: 2 tablet; Refill: 0    Urinary tract infection without hematuria, site unspecified: UTI with pcp on 7/22/20. Never completed course of cipro. Will recheck urine today.  -     Urine culture  -     Urinalysis        Plan:   Continue Temovate ointment externally in AM.  Continue Premarin cream nightly with applicator and apply small amount externally.  Start diflucan 150mg QD x1, repeat in 3 days.  Send urine for UA and culture. Treat pending.  Follow up in 10 days if not improving.    Instructed patient to call if she experiences any side effects or has any questions.

## 2020-08-21 ENCOUNTER — DOCUMENT SCAN (OUTPATIENT)
Dept: HOME HEALTH SERVICES | Facility: HOSPITAL | Age: 85
End: 2020-08-21
Payer: MEDICARE

## 2020-08-21 LAB
BACTERIA UR CULT: NORMAL
BACTERIA UR CULT: NORMAL

## 2020-08-24 ENCOUNTER — TELEPHONE (OUTPATIENT)
Dept: OBSTETRICS AND GYNECOLOGY | Facility: CLINIC | Age: 85
End: 2020-08-24

## 2020-08-24 DIAGNOSIS — N95.2 ATROPHIC VAGINITIS: ICD-10-CM

## 2020-08-24 DIAGNOSIS — N95.0 POSTMENOPAUSAL BLEEDING: Primary | ICD-10-CM

## 2020-08-24 RX ORDER — CLOBETASOL PROPIONATE 0.5 MG/G
OINTMENT TOPICAL
Qty: 15 G | Refills: 0 | Status: SHIPPED | OUTPATIENT
Start: 2020-08-24 | End: 2020-08-26 | Stop reason: SDUPTHER

## 2020-08-24 NOTE — TELEPHONE ENCOUNTER
Called patient to discuss hematuria. Reports that she has had light vaginal spotting starting 8/21/20 since exam last week. Still using premarin cream with applicator. Will order pelvic ultrasound, avoid transvaginal if possible. Stop using applicator for now and just use externally. Follow up with me or with MD if needed to do endometrial biopsy pending results.

## 2020-08-24 NOTE — PROGRESS NOTES
Ms. Peters thinks burning is worse with premarin. Concerns that she has caused small laceration in vaginal canal with applicator. D/c premarin. Just use temovate ointment externally. Schedule for pelvic ultrasound and will schedule follow up pending results. Contact if symptoms change or worsen.

## 2020-08-25 ENCOUNTER — HOSPITAL ENCOUNTER (OUTPATIENT)
Dept: RADIOLOGY | Facility: HOSPITAL | Age: 85
Discharge: HOME | End: 2020-08-25
Attending: PHYSICIAN ASSISTANT
Payer: MEDICARE

## 2020-08-25 DIAGNOSIS — N95.0 POSTMENOPAUSAL BLEEDING: ICD-10-CM

## 2020-08-25 PROCEDURE — 76856 US EXAM PELVIC COMPLETE: CPT | Mod: 26,HCNC,, | Performed by: RADIOLOGY

## 2020-08-25 PROCEDURE — 76856 US EXAM PELVIC COMPLETE: CPT | Mod: TC,HCNC

## 2020-08-25 PROCEDURE — 76856 US PELVIS COMPLETE NON OB: ICD-10-PCS | Mod: 26,HCNC,, | Performed by: RADIOLOGY

## 2020-08-26 ENCOUNTER — TELEPHONE (OUTPATIENT)
Dept: OBSTETRICS AND GYNECOLOGY | Facility: CLINIC | Age: 85
End: 2020-08-26

## 2020-08-26 DIAGNOSIS — N95.2 ATROPHIC VAGINITIS: ICD-10-CM

## 2020-08-26 RX ORDER — CLOBETASOL PROPIONATE 0.5 MG/G
OINTMENT TOPICAL
Qty: 45 G | Refills: 0 | Status: SHIPPED | OUTPATIENT
Start: 2020-08-26 | End: 2021-04-26

## 2020-08-26 NOTE — TELEPHONE ENCOUNTER
Reviewed pelvic ultrasound with patient. Exam limited since unable to tolerate transvaginal. 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 Vaginal spotting has resolved. Likely due to trauma from applicator. Using temovate BID but still having some burning. Increase temovate to QID x 1 week, then reduce to BID x 1 week, then QD x 1 week. I sent a message to Dr. Hayes' nurse to try to get scheduled in [facility]. She is to contact me if she has any more vaginal bleeding.

## 2020-08-27 ENCOUNTER — DOCUMENT SCAN (OUTPATIENT)
Dept: HOME HEALTH SERVICES | Facility: HOSPITAL | Age: 85
End: 2020-08-27
Payer: MEDICARE

## 2020-08-27 ENCOUNTER — TELEPHONE (OUTPATIENT)
Dept: OBSTETRICS AND GYNECOLOGY | Facility: CLINIC | Age: 85
End: 2020-08-27

## 2020-08-27 NOTE — TELEPHONE ENCOUNTER
Discussed with patient. Reviewed soaps and detergents. Only gentle soaps without fragrance or dyes. She will continue Temovate ointment as previously discussed. Contact if symptoms do not improve in 2-3 weeks.      ----- Message from Emani Palmer RN sent at 8/26/2020 11:06 AM CDT -----  Dr Freddy Su reviewed the patient's chart. He said let's give her some time on the new clobetasol regimen you ordered before we need to see her. He said make sure she is not continuing to use Vagisil, it is an irritant and will add to the burning. He also said to make sure she is not using anything else on the vulva/vagina like wipes, soaps, laundry detergents. Advise her to use all hypoallergenic soaps/detergents, use nothing with dyes or fragrances. He said by giving her more time with the increased clobetasol use she may get better resolution of symptoms. He is concerned about trying to see her and needing to do an exam with her fractured pelvis.  Keep us posted if she isn't doing better in a few weeks and I can get her scheduled.  Cinthya  ----- Message -----  From: Sharlene Alvarez PA-C  Sent: 8/26/2020   8:26 AM CDT  To: DEJA Rodriguez!    I'm a PA that is working with Dr. Bassett. I was wondering if I could get Ms. Peters in with Dr. Hayes for his vulvar clinic. She is a 89 year old female with vulvar burning and itching x 1 year. PCP gave her premarin but worsened the burning. Then had some vaginal spotting likely due to trauma from the applicator. I have her using temovate but not improving much. She is complicated by a recent pelvic fracture so physical exams are hard. Do you think Dr. Hayes would be able to see her?    Thanks so much!    Sharlene

## 2020-08-28 ENCOUNTER — PATIENT OUTREACH (OUTPATIENT)
Dept: OTHER | Facility: OTHER | Age: 85
End: 2020-08-28

## 2020-08-28 NOTE — PROGRESS NOTES
Digital Medicine: Health  Follow-Up    The history is provided by the patient.                 Patient needs assistance troubleshooting: tech support needed.    Additional Follow-up details: Patient has been having trouble logging into her [employer] account, and is not comfortable with the technology to work with tech support. She is unable to drive herself to the [place]. Provided her the tech support phone number to call when there is a family member there to help with the tech support.         Diet-Not assessed          Physical Activity-Not assessed    Medication Adherence-Medication Adherence not addressed.      Substance, Sleep, Stress-Not assessed      Additional monitoring needed.  Tech support needed.        There are no preventive care reminders to display for this patient.    Last 5 Patient Entered Readings                                      Current 30 Day Average: 175/68     Recent Readings 8/1/2020 7/9/2020 7/9/2020 7/5/2020 7/4/2020    SBP (mmHg) 175 175 172 182 155    DBP (mmHg) 68 72 73 69 64    Pulse 60 61 60 64 68

## 2020-08-30 PROCEDURE — G0179 MD RECERTIFICATION HHA PT: HCPCS | Mod: ,,, | Performed by: INTERNAL MEDICINE

## 2020-08-30 PROCEDURE — G0179 PR HOME HEALTH MD RECERTIFICATION: ICD-10-PCS | Mod: ,,, | Performed by: INTERNAL MEDICINE

## 2020-09-01 ENCOUNTER — DOCUMENT SCAN (OUTPATIENT)
Dept: HOME HEALTH SERVICES | Facility: HOSPITAL | Age: 85
End: 2020-09-01
Payer: MEDICARE

## 2020-09-02 ENCOUNTER — PATIENT OUTREACH (OUTPATIENT)
Dept: ADMINISTRATIVE | Facility: OTHER | Age: 85
End: 2020-09-02

## 2020-09-04 ENCOUNTER — TELEPHONE (OUTPATIENT)
Dept: OBSTETRICS AND GYNECOLOGY | Facility: CLINIC | Age: 85
End: 2020-09-04

## 2020-09-04 NOTE — TELEPHONE ENCOUNTER
----- Message from Chung Mendiola, Patient Care Assistant sent at 9/4/2020 10:30 AM CDT -----  Name of Who is Calling: SONNY SIMPSON [[id number]]    What is the request in detail: Requesting a call back in regards of some concerns. Please contact to further discuss and advise      Can the clinic reply by [facility]: No    What Number to Call Back if not in [facility]:   [phone]

## 2020-09-04 NOTE — TELEPHONE ENCOUNTER
Spoke with patient whom stated she has stopped from her last visit and has discontinued creme. However, patient believes the creme has irritated her eyes. Patient requested to speak with Sharlene to discuss updates. Message will be forwarded. Patient has no further questions or complaints.

## 2020-09-04 NOTE — TELEPHONE ENCOUNTER
Spoke with patient. She read that temovate ointment can affect her eyes so she does not want to use it anymore. She does not think premarin cream from her PCP was helpful.  She will reschedule her cystoscope with urology. If continues to have Vulvar itching or burning, she will contact the office and I will refer her to Dr. Hayes[facility].

## 2020-09-08 ENCOUNTER — EXTERNAL HOME HEALTH (OUTPATIENT)
Dept: HOME HEALTH SERVICES | Facility: HOSPITAL | Age: 85
End: 2020-09-08
Payer: MEDICARE

## 2020-09-09 ENCOUNTER — HOSPITAL ENCOUNTER (OUTPATIENT)
Dept: RADIOLOGY | Facility: HOSPITAL | Age: 85
Discharge: HOME | End: 2020-09-09
Attending: NURSE PRACTITIONER
Payer: MEDICARE

## 2020-09-09 ENCOUNTER — OFFICE VISIT (OUTPATIENT)
Dept: ORTHOPEDICS | Facility: CLINIC | Age: 85
End: 2020-09-09
Payer: MEDICARE

## 2020-09-09 VITALS
WEIGHT: 127 LBS | HEART RATE: 55 BPM | BODY MASS INDEX: 22.5 KG/M2 | DIASTOLIC BLOOD PRESSURE: 58 MMHG | HEIGHT: 63 IN | SYSTOLIC BLOOD PRESSURE: 149 MMHG

## 2020-09-09 DIAGNOSIS — S32.810D CLOSED PELVIC RING FRACTURE WITH ROUTINE HEALING, SUBSEQUENT ENCOUNTER: Primary | ICD-10-CM

## 2020-09-09 DIAGNOSIS — R10.2 PAIN IN PELVIS: ICD-10-CM

## 2020-09-09 DIAGNOSIS — R10.2 PAIN IN PELVIS: Primary | ICD-10-CM

## 2020-09-09 PROCEDURE — 1126F AMNT PAIN NOTED NONE PRSNT: CPT | Mod: HCNC,S$GLB,, | Performed by: NURSE PRACTITIONER

## 2020-09-09 PROCEDURE — 3288F PR FALLS RISK ASSESSMENT DOCUMENTED: ICD-10-PCS | Mod: HCNC,CPTII,S$GLB, | Performed by: NURSE PRACTITIONER

## 2020-09-09 PROCEDURE — 3288F FALL RISK ASSESSMENT DOCD: CPT | Mod: HCNC,CPTII,S$GLB, | Performed by: NURSE PRACTITIONER

## 2020-09-09 PROCEDURE — 72190 X-RAY EXAM OF PELVIS: CPT | Mod: TC,HCNC

## 2020-09-09 PROCEDURE — 1100F PTFALLS ASSESS-DOCD GE2>/YR: CPT | Mod: HCNC,CPTII,S$GLB, | Performed by: NURSE PRACTITIONER

## 2020-09-09 PROCEDURE — 72190 X-RAY EXAM OF PELVIS: CPT | Mod: 26,HCNC,, | Performed by: RADIOLOGY

## 2020-09-09 PROCEDURE — 72190 XR PELVIS AP INLET AND OUTLET: ICD-10-PCS | Mod: 26,HCNC,, | Performed by: RADIOLOGY

## 2020-09-09 PROCEDURE — 99213 PR OFFICE/OUTPT VISIT, EST, LEVL III, 20-29 MIN: ICD-10-PCS | Mod: HCNC,S$GLB,, | Performed by: NURSE PRACTITIONER

## 2020-09-09 PROCEDURE — 1159F MED LIST DOCD IN RCRD: CPT | Mod: HCNC,S$GLB,, | Performed by: NURSE PRACTITIONER

## 2020-09-09 PROCEDURE — 99999 PR PBB SHADOW E&M-EST. PATIENT-LVL IV: ICD-10-PCS | Mod: PBBFAC,HCNC,, | Performed by: NURSE PRACTITIONER

## 2020-09-09 PROCEDURE — 1100F PR PT FALLS ASSESS DOC 2+ FALLS/FALL W/INJURY/YR: ICD-10-PCS | Mod: HCNC,CPTII,S$GLB, | Performed by: NURSE PRACTITIONER

## 2020-09-09 PROCEDURE — 99999 PR PBB SHADOW E&M-EST. PATIENT-LVL IV: CPT | Mod: PBBFAC,HCNC,, | Performed by: NURSE PRACTITIONER

## 2020-09-09 PROCEDURE — 1159F PR MEDICATION LIST DOCUMENTED IN MEDICAL RECORD: ICD-10-PCS | Mod: HCNC,S$GLB,, | Performed by: NURSE PRACTITIONER

## 2020-09-09 PROCEDURE — 99213 OFFICE O/P EST LOW 20 MIN: CPT | Mod: HCNC,S$GLB,, | Performed by: NURSE PRACTITIONER

## 2020-09-09 PROCEDURE — 1126F PR PAIN SEVERITY QUANTIFIED, NO PAIN PRESENT: ICD-10-PCS | Mod: HCNC,S$GLB,, | Performed by: NURSE PRACTITIONER

## 2020-09-09 NOTE — PROGRESS NOTES
Subjective:      Patient ID: Elen Peters is a 89 y.o. female.    Chief Complaint: Pain of the Pelvis    Patient is a 89 y/o female who presents today for follow up for her left pubic ramus fracture.  I saw her last on 8/5/2020 at which time it was recommended she continue to weight bear as tolerated and therapy.  She is currently reporting intermittent pain to her left hip, mostly at night.  She has been using Norco to assist with sleeping.  She denies falls or injuries since last seen.  She is getting home health therapy through [facility].  No reports of numbness or tingling sensation to her lower leg or feet.      Review of Systems   Musculoskeletal: Positive for falls and joint pain.   All other systems reviewed and are negative.        Objective:      General    Vitals reviewed.  Constitutional: She is oriented to person, place, and time. She appears well-developed and well-nourished. No distress.   HENT:   Head: Normocephalic and atraumatic.   Nose: Nose normal.   Eyes: Conjunctivae are normal. Pupils are equal, round, and reactive to light.   Neck: Neck supple.   Cardiovascular: Intact distal pulses.    Pulmonary/Chest: Effort normal.   Neurological: She is alert and oriented to person, place, and time.   Psychiatric: She has a normal mood and affect. Her behavior is normal. Judgment and thought content normal.     General Musculoskeletal Exam   Gait: antalgic   Pelvic Obliquity: none        Right Hip Exam   Right hip exam is normal.   Left Hip Exam     Inspection   No deformity of hip.  Quadriceps Atrophy:  negative    Tenderness   The patient tender to palpation of the pubic symphysis.    Range of Motion   Extension: normal   Flexion: normal   External rotation: abnormal   Internal rotation: abnormal     Other   Sensation: normal    Comments:  Mild tenderness over left trochanteric site.          Muscle Strength   Left Lower Extremity   Hip Abduction: 4/5   Hip Adduction: 4/5   Hip Flexion: 4/5     Vascular  Exam       Left Pulses  Dorsalis Pedis:      2+  Posterior Tibial:      2+          RADS: Pelvic with inlet and outlet x-ray obtained and personally reviewed by me.  Fracture to the left superior and inferior pubic rami appears in good position.  There is callus formation.  No new fractures seen.        Assessment:       Encounter Diagnosis   Name Primary?    Closed pelvic ring fracture with routine healing, subsequent encounter Yes          Plan:       Elen was seen today for pain.    Diagnoses and all orders for this visit:    Closed pelvic ring fracture with routine healing, subsequent encounter      -X-ray findings shows some healing of her fracture.  -Continue non-operative treatment.  -Recommendation is to continue PT/OT with [facility].  Weight bear as tolerated.  -Advised patient to avoid using opioids for sleep aids, should reserve for severe pain.  She can try to manage her pain with Aleve 1 tab PO bid and Tylenol 500 mg tid.    -I will see her back in 12 weeks at which time she will need repeat x-ray of her pelvis with inlet and outlet views.  -Call for questions or concerns.    Future Appointments   Date Time Provider Department Center   9/23/2020  9:30 AM PROCEDURE, UROLOGY ROOM 1 [facility] UROLOG [facility]   10/1/2020  9:00 AM MONIE Hanna MD [facility] OPHTHAL [facility]   12/9/2020  3:30 PM Jeffy Savage NP [facility] ORTHO [facility]

## 2020-09-10 ENCOUNTER — TELEPHONE (OUTPATIENT)
Dept: OPHTHALMOLOGY | Facility: CLINIC | Age: 85
End: 2020-09-10

## 2020-09-10 NOTE — TELEPHONE ENCOUNTER
----- Message from Delaney Mora sent at 9/10/2020 10:27 AM CDT -----  Contact: Elen Peters  Patient stated she having some trouble with both of her eyes. Patient stated she wanted Dr. Chavez to see her. Patient contact number is [phone]. Patient stated she's concern about her eyes.

## 2020-09-10 NOTE — TELEPHONE ENCOUNTER
Left message for patient to call to discuss scheduled. Per Dr Chavez needs to have Retina Work-up.

## 2020-09-14 ENCOUNTER — TELEPHONE (OUTPATIENT)
Dept: OPHTHALMOLOGY | Facility: CLINIC | Age: 85
End: 2020-09-14

## 2020-09-14 NOTE — TELEPHONE ENCOUNTER
----- Message from Delaney Mora sent at 9/14/2020 11:30 AM CDT -----  Contact: Elen Fran  Patient is calling to speak with you. Patient stated she was supposed to received a call back from you. Patient contact number is [phone].

## 2020-09-14 NOTE — TELEPHONE ENCOUNTER
Patient has appointment scheduled with Dr Hanna for 10/01, patient does not feel she can wait that long.  Feels vision deteriorated rapidly.  Appointment made for check with Dr Chavez on 09/18/ at 9:45.

## 2020-09-15 ENCOUNTER — PATIENT OUTREACH (OUTPATIENT)
Dept: OTHER | Facility: OTHER | Age: 85
End: 2020-09-15

## 2020-09-18 ENCOUNTER — OFFICE VISIT (OUTPATIENT)
Dept: OPHTHALMOLOGY | Facility: CLINIC | Age: 85
End: 2020-09-18
Attending: OPHTHALMOLOGY
Payer: MEDICARE

## 2020-09-18 DIAGNOSIS — H35.30 AMD (AGE-RELATED MACULAR DEGENERATION), BILATERAL: Primary | ICD-10-CM

## 2020-09-18 PROCEDURE — 92014 COMPRE OPH EXAM EST PT 1/>: CPT | Mod: HCNC,S$GLB,, | Performed by: OPHTHALMOLOGY

## 2020-09-18 PROCEDURE — 99999 PR PBB SHADOW E&M-EST. PATIENT-LVL III: CPT | Mod: PBBFAC,HCNC,, | Performed by: OPHTHALMOLOGY

## 2020-09-18 PROCEDURE — 92014 PR EYE EXAM, EST PATIENT,COMPREHESV: ICD-10-PCS | Mod: HCNC,S$GLB,, | Performed by: OPHTHALMOLOGY

## 2020-09-18 PROCEDURE — 99499 UNLISTED E&M SERVICE: CPT | Mod: HCNC,S$GLB,, | Performed by: OPHTHALMOLOGY

## 2020-09-18 PROCEDURE — 99999 PR PBB SHADOW E&M-EST. PATIENT-LVL III: ICD-10-PCS | Mod: PBBFAC,HCNC,, | Performed by: OPHTHALMOLOGY

## 2020-09-18 PROCEDURE — 99499 RISK ADDL DX/OHS AUDIT: ICD-10-PCS | Mod: HCNC,S$GLB,, | Performed by: OPHTHALMOLOGY

## 2020-09-18 NOTE — PROGRESS NOTES
HPI     LALO: 09/25/2019       Patient reports that she is having blurred vision at near, she is worried   because she reads a lot and its been going on for about 3 weeks now, she   does report that she had a bad fall in June and has been on bed rest.   Distance vision appears to be doing okay.     Last edited by Darius York on 9/18/2020 10:54 AM. (History)            Assessment /Plan     For exam results, see Encounter Report.    AMD (age-related macular degeneration), bilateral      New onset wet AMD changes OS macula. 3 weeks per pt  Refer to retina appt 10/1 with AM

## 2020-09-23 ENCOUNTER — PROCEDURE VISIT (OUTPATIENT)
Dept: UROLOGY | Facility: CLINIC | Age: 85
End: 2020-09-23
Payer: MEDICARE

## 2020-09-23 VITALS
TEMPERATURE: 98 F | SYSTOLIC BLOOD PRESSURE: 162 MMHG | DIASTOLIC BLOOD PRESSURE: 72 MMHG | HEIGHT: 63 IN | RESPIRATION RATE: 17 BRPM | WEIGHT: 126 LBS | BODY MASS INDEX: 22.32 KG/M2 | HEART RATE: 70 BPM

## 2020-09-23 DIAGNOSIS — C67.9 MALIGNANT NEOPLASM OF URINARY BLADDER, UNSPECIFIED SITE: Primary | ICD-10-CM

## 2020-09-23 PROCEDURE — 52000 CYSTOURETHROSCOPY: CPT | Mod: HCNC,S$GLB,, | Performed by: UROLOGY

## 2020-09-23 PROCEDURE — 52000 CYSTOSCOPY: ICD-10-PCS | Mod: HCNC,S$GLB,, | Performed by: UROLOGY

## 2020-09-23 RX ORDER — DOXYCYCLINE HYCLATE 100 MG
100 TABLET ORAL
Status: COMPLETED | OUTPATIENT
Start: 2020-09-23 | End: 2020-09-23

## 2020-09-23 RX ORDER — LIDOCAINE HYDROCHLORIDE 20 MG/ML
JELLY TOPICAL
Status: COMPLETED | OUTPATIENT
Start: 2020-09-23 | End: 2020-09-23

## 2020-09-23 RX ADMIN — Medication 100 MG: at 09:09

## 2020-09-23 RX ADMIN — LIDOCAINE HYDROCHLORIDE: 20 JELLY TOPICAL at 09:09

## 2020-09-23 NOTE — PATIENT INSTRUCTIONS
What to Expect After a Cystoscopy  For the next 24-48 hours, you may feel a mild burning when you urinate. This burning is normal and expected. Drink plenty of water to dilute the urine to help relieve the burning sensation. You may also see a small amount of blood in your urine after the procedure.    Unless you are already taking antibiotics, you may be given an antibiotic after the test to prevent infection.    Signs and Symptoms to Report  Call the [facility] at [phone] if you develop any of the following:  · Fever of 101 degrees or higher  · Chills or persistent bleeding  · Inability to urinate

## 2020-09-23 NOTE — PROCEDURES
"Cystoscopy    Date/Time: 9/23/2020 9:30 AM  Performed by: Gracia Becerra MD  Authorized by: Gracia Becerra MD     Consent Done?:  Yes (Written)  Time out: Immediately prior to procedure a "time out" was called to verify the correct patient, procedure, equipment, support staff and site/side marked as required.    Indications: history bladder cancer    Position:  Dorsal lithotomy  Anesthesia:  Intraurethral instillation  Preparation: Patient was prepped and draped in usual sterile fashion      Scope type:  Flexible cystoscope  Stent inserted: No    Stent removed: No    External exam normal: Yes    Digital exam performed: No    Urethra normal: Yes    Prostate normal: Yes  Bladder neck normal: Bladder neck normal   Bladder normal: Yes      Patient tolerance:  Patient tolerated the procedure well with no immediate complications     Has severe atrophic vaginitis. Discussed replens vs. Vaginal estrogen.   She can f/u prn. No recurrence in years.       "

## 2020-10-01 ENCOUNTER — OFFICE VISIT (OUTPATIENT)
Dept: OPHTHALMOLOGY | Facility: CLINIC | Age: 85
End: 2020-10-01
Payer: MEDICARE

## 2020-10-01 DIAGNOSIS — H35.3132 NONEXUDATIVE AGE-RELATED MACULAR DEGENERATION, BILATERAL, INTERMEDIATE DRY STAGE: ICD-10-CM

## 2020-10-01 DIAGNOSIS — H35.3221 EXUDATIVE AGE-RELATED MACULAR DEGENERATION OF LEFT EYE WITH ACTIVE CHOROIDAL NEOVASCULARIZATION: Primary | ICD-10-CM

## 2020-10-01 DIAGNOSIS — H43.813 POSTERIOR VITREOUS DETACHMENT, BILATERAL: ICD-10-CM

## 2020-10-01 PROCEDURE — 92134 CPTRZ OPH DX IMG PST SGM RTA: CPT | Mod: HCNC,S$GLB,, | Performed by: OPHTHALMOLOGY

## 2020-10-01 PROCEDURE — 92014 COMPRE OPH EXAM EST PT 1/>: CPT | Mod: 25,HCNC,S$GLB, | Performed by: OPHTHALMOLOGY

## 2020-10-01 PROCEDURE — 99999 PR PBB SHADOW E&M-EST. PATIENT-LVL IV: CPT | Mod: PBBFAC,HCNC,, | Performed by: OPHTHALMOLOGY

## 2020-10-01 PROCEDURE — 92014 PR EYE EXAM, EST PATIENT,COMPREHESV: ICD-10-PCS | Mod: 25,HCNC,S$GLB, | Performed by: OPHTHALMOLOGY

## 2020-10-01 PROCEDURE — 67028 INJECTION EYE DRUG: CPT | Mod: HCNC,LT,S$GLB, | Performed by: OPHTHALMOLOGY

## 2020-10-01 PROCEDURE — 99999 PR PBB SHADOW E&M-EST. PATIENT-LVL IV: ICD-10-PCS | Mod: PBBFAC,HCNC,, | Performed by: OPHTHALMOLOGY

## 2020-10-01 PROCEDURE — 67028 PR INJECT INTRAVITREAL PHARMCOLOGIC: ICD-10-PCS | Mod: HCNC,LT,S$GLB, | Performed by: OPHTHALMOLOGY

## 2020-10-01 PROCEDURE — 92134 POSTERIOR SEGMENT OCT RETINA (OCULAR COHERENCE TOMOGRAPHY)-BOTH EYES: ICD-10-PCS | Mod: HCNC,S$GLB,, | Performed by: OPHTHALMOLOGY

## 2020-10-01 RX ADMIN — Medication 1.25 MG: at 10:10

## 2020-10-01 NOTE — PROGRESS NOTES
HPI     Referred by Dr. Chavez for AMD    S/P Phaco w IOL OS (3/16/15)   S/P Phaco w IOL OD (4/6/15).  S/P Yag Cap OU 08/23/2019    Patient states she has trouble reading at near. She states she has to use   a magnifying glass and is an avid reader. Denies flashes/ floaters or   pain.     No gtts.        Last edited by Ave Parsons on 10/1/2020  9:13 AM. (History)        OCT - OD drusen  OS - cnvm with SRF and heme      A/P    1. Wet AMD OS    Avastin OS today    2. Dry AMD OD    3. PVD OU    4. PCIOL OU    5. HTN Ret OU  BP control      1 month Avastin OS only    Risks, benefits, and alternatives to treatment discussed in detail with the patient.  The patient voiced understanding and wished to proceed with the procedure    Injection Procedure Note:  Diagnosis: Wet AMD OS    Patient Identified and Time Out complete  Pt Prefers to be marked with sticker rather than ink marker (OHS.Qual.003 #5)  Topical Proparacaine and Betadine.  Inject Avastin OS at 6:00 @ 3.5-4mm posterior to limbus  Post Operative Dx: Same  Complications: None  Follow up as above.

## 2020-10-01 NOTE — LETTER
October 1, 2020      Jhony Chavez MD  [address]  [address]  [place] [address]           [place] - Ophthalmology  [address]  [place] [address]  Phone: [phone]  Fax: [phone]          Patient: Elen Peters   MR Number: [id number]   YOB: 1929   Date of Visit: 10/1/2020       Dear Dr. Jhony Chavez:    Thank you for referring Elen Peters to me for evaluation. Attached you will find relevant portions of my assessment and plan of care.    If you have questions, please do not hesitate to call me. I look forward to following Elen Peters along with you.    Sincerely,    MONIE Hanna MD    Enclosure  CC:  No Recipients    If you would like to receive this communication electronically, please contact [email or web address] or [phone] to request more information on [email or web address] Link access.    For providers and/or their staff who would like to refer a patient to [facility], please contact us through our one-stop-shop provider referral line, [facility], at [phone].    If you feel you have received this communication in error or would no longer like to receive these types of communications, please e-mail [email or web address]

## 2020-10-01 NOTE — PATIENT INSTRUCTIONS

## 2020-10-02 ENCOUNTER — CLINICAL SUPPORT (OUTPATIENT)
Dept: URGENT CARE | Facility: CLINIC | Age: 85
End: 2020-10-02
Payer: MEDICARE

## 2020-10-02 DIAGNOSIS — Z23 INFLUENZA VACCINE NEEDED: Primary | ICD-10-CM

## 2020-10-02 PROCEDURE — 90694 FLU VACCINE - QUADRIVALENT - ADJUVANTED: ICD-10-PCS | Mod: S$GLB,,, | Performed by: EMERGENCY MEDICINE

## 2020-10-02 PROCEDURE — G0008 FLU VACCINE - QUADRIVALENT - ADJUVANTED: ICD-10-PCS | Mod: S$GLB,,, | Performed by: EMERGENCY MEDICINE

## 2020-10-02 PROCEDURE — G0008 ADMIN INFLUENZA VIRUS VAC: HCPCS | Mod: S$GLB,,, | Performed by: EMERGENCY MEDICINE

## 2020-10-02 PROCEDURE — 90694 VACC AIIV4 NO PRSRV 0.5ML IM: CPT | Mod: S$GLB,,, | Performed by: EMERGENCY MEDICINE

## 2020-10-05 ENCOUNTER — TELEPHONE (OUTPATIENT)
Dept: INTERNAL MEDICINE | Facility: CLINIC | Age: 85
End: 2020-10-05

## 2020-10-05 NOTE — TELEPHONE ENCOUNTER
----- Message from Michelle Carrasco sent at 10/5/2020 12:33 PM CDT -----  Contact: SONNY SIMPSON [[id number]] @  [phone]  Caller is requesting an earlier appt than we can schedule.  Caller declined first available appointment listed below. Caller will not accept being placed on the wait list and is requesting a message be sent to the provider.Dr Lemus  When is the next available appointment:  12/2020  Did you offer to schedule the next available appt and put the patient on the wait list?:   Yes  What visit type: Ep 6 mo f/u  Symptoms:  falls / to much medications  Patient preference of timeframe to be scheduled: October as soon as possible  What is the reason the patient is requesting a sooner appointment? Just do not want to wait for Dec   (insurance terminating, changing jobs):  nope  Would the patient rather a call back or a response via [facility]?:  phone       Comments:  Patient asked to speak with Antonette

## 2020-10-07 ENCOUNTER — TELEPHONE (OUTPATIENT)
Dept: INTERNAL MEDICINE | Facility: CLINIC | Age: 85
End: 2020-10-07

## 2020-10-07 ENCOUNTER — OFFICE VISIT (OUTPATIENT)
Dept: INTERNAL MEDICINE | Facility: CLINIC | Age: 85
End: 2020-10-07
Payer: MEDICARE

## 2020-10-07 VITALS
HEIGHT: 63 IN | SYSTOLIC BLOOD PRESSURE: 120 MMHG | HEART RATE: 68 BPM | DIASTOLIC BLOOD PRESSURE: 70 MMHG | WEIGHT: 129.19 LBS | BODY MASS INDEX: 22.89 KG/M2

## 2020-10-07 DIAGNOSIS — M80.00XD OSTEOPOROSIS WITH CURRENT PATHOLOGICAL FRACTURE WITH ROUTINE HEALING, UNSPECIFIED OSTEOPOROSIS TYPE, SUBSEQUENT ENCOUNTER: Primary | ICD-10-CM

## 2020-10-07 PROCEDURE — 1126F PR PAIN SEVERITY QUANTIFIED, NO PAIN PRESENT: ICD-10-PCS | Mod: HCNC,S$GLB,, | Performed by: INTERNAL MEDICINE

## 2020-10-07 PROCEDURE — 99999 PR PBB SHADOW E&M-EST. PATIENT-LVL IV: CPT | Mod: PBBFAC,HCNC,, | Performed by: INTERNAL MEDICINE

## 2020-10-07 PROCEDURE — 99213 PR OFFICE/OUTPT VISIT, EST, LEVL III, 20-29 MIN: ICD-10-PCS | Mod: HCNC,S$GLB,, | Performed by: INTERNAL MEDICINE

## 2020-10-07 PROCEDURE — 1126F AMNT PAIN NOTED NONE PRSNT: CPT | Mod: HCNC,S$GLB,, | Performed by: INTERNAL MEDICINE

## 2020-10-07 PROCEDURE — 99999 PR PBB SHADOW E&M-EST. PATIENT-LVL IV: ICD-10-PCS | Mod: PBBFAC,HCNC,, | Performed by: INTERNAL MEDICINE

## 2020-10-07 PROCEDURE — 1159F PR MEDICATION LIST DOCUMENTED IN MEDICAL RECORD: ICD-10-PCS | Mod: HCNC,S$GLB,, | Performed by: INTERNAL MEDICINE

## 2020-10-07 PROCEDURE — 99213 OFFICE O/P EST LOW 20 MIN: CPT | Mod: HCNC,S$GLB,, | Performed by: INTERNAL MEDICINE

## 2020-10-07 PROCEDURE — 1159F MED LIST DOCD IN RCRD: CPT | Mod: HCNC,S$GLB,, | Performed by: INTERNAL MEDICINE

## 2020-10-07 PROCEDURE — 1101F PT FALLS ASSESS-DOCD LE1/YR: CPT | Mod: HCNC,CPTII,S$GLB, | Performed by: INTERNAL MEDICINE

## 2020-10-07 PROCEDURE — 1101F PR PT FALLS ASSESS DOC 0-1 FALLS W/OUT INJ PAST YR: ICD-10-PCS | Mod: HCNC,CPTII,S$GLB, | Performed by: INTERNAL MEDICINE

## 2020-10-07 PROCEDURE — 99499 RISK ADDL DX/OHS AUDIT: ICD-10-PCS | Mod: HCNC,S$GLB,, | Performed by: INTERNAL MEDICINE

## 2020-10-07 PROCEDURE — 99499 UNLISTED E&M SERVICE: CPT | Mod: HCNC,S$GLB,, | Performed by: INTERNAL MEDICINE

## 2020-10-07 RX ORDER — ALENDRONATE SODIUM 70 MG/1
70 TABLET ORAL
Qty: 4 TABLET | Refills: 11 | Status: SHIPPED | OUTPATIENT
Start: 2020-10-07 | End: 2021-09-20

## 2020-10-07 NOTE — PROGRESS NOTES
"Digital Medicine: Health  Follow-Up    The history is provided by the patient.             Reason for review: Blood pressure not at goal        Topics Covered on Call: physical activity and recent fall; physical therapy; home health visits    Additional Follow-up details: Patient reports recently had a fall and was receiving home health visits and physical therapy at home. Patient reports not taking readings because of home health visits, as they were taking her blood pressure. Patient states she will begin taking readings again as home health visits ended last week.     Patient "thrilled" because she has "gotten rid of sitter and is able to walk on her own" with a cane "as a precaution". Patient reports not wanting to use the walker due to doctors not wanting her to. Patient able to go to grocery store on her own.     Patient reports watching mass at home and stated she had to go. Will call patient back in 2 weeks to check in for any issues with taking readings.             Diet-Not assessed          Physical Activity-Change      Additional physical activity details: Patient reports physical therapy was "giving her exercise". Patient reports going to grocery store and "was exhausted" after walking through store. Encouraged patient to try to get some steps in but to not push herself due to fall risk.       Medication Adherence-Medication Adherence not addressed.      Substance, Sleep, Stress-Not assessed      Additional monitoring needed.  Continue current diet/physical activity routine.       Addressed patient questions and patient has my contact information if needed prior to next outreach.   Explained the importance of self-monitoring and medication adherence. Encouraged the patient to communicate with their health  for lifestyle modifications to help improve or maintain a healthy lifestyle.               There are no preventive care reminders to display for this patient.      Last 5 Patient Entered " Readings                                      Current 30 Day Average: 154/70     Recent Readings 9/23/2020 9/7/2020 8/31/2020 8/1/2020 7/9/2020    SBP (mmHg) 169 139 153 175 175    DBP (mmHg) 75 65 72 68 72    Pulse 74 59 62 60 61

## 2020-10-07 NOTE — TELEPHONE ENCOUNTER
----- Message from Kourtney Moreno sent at 10/7/2020  2:19 PM CDT -----  Contact: John Myrick@[phone]--  Needs Advice    Reason for call:--Appointment--        Communication Preference:--Elen--[phone]--    Additional Information:PT calling to speak with the nurse regarding her appointment today at 3:30 pm

## 2020-10-12 ENCOUNTER — PES CALL (OUTPATIENT)
Dept: ADMINISTRATIVE | Facility: CLINIC | Age: 85
End: 2020-10-12

## 2020-10-19 NOTE — PROGRESS NOTES
Subjective:       Patient ID: Elen Peters is a 89 y.o. female.    Chief Complaint: Hyperlipidemia and Hypertension    Pt here in f/u of recent pelvic fracture.  Doing better but still has pain occasionally.  Will resume Fosamax.     Review of Systems    Objective:      Physical Exam  Vitals signs reviewed.   Constitutional:       General: She is not in acute distress.     Appearance: She is well-developed.   HENT:      Head: Normocephalic and atraumatic.   Eyes:      Conjunctiva/sclera: Conjunctivae normal.      Pupils: Pupils are equal, round, and reactive to light.   Neck:      Musculoskeletal: Normal range of motion and neck supple.   Cardiovascular:      Rate and Rhythm: Normal rate and regular rhythm.      Heart sounds: Normal heart sounds.   Pulmonary:      Effort: Pulmonary effort is normal.      Breath sounds: Normal breath sounds. No wheezing.   Abdominal:      General: Bowel sounds are normal.      Palpations: Abdomen is soft.      Tenderness: There is no abdominal tenderness.   Musculoskeletal: Normal range of motion.         General: No tenderness.   Skin:     Findings: No erythema.   Neurological:      Mental Status: She is alert and oriented to person, place, and time.      Cranial Nerves: No cranial nerve deficit.         Assessment:       1. Osteoporosis with current pathological fracture with routine healing, unspecified osteoporosis type, subsequent encounter        Plan:       Elen was seen today for hyperlipidemia and hypertension.    Diagnoses and all orders for this visit:    Osteoporosis with current pathological fracture with routine healing, unspecified osteoporosis type, subsequent encounter  -     alendronate (FOSAMAX) 70 MG tablet; Take 1 tablet (70 mg total) by mouth every 7 days.        Follow up in about 6 months (around 4/7/2021) for F/U APPOINTMENT WITH ME.

## 2020-10-23 ENCOUNTER — PATIENT OUTREACH (OUTPATIENT)
Dept: OTHER | Facility: OTHER | Age: 85
End: 2020-10-23

## 2020-10-23 NOTE — PROGRESS NOTES
Digital Medicine: Health  Follow-Up    The history is provided by the patient.             Reason for review: Blood pressure not at goal        Topics Covered on Call: physical activity    Additional Follow-up details: Follow-up call to ensure patient having no issues with BP cuff or taking readings. Patient reports she is having no issues. Patient states she is on the other line but is doing well. Call ended early.             Diet-Not assessed          Physical Activity-Change      Additional physical activity details: Patient states she is walking around more and doing well.       Medication Adherence-Medication Adherence not addressed.      Substance, Sleep, Stress-Not assessed      Continue current diet/physical activity routine.       Addressed patient questions and patient has my contact information if needed prior to next outreach.   Explained the importance of self-monitoring and medication adherence. Encouraged the patient to communicate with their health  for lifestyle modifications to help improve or maintain a healthy lifestyle.               There are no preventive care reminders to display for this patient.      Last 5 Patient Entered Readings                                      Current 30 Day Average: 163/73     Recent Readings 10/19/2020 10/16/2020 9/23/2020 9/7/2020 8/31/2020    SBP (mmHg) 165 156 169 139 153    DBP (mmHg) 76 67 75 65 72    Pulse 60 67 74 59 62

## 2020-11-02 ENCOUNTER — PES CALL (OUTPATIENT)
Dept: ADMINISTRATIVE | Facility: CLINIC | Age: 85
End: 2020-11-02

## 2020-11-04 ENCOUNTER — TELEPHONE (OUTPATIENT)
Dept: INTERNAL MEDICINE | Facility: CLINIC | Age: 85
End: 2020-11-04

## 2020-11-04 NOTE — TELEPHONE ENCOUNTER
----- Message from Santana Dey sent at 11/4/2020  3:44 PM CST -----  Regarding: self   Would like to get medical advice.  Symptoms (please be specific):  Pain in right shoulder   How long has patient had these symptoms:  over a month   Pharmacy name and phone # (copy from chart):  [id number] - [place], [place] - [address] AT [address] [phone] (Phone)  [phone] (Fax)      Comments:  Patient would like Antonette to call her back

## 2020-11-05 ENCOUNTER — PROCEDURE VISIT (OUTPATIENT)
Dept: OPHTHALMOLOGY | Facility: CLINIC | Age: 85
End: 2020-11-05
Payer: MEDICARE

## 2020-11-05 DIAGNOSIS — H35.3132 NONEXUDATIVE AGE-RELATED MACULAR DEGENERATION, BILATERAL, INTERMEDIATE DRY STAGE: ICD-10-CM

## 2020-11-05 DIAGNOSIS — H43.813 POSTERIOR VITREOUS DETACHMENT, BILATERAL: ICD-10-CM

## 2020-11-05 DIAGNOSIS — H35.3221 EXUDATIVE AGE-RELATED MACULAR DEGENERATION OF LEFT EYE WITH ACTIVE CHOROIDAL NEOVASCULARIZATION: Primary | ICD-10-CM

## 2020-11-05 PROCEDURE — 67028 INJECTION EYE DRUG: CPT | Mod: HCNC,LT,S$GLB, | Performed by: OPHTHALMOLOGY

## 2020-11-05 PROCEDURE — 99499 NO LOS: ICD-10-PCS | Mod: HCNC,S$GLB,, | Performed by: OPHTHALMOLOGY

## 2020-11-05 PROCEDURE — 99499 UNLISTED E&M SERVICE: CPT | Mod: HCNC,S$GLB,, | Performed by: OPHTHALMOLOGY

## 2020-11-05 PROCEDURE — 67028 PR INJECT INTRAVITREAL PHARMCOLOGIC: ICD-10-PCS | Mod: HCNC,LT,S$GLB, | Performed by: OPHTHALMOLOGY

## 2020-11-05 RX ORDER — ESTRADIOL 0.1 MG/G
CREAM VAGINAL
COMMUNITY
Start: 2020-08-04 | End: 2021-04-26

## 2020-11-05 RX ORDER — CONJUGATED ESTROGENS 0.62 MG/G
CREAM VAGINAL
COMMUNITY
Start: 2020-08-23 | End: 2021-04-26

## 2020-11-05 RX ADMIN — Medication 1.25 MG: at 10:11

## 2020-11-05 NOTE — PROGRESS NOTES
HPI     Macular Degeneration      Additional comments: 1 mon chk              Comments           OCT - OD drusen  OS - cnvm with SRF and heme      A/P    1. Wet AMD OS  S/p Avastin OS    Avastin OS today    2. Dry AMD OD    3. PVD OU    4. PCIOL OU    5. HTN Ret OU  BP control      1 month OCT no dilate    Risks, benefits, and alternatives to treatment discussed in detail with the patient.  The patient voiced understanding and wished to proceed with the procedure    Injection Procedure Note:  Diagnosis: Wet AMD OS    Patient Identified and Time Out complete  Pt Prefers to be marked with sticker rather than ink marker (OHS.Qual.003 #5)  Topical Proparacaine and Betadine.  Inject Avastin OS at 6:00 @ 3.5-4mm posterior to limbus  Post Operative Dx: Same  Complications: None  Follow up as above.

## 2020-11-06 ENCOUNTER — TELEPHONE (OUTPATIENT)
Dept: INTERNAL MEDICINE | Facility: CLINIC | Age: 85
End: 2020-11-06

## 2020-11-06 NOTE — TELEPHONE ENCOUNTER
----- Message from Harjinder Edwards sent at 11/6/2020 11:20 AM CST -----  Regarding: Pt [phone] or  [phone]  Patient is returning a phone call.  Who left a message for the patient: Antonette  Does patient know what this is regarding:  Yes. She wants a sooner appmnt than February and is interested in getting a steroid shot for the shoulder.  Comments:

## 2020-11-12 ENCOUNTER — OFFICE VISIT (OUTPATIENT)
Dept: URGENT CARE | Facility: CLINIC | Age: 85
End: 2020-11-12
Payer: MEDICARE

## 2020-11-12 VITALS
HEIGHT: 63 IN | WEIGHT: 129 LBS | HEART RATE: 61 BPM | SYSTOLIC BLOOD PRESSURE: 156 MMHG | OXYGEN SATURATION: 97 % | RESPIRATION RATE: 16 BRPM | TEMPERATURE: 98 F | BODY MASS INDEX: 22.86 KG/M2 | DIASTOLIC BLOOD PRESSURE: 68 MMHG

## 2020-11-12 DIAGNOSIS — Z20.822 EXPOSURE TO COVID-19 VIRUS: ICD-10-CM

## 2020-11-12 DIAGNOSIS — R06.09 DOE (DYSPNEA ON EXERTION): Primary | ICD-10-CM

## 2020-11-12 LAB
CTP QC/QA: YES
SARS-COV-2 RDRP RESP QL NAA+PROBE: NEGATIVE

## 2020-11-12 PROCEDURE — 99213 OFFICE O/P EST LOW 20 MIN: CPT | Mod: S$GLB,,, | Performed by: STUDENT IN AN ORGANIZED HEALTH CARE EDUCATION/TRAINING PROGRAM

## 2020-11-12 PROCEDURE — U0002 COVID-19 LAB TEST NON-CDC: HCPCS | Mod: QW,S$GLB,, | Performed by: STUDENT IN AN ORGANIZED HEALTH CARE EDUCATION/TRAINING PROGRAM

## 2020-11-12 PROCEDURE — 99213 PR OFFICE/OUTPT VISIT, EST, LEVL III, 20-29 MIN: ICD-10-PCS | Mod: S$GLB,,, | Performed by: STUDENT IN AN ORGANIZED HEALTH CARE EDUCATION/TRAINING PROGRAM

## 2020-11-12 PROCEDURE — U0002: ICD-10-PCS | Mod: QW,S$GLB,, | Performed by: STUDENT IN AN ORGANIZED HEALTH CARE EDUCATION/TRAINING PROGRAM

## 2020-11-12 NOTE — PROGRESS NOTES
"Subjective:       Patient ID: Elen Peters is a 89 y.o. female.    Vitals:  height is 5' 3" (1.6 m) and weight is 58.5 kg (129 lb). Her tympanic temperature is 98.3 °F (36.8 °C). Her blood pressure is 156/68 (abnormal) and her pulse is 61. Her respiration is 16 and oxygen saturation is 97%.     Chief Complaint: Shortness of Breath and COVID-19 Concerns    Pt presents with daughter in law for COVID concerns. Pt had high risk exposure on Saturday to multiple family members who tested positive within 48h. Pt denied any sx's but daughter in law states she felt that pt had unusual Mayer ("breathing hard") after walking from apartment to car. Denied f/c, URI sx's, GI sx's, or lower respiratory sx's otherwise.    Shortness of Breath  This is a new problem. The current episode started today. The problem occurs intermittently. The problem has been resolved. Pertinent negatives include no abdominal pain, chest pain, ear pain, fever, headaches, leg swelling, neck pain, rash, sore throat, sputum production, syncope, vomiting or wheezing. Nothing aggravates the symptoms. She has tried nothing for the symptoms. The treatment provided moderate relief. There is no history of chronic lung disease or COPD.       Constitution: Negative for chills, sweating, fatigue and fever.   HENT: Negative for ear pain, congestion, sinus pain, sinus pressure, sore throat and voice change.    Neck: Negative for neck pain and painful lymph nodes.   Cardiovascular: Positive for sob on exertion. Negative for chest pain, leg swelling, palpitations and passing out.   Eyes: Negative for eye discharge, eye itching and eye redness.   Respiratory: Negative for chest tightness, cough, sputum production, COPD, shortness of breath, stridor, wheezing and asthma.    Gastrointestinal: Negative for abdominal pain, nausea, vomiting and diarrhea.   Musculoskeletal: Negative for joint pain, joint swelling and muscle ache.   Skin: Negative for rash. "   Allergic/Immunologic: Negative for seasonal allergies and asthma.   Neurological: Negative for dizziness, light-headedness and headaches.   Hematologic/Lymphatic: Negative for swollen lymph nodes.   Psychiatric/Behavioral: Negative for confusion, agitation and sleep disturbance.       Objective:      Physical Exam   Constitutional: She appears well-developed. She does not appear ill. No distress.   HENT:   Head: Normocephalic and atraumatic.   Ears:   Right Ear: External ear normal.   Left Ear: External ear normal.   Eyes: Conjunctivae and EOM are normal. Right eye exhibits no discharge. Left eye exhibits no discharge. No scleral icterus.   Cardiovascular: Normal rate and regular rhythm.   Murmur (faint systolic) heard.  Pulmonary/Chest: Effort normal and breath sounds normal. No respiratory distress. She has no wheezes. She has no rhonchi. She has no rales.   Neurological: She is alert. Psychiatric: Her behavior is normal.   Vitals reviewed.    Recent Lab Results       11/12/20  1521         Acceptable Yes     SARS-CoV-2 RNA, Amplification, Qual Negative             Assessment:       1. MATOS (dyspnea on exertion)    2. Exposure to COVID-19 virus        Plan:         MATOS (dyspnea on exertion)  -     POCT COVID-19 Rapid Screening  - VSS on RA, no signs of respiratory distress on exam; pt denies sx's, reported by daughter in law    Exposure to COVID-19 virus  - discussed negative result with patient. Counseled Symptomatic patient with known exposure to continue home quarantine/isolation per CDC guidelines in event of false negative rapid COVID test    Follow up if symptoms worsen or fail to improve.    Mason Cooper MD/MPH  [facility]

## 2020-11-12 NOTE — PATIENT INSTRUCTIONS
Your COVID-19 test performed today was negative. However, you have reported that you had a high risk exposure within the last 14 days. You should quarantine at home for 2 weeks from the last day of exposure and follow-up if you develop any concerning symptoms. 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

## 2020-11-18 ENCOUNTER — PATIENT OUTREACH (OUTPATIENT)
Dept: OTHER | Facility: OTHER | Age: 85
End: 2020-11-18

## 2020-11-18 NOTE — PROGRESS NOTES
"Digital Medicine: Health  Follow-Up    The history is provided by the patient.             Reason for review: Blood pressure at goal        Topics Covered on Call: physical activity    Additional Follow-up details: Patient states she feels good about recent readings that have been lower. Commended patient on being at goal for HDMP with current average of 138/75 mmHg. Patient reports she had her [employer] cuff fixed and has had no tech problems since then.     Patient reports she has a PCP appt on Friday and would like to talk about hydralazine prescription and whether or not they still want her on it. Patient states she called her pharmacy for a refill and she never received it so is assuming her PCP does not want her on it anymore. Patient also plans to talk about upper arm pain associated with increased use of cane.     Patient reports due to not having a car, she is not able to leave the house much. To keep herself occupied, she reads a lot of books.              Diet-Not assessed          Physical Activity-Change      Additional physical activity details: Patient recently started walking "as much as she can" and recently switched from walker to cane. Patient has recently begun feeling pain in her arm similar to rotator cuff injury she sustained a few years ago which she attributes to weight bearing of her arm with her cane when walking.     Patient has also begun going swimming once a week after not having gone due to COVID-19 pandemic. Patient is frustrated because the pool is competitive with more people joining and wanting to use the pool. Due to only being able to get a ride to the pool once per week, she is limited with any solutions to this issue, but states she is "coping not complaining".       Medication Adherence-Medication Adherence not addressed.      Substance, Sleep, Stress-Not assessed      Continue current diet/physical activity routine.       Addressed patient questions and patient has my " contact information if needed prior to next outreach.   Explained the importance of self-monitoring and medication adherence. Encouraged the patient to communicate with their health  for lifestyle modifications to help improve or maintain a healthy lifestyle.               There are no preventive care reminders to display for this patient.      Last 5 Patient Entered Readings                                      Current 30 Day Average: 138/75     Recent Readings 11/14/2020 11/9/2020 11/2/2020 11/1/2020 10/19/2020    SBP (mmHg) 125 136 136 128 165    DBP (mmHg) 56 66 114 64 76    Pulse 71 64 69 71 60

## 2020-11-20 ENCOUNTER — OFFICE VISIT (OUTPATIENT)
Dept: INTERNAL MEDICINE | Facility: CLINIC | Age: 85
End: 2020-11-20
Payer: MEDICARE

## 2020-11-20 VITALS
DIASTOLIC BLOOD PRESSURE: 70 MMHG | BODY MASS INDEX: 23.63 KG/M2 | HEIGHT: 63 IN | SYSTOLIC BLOOD PRESSURE: 148 MMHG | HEART RATE: 64 BPM | WEIGHT: 133.38 LBS

## 2020-11-20 DIAGNOSIS — K12.0 APHTHOUS ULCER OF MOUTH: ICD-10-CM

## 2020-11-20 DIAGNOSIS — G89.29 CHRONIC RIGHT SHOULDER PAIN: ICD-10-CM

## 2020-11-20 DIAGNOSIS — I10 HTN (HYPERTENSION), BENIGN: Primary | ICD-10-CM

## 2020-11-20 DIAGNOSIS — M25.511 CHRONIC RIGHT SHOULDER PAIN: ICD-10-CM

## 2020-11-20 PROCEDURE — 1101F PR PT FALLS ASSESS DOC 0-1 FALLS W/OUT INJ PAST YR: ICD-10-PCS | Mod: HCNC,CPTII,S$GLB, | Performed by: INTERNAL MEDICINE

## 2020-11-20 PROCEDURE — 99499 UNLISTED E&M SERVICE: CPT | Mod: HCNC,S$GLB,, | Performed by: INTERNAL MEDICINE

## 2020-11-20 PROCEDURE — 1125F AMNT PAIN NOTED PAIN PRSNT: CPT | Mod: HCNC,S$GLB,, | Performed by: INTERNAL MEDICINE

## 2020-11-20 PROCEDURE — 3288F PR FALLS RISK ASSESSMENT DOCUMENTED: ICD-10-PCS | Mod: HCNC,CPTII,S$GLB, | Performed by: INTERNAL MEDICINE

## 2020-11-20 PROCEDURE — 1125F PR PAIN SEVERITY QUANTIFIED, PAIN PRESENT: ICD-10-PCS | Mod: HCNC,S$GLB,, | Performed by: INTERNAL MEDICINE

## 2020-11-20 PROCEDURE — 1101F PT FALLS ASSESS-DOCD LE1/YR: CPT | Mod: HCNC,CPTII,S$GLB, | Performed by: INTERNAL MEDICINE

## 2020-11-20 PROCEDURE — 99213 OFFICE O/P EST LOW 20 MIN: CPT | Mod: HCNC,S$GLB,, | Performed by: INTERNAL MEDICINE

## 2020-11-20 PROCEDURE — 1159F MED LIST DOCD IN RCRD: CPT | Mod: HCNC,S$GLB,, | Performed by: INTERNAL MEDICINE

## 2020-11-20 PROCEDURE — 3288F FALL RISK ASSESSMENT DOCD: CPT | Mod: HCNC,CPTII,S$GLB, | Performed by: INTERNAL MEDICINE

## 2020-11-20 PROCEDURE — 99499 RISK ADDL DX/OHS AUDIT: ICD-10-PCS | Mod: HCNC,S$GLB,, | Performed by: INTERNAL MEDICINE

## 2020-11-20 PROCEDURE — 99999 PR PBB SHADOW E&M-EST. PATIENT-LVL III: CPT | Mod: PBBFAC,HCNC,, | Performed by: INTERNAL MEDICINE

## 2020-11-20 PROCEDURE — 1159F PR MEDICATION LIST DOCUMENTED IN MEDICAL RECORD: ICD-10-PCS | Mod: HCNC,S$GLB,, | Performed by: INTERNAL MEDICINE

## 2020-11-20 PROCEDURE — 99999 PR PBB SHADOW E&M-EST. PATIENT-LVL III: ICD-10-PCS | Mod: PBBFAC,HCNC,, | Performed by: INTERNAL MEDICINE

## 2020-11-20 PROCEDURE — 99213 PR OFFICE/OUTPT VISIT, EST, LEVL III, 20-29 MIN: ICD-10-PCS | Mod: HCNC,S$GLB,, | Performed by: INTERNAL MEDICINE

## 2020-11-23 NOTE — PROGRESS NOTES
Subjective:       Patient ID: Elen Peters is a 89 y.o. female.    Chief Complaint: Shoulder Pain (R), Hypertension, Hyperlipidemia, and Mouth Lesions (ulcer R sided)    Shoulder Pain   The pain is present in the right shoulder. This is a recurrent problem. The current episode started in the past 7 days. The problem has been waxing and waning. The pain is mild. Pertinent negatives include no inability to bear weight or limited range of motion.   Hypertension  This is a chronic problem. The problem is unchanged. The problem is controlled. Pertinent negatives include no chest pain or shortness of breath.   Hyperlipidemia  This is a chronic problem. The problem is controlled. Recent lipid tests were reviewed and are normal. Pertinent negatives include no chest pain or shortness of breath.   Mouth Lesions   The current episode started 2 days ago. The onset was sudden. The problem occurs frequently. The problem is moderate. Associated symptoms include mouth sores.     Review of Systems   HENT: Positive for mouth sores.    Respiratory: Negative for shortness of breath.    Cardiovascular: Negative for chest pain.       Objective:      Physical Exam  Vitals signs reviewed.   Constitutional:       General: She is not in acute distress.     Appearance: She is well-developed.   HENT:      Head: Normocephalic and atraumatic.      Mouth/Throat:     Eyes:      Conjunctiva/sclera: Conjunctivae normal.      Pupils: Pupils are equal, round, and reactive to light.   Neck:      Musculoskeletal: Normal range of motion and neck supple.   Cardiovascular:      Rate and Rhythm: Normal rate and regular rhythm.      Heart sounds: Normal heart sounds.   Pulmonary:      Effort: Pulmonary effort is normal.      Breath sounds: Normal breath sounds. No wheezing.   Abdominal:      General: Bowel sounds are normal.      Palpations: Abdomen is soft.      Tenderness: There is no abdominal tenderness.   Musculoskeletal: Normal range of motion.       Right shoulder: She exhibits tenderness.        Arms:    Skin:     Findings: No erythema.   Neurological:      Mental Status: She is alert and oriented to person, place, and time.      Cranial Nerves: No cranial nerve deficit.         Assessment:       1. HTN (hypertension), benign    2. Chronic right shoulder pain    3. Aphthous ulcer of mouth        Plan:       Elen was seen today for shoulder pain, hypertension, hyperlipidemia and mouth lesions.    Diagnoses and all orders for this visit:    HTN (hypertension), benign  -     CBC Auto Differential; Future  -     Comprehensive Metabolic Panel; Future  -     Lipid Panel; Future    Chronic right shoulder pain  Comments:  deltoid tendonitis-  rec Ortho eval    Aphthous ulcer of mouth  Comments:  right tonsillar pillar        Follow up in about 6 months (around 5/20/2021) for F/U APPOINTMENT WITH ME.

## 2020-11-27 ENCOUNTER — PES CALL (OUTPATIENT)
Dept: ADMINISTRATIVE | Facility: CLINIC | Age: 85
End: 2020-11-27

## 2020-12-09 ENCOUNTER — OFFICE VISIT (OUTPATIENT)
Dept: ORTHOPEDICS | Facility: CLINIC | Age: 85
End: 2020-12-09
Payer: MEDICARE

## 2020-12-09 ENCOUNTER — HOSPITAL ENCOUNTER (OUTPATIENT)
Dept: RADIOLOGY | Facility: HOSPITAL | Age: 85
Discharge: HOME | End: 2020-12-09
Attending: NURSE PRACTITIONER
Payer: MEDICARE

## 2020-12-09 VITALS — TEMPERATURE: 97 F

## 2020-12-09 DIAGNOSIS — S32.810D CLOSED PELVIC RING FRACTURE WITH ROUTINE HEALING, SUBSEQUENT ENCOUNTER: Primary | ICD-10-CM

## 2020-12-09 DIAGNOSIS — S32.502A CLOSED FRACTURE OF LEFT PUBIS, UNSPECIFIED PORTION OF PUBIS, INITIAL ENCOUNTER: ICD-10-CM

## 2020-12-09 DIAGNOSIS — S32.810D CLOSED PELVIC RING FRACTURE WITH ROUTINE HEALING, SUBSEQUENT ENCOUNTER: ICD-10-CM

## 2020-12-09 PROCEDURE — 99999 PR PBB SHADOW E&M-EST. PATIENT-LVL IV: ICD-10-PCS | Mod: PBBFAC,HCNC,, | Performed by: NURSE PRACTITIONER

## 2020-12-09 PROCEDURE — 1126F AMNT PAIN NOTED NONE PRSNT: CPT | Mod: HCNC,S$GLB,, | Performed by: NURSE PRACTITIONER

## 2020-12-09 PROCEDURE — 99999 PR PBB SHADOW E&M-EST. PATIENT-LVL IV: CPT | Mod: PBBFAC,HCNC,, | Performed by: NURSE PRACTITIONER

## 2020-12-09 PROCEDURE — 99213 PR OFFICE/OUTPT VISIT, EST, LEVL III, 20-29 MIN: ICD-10-PCS | Mod: HCNC,S$GLB,, | Performed by: NURSE PRACTITIONER

## 2020-12-09 PROCEDURE — 1126F PR PAIN SEVERITY QUANTIFIED, NO PAIN PRESENT: ICD-10-PCS | Mod: HCNC,S$GLB,, | Performed by: NURSE PRACTITIONER

## 2020-12-09 PROCEDURE — 1159F PR MEDICATION LIST DOCUMENTED IN MEDICAL RECORD: ICD-10-PCS | Mod: HCNC,S$GLB,, | Performed by: NURSE PRACTITIONER

## 2020-12-09 PROCEDURE — 72190 X-RAY EXAM OF PELVIS: CPT | Mod: TC,HCNC

## 2020-12-09 PROCEDURE — 72190 XR PELVIS AP INLET AND OUTLET: ICD-10-PCS | Mod: 26,HCNC,, | Performed by: RADIOLOGY

## 2020-12-09 PROCEDURE — 99213 OFFICE O/P EST LOW 20 MIN: CPT | Mod: HCNC,S$GLB,, | Performed by: NURSE PRACTITIONER

## 2020-12-09 PROCEDURE — 1159F MED LIST DOCD IN RCRD: CPT | Mod: HCNC,S$GLB,, | Performed by: NURSE PRACTITIONER

## 2020-12-09 PROCEDURE — 72190 X-RAY EXAM OF PELVIS: CPT | Mod: 26,HCNC,, | Performed by: RADIOLOGY

## 2020-12-09 NOTE — LETTER
December 9, 2020      Tawnya Webster MD  [address]  [facility] [address]           [facility]  [address]LO[address]  [facility] [address]  Phone: [phone]          Patient: Elen Peters   MR Number: [id number]   YOB: 1929   Date of Visit: 12/9/2020       Dear Dr. Tawnya Webster:    Thank you for referring Elen Peters to me for evaluation. Attached you will find relevant portions of my assessment and plan of care.    If you have questions, please do not hesitate to call me. I look forward to following Elen Petres along with you.    Sincerely,    Jeffy Savage, JEANNE    Enclosure  CC:  No Recipients    If you would like to receive this communication electronically, please contact [email or web address] or [phone] to request more information on [facility] Link access.    For providers and/or their staff who would like to refer a patient to [facility], please contact us through our one-stop-shop provider referral line, [facility], at [phone].    If you feel you have received this communication in error or would no longer like to receive these types of communications, please e-mail [email or web address]

## 2020-12-09 NOTE — PROGRESS NOTES
Subjective:      Patient ID: Elen Peters is a 89 y.o. female.    Chief Complaint: Pain of the Pelvis    Patient is a 89 y/o female who presents today for follow up for her left pubic ramus fracture.  I saw her last on 9/9/2020 at which time it was recommended she continue to weight bear as tolerated and therapy.  She currently reports no pain, states she has been discharged from therapy.  She denies falls or injuries since last seen.  No reports of numbness or tingling sensation to her lower leg or feet.      Review of Systems   Musculoskeletal: Positive for falls and joint pain.   All other systems reviewed and are negative.        Objective:      General    Vitals reviewed.  Constitutional: She is oriented to person, place, and time. She appears well-developed and well-nourished. No distress.   HENT:   Head: Normocephalic and atraumatic.   Nose: Nose normal.   Eyes: Conjunctivae are normal. Pupils are equal, round, and reactive to light.   Neck: Neck supple.   Cardiovascular: Intact distal pulses.    Pulmonary/Chest: Effort normal.   Neurological: She is alert and oriented to person, place, and time.   Psychiatric: She has a normal mood and affect. Her behavior is normal. Judgment and thought content normal.     General Musculoskeletal Exam   Gait: antalgic   Pelvic Obliquity: none        Right Hip Exam   Right hip exam is normal.   Left Hip Exam   Left hip exam is normal.    Inspection   No deformity of hip.  Quadriceps Atrophy:  negative    Range of Motion   Extension: normal   Flexion: normal     Other   Sensation: normal          Muscle Strength   Left Lower Extremity   Hip Abduction: 5/5   Hip Adduction: 5/5   Hip Flexion: 5/5     Vascular Exam       Left Pulses  Dorsalis Pedis:      2+  Posterior Tibial:      2+          RADS: Pelvic with inlet and outlet x-ray obtained and personally reviewed by me.  Fracture to the left superior and inferior pubic rami appears in good position and aligned.  Fracture  appears to be healing, there is progressive callus formation.  No new fractures seen.        Assessment:       Encounter Diagnoses   Name Primary?    Closed fracture of left pubis, unspecified portion of pubis, initial encounter     Closed pelvic ring fracture with routine healing, subsequent encounter Yes          Plan:       Elen was seen today for pain.    Diagnoses and all orders for this visit:    Closed pelvic ring fracture with routine healing, subsequent encounter  -     X-Ray Pelvis AP Inlet And Outlet; Future    Closed fracture of left pubis, unspecified portion of pubis, initial encounter  -     Ambulatory referral/consult to Orthopedics      -X-ray findings shows progressive healing of her fracture.  -Continue non-operative treatment.  -Recommendation is to continue.  Weight bear as tolerated.  -I will see her back in 6 months at which time she will need repeat x-ray of her pelvis with inlet and outlet views.  -Call for questions or concerns.

## 2020-12-10 ENCOUNTER — PROCEDURE VISIT (OUTPATIENT)
Dept: OPHTHALMOLOGY | Facility: CLINIC | Age: 85
End: 2020-12-10
Payer: MEDICARE

## 2020-12-10 DIAGNOSIS — H35.3221 EXUDATIVE AGE-RELATED MACULAR DEGENERATION OF LEFT EYE WITH ACTIVE CHOROIDAL NEOVASCULARIZATION: Primary | ICD-10-CM

## 2020-12-10 DIAGNOSIS — H35.3132 NONEXUDATIVE AGE-RELATED MACULAR DEGENERATION, BILATERAL, INTERMEDIATE DRY STAGE: ICD-10-CM

## 2020-12-10 PROCEDURE — 92012 PR EYE EXAM, EST PATIENT,INTERMED: ICD-10-PCS | Mod: 25,HCNC,S$GLB, | Performed by: OPHTHALMOLOGY

## 2020-12-10 PROCEDURE — 92134 CPTRZ OPH DX IMG PST SGM RTA: CPT | Mod: HCNC,S$GLB,, | Performed by: OPHTHALMOLOGY

## 2020-12-10 PROCEDURE — 92012 INTRM OPH EXAM EST PATIENT: CPT | Mod: 25,HCNC,S$GLB, | Performed by: OPHTHALMOLOGY

## 2020-12-10 PROCEDURE — 67028 PR INJECT INTRAVITREAL PHARMCOLOGIC: ICD-10-PCS | Mod: HCNC,LT,S$GLB, | Performed by: OPHTHALMOLOGY

## 2020-12-10 PROCEDURE — 92134 POSTERIOR SEGMENT OCT RETINA (OCULAR COHERENCE TOMOGRAPHY)-BOTH EYES: ICD-10-PCS | Mod: HCNC,S$GLB,, | Performed by: OPHTHALMOLOGY

## 2020-12-10 PROCEDURE — 67028 INJECTION EYE DRUG: CPT | Mod: HCNC,LT,S$GLB, | Performed by: OPHTHALMOLOGY

## 2020-12-10 RX ADMIN — Medication 1.25 MG: at 11:12

## 2020-12-10 NOTE — PATIENT INSTRUCTIONS

## 2020-12-10 NOTE — PROGRESS NOTES
HPI     1 mo OCT   DLS- 11/05/20 Dr. Hanna     Pt sts reading much better eyes are just a little itchy   Denies pain   (-)Flashes (+)Floaters upon waking up see's a black mesh/veil over vision   in left eye and once she gets up it goes away   (-)Photophobia  (-)Glare  At's PRN         OCT - OD drusen  OS - cnvm with SRF and heme - improving      A/P    1. Wet AMD OS  S/p Avastin OS x2    Avastin OS today    Start extension    2. Dry AMD OD    3. PVD OU    4. PCIOL OU    5. HTN Ret OU  BP control      6 weeks OCT no dilate    Risks, benefits, and alternatives to treatment discussed in detail with the patient.  The patient voiced understanding and wished to proceed with the procedure    Injection Procedure Note:  Diagnosis: Wet AMD OS    Patient Identified and Time Out complete  Pt Prefers to be marked with sticker rather than ink marker ([id number] #[id number])  Topical Proparacaine and Betadine.  Inject Avastin OS at 6:00 @ 3.5-4mm posterior to limbus  Post Operative Dx: Same  Complications: None  Follow up as above.

## 2020-12-16 ENCOUNTER — PATIENT OUTREACH (OUTPATIENT)
Dept: OTHER | Facility: OTHER | Age: 85
End: 2020-12-16

## 2020-12-16 NOTE — PROGRESS NOTES
Digital Medicine: Health  Follow-Up    The history is provided by the patient.             Reason for review: Blood pressure not at goal        Topics Covered on Call: med routine    Additional Follow-up details: Patient describes that she has been feeling very well, and denies any changes that could explain the increased BP readings.             Diet-Change    Patient reports eating or drinking the following: Patient states that she doesn't eat very much.   Medication Adherence-Medication adherence was asssessed.        Patient reports that she is taking 1 carvedilol in the morning, and 2 at night. She made this change on her own without discussing with her doctor. She asks if she should resume with just 2 per day, and I advised that I would inform her pharmD and instructed to take medications as prescribed      Continue current diet/physical activity routine.       Addressed patient questions and patient has my contact information if needed prior to next outreach.   Explained the importance of self-monitoring and medication adherence. Encouraged the patient to communicate with their health  for lifestyle modifications to help improve or maintain a healthy lifestyle.               There are no preventive care reminders to display for this patient.      Last 5 Patient Entered Readings                                      Current 30 Day Average: 157/72     Recent Readings 12/10/2020 12/10/2020 12/1/2020 11/29/2020 11/14/2020    SBP (mmHg) 152 165 153 166 125    DBP (mmHg) 70 71 74 73 56    Pulse 66 62 64 57 71

## 2020-12-18 ENCOUNTER — TELEPHONE (OUTPATIENT)
Dept: INTERNAL MEDICINE | Facility: CLINIC | Age: 85
End: 2020-12-18

## 2020-12-18 NOTE — TELEPHONE ENCOUNTER
Spoke with patient she declined appt she was asking about in sports medicine she will wait and call back if need be>

## 2020-12-18 NOTE — TELEPHONE ENCOUNTER
----- Message from Carmen Terrell sent at 12/18/2020 12:43 PM CST -----  Contact: Patient [phone]  Patient would like to get a referral.  Referral to what specialty: Sports Medicine  Does the patient want the referral with a specific physician: no  Is the specialist an [facility]:   Reason: pain in right arm  Does the patient already have the specialty clinic appointment scheduled: no  If yes, what date is the appointment scheduled:   Is the insurance listed in Epic correct? Yes  Advised patient that once provider approves this either a nurse or  will return their call?:   Comments:    Thank You

## 2020-12-21 ENCOUNTER — PATIENT OUTREACH (OUTPATIENT)
Dept: OTHER | Facility: OTHER | Age: 85
End: 2020-12-21

## 2020-12-23 NOTE — PROGRESS NOTES
Digital Medicine: Clinician Follow-Up    Followed up with patient regarding the following task: Patient is taking extra doses of carvedilol. Could you please clarify with her?     The history is provided by the patient.     Hypertension    Readings are trending up due to Patient has not charged BP monitor recently.  Patient is not experiencing signs/symptoms of hypertension. Patient states she does not feel any differently when BP is elevated      Additional Follow-up details: Patient informed health  that she is now taking 2 carvedilol 6.25 mg tablets twice daily due to elevated BP. She denies feeling any differently with the higher dose of carvedilol.             Last 5 Patient Entered Readings                                      Current 30 Day Average: 161/72     Recent Readings 12/20/2020 12/16/2020 12/10/2020 12/10/2020 12/1/2020    SBP (mmHg) 176 156 152 165 153    DBP (mmHg) 73 68 70 71 74    Pulse 61 64 66 62 64                    ASSESSMENT(S)  Patients BP average is 161/72 mmHg, which is above goal. Patient's BP goal is less than or equal to 140/90.     Hypertension Plan  Additional monitoring needed.  Continue current therapy. Instructed patient to continue taking 2 carvedilol BID for now.  Continue current diet/physical activity routine.  Instructed to charge device.  Will follow up with patient once she submits readings after charging her device.     Addressed patient questions and patient has my contact information if needed prior to next outreach. Patient verbalizes understanding.      Explained to the patient that the Digital Medicine team is not available for emergencies. Advised patient call [facility] ([phone] or [phone]) or [phone] if needed.                Hypertension Medications             amLODIPine (NORVASC) 5 MG tablet Take 1 tablet (5 mg total) by mouth once daily. One-2 per day or as directed    carvediloL (COREG) 6.25 MG tablet Take 1 tablet (6.25 mg total) by mouth  2 (two) times daily.    nitroGLYCERIN (NITROSTAT) 0.4 MG SL tablet Place 1 tablet (0.4 mg total) under the tongue every 5 (five) minutes as needed for Chest pain.

## 2021-01-05 ENCOUNTER — TELEPHONE (OUTPATIENT)
Dept: INTERNAL MEDICINE | Facility: CLINIC | Age: 86
End: 2021-01-05

## 2021-01-06 ENCOUNTER — TELEPHONE (OUTPATIENT)
Dept: INTERNAL MEDICINE | Facility: CLINIC | Age: 86
End: 2021-01-06

## 2021-01-06 DIAGNOSIS — M75.80: Primary | ICD-10-CM

## 2021-01-10 ENCOUNTER — IMMUNIZATION (OUTPATIENT)
Dept: INTERNAL MEDICINE | Facility: CLINIC | Age: 86
End: 2021-01-10
Payer: MEDICARE

## 2021-01-10 DIAGNOSIS — Z23 NEED FOR VACCINATION: ICD-10-CM

## 2021-01-10 PROCEDURE — 91300 COVID-19, MRNA, LNP-S, PF, 30 MCG/0.3 ML DOSE VACCINE: CPT | Mod: PBBFAC | Performed by: INTERNAL MEDICINE

## 2021-01-20 ENCOUNTER — OFFICE VISIT (OUTPATIENT)
Dept: ORTHOPEDICS | Facility: CLINIC | Age: 86
End: 2021-01-20
Payer: MEDICARE

## 2021-01-20 ENCOUNTER — HOSPITAL ENCOUNTER (OUTPATIENT)
Dept: RADIOLOGY | Facility: HOSPITAL | Age: 86
Discharge: HOME | End: 2021-01-20
Attending: PHYSICIAN ASSISTANT
Payer: MEDICARE

## 2021-01-20 VITALS
DIASTOLIC BLOOD PRESSURE: 70 MMHG | HEART RATE: 57 BPM | WEIGHT: 135.13 LBS | BODY MASS INDEX: 23.94 KG/M2 | SYSTOLIC BLOOD PRESSURE: 177 MMHG

## 2021-01-20 DIAGNOSIS — M75.80: ICD-10-CM

## 2021-01-20 DIAGNOSIS — M19.011 PRIMARY OSTEOARTHRITIS OF RIGHT SHOULDER: Primary | ICD-10-CM

## 2021-01-20 PROCEDURE — 20610 DRAIN/INJ JOINT/BURSA W/O US: CPT | Mod: HCNC,RT,S$GLB, | Performed by: PHYSICIAN ASSISTANT

## 2021-01-20 PROCEDURE — 20610 PR DRAIN/INJECT LARGE JOINT/BURSA: ICD-10-PCS | Mod: HCNC,RT,S$GLB, | Performed by: PHYSICIAN ASSISTANT

## 2021-01-20 PROCEDURE — 73030 X-RAY EXAM OF SHOULDER: CPT | Mod: TC,HCNC,RT

## 2021-01-20 PROCEDURE — 1101F PR PT FALLS ASSESS DOC 0-1 FALLS W/OUT INJ PAST YR: ICD-10-PCS | Mod: HCNC,CPTII,S$GLB, | Performed by: PHYSICIAN ASSISTANT

## 2021-01-20 PROCEDURE — 73030 XR SHOULDER COMPLETE 2 OR MORE VIEWS RIGHT: ICD-10-PCS | Mod: 26,HCNC,RT, | Performed by: RADIOLOGY

## 2021-01-20 PROCEDURE — 99213 OFFICE O/P EST LOW 20 MIN: CPT | Mod: 25,HCNC,S$GLB, | Performed by: PHYSICIAN ASSISTANT

## 2021-01-20 PROCEDURE — 3288F PR FALLS RISK ASSESSMENT DOCUMENTED: ICD-10-PCS | Mod: HCNC,CPTII,S$GLB, | Performed by: PHYSICIAN ASSISTANT

## 2021-01-20 PROCEDURE — 99999 PR PBB SHADOW E&M-EST. PATIENT-LVL IV: ICD-10-PCS | Mod: PBBFAC,HCNC,, | Performed by: PHYSICIAN ASSISTANT

## 2021-01-20 PROCEDURE — 1125F AMNT PAIN NOTED PAIN PRSNT: CPT | Mod: HCNC,S$GLB,, | Performed by: PHYSICIAN ASSISTANT

## 2021-01-20 PROCEDURE — 1159F MED LIST DOCD IN RCRD: CPT | Mod: HCNC,S$GLB,, | Performed by: PHYSICIAN ASSISTANT

## 2021-01-20 PROCEDURE — 99999 PR PBB SHADOW E&M-EST. PATIENT-LVL IV: CPT | Mod: PBBFAC,HCNC,, | Performed by: PHYSICIAN ASSISTANT

## 2021-01-20 PROCEDURE — 1101F PT FALLS ASSESS-DOCD LE1/YR: CPT | Mod: HCNC,CPTII,S$GLB, | Performed by: PHYSICIAN ASSISTANT

## 2021-01-20 PROCEDURE — 99213 PR OFFICE/OUTPT VISIT, EST, LEVL III, 20-29 MIN: ICD-10-PCS | Mod: 25,HCNC,S$GLB, | Performed by: PHYSICIAN ASSISTANT

## 2021-01-20 PROCEDURE — 3288F FALL RISK ASSESSMENT DOCD: CPT | Mod: HCNC,CPTII,S$GLB, | Performed by: PHYSICIAN ASSISTANT

## 2021-01-20 PROCEDURE — 1159F PR MEDICATION LIST DOCUMENTED IN MEDICAL RECORD: ICD-10-PCS | Mod: HCNC,S$GLB,, | Performed by: PHYSICIAN ASSISTANT

## 2021-01-20 PROCEDURE — 1125F PR PAIN SEVERITY QUANTIFIED, PAIN PRESENT: ICD-10-PCS | Mod: HCNC,S$GLB,, | Performed by: PHYSICIAN ASSISTANT

## 2021-01-20 PROCEDURE — 73030 X-RAY EXAM OF SHOULDER: CPT | Mod: 26,HCNC,RT, | Performed by: RADIOLOGY

## 2021-01-20 RX ORDER — BETAMETHASONE SODIUM PHOSPHATE AND BETAMETHASONE ACETATE 3; 3 MG/ML; MG/ML
6 INJECTION, SUSPENSION INTRA-ARTICULAR; INTRALESIONAL; INTRAMUSCULAR; SOFT TISSUE
Status: COMPLETED | OUTPATIENT
Start: 2021-01-20 | End: 2021-01-20

## 2021-01-20 RX ADMIN — BETAMETHASONE SODIUM PHOSPHATE AND BETAMETHASONE ACETATE 6 MG: 3; 3 INJECTION, SUSPENSION INTRA-ARTICULAR; INTRALESIONAL; INTRAMUSCULAR; SOFT TISSUE at 04:01

## 2021-01-28 ENCOUNTER — PROCEDURE VISIT (OUTPATIENT)
Dept: OPHTHALMOLOGY | Facility: CLINIC | Age: 86
End: 2021-01-28
Payer: MEDICARE

## 2021-01-28 DIAGNOSIS — H35.3221 EXUDATIVE AGE-RELATED MACULAR DEGENERATION OF LEFT EYE WITH ACTIVE CHOROIDAL NEOVASCULARIZATION: Primary | ICD-10-CM

## 2021-01-28 DIAGNOSIS — H35.3132 NONEXUDATIVE AGE-RELATED MACULAR DEGENERATION, BILATERAL, INTERMEDIATE DRY STAGE: ICD-10-CM

## 2021-01-28 DIAGNOSIS — H43.813 POSTERIOR VITREOUS DETACHMENT, BILATERAL: ICD-10-CM

## 2021-01-28 PROCEDURE — 92012 PR EYE EXAM, EST PATIENT,INTERMED: ICD-10-PCS | Mod: 25,HCNC,S$GLB, | Performed by: OPHTHALMOLOGY

## 2021-01-28 PROCEDURE — 92012 INTRM OPH EXAM EST PATIENT: CPT | Mod: 25,HCNC,S$GLB, | Performed by: OPHTHALMOLOGY

## 2021-01-28 PROCEDURE — 99499 UNLISTED E&M SERVICE: CPT | Mod: S$GLB,,, | Performed by: OPHTHALMOLOGY

## 2021-01-28 PROCEDURE — 67028 PR INJECT INTRAVITREAL PHARMCOLOGIC: ICD-10-PCS | Mod: HCNC,LT,S$GLB, | Performed by: OPHTHALMOLOGY

## 2021-01-28 PROCEDURE — 67028 INJECTION EYE DRUG: CPT | Mod: HCNC,LT,S$GLB, | Performed by: OPHTHALMOLOGY

## 2021-01-28 PROCEDURE — 99499 RISK ADDL DX/OHS AUDIT: ICD-10-PCS | Mod: S$GLB,,, | Performed by: OPHTHALMOLOGY

## 2021-01-28 RX ADMIN — Medication 1.25 MG: at 11:01

## 2021-01-31 ENCOUNTER — IMMUNIZATION (OUTPATIENT)
Dept: INTERNAL MEDICINE | Facility: CLINIC | Age: 86
End: 2021-01-31
Payer: MEDICARE

## 2021-01-31 DIAGNOSIS — Z23 NEED FOR VACCINATION: Primary | ICD-10-CM

## 2021-01-31 PROCEDURE — 0002A PR IMMUNIZ ADMIN, SARS-COV-2 COVID-19 VACC, 30MCG/0.3ML, 2ND DOSE: ICD-10-PCS | Mod: HCNC,CV19,, | Performed by: INTERNAL MEDICINE

## 2021-01-31 PROCEDURE — 91300 PR SARS-COV- 2 COVID-19 VACCINE, NO PRSV, 30MCG/0.3ML, IM: CPT | Mod: HCNC,,, | Performed by: INTERNAL MEDICINE

## 2021-01-31 PROCEDURE — 0002A PR IMMUNIZ ADMIN, SARS-COV-2 COVID-19 VACC, 30MCG/0.3ML, 2ND DOSE: CPT | Mod: HCNC,CV19,, | Performed by: INTERNAL MEDICINE

## 2021-01-31 PROCEDURE — 91300 PR SARS-COV- 2 COVID-19 VACCINE, NO PRSV, 30MCG/0.3ML, IM: ICD-10-PCS | Mod: HCNC,,, | Performed by: INTERNAL MEDICINE

## 2021-01-31 RX ADMIN — Medication 0.3 ML: at 11:01

## 2021-02-03 ENCOUNTER — TELEPHONE (OUTPATIENT)
Dept: INTERNAL MEDICINE | Facility: CLINIC | Age: 86
End: 2021-02-03

## 2021-03-22 ENCOUNTER — TELEPHONE (OUTPATIENT)
Dept: INTERNAL MEDICINE | Facility: CLINIC | Age: 86
End: 2021-03-22

## 2021-03-22 DIAGNOSIS — M79.604 RIGHT LEG PAIN: Primary | ICD-10-CM

## 2021-03-23 ENCOUNTER — TELEPHONE (OUTPATIENT)
Dept: INTERNAL MEDICINE | Facility: CLINIC | Age: 86
End: 2021-03-23

## 2021-03-23 DIAGNOSIS — M79.604 RIGHT LEG PAIN: Primary | ICD-10-CM

## 2021-03-25 PROCEDURE — G0180 MD CERTIFICATION HHA PATIENT: HCPCS | Mod: ,,, | Performed by: INTERNAL MEDICINE

## 2021-03-25 PROCEDURE — G0180 PR HOME HEALTH MD CERTIFICATION: ICD-10-PCS | Mod: ,,, | Performed by: INTERNAL MEDICINE

## 2021-04-07 ENCOUNTER — EXTERNAL HOME HEALTH (OUTPATIENT)
Dept: HOME HEALTH SERVICES | Facility: HOSPITAL | Age: 86
End: 2021-04-07
Payer: MEDICARE

## 2021-04-08 ENCOUNTER — PROCEDURE VISIT (OUTPATIENT)
Dept: OPHTHALMOLOGY | Facility: CLINIC | Age: 86
End: 2021-04-08
Payer: MEDICARE

## 2021-04-08 DIAGNOSIS — H35.3132 NONEXUDATIVE AGE-RELATED MACULAR DEGENERATION, BILATERAL, INTERMEDIATE DRY STAGE: ICD-10-CM

## 2021-04-08 DIAGNOSIS — H35.3221 EXUDATIVE AGE-RELATED MACULAR DEGENERATION OF LEFT EYE WITH ACTIVE CHOROIDAL NEOVASCULARIZATION: Primary | ICD-10-CM

## 2021-04-08 DIAGNOSIS — H43.813 POSTERIOR VITREOUS DETACHMENT, BILATERAL: ICD-10-CM

## 2021-04-08 PROCEDURE — 92134 CPTRZ OPH DX IMG PST SGM RTA: CPT | Mod: S$GLB,,, | Performed by: OPHTHALMOLOGY

## 2021-04-08 PROCEDURE — 92134 POSTERIOR SEGMENT OCT RETINA (OCULAR COHERENCE TOMOGRAPHY)-BOTH EYES: ICD-10-PCS | Mod: S$GLB,,, | Performed by: OPHTHALMOLOGY

## 2021-04-08 PROCEDURE — 67028 PR INJECT INTRAVITREAL PHARMCOLOGIC: ICD-10-PCS | Mod: LT,S$GLB,, | Performed by: OPHTHALMOLOGY

## 2021-04-08 PROCEDURE — 92014 PR EYE EXAM, EST PATIENT,COMPREHESV: ICD-10-PCS | Mod: 25,S$GLB,, | Performed by: OPHTHALMOLOGY

## 2021-04-08 PROCEDURE — 67028 INJECTION EYE DRUG: CPT | Mod: LT,S$GLB,, | Performed by: OPHTHALMOLOGY

## 2021-04-08 PROCEDURE — 92014 COMPRE OPH EXAM EST PT 1/>: CPT | Mod: 25,S$GLB,, | Performed by: OPHTHALMOLOGY

## 2021-04-08 RX ADMIN — Medication 1.25 MG: at 11:04

## 2021-04-13 ENCOUNTER — OFFICE VISIT (OUTPATIENT)
Dept: ORTHOPEDICS | Facility: CLINIC | Age: 86
End: 2021-04-13
Payer: MEDICARE

## 2021-04-13 VITALS — HEIGHT: 63 IN | WEIGHT: 134.25 LBS | BODY MASS INDEX: 23.79 KG/M2

## 2021-04-13 DIAGNOSIS — M53.3 SACROILIAC JOINT DYSFUNCTION OF RIGHT SIDE: Primary | ICD-10-CM

## 2021-04-13 PROCEDURE — 99999 PR PBB SHADOW E&M-EST. PATIENT-LVL IV: CPT | Mod: PBBFAC,,, | Performed by: ORTHOPAEDIC SURGERY

## 2021-04-13 PROCEDURE — 1126F AMNT PAIN NOTED NONE PRSNT: CPT | Mod: S$GLB,,, | Performed by: ORTHOPAEDIC SURGERY

## 2021-04-13 PROCEDURE — 3288F PR FALLS RISK ASSESSMENT DOCUMENTED: ICD-10-PCS | Mod: CPTII,S$GLB,, | Performed by: ORTHOPAEDIC SURGERY

## 2021-04-13 PROCEDURE — 1126F PR PAIN SEVERITY QUANTIFIED, NO PAIN PRESENT: ICD-10-PCS | Mod: S$GLB,,, | Performed by: ORTHOPAEDIC SURGERY

## 2021-04-13 PROCEDURE — 99999 PR PBB SHADOW E&M-EST. PATIENT-LVL IV: ICD-10-PCS | Mod: PBBFAC,,, | Performed by: ORTHOPAEDIC SURGERY

## 2021-04-13 PROCEDURE — 1159F MED LIST DOCD IN RCRD: CPT | Mod: S$GLB,,, | Performed by: ORTHOPAEDIC SURGERY

## 2021-04-13 PROCEDURE — 99213 PR OFFICE/OUTPT VISIT, EST, LEVL III, 20-29 MIN: ICD-10-PCS | Mod: S$GLB,,, | Performed by: ORTHOPAEDIC SURGERY

## 2021-04-13 PROCEDURE — 1159F PR MEDICATION LIST DOCUMENTED IN MEDICAL RECORD: ICD-10-PCS | Mod: S$GLB,,, | Performed by: ORTHOPAEDIC SURGERY

## 2021-04-13 PROCEDURE — 1101F PT FALLS ASSESS-DOCD LE1/YR: CPT | Mod: CPTII,S$GLB,, | Performed by: ORTHOPAEDIC SURGERY

## 2021-04-13 PROCEDURE — 99213 OFFICE O/P EST LOW 20 MIN: CPT | Mod: S$GLB,,, | Performed by: ORTHOPAEDIC SURGERY

## 2021-04-13 PROCEDURE — 1101F PR PT FALLS ASSESS DOC 0-1 FALLS W/OUT INJ PAST YR: ICD-10-PCS | Mod: CPTII,S$GLB,, | Performed by: ORTHOPAEDIC SURGERY

## 2021-04-13 PROCEDURE — 3288F FALL RISK ASSESSMENT DOCD: CPT | Mod: CPTII,S$GLB,, | Performed by: ORTHOPAEDIC SURGERY

## 2021-04-14 ENCOUNTER — TELEPHONE (OUTPATIENT)
Dept: INTERNAL MEDICINE | Facility: CLINIC | Age: 86
End: 2021-04-14

## 2021-04-19 ENCOUNTER — TELEPHONE (OUTPATIENT)
Dept: INTERNAL MEDICINE | Facility: CLINIC | Age: 86
End: 2021-04-19

## 2021-04-19 DIAGNOSIS — R04.0 EPISTAXIS: Primary | ICD-10-CM

## 2021-04-21 ENCOUNTER — LAB VISIT (OUTPATIENT)
Dept: LAB | Facility: HOSPITAL | Age: 86
End: 2021-04-21
Attending: INTERNAL MEDICINE
Payer: MEDICARE

## 2021-04-21 DIAGNOSIS — I10 HTN (HYPERTENSION), BENIGN: ICD-10-CM

## 2021-04-21 LAB
ANION GAP SERPL CALC-SCNC: 6 MMOL/L (ref 8–16)
BUN SERPL-MCNC: 12 MG/DL (ref 10–30)
CALCIUM SERPL-MCNC: 9.2 MG/DL (ref 8.7–10.5)
CHLORIDE SERPL-SCNC: 104 MMOL/L (ref 95–110)
CO2 SERPL-SCNC: 29 MMOL/L (ref 23–29)
CREAT SERPL-MCNC: 0.7 MG/DL (ref 0.5–1.4)
EST. GFR  (AFRICAN AMERICAN): >60 ML/MIN/1.73 M^2
EST. GFR  (NON AFRICAN AMERICAN): >60 ML/MIN/1.73 M^2
GLUCOSE SERPL-MCNC: 94 MG/DL (ref 70–110)
POTASSIUM SERPL-SCNC: 4.5 MMOL/L (ref 3.5–5.1)
SODIUM SERPL-SCNC: 139 MMOL/L (ref 136–145)

## 2021-04-21 PROCEDURE — 80048 BASIC METABOLIC PNL TOTAL CA: CPT | Performed by: INTERNAL MEDICINE

## 2021-04-21 PROCEDURE — 36415 COLL VENOUS BLD VENIPUNCTURE: CPT | Mod: PO | Performed by: INTERNAL MEDICINE

## 2021-04-22 ENCOUNTER — PATIENT MESSAGE (OUTPATIENT)
Dept: CARDIOLOGY | Facility: CLINIC | Age: 86
End: 2021-04-22

## 2021-04-26 ENCOUNTER — PATIENT MESSAGE (OUTPATIENT)
Dept: CARDIOLOGY | Facility: CLINIC | Age: 86
End: 2021-04-26

## 2021-04-26 ENCOUNTER — OFFICE VISIT (OUTPATIENT)
Dept: CARDIOLOGY | Facility: CLINIC | Age: 86
End: 2021-04-26
Payer: MEDICARE

## 2021-04-26 ENCOUNTER — HOSPITAL ENCOUNTER (OUTPATIENT)
Dept: CARDIOLOGY | Facility: CLINIC | Age: 86
Discharge: HOME | End: 2021-04-26
Payer: MEDICARE

## 2021-04-26 ENCOUNTER — OFFICE VISIT (OUTPATIENT)
Dept: OTOLARYNGOLOGY | Facility: CLINIC | Age: 86
End: 2021-04-26
Payer: MEDICARE

## 2021-04-26 VITALS — DIASTOLIC BLOOD PRESSURE: 78 MMHG | SYSTOLIC BLOOD PRESSURE: 136 MMHG | HEART RATE: 73 BPM

## 2021-04-26 VITALS
HEART RATE: 59 BPM | HEIGHT: 61 IN | DIASTOLIC BLOOD PRESSURE: 73 MMHG | BODY MASS INDEX: 25.27 KG/M2 | WEIGHT: 133.81 LBS | SYSTOLIC BLOOD PRESSURE: 176 MMHG

## 2021-04-26 DIAGNOSIS — I10 HTN (HYPERTENSION), BENIGN: ICD-10-CM

## 2021-04-26 DIAGNOSIS — R94.31 NONSPECIFIC ABNORMAL ELECTROCARDIOGRAM (ECG) (EKG): ICD-10-CM

## 2021-04-26 DIAGNOSIS — I95.1 AUTONOMIC POSTURAL HYPOTENSION: ICD-10-CM

## 2021-04-26 DIAGNOSIS — K21.00 GASTROESOPHAGEAL REFLUX DISEASE WITH ESOPHAGITIS WITHOUT HEMORRHAGE: ICD-10-CM

## 2021-04-26 DIAGNOSIS — R04.0 EPISTAXIS: Primary | ICD-10-CM

## 2021-04-26 DIAGNOSIS — E78.2 MIXED HYPERLIPIDEMIA: ICD-10-CM

## 2021-04-26 DIAGNOSIS — I25.10 CORONARY ARTERY DISEASE INVOLVING NATIVE CORONARY ARTERY OF NATIVE HEART WITHOUT ANGINA PECTORIS: ICD-10-CM

## 2021-04-26 DIAGNOSIS — I10 HTN (HYPERTENSION), BENIGN: Primary | ICD-10-CM

## 2021-04-26 DIAGNOSIS — R26.81 GAIT INSTABILITY: ICD-10-CM

## 2021-04-26 DIAGNOSIS — H61.23 BILATERAL IMPACTED CERUMEN: ICD-10-CM

## 2021-04-26 DIAGNOSIS — E87.1 HYPONATREMIA: ICD-10-CM

## 2021-04-26 PROCEDURE — 1126F PR PAIN SEVERITY QUANTIFIED, NO PAIN PRESENT: ICD-10-PCS | Mod: S$GLB,,, | Performed by: NURSE PRACTITIONER

## 2021-04-26 PROCEDURE — 99999 PR PBB SHADOW E&M-EST. PATIENT-LVL IV: ICD-10-PCS | Mod: PBBFAC,,, | Performed by: NURSE PRACTITIONER

## 2021-04-26 PROCEDURE — 99213 PR OFFICE/OUTPT VISIT, EST, LEVL III, 20-29 MIN: ICD-10-PCS | Mod: 25,S$GLB,, | Performed by: NURSE PRACTITIONER

## 2021-04-26 PROCEDURE — 93000 EKG 12-LEAD: ICD-10-PCS | Mod: S$GLB,,, | Performed by: INTERNAL MEDICINE

## 2021-04-26 PROCEDURE — 3288F PR FALLS RISK ASSESSMENT DOCUMENTED: ICD-10-PCS | Mod: CPTII,S$GLB,, | Performed by: NURSE PRACTITIONER

## 2021-04-26 PROCEDURE — 1159F PR MEDICATION LIST DOCUMENTED IN MEDICAL RECORD: ICD-10-PCS | Mod: S$GLB,,, | Performed by: NURSE PRACTITIONER

## 2021-04-26 PROCEDURE — 99215 OFFICE O/P EST HI 40 MIN: CPT | Mod: S$GLB,,, | Performed by: INTERNAL MEDICINE

## 2021-04-26 PROCEDURE — 93000 ELECTROCARDIOGRAM COMPLETE: CPT | Mod: S$GLB,,, | Performed by: INTERNAL MEDICINE

## 2021-04-26 PROCEDURE — 1101F PT FALLS ASSESS-DOCD LE1/YR: CPT | Mod: CPTII,S$GLB,, | Performed by: NURSE PRACTITIONER

## 2021-04-26 PROCEDURE — 1126F AMNT PAIN NOTED NONE PRSNT: CPT | Mod: S$GLB,,, | Performed by: INTERNAL MEDICINE

## 2021-04-26 PROCEDURE — 1159F PR MEDICATION LIST DOCUMENTED IN MEDICAL RECORD: ICD-10-PCS | Mod: S$GLB,,, | Performed by: INTERNAL MEDICINE

## 2021-04-26 PROCEDURE — 1126F AMNT PAIN NOTED NONE PRSNT: CPT | Mod: S$GLB,,, | Performed by: NURSE PRACTITIONER

## 2021-04-26 PROCEDURE — 99999 PR PBB SHADOW E&M-EST. PATIENT-LVL IV: CPT | Mod: PBBFAC,,, | Performed by: INTERNAL MEDICINE

## 2021-04-26 PROCEDURE — 69210 REMOVE IMPACTED EAR WAX UNI: CPT | Mod: S$GLB,,, | Performed by: NURSE PRACTITIONER

## 2021-04-26 PROCEDURE — 1126F PR PAIN SEVERITY QUANTIFIED, NO PAIN PRESENT: ICD-10-PCS | Mod: S$GLB,,, | Performed by: INTERNAL MEDICINE

## 2021-04-26 PROCEDURE — 99215 PR OFFICE/OUTPT VISIT, EST, LEVL V, 40-54 MIN: ICD-10-PCS | Mod: S$GLB,,, | Performed by: INTERNAL MEDICINE

## 2021-04-26 PROCEDURE — 1159F MED LIST DOCD IN RCRD: CPT | Mod: S$GLB,,, | Performed by: NURSE PRACTITIONER

## 2021-04-26 PROCEDURE — 99999 PR PBB SHADOW E&M-EST. PATIENT-LVL IV: ICD-10-PCS | Mod: PBBFAC,,, | Performed by: INTERNAL MEDICINE

## 2021-04-26 PROCEDURE — 99213 OFFICE O/P EST LOW 20 MIN: CPT | Mod: 25,S$GLB,, | Performed by: NURSE PRACTITIONER

## 2021-04-26 PROCEDURE — 99999 PR PBB SHADOW E&M-EST. PATIENT-LVL IV: CPT | Mod: PBBFAC,,, | Performed by: NURSE PRACTITIONER

## 2021-04-26 PROCEDURE — 3288F FALL RISK ASSESSMENT DOCD: CPT | Mod: CPTII,S$GLB,, | Performed by: NURSE PRACTITIONER

## 2021-04-26 PROCEDURE — 1101F PR PT FALLS ASSESS DOC 0-1 FALLS W/OUT INJ PAST YR: ICD-10-PCS | Mod: CPTII,S$GLB,, | Performed by: NURSE PRACTITIONER

## 2021-04-26 PROCEDURE — 69210 EAR CERUMEN REMOVAL: ICD-10-PCS | Mod: S$GLB,,, | Performed by: NURSE PRACTITIONER

## 2021-04-26 PROCEDURE — 1159F MED LIST DOCD IN RCRD: CPT | Mod: S$GLB,,, | Performed by: INTERNAL MEDICINE

## 2021-04-26 RX ORDER — MUPIROCIN 20 MG/G
OINTMENT TOPICAL
Qty: 1 TUBE | Refills: 0 | Status: SHIPPED | OUTPATIENT
Start: 2021-04-26 | End: 2022-07-14

## 2021-04-26 RX ORDER — LOSARTAN POTASSIUM 50 MG/1
50 TABLET ORAL DAILY
Qty: 90 TABLET | Refills: 3 | Status: SHIPPED | OUTPATIENT
Start: 2021-04-26 | End: 2022-01-13

## 2021-04-30 NOTE — PATIENT INSTRUCTIONS
Delsym cough syrup for cough if can't fill tessalon perles.    Warm salt water gargles to help with sore throat.  
Preservice called and stated per patient insurance MRI needs to be without contrast
done

## 2021-05-26 ENCOUNTER — OFFICE VISIT (OUTPATIENT)
Dept: INTERNAL MEDICINE | Facility: CLINIC | Age: 86
End: 2021-05-26
Payer: MEDICARE

## 2021-05-26 VITALS
SYSTOLIC BLOOD PRESSURE: 130 MMHG | HEART RATE: 60 BPM | DIASTOLIC BLOOD PRESSURE: 62 MMHG | HEIGHT: 61 IN | WEIGHT: 135.81 LBS | BODY MASS INDEX: 25.64 KG/M2

## 2021-05-26 DIAGNOSIS — E78.2 MIXED HYPERLIPIDEMIA: ICD-10-CM

## 2021-05-26 DIAGNOSIS — I70.0 AORTIC ATHEROSCLEROSIS: ICD-10-CM

## 2021-05-26 DIAGNOSIS — I10 HTN (HYPERTENSION), BENIGN: Primary | ICD-10-CM

## 2021-05-26 DIAGNOSIS — G47.00 INSOMNIA, UNSPECIFIED TYPE: ICD-10-CM

## 2021-05-26 DIAGNOSIS — D69.2 SENILE PURPURA: ICD-10-CM

## 2021-05-26 DIAGNOSIS — F32.5 MAJOR DEPRESSIVE DISORDER, SINGLE EPISODE, IN FULL REMISSION: ICD-10-CM

## 2021-05-26 PROCEDURE — 3288F PR FALLS RISK ASSESSMENT DOCUMENTED: ICD-10-PCS | Mod: CPTII,S$GLB,, | Performed by: INTERNAL MEDICINE

## 2021-05-26 PROCEDURE — 99499 RISK ADDL DX/OHS AUDIT: ICD-10-PCS | Mod: HCNC,S$GLB,, | Performed by: INTERNAL MEDICINE

## 2021-05-26 PROCEDURE — 1126F AMNT PAIN NOTED NONE PRSNT: CPT | Mod: S$GLB,,, | Performed by: INTERNAL MEDICINE

## 2021-05-26 PROCEDURE — 3288F FALL RISK ASSESSMENT DOCD: CPT | Mod: CPTII,S$GLB,, | Performed by: INTERNAL MEDICINE

## 2021-05-26 PROCEDURE — 1159F PR MEDICATION LIST DOCUMENTED IN MEDICAL RECORD: ICD-10-PCS | Mod: S$GLB,,, | Performed by: INTERNAL MEDICINE

## 2021-05-26 PROCEDURE — 99213 OFFICE O/P EST LOW 20 MIN: CPT | Mod: S$GLB,,, | Performed by: INTERNAL MEDICINE

## 2021-05-26 PROCEDURE — 99999 PR PBB SHADOW E&M-EST. PATIENT-LVL III: ICD-10-PCS | Mod: PBBFAC,,, | Performed by: INTERNAL MEDICINE

## 2021-05-26 PROCEDURE — 1126F PR PAIN SEVERITY QUANTIFIED, NO PAIN PRESENT: ICD-10-PCS | Mod: S$GLB,,, | Performed by: INTERNAL MEDICINE

## 2021-05-26 PROCEDURE — 99213 PR OFFICE/OUTPT VISIT, EST, LEVL III, 20-29 MIN: ICD-10-PCS | Mod: S$GLB,,, | Performed by: INTERNAL MEDICINE

## 2021-05-26 PROCEDURE — 1159F MED LIST DOCD IN RCRD: CPT | Mod: S$GLB,,, | Performed by: INTERNAL MEDICINE

## 2021-05-26 PROCEDURE — 99999 PR PBB SHADOW E&M-EST. PATIENT-LVL III: CPT | Mod: PBBFAC,,, | Performed by: INTERNAL MEDICINE

## 2021-05-26 PROCEDURE — 1101F PT FALLS ASSESS-DOCD LE1/YR: CPT | Mod: CPTII,S$GLB,, | Performed by: INTERNAL MEDICINE

## 2021-05-26 PROCEDURE — 1101F PR PT FALLS ASSESS DOC 0-1 FALLS W/OUT INJ PAST YR: ICD-10-PCS | Mod: CPTII,S$GLB,, | Performed by: INTERNAL MEDICINE

## 2021-05-26 PROCEDURE — 99499 UNLISTED E&M SERVICE: CPT | Mod: HCNC,S$GLB,, | Performed by: INTERNAL MEDICINE

## 2021-05-26 RX ORDER — BEVACIZUMAB 100 MG/4ML
INJECTION, SOLUTION INTRAVENOUS
Status: ON HOLD | COMMUNITY
Start: 2021-01-28 | End: 2022-07-22 | Stop reason: HOSPADM

## 2021-05-26 RX ORDER — ZOLPIDEM TARTRATE 5 MG/1
5 TABLET ORAL NIGHTLY PRN
Qty: 30 TABLET | Refills: 0 | Status: SHIPPED | OUTPATIENT
Start: 2021-05-26 | End: 2021-12-15 | Stop reason: SDUPTHER

## 2021-05-26 RX ORDER — BETAMETHASONE SODIUM PHOSPHATE AND BETAMETHASONE ACETATE 3; 3 MG/ML; MG/ML
INJECTION, SUSPENSION INTRA-ARTICULAR; INTRALESIONAL; INTRAMUSCULAR; SOFT TISSUE
COMMUNITY
Start: 2021-01-20 | End: 2022-01-13 | Stop reason: ALTCHOICE

## 2021-05-27 ENCOUNTER — PROCEDURE VISIT (OUTPATIENT)
Dept: OPHTHALMOLOGY | Facility: CLINIC | Age: 86
End: 2021-05-27
Payer: MEDICARE

## 2021-05-27 DIAGNOSIS — H35.3132 NONEXUDATIVE AGE-RELATED MACULAR DEGENERATION, BILATERAL, INTERMEDIATE DRY STAGE: ICD-10-CM

## 2021-05-27 DIAGNOSIS — H43.813 POSTERIOR VITREOUS DETACHMENT, BILATERAL: ICD-10-CM

## 2021-05-27 DIAGNOSIS — H35.3221 EXUDATIVE AGE-RELATED MACULAR DEGENERATION OF LEFT EYE WITH ACTIVE CHOROIDAL NEOVASCULARIZATION: Primary | ICD-10-CM

## 2021-05-27 PROCEDURE — 67028 PR INJECT INTRAVITREAL PHARMCOLOGIC: ICD-10-PCS | Mod: LT,S$GLB,, | Performed by: OPHTHALMOLOGY

## 2021-05-27 PROCEDURE — 92134 CPTRZ OPH DX IMG PST SGM RTA: CPT | Mod: S$GLB,,, | Performed by: OPHTHALMOLOGY

## 2021-05-27 PROCEDURE — 67028 INJECTION EYE DRUG: CPT | Mod: LT,S$GLB,, | Performed by: OPHTHALMOLOGY

## 2021-05-27 PROCEDURE — 92014 COMPRE OPH EXAM EST PT 1/>: CPT | Mod: 25,S$GLB,, | Performed by: OPHTHALMOLOGY

## 2021-05-27 PROCEDURE — 92134 POSTERIOR SEGMENT OCT RETINA (OCULAR COHERENCE TOMOGRAPHY)-BOTH EYES: ICD-10-PCS | Mod: S$GLB,,, | Performed by: OPHTHALMOLOGY

## 2021-05-27 PROCEDURE — 92014 PR EYE EXAM, EST PATIENT,COMPREHESV: ICD-10-PCS | Mod: 25,S$GLB,, | Performed by: OPHTHALMOLOGY

## 2021-05-27 RX ADMIN — Medication 1.25 MG: at 11:05

## 2021-06-15 DIAGNOSIS — R07.9 CHEST PAIN DUE TO GERD: ICD-10-CM

## 2021-06-15 DIAGNOSIS — K21.9 CHEST PAIN DUE TO GERD: ICD-10-CM

## 2021-06-17 RX ORDER — ESOMEPRAZOLE MAGNESIUM 40 MG/1
CAPSULE, DELAYED RELEASE ORAL
Qty: 90 CAPSULE | Refills: 3 | Status: SHIPPED | OUTPATIENT
Start: 2021-06-17 | End: 2022-04-20

## 2021-06-29 RX ORDER — AMLODIPINE BESYLATE 5 MG/1
5 TABLET ORAL DAILY
Qty: 90 TABLET | Refills: 3 | Status: SHIPPED | OUTPATIENT
Start: 2021-06-29 | End: 2021-07-06 | Stop reason: SDUPTHER

## 2021-07-06 RX ORDER — AMLODIPINE BESYLATE 5 MG/1
5 TABLET ORAL DAILY
Qty: 90 TABLET | Refills: 3 | Status: SHIPPED | OUTPATIENT
Start: 2021-07-06 | End: 2021-07-09 | Stop reason: SDUPTHER

## 2021-07-06 RX ORDER — AMLODIPINE BESYLATE 5 MG/1
5 TABLET ORAL DAILY
Qty: 90 TABLET | Refills: 3 | Status: SHIPPED | OUTPATIENT
Start: 2021-07-06 | End: 2021-07-06 | Stop reason: SDUPTHER

## 2021-07-07 ENCOUNTER — TELEPHONE (OUTPATIENT)
Dept: CARDIOLOGY | Facility: CLINIC | Age: 86
End: 2021-07-07

## 2021-07-08 ENCOUNTER — PATIENT MESSAGE (OUTPATIENT)
Dept: CARDIOLOGY | Facility: CLINIC | Age: 86
End: 2021-07-08

## 2021-07-09 RX ORDER — AMLODIPINE BESYLATE 5 MG/1
5 TABLET ORAL DAILY
Qty: 90 TABLET | Refills: 3 | Status: SHIPPED | OUTPATIENT
Start: 2021-07-09 | End: 2021-07-16 | Stop reason: SDUPTHER

## 2021-07-19 RX ORDER — AMLODIPINE BESYLATE 5 MG/1
5 TABLET ORAL DAILY
Qty: 90 TABLET | Refills: 3 | Status: SHIPPED | OUTPATIENT
Start: 2021-07-19 | End: 2021-12-26 | Stop reason: SDUPTHER

## 2021-07-21 ENCOUNTER — TELEPHONE (OUTPATIENT)
Dept: OPHTHALMOLOGY | Facility: CLINIC | Age: 86
End: 2021-07-21

## 2021-07-22 ENCOUNTER — TELEPHONE (OUTPATIENT)
Dept: OPHTHALMOLOGY | Facility: CLINIC | Age: 86
End: 2021-07-22

## 2021-07-26 ENCOUNTER — NURSE TRIAGE (OUTPATIENT)
Dept: ADMINISTRATIVE | Facility: CLINIC | Age: 86
End: 2021-07-26

## 2021-07-26 ENCOUNTER — HOSPITAL ENCOUNTER (OUTPATIENT)
Dept: RADIOLOGY | Facility: HOSPITAL | Age: 86
Discharge: HOME | End: 2021-07-26
Attending: PHYSICIAN ASSISTANT
Payer: MEDICARE

## 2021-07-26 ENCOUNTER — TELEPHONE (OUTPATIENT)
Dept: INTERNAL MEDICINE | Facility: CLINIC | Age: 86
End: 2021-07-26

## 2021-07-26 ENCOUNTER — OFFICE VISIT (OUTPATIENT)
Dept: INTERNAL MEDICINE | Facility: CLINIC | Age: 86
End: 2021-07-26
Payer: MEDICARE

## 2021-07-26 VITALS
OXYGEN SATURATION: 96 % | DIASTOLIC BLOOD PRESSURE: 60 MMHG | WEIGHT: 134.94 LBS | HEART RATE: 58 BPM | RESPIRATION RATE: 16 BRPM | SYSTOLIC BLOOD PRESSURE: 150 MMHG | BODY MASS INDEX: 23.91 KG/M2 | HEIGHT: 63 IN

## 2021-07-26 DIAGNOSIS — M54.9 UPPER BACK PAIN ON LEFT SIDE: ICD-10-CM

## 2021-07-26 DIAGNOSIS — R51.9 NONINTRACTABLE HEADACHE, UNSPECIFIED CHRONICITY PATTERN, UNSPECIFIED HEADACHE TYPE: ICD-10-CM

## 2021-07-26 DIAGNOSIS — M25.512 ACUTE PAIN OF LEFT SHOULDER: ICD-10-CM
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
## 2021-07-26 DIAGNOSIS — S80.212A ABRASION OF LEFT KNEE, INITIAL ENCOUNTER: ICD-10-CM
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
## 2021-07-26 PROCEDURE — 1160F RVW MEDS BY RX/DR IN RCRD: CPT | Mod: CPTII,S$GLB,, | Performed by: PHYSICIAN ASSISTANT

## 2021-07-26 PROCEDURE — 73030 X-RAY EXAM OF SHOULDER: CPT | Mod: TC,LT

## 2021-07-26 PROCEDURE — 72070 X-RAY EXAM THORAC SPINE 2VWS: CPT | Mod: TC

## 2021-07-26 PROCEDURE — 1159F MED LIST DOCD IN RCRD: CPT | Mod: CPTII,S$GLB,, | Performed by: PHYSICIAN ASSISTANT

## 2021-07-26 PROCEDURE — 72070 X-RAY EXAM THORAC SPINE 2VWS: CPT | Mod: 26,,, | Performed by: RADIOLOGY

## 2021-07-26 PROCEDURE — 1125F AMNT PAIN NOTED PAIN PRSNT: CPT | Mod: CPTII,S$GLB,, | Performed by: PHYSICIAN ASSISTANT

## 2021-07-26 PROCEDURE — 99213 OFFICE O/P EST LOW 20 MIN: CPT | Mod: S$GLB,,, | Performed by: PHYSICIAN ASSISTANT

## 2021-07-26 PROCEDURE — 1160F PR REVIEW ALL MEDS BY PRESCRIBER/CLIN PHARMACIST DOCUMENTED: ICD-10-PCS | Mod: CPTII,S$GLB,, | Performed by: PHYSICIAN ASSISTANT

## 2021-07-26 PROCEDURE — 70450 CT HEAD/BRAIN W/O DYE: CPT | Mod: TC

## 2021-07-26 PROCEDURE — 70450 CT HEAD/BRAIN W/O DYE: CPT | Mod: 26,,, | Performed by: RADIOLOGY

## 2021-07-26 PROCEDURE — 1101F PT FALLS ASSESS-DOCD LE1/YR: CPT | Mod: CPTII,S$GLB,, | Performed by: PHYSICIAN ASSISTANT

## 2021-07-26 PROCEDURE — 72070 XR THORACIC SPINE AP LATERAL: ICD-10-PCS | Mod: 26,,, | Performed by: RADIOLOGY

## 2021-07-26 PROCEDURE — 3288F PR FALLS RISK ASSESSMENT DOCUMENTED: ICD-10-PCS | Mod: CPTII,S$GLB,, | Performed by: PHYSICIAN ASSISTANT

## 2021-07-26 PROCEDURE — 99213 PR OFFICE/OUTPT VISIT, EST, LEVL III, 20-29 MIN: ICD-10-PCS | Mod: S$GLB,,, | Performed by: PHYSICIAN ASSISTANT

## 2021-07-26 PROCEDURE — 3288F FALL RISK ASSESSMENT DOCD: CPT | Mod: CPTII,S$GLB,, | Performed by: PHYSICIAN ASSISTANT

## 2021-07-26 PROCEDURE — 1125F PR PAIN SEVERITY QUANTIFIED, PAIN PRESENT: ICD-10-PCS | Mod: CPTII,S$GLB,, | Performed by: PHYSICIAN ASSISTANT

## 2021-07-26 PROCEDURE — 99999 PR PBB SHADOW E&M-EST. PATIENT-LVL V: ICD-10-PCS | Mod: PBBFAC,,, | Performed by: PHYSICIAN ASSISTANT

## 2021-07-26 PROCEDURE — 70450 CT HEAD WITHOUT CONTRAST: ICD-10-PCS | Mod: 26,,, | Performed by: RADIOLOGY

## 2021-07-26 PROCEDURE — 1159F PR MEDICATION LIST DOCUMENTED IN MEDICAL RECORD: ICD-10-PCS | Mod: CPTII,S$GLB,, | Performed by: PHYSICIAN ASSISTANT

## 2021-07-26 PROCEDURE — 73030 X-RAY EXAM OF SHOULDER: CPT | Mod: 26,LT,, | Performed by: RADIOLOGY

## 2021-07-26 PROCEDURE — 99999 PR PBB SHADOW E&M-EST. PATIENT-LVL V: CPT | Mod: PBBFAC,,, | Performed by: PHYSICIAN ASSISTANT

## 2021-07-26 PROCEDURE — 1101F PR PT FALLS ASSESS DOC 0-1 FALLS W/OUT INJ PAST YR: ICD-10-PCS | Mod: CPTII,S$GLB,, | Performed by: PHYSICIAN ASSISTANT

## 2021-07-26 PROCEDURE — 73030 XR SHOULDER COMPLETE 2 OR MORE VIEWS LEFT: ICD-10-PCS | Mod: 26,LT,, | Performed by: RADIOLOGY

## 2021-08-03 ENCOUNTER — TELEPHONE (OUTPATIENT)
Dept: OPHTHALMOLOGY | Facility: CLINIC | Age: 86
End: 2021-08-03

## 2021-08-17 ENCOUNTER — PROCEDURE VISIT (OUTPATIENT)
Dept: OPHTHALMOLOGY | Facility: CLINIC | Age: 86
End: 2021-08-17
Payer: MEDICARE

## 2021-08-17 DIAGNOSIS — H35.3132 NONEXUDATIVE AGE-RELATED MACULAR DEGENERATION, BILATERAL, INTERMEDIATE DRY STAGE: ICD-10-CM

## 2021-08-17 DIAGNOSIS — H35.3221 EXUDATIVE AGE-RELATED MACULAR DEGENERATION OF LEFT EYE WITH ACTIVE CHOROIDAL NEOVASCULARIZATION: Primary | ICD-10-CM

## 2021-08-17 DIAGNOSIS — H43.813 POSTERIOR VITREOUS DETACHMENT, BILATERAL: ICD-10-CM

## 2021-08-17 PROCEDURE — 67028 PR INJECT INTRAVITREAL PHARMCOLOGIC: ICD-10-PCS | Mod: 50,S$GLB,, | Performed by: OPHTHALMOLOGY

## 2021-08-17 PROCEDURE — 92134 POSTERIOR SEGMENT OCT RETINA (OCULAR COHERENCE TOMOGRAPHY)-BOTH EYES: ICD-10-PCS | Mod: S$GLB,,, | Performed by: OPHTHALMOLOGY

## 2021-08-17 PROCEDURE — 92014 PR EYE EXAM, EST PATIENT,COMPREHESV: ICD-10-PCS | Mod: 25,S$GLB,, | Performed by: OPHTHALMOLOGY

## 2021-08-17 PROCEDURE — 67028 INJECTION EYE DRUG: CPT | Mod: 50,S$GLB,, | Performed by: OPHTHALMOLOGY

## 2021-08-17 PROCEDURE — 92134 CPTRZ OPH DX IMG PST SGM RTA: CPT | Mod: S$GLB,,, | Performed by: OPHTHALMOLOGY

## 2021-08-17 PROCEDURE — 92014 COMPRE OPH EXAM EST PT 1/>: CPT | Mod: 25,S$GLB,, | Performed by: OPHTHALMOLOGY

## 2021-08-17 RX ADMIN — Medication 1.25 MG: at 04:08

## 2021-08-24 ENCOUNTER — TELEPHONE (OUTPATIENT)
Dept: INTERNAL MEDICINE | Facility: CLINIC | Age: 86
End: 2021-08-24

## 2021-09-14 ENCOUNTER — TELEPHONE (OUTPATIENT)
Dept: INTERNAL MEDICINE | Facility: CLINIC | Age: 86
End: 2021-09-14

## 2021-09-15 ENCOUNTER — TELEPHONE (OUTPATIENT)
Dept: INTERNAL MEDICINE | Facility: CLINIC | Age: 86
End: 2021-09-15

## 2021-09-18 DIAGNOSIS — M80.00XD OSTEOPOROSIS WITH CURRENT PATHOLOGICAL FRACTURE WITH ROUTINE HEALING, UNSPECIFIED OSTEOPOROSIS TYPE, SUBSEQUENT ENCOUNTER: ICD-10-CM

## 2021-09-20 RX ORDER — ALENDRONATE SODIUM 70 MG/1
TABLET ORAL
Qty: 4 TABLET | Refills: 11 | Status: SHIPPED | OUTPATIENT
Start: 2021-09-20 | End: 2021-12-26 | Stop reason: SDUPTHER

## 2021-09-23 ENCOUNTER — TELEPHONE (OUTPATIENT)
Dept: INTERNAL MEDICINE | Facility: CLINIC | Age: 86
End: 2021-09-23

## 2021-09-24 ENCOUNTER — TELEPHONE (OUTPATIENT)
Dept: INTERNAL MEDICINE | Facility: CLINIC | Age: 86
End: 2021-09-24

## 2021-09-27 ENCOUNTER — TELEPHONE (OUTPATIENT)
Dept: INTERNAL MEDICINE | Facility: CLINIC | Age: 86
End: 2021-09-27

## 2021-09-29 RX ORDER — AZITHROMYCIN 250 MG/1
TABLET, FILM COATED ORAL
COMMUNITY
Start: 2021-09-16 | End: 2022-01-13 | Stop reason: ALTCHOICE

## 2021-09-30 ENCOUNTER — PROCEDURE VISIT (OUTPATIENT)
Dept: OPHTHALMOLOGY | Facility: CLINIC | Age: 86
End: 2021-09-30
Attending: OPHTHALMOLOGY
Payer: MEDICARE

## 2021-09-30 DIAGNOSIS — H35.3132 NONEXUDATIVE AGE-RELATED MACULAR DEGENERATION, BILATERAL, INTERMEDIATE DRY STAGE: ICD-10-CM

## 2021-09-30 DIAGNOSIS — H35.3221 EXUDATIVE AGE-RELATED MACULAR DEGENERATION OF LEFT EYE WITH ACTIVE CHOROIDAL NEOVASCULARIZATION: Primary | ICD-10-CM

## 2021-09-30 DIAGNOSIS — H43.813 POSTERIOR VITREOUS DETACHMENT, BILATERAL: ICD-10-CM

## 2021-09-30 PROCEDURE — 67028 PR INJECT INTRAVITREAL PHARMCOLOGIC: ICD-10-PCS | Mod: 50,HCNC,S$GLB, | Performed by: OPHTHALMOLOGY

## 2021-09-30 PROCEDURE — 67028 INJECTION EYE DRUG: CPT | Mod: 50,HCNC,S$GLB, | Performed by: OPHTHALMOLOGY

## 2021-09-30 PROCEDURE — 92134 CPTRZ OPH DX IMG PST SGM RTA: CPT | Mod: HCNC,S$GLB,, | Performed by: OPHTHALMOLOGY

## 2021-09-30 PROCEDURE — 92014 COMPRE OPH EXAM EST PT 1/>: CPT | Mod: 25,HCNC,S$GLB, | Performed by: OPHTHALMOLOGY

## 2021-09-30 PROCEDURE — 92014 PR EYE EXAM, EST PATIENT,COMPREHESV: ICD-10-PCS | Mod: 25,HCNC,S$GLB, | Performed by: OPHTHALMOLOGY

## 2021-09-30 PROCEDURE — 92134 POSTERIOR SEGMENT OCT RETINA (OCULAR COHERENCE TOMOGRAPHY)-BOTH EYES: ICD-10-PCS | Mod: HCNC,S$GLB,, | Performed by: OPHTHALMOLOGY

## 2021-10-04 ENCOUNTER — IMMUNIZATION (OUTPATIENT)
Dept: INTERNAL MEDICINE | Facility: CLINIC | Age: 86
End: 2021-10-04
Payer: MEDICARE

## 2021-10-04 ENCOUNTER — OFFICE VISIT (OUTPATIENT)
Dept: INTERNAL MEDICINE | Facility: CLINIC | Age: 86
End: 2021-10-04
Payer: MEDICARE

## 2021-10-04 VITALS
HEIGHT: 63 IN | WEIGHT: 132.25 LBS | SYSTOLIC BLOOD PRESSURE: 120 MMHG | DIASTOLIC BLOOD PRESSURE: 60 MMHG | HEART RATE: 68 BPM | BODY MASS INDEX: 23.43 KG/M2

## 2021-10-04 DIAGNOSIS — R42 VERTIGO: ICD-10-CM

## 2021-10-04 DIAGNOSIS — Z23 NEED FOR VACCINATION: Primary | ICD-10-CM

## 2021-10-04 DIAGNOSIS — R11.0 NAUSEA: ICD-10-CM

## 2021-10-04 DIAGNOSIS — R06.09 DOE (DYSPNEA ON EXERTION): Primary | ICD-10-CM

## 2021-10-04 PROCEDURE — 1159F MED LIST DOCD IN RCRD: CPT | Mod: HCNC,CPTII,S$GLB, | Performed by: INTERNAL MEDICINE

## 2021-10-04 PROCEDURE — 99499 RISK ADDL DX/OHS AUDIT: ICD-10-PCS | Mod: S$GLB,,, | Performed by: INTERNAL MEDICINE

## 2021-10-04 PROCEDURE — 90694 VACC AIIV4 NO PRSRV 0.5ML IM: CPT | Mod: HCNC,S$GLB,, | Performed by: INTERNAL MEDICINE

## 2021-10-04 PROCEDURE — 90694 FLU VACCINE - QUADRIVALENT - ADJUVANTED: ICD-10-PCS | Mod: HCNC,S$GLB,, | Performed by: INTERNAL MEDICINE

## 2021-10-04 PROCEDURE — 99499 UNLISTED E&M SERVICE: CPT | Mod: S$GLB,,, | Performed by: INTERNAL MEDICINE

## 2021-10-04 PROCEDURE — G0008 ADMIN INFLUENZA VIRUS VAC: HCPCS | Mod: HCNC,S$GLB,, | Performed by: INTERNAL MEDICINE

## 2021-10-04 PROCEDURE — 99999 PR PBB SHADOW E&M-EST. PATIENT-LVL III: CPT | Mod: PBBFAC,HCNC,, | Performed by: INTERNAL MEDICINE

## 2021-10-04 PROCEDURE — 1126F AMNT PAIN NOTED NONE PRSNT: CPT | Mod: HCNC,CPTII,S$GLB, | Performed by: INTERNAL MEDICINE

## 2021-10-04 PROCEDURE — 91300 COVID-19, MRNA, LNP-S, PF, 30 MCG/0.3 ML DOSE VACCINE: CPT | Mod: HCNC,PBBFAC | Performed by: INTERNAL MEDICINE

## 2021-10-04 PROCEDURE — 1160F RVW MEDS BY RX/DR IN RCRD: CPT | Mod: HCNC,CPTII,S$GLB, | Performed by: INTERNAL MEDICINE

## 2021-10-04 PROCEDURE — 1159F PR MEDICATION LIST DOCUMENTED IN MEDICAL RECORD: ICD-10-PCS | Mod: HCNC,CPTII,S$GLB, | Performed by: INTERNAL MEDICINE

## 2021-10-04 PROCEDURE — 1126F PR PAIN SEVERITY QUANTIFIED, NO PAIN PRESENT: ICD-10-PCS | Mod: HCNC,CPTII,S$GLB, | Performed by: INTERNAL MEDICINE

## 2021-10-04 PROCEDURE — 99213 OFFICE O/P EST LOW 20 MIN: CPT | Mod: HCNC,S$GLB,, | Performed by: INTERNAL MEDICINE

## 2021-10-04 PROCEDURE — 1160F PR REVIEW ALL MEDS BY PRESCRIBER/CLIN PHARMACIST DOCUMENTED: ICD-10-PCS | Mod: HCNC,CPTII,S$GLB, | Performed by: INTERNAL MEDICINE

## 2021-10-04 PROCEDURE — 99999 PR PBB SHADOW E&M-EST. PATIENT-LVL III: ICD-10-PCS | Mod: PBBFAC,HCNC,, | Performed by: INTERNAL MEDICINE

## 2021-10-04 PROCEDURE — G0008 FLU VACCINE - QUADRIVALENT - ADJUVANTED: ICD-10-PCS | Mod: HCNC,S$GLB,, | Performed by: INTERNAL MEDICINE

## 2021-10-04 PROCEDURE — 0003A COVID-19, MRNA, LNP-S, PF, 30 MCG/0.3 ML DOSE VACCINE: CPT | Mod: HCNC,CV19,PBBFAC | Performed by: INTERNAL MEDICINE

## 2021-10-04 PROCEDURE — 99213 PR OFFICE/OUTPT VISIT, EST, LEVL III, 20-29 MIN: ICD-10-PCS | Mod: HCNC,S$GLB,, | Performed by: INTERNAL MEDICINE

## 2021-10-04 RX ORDER — HYDROCHLOROTHIAZIDE 12.5 MG/1
12.5 TABLET ORAL DAILY
Qty: 30 TABLET | Refills: 11 | Status: CANCELLED | OUTPATIENT
Start: 2021-10-04 | End: 2022-10-04

## 2021-11-09 ENCOUNTER — PROCEDURE VISIT (OUTPATIENT)
Dept: OPHTHALMOLOGY | Facility: CLINIC | Age: 86
End: 2021-11-09
Payer: MEDICARE

## 2021-11-09 DIAGNOSIS — H43.813 POSTERIOR VITREOUS DETACHMENT, BILATERAL: ICD-10-CM

## 2021-11-09 DIAGNOSIS — H35.3132 NONEXUDATIVE AGE-RELATED MACULAR DEGENERATION, BILATERAL, INTERMEDIATE DRY STAGE: ICD-10-CM

## 2021-11-09 DIAGNOSIS — H35.3221 EXUDATIVE AGE-RELATED MACULAR DEGENERATION OF LEFT EYE WITH ACTIVE CHOROIDAL NEOVASCULARIZATION: Primary | ICD-10-CM

## 2021-11-09 PROCEDURE — 67028 INJECTION EYE DRUG: CPT | Mod: 50,HCNC,S$GLB, | Performed by: OPHTHALMOLOGY

## 2021-11-09 PROCEDURE — 92134 CPTRZ OPH DX IMG PST SGM RTA: CPT | Mod: HCNC,S$GLB,, | Performed by: OPHTHALMOLOGY

## 2021-11-09 PROCEDURE — 92014 COMPRE OPH EXAM EST PT 1/>: CPT | Mod: 25,HCNC,S$GLB, | Performed by: OPHTHALMOLOGY

## 2021-11-09 PROCEDURE — 67028 PR INJECT INTRAVITREAL PHARMCOLOGIC: ICD-10-PCS | Mod: 50,HCNC,S$GLB, | Performed by: OPHTHALMOLOGY

## 2021-11-09 PROCEDURE — 92134 POSTERIOR SEGMENT OCT RETINA (OCULAR COHERENCE TOMOGRAPHY)-BOTH EYES: ICD-10-PCS | Mod: HCNC,S$GLB,, | Performed by: OPHTHALMOLOGY

## 2021-11-09 PROCEDURE — 92014 PR EYE EXAM, EST PATIENT,COMPREHESV: ICD-10-PCS | Mod: 25,HCNC,S$GLB, | Performed by: OPHTHALMOLOGY

## 2021-12-10 RX ORDER — AFLIBERCEPT 40 MG/ML
INJECTION, SOLUTION INTRAVITREAL
COMMUNITY
Start: 2021-09-30 | End: 2022-01-13 | Stop reason: ALTCHOICE

## 2021-12-10 RX ORDER — INFLUENZA VACCINE, ADJUVANTED 15; 15; 15; 15 UG/.5ML; UG/.5ML; UG/.5ML; UG/.5ML
INJECTION, SUSPENSION INTRAMUSCULAR
COMMUNITY
Start: 2021-10-04 | End: 2022-01-13 | Stop reason: ALTCHOICE

## 2021-12-15 ENCOUNTER — OFFICE VISIT (OUTPATIENT)
Dept: INTERNAL MEDICINE | Facility: CLINIC | Age: 86
End: 2021-12-15
Payer: MEDICARE

## 2021-12-15 VITALS
DIASTOLIC BLOOD PRESSURE: 60 MMHG | BODY MASS INDEX: 25.11 KG/M2 | SYSTOLIC BLOOD PRESSURE: 120 MMHG | WEIGHT: 136.44 LBS | HEIGHT: 62 IN | HEART RATE: 60 BPM

## 2021-12-15 DIAGNOSIS — I70.0 AORTIC ATHEROSCLEROSIS: ICD-10-CM

## 2021-12-15 DIAGNOSIS — D69.2 SENILE PURPURA: ICD-10-CM

## 2021-12-15 DIAGNOSIS — G47.00 INSOMNIA, UNSPECIFIED TYPE: ICD-10-CM

## 2021-12-15 DIAGNOSIS — F32.5 MAJOR DEPRESSIVE DISORDER, SINGLE EPISODE, IN FULL REMISSION: ICD-10-CM

## 2021-12-15 DIAGNOSIS — I10 HTN (HYPERTENSION), BENIGN: ICD-10-CM

## 2021-12-15 PROCEDURE — 99999 PR PBB SHADOW E&M-EST. PATIENT-LVL III: CPT | Mod: PBBFAC,HCNC,, | Performed by: INTERNAL MEDICINE

## 2021-12-15 PROCEDURE — 1101F PR PT FALLS ASSESS DOC 0-1 FALLS W/OUT INJ PAST YR: ICD-10-PCS | Mod: HCNC,CPTII,S$GLB, | Performed by: INTERNAL MEDICINE

## 2021-12-15 PROCEDURE — 1125F PR PAIN SEVERITY QUANTIFIED, PAIN PRESENT: ICD-10-PCS | Mod: HCNC,CPTII,S$GLB, | Performed by: INTERNAL MEDICINE

## 2021-12-15 PROCEDURE — 1125F AMNT PAIN NOTED PAIN PRSNT: CPT | Mod: HCNC,CPTII,S$GLB, | Performed by: INTERNAL MEDICINE

## 2021-12-15 PROCEDURE — 3288F PR FALLS RISK ASSESSMENT DOCUMENTED: ICD-10-PCS | Mod: HCNC,CPTII,S$GLB, | Performed by: INTERNAL MEDICINE

## 2021-12-15 PROCEDURE — 1101F PT FALLS ASSESS-DOCD LE1/YR: CPT | Mod: HCNC,CPTII,S$GLB, | Performed by: INTERNAL MEDICINE

## 2021-12-15 PROCEDURE — 3288F FALL RISK ASSESSMENT DOCD: CPT | Mod: HCNC,CPTII,S$GLB, | Performed by: INTERNAL MEDICINE

## 2021-12-15 PROCEDURE — 99213 OFFICE O/P EST LOW 20 MIN: CPT | Mod: HCNC,S$GLB,, | Performed by: INTERNAL MEDICINE

## 2021-12-15 PROCEDURE — 99999 PR PBB SHADOW E&M-EST. PATIENT-LVL III: ICD-10-PCS | Mod: PBBFAC,HCNC,, | Performed by: INTERNAL MEDICINE

## 2021-12-15 PROCEDURE — 99213 PR OFFICE/OUTPT VISIT, EST, LEVL III, 20-29 MIN: ICD-10-PCS | Mod: HCNC,S$GLB,, | Performed by: INTERNAL MEDICINE

## 2021-12-15 RX ORDER — ZOLPIDEM TARTRATE 5 MG/1
5 TABLET ORAL NIGHTLY PRN
Qty: 30 TABLET | Refills: 0 | Status: SHIPPED | OUTPATIENT
Start: 2021-12-15 | End: 2021-12-27

## 2022-01-04 ENCOUNTER — PROCEDURE VISIT (OUTPATIENT)
Dept: OPHTHALMOLOGY | Facility: CLINIC | Age: 87
End: 2022-01-04
Payer: MEDICARE

## 2022-01-04 DIAGNOSIS — H43.813 POSTERIOR VITREOUS DETACHMENT, BILATERAL: ICD-10-CM

## 2022-01-04 DIAGNOSIS — H35.3132 NONEXUDATIVE AGE-RELATED MACULAR DEGENERATION, BILATERAL, INTERMEDIATE DRY STAGE: ICD-10-CM

## 2022-01-04 DIAGNOSIS — H35.3231 EXUDATIVE AGE-RELATED MACULAR DEGENERATION OF BOTH EYES WITH ACTIVE CHOROIDAL NEOVASCULARIZATION: Primary | ICD-10-CM

## 2022-01-04 PROCEDURE — 67028 INJECTION EYE DRUG: CPT | Mod: HCNC,RT,S$GLB, | Performed by: OPHTHALMOLOGY

## 2022-01-04 PROCEDURE — 92014 PR EYE EXAM, EST PATIENT,COMPREHESV: ICD-10-PCS | Mod: 25,HCNC,S$GLB, | Performed by: OPHTHALMOLOGY

## 2022-01-04 PROCEDURE — 99499 RISK ADDL DX/OHS AUDIT: ICD-10-PCS | Mod: S$GLB,,, | Performed by: OPHTHALMOLOGY

## 2022-01-04 PROCEDURE — 99499 UNLISTED E&M SERVICE: CPT | Mod: S$GLB,,, | Performed by: OPHTHALMOLOGY

## 2022-01-04 PROCEDURE — 92134 POSTERIOR SEGMENT OCT RETINA (OCULAR COHERENCE TOMOGRAPHY)-BOTH EYES: ICD-10-PCS | Mod: HCNC,S$GLB,, | Performed by: OPHTHALMOLOGY

## 2022-01-04 PROCEDURE — 92134 CPTRZ OPH DX IMG PST SGM RTA: CPT | Mod: HCNC,S$GLB,, | Performed by: OPHTHALMOLOGY

## 2022-01-04 PROCEDURE — 92014 COMPRE OPH EXAM EST PT 1/>: CPT | Mod: 25,HCNC,S$GLB, | Performed by: OPHTHALMOLOGY

## 2022-01-04 PROCEDURE — 67028 PR INJECT INTRAVITREAL PHARMCOLOGIC: ICD-10-PCS | Mod: HCNC,RT,S$GLB, | Performed by: OPHTHALMOLOGY

## 2022-01-04 NOTE — PROGRESS NOTES
HPI     5-6 wk FU OCT/Eylea OU     C/o decreasing VA ou and itching ou. No pain. No f/f.      Gtts: Refresh PRN OU        Last edited by Danii Raymond on 1/4/2022  2:35 PM. (History)        HPI     6 wk FU OCT/Eylea OU only    Pt states her vision is worsening since last visit. Itchy, watery OS.    No Floaters  No Flashes  No Pain  Gtts: Refresh PRN OU        Last edited by Charu Dillard on 11/9/2021  2:50 PM. 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 Wet AMD OU  S/p Avastin ODx 1, OS x7  S/p Eylea OU x1  Increased SRF at 10 weeks  8/21 New wet disease OD  1/22 some increase in SRF at 8-9 weeks OD    Eylea OD today    Tighten interval OD   Try obs OS    2. Dry AMD OD    3. PVD OU    4. PCIOL OU    5. HTN Ret OU  BP control      5-6 weeks OCT no dilate      Risks, benefits, and alternatives to treatment discussed in detail with the patient.  The patient voiced understanding and wished to proceed with the procedure    Injection Procedure Note:  Diagnosis: Wet AMD OD    Patient Identified and Time Out complete  Pt marked  Topical Proparacaine and Betadine.  Inject Eylea OD at 6:00 @ 3.5-4mm posterior to limbus  Post Operative Dx: Same  Complications: None  Follow up as above.

## 2022-01-05 NOTE — PATIENT INSTRUCTIONS

## 2022-01-10 ENCOUNTER — TELEPHONE (OUTPATIENT)
Dept: INTERNAL MEDICINE | Facility: CLINIC | Age: 87
End: 2022-01-10
Payer: MEDICARE

## 2022-01-10 NOTE — TELEPHONE ENCOUNTER
----- Message from Sarah Bradford sent at 1/10/2022  1:38 PM CST -----  Contact: [phone]  Pt is calling for Antonette to please give her a return call no other info given

## 2022-01-13 RX ORDER — LOSARTAN POTASSIUM 50 MG/1
25 TABLET ORAL DAILY
Qty: 45 TABLET | Refills: 3
Start: 2022-01-13 | End: 2022-02-24

## 2022-01-13 NOTE — PROGRESS NOTES
Subjective:       Patient ID: Elen Peters is a 89 y.o. female.    Chief Complaint: Hyperlipidemia, Hypertension, constipation, and Nausea    Hyperlipidemia  This is a chronic problem. The problem is controlled. Recent lipid tests were reviewed and are normal. Pertinent negatives include no chest pain or shortness of breath. The current treatment provides significant improvement of lipids.   Hypertension  This is a chronic problem. The problem is unchanged. The problem is controlled. Pertinent negatives include no chest pain, palpitations or shortness of breath. The current treatment provides significant improvement. There are no compliance problems.      Review of Systems   Constitutional: Negative for fatigue.   HENT: Negative for sore throat.    Eyes: Negative for visual disturbance.   Respiratory: Negative for shortness of breath.    Cardiovascular: Negative for chest pain and palpitations.   Gastrointestinal: Negative for abdominal pain.   Genitourinary: Negative for dysuria, frequency and hematuria.   Musculoskeletal: Negative for joint swelling.   Skin: Negative for rash.   Neurological: Negative for speech difficulty and weakness.   Psychiatric/Behavioral: Positive for sleep disturbance. Negative for dysphoric mood.       Objective:      Physical Exam  Vitals reviewed.   Constitutional:       General: She is not in acute distress.     Appearance: She is well-developed and well-nourished.   HENT:      Head: Normocephalic and atraumatic.      Mouth/Throat:      Mouth: Oropharynx is clear and moist.   Eyes:      Conjunctiva/sclera: Conjunctivae normal.      Pupils: Pupils are equal, round, and reactive to light.   Cardiovascular:      Rate and Rhythm: Normal rate and regular rhythm.      Heart sounds: Normal heart sounds.   Pulmonary:      Effort: Pulmonary effort is normal.      Breath sounds: Normal breath sounds. No wheezing.   Abdominal:      General: Bowel sounds are normal.      Palpations: Abdomen is  soft.      Tenderness: There is no abdominal tenderness.   Musculoskeletal:         General: No tenderness or edema. Normal range of motion.      Cervical back: Normal range of motion and neck supple.   Skin:     Findings: No erythema.   Neurological:      Mental Status: She is alert and oriented to person, place, and time.      Cranial Nerves: No cranial nerve deficit.   Psychiatric:         Mood and Affect: Mood and affect normal.         Assessment:       1. Insomnia, unspecified type    2. Senile purpura    3. Major depressive disorder, single episode, in full remission    4. Aortic atherosclerosis    5. HTN (hypertension), benign        Plan:       Elen was seen today for hyperlipidemia, hypertension, constipation and nausea.    Diagnoses and all orders for this visit:    Insomnia, unspecified type  Comments:  has had some memory lapses-  discussed side effects of Ambien  Orders:  -     Discontinue: zolpidem (AMBIEN) 5 MG Tab; Take 1 tablet (5 mg total) by mouth nightly as needed (insomnia).    Senile purpura    Major depressive disorder, single episode, in full remission    Aortic atherosclerosis    HTN (hypertension), benign  -     losartan (COZAAR) 50 MG tablet; Take 0.5 tablets (25 mg total) by mouth once daily.        Follow up in about 6 months (around 6/15/2022).

## 2022-01-18 ENCOUNTER — TELEPHONE (OUTPATIENT)
Dept: INTERNAL MEDICINE | Facility: CLINIC | Age: 87
End: 2022-01-18
Payer: MEDICARE

## 2022-01-18 NOTE — TELEPHONE ENCOUNTER
----- Message from Marycarmen Jones sent at 1/18/2022 12:30 PM CST -----  Contact: [phone]  Patient would like to get medical advice.  Please call patient about her nausea she is having.     [facility][id number] - [place], [place] - [facility] [address] AT [facility] [address]   Phone:  [phone]  Fax:  [phone]

## 2022-01-18 NOTE — TELEPHONE ENCOUNTER
Pt did not answer the phone. I left a message on her recorder to call back with more information or she can call [facility] [phone] open line [phone].

## 2022-01-19 NOTE — TELEPHONE ENCOUNTER
----- Message from Carmen Terrell sent at 1/19/2022 12:46 PM CST -----  Contact: pt [phone]  Patient is returning a phone call.  Who left a message for the patient: Antonette  Does patient know what this is regarding: n/a  Would you like a call back, or a response through your [facility] portal?:   call    Please call and advise.    Thank You

## 2022-01-21 NOTE — PROGRESS NOTES
"INTERNAL MEDICINE CLINIC - SAME DAY APPOINTMENT  Progress Note    PRESENTING HISTORY     PCP: Jim Treadwell MD    Chief Complaint/Reason for Visit:   No chief complaint on file.      History of Present Illness & ROS : Ms. Elen Peters is a 89 y.o. female.    Same day appt.   Seen by PCP in 12/2021. Very sweet lady.   Reports that has been having "nausea for years, starting to interfere with my daily routines". Denies any change in bowel pattern, endorses constipation (chronic), unable to identify a pattern in relation to her chronic nausea. Denies vomiting or diarrhea. Reports Last BM on yesterday. No visible blood to stool. Taking daily PPI.     *Noted to have been seen by GI ([facility]) in 2019, underwent CT with findings of Cholelithiasis and Diverticulosis.   No abdominal pain voiced today.     Also endorses "urinary frequency", but unable to identify if she is having "smell and urgency or pain with urinating".     She is here with her "", but reports "difficult to get to and from appts some times.     Review of Systems:  Eyes: denies visual changes at this time denies floaters   ENT: no nasal congestion or sore throat  Respiratory: no cough or shorness of breath  Cardiovascular: no chest pain or palpitations  Gastrointestinal: no nausea or vomiting, no abdominal pain or change in bowel habits  Genitourinary: no hematuria or dysuria; denies frequency  Hematologic/Lymphatic: no easy bruising or lymphadenopathy  Musculoskeletal: no arthralgias or myalgias  Neurological: no seizures or tremors  Endocrine: no heat or cold intolerance  '    PAST HISTORY:     Past Medical History:   Diagnosis Date    Abnormal stress test 11/18/2015    Arthritis     Bladder cancer     Cataract     Coronary artery disease involving native coronary artery 1/6/2016    Depression     Exudative age-related macular degeneration of left eye with active choroidal neovascularization 10/1/2020    GERD (gastroesophageal reflux " disease)     HTN (hypertension), benign 11/18/2015    Hx of bladder infections     Hyperlipemia     OP (osteoporosis)     Positive cardiac stress test 1/6/2016    Renal cancer     Syncope 1/6/2016    TMJ (dislocation of temporomandibular joint)     Upper back pain 12/1/2015    Ureteral cancer     Venous insufficiency 2017    Villous adenoma of colon 5/31/2013       Past Surgical History:   Procedure Laterality Date    APPENDECTOMY      BACK SURGERY      CATARACT EXTRACTION W/  INTRAOCULAR LENS IMPLANT Left 3/16/15    kristy    CATARACT EXTRACTION W/  INTRAOCULAR LENS IMPLANT Right 4/6/15    wagner    COLONOSCOPY  10/21/2013    CYSTOSCOPY      ESOPHAGOGASTRODUODENOSCOPY N/A 1/30/2019    Procedure: EGD (ESOPHAGOGASTRODUODENOSCOPY);  Surgeon: Diony Dey MD;  Location: [address]);  Service: Endoscopy;  Laterality: N/A;    EYE SURGERY      NEPHRECTOMY      L    SPINE SURGERY      TONSILLECTOMY, ADENOIDECTOMY         Family History   Problem Relation Age of Onset    Leukemia Mother     Heart disease Mother     Heart attack Mother     Cancer Mother         leukemia    Goiter Mother     Leukemia Father     Cancer Father         leukemia    Heart disease Maternal Grandfather     No Known Problems Brother     No Known Problems Son     No Known Problems Son     No Known Problems Son     Cancer Son         throat    No Known Problems Son     No Known Problems Son     No Known Problems Maternal Aunt     No Known Problems Maternal Uncle     No Known Problems Paternal Aunt     No Known Problems Paternal Uncle     No Known Problems Maternal Grandmother     No Known Problems Paternal Grandmother     No Known Problems Paternal Grandfather     No Known Problems Sister     Amblyopia Neg Hx     Blindness Neg Hx     Cataracts Neg Hx     Diabetes Neg Hx     Glaucoma Neg Hx     Hypertension Neg Hx     Macular degeneration Neg Hx     Retinal detachment Neg Hx     Strabismus  Neg Hx     Stroke Neg Hx     Thyroid disease Neg Hx     Breast cancer Neg Hx     Colon cancer Neg Hx     Esophageal cancer Neg Hx        Social History     Socioeconomic History    Marital status:    Occupational History    Occupation: retired   Tobacco Use    Smoking status: Former Smoker     Quit date:      Years since quittin.1    Smokeless tobacco: Never Used    Tobacco comment: in college   Substance and Sexual Activity    Alcohol use: No    Drug use: No    Sexual activity: Not Currently       MEDICATIONS & ALLERGIES:     Current Outpatient Medications on File Prior to Visit   Medication Sig Dispense Refill    alendronate (FOSAMAX) 70 MG tablet Take 1 tablet (70 mg total) by mouth every 7 days. 4 tablet 11    amLODIPine (NORVASC) 5 MG tablet Take 1 tablet (5 mg total) by mouth once daily. 30 tablet 11    aspirin (ECOTRIN) 81 MG EC tablet Take 81 mg by mouth once daily.      atorvastatin (LIPITOR) 40 MG tablet TAKE 1 TABLET EVERY DAY OR AS DIRECTED 90 tablet 3    AVASTIN 25 mg/mL injection       calcium-magnesium-zinc Tab Take 2 tablets by mouth once daily at 6am. 1 Tablet Oral Every day      carvediloL (COREG) 6.25 MG tablet TAKE 1 TABLET TWICE DAILY WITH MEALS  OR AS DIRECTED 180 tablet 3    citalopram (CELEXA) 20 MG tablet TAKE 1 TABLET EVERY DAY 90 tablet 3    esomeprazole (NEXIUM) 40 MG capsule TAKE 1 CAPSULE(40 MG) BY MOUTH BEFORE BREAKFAST 90 capsule 3    hydralazine HCl (HYDRALAZINE ORAL) Take 0.5 tablets by mouth every evening.      losartan (COZAAR) 50 MG tablet Take 0.5 tablets (25 mg total) by mouth once daily. 45 tablet 3    mupirocin (BACTROBAN) 2 % ointment Apply a pea size amount in the right nostril twice a day for 7 days. 1 Tube 0    mv-min-folic acid-lutein 0.4-250 mg-mcg Tab Take by mouth. 1 Tablet Oral Every day      nitroGLYCERIN (NITROSTAT) 0.4 MG SL tablet Place 1 tablet (0.4 mg total) under the tongue every 5 (five) minutes as needed for Chest  pain. 25 tablet 3    omega-3 fatty acids 1,000 mg Cap Take 1 capsule by mouth once daily. 2  Capsule Oral Every day      polyethylene glycol (GLYCOLAX) 17 gram PwPk Take 17 g by mouth once daily. 30 packet 1    zolpidem (AMBIEN) 5 MG Tab Take 1 tablet (5 mg total) by mouth nightly as needed (insomnia). 30 tablet 0     No current facility-administered medications on file prior to visit.        Review of patient's allergies indicates:   Allergen Reactions    Sulfa (sulfonamide antibiotics)      Unknown as child    Codeine      Other reaction(s): Unknown    Maxzide [triamterene-hydrochlorothiazid]     Sulfamethoxazole-trimethoprim        Medications Reconciliation:   I have reconciled the patient's home medications with the patient/family. I have updated all changes.  Refer to After-Visit Medication List.    OBJECTIVE:     Vital Signs:  There were no vitals filed for this visit.  Wt Readings from Last 3 Encounters:   12/15/21 1428 61.9 kg (136 lb 7.4 oz)   10/04/21 1135 60 kg (132 lb 4.4 oz)   07/26/21 1316 61.2 kg (134 lb 14.7 oz)     There is no height or weight on file to calculate BMI.   Wt Readings from Last 3 Encounters:   01/25/22 61.1 kg (134 lb 11.2 oz)   12/15/21 61.9 kg (136 lb 7.4 oz)   10/04/21 60 kg (132 lb 4.4 oz)     Temp Readings from Last 3 Encounters:   12/09/20 97.2 °F (36.2 °C) (Oral)   11/12/20 98.3 °F (36.8 °C) (Tympanic)   09/23/20 98.1 °F (36.7 °C) (Temporal)     BP Readings from Last 3 Encounters:   01/25/22 (!) 154/72   12/15/21 120/60   10/04/21 120/60     Pulse Readings from Last 3 Encounters:   01/25/22 74   12/15/21 60   10/04/21 68       Physical Exam:  General: Well developed, well nourished. No distress.  HEENT: Head is normocephalic, atraumatic; ears are normal.   Eyes: Clear conjunctiva.  Neck: Supple, symmetrical neck; trachea midline.  Lungs: Clear to auscultation bilaterally and normal respiratory effort.  Cardiovascular: Heart with regular rate and rhythm. No murmurs,  "gallops or rubs  Extremities: No LE edema. Pulses 2+ and symmetric.   Abdomen: Abdomen is soft, ? RUQ and LLQ voiced 'mild' tenderness, no guarding, non distended, with normal bowel sounds.  Skin: Skin color, texture, turgor normal. No rashes.  Lymph Nodes: No cervical or supraclavicular adenopathy.  Neurologic:  No focal numbness or weakness.   Psychiatric: Not depressed.      Laboratory  Lab Results   Component Value Date    WBC 5.26 06/30/2020    HGB 11.1 (L) 06/30/2020    HCT 34.1 (L) 06/30/2020     06/30/2020    CHOL 171 04/26/2021    TRIG 87 04/26/2021    HDL 65 04/26/2021    ALT 28 07/14/2020    AST 26 07/14/2020     04/21/2021    K 4.5 04/21/2021     04/21/2021    CREATININE 0.7 04/21/2021    BUN 12 04/21/2021    CO2 29 04/21/2021    TSH 1.135 04/26/2021    INR 0.9 06/28/2020    HGBA1C 5.8 02/24/2010     CT ABD  IMPRESSION:   Cholelithiasis without evidence of acute cholecystitis.     History of left nephrectomy with redemonstration of a hypodense bilobed structure just inferior to the renal arteries extending to the aortic bifurcation, appearing smaller when compared to CT urogram 05/06/2015 with peripheral and internal calcification and previous described as a lymphocele.     Bead like configuration of the right renal artery concerning for fibromuscular dysplasia.     Small hiatal hernia.     Colonic diverticulosis without evidence of acute diverticulitis.     Electronically signed by resident: Cele Hayes  Date:                                            03/30/2019  Time:                                           12:01     Electronically signed by: Kaleb Martinez DO  Date:                                            03/30/2019  Time:                                           13:16    ASSESSMENT & PLAN:     Same Day appt.       Chronic nausea  *Noted to have been seen by GI in 3-4/2019 with no follow ups. EGD: "unrevealing".   CT as noted above.   *Seen by Dr. Treadwell on 12/15/2021 for " this medical problem.   ? Biliary in the setting of Gallstones as noted on CT from 2019  ? R/t Colitis (h/o Diverticulosis with chronic constipation)   ? Bacterial (H Pylori)  *2019: CT ABD: + cholelithiasis, + Diverticulosis, and a small hernia (she is reportedly unaware of 'gallstones').   -     Urine RINALYSIS; Future; Expected date: 01/25/2022culture; Future; Expected date: 01/25/2022  -     Comprehensive Metabolic Panel; Future; Expected date: 01/25/2022  -     CBC Auto Differential; Future; Expected date: 01/25/2022  -     Amylase; Future; Expected date: 01/25/2022  -     Lipase; Future; Expected date: 01/25/2022  -     H. pylori antigen, stool; Future; Expected date: 01/25/2022  -     Urine culture; Future; Expected date: 01/25/2022  *May benefit from another visit with GI. Await labs, Urine and stool   *If labs should dictate, will send for an US vs repeat CT to further evaluate.        Biliary calculus of other site without obstruction  *Identifed on CT 2019     *Check labs today to determine need to repeat any imaging in regards that may be attributing to her symptoms of chronic nausea.    - Comprehensive Metabolic Panel; Future; Expected date: 01/25/2022  -     Amylase; Future; Expected date: 01/25/2022  -     Lipase; Future; Expected date: 01/25/2022      Gastroesophageal reflux disease with esophagitis without hemorrhage:   *Continue PPI; advised to hold for 5 days (if able to tolerate) and then collect stool for H pylori  -     Comprehensive Metabolic Panel; Future; Expected date: 01/25/2022  -     CBC Auto Differential; Future; Expected date: 01/25/2022  -     H. pylori antigen, stool; Future; Expected date: 01/25/2022    Diverticulosis  Chronic  On stool softners  *Identified on CT 2019  -     CBC Auto Differential; Future; Expected date: 01/25/2022      Future Appointments   Date Time Provider Department Center   2/8/2022  3:10 PM MONIE Hanna MD [facility] [place]   3/30/2022  2:30 PM  Jim Treadwell MD [facility] [place]        Medication List          Accurate as of January 25, 2022 12:43 PM. If you have any questions, ask your nurse or doctor.            CONTINUE taking these medications    alendronate 70 MG tablet  Commonly known as: FOSAMAX  Take 1 tablet (70 mg total) by mouth every 7 days.     amLODIPine 5 MG tablet  Commonly known as: NORVASC  Take 1 tablet (5 mg total) by mouth once daily.     aspirin 81 MG EC tablet  Commonly known as: ECOTRIN     atorvastatin 40 MG tablet  Commonly known as: LIPITOR  TAKE 1 TABLET EVERY DAY OR AS DIRECTED     AVASTIN 25 mg/mL injection  Generic drug: bevacizumab     calcium-magnesium-zinc Tab     carvediloL 6.25 MG tablet  Commonly known as: COREG  TAKE 1 TABLET TWICE DAILY WITH MEALS  OR AS DIRECTED     citalopram 20 MG tablet  Commonly known as: CeleXA  TAKE 1 TABLET EVERY DAY     esomeprazole 40 MG capsule  Commonly known as: NEXIUM  TAKE 1 CAPSULE(40 MG) BY MOUTH BEFORE BREAKFAST     HYDRALAZINE ORAL     losartan 50 MG tablet  Commonly known as: COZAAR  Take 0.5 tablets (25 mg total) by mouth once daily.     mupirocin 2 % ointment  Commonly known as: BACTROBAN  Apply a pea size amount in the right nostril twice a day for 7 days.     mv-min-folic acid-lutein 0.4-250 mg-mcg Tab     nitroGLYCERIN 0.4 MG SL tablet  Commonly known as: NITROSTAT  Place 1 tablet (0.4 mg total) under the tongue every 5 (five) minutes as needed for Chest pain.     omega-3 fatty acids 1,000 mg Cap     polyethylene glycol 17 gram Pwpk  Commonly known as: GLYCOLAX  Take 17 g by mouth once daily.     zolpidem 5 MG Tab  Commonly known as: AMBIEN  Take 1 tablet (5 mg total) by mouth nightly as needed (insomnia).            Signing Physician:  CHAVO Swartz

## 2022-01-25 ENCOUNTER — LAB VISIT (OUTPATIENT)
Dept: LAB | Facility: HOSPITAL | Age: 87
End: 2022-01-25
Payer: MEDICARE

## 2022-01-25 ENCOUNTER — OFFICE VISIT (OUTPATIENT)
Dept: INTERNAL MEDICINE | Facility: CLINIC | Age: 87
End: 2022-01-25
Payer: MEDICARE

## 2022-01-25 VITALS
SYSTOLIC BLOOD PRESSURE: 154 MMHG | OXYGEN SATURATION: 97 % | HEART RATE: 74 BPM | DIASTOLIC BLOOD PRESSURE: 72 MMHG | HEIGHT: 62 IN | WEIGHT: 134.69 LBS | BODY MASS INDEX: 24.78 KG/M2

## 2022-01-25 DIAGNOSIS — K57.90 DIVERTICULOSIS: ICD-10-CM

## 2022-01-25 DIAGNOSIS — K21.00 GASTROESOPHAGEAL REFLUX DISEASE WITH ESOPHAGITIS WITHOUT HEMORRHAGE: ICD-10-CM

## 2022-01-25 DIAGNOSIS — R68.89 OTHER GENERAL SYMPTOMS AND SIGNS: ICD-10-CM

## 2022-01-25 DIAGNOSIS — K80.80 BILIARY CALCULUS OF OTHER SITE WITHOUT OBSTRUCTION: ICD-10-CM

## 2022-01-25 DIAGNOSIS — R11.0 CHRONIC NAUSEA: ICD-10-CM

## 2022-01-25 DIAGNOSIS — R11.0 CHRONIC NAUSEA: Primary | ICD-10-CM

## 2022-01-25 LAB
ALBUMIN SERPL BCP-MCNC: 3.7 G/DL (ref 3.5–5.2)
ALP SERPL-CCNC: 54 U/L (ref 55–135)
ALT SERPL W/O P-5'-P-CCNC: 18 U/L (ref 10–44)
AMYLASE SERPL-CCNC: 87 U/L (ref 20–110)
ANION GAP SERPL CALC-SCNC: 4 MMOL/L (ref 8–16)
AST SERPL-CCNC: 19 U/L (ref 10–40)
BASOPHILS # BLD AUTO: 0.05 K/UL (ref 0–0.2)
BASOPHILS NFR BLD: 0.8 % (ref 0–1.9)
BILIRUB SERPL-MCNC: 0.7 MG/DL (ref 0.1–1)
BUN SERPL-MCNC: 11 MG/DL (ref 10–30)
CALCIUM SERPL-MCNC: 9.9 MG/DL (ref 8.7–10.5)
CHLORIDE SERPL-SCNC: 98 MMOL/L (ref 95–110)
CO2 SERPL-SCNC: 30 MMOL/L (ref 23–29)
CREAT SERPL-MCNC: 0.7 MG/DL (ref 0.5–1.4)
DIFFERENTIAL METHOD: ABNORMAL
EOSINOPHIL # BLD AUTO: 0.1 K/UL (ref 0–0.5)
EOSINOPHIL NFR BLD: 2 % (ref 0–8)
ERYTHROCYTE [DISTWIDTH] IN BLOOD BY AUTOMATED COUNT: 13.3 % (ref 11.5–14.5)
EST. GFR  (AFRICAN AMERICAN): >60 ML/MIN/1.73 M^2
EST. GFR  (NON AFRICAN AMERICAN): >60 ML/MIN/1.73 M^2
GLUCOSE SERPL-MCNC: 85 MG/DL (ref 70–110)
HCT VFR BLD AUTO: 38.7 % (ref 37–48.5)
HGB BLD-MCNC: 12.3 G/DL (ref 12–16)
IMM GRANULOCYTES # BLD AUTO: 0.02 K/UL (ref 0–0.04)
IMM GRANULOCYTES NFR BLD AUTO: 0.3 % (ref 0–0.5)
LIPASE SERPL-CCNC: 40 U/L (ref 4–60)
LYMPHOCYTES # BLD AUTO: 1.3 K/UL (ref 1–4.8)
LYMPHOCYTES NFR BLD: 21.9 % (ref 18–48)
MCH RBC QN AUTO: 31.2 PG (ref 27–31)
MCHC RBC AUTO-ENTMCNC: 31.8 G/DL (ref 32–36)
MCV RBC AUTO: 98 FL (ref 82–98)
MONOCYTES # BLD AUTO: 0.6 K/UL (ref 0.3–1)
MONOCYTES NFR BLD: 9.8 % (ref 4–15)
NEUTROPHILS # BLD AUTO: 4 K/UL (ref 1.8–7.7)
NEUTROPHILS NFR BLD: 65.2 % (ref 38–73)
NRBC BLD-RTO: 0 /100 WBC
PLATELET # BLD AUTO: 256 K/UL (ref 150–450)
PMV BLD AUTO: 10.6 FL (ref 9.2–12.9)
POTASSIUM SERPL-SCNC: 4.2 MMOL/L (ref 3.5–5.1)
PROT SERPL-MCNC: 6.8 G/DL (ref 6–8.4)
RBC # BLD AUTO: 3.94 M/UL (ref 4–5.4)
SODIUM SERPL-SCNC: 132 MMOL/L (ref 136–145)
WBC # BLD AUTO: 6.11 K/UL (ref 3.9–12.7)

## 2022-01-25 PROCEDURE — 1101F PR PT FALLS ASSESS DOC 0-1 FALLS W/OUT INJ PAST YR: ICD-10-PCS | Mod: HCNC,CPTII,S$GLB, | Performed by: NURSE PRACTITIONER

## 2022-01-25 PROCEDURE — 99214 PR OFFICE/OUTPT VISIT, EST, LEVL IV, 30-39 MIN: ICD-10-PCS | Mod: HCNC,S$GLB,, | Performed by: NURSE PRACTITIONER

## 2022-01-25 PROCEDURE — 3288F FALL RISK ASSESSMENT DOCD: CPT | Mod: HCNC,CPTII,S$GLB, | Performed by: NURSE PRACTITIONER

## 2022-01-25 PROCEDURE — 99999 PR PBB SHADOW E&M-EST. PATIENT-LVL IV: ICD-10-PCS | Mod: PBBFAC,HCNC,, | Performed by: NURSE PRACTITIONER

## 2022-01-25 PROCEDURE — 80053 COMPREHEN METABOLIC PANEL: CPT | Mod: HCNC | Performed by: NURSE PRACTITIONER

## 2022-01-25 PROCEDURE — 99999 PR PBB SHADOW E&M-EST. PATIENT-LVL IV: CPT | Mod: PBBFAC,HCNC,, | Performed by: NURSE PRACTITIONER

## 2022-01-25 PROCEDURE — 1101F PT FALLS ASSESS-DOCD LE1/YR: CPT | Mod: HCNC,CPTII,S$GLB, | Performed by: NURSE PRACTITIONER

## 2022-01-25 PROCEDURE — 99214 OFFICE O/P EST MOD 30 MIN: CPT | Mod: HCNC,S$GLB,, | Performed by: NURSE PRACTITIONER

## 2022-01-25 PROCEDURE — 1159F PR MEDICATION LIST DOCUMENTED IN MEDICAL RECORD: ICD-10-PCS | Mod: HCNC,CPTII,S$GLB, | Performed by: NURSE PRACTITIONER

## 2022-01-25 PROCEDURE — 1126F AMNT PAIN NOTED NONE PRSNT: CPT | Mod: HCNC,CPTII,S$GLB, | Performed by: NURSE PRACTITIONER

## 2022-01-25 PROCEDURE — 85025 COMPLETE CBC W/AUTO DIFF WBC: CPT | Mod: HCNC | Performed by: NURSE PRACTITIONER

## 2022-01-25 PROCEDURE — 83690 ASSAY OF LIPASE: CPT | Mod: HCNC | Performed by: NURSE PRACTITIONER

## 2022-01-25 PROCEDURE — 82150 ASSAY OF AMYLASE: CPT | Mod: HCNC | Performed by: NURSE PRACTITIONER

## 2022-01-25 PROCEDURE — 1159F MED LIST DOCD IN RCRD: CPT | Mod: HCNC,CPTII,S$GLB, | Performed by: NURSE PRACTITIONER

## 2022-01-25 PROCEDURE — 36415 COLL VENOUS BLD VENIPUNCTURE: CPT | Mod: HCNC | Performed by: NURSE PRACTITIONER

## 2022-01-25 PROCEDURE — 1126F PR PAIN SEVERITY QUANTIFIED, NO PAIN PRESENT: ICD-10-PCS | Mod: HCNC,CPTII,S$GLB, | Performed by: NURSE PRACTITIONER

## 2022-01-25 PROCEDURE — 3288F PR FALLS RISK ASSESSMENT DOCUMENTED: ICD-10-PCS | Mod: HCNC,CPTII,S$GLB, | Performed by: NURSE PRACTITIONER

## 2022-01-26 ENCOUNTER — LAB VISIT (OUTPATIENT)
Dept: LAB | Facility: HOSPITAL | Age: 87
End: 2022-01-26
Attending: NURSE PRACTITIONER
Payer: MEDICARE

## 2022-01-26 DIAGNOSIS — R11.0 CHRONIC NAUSEA: ICD-10-CM

## 2022-01-26 DIAGNOSIS — K57.90 DIVERTICULOSIS: ICD-10-CM

## 2022-01-26 DIAGNOSIS — K21.00 GASTROESOPHAGEAL REFLUX DISEASE WITH ESOPHAGITIS WITHOUT HEMORRHAGE: ICD-10-CM

## 2022-01-26 DIAGNOSIS — K80.80 BILIARY CALCULUS OF OTHER SITE WITHOUT OBSTRUCTION: ICD-10-CM

## 2022-01-26 PROCEDURE — 87338 HPYLORI STOOL AG IA: CPT | Mod: HCNC | Performed by: NURSE PRACTITIONER

## 2022-01-27 ENCOUNTER — TELEPHONE (OUTPATIENT)
Dept: INTERNAL MEDICINE | Facility: CLINIC | Age: 87
End: 2022-01-27
Payer: MEDICARE

## 2022-01-27 NOTE — TELEPHONE ENCOUNTER
----- Message from Sharmin Villalobos sent at 1/27/2022  3:15 PM CST -----  Contact: Pt Home [phone]  Patient said that she would like to speak with you about the doctor that you have referred her to please.

## 2022-01-28 ENCOUNTER — PATIENT MESSAGE (OUTPATIENT)
Dept: INTERNAL MEDICINE | Facility: CLINIC | Age: 87
End: 2022-01-28
Payer: MEDICARE

## 2022-01-28 RX ORDER — AMOXICILLIN AND CLAVULANATE POTASSIUM 500; 125 MG/1; MG/1
1 TABLET, FILM COATED ORAL 2 TIMES DAILY
Qty: 10 TABLET | Refills: 0 | Status: SHIPPED | OUTPATIENT
Start: 2022-01-28 | End: 2022-02-17

## 2022-01-31 ENCOUNTER — TELEPHONE (OUTPATIENT)
Dept: INTERNAL MEDICINE | Facility: CLINIC | Age: 87
End: 2022-01-31
Payer: MEDICARE

## 2022-01-31 ENCOUNTER — PATIENT MESSAGE (OUTPATIENT)
Dept: INTERNAL MEDICINE | Facility: CLINIC | Age: 87
End: 2022-01-31
Payer: MEDICARE

## 2022-01-31 NOTE — TELEPHONE ENCOUNTER
Attempted to call patient back to see how she was feeling and to see if she needed to come in for the dizziness, wondering if she is dehydrated, no answer on either line. Please have staff call her again in the a.m. Thank you!

## 2022-01-31 NOTE — TELEPHONE ENCOUNTER
----- Message from Kyle Argueta sent at 1/31/2022  4:26 PM CST -----  Contact: [phone]  Pt is calling in she is wanting to get lab results, please call pt back, thanks

## 2022-02-01 ENCOUNTER — TELEPHONE (OUTPATIENT)
Dept: INTERNAL MEDICINE | Facility: CLINIC | Age: 87
End: 2022-02-01
Payer: MEDICARE

## 2022-02-01 NOTE — TELEPHONE ENCOUNTER
I called and spoke to the patient. She answered on her home line.  She states she's not going to the Er unless Dr Treadwell gives her permission. She wants him to call her.  I sent Dr. Treadwell a message. Thank you Do.

## 2022-02-01 NOTE — TELEPHONE ENCOUNTER
Attempted to reach patient last night and again this morning, via the listed numbers in her chart: both landline and cell, w/o success. Voice Msgs left.     Will re attempt.   Have asked our Nursing Supervisor to also attempt a reach back out to this patient on behalf of Dr Treadwell and DANNIE QUIROS

## 2022-02-01 NOTE — TELEPHONE ENCOUNTER
I called patient as well, she stated she does not have a HH nurse, thank you Marlee for reaching out.

## 2022-02-01 NOTE — TELEPHONE ENCOUNTER
Called and spoke to pt. Pt states she is still having the nausea and dizziness this morning. Pt does not have HH services and does not have transportation at this time. Pt state she [place] close to an  but she has to [place] to get their.Evident short term memory issues noted during the conversation and had to repeat myself several times.Pt was not sure how totake antibiotics.Pt states that something is wrong with her phone and that she is not getting any message. Inquired about pts son Segundo but pt states he only deals with her finances.Attempted to reach out to him. Left VM to call me back and see if we can get her a ride to the ED or somewhere to be evaluated.

## 2022-02-02 ENCOUNTER — TELEPHONE (OUTPATIENT)
Dept: INTERNAL MEDICINE | Facility: CLINIC | Age: 87
End: 2022-02-02
Payer: MEDICARE

## 2022-02-02 NOTE — TELEPHONE ENCOUNTER
"Had a very lengthy conversation with Ms. Myrick (spent 1 hour) with her in regards to updating on everything via phone this evening.   She reports that her 'dizziness is not new, taking the antibiotic, had a little diarrhea with it, and the nausea has not changed, but has not gotten any worse; urine symptoms are much better".   She also reports that she does have a private sitter, comes 1/2 day on Mondays and if needed, would bring her to her appts. She does not desire to have more labs drawn at this time. She is verbally expressive about not wanting to be seen or referred to many other providers or go to the ER.   Since her chronic nausea and dizziness seems to be at 'baseline', will continue to have phone conference with her weekly (unless otherwise advised per Dr. Tredawell), in an effort to stay connected until she follows up with Dr. Treadwell, or she does not feel the need.   She was VERY appreciative with this plan and in complete agreement with having these conducted. She also understands that if her baseline level of chronic nausea and / or dizziness should change (worsen), she needs to reach out to Dr. Treadwell or I immediately.     Will reach out to Ms. Whitman and ask that she arranges afternoon audio visits with Ms. Myrick for the next several weeks at this time.     She understands that the H Pylori was a send out and still don't have the results.   She is not wanting to proceed with any imaging until the H Pylori has resulted and agree with this.   Would recommend that we repeat the lab (BMP) in a week or so to resurveillance the sodium level, sooner if indicated.     SDJ  "

## 2022-02-02 NOTE — TELEPHONE ENCOUNTER
Wojciech Lei, Thank you as always !    I spoke with her this morning and obviously she doesn't look My [facility]. She is in a tizzy because someone called her last night and told her to go to the ER. She doesn't understand what is going on and wants to speak with you only by phone. Please let me know if I can do anything to help. Antonette

## 2022-02-02 NOTE — TELEPHONE ENCOUNTER
"Ms Myrick has been seen by me on 1/25/2022 for chronic nausea, with revealing possible cause of chronic hyponatremia and Proteus UTI (which is unclear if she has started the antibiotic) per nursing supervisor's and LPN's reporting. Serum Na on 1/25, resulted at 132, 139 on prior lab, but is now reporting progressive 'dizziness'. Unable to get follow up surveillance labs due to no est'd HHC and "unable to drive or have transportation to the clinic".  Declines the ER unless PCP recommends.   She is well est'd and known to her primary, Dr. SYLVAIN Treadwell, whom has been updated and per the Supervisor, plans on reaching out to Ms. Myrick today (2/2/2022). Will respectfully defer care and mgt at this time to PCP.     H Pylori (in relation to chronic Nausea) was a Send out lab and is still pending to result. Will update patient on this when received.     SDJ  "

## 2022-02-02 NOTE — TELEPHONE ENCOUNTER
----- Message from CHAVO Arango sent at 1/28/2022  6:25 PM CST -----  Hi Ms. Whitman,     Can you try reaching out to Ms. Myrick.....  Don't want her to think I dropped the ball and not following up on her results...it's all in process and her file is on my desk...  I am sending Ms. Myrick messages via portal with update on labs and the plan(s) since 1/26/2022 as noted to chart; appears the last time she have checked her messages via portal was on 1/24; please tell her to check her portal.....several have been sent. Her work up, in regards to the chronic nausea, she is wanting me to address is still in the progress... (one of her cultures, H pylori is still pending to result and was a lab send-out).   Sending her another message this evening again (it is 1/28 at 6:27pm); she needs to be commenced to antibiotic for a simple UTI issue that she mentioned on the workup for her chronic nausea problem. Wanted to mention that is having 'urinary frequency'; urine culture is growing Proteus Mirabilis and will need to be on an antibiotic for this.     Remaining labs were non revealing (nothing overtly revealed on biliary labs as a cause, including negative for pancreatitis), and if H Pylori is negative, will proceed with repeat imagining, +/- referral back to GI (as explained to her in the clinic when seen). Hoping to know more on the H Pylori in another day or so, but the send-out on this has been taking up to 7 days it seems, and will update her as soon as I receive these results.     If she preferences a phone call, happy to do so upon my return to the office.     Thanks for the help.   Best,   Do

## 2022-02-04 LAB
H PYLORI AG STL QL IA: NORMAL
SPECIMEN SOURCE: NORMAL

## 2022-02-08 ENCOUNTER — PROCEDURE VISIT (OUTPATIENT)
Dept: OPHTHALMOLOGY | Facility: CLINIC | Age: 87
End: 2022-02-08
Payer: MEDICARE

## 2022-02-08 ENCOUNTER — TELEPHONE (OUTPATIENT)
Dept: INTERNAL MEDICINE | Facility: CLINIC | Age: 87
End: 2022-02-08
Payer: MEDICARE

## 2022-02-08 DIAGNOSIS — H35.3231 EXUDATIVE AGE-RELATED MACULAR DEGENERATION OF BOTH EYES WITH ACTIVE CHOROIDAL NEOVASCULARIZATION: Primary | ICD-10-CM

## 2022-02-08 PROCEDURE — 92012 PR EYE EXAM, EST PATIENT,INTERMED: ICD-10-PCS | Mod: 25,HCNC,S$GLB, | Performed by: OPHTHALMOLOGY

## 2022-02-08 PROCEDURE — 92012 INTRM OPH EXAM EST PATIENT: CPT | Mod: 25,HCNC,S$GLB, | Performed by: OPHTHALMOLOGY

## 2022-02-08 PROCEDURE — 92134 CPTRZ OPH DX IMG PST SGM RTA: CPT | Mod: HCNC,S$GLB,, | Performed by: OPHTHALMOLOGY

## 2022-02-08 PROCEDURE — 67028 INJECTION EYE DRUG: CPT | Mod: HCNC,RT,S$GLB, | Performed by: OPHTHALMOLOGY

## 2022-02-08 PROCEDURE — 67028 PR INJECT INTRAVITREAL PHARMCOLOGIC: ICD-10-PCS | Mod: HCNC,RT,S$GLB, | Performed by: OPHTHALMOLOGY

## 2022-02-08 PROCEDURE — 92134 POSTERIOR SEGMENT OCT RETINA (OCULAR COHERENCE TOMOGRAPHY)-BOTH EYES: ICD-10-PCS | Mod: HCNC,S$GLB,, | Performed by: OPHTHALMOLOGY

## 2022-02-08 NOTE — TELEPHONE ENCOUNTER
Pt requesting urinalysis... pt c/o pain, burning, and frequency. Pt requesting orders today  Please Advice

## 2022-02-08 NOTE — PROGRESS NOTES
"Established Patient - Audio Only Telehealth Visit     The patient location is: HOME  The chief complaint leading to consultation is: Follow UP  Visit type: Virtual visit with audio only (telephone)  Total time spent with patient: 30 mins       The reason for the audio only service rather than synchronous audio and video virtual visit was related to technical difficulties or patient preference/necessity.     Each patient to whom I provide medical services by telemedicine is:  (1) informed of the relationship between the physician and patient and the respective role of any other health care provider with respect to management of the patient; and (2) notified that they may decline to receive medical services by telemedicine and may withdraw from such care at any time. Patient verbally consented to receive this service via voice-only telephone call.       HPI:   Ms. Myrick is a very pleasant 89 year old lady.   Audio visit with Ms. Myrick for follow up.  Last seen by me on 1/25 for chronic GI issues, negative H Pylori, tx'd Proteus UTI with Augmentin and dizziness (at baseline).  Reportedly went to have 'injections with Dr. Hanna to eyes and was a little uncomfortable, but went to play Bridge today'.   Reports that 'started to feel a burn with urination a day or so ago, little better, but no smell or frequency'. States, "have all these creams here and wonder if should use, Triamcinolone, Ketoconazole and triple antibiotic ointment'. Advised by provider to refrain from placing these creams to vaginal area and will check urine again to ascertain no 'new' or 'persistent UTI. She does endorse completing the full course of the Augmentin. She will ask her  to bring her to the lab on Monday.   No nausea or back pain. No fever or chills.   In addition, she did go to an outside Dermatologist for 'itching to back, Daughter n  took me to hers and should have known better; she's not in the [facility] network'. Declines the " need to be seen by an [facility] Dermatologist at this time, but advised to let us know if some thing in regard should change.     Assessment / Plan:   Burning with urination  -     URINALYSIS; Future; Expected date: 02/10/2022  -     Urine culture; Future; Expected date: 02/10/2022    Proteus UTI:   Augmentin completed > 1 week ago    Chronic Nausea / Dizziness:   Stable   Negative labs  Negative H Pylori       HTN (Hydralazine, Coreg, Norvasc, Losartan)      Depression (Celexa)      HLP (Lipitor)      Chronic Hyponatremia  / Dizziness     Future Appointments   Date Time Provider Department Center   2[facility]17[facility]  [facility]   2/24/2022  4:00 PM Do Mantilla [facility]   3/22/2022  2:40 PM MONIE Hanna MD [facility]   3/30/2022  2:30 PM Jim Treadwell MD [facility]        Medication List          Accurate as of February 10, 2022  4:50 PM. If you have any questions, ask your nurse or doctor.            CONTINUE taking these medications    alendronate 70 MG tablet  Commonly known as: FOSAMAX  Take 1 tablet (70 mg total) by mouth every 7 days.     amLODIPine 5 MG tablet  Commonly known as: NORVASC  Take 1 tablet (5 mg total) by mouth once daily.     amoxicillin-clavulanate 500-125mg 500-125 mg Tab  Commonly known as: AUGMENTIN  Take 1 tablet (500 mg total) by mouth 2 (two) times daily.     aspirin 81 MG EC tablet  Commonly known as: ECOTRIN     atorvastatin 40 MG tablet  Commonly known as: LIPITOR  TAKE 1 TABLET EVERY DAY OR AS DIRECTED     AVASTIN 25 mg/mL injection  Generic drug: bevacizumab     calcium-magnesium-zinc Tab     carvediloL 6.25 MG tablet  Commonly known as: COREG  TAKE 1 TABLET TWICE DAILY WITH MEALS  OR AS DIRECTED     citalopram 20 MG tablet  Commonly known as: CeleXA  TAKE 1 TABLET EVERY DAY     esomeprazole 40 MG capsule  Commonly known as: NEXIUM  TAKE 1 CAPSULE(40 MG) BY MOUTH BEFORE BREAKFAST      HYDRALAZINE ORAL     losartan 50 MG tablet  Commonly known as: COZAAR  Take 0.5 tablets (25 mg total) by mouth once daily.     mupirocin 2 % ointment  Commonly known as: BACTROBAN  Apply a pea size amount in the right nostril twice a day for 7 days.     mv-min-folic acid-lutein 0.4-250 mg-mcg Tab     nitroGLYCERIN 0.4 MG SL tablet  Commonly known as: NITROSTAT  Place 1 tablet (0.4 mg total) under the tongue every 5 (five) minutes as needed for Chest pain.     omega-3 fatty acids 1,000 mg Cap     polyethylene glycol 17 gram Pwpk  Commonly known as: GLYCOLAX  Take 17 g by mouth once daily.     zolpidem 5 MG Tab  Commonly known as: AMBIEN  Take 1 tablet (5 mg total) by mouth nightly as needed (insomnia).          HORACE Persaud NP     This service was not originating from a related E/M service provided within the previous 7 days nor will  to an E/M service or procedure within the next 24 hours or my soonest available appointment.  Prevailing standard of care was able to be met in this audio-only visit.

## 2022-02-08 NOTE — TELEPHONE ENCOUNTER
----- Message from Shyla Rader sent at 2/8/2022 10:20 AM CST -----  Contact: Self   Pt completed rx and is still in pain. Pt would like to know if she can swing by to drop urine sample. Please advise

## 2022-02-08 NOTE — TELEPHONE ENCOUNTER
"----- Message from CHAVO Arango sent at 2/8/2022  7:27 AM CST -----  Please Call this patient of Dr. Treadwell and let her know that her stool for H Pylori is "negative" and without any evidence of bacterial infection that would have been attributing to her chronic issues of 'nausea or stomach' discomforts.     "

## 2022-02-09 NOTE — PROGRESS NOTES
HPI     DLS 01/04/2022    Patient c/o mild irritation OD. No pain. VA stable. No f/f.      HPI     5-6 wk FU OCT/Eylea OU     C/o decreasing VA ou and itching ou. No pain. No f/f.      Gtts: Refresh PRN OU        Last edited by Danii Raymond on 1/4/2022  2:35 PM. (History)        HPI     6 wk FU OCT/Eylea OU only    Pt states her vision is worsening since last visit. Itchy, watery OS.    No Floaters  No Flashes  No Pain  Gtts: Refresh PRN OU        Last edited by Charu Dillard on 11/9/2021  2:50 PM. 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 Wet AMD OU  S/p Avastin ODx 1, OS x7  S/p Eylea OD x2, OS x 1  Increased SRF at 10 weeks  8/21 New wet disease OD  1/22 some increase in SRF at 8-9 weeks OD    Eylea OD today    Keep OD at 5-6 weeks  Continue obs OS    2. Dry AMD OD    3. PVD OU    4. PCIOL OU    5. HTN Ret OU  BP control      5-6 weeks OCT no dilate      Risks, benefits, and alternatives to treatment discussed in detail with the patient.  The patient voiced understanding and wished to proceed with the procedure    Injection Procedure Note:  Diagnosis: Wet AMD OD    Patient Identified and Time Out complete  Pt marked  Topical Proparacaine and Betadine.  Inject Eylea OD at 6:00 @ 3.5-4mm posterior to limbus  Post Operative Dx: Same  Complications: None  Follow up as above.

## 2022-02-09 NOTE — PATIENT INSTRUCTIONS

## 2022-02-09 NOTE — TELEPHONE ENCOUNTER
I need a reason for the referral and the fax number to this external provider she wants to see.    Shadia Valencia DNP, FNP-C

## 2022-02-09 NOTE — TELEPHONE ENCOUNTER
----- Message from Yoselin Terry sent at 2/9/2022  3:31 PM CST -----  Pt is calling to get an order for a dermatologist that is out of the [facility] network the name is ruperto ha.Please advise

## 2022-02-10 ENCOUNTER — OFFICE VISIT (OUTPATIENT)
Dept: INTERNAL MEDICINE | Facility: CLINIC | Age: 87
End: 2022-02-10
Payer: MEDICARE

## 2022-02-10 ENCOUNTER — TELEPHONE (OUTPATIENT)
Dept: INTERNAL MEDICINE | Facility: CLINIC | Age: 87
End: 2022-02-10
Payer: MEDICARE

## 2022-02-10 DIAGNOSIS — E78.2 MIXED HYPERLIPIDEMIA: ICD-10-CM

## 2022-02-10 DIAGNOSIS — E87.1 HYPONATREMIA: ICD-10-CM

## 2022-02-10 DIAGNOSIS — K21.00 GASTROESOPHAGEAL REFLUX DISEASE WITH ESOPHAGITIS WITHOUT HEMORRHAGE: ICD-10-CM

## 2022-02-10 DIAGNOSIS — F32.5 DEPRESSION, MAJOR, IN REMISSION: ICD-10-CM

## 2022-02-10 DIAGNOSIS — R30.0 BURNING WITH URINATION: Primary | ICD-10-CM

## 2022-02-10 DIAGNOSIS — I10 HTN (HYPERTENSION), BENIGN: ICD-10-CM

## 2022-02-10 PROCEDURE — 99443 PR PHYSICIAN TELEPHONE EVALUATION 21-30 MIN: ICD-10-PCS | Mod: HCNC,95,, | Performed by: NURSE PRACTITIONER

## 2022-02-10 PROCEDURE — 99443 PR PHYSICIAN TELEPHONE EVALUATION 21-30 MIN: CPT | Mod: HCNC,95,, | Performed by: NURSE PRACTITIONER

## 2022-02-10 NOTE — TELEPHONE ENCOUNTER
"I I have spoken to pt advised her that she will need to discuss the urine issues with SDJ when she calls her today at 4pm   She forgot she had that appointment   Also she saw an derm outside of [facility] for her back itching   rcvd a bill for $400 from them bc she "didn't get permission" from Dr Treadwell which may be bc a ref was not placed.    Pend ref for pt sent to Dr Mile glass that will help  "

## 2022-02-10 NOTE — TELEPHONE ENCOUNTER
[employer] does not require referrals-  Its likely the doc was not on the plan.  Nothing we can do.  PC

## 2022-02-10 NOTE — TELEPHONE ENCOUNTER
----- Message from Patrick Reyez sent at 2/10/2022 12:57 PM CST -----  Contact: [phone]  Elen is requesting a callback as soon as possible from nurse Antonette.  She states that she still may have a bladder infection.    Callback number: [phone]

## 2022-02-13 NOTE — PROGRESS NOTES
"Established Patient - Audio Only Telehealth Visit     The patient location is: HOME  The chief complaint leading to consultation is: Follow UP  Visit type: Virtual visit with audio only (telephone)  Total time spent with patient: 20 mins      The reason for the audio only service rather than synchronous audio and video virtual visit was related to technical difficulties or patient preference/necessity.     Each patient to whom I provide medical services by telemedicine is:  (1) informed of the relationship between the physician and patient and the respective role of any other health care provider with respect to management of the patient; and (2) notified that they may decline to receive medical services by telemedicine and may withdraw from such care at any time. Patient verbally consented to receive this service via voice-only telephone call.       HPI:  Ms. Myrick is a very pleasant 89 year old lady.   Audio visit with Ms. Myrick for follow up.  Last seen by me on 1/25 for chronic GI issues, negative H Pylori, tx'd Proteus UTI with Augmentin and dizziness (at baseline). Recent follow up with dr. Hanna for 'eye injections". Walked on her treadmill on yesterday.   Last we spoke, having recurrent 'burning   sensations with urination. Repeat UA on 2/14/2022, with persistent Proteus M (10-49k count), ? Completed full course of Augmentin. She feels she did.   No back pain, no chills or fevers.     *Medication review done today.   She is very frustrated with taking Norvasc, 'makes ankles swell and makes me itch; when went to [place] was miserable and not taking this again".       Assessment / Plan  Recent treatment of Proteus UTI:   Recurrent symptoms of 'burning', repeat UA on 2/14/2022:   *Has an old Rx at home for Cipro, states, "took that before just did not finish it back then".   *Augmentin completed   ` repeat urine culture 2/14/2022, 10-49k cnt Proteus M, Cipro x 7 days; if symptoms persist upon completion, " "consideration to be given to Urology referral at this time.   ` Encouraged to increase oral intake of water.    -     ciprofloxacin HCl (CIPRO) 250 MG tablet; Take 1 tablet (250 mg total) by mouth 2 (two) times daily.  Dispense: 10 tablet; Refill: 0      Chronic Nausea / Dizziness:   Stable   Negative labs  Negative H Pylori         HTN (Coreg 6.25, Norvasc (not taking), Losartan 25)  Home Monitoring BP,not consistent.   Still 'getting call from digital" program.   Last night: Systolic 140s        Depression (Celexa taking " for years")        HLP (Lipitor)       Future Appointments   Date Time Provider Department Center   2/17/2022  4:00 PM CAHVO Arango [facility]   2/24/2022  2:30 PM CHAVO Arango [facility]   3/22/2022  2:40 PM MONIE Hanna MD [facility]   3/30/2022  2:30 PM Jim Treadwell MD [facility]        Medication List          Accurate as of February 17, 2022  7:54 AM. If you have any questions, ask your nurse or doctor.            START taking these medications    ciprofloxacin HCl 250 MG tablet  Commonly known as: CIPRO  Take 1 tablet (250 mg total) by mouth 2 (two) times daily.  Started by: CAHVO Swartz        CONTINUE taking these medications    alendronate 70 MG tablet  Commonly known as: FOSAMAX  Take 1 tablet (70 mg total) by mouth every 7 days.     aspirin 81 MG EC tablet  Commonly known as: ECOTRIN     atorvastatin 40 MG tablet  Commonly known as: LIPITOR  TAKE 1 TABLET EVERY DAY OR AS DIRECTED     AVASTIN 25 mg/mL injection  Generic drug: bevacizumab     calcium-magnesium-zinc Tab     carvediloL 6.25 MG tablet  Commonly known as: COREG  TAKE 1 TABLET TWICE DAILY WITH MEALS  OR AS DIRECTED     citalopram 20 MG tablet  Commonly known as: CeleXA  Take 1 tablet (20 mg total) by mouth once daily.     esomeprazole 40 MG capsule  Commonly known as: NEXIUM  TAKE 1 CAPSULE(40 MG) BY MOUTH BEFORE BREAKFAST   "   losartan 50 MG tablet  Commonly known as: COZAAR  Take 0.5 tablets (25 mg total) by mouth once daily.     mupirocin 2 % ointment  Commonly known as: BACTROBAN  Apply a pea size amount in the right nostril twice a day for 7 days.     mv-min-folic acid-lutein 0.4-250 mg-mcg Tab     nitroGLYCERIN 0.4 MG SL tablet  Commonly known as: NITROSTAT  Place 1 tablet (0.4 mg total) under the tongue every 5 (five) minutes as needed for Chest pain.     omega-3 fatty acids 1,000 mg Cap     polyethylene glycol 17 gram Pwpk  Commonly known as: GLYCOLAX  Take 17 g by mouth once daily.     zolpidem 5 MG Tab  Commonly known as: AMBIEN  Take 1 tablet (5 mg total) by mouth nightly as needed (insomnia).        STOP taking these medications    amLODIPine 5 MG tablet  Commonly known as: NORVASC  Stopped by: CHAVO Swartz     amoxicillin-clavulanate 500-125mg 500-125 mg Tab  Commonly known as: AUGMENTIN  Stopped by: CHAVO Swartz     HYDRALAZINE ORAL  Stopped by: CHAVO Swartz           Where to Get Your Medications      [id number] [place], [place] - [address] AT [facility] [address]  [facility] [address]    Phone: [phone]   · ciprofloxacin HCl 250 MG tablet     Information about where to get these medications is not yet available    Ask your nurse or doctor about these medications  · citalopram 20 MG tablet         HORACE Persaud NP       This service was not originating from a related E/M service provided within the previous 7 days nor will  to an E/M service or procedure within the next 24 hours or my soonest available appointment.  Prevailing standard of care was able to be met in this audio-only visit.

## 2022-02-14 ENCOUNTER — LAB VISIT (OUTPATIENT)
Dept: LAB | Facility: HOSPITAL | Age: 87
End: 2022-02-14
Payer: MEDICARE

## 2022-02-14 DIAGNOSIS — R30.0 BURNING WITH URINATION: ICD-10-CM

## 2022-02-14 LAB
BILIRUB UR QL STRIP: NEGATIVE
CLARITY UR REFRACT.AUTO: CLEAR
COLOR UR AUTO: YELLOW
GLUCOSE UR QL STRIP: NEGATIVE
HGB UR QL STRIP: NEGATIVE
KETONES UR QL STRIP: NEGATIVE
LEUKOCYTE ESTERASE UR QL STRIP: NEGATIVE
NITRITE UR QL STRIP: NEGATIVE
PH UR STRIP: 7 [PH] (ref 5–8)
PROT UR QL STRIP: NEGATIVE
SP GR UR STRIP: 1.01 (ref 1–1.03)
URN SPEC COLLECT METH UR: NORMAL

## 2022-02-14 PROCEDURE — 81003 URINALYSIS AUTO W/O SCOPE: CPT | Mod: HCNC | Performed by: NURSE PRACTITIONER

## 2022-02-14 PROCEDURE — 87086 URINE CULTURE/COLONY COUNT: CPT | Mod: HCNC | Performed by: NURSE PRACTITIONER

## 2022-02-14 PROCEDURE — 87186 SC STD MICRODIL/AGAR DIL: CPT | Mod: HCNC | Performed by: NURSE PRACTITIONER

## 2022-02-14 PROCEDURE — 87077 CULTURE AEROBIC IDENTIFY: CPT | Mod: HCNC | Performed by: NURSE PRACTITIONER

## 2022-02-14 PROCEDURE — 87088 URINE BACTERIA CULTURE: CPT | Mod: HCNC | Performed by: NURSE PRACTITIONER

## 2022-02-15 ENCOUNTER — TELEPHONE (OUTPATIENT)
Dept: INTERNAL MEDICINE | Facility: CLINIC | Age: 87
End: 2022-02-15
Payer: MEDICARE

## 2022-02-15 NOTE — PROGRESS NOTES
"Established Patient - Audio Only Telehealth Visit     The patient location is: HOME  The chief complaint leading to consultation is: Follow UP    *Audio: 2/17/2022  *Audio: 2/10/2022  *Face to Face: 01/25/2022  Visit type: Virtual visit with audio only (telephone)  Total time spent with patient: 25 mins       The reason for the audio only service rather than synchronous audio and video virtual visit was related to technical difficulties or patient preference/necessity.     Each patient to whom I provide medical services by telemedicine is:  (1) informed of the relationship between the physician and patient and the respective role of any other health care provider with respect to management of the patient; and (2) notified that they may decline to receive medical services by telemedicine and may withdraw from such care at any time. Patient verbally consented to receive this service via voice-only telephone call.     HPI  Ms. Myrick is a very pleasant 89 year old lady.   Audio visit with Ms. Myrick for follow up. Last seen by me on 1/25 for chronic GI issues, negative H Pylori, tx'd Proteus UTI with Augmentin and dizziness (at baseline). Recent follow up with Dr. Hanna for 'eye injections". Walks on treadmill and enjoys playing Bridge with friends on Thursdays.   Last we spoke, discussed her urine results, with repeat UA on 2/14/2022, with persistent Proteus M (10-49k count).   No back pain, no chills or fevers.  Rx for Cipro sent to her pharmacy.   Completed the Cipro, no pain, burning and less frequency with urination.   No smell to urine.   Monitoring her BPs now, still not taking Norvasc due to side effects. Reports 'since not taking the Norvasc, running in the 140s it seems". No more ankle swelling.   No SOB or chest discomforts.   No cough.   Still with some chronic nausea, but at baseline.   No nausea.     She was preparing to eat breakfast and looking forward to bridge today.     Assessment / Plan:   Recent " "treatment of Proteus UTI:   Recurrent symptoms of 'burning', repeat UA on 2/14/2022, Proteus M:    ` completed Cipro; symptoms have reportedly resolved  *Consideration to be given to Urology      Chronic Nausea / Dizziness:   Stable   Negative labs  Negative H Pylori         HTN (Coreg 6.25, Norvasc (not taking), Losartan changed to 50 mg today due to reported systolic blood pressures in the 140s since she stopped the norvasc, doesn't want to take due to 'swelling in legs and don't like this medication".   Home Monitoring BP. She is now keeping a log.   ` increase Losartan to 50 dailly (renal status is permissible, she will continue to monitor, goal is to get her SBP < 140s, aiming for 130s consistently on regimen  ` will keep a log and touch base with her next week as requested.     Depression (Celexa taking " for years")        HLP (Lipitor)     This service was not originating from a related E/M service provided within the previous 7 days nor will  to an E/M service or procedure within the next 24 hours or my soonest available appointment.  Prevailing standard of care was able to be met in this audio-only visit.      Future Appointments   Date Time Provider Department Center   2/24/2022  2:30 PM CHAVO Arango [facility]   3/22/2022  2:40 PM MONIE Hanna MD [facility]   3/30/2022  2:30 PM Jim Treadwell MD [facility]        Medication List          Accurate as of February 24, 2022  9:17 AM. If you have any questions, ask your nurse or doctor.            CHANGE how you take these medications    losartan 50 MG tablet  Commonly known as: COZAAR  Take 1 tablet (50 mg total) by mouth once daily.  What changed: how much to take  Changed by: CHAVO Swartz        CONTINUE taking these medications    alendronate 70 MG tablet  Commonly known as: FOSAMAX  Take 1 tablet (70 mg total) by mouth every 7 days.     aspirin 81 MG EC tablet  Commonly known " as: ECOTRIN     atorvastatin 40 MG tablet  Commonly known as: LIPITOR  TAKE 1 TABLET EVERY DAY OR AS DIRECTED     AVASTIN 25 mg/mL injection  Generic drug: bevacizumab     calcium-magnesium-zinc Tab     carvediloL 6.25 MG tablet  Commonly known as: COREG  TAKE 1 TABLET TWICE DAILY WITH MEALS  OR AS DIRECTED     citalopram 20 MG tablet  Commonly known as: CeleXA  Take 1 tablet (20 mg total) by mouth once daily.     esomeprazole 40 MG capsule  Commonly known as: NEXIUM  TAKE 1 CAPSULE(40 MG) BY MOUTH BEFORE BREAKFAST     mupirocin 2 % ointment  Commonly known as: BACTROBAN  Apply a pea size amount in the right nostril twice a day for 7 days.     mv-min-folic acid-lutein 0.4-250 mg-mcg Tab     nitroGLYCERIN 0.4 MG SL tablet  Commonly known as: NITROSTAT  Place 1 tablet (0.4 mg total) under the tongue every 5 (five) minutes as needed for Chest pain.     omega-3 fatty acids 1,000 mg Cap     polyethylene glycol 17 gram Pwpk  Commonly known as: GLYCOLAX  Take 17 g by mouth once daily.     zolpidem 5 MG Tab  Commonly known as: AMBIEN  Take 1 tablet (5 mg total) by mouth nightly as needed (insomnia).        STOP taking these medications    ciprofloxacin HCl 250 MG tablet  Commonly known as: CIPRO  Stopped by: CHAVO Swartz           Where to Get Your Medications      Information about where to get these medications is not yet available    Ask your nurse or doctor about these medications  · losartan 50 MG tablet       HORACE Persaud NP

## 2022-02-16 LAB — BACTERIA UR CULT: ABNORMAL

## 2022-02-17 ENCOUNTER — TELEPHONE (OUTPATIENT)
Dept: INTERNAL MEDICINE | Facility: CLINIC | Age: 87
End: 2022-02-17
Payer: MEDICARE

## 2022-02-17 ENCOUNTER — OFFICE VISIT (OUTPATIENT)
Dept: INTERNAL MEDICINE | Facility: CLINIC | Age: 87
End: 2022-02-17
Payer: MEDICARE

## 2022-02-17 DIAGNOSIS — F43.9 SITUATIONAL STRESS: ICD-10-CM

## 2022-02-17 DIAGNOSIS — A49.8 PROTEUS MIRABILIS INFECTION: Primary | ICD-10-CM

## 2022-02-17 PROCEDURE — 99442 PR PHYSICIAN TELEPHONE EVALUATION 11-20 MIN: ICD-10-PCS | Mod: HCNC,95,, | Performed by: NURSE PRACTITIONER

## 2022-02-17 PROCEDURE — 99442 PR PHYSICIAN TELEPHONE EVALUATION 11-20 MIN: CPT | Mod: HCNC,95,, | Performed by: NURSE PRACTITIONER

## 2022-02-17 RX ORDER — CIPROFLOXACIN 250 MG/1
250 TABLET, FILM COATED ORAL 2 TIMES DAILY
Qty: 10 TABLET | Refills: 0 | Status: SHIPPED | OUTPATIENT
Start: 2022-02-17 | End: 2022-02-24

## 2022-02-17 RX ORDER — CIPROFLOXACIN 500 MG/1
500 TABLET ORAL EVERY 12 HOURS
Qty: 10 TABLET | Refills: 0 | Status: SHIPPED | OUTPATIENT
Start: 2022-02-17 | End: 2022-02-17

## 2022-02-17 RX ORDER — CITALOPRAM 20 MG/1
20 TABLET, FILM COATED ORAL DAILY
Qty: 90 TABLET | Refills: 3 | Status: ON HOLD
Start: 2022-02-17 | End: 2022-08-01 | Stop reason: HOSPADM

## 2022-02-17 NOTE — TELEPHONE ENCOUNTER
Unclear if finished the Augmentin.   Cxs are persistent for Proteus M   Start 5 day course of Cipro. Not on Celexa as on 10/2021.     If symptoms persist, upon completion of this course, will suggest / recommend having her seen by Urology at this time.     CHULA

## 2022-02-24 ENCOUNTER — OFFICE VISIT (OUTPATIENT)
Dept: INTERNAL MEDICINE | Facility: CLINIC | Age: 87
End: 2022-02-24
Payer: MEDICARE

## 2022-02-24 DIAGNOSIS — I10 HTN (HYPERTENSION), BENIGN: Primary | ICD-10-CM

## 2022-02-24 DIAGNOSIS — I25.10 CORONARY ARTERY DISEASE INVOLVING NATIVE CORONARY ARTERY OF NATIVE HEART WITHOUT ANGINA PECTORIS: ICD-10-CM

## 2022-02-24 DIAGNOSIS — K21.00 GASTROESOPHAGEAL REFLUX DISEASE WITH ESOPHAGITIS WITHOUT HEMORRHAGE: ICD-10-CM

## 2022-02-24 DIAGNOSIS — E78.2 MIXED HYPERLIPIDEMIA: ICD-10-CM

## 2022-02-24 DIAGNOSIS — F32.5 DEPRESSION, MAJOR, IN REMISSION: ICD-10-CM

## 2022-02-24 DIAGNOSIS — F43.9 SITUATIONAL STRESS: ICD-10-CM

## 2022-02-24 PROCEDURE — 99442 PR PHYSICIAN TELEPHONE EVALUATION 11-20 MIN: ICD-10-PCS | Mod: HCNC,95,, | Performed by: NURSE PRACTITIONER

## 2022-02-24 PROCEDURE — 99442 PR PHYSICIAN TELEPHONE EVALUATION 11-20 MIN: CPT | Mod: HCNC,95,, | Performed by: NURSE PRACTITIONER

## 2022-02-24 RX ORDER — LOSARTAN POTASSIUM 50 MG/1
50 TABLET ORAL DAILY
Qty: 45 TABLET | Refills: 3
Start: 2022-02-24 | End: 2022-06-22

## 2022-02-28 NOTE — PROGRESS NOTES
"Established Patient - Audio Only Telehealth Visit     The patient location is: HOME  The chief complaint leading to consultation is: FOLLOW UP  For Blood Pressure re evaluation   Visit type: Virtual visit with audio only (telephone)  Total time spent with patient:        The reason for the audio only service rather than synchronous audio and video virtual visit was related to technical difficulties or patient preference/necessity.     Each patient to whom I provide medical services by telemedicine is:  (1) informed of the relationship between the physician and patient and the respective role of any other health care provider with respect to management of the patient; and (2) notified that they may decline to receive medical services by telemedicine and may withdraw from such care at any time. Patient verbally consented to receive this service via voice-only telephone call.    *Audio: 2/24/2022  *Audio: 2/17/2022  *Audio: 2/10/2022  *Face to Face: 01/25/2022    HPI:  Ms. Myrick is a very pleasant  89 year old lady.   Audio visit with Ms. Myrick for follow up was requested given some changes in her blood pressure medications.   Seen by me on a face to face on 1/25 for chronic GI issues, negative H Pylori, tx'd Proteus UTI with Augmentin and dizziness (at baseline). Recent follow up with Dr. Hanna for 'eye injections". Walks on treadmill and enjoys playing Bridge with friends on Thursdays.   Last we spoke, discussed her urine results, had completed Cipro and symptoms of had resolved.      She reported that she is not consistently monitoring her BPs, not taking Norvasc due to side effects. No more ankle swelling.   No SOB or chest discomforts, dizziness, headaches or cough.   Still with some chronic nausea, but at baseline. Enjoys 'eating a doughnut' every now and then. "Any appetite stimulant that can take?".   Preparing to go out for breakfast with her grandson this morning and looking forward to this.   She is " "desiring to 'start swimming once again when daughter n law can take her.       Assessment and plan:    HTN (hypertension), benign    Gastroesophageal reflux disease with esophagitis without hemorrhage    Depression, major, in remission    Mixed hyperlipidemia    Situational stress    Exudative age-related macular degeneration of both eyes with active choroidal neovascularization      Hypertension:  *Patient not checking Blood pressures consistently, checking randomly, primarily in the morning before any medications.   *Home Range: 130s-150s  *Heart Rate: in the 60s  ` continue Coreg  ` continue Losartan 50 mg  *Goal is to get her SBP < 140s, aiming for 130s consistently on regimen....  *Encouraged to keep follow up with Dr. Treadwell on 3/30 and can reach out to this provider with any questions or concerns in the interm.        Depression / Situational Stress (Celexa taking " for years")  Chronic  stable        HLP (Lipitor)  Chronic      Exudative age-related macular degeneration of both eyes with active choroidal neovascularization  Followed by Dr. Venegas (appt 3/22)      Chronic Nausea / Dizziness:   Stable   Negative labs  Negative H Pylori   Will send in PRN Zofran as discussed (she wants to try this)    Future Appointments   Date Time Provider Department Center   3/4/2022  4:00 PM CHAVO Arango [facility] Ruben PORRAS   3/22/2022  2:40 PM MONIE Hanna [facility] Ruben Larose   3/30/2022  2:30 PM Jim Treadwell MD [facility] Ruben ALEJANDRO        Medication List          Accurate as of March 4, 2022  7:58 AM. If you have any questions, ask your nurse or doctor.            START taking these medications    ondansetron 4 MG tablet  Commonly known as: ZOFRAN  Take 1 tablet (4 mg total) by mouth every 8 (eight) hours as needed for Nausea.  Started by: CHAVO Swartz        CONTINUE taking these medications    alendronate 70 MG tablet  Commonly known as: FOSAMAX  Take 1 tablet (70 mg " total) by mouth every 7 days.     aspirin 81 MG EC tablet  Commonly known as: ECOTRIN     atorvastatin 40 MG tablet  Commonly known as: LIPITOR  TAKE 1 TABLET EVERY DAY OR AS DIRECTED     AVASTIN 25 mg/mL injection  Generic drug: bevacizumab     calcium-magnesium-zinc Tab     carvediloL 6.25 MG tablet  Commonly known as: COREG  TAKE 1 TABLET TWICE DAILY WITH MEALS  OR AS DIRECTED     citalopram 20 MG tablet  Commonly known as: CeleXA  Take 1 tablet (20 mg total) by mouth once daily.     esomeprazole 40 MG capsule  Commonly known as: NEXIUM  TAKE 1 CAPSULE(40 MG) BY MOUTH BEFORE BREAKFAST     losartan 50 MG tablet  Commonly known as: COZAAR  Take 1 tablet (50 mg total) by mouth once daily.     mupirocin 2 % ointment  Commonly known as: BACTROBAN  Apply a pea size amount in the right nostril twice a day for 7 days.     mv-min-folic acid-lutein 0.4-250 mg-mcg Tab     nitroGLYCERIN 0.4 MG SL tablet  Commonly known as: NITROSTAT  Place 1 tablet (0.4 mg total) under the tongue every 5 (five) minutes as needed for Chest pain.     omega-3 fatty acids 1,000 mg Cap     polyethylene glycol 17 gram Pwpk  Commonly known as: GLYCOLAX  Take 17 g by mouth once daily.     zolpidem 5 MG Tab  Commonly known as: AMBIEN  Take 1 tablet (5 mg total) by mouth nightly as needed (insomnia).           Where to Get Your Medications      These medications were sent to [id number] - [place], [place] - [address] AT [facility] [address]  [facility] [address] [facility] [address]    Phone: [phone]   · ondansetron 4 MG tablet           HORACE Persaud NP    This service was not originating from a related E/M service provided within the previous 7 days nor will  to an E/M service or procedure within the next 24 hours or my soonest available appointment.  Prevailing standard of care was able to be met in this audio-only visit.

## 2022-03-04 ENCOUNTER — OFFICE VISIT (OUTPATIENT)
Dept: INTERNAL MEDICINE | Facility: CLINIC | Age: 87
End: 2022-03-04
Payer: MEDICARE

## 2022-03-04 DIAGNOSIS — I10 HTN (HYPERTENSION), BENIGN: Primary | ICD-10-CM

## 2022-03-04 DIAGNOSIS — K21.00 GASTROESOPHAGEAL REFLUX DISEASE WITH ESOPHAGITIS WITHOUT HEMORRHAGE: ICD-10-CM

## 2022-03-04 DIAGNOSIS — E78.2 MIXED HYPERLIPIDEMIA: ICD-10-CM

## 2022-03-04 DIAGNOSIS — H35.3231 EXUDATIVE AGE-RELATED MACULAR DEGENERATION OF BOTH EYES WITH ACTIVE CHOROIDAL NEOVASCULARIZATION: ICD-10-CM

## 2022-03-04 DIAGNOSIS — F43.9 SITUATIONAL STRESS: ICD-10-CM

## 2022-03-04 DIAGNOSIS — R07.9 CHEST PAIN DUE TO GERD: ICD-10-CM

## 2022-03-04 DIAGNOSIS — F32.5 DEPRESSION, MAJOR, IN REMISSION: ICD-10-CM

## 2022-03-04 DIAGNOSIS — K21.9 CHEST PAIN DUE TO GERD: ICD-10-CM

## 2022-03-04 PROCEDURE — 99443 PR PHYSICIAN TELEPHONE EVALUATION 21-30 MIN: ICD-10-PCS | Mod: 95,,, | Performed by: NURSE PRACTITIONER

## 2022-03-04 PROCEDURE — 1160F RVW MEDS BY RX/DR IN RCRD: CPT | Mod: CPTII,95,, | Performed by: NURSE PRACTITIONER

## 2022-03-04 PROCEDURE — 1160F PR REVIEW ALL MEDS BY PRESCRIBER/CLIN PHARMACIST DOCUMENTED: ICD-10-PCS | Mod: CPTII,95,, | Performed by: NURSE PRACTITIONER

## 2022-03-04 PROCEDURE — 1159F PR MEDICATION LIST DOCUMENTED IN MEDICAL RECORD: ICD-10-PCS | Mod: CPTII,95,, | Performed by: NURSE PRACTITIONER

## 2022-03-04 PROCEDURE — 1159F MED LIST DOCD IN RCRD: CPT | Mod: CPTII,95,, | Performed by: NURSE PRACTITIONER

## 2022-03-04 PROCEDURE — 99443 PR PHYSICIAN TELEPHONE EVALUATION 21-30 MIN: CPT | Mod: 95,,, | Performed by: NURSE PRACTITIONER

## 2022-03-04 RX ORDER — ONDANSETRON 4 MG/1
4 TABLET, FILM COATED ORAL EVERY 8 HOURS PRN
Qty: 30 TABLET | Refills: 0 | Status: SHIPPED | OUTPATIENT
Start: 2022-03-04 | End: 2022-07-01 | Stop reason: SDUPTHER

## 2022-03-22 ENCOUNTER — PROCEDURE VISIT (OUTPATIENT)
Dept: OPHTHALMOLOGY | Facility: CLINIC | Age: 87
End: 2022-03-22
Payer: MEDICARE

## 2022-03-22 DIAGNOSIS — H35.3231 EXUDATIVE AGE-RELATED MACULAR DEGENERATION OF BOTH EYES WITH ACTIVE CHOROIDAL NEOVASCULARIZATION: Primary | ICD-10-CM

## 2022-03-22 PROCEDURE — 67028 PR INJECT INTRAVITREAL PHARMCOLOGIC: ICD-10-PCS | Mod: RT,S$GLB,, | Performed by: OPHTHALMOLOGY

## 2022-03-22 PROCEDURE — 92134 CPTRZ OPH DX IMG PST SGM RTA: CPT | Mod: S$GLB,,, | Performed by: OPHTHALMOLOGY

## 2022-03-22 PROCEDURE — 67028 INJECTION EYE DRUG: CPT | Mod: RT,S$GLB,, | Performed by: OPHTHALMOLOGY

## 2022-03-22 PROCEDURE — 92012 PR EYE EXAM, EST PATIENT,INTERMED: ICD-10-PCS | Mod: 25,S$GLB,, | Performed by: OPHTHALMOLOGY

## 2022-03-22 PROCEDURE — 92134 POSTERIOR SEGMENT OCT RETINA (OCULAR COHERENCE TOMOGRAPHY)-BOTH EYES: ICD-10-PCS | Mod: S$GLB,,, | Performed by: OPHTHALMOLOGY

## 2022-03-22 PROCEDURE — 92012 INTRM OPH EXAM EST PATIENT: CPT | Mod: 25,S$GLB,, | Performed by: OPHTHALMOLOGY

## 2022-03-22 NOTE — PROGRESS NOTES
HPI     DLS 02/08/2022    Patient c/o mild irritation OU. No pain. Patient complains of decrease in   VA OU.         OCT - OD drusen  OS - cnvm with SRF and heme - improving      A/P    1. Wet AMD OU  S/p Avastin ODx 1, OS x7  S/p Eylea OD x3, OS x 1  Increased SRF at 10 weeks  8/21 New wet disease OD  1/22 some increase in SRF at 8-9 weeks OD    Eylea OD today    Keep OD at 6 weeks  Continue obs OS    2. Dry AMD OD    3. PVD OU    4. PCIOL OU    5. HTN Ret OU  BP control      6 weeks OCT no dilate      Risks, benefits, and alternatives to treatment discussed in detail with the patient.  The patient voiced understanding and wished to proceed with the procedure    Injection Procedure Note:  Diagnosis: Wet AMD OD    Patient Identified and Time Out complete  Pt marked  Topical Proparacaine and Betadine.  Inject Eylea OD at 6:00 @ 3.5-4mm posterior to limbus  Post Operative Dx: Same  Complications: None  Follow up as above.

## 2022-03-30 ENCOUNTER — OFFICE VISIT (OUTPATIENT)
Dept: INTERNAL MEDICINE | Facility: CLINIC | Age: 87
End: 2022-03-30
Payer: MEDICARE

## 2022-03-30 VITALS
HEIGHT: 64 IN | BODY MASS INDEX: 23.11 KG/M2 | DIASTOLIC BLOOD PRESSURE: 80 MMHG | WEIGHT: 135.38 LBS | HEART RATE: 60 BPM | OXYGEN SATURATION: 96 % | SYSTOLIC BLOOD PRESSURE: 134 MMHG

## 2022-03-30 DIAGNOSIS — E78.2 MIXED HYPERLIPIDEMIA: ICD-10-CM

## 2022-03-30 DIAGNOSIS — I70.0 AORTIC ATHEROSCLEROSIS: ICD-10-CM

## 2022-03-30 DIAGNOSIS — N30.20 CHRONIC CYSTITIS: Primary | ICD-10-CM

## 2022-03-30 DIAGNOSIS — D69.2 SENILE PURPURA: ICD-10-CM

## 2022-03-30 PROCEDURE — 99213 PR OFFICE/OUTPT VISIT, EST, LEVL III, 20-29 MIN: ICD-10-PCS | Mod: S$GLB,,, | Performed by: INTERNAL MEDICINE

## 2022-03-30 PROCEDURE — 99999 PR PBB SHADOW E&M-EST. PATIENT-LVL III: CPT | Mod: PBBFAC,,, | Performed by: INTERNAL MEDICINE

## 2022-03-30 PROCEDURE — 99213 OFFICE O/P EST LOW 20 MIN: CPT | Mod: S$GLB,,, | Performed by: INTERNAL MEDICINE

## 2022-03-30 PROCEDURE — 1101F PR PT FALLS ASSESS DOC 0-1 FALLS W/OUT INJ PAST YR: ICD-10-PCS | Mod: CPTII,S$GLB,, | Performed by: INTERNAL MEDICINE

## 2022-03-30 PROCEDURE — 99999 PR PBB SHADOW E&M-EST. PATIENT-LVL III: ICD-10-PCS | Mod: PBBFAC,,, | Performed by: INTERNAL MEDICINE

## 2022-03-30 PROCEDURE — 1159F PR MEDICATION LIST DOCUMENTED IN MEDICAL RECORD: ICD-10-PCS | Mod: CPTII,S$GLB,, | Performed by: INTERNAL MEDICINE

## 2022-03-30 PROCEDURE — 99499 RISK ADDL DX/OHS AUDIT: ICD-10-PCS | Mod: S$GLB,,, | Performed by: INTERNAL MEDICINE

## 2022-03-30 PROCEDURE — 1101F PT FALLS ASSESS-DOCD LE1/YR: CPT | Mod: CPTII,S$GLB,, | Performed by: INTERNAL MEDICINE

## 2022-03-30 PROCEDURE — 1126F PR PAIN SEVERITY QUANTIFIED, NO PAIN PRESENT: ICD-10-PCS | Mod: CPTII,S$GLB,, | Performed by: INTERNAL MEDICINE

## 2022-03-30 PROCEDURE — 3288F PR FALLS RISK ASSESSMENT DOCUMENTED: ICD-10-PCS | Mod: CPTII,S$GLB,, | Performed by: INTERNAL MEDICINE

## 2022-03-30 PROCEDURE — 1160F PR REVIEW ALL MEDS BY PRESCRIBER/CLIN PHARMACIST DOCUMENTED: ICD-10-PCS | Mod: CPTII,S$GLB,, | Performed by: INTERNAL MEDICINE

## 2022-03-30 PROCEDURE — 99499 UNLISTED E&M SERVICE: CPT | Mod: S$GLB,,, | Performed by: INTERNAL MEDICINE

## 2022-03-30 PROCEDURE — 3288F FALL RISK ASSESSMENT DOCD: CPT | Mod: CPTII,S$GLB,, | Performed by: INTERNAL MEDICINE

## 2022-03-30 PROCEDURE — 1126F AMNT PAIN NOTED NONE PRSNT: CPT | Mod: CPTII,S$GLB,, | Performed by: INTERNAL MEDICINE

## 2022-03-30 PROCEDURE — 1160F RVW MEDS BY RX/DR IN RCRD: CPT | Mod: CPTII,S$GLB,, | Performed by: INTERNAL MEDICINE

## 2022-03-30 PROCEDURE — 1159F MED LIST DOCD IN RCRD: CPT | Mod: CPTII,S$GLB,, | Performed by: INTERNAL MEDICINE

## 2022-03-30 RX ORDER — ATORVASTATIN CALCIUM 20 MG/1
20 TABLET, FILM COATED ORAL DAILY
Qty: 90 TABLET | Refills: 3 | Status: SHIPPED | OUTPATIENT
Start: 2022-03-30 | End: 2022-04-26

## 2022-04-12 NOTE — PROGRESS NOTES
Subjective:       Patient ID: Elen Peters is a 89 y.o. female.    Chief Complaint: Cystitis    Followup of recent treatment-  Doing well.  No symptpms at present.    Review of Systems    Objective:      Physical Exam  Constitutional:       Appearance: Normal appearance.   Neurological:      Mental Status: She is alert.         Assessment:       1. Chronic cystitis    2. Mixed hyperlipidemia    3. Senile purpura    4. Aortic atherosclerosis        Plan:       Elen was seen today for cystitis.    Diagnoses and all orders for this visit:    Chronic cystitis  Comments:  no symptoms at present    Mixed hyperlipidemia  -     atorvastatin (LIPITOR) 20 MG tablet; Take 1 tablet (20 mg total) by mouth once daily.    Senile purpura  Comments:  stable number of lesions    Aortic atherosclerosis  Comments:  regimen continues-  no recent symptpms        Follow up in about 6 months (around 9/30/2022) for F/U APPOINTMENT WITH ME.

## 2022-04-19 NOTE — PROGRESS NOTES
"INTERNAL MEDICINE CLINIC - SAME DAY APPOINTMENT  Progress Note    PRESENTING HISTORY     PCP: Jim Treadwell MD    Chief Complaint/Reason for Visit:   No chief complaint on file.    History of Present Illness & ROS : Ms. Elen Peters is a 89 y.o. female.    Same day apt.   Follow up.   Chart has been reviewed.   Very sweet lady, here with her supportive daughter Emory remy.     Ms. Myrick is having 'burning to vaginal area, not occurring with urination; can just be sitting around; tried several creams, uncertain which ones she have at home" (asked that she lets me know what creams she has tried and send to me in the portal).     In addition, still with 'nausea and burning to stomach and no real appetite". Taking Nexium daily, not interested in changing (offered to trial Protonix); will add a trial of Carafate to see if this will afford her some relief; she is in agreement with this and let me know if helped.     In addition, having pain (acute on chronic) to right knee; hurts with moving and 'having to use walker'. No falls or trauma endorsed.   Taking Tylenol       Review of Systems:  Eyes: denies visual changes at this time denies floaters   ENT: no nasal congestion or sore throat  Respiratory: no cough or shorness of breath  Cardiovascular: no chest pain or palpitations  Gastrointestinal: no nausea or vomiting, no abdominal pain or change in bowel habits  Genitourinary: no hematuria or dysuria; denies frequency  Hematologic/Lymphatic: no easy bruising or lymphadenopathy  Musculoskeletal: no arthralgias or myalgias  Neurological: no seizures or tremors  Endocrine: no heat or cold intolerance      PAST HISTORY:     Past Medical History:   Diagnosis Date    Abnormal stress test 11/18/2015    Arthritis     Bladder cancer     Cataract     Coronary artery disease involving native coronary artery 1/6/2016    Depression     Exudative age-related macular degeneration of left eye with active choroidal " neovascularization 10/1/2020    GERD (gastroesophageal reflux disease)     HTN (hypertension), benign 11/18/2015    Hx of bladder infections     Hyperlipemia     OP (osteoporosis)     Positive cardiac stress test 1/6/2016    Renal cancer     Syncope 1/6/2016    TMJ (dislocation of temporomandibular joint)     Upper back pain 12/1/2015    Ureteral cancer     Venous insufficiency 2017    Villous adenoma of colon 5/31/2013       Past Surgical History:   Procedure Laterality Date    APPENDECTOMY      BACK SURGERY      CATARACT EXTRACTION W/  INTRAOCULAR LENS IMPLANT Left 3/16/15    kristy    CATARACT EXTRACTION W/  INTRAOCULAR LENS IMPLANT Right 4/6/15    kristy    COLONOSCOPY  10/21/2013    CYSTOSCOPY      ESOPHAGOGASTRODUODENOSCOPY N/A 1/30/2019    Procedure: EGD (ESOPHAGOGASTRODUODENOSCOPY);  Surgeon: Diony Dey MD;  Location: [address]);  Service: Endoscopy;  Laterality: N/A;    EYE SURGERY      NEPHRECTOMY      L    SPINE SURGERY      TONSILLECTOMY, ADENOIDECTOMY         Family History   Problem Relation Age of Onset    Leukemia Mother     Heart disease Mother     Heart attack Mother     Cancer Mother         leukemia    Goiter Mother     Leukemia Father     Cancer Father         leukemia    Heart disease Maternal Grandfather     No Known Problems Brother     No Known Problems Son     No Known Problems Son     No Known Problems Son     Cancer Son         throat    No Known Problems Son     No Known Problems Son     No Known Problems Maternal Aunt     No Known Problems Maternal Uncle     No Known Problems Paternal Aunt     No Known Problems Paternal Uncle     No Known Problems Maternal Grandmother     No Known Problems Paternal Grandmother     No Known Problems Paternal Grandfather     No Known Problems Sister     Amblyopia Neg Hx     Blindness Neg Hx     Cataracts Neg Hx     Diabetes Neg Hx     Glaucoma Neg Hx     Hypertension Neg Hx     Macular  degeneration Neg Hx     Retinal detachment Neg Hx     Strabismus Neg Hx     Stroke Neg Hx     Thyroid disease Neg Hx     Breast cancer Neg Hx     Colon cancer Neg Hx     Esophageal cancer Neg Hx        Social History     Socioeconomic History    Marital status:    Occupational History    Occupation: retired   Tobacco Use    Smoking status: Former Smoker     Quit date:      Years since quittin.3    Smokeless tobacco: Never Used    Tobacco comment: in college   Substance and Sexual Activity    Alcohol use: No    Drug use: No    Sexual activity: Not Currently       MEDICATIONS & ALLERGIES:     Current Outpatient Medications on File Prior to Visit   Medication Sig Dispense Refill    alendronate (FOSAMAX) 70 MG tablet Take 1 tablet (70 mg total) by mouth every 7 days. 4 tablet 11    aspirin (ECOTRIN) 81 MG EC tablet Take 81 mg by mouth once daily.      atorvastatin (LIPITOR) 20 MG tablet Take 1 tablet (20 mg total) by mouth once daily. 90 tablet 3    AVASTIN 25 mg/mL injection       calcium-magnesium-zinc Tab Take 2 tablets by mouth once daily at 6am. 1 Tablet Oral Every day      carvediloL (COREG) 6.25 MG tablet TAKE 1 TABLET TWICE DAILY WITH MEALS  OR AS DIRECTED (Patient not taking: Reported on 3/30/2022) 180 tablet 3    citalopram (CELEXA) 20 MG tablet Take 1 tablet (20 mg total) by mouth once daily. 90 tablet 3    esomeprazole (NEXIUM) 40 MG capsule TAKE 1 CAPSULE(40 MG) BY MOUTH BEFORE BREAKFAST 90 capsule 3    losartan (COZAAR) 50 MG tablet Take 1 tablet (50 mg total) by mouth once daily. 45 tablet 3    mupirocin (BACTROBAN) 2 % ointment Apply a pea size amount in the right nostril twice a day for 7 days. 1 Tube 0    mv-min-folic acid-lutein 0.4-250 mg-mcg Tab Take by mouth. 1 Tablet Oral Every day      nitroGLYCERIN (NITROSTAT) 0.4 MG SL tablet Place 1 tablet (0.4 mg total) under the tongue every 5 (five) minutes as needed for Chest pain. 25 tablet 3    omega-3 fatty  acids 1,000 mg Cap Take 1 capsule by mouth once daily. 2  Capsule Oral Every day      ondansetron (ZOFRAN) 4 MG tablet Take 1 tablet (4 mg total) by mouth every 8 (eight) hours as needed for Nausea. (Patient not taking: Reported on 3/30/2022) 30 tablet 0    polyethylene glycol (GLYCOLAX) 17 gram PwPk Take 17 g by mouth once daily. 30 packet 1    zolpidem (AMBIEN) 5 MG Tab Take 1 tablet (5 mg total) by mouth nightly as needed (insomnia). 30 tablet 0     No current facility-administered medications on file prior to visit.        Review of patient's allergies indicates:   Allergen Reactions    Amlodipine Swelling    Sulfa (sulfonamide antibiotics)      Unknown as child    Codeine      Other reaction(s): Unknown    Maxzide [triamterene-hydrochlorothiazid]     Sulfamethoxazole-trimethoprim        Medications Reconciliation:   I have reconciled the patient's home medications with the patient/family. I have updated all changes.  Refer to After-Visit Medication List.    OBJECTIVE:     Vital Signs:  There were no vitals filed for this visit.  Wt Readings from Last 3 Encounters:   03/30/22 1436 61.4 kg (135 lb 5.8 oz)   01/25/22 1132 61.1 kg (134 lb 11.2 oz)   12/15/21 1428 61.9 kg (136 lb 7.4 oz)     There is no height or weight on file to calculate BMI.   Wt Readings from Last 3 Encounters:   04/20/22 62 kg (136 lb 11 oz)   03/30/22 61.4 kg (135 lb 5.8 oz)   01/25/22 61.1 kg (134 lb 11.2 oz)     Temp Readings from Last 3 Encounters:   12/09/20 97.2 °F (36.2 °C) (Oral)   11/12/20 98.3 °F (36.8 °C) (Tympanic)   09/23/20 98.1 °F (36.7 °C) (Temporal)     BP Readings from Last 3 Encounters:   04/20/22 130/70   03/30/22 134/80   01/25/22 (!) 154/72     Pulse Readings from Last 3 Encounters:   04/20/22 68   03/30/22 60   01/25/22 74       Physical Exam:  General: Well developed, well nourished. No distress.  HEENT: Head is normocephalic, atraumatic  Eyes: Clear conjunctiva.  Neck: Supple, symmetrical neck; trachea  "midline.  Lungs: Clear to auscultation bilaterally and normal respiratory effort.  Cardiovascular: Heart with regular rate and rhythm. No murmurs, gallops or rubs  Extremities: No LE edema. Pulses 2+ and symmetric.   + severe crepitus to right patella upon flexion and extension; small effusion medially, no discoloration appreciated or increase palpable warmth  Abdomen: Abdomen is soft, non-tender non-distended with normal bowel sounds.  Skin: Skin color, texture, turgor normal. No rashes.  Musculoskeletal: Normal gait with rolling walker   Neurologic:  No focal numbness or weakness.         Laboratory  Lab Results   Component Value Date    WBC 6.11 01/25/2022    HGB 12.3 01/25/2022    HCT 38.7 01/25/2022     01/25/2022    CHOL 171 04/26/2021    TRIG 87 04/26/2021    HDL 65 04/26/2021    ALT 18 01/25/2022    AST 19 01/25/2022     (L) 01/25/2022    K 4.2 01/25/2022    CL 98 01/25/2022    CREATININE 0.7 01/25/2022    BUN 11 01/25/2022    CO2 30 (H) 01/25/2022    TSH 1.135 04/26/2021    INR 0.9 06/28/2020    HGBA1C 5.8 02/24/2010         ASSESSMENT & PLAN:     Same day appt.     Ms. Myrick was seen via Virtual visits by me: 02-03/2022 on behalf of Dr. Treadwell for chronic nausea, recurrent Cystitis and HTN.   She was seen by Dr. Treadwell on 3/30/2022 and was 'doing well'.     Here today for follow up.   Ms. Myrick is having 'burning to vaginal area, not occurring with urination; can just be sitting around; tried several creams, uncertain which ones she have at home" (asked that she lets me know what creams she has tried and send to me in the portal).     In addition, still with 'nausea and burning to stomach and no real appetite". Taking Nexium daily, not interested in changing (offered to trial Protonix); will add a trial of Carafate to see if this will afford her some relief; she is in agreement with this and let me know if helped.     In addition, having pain (acute on chronic) to right knee; hurts with " "moving and 'having to use walker'. No falls or trauma endorsed.   Taking Tylenol         Knee pain, unspecified chronicity, unspecified laterality  Suspect OA  -     X-Ray Knee 3 View Right; Future; Expected date: 04/20/2022  -     diclofenac sodium (VOLTAREN) 1 % Gel; Apply 2 g topically 2 (two) times daily.  Dispense: 20 g; Refill: 3      Vaginal burning  Suspect possible vaginitis  / atrophy. Declines being seen to GYN (agree with at this time).  *Reports that burning to vaginal area, not occurring with urination; can just be sitting around; tried several creams, uncertain which ones she have at home" (asked that she lets me know what creams she has tried and send to me in the portal).  ` May benefit from Hydrocortisone velma or Clobetasol  Velma. She will message me with the creams she has already tried.       Chronic Nausea:   Anorexia  Gastroesophageal reflux disease with esophagitis without hemorrhage  Negative work up  ` PRN Zofran  ` continue Nexium and trial low dose Carafate for the next 5-7 days   -     esomeprazole (NEXIUM) 40 MG capsule; Take 1 capsule (40 mg total) by mouth before breakfast.  Dispense: 90 capsule; Refill: 3  -     sucralfate (CARAFATE) 1 gram tablet; Take 1 tablet (1 g total) by mouth 4 (four) times daily before meals and nightly.  Dispense: 30 tablet; Refill: 1        Other Medical issues:   HTN:   When seen in 3/2022 with PCP: 134/80  BP Today: 130/70 (acceptable controlled)  HR today: 68 (optimal controlled)  ` continue Losartan 50  ` continue Coreg 6.25 bid      Chronic Cystitis:    (does not appear to be the issue at this time)  2/2022: Marvel HEATON      Exudative age-related macular degeneration of both eyes with active choroidal neovascularization:   Follows up with Dr. Taylor monthly as he has advised.     Future Appointments   Date Time Provider Department Center   4/26/2022 10:00 AM LAB, APPOINTMENT [place] [facility]   4/26/2022 10:30 AM EKG, APPT [facility] EKG " Ruben Larose   4/26/2022 11:00 AM Davidson Cartagena MD [facility] CARDIO [facility]   5/3/2022  2:40 PM MONIE Hanna MD [facility] OPHTHAL [facility] [place]        Medication List          Accurate as of April 20, 2022 11:08 AM. If you have any questions, ask your nurse or doctor.            START taking these medications    diclofenac sodium 1 % Gel  Commonly known as: VOLTAREN  Apply 2 g topically 2 (two) times daily.  Started by: CHAVO Swartz     sucralfate 1 gram tablet  Commonly known as: CARAFATE  Take 1 tablet (1 g total) by mouth 4 (four) times daily before meals and nightly.  Started by: CHAVO Swartz        CONTINUE taking these medications    alendronate 70 MG tablet  Commonly known as: FOSAMAX  Take 1 tablet (70 mg total) by mouth every 7 days.     aspirin 81 MG EC tablet  Commonly known as: ECOTRIN     atorvastatin 20 MG tablet  Commonly known as: LIPITOR  Take 1 tablet (20 mg total) by mouth once daily.     AVASTIN 25 mg/mL injection  Generic drug: bevacizumab     calcium-magnesium-zinc Tab     carvediloL 6.25 MG tablet  Commonly known as: COREG  TAKE 1 TABLET TWICE DAILY WITH MEALS  OR AS DIRECTED     citalopram 20 MG tablet  Commonly known as: CeleXA  Take 1 tablet (20 mg total) by mouth once daily.     esomeprazole 40 MG capsule  Commonly known as: NEXIUM  Take 1 capsule (40 mg total) by mouth before breakfast.     losartan 50 MG tablet  Commonly known as: COZAAR  Take 1 tablet (50 mg total) by mouth once daily.     mupirocin 2 % ointment  Commonly known as: BACTROBAN  Apply a pea size amount in the right nostril twice a day for 7 days.     mv-min-folic acid-lutein 0.4-250 mg-mcg Tab     nitroGLYCERIN 0.4 MG SL tablet  Commonly known as: NITROSTAT  Place 1 tablet (0.4 mg total) under the tongue every 5 (five) minutes as needed for Chest pain.     omega-3 fatty acids 1,000 mg Cap     ondansetron 4 MG tablet  Commonly known as: ZOFRAN  Take 1 tablet (4 mg total) by mouth every 8 (eight) hours  as needed for Nausea.     polyethylene glycol 17 gram Pwpk  Commonly known as: GLYCOLAX  Take 17 g by mouth once daily.     zolpidem 5 MG Tab  Commonly known as: AMBIEN  Take 1 tablet (5 mg total) by mouth nightly as needed (insomnia).           Where to Get Your Medications      These medications were sent to [id number] - [place], [place] - [address] AT [facility] [address], [facility] [address]    Phone: [phone]   · diclofenac sodium 1 % Gel  · sucralfate 1 gram tablet     Information about where to get these medications is not yet available    Ask your nurse or doctor about these medications  · esomeprazole 40 MG capsule         Signing Physician:  CHAVO Swartz

## 2022-04-20 ENCOUNTER — IMMUNIZATION (OUTPATIENT)
Dept: INTERNAL MEDICINE | Facility: CLINIC | Age: 87
End: 2022-04-20
Payer: MEDICARE

## 2022-04-20 ENCOUNTER — HOSPITAL ENCOUNTER (OUTPATIENT)
Dept: RADIOLOGY | Facility: HOSPITAL | Age: 87
Discharge: HOME | End: 2022-04-20
Attending: NURSE PRACTITIONER
Payer: MEDICARE

## 2022-04-20 ENCOUNTER — OFFICE VISIT (OUTPATIENT)
Dept: INTERNAL MEDICINE | Facility: CLINIC | Age: 87
End: 2022-04-20
Payer: MEDICARE

## 2022-04-20 VITALS
DIASTOLIC BLOOD PRESSURE: 70 MMHG | SYSTOLIC BLOOD PRESSURE: 130 MMHG | BODY MASS INDEX: 23.34 KG/M2 | HEIGHT: 64 IN | HEART RATE: 68 BPM | OXYGEN SATURATION: 97 % | WEIGHT: 136.69 LBS

## 2022-04-20 DIAGNOSIS — N94.9 VAGINAL BURNING: ICD-10-CM

## 2022-04-20 DIAGNOSIS — M25.569 KNEE PAIN, UNSPECIFIED CHRONICITY, UNSPECIFIED LATERALITY: ICD-10-CM

## 2022-04-20 DIAGNOSIS — N30.20 CYSTITIS, CHRONIC: ICD-10-CM

## 2022-04-20 DIAGNOSIS — R63.0 ANOREXIA: ICD-10-CM

## 2022-04-20 DIAGNOSIS — K21.00 GASTROESOPHAGEAL REFLUX DISEASE WITH ESOPHAGITIS WITHOUT HEMORRHAGE: ICD-10-CM

## 2022-04-20 DIAGNOSIS — R11.0 CHRONIC NAUSEA: ICD-10-CM

## 2022-04-20 DIAGNOSIS — I10 HTN (HYPERTENSION), BENIGN: ICD-10-CM

## 2022-04-20 DIAGNOSIS — M25.569 KNEE PAIN, UNSPECIFIED CHRONICITY, UNSPECIFIED LATERALITY: Primary | ICD-10-CM

## 2022-04-20 DIAGNOSIS — Z23 NEED FOR VACCINATION: Primary | ICD-10-CM

## 2022-04-20 PROCEDURE — 99999 PR PBB SHADOW E&M-EST. PATIENT-LVL IV: ICD-10-PCS | Mod: PBBFAC,,, | Performed by: NURSE PRACTITIONER

## 2022-04-20 PROCEDURE — 1125F PR PAIN SEVERITY QUANTIFIED, PAIN PRESENT: ICD-10-PCS | Mod: CPTII,S$GLB,, | Performed by: NURSE PRACTITIONER

## 2022-04-20 PROCEDURE — 3288F PR FALLS RISK ASSESSMENT DOCUMENTED: ICD-10-PCS | Mod: CPTII,S$GLB,, | Performed by: NURSE PRACTITIONER

## 2022-04-20 PROCEDURE — 91305 COVID-19, MRNA, LNP-S, PF, 30 MCG/0.3 ML DOSE VACCINE (PFIZER): CPT | Mod: PBBFAC | Performed by: INTERNAL MEDICINE

## 2022-04-20 PROCEDURE — 73562 X-RAY EXAM OF KNEE 3: CPT | Mod: 26,RT,, | Performed by: RADIOLOGY

## 2022-04-20 PROCEDURE — 99214 OFFICE O/P EST MOD 30 MIN: CPT | Mod: S$GLB,,, | Performed by: NURSE PRACTITIONER

## 2022-04-20 PROCEDURE — 1101F PR PT FALLS ASSESS DOC 0-1 FALLS W/OUT INJ PAST YR: ICD-10-PCS | Mod: CPTII,S$GLB,, | Performed by: NURSE PRACTITIONER

## 2022-04-20 PROCEDURE — 3288F FALL RISK ASSESSMENT DOCD: CPT | Mod: CPTII,S$GLB,, | Performed by: NURSE PRACTITIONER

## 2022-04-20 PROCEDURE — 1159F MED LIST DOCD IN RCRD: CPT | Mod: CPTII,S$GLB,, | Performed by: NURSE PRACTITIONER

## 2022-04-20 PROCEDURE — 73562 X-RAY EXAM OF KNEE 3: CPT | Mod: TC,RT

## 2022-04-20 PROCEDURE — 1159F PR MEDICATION LIST DOCUMENTED IN MEDICAL RECORD: ICD-10-PCS | Mod: CPTII,S$GLB,, | Performed by: NURSE PRACTITIONER

## 2022-04-20 PROCEDURE — 1125F AMNT PAIN NOTED PAIN PRSNT: CPT | Mod: CPTII,S$GLB,, | Performed by: NURSE PRACTITIONER

## 2022-04-20 PROCEDURE — 73562 XR KNEE 3 VIEW RIGHT: ICD-10-PCS | Mod: 26,RT,, | Performed by: RADIOLOGY

## 2022-04-20 PROCEDURE — 1101F PT FALLS ASSESS-DOCD LE1/YR: CPT | Mod: CPTII,S$GLB,, | Performed by: NURSE PRACTITIONER

## 2022-04-20 PROCEDURE — 99214 PR OFFICE/OUTPT VISIT, EST, LEVL IV, 30-39 MIN: ICD-10-PCS | Mod: S$GLB,,, | Performed by: NURSE PRACTITIONER

## 2022-04-20 PROCEDURE — 99999 PR PBB SHADOW E&M-EST. PATIENT-LVL IV: CPT | Mod: PBBFAC,,, | Performed by: NURSE PRACTITIONER

## 2022-04-20 RX ORDER — DICLOFENAC SODIUM 10 MG/G
2 GEL TOPICAL 2 TIMES DAILY
Qty: 20 G | Refills: 3 | Status: ON HOLD | OUTPATIENT
Start: 2022-04-20 | End: 2022-08-01 | Stop reason: HOSPADM

## 2022-04-20 RX ORDER — SUCRALFATE 1 G/1
1 TABLET ORAL
Qty: 30 TABLET | Refills: 1 | Status: SHIPPED | OUTPATIENT
Start: 2022-04-20 | End: 2022-05-04 | Stop reason: SDUPTHER

## 2022-04-20 RX ORDER — ESOMEPRAZOLE MAGNESIUM 40 MG/1
40 CAPSULE, DELAYED RELEASE ORAL
Qty: 90 CAPSULE | Refills: 3
Start: 2022-04-20 | End: 2022-06-01

## 2022-04-21 ENCOUNTER — TELEPHONE (OUTPATIENT)
Dept: INTERNAL MEDICINE | Facility: CLINIC | Age: 87
End: 2022-04-21
Payer: MEDICARE

## 2022-04-21 NOTE — TELEPHONE ENCOUNTER
----- Message from Ivana Castillo sent at 4/21/2022 12:19 PM CDT -----  Regarding: returned call  Contact: patient [phone]  Patient is returning a phone call.  Who left a message for the patient: Tien SCHULER  Does patient know what this is regarding:    Would you like a call back, or a response through your [facility] portal?:   Please  Comments:

## 2022-04-21 NOTE — TELEPHONE ENCOUNTER
"----- Message from CHAVO Arango sent at 4/20/2022 11:37 AM CDT -----  Please call Ms. Myrick and let her know that there is some 'bone spurs in the knee cap and certainly some bone of bone involvement". Recommend that she tries the topical Voltaren cream, if not improving with the topical cream, tell her to let us know and happy to get her with an Orthopedist (bone doctor) here at [facility].     Thanks.   "

## 2022-04-21 NOTE — TELEPHONE ENCOUNTER
Called and spoke to the pt states she was just calling to inform that the voltaren gel is working for now.

## 2022-04-22 ENCOUNTER — PATIENT OUTREACH (OUTPATIENT)
Dept: ADMINISTRATIVE | Facility: OTHER | Age: 87
End: 2022-04-22
Payer: MEDICARE

## 2022-04-22 ENCOUNTER — PATIENT MESSAGE (OUTPATIENT)
Dept: INTERNAL MEDICINE | Facility: CLINIC | Age: 87
End: 2022-04-22
Payer: MEDICARE

## 2022-04-22 NOTE — TELEPHONE ENCOUNTER
----- Message from Leonela Jones sent at 4/22/2022 10:33 AM CDT -----  Contact: Self [phone]  Patient would like to get medical advice.    Would you like a call back, or a response through your [facility] portal?:   call back    Pharmacy name and phone # (copy from chart):      [id number] - [place], [place] - [address] AT [facility] [address]  [facility] [address]  Phone: [phone] Fax: [phone]    Comments:   Calling to speak with nurse regarding a new rx request for burning  in vaginal area.

## 2022-04-25 ENCOUNTER — TELEPHONE (OUTPATIENT)
Dept: INTERNAL MEDICINE | Facility: CLINIC | Age: 87
End: 2022-04-25

## 2022-04-25 NOTE — TELEPHONE ENCOUNTER
----- Message from Camille Brantley sent at 4/25/2022  1:57 PM CDT -----  Contact: Self/[phone]  Pt said that she is calling in regards to needing to speak with the nurse about some medication that's he never received. Please advise

## 2022-04-26 ENCOUNTER — OFFICE VISIT (OUTPATIENT)
Dept: CARDIOLOGY | Facility: CLINIC | Age: 87
End: 2022-04-26
Payer: MEDICARE

## 2022-04-26 ENCOUNTER — LAB VISIT (OUTPATIENT)
Dept: LAB | Facility: HOSPITAL | Age: 87
End: 2022-04-26
Attending: INTERNAL MEDICINE
Payer: MEDICARE

## 2022-04-26 ENCOUNTER — HOSPITAL ENCOUNTER (OUTPATIENT)
Dept: CARDIOLOGY | Facility: CLINIC | Age: 87
Discharge: HOME | End: 2022-04-26
Payer: MEDICARE

## 2022-04-26 VITALS
SYSTOLIC BLOOD PRESSURE: 186 MMHG | HEIGHT: 64 IN | DIASTOLIC BLOOD PRESSURE: 83 MMHG | HEART RATE: 63 BPM | BODY MASS INDEX: 22.7 KG/M2 | WEIGHT: 132.94 LBS

## 2022-04-26 DIAGNOSIS — F32.5 DEPRESSION, MAJOR, IN REMISSION: ICD-10-CM

## 2022-04-26 DIAGNOSIS — I25.10 CORONARY ARTERY DISEASE INVOLVING NATIVE CORONARY ARTERY OF NATIVE HEART WITHOUT ANGINA PECTORIS: ICD-10-CM

## 2022-04-26 DIAGNOSIS — E78.2 MIXED HYPERLIPIDEMIA: ICD-10-CM

## 2022-04-26 DIAGNOSIS — K21.00 GASTROESOPHAGEAL REFLUX DISEASE WITH ESOPHAGITIS WITHOUT HEMORRHAGE: ICD-10-CM

## 2022-04-26 DIAGNOSIS — I70.0 AORTIC ATHEROSCLEROSIS: ICD-10-CM

## 2022-04-26 DIAGNOSIS — R94.31 NONSPECIFIC ABNORMAL ELECTROCARDIOGRAM (ECG) (EKG): ICD-10-CM

## 2022-04-26 DIAGNOSIS — E87.1 HYPONATREMIA: ICD-10-CM

## 2022-04-26 DIAGNOSIS — I10 HTN (HYPERTENSION), BENIGN: ICD-10-CM

## 2022-04-26 DIAGNOSIS — I10 HTN (HYPERTENSION), BENIGN: Primary | ICD-10-CM

## 2022-04-26 DIAGNOSIS — I95.1 AUTONOMIC POSTURAL HYPOTENSION: ICD-10-CM

## 2022-04-26 DIAGNOSIS — R26.81 GAIT INSTABILITY: ICD-10-CM

## 2022-04-26 LAB
ALBUMIN SERPL BCP-MCNC: 3.7 G/DL (ref 3.5–5.2)
ALP SERPL-CCNC: 51 U/L (ref 55–135)
ALT SERPL W/O P-5'-P-CCNC: 16 U/L (ref 10–44)
ANION GAP SERPL CALC-SCNC: 7 MMOL/L (ref 8–16)
AST SERPL-CCNC: 18 U/L (ref 10–40)
BILIRUB SERPL-MCNC: 0.7 MG/DL (ref 0.1–1)
BNP SERPL-MCNC: 293 PG/ML (ref 0–99)
BUN SERPL-MCNC: 13 MG/DL (ref 10–30)
CALCIUM SERPL-MCNC: 9.9 MG/DL (ref 8.7–10.5)
CHLORIDE SERPL-SCNC: 98 MMOL/L (ref 95–110)
CHOLEST SERPL-MCNC: 264 MG/DL (ref 120–199)
CHOLEST/HDLC SERPL: 4.2 {RATIO} (ref 2–5)
CO2 SERPL-SCNC: 30 MMOL/L (ref 23–29)
CREAT SERPL-MCNC: 0.8 MG/DL (ref 0.5–1.4)
EST. GFR  (AFRICAN AMERICAN): >60 ML/MIN/1.73 M^2
EST. GFR  (NON AFRICAN AMERICAN): >60 ML/MIN/1.73 M^2
GLUCOSE SERPL-MCNC: 92 MG/DL (ref 70–110)
HDLC SERPL-MCNC: 63 MG/DL (ref 40–75)
HDLC SERPL: 23.9 % (ref 20–50)
LDLC SERPL CALC-MCNC: 181.4 MG/DL (ref 63–159)
NONHDLC SERPL-MCNC: 201 MG/DL
POTASSIUM SERPL-SCNC: 4.3 MMOL/L (ref 3.5–5.1)
PROT SERPL-MCNC: 6.7 G/DL (ref 6–8.4)
SODIUM SERPL-SCNC: 135 MMOL/L (ref 136–145)
TRIGL SERPL-MCNC: 98 MG/DL (ref 30–150)
TSH SERPL DL<=0.005 MIU/L-ACNC: 1.81 UIU/ML (ref 0.4–4)

## 2022-04-26 PROCEDURE — 1101F PT FALLS ASSESS-DOCD LE1/YR: CPT | Mod: CPTII,S$GLB,, | Performed by: INTERNAL MEDICINE

## 2022-04-26 PROCEDURE — 99215 PR OFFICE/OUTPT VISIT, EST, LEVL V, 40-54 MIN: ICD-10-PCS | Mod: S$GLB,,, | Performed by: INTERNAL MEDICINE

## 2022-04-26 PROCEDURE — 3288F PR FALLS RISK ASSESSMENT DOCUMENTED: ICD-10-PCS | Mod: CPTII,S$GLB,, | Performed by: INTERNAL MEDICINE

## 2022-04-26 PROCEDURE — 80053 COMPREHEN METABOLIC PANEL: CPT | Performed by: INTERNAL MEDICINE

## 2022-04-26 PROCEDURE — 1126F AMNT PAIN NOTED NONE PRSNT: CPT | Mod: CPTII,S$GLB,, | Performed by: INTERNAL MEDICINE

## 2022-04-26 PROCEDURE — 1159F PR MEDICATION LIST DOCUMENTED IN MEDICAL RECORD: ICD-10-PCS | Mod: CPTII,S$GLB,, | Performed by: INTERNAL MEDICINE

## 2022-04-26 PROCEDURE — 93005 EKG 12-LEAD: ICD-10-PCS | Mod: S$GLB,,, | Performed by: INTERNAL MEDICINE

## 2022-04-26 PROCEDURE — 99215 OFFICE O/P EST HI 40 MIN: CPT | Mod: S$GLB,,, | Performed by: INTERNAL MEDICINE

## 2022-04-26 PROCEDURE — 93010 ELECTROCARDIOGRAM REPORT: CPT | Mod: S$GLB,,, | Performed by: INTERNAL MEDICINE

## 2022-04-26 PROCEDURE — 36415 COLL VENOUS BLD VENIPUNCTURE: CPT | Performed by: INTERNAL MEDICINE

## 2022-04-26 PROCEDURE — 99499 UNLISTED E&M SERVICE: CPT | Mod: S$GLB,,, | Performed by: INTERNAL MEDICINE

## 2022-04-26 PROCEDURE — 99999 PR PBB SHADOW E&M-EST. PATIENT-LVL IV: CPT | Mod: PBBFAC,,, | Performed by: INTERNAL MEDICINE

## 2022-04-26 PROCEDURE — 99499 RISK ADDL DX/OHS AUDIT: ICD-10-PCS | Mod: S$GLB,,, | Performed by: INTERNAL MEDICINE

## 2022-04-26 PROCEDURE — 99999 PR PBB SHADOW E&M-EST. PATIENT-LVL IV: ICD-10-PCS | Mod: PBBFAC,,, | Performed by: INTERNAL MEDICINE

## 2022-04-26 PROCEDURE — 1159F MED LIST DOCD IN RCRD: CPT | Mod: CPTII,S$GLB,, | Performed by: INTERNAL MEDICINE

## 2022-04-26 PROCEDURE — 93005 ELECTROCARDIOGRAM TRACING: CPT | Mod: S$GLB,,, | Performed by: INTERNAL MEDICINE

## 2022-04-26 PROCEDURE — 80061 LIPID PANEL: CPT | Performed by: INTERNAL MEDICINE

## 2022-04-26 PROCEDURE — 93010 EKG 12-LEAD: ICD-10-PCS | Mod: S$GLB,,, | Performed by: INTERNAL MEDICINE

## 2022-04-26 PROCEDURE — 3288F FALL RISK ASSESSMENT DOCD: CPT | Mod: CPTII,S$GLB,, | Performed by: INTERNAL MEDICINE

## 2022-04-26 PROCEDURE — 83880 ASSAY OF NATRIURETIC PEPTIDE: CPT | Performed by: INTERNAL MEDICINE

## 2022-04-26 PROCEDURE — 1101F PR PT FALLS ASSESS DOC 0-1 FALLS W/OUT INJ PAST YR: ICD-10-PCS | Mod: CPTII,S$GLB,, | Performed by: INTERNAL MEDICINE

## 2022-04-26 PROCEDURE — 1126F PR PAIN SEVERITY QUANTIFIED, NO PAIN PRESENT: ICD-10-PCS | Mod: CPTII,S$GLB,, | Performed by: INTERNAL MEDICINE

## 2022-04-26 PROCEDURE — 84443 ASSAY THYROID STIM HORMONE: CPT | Performed by: INTERNAL MEDICINE

## 2022-04-26 RX ORDER — TRIAMTERENE/HYDROCHLOROTHIAZID 37.5-25 MG
1 TABLET ORAL DAILY
Qty: 90 TABLET | Refills: 3 | Status: SHIPPED | OUTPATIENT
Start: 2022-04-26 | End: 2022-07-15 | Stop reason: SINTOL

## 2022-04-26 RX ORDER — ROSUVASTATIN CALCIUM 20 MG/1
20 TABLET, COATED ORAL DAILY
Qty: 90 TABLET | Refills: 3 | Status: ON HOLD | OUTPATIENT
Start: 2022-04-26 | End: 2022-08-01 | Stop reason: HOSPADM

## 2022-04-26 NOTE — PATIENT INSTRUCTIONS
Discussed diet , achieving and maintaining ideal body weight, and exercise.   We reviewed meds in detail.  Reassured-Discussed goals, options, plan.  Goal BP< 135-140/85-90 considering advanced age.  Stop Atorva and just take half of Rosuvastatin 20 mg daily  HCTZ /triameterene just half pill 3 times per week-if it causes swelling will switch to alternative

## 2022-04-26 NOTE — PROGRESS NOTES
Subjective:   Patient ID:  Elen Peters is a 89 y.o. female who presents for follow-up of CAD risk    HPI: The patient is here for CAD risk factors and HTN. I have been physician for her late  and helped with sons . Good friends with Rodney and Pascual but know most of her sons   The patient has no chest pain, SOB, TIA, palpitations, syncope or pre-syncope.Patient currently exercises only a few times per week and less than prior years .BP at home in 130s.She stopped atorva due to muscle aches.        Review of Systems   Constitutional: Positive for malaise/fatigue. Negative for chills, decreased appetite, diaphoresis, fever, night sweats, weight gain and weight loss.   HENT: Negative for congestion, hoarse voice, nosebleeds, sore throat and tinnitus.    Eyes: Negative for blurred vision, double vision, vision loss in left eye, vision loss in right eye, visual disturbance and visual halos.   Cardiovascular: Negative for chest pain, claudication, cyanosis, dyspnea on exertion, irregular heartbeat, leg swelling, near-syncope, orthopnea, palpitations, paroxysmal nocturnal dyspnea and syncope.   Respiratory: Negative for cough, hemoptysis, shortness of breath, sleep disturbances due to breathing, snoring, sputum production and wheezing.    Endocrine: Negative for cold intolerance, heat intolerance, polydipsia, polyphagia and polyuria.   Hematologic/Lymphatic: Negative for adenopathy and bleeding problem. Does not bruise/bleed easily.   Skin: Negative for color change, dry skin, flushing, itching, nail changes, poor wound healing, rash, skin cancer, suspicious lesions and unusual hair distribution.   Musculoskeletal: Negative for arthritis, back pain, falls, gout, joint pain, joint swelling, muscle cramps, muscle weakness, myalgias and stiffness.   Gastrointestinal: Negative for abdominal pain, anorexia, change in bowel habit, constipation, diarrhea, dysphagia, heartburn, hematemesis, hematochezia, melena and  "vomiting.   Genitourinary: Negative for decreased libido, dysuria, hematuria, hesitancy and urgency.   Neurological: Positive for weakness. Negative for excessive daytime sleepiness, dizziness, focal weakness, headaches, light-headedness, loss of balance, numbness, paresthesias, seizures, sensory change, tremors and vertigo.   Psychiatric/Behavioral: Negative for altered mental status, depression, hallucinations, memory loss, substance abuse and suicidal ideas. The patient does not have insomnia and is not nervous/anxious.    Allergic/Immunologic: Negative for environmental allergies and hives.       Objective: BP (!) 186/83 (BP Location: Left arm, Patient Position: Sitting, BP Method: Medium (Automatic))   Pulse 63   Ht 5' 4" (1.626 m)   Wt 60.3 kg (132 lb 15 oz)   BMI 22.82 kg/m²      Physical Exam  Constitutional:       Appearance: She is well-developed.   HENT:      Head: Normocephalic.   Eyes:      Pupils: Pupils are equal, round, and reactive to light.   Neck:      Thyroid: No thyromegaly.      Vascular: Normal carotid pulses. No carotid bruit, hepatojugular reflux or JVD.   Cardiovascular:      Rate and Rhythm: Normal rate and regular rhythm.      Pulses: Intact distal pulses.      Heart sounds: Normal heart sounds. No murmur heard.    No friction rub. No gallop.   Pulmonary:      Effort: Pulmonary effort is normal. No tachypnea or respiratory distress.      Breath sounds: Normal breath sounds. No wheezing or rales.   Chest:      Chest wall: No tenderness.   Abdominal:      General: Bowel sounds are normal. There is no distension.      Palpations: Abdomen is soft. There is no mass.      Tenderness: There is no abdominal tenderness. There is no guarding or rebound.   Musculoskeletal:         General: No tenderness. Normal range of motion.      Cervical back: Normal range of motion.   Lymphadenopathy:      Cervical: No cervical adenopathy.   Skin:     General: Skin is warm.      Findings: No erythema or " rash.   Neurological:      Mental Status: She is alert and oriented to person, place, and time.      Cranial Nerves: No cranial nerve deficit.      Coordination: Coordination normal.   Psychiatric:         Behavior: Behavior normal.         Thought Content: Thought content normal.         Judgment: Judgment normal.         Assessment:     1. HTN (hypertension), benign    2. Coronary artery disease involving native coronary artery of native heart without angina pectoris    3. Autonomic postural hypotension    4. Nonspecific abnormal electrocardiogram (ECG) (EKG)    5. Hyponatremia    6. Gait instability    7. Aortic atherosclerosis    8. Depression, major, in remission    9. Mixed hyperlipidemia    10. Gastroesophageal reflux disease with esophagitis without hemorrhage        Plan:   Discussed diet , achieving and maintaining ideal body weight, and exercise.   We reviewed meds in detail.  Reassured-Discussed goals, options, plan.  Goal BP< 135-140/85-90 considering advanced age.  Stop Atorva and just take half of Rosuvastatin 20 mg daily  HCTZ /triameterene just half pill 3 times per week-if it causes swelling will switch to alternative  Elen was seen today for annual exam and dizziness.    Diagnoses and all orders for this visit:    HTN (hypertension), benign  -     Lipid Panel; Future; Expected date: 04/26/2022  -     Comprehensive Metabolic Panel; Future; Expected date: 04/26/2022  -     TSH; Future; Expected date: 04/26/2022  -     BNP; Future; Expected date: 04/26/2022  -     rosuvastatin (CRESTOR) 20 MG tablet; Take 1 tablet (20 mg total) by mouth once daily.  -     triamterene-hydrochlorothiazide 37.5-25 mg (MAXZIDE-25) 37.5-25 mg per tablet; Take 1 tablet by mouth once daily. Use as directed 0.5-1 daily or every other day    Coronary artery disease involving native coronary artery of native heart without angina pectoris  -     Lipid Panel; Future; Expected date: 04/26/2022  -     Comprehensive Metabolic  Panel; Future; Expected date: 04/26/2022  -     TSH; Future; Expected date: 04/26/2022  -     BNP; Future; Expected date: 04/26/2022    Autonomic postural hypotension    Nonspecific abnormal electrocardiogram (ECG) (EKG)    Hyponatremia    Gait instability    Aortic atherosclerosis  -     Lipid Panel; Future; Expected date: 04/26/2022    Depression, major, in remission    Mixed hyperlipidemia  -     Lipid Panel; Future; Expected date: 04/26/2022  -     Comprehensive Metabolic Panel; Future; Expected date: 04/26/2022  -     rosuvastatin (CRESTOR) 20 MG tablet; Take 1 tablet (20 mg total) by mouth once daily.  -     triamterene-hydrochlorothiazide 37.5-25 mg (MAXZIDE-25) 37.5-25 mg per tablet; Take 1 tablet by mouth once daily. Use as directed 0.5-1 daily or every other day    Gastroesophageal reflux disease with esophagitis without hemorrhage            Follow up in about 15 months (around 7/26/2023) for with labs; labs 6 months.

## 2022-04-27 ENCOUNTER — TELEPHONE (OUTPATIENT)
Dept: INTERNAL MEDICINE | Facility: CLINIC | Age: 87
End: 2022-04-27
Payer: MEDICARE

## 2022-04-27 NOTE — TELEPHONE ENCOUNTER
Notified pt of message from provider and she said she used Vagisil, Clortrimazole and a 7 day Vaginal Cream.  The other creams were for itching.    She doesn't have  Vaginal discharge and is not experience urinary incontinence.  She also wanted you to know after a recent visit w/ the Cardiologist she started Crestor for cholesterol on 04/26 and the Nurse visits

## 2022-04-28 ENCOUNTER — TELEPHONE (OUTPATIENT)
Dept: INTERNAL MEDICINE | Facility: CLINIC | Age: 87
End: 2022-04-28
Payer: MEDICARE

## 2022-04-28 NOTE — TELEPHONE ENCOUNTER
"Returned call to pt re: message below.  Pt stated she will continue to use the Vagisil cream.    Lm for pt and Jo Ann (family) asking for a returned call.     ----- Message from CHAVO Arango sent at 4/28/2022  6:19 AM CDT -----  Thanks for the follow up message on ms. Myrick.   Please tell her to continue using the Vagisil cream (best that can be used for the 'itching').  Also, advise that she increases her intake of 'water' while taking the Crestor and happy to hear that the 'gel' for her knees is working :)     Tell her "hello" for me and I will call her soon, but reach out to us sooner if she should need anything.         "

## 2022-04-28 NOTE — TELEPHONE ENCOUNTER
"Lm for pt and Jo Ann (family) asking for a returned call.    ----- Message from CHAVO Arango sent at 4/28/2022  6:19 AM CDT -----  Thanks for the follow up message on ms. Myrick.   Please tell her to continue using the Vagisil cream (best that can be used for the 'itching').  Also, advise that she increases her intake of 'water' while taking the Crestor and happy to hear that the 'gel' for her knees is working :)    Tell her "hello" for me and I will call her soon, but reach out to us sooner if she should need anything.           "

## 2022-04-28 NOTE — TELEPHONE ENCOUNTER
----- Message from Yoselin Terry sent at 4/28/2022  4:43 PM CDT -----  Contact: [phone]  Pt stated she received a call from the office and is returning that call.Please advise     PACU

## 2022-05-03 ENCOUNTER — PROCEDURE VISIT (OUTPATIENT)
Dept: OPHTHALMOLOGY | Facility: CLINIC | Age: 87
End: 2022-05-03
Payer: MEDICARE

## 2022-05-03 DIAGNOSIS — H35.3231 EXUDATIVE AGE-RELATED MACULAR DEGENERATION OF BOTH EYES WITH ACTIVE CHOROIDAL NEOVASCULARIZATION: Primary | ICD-10-CM

## 2022-05-03 PROCEDURE — 92012 PR EYE EXAM, EST PATIENT,INTERMED: ICD-10-PCS | Mod: 25,S$GLB,, | Performed by: OPHTHALMOLOGY

## 2022-05-03 PROCEDURE — 92134 CPTRZ OPH DX IMG PST SGM RTA: CPT | Mod: S$GLB,,, | Performed by: OPHTHALMOLOGY

## 2022-05-03 PROCEDURE — 92134 POSTERIOR SEGMENT OCT RETINA (OCULAR COHERENCE TOMOGRAPHY)-BOTH EYES: ICD-10-PCS | Mod: S$GLB,,, | Performed by: OPHTHALMOLOGY

## 2022-05-03 PROCEDURE — 67028 INJECTION EYE DRUG: CPT | Mod: RT,S$GLB,, | Performed by: OPHTHALMOLOGY

## 2022-05-03 PROCEDURE — 67028 PR INJECT INTRAVITREAL PHARMCOLOGIC: ICD-10-PCS | Mod: RT,S$GLB,, | Performed by: OPHTHALMOLOGY

## 2022-05-03 PROCEDURE — 92012 INTRM OPH EXAM EST PATIENT: CPT | Mod: 25,S$GLB,, | Performed by: OPHTHALMOLOGY

## 2022-05-03 RX ORDER — AFLIBERCEPT 40 MG/ML
INJECTION, SOLUTION INTRAVITREAL
Status: ON HOLD | COMMUNITY
Start: 2022-02-08 | End: 2022-07-22 | Stop reason: HOSPADM

## 2022-05-03 NOTE — PROGRESS NOTES
HPI     DLS 3/22/22    Patient here today with c/o decreasing VA ou x 2 weeks at near. No pain.   No f/f.    Ats ou prn    Last edited by Danii Raymond on 5/3/2022  2:37 PM. (History)                OCT - OD drusen  OS - cnvm with SRF and heme - improving      A/P    1. Wet AMD OU  S/p Avastin ODx 1, OS x7  S/p Eylea OD x4, OS x 1  Increased SRF at 10 weeks  8/21 New wet disease OD  1/22 some increase in SRF at 8-9 weeks OD    Eylea OD today    Keep OD at 6 weeks  Continue obs OS    2. Dry AMD OD    3. PVD OU    4. PCIOL OU    5. HTN Ret OU  BP control      6 weeks OCT no dilate      Risks, benefits, and alternatives to treatment discussed in detail with the patient.  The patient voiced understanding and wished to proceed with the procedure    Injection Procedure Note:  Diagnosis: Wet AMD OD    Patient Identified and Time Out complete  Pt marked  Topical Proparacaine and Betadine.  Inject Eylea OD at 6:00 @ 3.5-4mm posterior to limbus  Post Operative Dx: Same  Complications: None  Follow up as above.

## 2022-05-03 NOTE — PATIENT INSTRUCTIONS

## 2022-05-09 ENCOUNTER — TELEPHONE (OUTPATIENT)
Dept: INTERNAL MEDICINE | Facility: CLINIC | Age: 87
End: 2022-05-09
Payer: MEDICARE

## 2022-05-09 NOTE — TELEPHONE ENCOUNTER
----- Message from Betty Gustavo sent at 5/9/2022  2:12 PM CDT -----  Contact: Pt @[phone]  Caller is requesting an earlier appointment then we can schedule.  Caller is requesting a message be sent to the provider.  If this is for urgent care symptoms, did you offer other providers at this location, providers at other locations, or [facility]? (yes, no, n/a):    If this is for the patients physical, did you offer to schedule next available and put on wait list, or to see NP or PA for their physical?  (yes, no, n/a):    When is the next available appointment with their provider:  July   Reason for the appointment:   leg pain   Patient preference of timeframe to be scheduled: as soon as possible   Would the patient like a call back, or a response through their [facility] portal?:  call back   Comments:   please call back to advise.

## 2022-05-10 ENCOUNTER — TELEPHONE (OUTPATIENT)
Dept: INTERNAL MEDICINE | Facility: CLINIC | Age: 87
End: 2022-05-10

## 2022-05-10 NOTE — TELEPHONE ENCOUNTER
Spoke to pt, pt stated she is having leg pain right after she took naproxen. Pt stated she is feeling very helpless and wants to speak to the doctor. Please advise.

## 2022-05-10 NOTE — TELEPHONE ENCOUNTER
Please advise spoke to pt yesterday offered her a sooner appt than what she has scheduled on 5-17-22 she declined and wanted to come in on next week.

## 2022-05-10 NOTE — TELEPHONE ENCOUNTER
----- Message from Sarah Bradford sent at 5/10/2022  8:04 AM CDT -----  Contact: [phone]  Pt is calling she states she needs to speak to the dr as soon as possible no other info given

## 2022-05-10 NOTE — TELEPHONE ENCOUNTER
----- Message from Miriam Terry sent at 5/10/2022  8:23 AM CDT -----  Contact: self [phone]  Pt requesting a call back stated she having leg pain.    Please call and advise

## 2022-05-11 ENCOUNTER — PATIENT MESSAGE (OUTPATIENT)
Dept: INTERNAL MEDICINE | Facility: CLINIC | Age: 87
End: 2022-05-11
Payer: MEDICARE

## 2022-05-12 ENCOUNTER — TELEPHONE (OUTPATIENT)
Dept: CARDIOLOGY | Facility: CLINIC | Age: 87
End: 2022-05-12
Payer: MEDICARE

## 2022-05-12 ENCOUNTER — TELEPHONE (OUTPATIENT)
Dept: INTERNAL MEDICINE | Facility: CLINIC | Age: 87
End: 2022-05-12
Payer: MEDICARE

## 2022-05-12 ENCOUNTER — OFFICE VISIT (OUTPATIENT)
Dept: INTERNAL MEDICINE | Facility: CLINIC | Age: 87
End: 2022-05-12
Payer: MEDICARE

## 2022-05-12 DIAGNOSIS — Z09 FOLLOW UP: ICD-10-CM

## 2022-05-12 DIAGNOSIS — M25.569 CHRONIC KNEE PAIN, UNSPECIFIED LATERALITY: Primary | ICD-10-CM

## 2022-05-12 DIAGNOSIS — I10 HTN (HYPERTENSION), BENIGN: ICD-10-CM

## 2022-05-12 DIAGNOSIS — K21.00 GASTROESOPHAGEAL REFLUX DISEASE WITH ESOPHAGITIS WITHOUT HEMORRHAGE: ICD-10-CM

## 2022-05-12 DIAGNOSIS — G89.29 CHRONIC KNEE PAIN, UNSPECIFIED LATERALITY: Primary | ICD-10-CM

## 2022-05-12 PROCEDURE — 99441 PR PHYSICIAN TELEPHONE EVALUATION 5-10 MIN: ICD-10-PCS | Mod: 95,,, | Performed by: NURSE PRACTITIONER

## 2022-05-12 PROCEDURE — 99441 PR PHYSICIAN TELEPHONE EVALUATION 5-10 MIN: CPT | Mod: 95,,, | Performed by: NURSE PRACTITIONER

## 2022-05-12 NOTE — TELEPHONE ENCOUNTER
Called patient and informed her of Dr Cartagena's instructions below. Pt verbalized understanding.      MD Kandice Armenta MA  Caller: Unspecified (Yesterday,  2:57 PM)  Take Co Q 10 200 mg daily and restart the half of Rosuva 7 days after starting the Co Q 10,CJL             Previous Messages       ----- Message -----   From: Kandice Cha MA   Sent: 5/11/2022   3:37 PM CDT   To: MD Dr Osiel Armenta - Mrs Peters said she is having leg cramps since she started the Rosuvastatin ( She takes half of 20mg tablet ). I told her to hold it until I heard from you. I also asked her if she takes CO Q 10 and she said no.  Thanks - Lynda     ----- Message -----   From: Suki Uribe MA   Sent: 5/11/2022   2:58 PM CDT   To: Osiel Howell the patient would like to talk to you only about her medication please call [phone]. Thank you.

## 2022-05-12 NOTE — PROGRESS NOTES
"Established Patient - Audio Only Telehealth Visit     The patient location is: HOME  The chief complaint leading to consultation is: follow up on medications taking  Visit type: Virtual visit with audio only (telephone)  Total time spent with patient:       The reason for the audio only service rather than synchronous audio and video virtual visit was related to technical difficulties or patient preference/necessity.     Each patient to whom I provide medical services by telemedicine is:  (1) informed of the relationship between the physician and patient and the respective role of any other health care provider with respect to management of the patient; and (2) notified that they may decline to receive medical services by telemedicine and may withdraw from such care at any time. Patient verbally consented to receive this service via voice-only telephone call.      HPI:   Ms Myrick is est'd with Dr. Treadwell. Last seen by her Primary care provider in March 2022, at which time, she was Rx'd Lipitor for HLP.   She saw me in April 2022 for GERD, started on Nexium and Carafate; reportedly taking and 'doing well; no nausea'. In fact, when I called her, she was eating.   Rx'd Voltaren gel for chronic knee pain which on follow up call reports that 'helping a lot"; however, today she reports that 'using the cream 3 times a day, making knee red and not certain if helping any more'. Advised to stop the gel.   No c/o chronic vaginal burning today. Recommend Vagifem recently.   She was seen by Dr. Cartagena in April 2022, he stopped the Lipitor, started her on Crestor (1/2 pill), which is taking, reportedly 'not tolerating, spoke with his nurse, advised to stop and take supplement', which she plans on getting. She has not been taking the  the HCTZ / Triam to 1/2 pill 3 times a week (with recommended follow up in 6 months) due to 'not knowing'. Confirmed that at her pharmacy. Noted the electronic submission per Dr. Cartagena. She will "  start taking and update DR. Cartagena with unfavorable reponse.       Assessment and plan:     (AS noted above)  Follow up    Chronic knee pain, unspecified laterality  -     Ambulatory referral/consult to Orthopedics; Future; Expected date: 05/19/2022  Refer to Ortho for consideration of steroid injection  ` stop the Volatren Gel applications for now    HTN (hypertension), benign  Start Hctz/ Triam     Gastroesophageal reflux disease with esophagitis without hemorrhage  Nexium an Carafate    *Have asked she updates Dr. Treadwell or this provider in 1-2 weeks.   *Have messaged my nurse to reach out to her for scheduling timely appt with Ortho.     This service was not originating from a related E/M service provided within the previous 7 days nor will  to an E/M service or procedure within the next 24 hours or my soonest available appointment.  Prevailing standard of care was able to be met in this audio-only visit.      HORACE Persaud NP

## 2022-05-12 NOTE — TELEPHONE ENCOUNTER
----- Message from Davidson Cartaegna MD sent at 5/11/2022  9:37 PM CDT -----  Take Co Q 10 200 mg daily and restart the half of Rosuva 7 days after starting the Co Q 10,CJL  ----- Message -----  From: Kandice Cha MA  Sent: 5/11/2022   3:37 PM CDT  To: MD Dr Osiel Armenta - Mrs Peters said she is having leg cramps since she started the Rosuvastatin ( She takes half of 20mg tablet ). I told her to hold it until I heard from you. I also asked her if she takes CO Q 10 and she said no.  Thanks - Lynda    ----- Message -----  From: Suki Uribe MA  Sent: 5/11/2022   2:58 PM CDT  To: Osiel Howell the patient would like to talk to you only about her medication please call [phone]. Thank you.

## 2022-05-16 ENCOUNTER — PATIENT MESSAGE (OUTPATIENT)
Dept: INTERNAL MEDICINE | Facility: CLINIC | Age: 87
End: 2022-05-16
Payer: MEDICARE

## 2022-05-17 ENCOUNTER — PATIENT MESSAGE (OUTPATIENT)
Dept: CARDIOLOGY | Facility: CLINIC | Age: 87
End: 2022-05-17
Payer: MEDICARE

## 2022-05-17 ENCOUNTER — OFFICE VISIT (OUTPATIENT)
Dept: ORTHOPEDICS | Facility: CLINIC | Age: 87
End: 2022-05-17
Payer: MEDICARE

## 2022-05-17 VITALS — WEIGHT: 132.94 LBS | BODY MASS INDEX: 22.7 KG/M2 | HEIGHT: 64 IN

## 2022-05-17 DIAGNOSIS — M25.561 RIGHT KNEE PAIN, UNSPECIFIED CHRONICITY: ICD-10-CM

## 2022-05-17 PROCEDURE — 1101F PT FALLS ASSESS-DOCD LE1/YR: CPT | Mod: CPTII,S$GLB,, | Performed by: PHYSICIAN ASSISTANT

## 2022-05-17 PROCEDURE — 1101F PR PT FALLS ASSESS DOC 0-1 FALLS W/OUT INJ PAST YR: ICD-10-PCS | Mod: CPTII,S$GLB,, | Performed by: PHYSICIAN ASSISTANT

## 2022-05-17 PROCEDURE — 1160F RVW MEDS BY RX/DR IN RCRD: CPT | Mod: CPTII,S$GLB,, | Performed by: PHYSICIAN ASSISTANT

## 2022-05-17 PROCEDURE — 99213 PR OFFICE/OUTPT VISIT, EST, LEVL III, 20-29 MIN: ICD-10-PCS | Mod: 25,S$GLB,, | Performed by: PHYSICIAN ASSISTANT

## 2022-05-17 PROCEDURE — 99213 OFFICE O/P EST LOW 20 MIN: CPT | Mod: 25,S$GLB,, | Performed by: PHYSICIAN ASSISTANT

## 2022-05-17 PROCEDURE — 1125F AMNT PAIN NOTED PAIN PRSNT: CPT | Mod: CPTII,S$GLB,, | Performed by: PHYSICIAN ASSISTANT

## 2022-05-17 PROCEDURE — 1160F PR REVIEW ALL MEDS BY PRESCRIBER/CLIN PHARMACIST DOCUMENTED: ICD-10-PCS | Mod: CPTII,S$GLB,, | Performed by: PHYSICIAN ASSISTANT

## 2022-05-17 PROCEDURE — 1159F PR MEDICATION LIST DOCUMENTED IN MEDICAL RECORD: ICD-10-PCS | Mod: CPTII,S$GLB,, | Performed by: PHYSICIAN ASSISTANT

## 2022-05-17 PROCEDURE — 20610 PR DRAIN/INJECT LARGE JOINT/BURSA: ICD-10-PCS | Mod: RT,S$GLB,, | Performed by: PHYSICIAN ASSISTANT

## 2022-05-17 PROCEDURE — 99999 PR PBB SHADOW E&M-EST. PATIENT-LVL IV: ICD-10-PCS | Mod: PBBFAC,,, | Performed by: PHYSICIAN ASSISTANT

## 2022-05-17 PROCEDURE — 1159F MED LIST DOCD IN RCRD: CPT | Mod: CPTII,S$GLB,, | Performed by: PHYSICIAN ASSISTANT

## 2022-05-17 PROCEDURE — 3288F PR FALLS RISK ASSESSMENT DOCUMENTED: ICD-10-PCS | Mod: CPTII,S$GLB,, | Performed by: PHYSICIAN ASSISTANT

## 2022-05-17 PROCEDURE — 20610 DRAIN/INJ JOINT/BURSA W/O US: CPT | Mod: RT,S$GLB,, | Performed by: PHYSICIAN ASSISTANT

## 2022-05-17 PROCEDURE — 1125F PR PAIN SEVERITY QUANTIFIED, PAIN PRESENT: ICD-10-PCS | Mod: CPTII,S$GLB,, | Performed by: PHYSICIAN ASSISTANT

## 2022-05-17 PROCEDURE — 3288F FALL RISK ASSESSMENT DOCD: CPT | Mod: CPTII,S$GLB,, | Performed by: PHYSICIAN ASSISTANT

## 2022-05-17 PROCEDURE — 99999 PR PBB SHADOW E&M-EST. PATIENT-LVL IV: CPT | Mod: PBBFAC,,, | Performed by: PHYSICIAN ASSISTANT

## 2022-05-17 RX ADMIN — TRIAMCINOLONE ACETONIDE 40 MG: 40 INJECTION, SUSPENSION INTRA-ARTICULAR; INTRAMUSCULAR at 12:05

## 2022-05-17 NOTE — PROGRESS NOTES
Subjective:      Patient ID: Elen Peters is a 89 y.o. female.    Chief Complaint: Pain of the Left Lower Leg and Pain of the Right Lower Leg    HPI  89 year old female presents with chief complaint of bilateral knee and leg pain R>L x couple of weeks. No recent trauma. She says she fell 2 years ago and injured the left knee. Pain is at the posterior knee and it radiates down her calf. It is worse with standing. Tylenol gives some relief. Voltaren gel does not help. She denies swelling, N/T, and LBP. She uses a walker.  Review of Systems   Constitutional: Negative for chills, fever and night sweats.   Cardiovascular: Negative for chest pain.   Respiratory: Negative for cough and shortness of breath.    Hematologic/Lymphatic: Does not bruise/bleed easily.   Skin: Negative for color change.   Gastrointestinal: Negative for heartburn.   Genitourinary: Negative for dysuria.   Neurological: Negative for numbness and paresthesias.   Psychiatric/Behavioral: Negative for altered mental status.   Allergic/Immunologic: Negative for persistent infections.         Objective:            General    Vitals reviewed.  Constitutional: She is oriented to person, place, and time. She appears well-developed and well-nourished.   Cardiovascular: Normal rate.    Neurological: She is alert and oriented to person, place, and time.             Bilateral Knee Exam:  ROM 0-120 degrees  No effusion  TTP medial and lateral joint line  No tenderness at the posterior knee/calf  Pain with knee flexion  2+ dp pulses      X-ray was independently reviewed by me. Mild OA change present.      Assessment:       Encounter Diagnosis   Name Primary?    Right knee pain, unspecified chronicity           Plan:       Discussed treatment options with patient. She would like to try right knee CSI. If her pain does not improve, will refer her to pain mgmt. RTC prn.     PROCEDURE:  I have explained the risks, benefits, and alternatives of the procedure in  detail.  The patient voices understanding and all questions have been answered.  The patient agrees to proceed as planned. So after I performed a sterile prep of the skin in the normal fashion the right knee is injected using a 22 gauge needle from the anterolateral approach with a combination of 4cc 1% plain lidocaine and 40 mg of kenalog.  The patient is cautioned and immediate relief of pain is secondary to the local anesthetic and will be temporary.  After the anesthetic wears off there may be a increase in pain that may last for a few hours or a few days and they should use ice to help alleviate this flair up of pain.

## 2022-05-22 RX ORDER — TRIAMCINOLONE ACETONIDE 40 MG/ML
40 INJECTION, SUSPENSION INTRA-ARTICULAR; INTRAMUSCULAR
Status: COMPLETED | OUTPATIENT
Start: 2022-05-17 | End: 2022-05-17

## 2022-06-01 ENCOUNTER — TELEPHONE (OUTPATIENT)
Dept: INTERNAL MEDICINE | Facility: CLINIC | Age: 87
End: 2022-06-01
Payer: MEDICARE

## 2022-06-01 NOTE — TELEPHONE ENCOUNTER
----- Message from Ivana Castillo sent at 6/1/2022  2:21 PM CDT -----  Regarding: appointmetn access  Contact: amanda [phone]  Caller is requesting an earlier appointment then we can schedule.  Caller is requesting a message be sent to the provider.  If this is for urgent care symptoms, did you offer other providers at this location, providers at other locations, or [facility]? (yes, no, n/a):  yes  If this is for the patients physical, did you offer to schedule next available and put on wait list, or to see NP or PA for their physical?  (yes, no, n/a):  n/a  When is the next available appointment with their provider: 7/27/2022  Reason for the appointment:  Fall  Patient preference of timeframe to be scheduled:  soon  Would the patient like a call back, or a response through their [facility] portal?:   #please call  Comments:

## 2022-06-02 ENCOUNTER — PATIENT MESSAGE (OUTPATIENT)
Dept: INTERNAL MEDICINE | Facility: CLINIC | Age: 87
End: 2022-06-02
Payer: MEDICARE

## 2022-06-03 ENCOUNTER — TELEPHONE (OUTPATIENT)
Dept: INTERNAL MEDICINE | Facility: CLINIC | Age: 87
End: 2022-06-03
Payer: MEDICARE

## 2022-06-03 ENCOUNTER — PATIENT MESSAGE (OUTPATIENT)
Dept: INTERNAL MEDICINE | Facility: CLINIC | Age: 87
End: 2022-06-03
Payer: MEDICARE

## 2022-06-03 NOTE — TELEPHONE ENCOUNTER
Keep playing telephone tag   I left another message but will also send a My O as well since the pt's son has been sending messages to us

## 2022-06-03 NOTE — TELEPHONE ENCOUNTER
----- Message from Sharmin S Wilfredo sent at 6/3/2022  1:54 PM CDT -----  Contact: Pt Home [phone]  Patient is returning a phone call.  Who left a message for the patient: Amy Kendrick MA  Does patient know what this is regarding:  A message from Amy Kendrick MA  Would you like a call back, or a response through your [facility] portal?:  Call  Comments:  Patient would like for you to give her a call back, she said that its very important.

## 2022-06-06 ENCOUNTER — TELEPHONE (OUTPATIENT)
Dept: INTERNAL MEDICINE | Facility: CLINIC | Age: 87
End: 2022-06-06
Payer: MEDICARE

## 2022-06-06 ENCOUNTER — PATIENT MESSAGE (OUTPATIENT)
Dept: INTERNAL MEDICINE | Facility: CLINIC | Age: 87
End: 2022-06-06
Payer: MEDICARE

## 2022-06-06 NOTE — TELEPHONE ENCOUNTER
----- Message from Carmen Terrell sent at 6/4/2022 11:31 AM CDT -----  Contact: pt [phone]  Patient returned your call and would like the paperwork mailed to the address on file.    Thank You

## 2022-06-06 NOTE — TELEPHONE ENCOUNTER
[facility]   Mailed it to her per her request

## 2022-06-07 ENCOUNTER — PATIENT MESSAGE (OUTPATIENT)
Dept: INTERNAL MEDICINE | Facility: CLINIC | Age: 87
End: 2022-06-07
Payer: MEDICARE

## 2022-06-14 ENCOUNTER — PROCEDURE VISIT (OUTPATIENT)
Dept: OPHTHALMOLOGY | Facility: CLINIC | Age: 87
End: 2022-06-14
Payer: MEDICARE

## 2022-06-14 DIAGNOSIS — H35.3221 EXUDATIVE AGE-RELATED MACULAR DEGENERATION OF LEFT EYE WITH ACTIVE CHOROIDAL NEOVASCULARIZATION: ICD-10-CM

## 2022-06-14 DIAGNOSIS — H35.3132 NONEXUDATIVE AGE-RELATED MACULAR DEGENERATION, BILATERAL, INTERMEDIATE DRY STAGE: ICD-10-CM

## 2022-06-14 DIAGNOSIS — H35.3231 EXUDATIVE AGE-RELATED MACULAR DEGENERATION OF BOTH EYES WITH ACTIVE CHOROIDAL NEOVASCULARIZATION: Primary | ICD-10-CM

## 2022-06-14 DIAGNOSIS — H43.813 POSTERIOR VITREOUS DETACHMENT, BILATERAL: ICD-10-CM

## 2022-06-14 DIAGNOSIS — H35.30 AMD (AGE-RELATED MACULAR DEGENERATION), BILATERAL: ICD-10-CM

## 2022-06-14 PROCEDURE — 92012 PR EYE EXAM, EST PATIENT,INTERMED: ICD-10-PCS | Mod: 25,S$GLB,, | Performed by: OPHTHALMOLOGY

## 2022-06-14 PROCEDURE — 67028 PR INJECT INTRAVITREAL PHARMCOLOGIC: ICD-10-PCS | Mod: RT,S$GLB,, | Performed by: OPHTHALMOLOGY

## 2022-06-14 PROCEDURE — 67028 INJECTION EYE DRUG: CPT | Mod: RT,S$GLB,, | Performed by: OPHTHALMOLOGY

## 2022-06-14 PROCEDURE — 92134 CPTRZ OPH DX IMG PST SGM RTA: CPT | Mod: S$GLB,,, | Performed by: OPHTHALMOLOGY

## 2022-06-14 PROCEDURE — 92012 INTRM OPH EXAM EST PATIENT: CPT | Mod: 25,S$GLB,, | Performed by: OPHTHALMOLOGY

## 2022-06-14 PROCEDURE — 92134 POSTERIOR SEGMENT OCT RETINA (OCULAR COHERENCE TOMOGRAPHY)-BOTH EYES: ICD-10-PCS | Mod: S$GLB,,, | Performed by: OPHTHALMOLOGY

## 2022-06-14 NOTE — PROGRESS NOTES
HPI     DLS 3/22/22    Patient here today with c/o decreasing VA ou x 2 weeks at near. No pain.   No f/f.    Ats ou prn    Last edited by Danii Raymond on 5/3/2022  2:37 PM. (History)                OCT - OD drusen - SRF resolved  OS - cnvm with SRF and heme - improving      A/P    1. Wet AMD OU  S/p Avastin ODx 1, OS x7  S/p Eylea OD x5, OS x 1  Increased SRF at 10 weeks  8/21 New wet disease OD  1/22 some increase in SRF at 8-9 weeks OD    Eylea OD today    Keep OD at 6 weeks  Continue obs OS    Some more cognitive decline, will try to have family with her next visit, or discuss over phone    2. Dry AMD OD    3. PVD OU    4. PCIOL OU    5. HTN Ret OU  BP control      6 weeks OCT and dilate      Risks, benefits, and alternatives to treatment discussed in detail with the patient.  The patient voiced understanding and wished to proceed with the procedure    Injection Procedure Note:  Diagnosis: Wet AMD OD    Patient Identified and Time Out complete  Pt marked  Topical Proparacaine and Betadine.  Inject Eylea OD at 6:00 @ 3.5-4mm posterior to limbus  Post Operative Dx: Same  Complications: None  Follow up as above.

## 2022-06-15 ENCOUNTER — TELEPHONE (OUTPATIENT)
Dept: INTERNAL MEDICINE | Facility: CLINIC | Age: 87
End: 2022-06-15
Payer: MEDICARE

## 2022-06-15 ENCOUNTER — PATIENT MESSAGE (OUTPATIENT)
Dept: INTERNAL MEDICINE | Facility: CLINIC | Age: 87
End: 2022-06-15
Payer: MEDICARE

## 2022-06-15 DIAGNOSIS — N30.90 CYSTITIS: Primary | ICD-10-CM

## 2022-06-15 NOTE — PROGRESS NOTES
"Established Patient - Audio Only Telehealth Visit     The patient location is: [facility]  The chief complaint leading to consultation is: (Urinary symptoms)  Visit type: Virtual visit with audio only (telephone)  Total time spent with patient: 30 mins        The reason for the audio only service rather than synchronous audio and video virtual visit was related to technical difficulties or patient preference/necessity.     Each patient to whom I provide medical services by telemedicine is:  (1) informed of the relationship between the physician and patient and the respective role of any other health care provider with respect to management of the patient; and (2) notified that they may decline to receive medical services by telemedicine and may withdraw from such care at any time. Patient verbally consented to receive this service via voice-only telephone call.    HPI:  Ms Myrick is a very pleasant lady. Recently moved in the [facility]. Her daughter in law, Gertrudis, was with her today and able to assist with the phone visit.   Her POA / Son, Tavo, reached out in regards to his mother having a "possible UTI".   Ms Myrick reports that she is experiencing 'burning' sensations with urination. No reported fever, chills or back pain.     *Gertrudis request labs for urine specimen; however, [facility] using an external lab; orders have been placed and will be fax';d; called to speak with Bonny Mao at [facility] ([phone]), spoke with some one named "Yancy", who transferred me to her voicemail, but did confirm that she is in the office today. Message left in regards to needing a return call in regards today. Will have staff fax to [facility] for the collection today.        Assessment and plan:    Acute on chronic recurrent Cystitis  Of note:   Proteus M 02/2022  Proteus M 01/2022  Coag neg staph: 07/2020    Cystitis  -     URINALYSIS; Future; Expected date: 06/16/2022  -     Urine culture; Future; Expected " date: 06/16/2022    This service was not originating from a related E/M service provided within the previous 7 days nor will  to an E/M service or procedure within the next 24 hours or my soonest available appointment.  Prevailing standard of care was able to be met in this audio-only visit.      HORACE Persaud NP

## 2022-06-15 NOTE — TELEPHONE ENCOUNTER
Message rec'd with concern for possible 'UTI'. Order placed for home collect urine and will do a phone conference on tomorrow.     CHULA

## 2022-06-16 ENCOUNTER — OFFICE VISIT (OUTPATIENT)
Dept: INTERNAL MEDICINE | Facility: CLINIC | Age: 87
End: 2022-06-16
Payer: MEDICARE

## 2022-06-16 DIAGNOSIS — N30.90 CYSTITIS: Primary | ICD-10-CM

## 2022-06-16 PROCEDURE — 99443 PR PHYSICIAN TELEPHONE EVALUATION 21-30 MIN: ICD-10-PCS | Mod: 95,,, | Performed by: NURSE PRACTITIONER

## 2022-06-16 PROCEDURE — 1160F RVW MEDS BY RX/DR IN RCRD: CPT | Mod: CPTII,95,, | Performed by: NURSE PRACTITIONER

## 2022-06-16 PROCEDURE — 1160F PR REVIEW ALL MEDS BY PRESCRIBER/CLIN PHARMACIST DOCUMENTED: ICD-10-PCS | Mod: CPTII,95,, | Performed by: NURSE PRACTITIONER

## 2022-06-16 PROCEDURE — 1159F PR MEDICATION LIST DOCUMENTED IN MEDICAL RECORD: ICD-10-PCS | Mod: CPTII,95,, | Performed by: NURSE PRACTITIONER

## 2022-06-16 PROCEDURE — 99443 PR PHYSICIAN TELEPHONE EVALUATION 21-30 MIN: CPT | Mod: 95,,, | Performed by: NURSE PRACTITIONER

## 2022-06-16 PROCEDURE — 1159F MED LIST DOCD IN RCRD: CPT | Mod: CPTII,95,, | Performed by: NURSE PRACTITIONER

## 2022-06-16 RX ORDER — ESOMEPRAZOLE MAGNESIUM 40 MG/1
40 CAPSULE, DELAYED RELEASE ORAL
Qty: 90 CAPSULE | Refills: 3 | Status: SHIPPED | OUTPATIENT
Start: 2022-06-16 | End: 2022-06-20 | Stop reason: SDUPTHER

## 2022-06-16 NOTE — TELEPHONE ENCOUNTER
----- Message from Ivana Castillo sent at 6/16/2022  1:59 PM CDT -----  Regarding: returning zenon  Contact: Bonny at the [facility] [phone]  Patient is returning a phone call.  Who left a message for the patient: Ramez Persaud   Does patient know what this is regarding:  patient   Would you like a call back, or a response through your [facility] portal?:   please call  Comments:

## 2022-06-17 ENCOUNTER — TELEPHONE (OUTPATIENT)
Dept: INTERNAL MEDICINE | Facility: CLINIC | Age: 87
End: 2022-06-17
Payer: MEDICARE

## 2022-06-17 RX ORDER — CIPROFLOXACIN 500 MG/1
500 TABLET ORAL EVERY 12 HOURS
Qty: 14 TABLET | Refills: 0 | Status: SHIPPED | OUTPATIENT
Start: 2022-06-17 | End: 2022-07-14

## 2022-06-17 NOTE — TELEPHONE ENCOUNTER
----- Message from CHAVO Arango sent at 6/17/2022  6:26 AM CDT -----  Regarding: Antibiotic  Please let Gertrudis and Bonny ([facility] [place] nurse) know that I have sent in an antibiotic for Ms. Myrick to start taking, as long as the urine has been collected. May warrant a possible change upon review of the cultures, but did not want to leave her uncovered pending results that may take another 2 days or so.     Thanks.

## 2022-06-17 NOTE — TELEPHONE ENCOUNTER
Spoke with doe at the [facility] and advise of pt medication order by provider. Doe verbally u nderstood and will let pt daughter know that a medication has been sent to the pharmacy

## 2022-06-17 NOTE — TELEPHONE ENCOUNTER
Not taking the Celexa consistently. Have sent in Cipro at this time.   Cultures are pending and may potentially warrant a change.     SDJ

## 2022-06-20 ENCOUNTER — PATIENT MESSAGE (OUTPATIENT)
Dept: INTERNAL MEDICINE | Facility: CLINIC | Age: 87
End: 2022-06-20
Payer: MEDICARE

## 2022-06-20 DIAGNOSIS — I10 HTN (HYPERTENSION), BENIGN: ICD-10-CM

## 2022-06-20 DIAGNOSIS — R26.81 GAIT INSTABILITY: Primary | ICD-10-CM

## 2022-06-20 DIAGNOSIS — E78.2 MIXED HYPERLIPIDEMIA: ICD-10-CM

## 2022-06-20 RX ORDER — ESOMEPRAZOLE MAGNESIUM 40 MG/1
40 CAPSULE, DELAYED RELEASE ORAL
Qty: 90 CAPSULE | Refills: 3 | Status: SHIPPED | OUTPATIENT
Start: 2022-06-20 | End: 2022-06-23

## 2022-06-20 NOTE — TELEPHONE ENCOUNTER
Spoke to doe at the [facility]. She stated family was given the lab oder for pt. She is not aware of where the labs were dropped off. I will contact the family. Doe said to let her know if we need to send in another urine order.

## 2022-06-20 NOTE — TELEPHONE ENCOUNTER
Spoke to lab personal at #[id number], she stated pt doesn't have an order place at all.. not sure what is the issue, I am able to see order place by JEANNE Mantilla. And order was faxed to Bonny at the [facility] for pt's family to be able to drop off urine sample.

## 2022-06-20 NOTE — TELEPHONE ENCOUNTER
Spoke to miquel, pt's daughter in law and she stated labs were dropped off at [facility] drop off area. She was told by an employee to place sample in the basket. I spoke to lab and they have no trace of it, and they stated they probably threw it out if it was after hours.. please advice.

## 2022-06-20 NOTE — TELEPHONE ENCOUNTER
No new care gaps identified.  [facility] Embedded Care Gaps. Reference number: [id number]. 6/20/2022   4:37:53 PM CDT

## 2022-06-21 ENCOUNTER — TELEPHONE (OUTPATIENT)
Dept: INTERNAL MEDICINE | Facility: CLINIC | Age: 87
End: 2022-06-21
Payer: MEDICARE

## 2022-06-21 NOTE — TELEPHONE ENCOUNTER
----- Message from CHAVO Arango sent at 6/20/2022  5:23 PM CDT -----  Attention Dr. Treadwell's MA:   Please reach out to Bonny at [facility] in regards to Ms. Peters (Bonny is the Nurse).   The family is needing to coordinate some things in regards to her 'medications' that they have on their list and what we have here in the [facility]. (I have forwarded the current list of meds to the family).   I have placed Bonny's office number in the chart notes and her primary proxy at this time has been her dtr in law, Gertrudis (Mr. Vo's wife). Please reach out to Gertrudis.   I have addressed a recent 'urine' event and sent in some meds to the [facility] pharmacy.     Let me know if you have any questions or need any assistance. Happy to help.     Thanks

## 2022-06-22 ENCOUNTER — TELEPHONE (OUTPATIENT)
Dept: INTERNAL MEDICINE | Facility: CLINIC | Age: 87
End: 2022-06-22
Payer: MEDICARE

## 2022-06-22 PROCEDURE — G0180 MD CERTIFICATION HHA PATIENT: HCPCS | Mod: ,,, | Performed by: INTERNAL MEDICINE

## 2022-06-22 PROCEDURE — G0180 PR HOME HEALTH MD CERTIFICATION: ICD-10-PCS | Mod: ,,, | Performed by: INTERNAL MEDICINE

## 2022-06-22 NOTE — TELEPHONE ENCOUNTER
----- Message from Mickey Heard sent at 6/22/2022 12:26 PM CDT -----  Contact: [phone]  Pt said she missed a call and would like a call back.

## 2022-06-22 NOTE — TELEPHONE ENCOUNTER
Spoke to doe, doe stated new lab orders need to be placed and faxed to the [facility] at [phone]

## 2022-06-22 NOTE — TELEPHONE ENCOUNTER
Spoke to miquel, she stated she will come by tomorrow and  urine order kit to bring to pt, and then would bring it back to our lab

## 2022-06-22 NOTE — TELEPHONE ENCOUNTER
----- Message from Do Mantilla, CHAVO sent at 6/22/2022  8:54 AM CDT -----  Demetrio Rausch,   Please call Hossein and Bonny for coordinating a repeat Urine collection at the [facility] (let me know if have any questions; but ask Bonny if another order will be needed and fax'd?).     Also, reach out to Ms. Myrick and Gertrudis for getting some every 3-4 week PHONE visits scheduled with me (will keep Dr. PADILLA in the loop).  PLEASE block off for 30 minutes not the 15 minutes; have done these with her in the recent past and needs more than 15 minutes.     Thanks for the help.

## 2022-06-22 NOTE — TELEPHONE ENCOUNTER
I have spoke to miquel in regards of new lab orders she has stated she will  urine kit from [facility] at our check out area on tomorrow.

## 2022-06-22 NOTE — TELEPHONE ENCOUNTER
----- Message from Mickey Heard sent at 6/22/2022 12:26 PM CDT -----  Contact: [phone]  Pt said she missed a call and would like a call back.

## 2022-06-22 NOTE — TELEPHONE ENCOUNTER
Spoke to gertrudis and I advise of new lab orders being placed and faxed to doe at the [facility]. Gertrudis verbally understood and stated she will make sure she calls before urine sample is dropped off.

## 2022-06-23 ENCOUNTER — TELEPHONE (OUTPATIENT)
Dept: INTERNAL MEDICINE | Facility: CLINIC | Age: 87
End: 2022-06-23

## 2022-06-23 RX ORDER — PANTOPRAZOLE SODIUM 40 MG/1
40 TABLET, DELAYED RELEASE ORAL DAILY
Qty: 90 TABLET | Refills: 3 | Status: ON HOLD | OUTPATIENT
Start: 2022-06-23 | End: 2022-08-01 | Stop reason: HOSPADM

## 2022-06-23 NOTE — TELEPHONE ENCOUNTER
Patient is still with some GI discomforts on the Nexium, which she has been taking for some time. Will transition to a different PPI at this time and try the Protonix instead.     CHULA

## 2022-06-30 ENCOUNTER — CARE AT HOME (OUTPATIENT)
Dept: HOME HEALTH SERVICES | Facility: CLINIC | Age: 87
End: 2022-06-30
Payer: MEDICARE

## 2022-06-30 DIAGNOSIS — R26.81 GAIT INSTABILITY: Primary | ICD-10-CM

## 2022-06-30 DIAGNOSIS — I50.9 CONGESTIVE HEART FAILURE, UNSPECIFIED HF CHRONICITY, UNSPECIFIED HEART FAILURE TYPE: ICD-10-CM

## 2022-06-30 PROCEDURE — 99499 UNLISTED E&M SERVICE: CPT | Mod: S$GLB,,, | Performed by: NURSE PRACTITIONER

## 2022-06-30 PROCEDURE — 99349 HOME/RES VST EST MOD MDM 40: CPT | Mod: S$GLB,,, | Performed by: NURSE PRACTITIONER

## 2022-06-30 PROCEDURE — 99349 PR HOME VISIT,ESTAB PATIENT,LEVEL III: ICD-10-PCS | Mod: S$GLB,,, | Performed by: NURSE PRACTITIONER

## 2022-06-30 PROCEDURE — 99497 ADVNCD CARE PLAN 30 MIN: CPT | Mod: S$GLB,,, | Performed by: NURSE PRACTITIONER

## 2022-06-30 PROCEDURE — 99497 PR ADVNCD CARE PLAN 30 MIN: ICD-10-PCS | Mod: S$GLB,,, | Performed by: NURSE PRACTITIONER

## 2022-06-30 PROCEDURE — 99499 RISK ADDL DX/OHS AUDIT: ICD-10-PCS | Mod: S$GLB,,, | Performed by: NURSE PRACTITIONER

## 2022-07-01 NOTE — PROGRESS NOTES
[facility]    Visit Date: 2022  Encounter Provider: Anita GARCIA Chairs  PCP:  Jim Treadwell MD    Subjective:      Patient ID: Elen Peters is a 89 y.o. female.    Consult Requested By:  No ref. provider found  Reason for Consult:  Establish home care    Elen is being seen at home due to being seen at home due to physical debility that presents a taxing effort to leave the home, to mitigate high risk of hospital readmission and/or due to the limited availability of reliable or safe options for transportation to the point of access to the provider. Prior to treatment on this visit the chart was reviewed and patient verbal consent was obtained.    Chief Complaint: Follow-up      HPI  Elen Peters  is a 89 y/o F with a past medical history of HTN, GERD, CAD and Depression. Currently is ambulatory with walker and lives in [facility]. Her gait is unsteady, PT to evaluate and treat as indicated.     With this visit today patient is found in her room at the [facility]. Patient is AAOx3, able to verify his name and . Most of patients other history was received from her medical record. SHe sees Dr. Treadwell as her PCP. I will continue seeing the patient in the home as it is difficult for him to physically make multiple visits. She endorses eating x 3 small meals per day. She endorses regular BMs.     [facility] PT ordered for gait training strengthening exercise. Education completed ~ 15 minutes. Plan to follow up.     VSS. Denies fever, chest pain, shortness of breath, nausea, vomiting, diarrhea. Risks of environmental exposure to coronavirus discussed including: social distancing, hand hygiene, and limiting departures from the home for necessities only.  Reports understanding and willingness to comply.  All hospital discharge orders reviewed and being followed, all medications reconciled and reviewed, patient and family verbalized understanding. No other needs  identified at this time.     I initiated the process of advance care planning today and explained the importance of this process to the patient and family.  I introduced the concept of advance directives to the patient, as well. Then the patient received detailed information about the importance of designating a Health Care Power of  (HCPOA). She was also instructed to communicate with this person about his wishes for future healthcare, should he become sick and lose decision-making capacity. FULL CODE STATUS    I Introduced [place] form with patient/family, explaining this is the patient's wishes, and this form will be uploaded into the patient's [facility] Chart and the [place] Registry.     We spoke about ACP for 20 minutes.    Attestation: Screening criteria to assess the level of the patient's risk for infection with COVID-19 as recommended by the CDC at the time of the above documented home visit concluded appropriateness to proceed. Universal precautions were maintained at all times, including provider use of 60% alcohol gel hand  immediately prior to entry and upon departing the patient's home.    Review of Systems   Constitutional: Negative for fatigue and unexpected weight change.   HENT: Negative for congestion and trouble swallowing.    Respiratory: Negative for cough and shortness of breath.    Cardiovascular: Negative for chest pain and palpitations.   Gastrointestinal: Negative for abdominal distention, nausea and vomiting.   Genitourinary: Negative for difficulty urinating.   Musculoskeletal: Positive for gait problem.   Skin: Negative for color change and rash.   Neurological: Positive for weakness. Negative for headaches.   Psychiatric/Behavioral: Negative for agitation.       PAST HISTORY:     Past Medical History:   Diagnosis Date    Abnormal stress test 11/18/2015    Arthritis     Bladder cancer     Cataract     Coronary artery disease involving native coronary artery  1/6/2016    Depression     Exudative age-related macular degeneration of left eye with active choroidal neovascularization 10/1/2020    GERD (gastroesophageal reflux disease)     HTN (hypertension), benign 11/18/2015    Hx of bladder infections     Hyperlipemia     OP (osteoporosis)     Positive cardiac stress test 1/6/2016    Renal cancer     Syncope 1/6/2016    TMJ (dislocation of temporomandibular joint)     Upper back pain 12/1/2015    Ureteral cancer     Venous insufficiency 2017    Villous adenoma of colon 5/31/2013       Past Surgical History:   Procedure Laterality Date    APPENDECTOMY      BACK SURGERY      CATARACT EXTRACTION W/  INTRAOCULAR LENS IMPLANT Left 3/16/15    kristy    CATARACT EXTRACTION W/  INTRAOCULAR LENS IMPLANT Right 4/6/15    kristy    COLONOSCOPY  10/21/2013    CYSTOSCOPY      ESOPHAGOGASTRODUODENOSCOPY N/A 1/30/2019    Procedure: EGD (ESOPHAGOGASTRODUODENOSCOPY);  Surgeon: Diony Dey MD;  Location: [address];  Service: Endoscopy;  Laterality: N/A;    EYE SURGERY      NEPHRECTOMY      L    SPINE SURGERY      TONSILLECTOMY, ADENOIDECTOMY         Family History   Problem Relation Age of Onset    Leukemia Mother     Heart disease Mother     Heart attack Mother     Cancer Mother         leukemia    Goiter Mother     Leukemia Father     Cancer Father         leukemia    Heart disease Maternal Grandfather     No Known Problems Brother     No Known Problems Son     No Known Problems Son     No Known Problems Son     Cancer Son         throat    No Known Problems Son     No Known Problems Son     No Known Problems Maternal Aunt     No Known Problems Maternal Uncle     No Known Problems Paternal Aunt     No Known Problems Paternal Uncle     No Known Problems Maternal Grandmother     No Known Problems Paternal Grandmother     No Known Problems Paternal Grandfather     No Known Problems Sister     Amblyopia Neg Hx     Blindness Neg Hx      Cataracts Neg Hx     Diabetes Neg Hx     Glaucoma Neg Hx     Hypertension Neg Hx     Macular degeneration Neg Hx     Retinal detachment Neg Hx     Strabismus Neg Hx     Stroke Neg Hx     Thyroid disease Neg Hx     Breast cancer Neg Hx     Colon cancer Neg Hx     Esophageal cancer Neg Hx        Social History     Socioeconomic History    Marital status:    Occupational History    Occupation: retired   Tobacco Use    Smoking status: Former Smoker     Quit date:      Years since quittin.5    Smokeless tobacco: Never Used    Tobacco comment: in college   Substance and Sexual Activity    Alcohol use: No    Drug use: No    Sexual activity: Not Currently       MEDICATIONS & ALLERGIES:     Current Outpatient Medications on File Prior to Visit   Medication Sig Dispense Refill    alendronate (FOSAMAX) 70 MG tablet Take 1 tablet (70 mg total) by mouth every 7 days. 4 tablet 11    aspirin (ECOTRIN) 81 MG EC tablet Take 81 mg by mouth once daily.      AVASTIN 25 mg/mL injection       calcium-magnesium-zinc Tab Take 2 tablets by mouth once daily at 6am. 1 Tablet Oral Every day      carvediloL (COREG) 6.25 MG tablet TAKE 1 TABLET TWICE DAILY WITH MEALS  OR AS DIRECTED 180 tablet 3    ciprofloxacin HCl (CIPRO) 500 MG tablet Take 1 tablet (500 mg total) by mouth every 12 (twelve) hours. 14 tablet 0    citalopram (CELEXA) 20 MG tablet Take 1 tablet (20 mg total) by mouth once daily. 90 tablet 3    diclofenac sodium (VOLTAREN) 1 % Gel Apply 2 g topically 2 (two) times daily. 20 g 3    EYLEA 2 mg/0.05 mL Syrg       losartan (COZAAR) 50 MG tablet TAKE 1 TABLET(50 MG) BY MOUTH EVERY DAY 90 tablet 3    mupirocin (BACTROBAN) 2 % ointment Apply a pea size amount in the right nostril twice a day for 7 days. 1 Tube 0    mv-min-folic acid-lutein 0.4-250 mg-mcg Tab Take by mouth. 1 Tablet Oral Every day      nitroGLYCERIN (NITROSTAT) 0.4 MG SL tablet Place 1 tablet (0.4 mg total) under the  tongue every 5 (five) minutes as needed for Chest pain. 25 tablet 3    omega-3 fatty acids 1,000 mg Cap Take 1 capsule by mouth once daily. 2  Capsule Oral Every day      pantoprazole (PROTONIX) 40 MG tablet Take 1 tablet (40 mg total) by mouth once daily. 90 tablet 3    polyethylene glycol (GLYCOLAX) 17 gram PwPk Take 17 g by mouth once daily. 30 packet 1    rosuvastatin (CRESTOR) 20 MG tablet Take 1 tablet (20 mg total) by mouth once daily. 90 tablet 3    sucralfate (CARAFATE) 1 gram tablet TAKE 1 TABLET(1 GRAM) BY MOUTH FOUR TIMES DAILY BEFORE MEALS AND AT NIGHT 30 tablet 1    triamterene-hydrochlorothiazide 37.5-25 mg (MAXZIDE-25) 37.5-25 mg per tablet Take 1 tablet by mouth once daily. Use as directed 0.5-1 daily or every other day 90 tablet 3    zolpidem (AMBIEN) 5 MG Tab Take 1 tablet (5 mg total) by mouth nightly as needed (insomnia). 30 tablet 0     No current facility-administered medications on file prior to visit.        Review of patient's allergies indicates:   Allergen Reactions    Amlodipine Swelling    Sulfa (sulfonamide antibiotics)      Unknown as child    Codeine      Other reaction(s): Unknown    Maxzide [triamterene-hydrochlorothiazid]     Sulfamethoxazole-trimethoprim          Assessments:  · Environmental: Lives at [facility]  · Functional Status: Min asst with ADLs, walker for mobility   · Safety: Fall precautions  · Nutritional: Heart healty  · Home Health/DME/Supplies: walker    Objective:   Physical Exam  HENT:      Head: Atraumatic.   Cardiovascular:      Rate and Rhythm: Normal rate.   Pulmonary:      Effort: Pulmonary effort is normal.   Skin:     Capillary Refill: Capillary refill takes less than 2 seconds.   Neurological:      Mental Status: She is alert. Mental status is at baseline.   Psychiatric:         Mood and Affect: Mood normal.         Vitals:    06/30/22 1720   Pulse: 67   Resp: 18   Temp: 97.3 °F (36.3 °C)   SpO2: 97%   PainSc: 0-No pain     There is no height or  weight on file to calculate BMI.    Assessment:     1. Gait instability    2. Congestive heart failure, unspecified HF chronicity, unspecified heart failure type        Plan:     Ethical / Legal: Advance Care Planning   · Surrogate decision maker:  Name Segundo Peters, Relationship: son  · Code Status:  Full code  · [place]:  TBD  Other advance directive:  TBD     1. Gait instability  -     SUBSEQUENT HOME HEALTH ORDERS  PT to evaluate and treat as indicated    2. Congestive heart failure, unspecified HF chronicity, unspecified heart failure type  The current medical regimen is effective;  continue present plan and medications.          Were controlled substances prescribed?  No    Patient Instructions Given:  - Continue all medications, treatments and therapies as ordered.   - Follow all instructions, recommendations as discussed.  - Maintain Safety Precautions at all times.  - Attend all medical appointments as scheduled.  - For worsening symptoms: call Primary Care Physician or Nurse Practitioner.  - For emergencies, call 911 or immediately report to the nearest emergency room.    After Visit Medication List :     Medication List          Accurate as of June 30, 2022 11:59 PM. If you have any questions, ask your nurse or doctor.            CONTINUE taking these medications    alendronate 70 MG tablet  Commonly known as: FOSAMAX  Take 1 tablet (70 mg total) by mouth every 7 days.     aspirin 81 MG EC tablet  Commonly known as: ECOTRIN     AVASTIN 25 mg/mL injection  Generic drug: bevacizumab     calcium-magnesium-zinc Tab     carvediloL 6.25 MG tablet  Commonly known as: COREG  TAKE 1 TABLET TWICE DAILY WITH MEALS  OR AS DIRECTED     ciprofloxacin HCl 500 MG tablet  Commonly known as: CIPRO  Take 1 tablet (500 mg total) by mouth every 12 (twelve) hours.     citalopram 20 MG tablet  Commonly known as: CeleXA  Take 1 tablet (20 mg total) by mouth once daily.     diclofenac sodium 1 % Gel  Commonly known as:  VOLTAREN  Apply 2 g topically 2 (two) times daily.     EYLEA 2 mg/0.05 mL Syrg  Generic drug: aflibercept     losartan 50 MG tablet  Commonly known as: COZAAR  TAKE 1 TABLET(50 MG) BY MOUTH EVERY DAY     mupirocin 2 % ointment  Commonly known as: BACTROBAN  Apply a pea size amount in the right nostril twice a day for 7 days.     mv-min-folic acid-lutein 0.4-250 mg-mcg Tab     nitroGLYCERIN 0.4 MG SL tablet  Commonly known as: NITROSTAT  Place 1 tablet (0.4 mg total) under the tongue every 5 (five) minutes as needed for Chest pain.     omega-3 fatty acids 1,000 mg Cap     ondansetron 4 MG tablet  Commonly known as: ZOFRAN  Take 1 tablet (4 mg total) by mouth every 8 (eight) hours as needed for Nausea.     pantoprazole 40 MG tablet  Commonly known as: PROTONIX  Take 1 tablet (40 mg total) by mouth once daily.     polyethylene glycol 17 gram Pwpk  Commonly known as: GLYCOLAX  Take 17 g by mouth once daily.     rosuvastatin 20 MG tablet  Commonly known as: CRESTOR  Take 1 tablet (20 mg total) by mouth once daily.     sucralfate 1 gram tablet  Commonly known as: CARAFATE  TAKE 1 TABLET(1 GRAM) BY MOUTH FOUR TIMES DAILY BEFORE MEALS AND AT NIGHT     triamterene-hydrochlorothiazide 37.5-25 mg 37.5-25 mg per tablet  Commonly known as: MAXZIDE-25  Take 1 tablet by mouth once daily. Use as directed 0.5-1 daily or every other day     zolpidem 5 MG Tab  Commonly known as: AMBIEN  Take 1 tablet (5 mg total) by mouth nightly as needed (insomnia).          Future Appointments   Date Time Provider Department Center   7/26/2022  8:50 AM MONIE Hanna MD [facility] [place]   7/27/2022  1:30 PM Jim Treadwell MD [facility] [place] [facility]   10/26/2022 10:00 AM LAB, [facility] [place]     Risks of environmental exposure to coronavirus discussed including: social distancing, hand hygiene, and limiting departures from the home for necessities only. Reports understanding and willingness to comply.     Signature:    Anita Mahan,  MSN, APRN, FNP-C  [facility]

## 2022-07-13 ENCOUNTER — HOSPITAL ENCOUNTER (EMERGENCY)
Facility: HOSPITAL | Age: 87
Discharge: HOME | End: 2022-07-13
Attending: EMERGENCY MEDICINE
Payer: MEDICARE

## 2022-07-13 ENCOUNTER — PATIENT MESSAGE (OUTPATIENT)
Dept: INTERNAL MEDICINE | Facility: CLINIC | Age: 87
End: 2022-07-13
Payer: MEDICARE

## 2022-07-13 VITALS
DIASTOLIC BLOOD PRESSURE: 62 MMHG | OXYGEN SATURATION: 97 % | TEMPERATURE: 99 F | HEART RATE: 73 BPM | WEIGHT: 132.94 LBS | SYSTOLIC BLOOD PRESSURE: 131 MMHG | RESPIRATION RATE: 18 BRPM | BODY MASS INDEX: 22.82 KG/M2

## 2022-07-13 VITALS — OXYGEN SATURATION: 97 % | TEMPERATURE: 97 F | HEART RATE: 67 BPM | RESPIRATION RATE: 18 BRPM

## 2022-07-13 DIAGNOSIS — R51.9 NONINTRACTABLE HEADACHE, UNSPECIFIED CHRONICITY PATTERN, UNSPECIFIED HEADACHE TYPE: ICD-10-CM

## 2022-07-13 DIAGNOSIS — E87.1 HYPONATREMIA: ICD-10-CM

## 2022-07-13 DIAGNOSIS — R07.9 CHEST PAIN: ICD-10-CM
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
## 2022-07-13 DIAGNOSIS — M54.50 LOW BACK PAIN WITHOUT SCIATICA, UNSPECIFIED BACK PAIN LATERALITY, UNSPECIFIED CHRONICITY: ICD-10-CM

## 2022-07-13 LAB
ALBUMIN SERPL BCP-MCNC: 3.4 G/DL (ref 3.5–5.2)
ALP SERPL-CCNC: 49 U/L (ref 55–135)
ALT SERPL W/O P-5'-P-CCNC: 19 U/L (ref 10–44)
ANION GAP SERPL CALC-SCNC: 8 MMOL/L (ref 8–16)
AST SERPL-CCNC: 19 U/L (ref 10–40)
BASOPHILS # BLD AUTO: 0.02 K/UL (ref 0–0.2)
BASOPHILS NFR BLD: 0.2 % (ref 0–1.9)
BILIRUB SERPL-MCNC: 0.9 MG/DL (ref 0.1–1)
BILIRUB UR QL STRIP: NEGATIVE
BNP SERPL-MCNC: 252 PG/ML (ref 0–99)
BUN SERPL-MCNC: 9 MG/DL (ref 10–30)
CALCIUM SERPL-MCNC: 9.4 MG/DL (ref 8.7–10.5)
CHLORIDE SERPL-SCNC: 90 MMOL/L (ref 95–110)
CLARITY UR REFRACT.AUTO: CLEAR
CO2 SERPL-SCNC: 27 MMOL/L (ref 23–29)
COLOR UR AUTO: YELLOW
CREAT SERPL-MCNC: 0.7 MG/DL (ref 0.5–1.4)
CTP QC/QA: YES
DIFFERENTIAL METHOD: ABNORMAL
EOSINOPHIL # BLD AUTO: 0 K/UL (ref 0–0.5)
EOSINOPHIL NFR BLD: 0.2 % (ref 0–8)
ERYTHROCYTE [DISTWIDTH] IN BLOOD BY AUTOMATED COUNT: 12.9 % (ref 11.5–14.5)
EST. GFR  (AFRICAN AMERICAN): >60 ML/MIN/1.73 M^2
EST. GFR  (NON AFRICAN AMERICAN): >60 ML/MIN/1.73 M^2
GLUCOSE SERPL-MCNC: 118 MG/DL (ref 70–110)
GLUCOSE UR QL STRIP: NEGATIVE
HCT VFR BLD AUTO: 33.9 % (ref 37–48.5)
HGB BLD-MCNC: 11.8 G/DL (ref 12–16)
HGB UR QL STRIP: NEGATIVE
IMM GRANULOCYTES # BLD AUTO: 0.03 K/UL (ref 0–0.04)
IMM GRANULOCYTES NFR BLD AUTO: 0.3 % (ref 0–0.5)
KETONES UR QL STRIP: NEGATIVE
LEUKOCYTE ESTERASE UR QL STRIP: NEGATIVE
LYMPHOCYTES # BLD AUTO: 0.8 K/UL (ref 1–4.8)
LYMPHOCYTES NFR BLD: 8.6 % (ref 18–48)
MAGNESIUM SERPL-MCNC: 1.8 MG/DL (ref 1.6–2.6)
MCH RBC QN AUTO: 31.6 PG (ref 27–31)
MCHC RBC AUTO-ENTMCNC: 34.8 G/DL (ref 32–36)
MCV RBC AUTO: 91 FL (ref 82–98)
MONOCYTES # BLD AUTO: 0.7 K/UL (ref 0.3–1)
MONOCYTES NFR BLD: 6.8 % (ref 4–15)
NEUTROPHILS # BLD AUTO: 8.1 K/UL (ref 1.8–7.7)
NEUTROPHILS NFR BLD: 83.9 % (ref 38–73)
NITRITE UR QL STRIP: NEGATIVE
NRBC BLD-RTO: 0 /100 WBC
PH UR STRIP: 8 [PH] (ref 5–8)
PHOSPHATE SERPL-MCNC: 2.8 MG/DL (ref 2.7–4.5)
PLATELET # BLD AUTO: 237 K/UL (ref 150–450)
PMV BLD AUTO: 9.6 FL (ref 9.2–12.9)
POTASSIUM SERPL-SCNC: 3.8 MMOL/L (ref 3.5–5.1)
PROT SERPL-MCNC: 6 G/DL (ref 6–8.4)
PROT UR QL STRIP: NEGATIVE
RBC # BLD AUTO: 3.74 M/UL (ref 4–5.4)
SARS-COV-2 RDRP RESP QL NAA+PROBE: NEGATIVE
SODIUM SERPL-SCNC: 125 MMOL/L (ref 136–145)
SP GR UR STRIP: 1.01 (ref 1–1.03)
TROPONIN I SERPL DL<=0.01 NG/ML-MCNC: 0.01 NG/ML (ref 0–0.03)
TROPONIN I SERPL DL<=0.01 NG/ML-MCNC: <0.006 NG/ML (ref 0–0.03)
TSH SERPL DL<=0.005 MIU/L-ACNC: 0.77 UIU/ML (ref 0.4–4)
URN SPEC COLLECT METH UR: NORMAL
WBC # BLD AUTO: 9.59 K/UL (ref 3.9–12.7)

## 2022-07-13 PROCEDURE — 81003 URINALYSIS AUTO W/O SCOPE: CPT | Performed by: STUDENT IN AN ORGANIZED HEALTH CARE EDUCATION/TRAINING PROGRAM

## 2022-07-13 PROCEDURE — 99285 EMERGENCY DEPT VISIT HI MDM: CPT | Mod: CS,,, | Performed by: EMERGENCY MEDICINE

## 2022-07-13 PROCEDURE — 83880 ASSAY OF NATRIURETIC PEPTIDE: CPT | Performed by: STUDENT IN AN ORGANIZED HEALTH CARE EDUCATION/TRAINING PROGRAM

## 2022-07-13 PROCEDURE — 85025 COMPLETE CBC W/AUTO DIFF WBC: CPT | Performed by: STUDENT IN AN ORGANIZED HEALTH CARE EDUCATION/TRAINING PROGRAM

## 2022-07-13 PROCEDURE — 93005 ELECTROCARDIOGRAM TRACING: CPT

## 2022-07-13 PROCEDURE — 84443 ASSAY THYROID STIM HORMONE: CPT | Performed by: STUDENT IN AN ORGANIZED HEALTH CARE EDUCATION/TRAINING PROGRAM

## 2022-07-13 PROCEDURE — 84484 ASSAY OF TROPONIN QUANT: CPT | Mod: 91 | Performed by: STUDENT IN AN ORGANIZED HEALTH CARE EDUCATION/TRAINING PROGRAM

## 2022-07-13 PROCEDURE — 99285 EMERGENCY DEPT VISIT HI MDM: CPT | Mod: 25

## 2022-07-13 PROCEDURE — 93010 EKG 12-LEAD: ICD-10-PCS | Mod: ,,, | Performed by: INTERNAL MEDICINE

## 2022-07-13 PROCEDURE — 99285 PR EMERGENCY DEPT VISIT,LEVEL V: ICD-10-PCS | Mod: CS,,, | Performed by: EMERGENCY MEDICINE

## 2022-07-13 PROCEDURE — 83735 ASSAY OF MAGNESIUM: CPT | Performed by: STUDENT IN AN ORGANIZED HEALTH CARE EDUCATION/TRAINING PROGRAM

## 2022-07-13 PROCEDURE — 93010 ELECTROCARDIOGRAM REPORT: CPT | Mod: ,,, | Performed by: INTERNAL MEDICINE

## 2022-07-13 PROCEDURE — 84100 ASSAY OF PHOSPHORUS: CPT | Performed by: STUDENT IN AN ORGANIZED HEALTH CARE EDUCATION/TRAINING PROGRAM

## 2022-07-13 PROCEDURE — 80053 COMPREHEN METABOLIC PANEL: CPT | Performed by: STUDENT IN AN ORGANIZED HEALTH CARE EDUCATION/TRAINING PROGRAM

## 2022-07-13 PROCEDURE — U0002 COVID-19 LAB TEST NON-CDC: HCPCS | Performed by: STUDENT IN AN ORGANIZED HEALTH CARE EDUCATION/TRAINING PROGRAM

## 2022-07-13 PROCEDURE — 25000003 PHARM REV CODE 250: Performed by: STUDENT IN AN ORGANIZED HEALTH CARE EDUCATION/TRAINING PROGRAM

## 2022-07-13 RX ORDER — ACETAMINOPHEN 500 MG
1000 TABLET ORAL
Status: COMPLETED | OUTPATIENT
Start: 2022-07-13 | End: 2022-07-13

## 2022-07-13 RX ADMIN — ACETAMINOPHEN 1000 MG: 500 TABLET ORAL at 11:07

## 2022-07-13 NOTE — ED TRIAGE NOTES
Patient is a 89 year old female that presents to the ED via EMS with c/o an unwitnessed fall. Pt reports hitting her head and endorses chest pain. She is not on blood thinners. Not obvious trauma noted other then bruising to her upper extremities.

## 2022-07-13 NOTE — DISCHARGE INSTRUCTIONS
Thank you for visiting us Today!    Please follow up with your primary care physician concerning your symptoms.  Please ensure to review the results of your CT scan with your primary care physician.

## 2022-07-13 NOTE — ED PROVIDER NOTES
Encounter Date: 7/13/2022       History     Chief Complaint   Patient presents with    Fall     Resident at [facility], [place], operated by [facility] living [facility]. Unwitnessed fall last night, hematoma to back of head. C/O back pain. No blood thinners.      Ms. Peters is a 89-year-old female past medical history of hypertension, GERD, coronary artery disease and depression who presents to the emergency department due to fall.  Patient states that she had gotten up to go the bathroom last night she gotten out of her wheelchair when she slipped falling and hitting her head on the ground she states that she needed help getting up and since then she has been having pain at the back of her head as well as her lower back patient also states that this morning she was having tightness in her chest and was also feeling short of breath.  She denies fever, chills, nausea, vomiting or abdominal pain.   She denies any weakness in her hands and feet.  She denies any trouble urinating.         Review of patient's allergies indicates:   Allergen Reactions    Amlodipine Swelling    Sulfa (sulfonamide antibiotics)      Unknown as child    Codeine      Other reaction(s): Unknown    Maxzide [triamterene-hydrochlorothiazid]     Sulfamethoxazole-trimethoprim      Past Medical History:   Diagnosis Date    Abnormal stress test 11/18/2015    Arthritis     Bladder cancer     Cataract     Coronary artery disease involving native coronary artery 1/6/2016    Depression     Exudative age-related macular degeneration of left eye with active choroidal neovascularization 10/1/2020    GERD (gastroesophageal reflux disease)     HTN (hypertension), benign 11/18/2015    Hx of bladder infections     Hyperlipemia     OP (osteoporosis)     Positive cardiac stress test 1/6/2016    Renal cancer     Syncope 1/6/2016    TMJ (dislocation of temporomandibular joint)     Upper back pain 12/1/2015    Ureteral cancer     Venous insufficiency Gus Jay  adenoma of colon 2013     Past Surgical History:   Procedure Laterality Date    APPENDECTOMY      BACK SURGERY      CATARACT EXTRACTION W/  INTRAOCULAR LENS IMPLANT Left 3/16/15    kristy    CATARACT EXTRACTION W/  INTRAOCULAR LENS IMPLANT Right 4/6/15    kristy    COLONOSCOPY  10/21/2013    CYSTOSCOPY      ESOPHAGOGASTRODUODENOSCOPY N/A 2019    Procedure: EGD (ESOPHAGOGASTRODUODENOSCOPY);  Surgeon: Diony Dey MD;  Location: [address];  Service: Endoscopy;  Laterality: N/A;    EYE SURGERY      NEPHRECTOMY      L    SPINE SURGERY      TONSILLECTOMY, ADENOIDECTOMY       Family History   Problem Relation Age of Onset    Leukemia Mother     Heart disease Mother     Heart attack Mother     Cancer Mother         leukemia    Goiter Mother     Leukemia Father     Cancer Father         leukemia    Heart disease Maternal Grandfather     No Known Problems Brother     No Known Problems Son     No Known Problems Son     No Known Problems Son     Cancer Son         throat    No Known Problems Son     No Known Problems Son     No Known Problems Maternal Aunt     No Known Problems Maternal Uncle     No Known Problems Paternal Aunt     No Known Problems Paternal Uncle     No Known Problems Maternal Grandmother     No Known Problems Paternal Grandmother     No Known Problems Paternal Grandfather     No Known Problems Sister     Amblyopia Neg Hx     Blindness Neg Hx     Cataracts Neg Hx     Diabetes Neg Hx     Glaucoma Neg Hx     Hypertension Neg Hx     Macular degeneration Neg Hx     Retinal detachment Neg Hx     Strabismus Neg Hx     Stroke Neg Hx     Thyroid disease Neg Hx     Breast cancer Neg Hx     Colon cancer Neg Hx     Esophageal cancer Neg Hx      Social History     Tobacco Use    Smoking status: Former Smoker     Quit date:      Years since quittin.5    Smokeless tobacco: Never Used    Tobacco comment: in college   Substance Use Topics     Alcohol use: No    Drug use: No     Review of Systems   Constitutional: Negative for activity change, appetite change, chills, fatigue and fever.   HENT: Negative for congestion, ear discharge, ear pain, postnasal drip, rhinorrhea, sinus pressure, sinus pain and trouble swallowing.    Eyes: Negative for photophobia, pain and visual disturbance.   Respiratory: Negative for cough, chest tightness, shortness of breath and wheezing.    Cardiovascular: Negative for chest pain, palpitations and leg swelling.   Gastrointestinal: Negative for abdominal pain, constipation, diarrhea, nausea and vomiting.   Genitourinary: Negative for difficulty urinating, flank pain, hematuria, pelvic pain and urgency.   Musculoskeletal: Positive for joint swelling and myalgias. Negative for arthralgias and back pain.   Skin: Negative for color change and wound.   Neurological: Negative for dizziness, weakness, light-headedness, numbness and headaches.   Psychiatric/Behavioral: Negative for agitation and confusion.       Physical Exam     Initial Vitals [07/13/22 0834]   BP Pulse Resp Temp SpO2   136/66 75 16 98.5 °F (36.9 °C) 98 %      MAP       --         Physical Exam    Nursing note and vitals reviewed.  Constitutional: She appears well-developed and well-nourished. She is not diaphoretic. No distress.   HENT:   Head: Normocephalic and atraumatic.   Mouth/Throat: Oropharynx is clear and moist.   Eyes: EOM are normal. Pupils are equal, round, and reactive to light. Right eye exhibits no discharge. Left eye exhibits no discharge.   Neck: No tracheal deviation present. No JVD present.   Normal range of motion.  Cardiovascular: Normal rate, normal heart sounds and intact distal pulses. Exam reveals no gallop.    No murmur heard.  Pulmonary/Chest: No respiratory distress. She has no wheezes. She has no rales. She exhibits no tenderness.   Abdominal: Abdomen is soft. Bowel sounds are normal. She exhibits no distension. There is no abdominal  tenderness. There is no guarding.   Musculoskeletal:         General: Tenderness present. No edema. Normal range of motion.      Cervical back: Normal range of motion.      Comments: Lumbar spinal tenderness        Neurological: She is alert and oriented to person, place, and time. She has normal strength.   Skin: Skin is warm. Capillary refill takes less than 2 seconds. No erythema.         ED Course   Procedures  Labs Reviewed   CBC W/ AUTO DIFFERENTIAL - Abnormal; Notable for the following components:       Result Value    RBC 3.74 (*)     Hemoglobin 11.8 (*)     Hematocrit 33.9 (*)     MCH 31.6 (*)     Gran # (ANC) 8.1 (*)     Lymph # 0.8 (*)     Gran % 83.9 (*)     Lymph % 8.6 (*)     All other components within normal limits   COMPREHENSIVE METABOLIC PANEL - Abnormal; Notable for the following components:    Sodium 125 (*)     Chloride 90 (*)     Glucose 118 (*)     BUN 9 (*)     Albumin 3.4 (*)     Alkaline Phosphatase 49 (*)     All other components within normal limits   B-TYPE NATRIURETIC PEPTIDE - Abnormal; Notable for the following components:     (*)     All other components within normal limits   POCT GLUCOSE - Abnormal; Notable for the following components:    POCT Glucose 112 (*)     All other components within normal limits   SARS-COV-2 RDRP GENE - Normal    Narrative:     This test utilizes isothermal nucleic acid amplification   technology to detect the SARS-CoV-2 RdRp nucleic acid segment.   The analytical sensitivity (limit of detection) is 125 genome   equivalents/mL.   A POSITIVE result implies infection with the SARS-CoV-2 virus;   the patient is presumed to be contagious.     A NEGATIVE result means that SARS-CoV-2 nucleic acids are not   present above the limit of detection. A NEGATIVE result should be   treated as presumptive. It does not rule out the possibility of   COVID-19 and should not be the sole basis for treatment decisions.   If COVID-19 is strongly suspected based on  clinical and exposure   history, re-testing using an alternate molecular assay should be   considered.   This test is only for use under the Food and Drug   Administration s Emergency Use Authorization (EUA).   Commercial kits are provided by [employer].   Performance characteristics of the EUA have been independently   verified by [facility] Department of   Pathology and Laboratory Medicine.   _________________________________________________________________   The authorized Fact Sheet for Healthcare Providers and the authorized Fact   Sheet for Patients of the ID NOW COVID-19 are available on the FDA   website:     https://www.fda.gov/media/924730/download  https://www.fda.gov/media/782572/download          URINALYSIS, REFLEX TO URINE CULTURE    Narrative:     Specimen Source->Urine   TSH   TROPONIN I   MAGNESIUM   PHOSPHORUS   TROPONIN I     EKG Readings: (Independently Interpreted)   Initial Reading: No STEMI. Previous EKG: Compared with most recent EKG Rhythm: Sinus Arrhythmia. Heart Rate: 69. ST Segment Depression: II, III, AVF, V4, V5 and V6. T Waves: Normal. Axis: Left Axis Deviation.     ECG Results          EKG 12-lead (Final result)  Result time 07/13/22 09:07:11    Final result by Interface, Lab In [facility] (07/13/22 09:07:11)                 Narrative:    Test Reason : R07.9,    Vent. Rate : 069 BPM     Atrial Rate : 069 BPM     P-R Int : 180 ms          QRS Dur : 108 ms      QT Int : 428 ms       P-R-T Axes : 075 -17 270 degrees     QTc Int : 458 ms    Sinus rhythm with marked sinus arrythmia  Moderate voltage criteria for LVH, may be normal variant  ST and T wave abnormality, consider inferolateral ischemia  Abnormal ECG  When compared with ECG of 26-APR-2022 09:54,  T wave inversion more evident in Inferior leads  Inverted T waves have replaced nonspecific T wave abnormality in Lateral  leads  Confirmed by Davidson DIETZ MD ([id number]) on 7/13/2022 9:06:58 AM    Referred By: System System            Confirmed By:Davidson DIETZ MD                            Imaging Results          X-Ray Chest AP Portable (Final result)  Result time 07/13/22 11:07:32    Final result by Brayan Lynch MD (07/13/22 11:07:32)                 Impression:      No significant detrimental interval change in the appearance of the chest since 06/28/2020 is appreciated.      Electronically signed by: Brayan Lynch MD  Date:    07/13/2022  Time:    11:07             Narrative:    EXAMINATION:  XR CHEST AP PORTABLE    CLINICAL HISTORY:  Fall;    COMPARISON:  Comparison is made to the most recent prior chest radiograph, dated 06/28/2020.    FINDINGS:  Heart size and the appearance of the cardiomediastinal silhouette demonstrate no detrimental change since the examination referenced above.  Pulmonary vascularity is normal.  Accentuated interstitial markings are again noted in both lungs, but the lung zones are stable since the prior exam and are free of significant airspace consolidation or volume loss.  No pleural fluid of any substantial volume is seen on either side.  No abnormal mediastinal widening.  No pneumothorax or pneumomediastinum.                               CT Lumbar Spine Without Contrast (Final result)  Result time 07/13/22 10:27:08    Final result by Andi Spain MD (07/13/22 10:27:08)                 Impression:      1. No definite evidence of acute fracture or traumatic subluxation.  2. Details of degenerative findings, as discussed.  3. Additional details, as provided in the body of report.      Electronically signed by: Andi Spain  Date:    07/13/2022  Time:    10:27             Narrative:    EXAMINATION:  CT LUMBAR SPINE WITHOUT CONTRAST    CLINICAL HISTORY:  fall;    TECHNIQUE:  Low-dose axial, sagittal and coronal reformations are obtained through the lumbar spine.  Contrast was not administered.    COMPARISON:  Lumbar spine radiograph 07/16/2010    FINDINGS:  Additional comment: Possible transitional  lumbosacral anatomy.  For the purposes of this report, the L5-S1 disc levels considered axial image 299.  Confirmation of level numbering prior to any spine intervention would be recommended.      Fractures: No acute lumbar spine fracture is present.    Alignment: There is no significant vertebral subluxation.    Discs: Degenerative disc disease.    Vertebral bodies: Osseous demineralization.  Degenerative endplate change.  Benign-appearing sclerotic lesions and right iliac bone.    Paraspinal soft tissues: Unremarkable.    Although CT is inferior to MRI in the evaluation of spinal canal and foraminal soft tissues, level-wise findings are as follows:    L1-L2: There is no significant spinal canal or foraminal stenosis.    L2-L3: Diffuse disc bulge and facet arthropathy contribute to mild moderate central spinal canal stenosis.  Mild bilateral foraminal narrowing    L3-L4: Diffuse disc bulge and facet arthropathy contribute to mild-to-moderate central spinal canal stenosis.  Mild bilateral foraminal narrowing.    L4-L5: Diffuse disc bulge and advanced facet arthropathy with ligamentum flavum thickening contribute to moderate severe central spinal canal stenosis.  Moderate bilateral foraminal narrowing.    L5-S1: Shallow diffuse disc bulge and advanced facet arthropathy with ligamentum flavum thickening.  Mild ventral thecal sac deformity suggested.  No significant canal or foraminal narrowing.    Imaged sacroiliac joints: Degenerative changes.    Imaged abdomen, pelvis, and retroperitoneum: Atherosclerosis.  Status post left nephrectomy.  When compared to prior CT imaging of 03/30/2019.  Suggested slight interval decrease in size of peripherally calcified lobulated hypoattenuating lesion in the left retroperitoneum deep to the anticipated nephrectomies site, which could indicate lymphocele.  Hiatal hernia.                               CT Cervical Spine Without Contrast (Final result)  Result time 07/13/22 10:15:16     Final result by Andi Spain MD (07/13/22 10:15:16)                 Impression:      1. No evidence of acute cervical spine fracture.  2. Moderate compression fracture T5 vertebra, likely not substantially changed relative to prior thoracic spine radiographs performed 07/26/2021, allowing for differences in technique/modality.  3. Degenerative findings, as discussed.  4. Several solid pulmonary nodules measuring up to 3 mm.  For multiple solid nodules all <6 mm, Fleischner Society 2017 guidelines recommend no routine follow up for a low risk patient, or follow up with non-contrast chest CT at 12 months after discovery in a high risk patient.      Electronically signed by: Andi Spain  Date:    07/13/2022  Time:    10:15             Narrative:    EXAMINATION:  CT CERVICAL SPINE WITHOUT CONTRAST    CLINICAL HISTORY:  Neck trauma (Age >= 65y);    TECHNIQUE:  Low dose axial images, sagittal and coronal reformations were performed though the cervical spine.  Contrast was not administered.    COMPARISON:  CT cervical spine performed 01/04/2019.    FINDINGS:  Fractures: No definite acute cervical spine fracture.  Moderate compression fracture T5 vertebra, likely not substantially changed configuration relative to prior thoracic spine radiograph performed 07/26/2021.    Alignment: There is no significant vertebral subluxation  Atlanto-axial and atlanto-occipital joints: Atlanto-axial and atlanto-occipital intervals are not widened.  Facet joints: There is no traumatic facet joint widening.Degenerate facet arthropathy.  Ankylosis of left C2-3 facet joint.  Vertebral bodies: Osseous demineralization.  Degenerative plate change.  Questioned intraosseous hemangioma/benign vasoformative lesion T2 vertebra.  Discs: Degenerative disc disease.  Spinal canal and foraminal narrowing: Although CT does not optimally evaluate the soft tissue contents of the spinal canal and foramina, no critical stenosis is suggested.    At  C3-4, suggested moderate left foraminal narrowing.    And C5-6, suggested mild-to-moderate right foraminal narrowing.      Paraspinal soft tissues: Unremarkable.    Upper Lungs:Assessment of the lungs is limited due to respiratory motion.  Biapical pleuroparenchymal scarring noted.  Smooth interlobular septal thickening could indicate pulmonary vascular congestion/edema.  Several small sub 5 mm solid nodules are noted.  Right upper lobe 2 mm solid nodule (series 3, image 260).  Right upper lobe 3 mm solid nodule (series 3, image 264).                               CT Head Without Contrast (Final result)  Result time 07/13/22 10:07:07    Final result by Kaleb Martinez DO (07/13/22 10:07:07)                 Impression:      Small region of induration within the left posterior parietal soft tissues suggestive for subcutaneous hematoma in light of history of trauma    No acute intracranial findings specifically without evidence for acute intracranial hemorrhage or sulcal effacement to suggest large territory recent infarction    Clinical correlation and further evaluation as warranted.      Electronically signed by: Kaleb Martinez DO  Date:    07/13/2022  Time:    10:07             Narrative:    EXAMINATION:  CT HEAD WITHOUT CONTRAST    CLINICAL HISTORY:  Head trauma, minor (Age >= 65y);    TECHNIQUE:  Multiple sequential 5 mm axial images of the head without contrast.  Coronal and sagittal reformatted imaging from the axial acquisition.    COMPARISON:  07/26/2021    FINDINGS:  Soft tissue swelling induration overlying the left paramedian posterior parietal calvarium concerning for subcutaneous hematoma without underlying calvarial fracture    Small extra-axial hyperdense focus overlies the left parietal lobe at the vertex unchanged from prior most compatible with osteoma versus calcified meningioma this measures 0.5 cm    There is no evidence for acute intracranial hemorrhage or sulcal effacement.  Ill-defined patchy  and confluent hypoattenuation supratentorial white matter which is nonspecific and may be sequela of chronic ischemic change with patchy hypoattenuation in the basal ganglia greater on the right.  No new abnormal parenchymal attenuation.  Ventricles stable without hydrocephalus.  Patchy ethmoid air cell opacities most pronounced on the right posteriorly similar to prior with continued though reduced opacification right sphenoid sinus.  Probable prominent vascular channels within the calvarium again seen..                                 Medications   acetaminophen tablet 1,000 mg (1,000 mg Oral Given 7/13/22 1122)     Medical Decision Making:   History:   Old Medical Records: I decided to obtain old medical records.  Initial Assessment:   Ms. Peters is a 89-year-old female past medical history of hypertension, GERD, coronary artery disease and depression who presents to the emergency department due to fall.  Differential Diagnosis:   Intracranial hemorrhage  Ankle fracture  Tibia/fibula fracture  Rib fractures  Intra-abdominal injury  Clinical Tests:   Lab Tests: Ordered and Reviewed  Radiological Study: Ordered and Reviewed  ED Management:  Patient was examined, given her mechanism of injury I was concerned for possible trauma.  Patient was fully exposed in other to assess for any injuries.  CT of patient's head was obtained which did not show any significant findings  CT of the neck was not obtained because patient did not have any cervical spine tenderness and she had good range of motion in her neck  Patient had significant pain in her lumbar spine and so CT lumbar spine was ordered which also did not show any significant findings  Patient's lab workup did not show significant findings  She is able to ambulate while in the emergency department patient stated that she wanted to go home  Patient's oxygen saturation on room air was stable and between 96-98% with good waveform  I felt that patient was stable to be  discharged back to her facility so she was discharged.                 Attending Attestation:   Physician Attestation Statement for Resident:  As the supervising MD   Physician Attestation Statement: I have personally seen and examined this patient.   I agree with the above history. -: According to patient, she had a slip and then fall, without syncope.  No prodrome of weakness.  Labs reveal acute on chronic hyponatremia of 125.  EKG with inferior lateral T-wave inversions that were present in some of those leads previously but now slightly worsened.  Patient had chest pain on arrival but it resolved at time of my evaluation. There was transient hypoxia documented in the ED, however it seemed to have resolved.  Unfortunately, patient was accidentally discharged despite the hyponatremia.  I called the patient and her son multiple times the following day at time of my attestation of the resident's note.  I spoke with the patient's daughter-in-law who notes that the patient had a telehealth appointment with a FNP who caught this hyponatremia and address that today.  They discussed that bringing the patient back to the ER would be quite traumatic for her.  They are planning to hold the patient's triamterene-HCTZ and repeating the sodium.  They are doing this in conjunction with the patient's cardiologist.  I apologized to the daughter-in-law and explained that should symptoms worsen, she should return to the ER for repeat lab draw.  The daughter-in-law also notes that the patient will try to drink fluids that have salt in them.   As the supervising MD I agree with the above PE.    As the supervising MD I agree with the above treatment, course, plan, and disposition.                ED Course as of 07/14/22 1748 Wed Jul 13, 2022   1303 Troponin I: <0.006 [OO]      ED Course User Index  [OO] Robert Adrian MD             Clinical Impression:   Final diagnoses:  [R07.9] Chest pain  [W19.XXXA] Fall, initial  encounter (Primary)  [R51.9] Nonintractable headache, unspecified chronicity pattern, unspecified headache type  [M54.50] Low back pain without sciatica, unspecified back pain laterality, unspecified chronicity  [E87.1] Hyponatremia          ED Disposition Condition    Discharge Stable        ED Prescriptions     None        Follow-up Information     Follow up With Specialties Details Why Contact Info    Jim Treadwell MD Internal Medicine Schedule an appointment as soon as possible for a visit in 3 days  [address]  [place] [address]  [phone]             Robert Adrian MD  Resident  07/13/22 1420       Molina Bates MD  07/14/22 1739       Molina Bates MD  07/14/22 1741

## 2022-07-13 NOTE — PATIENT INSTRUCTIONS
Instructions:  - [facility] Nurse Practitioner to schedule home follow-up visit with patient in 8-10 weeks or as needed.  - Continue all medications, treatments and therapies as ordered.   - Follow all instructions, recommendations as discussed.  - Maintain Safety Precautions at all times.  - Attend all medical appointments as scheduled.  - For worsening symptoms: call Primary Care Physician or Nurse Practitioner.  - For emergencies, call 911 or immediately report to the nearest emergency room.  - Limit Risks of environmental exposure to coronavirus/COVID-19 as discussed including: social distancing, hand hygiene, and limiting departures from the home for necessities only.

## 2022-07-14 ENCOUNTER — OFFICE VISIT (OUTPATIENT)
Dept: INTERNAL MEDICINE | Facility: CLINIC | Age: 87
End: 2022-07-14
Payer: MEDICARE

## 2022-07-14 ENCOUNTER — PATIENT MESSAGE (OUTPATIENT)
Dept: CARDIOLOGY | Facility: CLINIC | Age: 87
End: 2022-07-14
Payer: MEDICARE

## 2022-07-14 DIAGNOSIS — I10 HTN (HYPERTENSION), BENIGN: ICD-10-CM

## 2022-07-14 DIAGNOSIS — R26.81 GAIT INSTABILITY: ICD-10-CM

## 2022-07-14 DIAGNOSIS — E87.1 HYPONATREMIA: ICD-10-CM

## 2022-07-14 DIAGNOSIS — I25.10 CORONARY ARTERY DISEASE INVOLVING NATIVE CORONARY ARTERY OF NATIVE HEART WITHOUT ANGINA PECTORIS: ICD-10-CM

## 2022-07-14 DIAGNOSIS — K21.00 GASTROESOPHAGEAL REFLUX DISEASE WITH ESOPHAGITIS WITHOUT HEMORRHAGE: ICD-10-CM
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
## 2022-07-14 DIAGNOSIS — E78.2 MIXED HYPERLIPIDEMIA: ICD-10-CM

## 2022-07-14 LAB — POCT GLUCOSE: 112 MG/DL (ref 70–110)

## 2022-07-14 PROCEDURE — 99443 PR PHYSICIAN TELEPHONE EVALUATION 21-30 MIN: ICD-10-PCS | Mod: 95,,, | Performed by: NURSE PRACTITIONER

## 2022-07-14 PROCEDURE — 99443 PR PHYSICIAN TELEPHONE EVALUATION 21-30 MIN: CPT | Mod: 95,,, | Performed by: NURSE PRACTITIONER

## 2022-07-14 NOTE — PROGRESS NOTES
"Established Patient - Audio Only Telehealth Visit     The patient location is: HOME  The chief complaint leading to consultation is: Follow up and ER Follow up visit, post fall 7/13/2022  Visit type: Virtual visit with audio only (telephone)  Total time spent with patient: 25 minutes        The reason for the audio only service rather than synchronous audio and video virtual visit was related to technical difficulties or patient preference/necessity.     Each patient to whom I provide medical services by telemedicine is:  (1) informed of the relationship between the physician and patient and the respective role of any other health care provider with respect to management of the patient; and (2) notified that they may decline to receive medical services by telemedicine and may withdraw from such care at any time. Patient verbally consented to receive this service via voice-only telephone call.       HPI:   Seen by me in the recent past; dtr in [place] has been assisting with getting her settled in [place]. Very supportive family,   Son, Mr. Tavo Peters, has legal POA.   Ms Myrick is well est'd with Dr. Treadwell and very pleasant lady, recently transitioned to [employer].   Tx'd for UTI in 6/2022. No repeat test for cure was done. There was a collection issue between the [facility] [facility] collection system and [facility] with collection container used. However, during most recent ER visit, UA is unremarkable. She has completed the Cipro prescribed on 6/17/2022. No symptoms.   Changed from Nexium to Protonix for her chronic dyspepsia, and family 'feels helping; appetite still not the best, but has not been complaining about stomach'. Continues to take Zofran periodically.   Recently added to "Home" visits ? And Digital HTN. Uncertain if has been taking the Coreg, Maxzide, and Losartan. (Her dtr n Gertrudis gates, will reach out to Dr. Cartagena in regards to the Maxzide and if he wants her to continue taking; ? If may be " causing her to shift the serum sodium levels, which may increase risk for falls and 'dizziness').     *Ms Elen and I had a standing follow up Audio appt on yesterday; however, due to the fall, we postponed till today.   Call conducted with her very supportive Dtr Gertrudis remy.      Of note, offered and recommend arranging to have PT at [facility] if needed. Family was in agreement and will reach out to us if need any assistance with arranging  / orders, or anything more in support. Ms. Myrick is currently ambulating with a walker.     Assessment and plan:    Fall, initial encounter    HTN (hypertension), benign    Coronary artery disease involving native coronary artery of native heart without angina pectoris    Mixed hyperlipidemia    Gait instability    Hyponatremia    Gastroesophageal reflux disease with esophagitis without hemorrhage      S/p Fall at [facility]  Hematoma   ER Visit notes reviewed from 7/13/2022  *Hematoma  *Serum Na: 125 (needs a repeat)  *CT neck neg  *EKG: neg  *Urine: neg   *CT HEAD  Small region of induration within the left posterior parietal soft tissues suggestive for subcutaneous hematoma in light of history of trauma    No acute intracranial findings specifically without evidence for acute intracranial hemorrhage or sulcal effacement to suggest large territory recent infarction     Clinical correlation and further evaluation as warranted.     Electronically signed by:       Kaleb Martinez DO  Date:                                                07/13/2022  Time:                                                10:07  *Recommend that if develops change in LOC, headaches , dizziness, increase in falls, increase size of hematoma (family notes 'red but not very large'), should report to the ER immediately.       Chronic GERD:   Seems to be controlled at this time; continue current regimen.   ` Protonix and Carafate in 5/2022    HTN:   ` Coreg  ` Losartan  *Family will reach out to her  est'nicky Cardiologist in regards to the Maxzide, particularly in the setting her her chronic Hyponatremia (most recent level was 125 in the ER; family defers having any additional labs to resurveillance this at this time; will be following upon with Dr. Treadwell in 2 weeks and would recommend having this repeated at this time; sooner if indicated; family will keep us posted; currently, no dizziness or headaches)      HLP:   ` Crestor      Depression:   ` Celexa      CAD:  *Followed by Dr. LILI Cartagena, seen in 4/2022, due in 7/2023.   *LABS (already ordered per Dr. Cartagena): DUE: 10/2022.       *Recommend repeat phone conference 2-3 weeks post meeting with Dr Treadwell on 7/27/2022.  *Family will reach out to the practice if there are any questions or concerns in the interim.     This service was not originating from a related E/M service provided within the previous 7 days nor will  to an E/M service or procedure within the next 24 hours or my soonest available appointment.  Prevailing standard of care was able to be met in this audio-only visit.      HORACE Persaud NP

## 2022-07-15 DIAGNOSIS — E87.1 HYPONATREMIA: Primary | ICD-10-CM

## 2022-07-15 DIAGNOSIS — Z91.89 AT HIGH RISK FOR FLUID OVERLOAD: ICD-10-CM

## 2022-07-15 DIAGNOSIS — I10 HTN (HYPERTENSION), BENIGN: ICD-10-CM

## 2022-07-15 DIAGNOSIS — R60.9 EDEMA, UNSPECIFIED TYPE: ICD-10-CM

## 2022-07-15 RX ORDER — LOSARTAN POTASSIUM 50 MG/1
50 TABLET ORAL 2 TIMES DAILY
Qty: 180 TABLET | Refills: 3 | Status: ON HOLD | OUTPATIENT
Start: 2022-07-15 | End: 2022-08-01 | Stop reason: HOSPADM

## 2022-07-15 NOTE — TELEPHONE ENCOUNTER
As requested by son whom I called, patient's daughter in law updated on dr Cartagena's instructions and verbalized understanding. Pt scheduled for labs on 7/27 (hard to go places and already had an appt that day). She agreed to date/time of appointment(s).

## 2022-07-18 ENCOUNTER — TELEPHONE (OUTPATIENT)
Dept: INTERNAL MEDICINE | Facility: CLINIC | Age: 87
End: 2022-07-18
Payer: MEDICARE

## 2022-07-18 ENCOUNTER — EXTERNAL HOME HEALTH (OUTPATIENT)
Dept: HOME HEALTH SERVICES | Facility: HOSPITAL | Age: 87
End: 2022-07-18
Payer: MEDICARE

## 2022-07-18 ENCOUNTER — PATIENT MESSAGE (OUTPATIENT)
Dept: INTERNAL MEDICINE | Facility: CLINIC | Age: 87
End: 2022-07-18
Payer: MEDICARE

## 2022-07-18 NOTE — TELEPHONE ENCOUNTER
Pt had a fall last week. Family is requesting a walker with chair for pt. Also family believes she has a UTI, they have cipro rx that JEANNE rodriguez prescribe to pt, which has not started taking. Please advise

## 2022-07-18 NOTE — TELEPHONE ENCOUNTER
Pt family would like to know if there is a way to get her expedited through the ER. Pt has been advise to go to the ER by JEANNE Myrick, JEANNE myrick is out the clinic today. Please advise

## 2022-07-18 NOTE — TELEPHONE ENCOUNTER
"Spoke to miquel to give her a list of wait times at at all [facility] [place] campuses. She states the patient is 'feeling better", eating crackers and drinking gatorade. Advised to watch for signs of dehydration, not urinating, color of urine darker. Patient should be brought in for the above or worsening of symptoms. Shadia Valencia RN IRASEMA.  "

## 2022-07-18 NOTE — TELEPHONE ENCOUNTER
Message  / text received from Sultana SCHULER Earlier today in regards to receiving message from patient's POA;s wife, Gertrudis, whom has been very instrumental in Ms. Myrick's care. Reports that 'she is weak, not doing well, diarrhea and nausea'. Given the recent hyponatremia finding on her labs (125) from recent ER visit, concern is her inability to keep any fluids in and subsequently resulting in a continual drop / decline in her sodium level, predisposing to increase falls, change in LOC, increase nausea and equillibruim issues, recommended ER transport today for urgent evaluation and that notification be made to the covering physician for me today, her primary care (Dr. Treadwell) and the covering house supervisor to reach out to the patient and her family member for expedited triaging.     *This provider to touch base with the family upon return to clinic on tomorrow.   CHULA

## 2022-07-18 NOTE — TELEPHONE ENCOUNTER
"Pt is not doing well since fall last week, have reached out to JEANNE Persaud in regards to this matter. Np recommends pt going to ER due to possible sodium levels causing weakness and nausea. I have reached out to Gertrudis and advised her to take  to ER. Gertrudis verbally understood and will call "the blakes" to get pt to ER. JEANNE Persaud would like for  to be aware of this matter. Also pt family is requesting walker with a chair for pt. Please advise.  "

## 2022-07-19 ENCOUNTER — PATIENT MESSAGE (OUTPATIENT)
Dept: INTERNAL MEDICINE | Facility: CLINIC | Age: 87
End: 2022-07-19
Payer: MEDICARE

## 2022-07-19 ENCOUNTER — HOSPITAL ENCOUNTER (OUTPATIENT)
Facility: HOSPITAL | Age: 87
Discharge: NURSING, REHAB OR LONG-TERM CARE | End: 2022-07-22
Attending: EMERGENCY MEDICINE | Admitting: NEUROLOGICAL SURGERY
Payer: MEDICARE

## 2022-07-19 ENCOUNTER — TELEPHONE (OUTPATIENT)
Dept: INTERNAL MEDICINE | Facility: CLINIC | Age: 87
End: 2022-07-19
Payer: MEDICARE

## 2022-07-19 DIAGNOSIS — R41.82 ALTERED MENTAL STATUS, UNSPECIFIED ALTERED MENTAL STATUS TYPE: Primary | ICD-10-CM

## 2022-07-19 DIAGNOSIS — R11.0 NAUSEA: ICD-10-CM

## 2022-07-19 DIAGNOSIS — R94.31 QT PROLONGATION: ICD-10-CM

## 2022-07-19 DIAGNOSIS — R53.81 PHYSICAL DEBILITY: Primary | ICD-10-CM

## 2022-07-19 DIAGNOSIS — R29.6 FALLS FREQUENTLY: ICD-10-CM

## 2022-07-19 PROBLEM — S06.5XAA SUBDURAL HEMATOMA, POST-TRAUMATIC: Status: ACTIVE | Noted: 2022-07-19

## 2022-07-19 LAB
ALBUMIN SERPL BCP-MCNC: 3.5 G/DL (ref 3.5–5.2)
ALP SERPL-CCNC: 58 U/L (ref 55–135)
ALT SERPL W/O P-5'-P-CCNC: 17 U/L (ref 10–44)
ANION GAP SERPL CALC-SCNC: 12 MMOL/L (ref 8–16)
AST SERPL-CCNC: 19 U/L (ref 10–40)
BASOPHILS # BLD AUTO: 0.03 K/UL (ref 0–0.2)
BASOPHILS NFR BLD: 0.4 % (ref 0–1.9)
BILIRUB SERPL-MCNC: 0.5 MG/DL (ref 0.1–1)
BILIRUB UR QL STRIP: NEGATIVE
BUN SERPL-MCNC: 7 MG/DL (ref 10–30)
CALCIUM SERPL-MCNC: 9.8 MG/DL (ref 8.7–10.5)
CHLORIDE SERPL-SCNC: 91 MMOL/L (ref 95–110)
CLARITY UR REFRACT.AUTO: CLEAR
CO2 SERPL-SCNC: 24 MMOL/L (ref 23–29)
COLOR UR AUTO: NORMAL
CREAT SERPL-MCNC: 0.7 MG/DL (ref 0.5–1.4)
DIFFERENTIAL METHOD: ABNORMAL
EOSINOPHIL # BLD AUTO: 0.1 K/UL (ref 0–0.5)
EOSINOPHIL NFR BLD: 1.9 % (ref 0–8)
ERYTHROCYTE [DISTWIDTH] IN BLOOD BY AUTOMATED COUNT: 13.1 % (ref 11.5–14.5)
EST. GFR  (AFRICAN AMERICAN): >60 ML/MIN/1.73 M^2
EST. GFR  (NON AFRICAN AMERICAN): >60 ML/MIN/1.73 M^2
GLUCOSE SERPL-MCNC: 106 MG/DL (ref 70–110)
GLUCOSE UR QL STRIP: NEGATIVE
HCT VFR BLD AUTO: 35.9 % (ref 37–48.5)
HGB BLD-MCNC: 12.8 G/DL (ref 12–16)
HGB UR QL STRIP: NEGATIVE
IMM GRANULOCYTES # BLD AUTO: 0.03 K/UL (ref 0–0.04)
IMM GRANULOCYTES NFR BLD AUTO: 0.4 % (ref 0–0.5)
KETONES UR QL STRIP: NEGATIVE
LEUKOCYTE ESTERASE UR QL STRIP: NEGATIVE
LYMPHOCYTES # BLD AUTO: 1.2 K/UL (ref 1–4.8)
LYMPHOCYTES NFR BLD: 17 % (ref 18–48)
MCH RBC QN AUTO: 32.6 PG (ref 27–31)
MCHC RBC AUTO-ENTMCNC: 35.7 G/DL (ref 32–36)
MCV RBC AUTO: 91 FL (ref 82–98)
MONOCYTES # BLD AUTO: 0.8 K/UL (ref 0.3–1)
MONOCYTES NFR BLD: 11.4 % (ref 4–15)
NEUTROPHILS # BLD AUTO: 4.7 K/UL (ref 1.8–7.7)
NEUTROPHILS NFR BLD: 68.9 % (ref 38–73)
NITRITE UR QL STRIP: NEGATIVE
NRBC BLD-RTO: 0 /100 WBC
PH UR STRIP: 7 [PH] (ref 5–8)
PLATELET # BLD AUTO: 274 K/UL (ref 150–450)
PMV BLD AUTO: 9.4 FL (ref 9.2–12.9)
POTASSIUM SERPL-SCNC: 3.8 MMOL/L (ref 3.5–5.1)
PROT SERPL-MCNC: 6.6 G/DL (ref 6–8.4)
PROT UR QL STRIP: NEGATIVE
RBC # BLD AUTO: 3.93 M/UL (ref 4–5.4)
SARS-COV-2 RDRP RESP QL NAA+PROBE: NEGATIVE
SODIUM SERPL-SCNC: 127 MMOL/L (ref 136–145)
SP GR UR STRIP: 1 (ref 1–1.03)
URN SPEC COLLECT METH UR: NORMAL
WBC # BLD AUTO: 6.78 K/UL (ref 3.9–12.7)

## 2022-07-19 PROCEDURE — G0378 HOSPITAL OBSERVATION PER HR: HCPCS

## 2022-07-19 PROCEDURE — 85025 COMPLETE CBC W/AUTO DIFF WBC: CPT | Performed by: EMERGENCY MEDICINE

## 2022-07-19 PROCEDURE — 81003 URINALYSIS AUTO W/O SCOPE: CPT | Performed by: EMERGENCY MEDICINE

## 2022-07-19 PROCEDURE — U0002 COVID-19 LAB TEST NON-CDC: HCPCS | Performed by: EMERGENCY MEDICINE

## 2022-07-19 PROCEDURE — 93010 EKG 12-LEAD: ICD-10-PCS | Mod: ,,, | Performed by: INTERNAL MEDICINE

## 2022-07-19 PROCEDURE — 25000003 PHARM REV CODE 250: Performed by: PHYSICIAN ASSISTANT

## 2022-07-19 PROCEDURE — 25500020 PHARM REV CODE 255: Performed by: EMERGENCY MEDICINE

## 2022-07-19 PROCEDURE — 99219 PR INITIAL OBSERVATION CARE,LEVL II: ICD-10-PCS | Mod: ,,, | Performed by: PHYSICIAN ASSISTANT

## 2022-07-19 PROCEDURE — 99219 PR INITIAL OBSERVATION CARE,LEVL II: CPT | Mod: ,,, | Performed by: PHYSICIAN ASSISTANT

## 2022-07-19 PROCEDURE — 93010 ELECTROCARDIOGRAM REPORT: CPT | Mod: ,,, | Performed by: INTERNAL MEDICINE

## 2022-07-19 PROCEDURE — 99284 EMERGENCY DEPT VISIT MOD MDM: CPT | Mod: CS,,, | Performed by: EMERGENCY MEDICINE

## 2022-07-19 PROCEDURE — 99285 EMERGENCY DEPT VISIT HI MDM: CPT | Mod: 25,CS

## 2022-07-19 PROCEDURE — 93005 ELECTROCARDIOGRAM TRACING: CPT

## 2022-07-19 PROCEDURE — 80053 COMPREHEN METABOLIC PANEL: CPT | Performed by: EMERGENCY MEDICINE

## 2022-07-19 PROCEDURE — 99284 PR EMERGENCY DEPT VISIT,LEVEL IV: ICD-10-PCS | Mod: CS,,, | Performed by: EMERGENCY MEDICINE

## 2022-07-19 RX ORDER — SUCRALFATE 1 G/1
1 TABLET ORAL
Status: DISCONTINUED | OUTPATIENT
Start: 2022-07-19 | End: 2022-07-22 | Stop reason: HOSPADM

## 2022-07-19 RX ORDER — IBUPROFEN 200 MG
24 TABLET ORAL
Status: DISCONTINUED | OUTPATIENT
Start: 2022-07-19 | End: 2022-07-22 | Stop reason: HOSPADM

## 2022-07-19 RX ORDER — GLUCAGON 1 MG
1 KIT INJECTION
Status: DISCONTINUED | OUTPATIENT
Start: 2022-07-19 | End: 2022-07-22 | Stop reason: HOSPADM

## 2022-07-19 RX ORDER — IBUPROFEN 200 MG
16 TABLET ORAL
Status: DISCONTINUED | OUTPATIENT
Start: 2022-07-19 | End: 2022-07-22 | Stop reason: HOSPADM

## 2022-07-19 RX ORDER — CALCIUM CARBONATE 160(400)MG
1 TABLET,CHEWABLE ORAL DAILY
Qty: 1 EACH | Refills: 0 | Status: ON HOLD | OUTPATIENT
Start: 2022-07-19 | End: 2022-08-01 | Stop reason: HOSPADM

## 2022-07-19 RX ORDER — LOSARTAN POTASSIUM 50 MG/1
50 TABLET ORAL 2 TIMES DAILY
Status: DISCONTINUED | OUTPATIENT
Start: 2022-07-19 | End: 2022-07-22 | Stop reason: HOSPADM

## 2022-07-19 RX ORDER — ACETAMINOPHEN 325 MG/1
650 TABLET ORAL EVERY 6 HOURS PRN
Status: DISCONTINUED | OUTPATIENT
Start: 2022-07-19 | End: 2022-07-22 | Stop reason: HOSPADM

## 2022-07-19 RX ORDER — PANTOPRAZOLE SODIUM 40 MG/1
40 TABLET, DELAYED RELEASE ORAL DAILY
Status: DISCONTINUED | OUTPATIENT
Start: 2022-07-20 | End: 2022-07-22 | Stop reason: HOSPADM

## 2022-07-19 RX ORDER — LABETALOL HCL 20 MG/4 ML
10 SYRINGE (ML) INTRAVENOUS EVERY 6 HOURS PRN
Status: DISCONTINUED | OUTPATIENT
Start: 2022-07-19 | End: 2022-07-22 | Stop reason: HOSPADM

## 2022-07-19 RX ORDER — HYDRALAZINE HYDROCHLORIDE 20 MG/ML
10 INJECTION INTRAMUSCULAR; INTRAVENOUS EVERY 8 HOURS PRN
Status: DISCONTINUED | OUTPATIENT
Start: 2022-07-19 | End: 2022-07-22 | Stop reason: HOSPADM

## 2022-07-19 RX ORDER — CARVEDILOL 6.25 MG/1
6.25 TABLET ORAL 2 TIMES DAILY
Status: DISCONTINUED | OUTPATIENT
Start: 2022-07-19 | End: 2022-07-22 | Stop reason: HOSPADM

## 2022-07-19 RX ORDER — CITALOPRAM 10 MG/1
20 TABLET ORAL DAILY
Status: DISCONTINUED | OUTPATIENT
Start: 2022-07-20 | End: 2022-07-22 | Stop reason: HOSPADM

## 2022-07-19 RX ORDER — CALCIUM CARBONATE 160(400)MG
1 TABLET,CHEWABLE ORAL DAILY
Qty: 1 EACH | Refills: 0 | Status: SHIPPED | OUTPATIENT
Start: 2022-07-19 | End: 2022-07-19

## 2022-07-19 RX ORDER — ONDANSETRON 2 MG/ML
4 INJECTION INTRAMUSCULAR; INTRAVENOUS EVERY 6 HOURS PRN
Status: DISCONTINUED | OUTPATIENT
Start: 2022-07-19 | End: 2022-07-22 | Stop reason: HOSPADM

## 2022-07-19 RX ORDER — ATORVASTATIN CALCIUM 20 MG/1
40 TABLET, FILM COATED ORAL DAILY
Refills: 3 | Status: DISCONTINUED | OUTPATIENT
Start: 2022-07-20 | End: 2022-07-22 | Stop reason: HOSPADM

## 2022-07-19 RX ADMIN — SUCRALFATE 1 G: 1 TABLET ORAL at 09:07

## 2022-07-19 RX ADMIN — CARVEDILOL 6.25 MG: 6.25 TABLET, FILM COATED ORAL at 09:07

## 2022-07-19 RX ADMIN — LOSARTAN POTASSIUM 50 MG: 50 TABLET, FILM COATED ORAL at 09:07

## 2022-07-19 RX ADMIN — IOHEXOL 75 ML: 350 INJECTION, SOLUTION INTRAVENOUS at 03:07

## 2022-07-19 NOTE — PROGRESS NOTES
[facility] - Emergency Dept  Neurosurgery  Progress Note    Subjective:     History of Present Illness: Elen Peters is a 89 y.o. female with PMH of TMJ, syncope, renal cancer, hyperlipidemia, hypertension, depression, CAD who presents to the ED for AMS following recent fall with head trauma. Daughter reports pt had a fall on 7/13 when getting up from the commode, hitting her head. She presented to the ED, CTH at that time appeared negative for bleed. Since the fall, daughter states patient has been more confused than usual. States she has some mild confusion at baseline, but noted her to be more irritable and not quite acting herself. She thought it was a UTI as she has had similar presentation with this in the past, she was prescribed Cipro by her PCP but failed to improved, so PCP recommended returning to the ED. Repeat CTH on arrival to the ED revealed thin SDH along the left side of the posterior falx. Patient denies any additional falls and none witnessed, but daughter reports patient had mentioned possibly falling again since 7/13. Patient reports she has been having increased headaches past several days. She also endorses nausea and dizziness, but states these have been chronic. Denies any vomiting. Patient take ASA 81 mg daily, believes she last took this yesterday.        Post-Op Info:  * No surgery found *         (Not in a hospital admission)      Review of patient's allergies indicates:   Allergen Reactions    Amlodipine Swelling    Sulfa (sulfonamide antibiotics)      Unknown as child    Codeine      Other reaction(s): Unknown    Maxzide [triamterene-hydrochlorothiazid]     Sulfamethoxazole-trimethoprim        Past Medical History:   Diagnosis Date    Abnormal stress test 11/18/2015    Arthritis     Bladder cancer     Cataract     Coronary artery disease involving native coronary artery 1/6/2016    Depression     Exudative age-related macular degeneration of left eye with active choroidal  neovascularization 10/1/2020    GERD (gastroesophageal reflux disease)     HTN (hypertension), benign 2015    Hx of bladder infections     Hyperlipemia     OP (osteoporosis)     Positive cardiac stress test 2016    Renal cancer     Syncope 2016    TMJ (dislocation of temporomandibular joint)     Upper back pain 2015    Ureteral cancer     Venous insufficiency 2017    Villous adenoma of colon 2013     Past Surgical History:   Procedure Laterality Date    APPENDECTOMY      BACK SURGERY      CATARACT EXTRACTION W/  INTRAOCULAR LENS IMPLANT Left 3/16/15    kristy    CATARACT EXTRACTION W/  INTRAOCULAR LENS IMPLANT Right 4/6/15    kristy    COLONOSCOPY  10/21/2013    CYSTOSCOPY      ESOPHAGOGASTRODUODENOSCOPY N/A 2019    Procedure: EGD (ESOPHAGOGASTRODUODENOSCOPY);  Surgeon: Diony Dey MD;  Location: [address]);  Service: Endoscopy;  Laterality: N/A;    EYE SURGERY      NEPHRECTOMY      L    SPINE SURGERY      TONSILLECTOMY, ADENOIDECTOMY       Family History       Problem Relation (Age of Onset)    Cancer Mother, Father, Son    Goiter Mother    Heart attack Mother    Heart disease Mother, Maternal Grandfather    Leukemia Mother, Father    No Known Problems Brother, Son, Son, Son, Son, Son, Maternal Aunt, Maternal Uncle, Paternal Aunt, Paternal Uncle, Maternal Grandmother, Paternal Grandmother, Paternal Grandfather, Sister          Tobacco Use    Smoking status: Former Smoker     Quit date: 9     Years since quittin.5    Smokeless tobacco: Never Used    Tobacco comment: in college   Substance and Sexual Activity    Alcohol use: No    Drug use: No    Sexual activity: Not Currently     Review of Systems   Constitutional:  Negative for chills and fever.   HENT:  Negative for nosebleeds and trouble swallowing.    Eyes:  Negative for photophobia and visual disturbance.   Respiratory:  Negative for cough and shortness of breath.    Cardiovascular:   Negative for chest pain and palpitations.   Gastrointestinal:  Positive for abdominal pain and nausea. Negative for vomiting.   Genitourinary:  Positive for dysuria. Negative for difficulty urinating.   Skin:  Negative for rash and wound.   Neurological:  Positive for dizziness and headaches. Negative for seizures, speech difficulty, weakness and numbness.   Psychiatric/Behavioral:  Positive for agitation and confusion.    Objective:     Weight: 59.9 kg (132 lb)  Body mass index is 22.66 kg/m².  Vital Signs (Most Recent):  Temp: 98 °F (36.7 °C) (07/19/22 1436)  Pulse: 80 (07/19/22 1436)  Resp: 16 (07/19/22 1436)  BP: 139/63 (07/19/22 1436)  SpO2: 95 % (07/19/22 1436) Vital Signs (24h Range):  Temp:  [98 °F (36.7 °C)-98.4 °F (36.9 °C)] 98 °F (36.7 °C)  Pulse:  [80-84] 80  Resp:  [16-20] 16  SpO2:  [95 %-96 %] 95 %  BP: (126-139)/(63) 139/63                          Physical Exam    Neurosurgery Physical Exam    General: well developed, well nourished, no distress.   Head: normocephalic, no obvious trauma  Neck: No tracheal deviation. No palpable masses. Full ROM.   GCS: E4 V5 M6; Total: 15  Mental Status: Awake, Alert, Oriented x 3 (person, place, president; not year or situation)  Language: No aphasia  Speech: No dysarthria  Cranial nerves: face symmetric, tongue midline, CN II-XII grossly intact.   Eyes: pupils equal, round, reactive to light with accomodation, EOMI.   Ears: No drainage.   Pulmonary: normal respirations, no signs of respiratory distress  Abdomen: soft, non-distended, not tender to palpation  Vascular: No LE edema.   Skin: Skin is warm, dry and intact.    Sensory: intact to light touch throughout  Motor Strength: Moves all extremities spontaneously with good tone.  Grossly full strength upper and lower extremities. No abnormal movements seen.        Cerebellar:   Finger-to-nose: intact bilaterally   Pronator drift: absent bilaterally      Significant Labs:  Recent Labs   Lab 07/19/22  1211       *   K 3.8   CL 91*   CO2 24   BUN 7*   CREATININE 0.7   CALCIUM 9.8     Recent Labs   Lab 07/19/22  1211   WBC 6.78   HGB 12.8   HCT 35.9*        No results for input(s): LABPT, INR, APTT in the last 48 hours.  Microbiology Results (last 7 days)       ** No results found for the last 168 hours. **          All pertinent labs from the last 24 hours have been reviewed.    Significant Diagnostics:  CT: CT Head Without Contrast    Result Date: 7/19/2022  Small subdural hematoma along the left side of the inferior falx.  No midline shift or herniation.  This may reflect redistribution from the prior study dated 07/13/2022 however follow-up to ensure stability as clinically warranted. Electronically signed by: Ham Meyer Date:    07/19/2022 Time:    15:38    CT Abdomen Pelvis With Contrast    Result Date: 7/19/2022  Status post left nephrectomy without convincing evidence of residual or recurrent disease in the abdomen or pelvis. Sigmoid diverticulosis without convincing evidence of diverticulitis Small hiatal hernia Cholelithiasis Stable peripherally calcified retroperitoneal and presacral fluid collection may reflect seroma, chronic hematoma, or lymphocele related to previous nephrectomy. Electronically signed by: Jamar Alegria Jr Date:    07/19/2022 Time:    15:57   I have reviewed and interpreted all pertinent imaging results/findings within the past 24 hours.    Assessment/Plan:     * Subdural hematoma, post-traumatic  Elen Peters is a 89 y.o. female on ASA 81 mg daily who presents after recent fall with head trauma and subsequent confusion, found to have thin SDH along the left inferior falx.    - Admit to NSGY for observation  - Neuro checks and vitals q4h  - Imaging reviewed:   - CTH 7/19: small 2-3 mm SDH along left aspect of inferior falx  - Repeat CTH in am for stability  - Hold home ASA  - HTN: Goal SBP < 160. Continue home regimen. Hydralazine/labetalol PRN  - Headache: Tylenol  PRN  - Hyponatremia: Noted to have Na of 125 on previous ED visit 7/13. Na on admission 127.   - Fluid restriction to 1L daily   - Follow up urine osm, urine Na, serum osm  - Debility, Falls: PT/OT, fall precautions  - Notify NSGY with any acute decline in exam        Millie Martins PA-C  Neurosurgery  [facility] - Emergency Dept

## 2022-07-19 NOTE — ASSESSMENT & PLAN NOTE
Elen Peters is a 89 y.o. female on ASA 81 mg daily who presents after recent fall with head trauma and subsequent confusion, found to have thin SDH along the left inferior falx.    - Admit to NSGY for observation  - Neuro checks and vitals q4h  - Imaging reviewed:   - CTH 7/19: small 2-3 mm SDH along left aspect of inferior falx  - Repeat CTH in am for stability  - Hold home ASA  - HTN: Goal SBP < 160. Continue home regimen. Hydralazine/labetalol PRN  - Headache: Tylenol PRN  - Hyponatremia: Noted to have Na of 125 on previous ED visit 7/13. Na on admission 127.   - Fluid restriction to 1L daily   - Follow up urine osm, urine Na, serum osm  - Debility, Falls: PT/OT, fall precautions  - Notify NSGY with any acute decline in exam

## 2022-07-19 NOTE — FIRST PROVIDER EVALUATION
"Medical screening exam completed.  I have conducted a focused provider triage encounter, findings are as follows:    Brief history of present illness:  Urinary sx?  Confusion/delirium x 2 days    Vitals:    07/19/22 1057   BP: 126/63   Pulse: 84   Resp: 20   Temp: 98.4 °F (36.9 °C)   TempSrc: Oral   SpO2: 96%   Weight: 59.9 kg (132 lb)   Height: 5' 4" (1.626 m)       Pertinent physical exam:  VSS, well-appearing clinically but clearly confused    Brief workup plan:  Labs, COVID    Preliminary workup initiated; this workup will be continued and followed by the physician or advanced practice provider that is assigned to the patient when roomed.  "

## 2022-07-19 NOTE — SUBJECTIVE & OBJECTIVE
(Not in a hospital admission)      Review of patient's allergies indicates:   Allergen Reactions    Amlodipine Swelling    Sulfa (sulfonamide antibiotics)      Unknown as child    Codeine      Other reaction(s): Unknown    Maxzide [triamterene-hydrochlorothiazid]     Sulfamethoxazole-trimethoprim        Past Medical History:   Diagnosis Date    Abnormal stress test 11/18/2015    Arthritis     Bladder cancer     Cataract     Coronary artery disease involving native coronary artery 1/6/2016    Depression     Exudative age-related macular degeneration of left eye with active choroidal neovascularization 10/1/2020    GERD (gastroesophageal reflux disease)     HTN (hypertension), benign 11/18/2015    Hx of bladder infections     Hyperlipemia     OP (osteoporosis)     Positive cardiac stress test 1/6/2016    Renal cancer     Syncope 1/6/2016    TMJ (dislocation of temporomandibular joint)     Upper back pain 12/1/2015    Ureteral cancer     Venous insufficiency 2017    Villous adenoma of colon 5/31/2013     Past Surgical History:   Procedure Laterality Date    APPENDECTOMY      BACK SURGERY      CATARACT EXTRACTION W/  INTRAOCULAR LENS IMPLANT Left 3/16/15    kristy    CATARACT EXTRACTION W/  INTRAOCULAR LENS IMPLANT Right 4/6/15    kristy    COLONOSCOPY  10/21/2013    CYSTOSCOPY      ESOPHAGOGASTRODUODENOSCOPY N/A 1/30/2019    Procedure: EGD (ESOPHAGOGASTRODUODENOSCOPY);  Surgeon: Diony Dey MD;  Location: [facility] ([address] [place] [address]);  Service: Endoscopy;  Laterality: N/A;    EYE SURGERY      NEPHRECTOMY      L    SPINE SURGERY      TONSILLECTOMY, ADENOIDECTOMY       Family History       Problem Relation (Age of Onset)    Cancer Mother, Father, Son    Goiter Mother    Heart attack Mother    Heart disease Mother, Maternal Grandfather    Leukemia Mother, Father    No Known Problems Brother, Son, Son, Son, Son, Son, Maternal Aunt, Maternal Uncle, Paternal Aunt, Paternal Uncle, Maternal Grandmother, Paternal Grandmother, Paternal  Grandfather, Sister          Tobacco Use    Smoking status: Former Smoker     Quit date:      Years since quittin.5    Smokeless tobacco: Never Used    Tobacco comment: in college   Substance and Sexual Activity    Alcohol use: No    Drug use: No    Sexual activity: Not Currently     Review of Systems   Constitutional:  Negative for chills and fever.   HENT:  Negative for nosebleeds and trouble swallowing.    Eyes:  Negative for photophobia and visual disturbance.   Respiratory:  Negative for cough and shortness of breath.    Cardiovascular:  Negative for chest pain and palpitations.   Gastrointestinal:  Positive for abdominal pain and nausea. Negative for vomiting.   Genitourinary:  Positive for dysuria. Negative for difficulty urinating.   Skin:  Negative for rash and wound.   Neurological:  Positive for dizziness and headaches. Negative for seizures, speech difficulty, weakness and numbness.   Psychiatric/Behavioral:  Positive for agitation and confusion.    Objective:     Weight: 59.9 kg (132 lb)  Body mass index is 22.66 kg/m².  Vital Signs (Most Recent):  Temp: 98 °F (36.7 °C) (22 1436)  Pulse: 80 (22 1436)  Resp: 16 (22 1436)  BP: 139/63 (22 1436)  SpO2: 95 % (22 1436) Vital Signs (24h Range):  Temp:  [98 °F (36.7 °C)-98.4 °F (36.9 °C)] 98 °F (36.7 °C)  Pulse:  [80-84] 80  Resp:  [16-20] 16  SpO2:  [95 %-96 %] 95 %  BP: (126-139)/(63) 139/63                          Physical Exam    Neurosurgery Physical Exam    General: well developed, well nourished, no distress.   Head: normocephalic, no obvious trauma  Neck: No tracheal deviation. No palpable masses. Full ROM.   GCS: E4 V5 M6; Total: 15  Mental Status: Awake, Alert, Oriented x 3 (person, place, president; not year or situation)  Language: No aphasia  Speech: No dysarthria  Cranial nerves: face symmetric, tongue midline, CN II-XII grossly intact.   Eyes: pupils equal, round, reactive to light with accomodation, EOMI.    Ears: No drainage.   Pulmonary: normal respirations, no signs of respiratory distress  Abdomen: soft, non-distended, not tender to palpation  Vascular: No LE edema.   Skin: Skin is warm, dry and intact.    Sensory: intact to light touch throughout  Motor Strength: Moves all extremities spontaneously with good tone.  Grossly full strength upper and lower extremities. No abnormal movements seen.        Cerebellar:   Finger-to-nose: intact bilaterally   Pronator drift: absent bilaterally      Significant Labs:  Recent Labs   Lab 07/19/22  1211      *   K 3.8   CL 91*   CO2 24   BUN 7*   CREATININE 0.7   CALCIUM 9.8     Recent Labs   Lab 07/19/22  1211   WBC 6.78   HGB 12.8   HCT 35.9*        No results for input(s): LABPT, INR, APTT in the last 48 hours.  Microbiology Results (last 7 days)       ** No results found for the last 168 hours. **          All pertinent labs from the last 24 hours have been reviewed.    Significant Diagnostics:  CT: CT Head Without Contrast    Result Date: 7/19/2022  Small subdural hematoma along the left side of the inferior falx.  No midline shift or herniation.  This may reflect redistribution from the prior study dated 07/13/2022 however follow-up to ensure stability as clinically warranted. Electronically signed by: Ham Meyer Date:    07/19/2022 Time:    15:38    CT Abdomen Pelvis With Contrast    Result Date: 7/19/2022  Status post left nephrectomy without convincing evidence of residual or recurrent disease in the abdomen or pelvis. Sigmoid diverticulosis without convincing evidence of diverticulitis Small hiatal hernia Cholelithiasis Stable peripherally calcified retroperitoneal and presacral fluid collection may reflect seroma, chronic hematoma, or lymphocele related to previous nephrectomy. Electronically signed by: Jamar Alegria Jr Date:    07/19/2022 Time:    15:57   I have reviewed and interpreted all pertinent imaging results/findings within the  past 24 hours.

## 2022-07-19 NOTE — H&P
Please see progress note from 7/19 for full H&P.    Millie Martins PA-C  Neurosurgery  [facility]

## 2022-07-19 NOTE — ED NOTES
Attempted to call report to floor, floor RN unable to take report a this time. Will call back in 15 mins.

## 2022-07-19 NOTE — CONSULTS
Consult acknowledged. See progress note from 7/19.    Millie Martins PA-C  Neurosurgery  [facility]

## 2022-07-19 NOTE — PROVIDER PROGRESS NOTES - EMERGENCY DEPT.
Encounter Date: 7/19/2022    ED Physician Progress Notes             I assumed care of this patient at change of shift from Dr. Alarcon. Briefly, this is a 89 y.o. female who was brought in by family with AMS/confusion in setting of recent head trauma, concerned for possible UTI. CT head by my independent interpretation notable for small SDH, possibly present last week but not clear. I discussed the patient with neurosurgery who saw and evaluated her, and have placed her in observation on their service for further care. Family agreeable to plan.       Workup notable for:     Labs Reviewed   CBC W/ AUTO DIFFERENTIAL - Abnormal; Notable for the following components:       Result Value    RBC 3.93 (*)     Hematocrit 35.9 (*)     MCH 32.6 (*)     Lymph % 17.0 (*)     All other components within normal limits   COMPREHENSIVE METABOLIC PANEL - Abnormal; Notable for the following components:    Sodium 127 (*)     Chloride 91 (*)     BUN 7 (*)     All other components within normal limits   URINALYSIS, REFLEX TO URINE CULTURE    Narrative:     Specimen Source->Urine   SARS-COV-2 RNA AMPLIFICATION, QUAL    Narrative:     Is the patient symptomatic?->Yes  Is this needed for pre-procedure or pre-op testing?->No     CT Head Without Contrast   Final Result      Small subdural hematoma along the left side of the inferior falx.  No midline shift or herniation.  This may reflect redistribution from the prior study dated 07/13/2022 however follow-up to ensure stability as clinically warranted.         Electronically signed by: Ham Meyer   Date:    07/19/2022   Time:    15:38      CT Abdomen Pelvis With Contrast   Final Result      Status post left nephrectomy without convincing evidence of residual or recurrent disease in the abdomen or pelvis.      Sigmoid diverticulosis without convincing evidence of diverticulitis      Small hiatal hernia      Cholelithiasis      Stable peripherally calcified retroperitoneal and presacral  fluid collection may reflect seroma, chronic hematoma, or lymphocele related to previous nephrectomy.         Electronically signed by: Jamar Alegria Jr   Date:    07/19/2022   Time:    15:57          Medications   glucose chewable tablet 16 g (has no administration in time range)   glucose chewable tablet 16 g (has no administration in time range)   glucose chewable tablet 24 g (has no administration in time range)   glucagon (human recombinant) injection 1 mg (has no administration in time range)   dextrose 10% bolus 125 mL (has no administration in time range)   dextrose 10% bolus 250 mL (has no administration in time range)   iohexoL (OMNIPAQUE 350) injection 75 mL (75 mLs Intravenous Given 7/19/22 1516)         Final diagnoses:  [R41.82] Altered mental status, unspecified altered mental status type (Primary)  [R11.0] Nausea

## 2022-07-19 NOTE — ED PROVIDER NOTES
Encounter Date: 7/19/2022       History     Chief Complaint   Patient presents with    Abdominal Pain     Urine burning, started with confusion yest, hx uti     89-year-old female presenting with change in mental status, abdominal pain.    PMH:  TMJ, syncope, renal cancer, hyperlipidemia, hypertension, depression, CAD    Context:  History supplemented by patient's son, Edis, present at bedside.  Patient started getting confused over last couple of days, which normally happens when she has a urinary tract infection.  The patient denies vomiting, but has felt nauseated.  She endorses lower abdominal pain.  Edis reports a fall on 7/13, but no head injury since that visit.   Onset:  Gradual  Location:  Left lower abdomen  Duration:  Past couple days  Associated Symptoms:  Positive nausea, no vomiting    The history is provided by the patient, medical records and a relative. The history is limited by the condition of the patient. No  was used.     Review of patient's allergies indicates:   Allergen Reactions    Amlodipine Swelling    Sulfa (sulfonamide antibiotics)      Unknown as child    Codeine      Other reaction(s): Unknown    Maxzide [triamterene-hydrochlorothiazid]     Sulfamethoxazole-trimethoprim      Past Medical History:   Diagnosis Date    Abnormal stress test 11/18/2015    Arthritis     Bladder cancer     Cataract     Coronary artery disease involving native coronary artery 1/6/2016    Depression     Exudative age-related macular degeneration of left eye with active choroidal neovascularization 10/1/2020    GERD (gastroesophageal reflux disease)     HTN (hypertension), benign 11/18/2015    Hx of bladder infections     Hyperlipemia     OP (osteoporosis)     Positive cardiac stress test 1/6/2016    Renal cancer     Syncope 1/6/2016    TMJ (dislocation of temporomandibular joint)     Upper back pain 12/1/2015    Ureteral cancer     Venous insufficiency 2017     Villous adenoma of colon 2013     Past Surgical History:   Procedure Laterality Date    APPENDECTOMY      BACK SURGERY      CATARACT EXTRACTION W/  INTRAOCULAR LENS IMPLANT Left 3/16/15    kristy    CATARACT EXTRACTION W/  INTRAOCULAR LENS IMPLANT Right 4/6/15    kristy    COLONOSCOPY  10/21/2013    CYSTOSCOPY      ESOPHAGOGASTRODUODENOSCOPY N/A 2019    Procedure: EGD (ESOPHAGOGASTRODUODENOSCOPY);  Surgeon: Diony Dey MD;  Location: [address];  Service: Endoscopy;  Laterality: N/A;    EYE SURGERY      NEPHRECTOMY      L    SPINE SURGERY      TONSILLECTOMY, ADENOIDECTOMY       Family History   Problem Relation Age of Onset    Leukemia Mother     Heart disease Mother     Heart attack Mother     Cancer Mother         leukemia    Goiter Mother     Leukemia Father     Cancer Father         leukemia    Heart disease Maternal Grandfather     No Known Problems Brother     No Known Problems Son     No Known Problems Son     No Known Problems Son     Cancer Son         throat    No Known Problems Son     No Known Problems Son     No Known Problems Maternal Aunt     No Known Problems Maternal Uncle     No Known Problems Paternal Aunt     No Known Problems Paternal Uncle     No Known Problems Maternal Grandmother     No Known Problems Paternal Grandmother     No Known Problems Paternal Grandfather     No Known Problems Sister     Amblyopia Neg Hx     Blindness Neg Hx     Cataracts Neg Hx     Diabetes Neg Hx     Glaucoma Neg Hx     Hypertension Neg Hx     Macular degeneration Neg Hx     Retinal detachment Neg Hx     Strabismus Neg Hx     Stroke Neg Hx     Thyroid disease Neg Hx     Breast cancer Neg Hx     Colon cancer Neg Hx     Esophageal cancer Neg Hx      Social History     Tobacco Use    Smoking status: Former Smoker     Quit date:      Years since quittin.5    Smokeless tobacco: Never Used    Tobacco comment: in college   Substance Use  Topics    Alcohol use: No    Drug use: No     Review of Systems   Unable to perform ROS: Mental status change       Physical Exam     Initial Vitals [07/19/22 1057]   BP Pulse Resp Temp SpO2   126/63 84 20 98.4 °F (36.9 °C) 96 %      MAP       --         Physical Exam    Nursing note and vitals reviewed.  Constitutional: She is not diaphoretic. No distress.   HENT:   Head: Normocephalic and atraumatic.   Eyes: Right eye exhibits no discharge. Left eye exhibits no discharge.   Neck: Neck supple. No tracheal deviation present.   Cardiovascular: Normal rate and regular rhythm.   Pulmonary/Chest: Breath sounds normal. No respiratory distress.   Abdominal: Abdomen is soft. There is abdominal tenderness (Left lower quadrant).   Musculoskeletal:      Cervical back: Neck supple.     Neurological: She is alert. She has normal strength. No sensory deficit.   Oriented to self  Does not know the year or why she is in the hospital    Moves all extremities equally   Skin: Skin is warm. No rash noted.   Psychiatric: She has a normal mood and affect. Her behavior is normal.         ED Course   Procedures  Labs Reviewed   CBC W/ AUTO DIFFERENTIAL - Abnormal; Notable for the following components:       Result Value    RBC 3.93 (*)     Hematocrit 35.9 (*)     MCH 32.6 (*)     Lymph % 17.0 (*)     All other components within normal limits   COMPREHENSIVE METABOLIC PANEL - Abnormal; Notable for the following components:    Sodium 127 (*)     Chloride 91 (*)     BUN 7 (*)     All other components within normal limits   URINALYSIS, REFLEX TO URINE CULTURE    Narrative:     Specimen Source->Urine   SARS-COV-2 RNA AMPLIFICATION, QUAL    Narrative:     Is the patient symptomatic?->Yes  Is this needed for pre-procedure or pre-op testing?->No     EKG Readings: (Independently Interpreted)   Initial Reading: No STEMI. Previous EKG: Compared with most recent EKG Rhythm: Normal Sinus Rhythm. Heart Rate: 87.   NSSTWA       Imaging Results           CT Head Without Contrast (Final result)  Result time 07/19/22 15:38:07    Final result by Ham Meyer MD (07/19/22 15:38:07)                 Impression:      Small subdural hematoma along the left side of the inferior falx.  No midline shift or herniation.  This may reflect redistribution from the prior study dated 07/13/2022 however follow-up to ensure stability as clinically warranted.      Electronically signed by: Ham Meyer  Date:    07/19/2022  Time:    15:38             Narrative:    EXAMINATION:  CT HEAD WITHOUT CONTRAST    CLINICAL HISTORY:  Mental status change, unknown cause;    TECHNIQUE:  Low dose axial images were obtained through the head.  Coronal and sagittal reformations were also performed. Contrast was not administered.    COMPARISON:  Multiple prior studies with the most recent 07/13/2022    FINDINGS:  There is a small subdural hematoma measuring 3 mm in width along the inferior left side of the falx extending towards the tentorium.  No midline shift or herniation.  No acute intraparenchymal process.    No hydrocephalus is identified.  Decreased attenuation the periventricular white matter is nonspecific but may reflect mild chronic small vessel ischemic changes.    No calvarial fracture.  Chronic nasal deformity.    Chronic mild right ethmoid mucosal thickening.  The mastoid air cells are clear.    This was communicated to Dr. Nails at 15:31 on 07/19/2022                               CT Abdomen Pelvis With Contrast (Final result)  Result time 07/19/22 15:57:29    Final result by Jamar Rivas Jr., MD (07/19/22 15:57:29)                 Impression:      Status post left nephrectomy without convincing evidence of residual or recurrent disease in the abdomen or pelvis.    Sigmoid diverticulosis without convincing evidence of diverticulitis    Small hiatal hernia    Cholelithiasis    Stable peripherally calcified retroperitoneal and presacral fluid collection may  reflect seroma, chronic hematoma, or lymphocele related to previous nephrectomy.      Electronically signed by: Jamar Cancino Scott Jr  Date:    07/19/2022  Time:    15:57             Narrative:    EXAMINATION:  CT ABDOMEN PELVIS WITH CONTRAST    CLINICAL HISTORY:  LLQ abdominal pain;    TECHNIQUE:  Low dose axial images, sagittal and coronal reformations were obtained from the lung bases to the pubic symphysis following the IV administration of 75 mL of Omnipaque 350    COMPARISON:  03/30/2019    FINDINGS:  Abdomen:    - Lung bases: Clear.    - Liver: Normal.    - Gallbladder and bile ducts: Multiple gallstones, otherwise unremarkable.    - Spleen: Unremarkable.    - Pancreas: Normal.    - Kidneys: Left kidney surgically absent.  Right kidney unremarkable.    - Adrenals: Unremarkable.    - Retroperitoneum:  Beginning at the level of the aortic bifurcation and extending caudally and medially into the presacral space there is a stable appearance of an irregular rim enhancing and peripherally calcified hypoattenuating lesion measuring 5 cm in greatest transverse diameter and approximately 6 cm in craniocaudad diameter.  No definite adenopathy is seen.    - Vascular: Aortic atherosclerosis is present.    - Bowel/mesentery: Sliding hiatal hernia.  No evidence of obstruction or inflammation.  Sigmoid colon diverticulosis the is present without convincing evidence of diverticulitis.    Pelvis:    No pelvic mass, adenopathy, or free fluid.    Bones:  No suspicious bone lesion.                                 Medications   glucose chewable tablet 16 g (has no administration in time range)   glucose chewable tablet 16 g (has no administration in time range)   glucose chewable tablet 24 g (has no administration in time range)   glucagon (human recombinant) injection 1 mg (has no administration in time range)   dextrose 10% bolus 125 mL (has no administration in time range)   dextrose 10% bolus 250 mL (has no administration in  time range)   carvediloL tablet 6.25 mg (has no administration in time range)   citalopram tablet 20 mg (has no administration in time range)   losartan tablet 50 mg (has no administration in time range)   pantoprazole EC tablet 40 mg (has no administration in time range)   atorvastatin tablet 40 mg (has no administration in time range)   sucralfate tablet 1 g (has no administration in time range)   acetaminophen tablet 650 mg (has no administration in time range)   ondansetron injection 4 mg (has no administration in time range)   hydrALAZINE injection 10 mg (has no administration in time range)   labetalol 20 mg/4 mL (5 mg/mL) IV syring (has no administration in time range)   iohexoL (OMNIPAQUE 350) injection 75 mL (75 mLs Intravenous Given 7/19/22 1516)     Medical Decision Making:   History:   Old Medical Records: I decided to obtain old medical records.  Initial Assessment:   89 yo female p/w nausea, confusion, concern for UTI.  On arrival, she is awake, answering questions, non-focal neuro exam.    Plan for lab evaluation, UA, CTH to r/o intracranial emergency, CT A/P given her abdominal TTP.   Differential Diagnosis:   Including, but not limited to:    UTI  ICH  Dementia  Electrolyte derangement   Independently Interpreted Test(s):   I have ordered and independently interpreted EKG Reading(s) - see prior notes  Clinical Tests:   Lab Tests: Reviewed and Ordered  Radiological Study: Ordered and Reviewed  Medical Tests: Reviewed and Ordered  ED Management:  Hyponatremia (127<--125) - not likely etiology of her presentation.  Signed out to oncoming attending Dr. Madison with UA, CTH and CT A/P pending.  Anticipate admission for encephalopathy, as patient remains not at baseline, per son at bedside.               ED Course as of 07/19/22 1819   Tue Jul 19, 2022   1300 WBC: 6.78  No leukocytosis  [AB]   1301 Sodium(!): 127  Hyponatremia, stable  [AB]   1556 Discussed with nsg [EB]      ED Course User Index  [AB]  Grayson Alarcon MD  [EB] Sharlene Madison MD             Clinical Impression:   Final diagnoses:  [R41.82] Altered mental status, unspecified altered mental status type (Primary)  [R11.0] Nausea          ED Disposition Condition    Observation               Grayson Alarcon MD  07/19/22 6680

## 2022-07-19 NOTE — HPI
Elen Peters is a 89 y.o. female with PMH of TMJ, syncope, renal cancer, hyperlipidemia, hypertension, depression, CAD who presents to the ED for AMS following recent fall with head trauma. Daughter reports pt had a fall on 7/13 when getting up from the commode, hitting her head. She presented to the ED, CTH at that time appeared negative for bleed. Since the fall, daughter states patient has been more confused than usual. States she has some mild confusion at baseline, but noted her to be more irritable and not quite acting herself. She thought it was a UTI as she has had similar presentation with this in the past, she was prescribed Cipro by her PCP but failed to improved, so PCP recommended returning to the ED. Repeat CTH on arrival to the ED revealed thin SDH along the left side of the posterior falx. Patient denies any additional falls and none witnessed, but daughter reports patient had mentioned possibly falling again since 7/13. Patient reports she has been having increased headaches past several days. She also endorses nausea and dizziness, but states these have been chronic. Denies any vomiting. Patient take ASA 81 mg daily, believes she last took this yesterday.

## 2022-07-20 LAB
ANION GAP SERPL CALC-SCNC: 8 MMOL/L (ref 8–16)
BASOPHILS # BLD AUTO: 0.04 K/UL (ref 0–0.2)
BASOPHILS NFR BLD: 0.6 % (ref 0–1.9)
BUN SERPL-MCNC: 6 MG/DL (ref 10–30)
CALCIUM SERPL-MCNC: 9.2 MG/DL (ref 8.7–10.5)
CHLORIDE SERPL-SCNC: 92 MMOL/L (ref 95–110)
CO2 SERPL-SCNC: 27 MMOL/L (ref 23–29)
CREAT SERPL-MCNC: 0.7 MG/DL (ref 0.5–1.4)
DIFFERENTIAL METHOD: ABNORMAL
EOSINOPHIL # BLD AUTO: 0.1 K/UL (ref 0–0.5)
EOSINOPHIL NFR BLD: 1.4 % (ref 0–8)
ERYTHROCYTE [DISTWIDTH] IN BLOOD BY AUTOMATED COUNT: 13.4 % (ref 11.5–14.5)
EST. GFR  (AFRICAN AMERICAN): >60 ML/MIN/1.73 M^2
EST. GFR  (NON AFRICAN AMERICAN): >60 ML/MIN/1.73 M^2
GLUCOSE SERPL-MCNC: 99 MG/DL (ref 70–110)
HCT VFR BLD AUTO: 34.5 % (ref 37–48.5)
HGB BLD-MCNC: 12.1 G/DL (ref 12–16)
IMM GRANULOCYTES # BLD AUTO: 0.02 K/UL (ref 0–0.04)
IMM GRANULOCYTES NFR BLD AUTO: 0.3 % (ref 0–0.5)
LYMPHOCYTES # BLD AUTO: 1.1 K/UL (ref 1–4.8)
LYMPHOCYTES NFR BLD: 16.7 % (ref 18–48)
MCH RBC QN AUTO: 32.4 PG (ref 27–31)
MCHC RBC AUTO-ENTMCNC: 35.1 G/DL (ref 32–36)
MCV RBC AUTO: 93 FL (ref 82–98)
MONOCYTES # BLD AUTO: 0.7 K/UL (ref 0.3–1)
MONOCYTES NFR BLD: 11.1 % (ref 4–15)
NEUTROPHILS # BLD AUTO: 4.6 K/UL (ref 1.8–7.7)
NEUTROPHILS NFR BLD: 69.9 % (ref 38–73)
NRBC BLD-RTO: 0 /100 WBC
PLATELET # BLD AUTO: 284 K/UL (ref 150–450)
PMV BLD AUTO: 9.4 FL (ref 9.2–12.9)
POCT GLUCOSE: 108 MG/DL (ref 70–110)
POTASSIUM SERPL-SCNC: 3.4 MMOL/L (ref 3.5–5.1)
RBC # BLD AUTO: 3.73 M/UL (ref 4–5.4)
SODIUM SERPL-SCNC: 127 MMOL/L (ref 136–145)
WBC # BLD AUTO: 6.6 K/UL (ref 3.9–12.7)

## 2022-07-20 PROCEDURE — 97530 THERAPEUTIC ACTIVITIES: CPT

## 2022-07-20 PROCEDURE — 97162 PT EVAL MOD COMPLEX 30 MIN: CPT

## 2022-07-20 PROCEDURE — G0378 HOSPITAL OBSERVATION PER HR: HCPCS

## 2022-07-20 PROCEDURE — 25000003 PHARM REV CODE 250: Performed by: PHYSICIAN ASSISTANT

## 2022-07-20 PROCEDURE — 94761 N-INVAS EAR/PLS OXIMETRY MLT: CPT

## 2022-07-20 PROCEDURE — 97166 OT EVAL MOD COMPLEX 45 MIN: CPT

## 2022-07-20 PROCEDURE — 97535 SELF CARE MNGMENT TRAINING: CPT

## 2022-07-20 PROCEDURE — 97116 GAIT TRAINING THERAPY: CPT

## 2022-07-20 PROCEDURE — 96374 THER/PROPH/DIAG INJ IV PUSH: CPT

## 2022-07-20 PROCEDURE — 80048 BASIC METABOLIC PNL TOTAL CA: CPT | Performed by: PHYSICIAN ASSISTANT

## 2022-07-20 PROCEDURE — 36415 COLL VENOUS BLD VENIPUNCTURE: CPT | Performed by: PHYSICIAN ASSISTANT

## 2022-07-20 PROCEDURE — 99213 OFFICE O/P EST LOW 20 MIN: CPT | Mod: ,,, | Performed by: NEUROLOGICAL SURGERY

## 2022-07-20 PROCEDURE — 63600175 PHARM REV CODE 636 W HCPCS: Performed by: PHYSICIAN ASSISTANT

## 2022-07-20 PROCEDURE — 99213 PR OFFICE/OUTPT VISIT, EST, LEVL III, 20-29 MIN: ICD-10-PCS | Mod: ,,, | Performed by: NEUROLOGICAL SURGERY

## 2022-07-20 PROCEDURE — 85025 COMPLETE CBC W/AUTO DIFF WBC: CPT | Performed by: PHYSICIAN ASSISTANT

## 2022-07-20 RX ADMIN — CARVEDILOL 6.25 MG: 6.25 TABLET, FILM COATED ORAL at 10:07

## 2022-07-20 RX ADMIN — LOSARTAN POTASSIUM 50 MG: 50 TABLET, FILM COATED ORAL at 10:07

## 2022-07-20 RX ADMIN — SUCRALFATE 1 G: 1 TABLET ORAL at 05:07

## 2022-07-20 RX ADMIN — ONDANSETRON 4 MG: 2 INJECTION INTRAMUSCULAR; INTRAVENOUS at 12:07

## 2022-07-20 RX ADMIN — PANTOPRAZOLE SODIUM 40 MG: 40 TABLET, DELAYED RELEASE ORAL at 08:07

## 2022-07-20 RX ADMIN — ATORVASTATIN CALCIUM 40 MG: 20 TABLET, FILM COATED ORAL at 08:07

## 2022-07-20 RX ADMIN — ACETAMINOPHEN 650 MG: 325 TABLET ORAL at 05:07

## 2022-07-20 RX ADMIN — LOSARTAN POTASSIUM 50 MG: 50 TABLET, FILM COATED ORAL at 08:07

## 2022-07-20 RX ADMIN — SUCRALFATE 1 G: 1 TABLET ORAL at 10:07

## 2022-07-20 RX ADMIN — CITALOPRAM HYDROBROMIDE 20 MG: 10 TABLET ORAL at 08:07

## 2022-07-20 RX ADMIN — CARVEDILOL 6.25 MG: 6.25 TABLET, FILM COATED ORAL at 08:07

## 2022-07-20 NOTE — ED NOTES
Pt presents to [facility] ER c AMS. Per son at bedside, pt fell on 7/13 and has been becoming more confused since fall. Pt currently AAOx2, disoriented to time/situation. Pt's son reports the pt's current presentation usually happens when pt has a UTI, but was advised to bring her to the ER. PMHx includes HTN, HLD, renal CA, CAD, depression, TMJ, and syncope. Pt VSS, NADN at this time. Pt labs/imaging pending. Pt free from fall/injury, side rails up x2, bed in lowest/locked position, son at bedside. Will continue to monitor pt while in the ER.

## 2022-07-20 NOTE — PLAN OF CARE
07/20/22 1314   Post-Acute Status   Post-Acute Authorization Placement   Post-Acute Placement Status Patient List Provided       CM provided daughter in law a copy of [facility] list for patient specific plan.

## 2022-07-20 NOTE — SUBJECTIVE & OBJECTIVE
Interval History: 7/18: NAEON. AF, VSS. Na 127 (127 7/19). No complaints of HA     Medications:  Continuous Infusions:  Scheduled Meds:   atorvastatin  40 mg Oral Daily    carvediloL  6.25 mg Oral BID    citalopram  20 mg Oral Daily    losartan  50 mg Oral BID    pantoprazole  40 mg Oral Daily    sucralfate  1 g Oral QID (AC & HS)     PRN Meds:acetaminophen, dextrose 10%, dextrose 10%, glucagon (human recombinant), glucose, glucose, glucose, hydrALAZINE, labetalol, ondansetron     Review of Systems  Objective:     Weight: 59.9 kg (132 lb)  Body mass index is 22.66 kg/m².  Vital Signs (Most Recent):  Temp: 98 °F (36.7 °C) (07/20/22 0800)  Pulse: 72 (07/20/22 0800)  Resp: 18 (07/20/22 0800)  BP: (!) 158/74 (07/20/22 0840)  SpO2: (!) 93 % (07/20/22 0800)   Vital Signs (24h Range):  Temp:  [97 °F (36.1 °C)-98.4 °F (36.9 °C)] 98 °F (36.7 °C)  Pulse:  [72-91] 72  Resp:  [15-20] 18  SpO2:  [90 %-98 %] 93 %  BP: (126-171)/(63-77) 158/74                          Physical Exam    Neurosurgery Physical Exam    [id number], Aox2  Fcx4, 5/5 BUE BLE  SILT  No pathologic reflex  PERRL, EOMi      Significant Labs:  Recent Labs   Lab 07/19/22  1211 07/20/22  0715    99   * 127*   K 3.8 3.4*   CL 91* 92*   CO2 24 27   BUN 7* 6*   CREATININE 0.7 0.7   CALCIUM 9.8 9.2     Recent Labs   Lab 07/19/22  1211 07/20/22  0715   WBC 6.78 6.60   HGB 12.8 12.1   HCT 35.9* 34.5*    284     No results for input(s): LABPT, INR, APTT in the last 48 hours.  Microbiology Results (last 7 days)       ** No results found for the last 168 hours. **          All pertinent labs from the last 24 hours have been reviewed.    Significant Diagnostics:  I have reviewed and interpreted all pertinent imaging results/findings within the past 24 hours.  CT Head Without Contrast    Result Date: 7/19/2022  Small subdural hematoma along the left side of the inferior falx.  No midline shift or herniation.  This may reflect redistribution from the prior  study dated 07/13/2022 however follow-up to ensure stability as clinically warranted. Electronically signed by: Ham Meyer Date:    07/19/2022 Time:    15:38    CT Abdomen Pelvis With Contrast    Result Date: 7/19/2022  Status post left nephrectomy without convincing evidence of residual or recurrent disease in the abdomen or pelvis. Sigmoid diverticulosis without convincing evidence of diverticulitis Small hiatal hernia Cholelithiasis Stable peripherally calcified retroperitoneal and presacral fluid collection may reflect seroma, chronic hematoma, or lymphocele related to previous nephrectomy. Electronically signed by: Jamar Alegria Jr Date:    07/19/2022 Time:    15:57

## 2022-07-20 NOTE — ED NOTES
Attempted to call report to floor RN at this time. Floor RN unable to take report, will attempt to call back in 10-15 minutes.

## 2022-07-20 NOTE — PROGRESS NOTES
Ruben Larose - Neurosurgery ([facility])  Neurosurgery  Progress Note    Subjective:     History of Present Illness: Elen Peters is a 89 y.o. female with PMH of TMJ, syncope, renal cancer, hyperlipidemia, hypertension, depression, CAD who presents to the ED for AMS following recent fall with head trauma. Daughter reports pt had a fall on 7/13 when getting up from the commode, hitting her head. She presented to the ED, CTH at that time appeared negative for bleed. Since the fall, daughter states patient has been more confused than usual. States she has some mild confusion at baseline, but noted her to be more irritable and not quite acting herself. She thought it was a UTI as she has had similar presentation with this in the past, she was prescribed Cipro by her PCP but failed to improved, so PCP recommended returning to the ED. Repeat CTH on arrival to the ED revealed thin SDH along the left side of the posterior falx. Patient denies any additional falls and none witnessed, but daughter reports patient had mentioned possibly falling again since 7/13. Patient reports she has been having increased headaches past several days. She also endorses nausea and dizziness, but states these have been chronic. Denies any vomiting. Patient take ASA 81 mg daily, believes she last took this yesterday.        Post-Op Info:  * No surgery found *         Interval History: 7/18: SANDY LEE, CONNIE. Na 127 (127 7/19). No complaints of HA     Medications:  Continuous Infusions:  Scheduled Meds:   atorvastatin  40 mg Oral Daily    carvediloL  6.25 mg Oral BID    citalopram  20 mg Oral Daily    losartan  50 mg Oral BID    pantoprazole  40 mg Oral Daily    sucralfate  1 g Oral QID (AC & HS)     PRN Meds:acetaminophen, dextrose 10%, dextrose 10%, glucagon (human recombinant), glucose, glucose, glucose, hydrALAZINE, labetalol, ondansetron     Review of Systems  Objective:     Weight: 59.9 kg (132 lb)  Body mass index is 22.66 kg/m².  Vital  Signs (Most Recent):  Temp: 98 °F (36.7 °C) (07/20/22 0800)  Pulse: 72 (07/20/22 0800)  Resp: 18 (07/20/22 0800)  BP: (!) 158/74 (07/20/22 0840)  SpO2: (!) 93 % (07/20/22 0800)   Vital Signs (24h Range):  Temp:  [97 °F (36.1 °C)-98.4 °F (36.9 °C)] 98 °F (36.7 °C)  Pulse:  [72-91] 72  Resp:  [15-20] 18  SpO2:  [90 %-98 %] 93 %  BP: (126-171)/(63-77) 158/74                          Physical Exam    Neurosurgery Physical Exam    E4V4M6, Aox2  Fcx4, 5/5 BUE BLE  SILT  No pathologic reflex  PERRL, EOMi, CN intact        Significant Labs:  Recent Labs   Lab 07/19/22  1211 07/20/22  0715    99   * 127*   K 3.8 3.4*   CL 91* 92*   CO2 24 27   BUN 7* 6*   CREATININE 0.7 0.7   CALCIUM 9.8 9.2     Recent Labs   Lab 07/19/22  1211 07/20/22  0715   WBC 6.78 6.60   HGB 12.8 12.1   HCT 35.9* 34.5*    284     No results for input(s): LABPT, INR, APTT in the last 48 hours.  Microbiology Results (last 7 days)       ** No results found for the last 168 hours. **          All pertinent labs from the last 24 hours have been reviewed.    Significant Diagnostics:  I have reviewed and interpreted all pertinent imaging results/findings within the past 24 hours.  CT Head Without Contrast    Result Date: 7/19/2022  Small subdural hematoma along the left side of the inferior falx.  No midline shift or herniation.  This may reflect redistribution from the prior study dated 07/13/2022 however follow-up to ensure stability as clinically warranted. Electronically signed by: Ham Meyer Date:    07/19/2022 Time:    15:38    CT Abdomen Pelvis With Contrast    Result Date: 7/19/2022  Status post left nephrectomy without convincing evidence of residual or recurrent disease in the abdomen or pelvis. Sigmoid diverticulosis without convincing evidence of diverticulitis Small hiatal hernia Cholelithiasis Stable peripherally calcified retroperitoneal and presacral fluid collection may reflect seroma, chronic hematoma, or lymphocele  related to previous nephrectomy. Electronically signed by: Jamar Alegria Jr Date:    07/19/2022 Time:    15:57       Assessment/Plan:     * Subdural hematoma, post-traumatic  Elen Peters is a 89 y.o. female on ASA 81 mg daily who presents after recent fall with head trauma and subsequent confusion, found to have thin SDH along the left inferior falx.    - Admit to NSGY for observation  - Neuro checks and vitals q4h  - Imaging reviewed:   - CTH 7/19: small 2-3 mm SDH along left aspect of inferior falx  - Repeat CTH in am for stability  - Hold home ASA  - HTN: Goal SBP < 160. Continue home regimen. Hydralazine/labetalol PRN  - Headache: Tylenol PRN  - Hyponatremia: Noted to have Na of 125 on previous ED visit 7/13. Na on admission 127, rpx 127 today.   - Fluid restriction to 1L daily   - Follow up urine osm, urine Na, serum osm              - calc serum osm 261.5  - Debility, Falls: PT/OT, fall precautions  - Notify NSGY with any acute decline in exam        Ken Neumann MD  Neurosurgery  Ruben Larose - Neurosurgery ([facility])

## 2022-07-20 NOTE — ED NOTES
Dorian UREÑA notified via secure chat that the pt's EKG is complete and has been transmitted to MUSE.

## 2022-07-20 NOTE — PLAN OF CARE
Problem: Adult Inpatient Plan of Care  Goal: Plan of Care Review  Outcome: Ongoing, Progressing  Goal: Optimal Comfort and Wellbeing  Outcome: Ongoing, Progressing     Patient is AAO x2. POC reviewed with patient and son. Patient & son verbalized understanding. Patient's breathing is unlabored with equal chest expansion. Patient complains of numbness and tingling to bilat feet at baseline. Patient voids per bedpan. Patient remained free from falls. Patient rested well through shift. Bed in lowest position,bed alarm on, side rails up x3, no complaints or signs of distress. WCTM during shift.  See flowsheets for full assessment and VS info.

## 2022-07-20 NOTE — TELEPHONE ENCOUNTER
"Spoke with gertrudis, gertrudis stated PT visited patient in hospital and they recomend she goes to "in patient rehab" Gertrudis states  is refusing to go and that pt is requesting to speak to  on the phone about PT suggestion. Or if  does rounds if he could please go see her next door. Please advise.  "

## 2022-07-20 NOTE — ASSESSMENT & PLAN NOTE
Elen Peters is a 89 y.o. female on ASA 81 mg daily who presents after recent fall with head trauma and subsequent confusion, found to have thin SDH along the left inferior falx.    - Admit to NSGY for observation  - Neuro checks and vitals q4h  - Imaging reviewed:   - CTH 7/19: small 2-3 mm SDH along left aspect of inferior falx  - Repeat CTH in am for stability  - Hold home ASA  - HTN: Goal SBP < 160. Continue home regimen. Hydralazine/labetalol PRN  - Headache: Tylenol PRN  - Hyponatremia: Noted to have Na of 125 on previous ED visit 7/13. Na on admission 127, rpx 127 today.   - Fluid restriction to 1L daily   - Follow up urine osm, urine Na, serum osm              - calc serum osm 261.5  - Debility, Falls: PT/OT, fall precautions  - Notify NSGY with any acute decline in exam

## 2022-07-20 NOTE — PLAN OF CARE
POC established and functional mobility goals were created to help pt return to PLOF. Will be reassessed as appropriate to measure pt progress.    Problem: Physical Therapy  Goal: Physical Therapy Goal  Description: Goals to be met by: 8/3/22     Patient will increase functional independence with mobility by performin. Supine to sit with Stand-by Assistance  2. Sit to supine with Stand-by Assistance  3. Sit to stand transfer with Contact Guard Assistance  4. Bed to chair transfer with Minimal Assistance using RW  5. Gait  x 100 feet with Minimal Assistance using RW.   6. Lower extremity exercise program x15 reps per handout, with assistance as needed    Outcome: Ongoing, Progressing

## 2022-07-20 NOTE — PLAN OF CARE
Ruben Larose - Neurosurgery ([facility])  Initial Discharge Assessment       Primary Care Provider: Jim Treadwell MD  Admission Diagnosis: Nausea [R11.0]  QT prolongation [R94.31]  Altered mental status, unspecified altered mental status type [R41.82]  Admission Date: 7/19/2022  Expected Discharge Date: 7/22/2022    CM completed discharge planning assessment.  Patient is AAO x 3.  Pt/DIL verified that all demographic information on face sheet is correct.  Pt/DIL verified PCP -  Dr. Treadwell.  Pt/DIL verified primary health insurance is [employer] and secondary is none.  Patient current with home health - no.  Has below listed DME.  POA and Living Will on file - no.  Patient on dialysis - no .  Patient on coumadin - no.  Patient is a 30-day readmission - no.  Patient currently has financial issues - no.  Patient will have transportation upon discharge from facility - family .  Patient ADLs - assist (recently started with sitter 2 days/6hours a day).  Patient lives -  At the [facility] in [place].   Discharge Planning Booklet given to patient/family and discussed.    All questions addressed.    Discharge Barriers Identified: None  Payor: [employer] MANAGED MEDICARE / Plan: [employer] TOTAL CARE ADVANTAGE / Product Type: Medicare Advantage /   Extended Emergency Contact Information  Primary Emergency Contact: Segundo Peters  Address: UNKNOWN   [place]  Work Phone: [phone]  Mobile Phone: [phone]  Relation: Son  Secondary Emergency Contact: FranJo Ann  Address: UNKNOWN   [place]  Mobile Phone: [phone]  Relation: Daughter  Discharge Plan A: Skilled Nursing Facility  Discharge Plan B: Assisted Living, Home Health    [facility] ([facility]) - [place], [place] - [address]  [address] [address]  Phone: [phone] Fax: [phone]    [id number] - [place] - [address]  [address]  [address] [address]  Phone: [phone] Fax: [phone]    Initial Assessment (most recent)     Adult Discharge Assessment - 07/20/22 1308        Discharge Assessment    Assessment Type Discharge Planning Assessment     Confirmed/corrected address, phone number and insurance Yes     Confirmed Demographics Correct on Facesheet     Source of Information patient;family     If unable to respond/provide information was family/caregiver contacted? Yes     Contact Name/Number Gertrudis daughter in law     Communicated DIANA with patient/caregiver Yes     Reason For Admission Subdural hematoma, post-traumatic     Lives With alone;facility resident     Facility Arrived From: [facility]     Do you expect to return to your current living situation? Yes     Do you have help at home or someone to help you manage your care at home? Yes     Who are your caregiver(s) and their phone number(s)? sitter     Prior to hospitilization cognitive status: Not Oriented to Time     Current cognitive status: Not Oriented to Time     Walking or Climbing Stairs Difficulty ambulation difficulty, requires equipment     Dressing/Bathing Difficulty bathing difficulty, requires equipment     Home Layout Able to live on 1st floor     Equipment Currently Used at Home walker, rolling;shower chair;bedside commode;other (see comments)   recently rollator ordered but not picked up yet    Readmission within 30 days? No     Patient currently being followed by outpatient case management? No     Do you currently have service(s) that help you manage your care at home? No     Do you take prescription medications? Yes     Do you have any problems affording any of your prescribed medications? TBD     Who is going to help you get home at discharge? family and sitter     How do you get to doctors appointments? family or friend will provide     Are you on dialysis? No     Do you take coumadin? No     Discharge  Plan A Skilled Nursing Facility     Discharge Plan B Assisted Living;Home Health     DME Needed Upon Discharge  none     Discharge Plan discussed with: Patient;Adult children   jo annugher in law Gertrudis    Discharge Barriers Identified None        Relationship/Environment    Name(s) of Who Lives With Patient in facility                  Candice Uribe RN  Case Management  Ext: [phone]

## 2022-07-20 NOTE — ED NOTES
Pt being transported to IP bed via stretcher c pt transport. Personal belongings sent, NADN upon departure.

## 2022-07-20 NOTE — HOSPITAL COURSE
7/18: HERBERT DOMINGO 127 (127 7/19). No complaints of HA   7/21: SANDY BIRCH Na 130 . Pending SNF  7/22: SANDY MORSE. Pending authorization to [facility] today

## 2022-07-20 NOTE — PT/OT/SLP EVAL
"Occupational Therapy  Co Evaluation    Name: Elen Peters  MRN: [id number]  Admitting Diagnosis:  Subdural hematoma, post-traumatic  Recent Surgery: * No surgery found *      Recommendations:     Discharge Recommendations: nursing facility, skilled  Discharge Equipment Recommendations:   (TBD At SNF)  Barriers to discharge:  Decreased caregiver support  Co-evaluation/treatment performed due to patient's multiple deficits requiring two skilled therapists to appropriately and safely assess patient's strength and endurance while facilitating functional tasks in addition to accommodating for patient's activity tolerance.     Assessment:     Elen Peters is a 89 y.o. female with a medical diagnosis of Subdural hematoma, post-traumatic.  She presents with confusion A&O of one. Performance deficits affecting function: weakness, impaired functional mobility, impaired cognition, decreased safety awareness, impaired cardiopulmonary response to activity, impaired endurance, gait instability, decreased coordination, impaired self care skills, impaired balance.  She has poor insight into safety, limitations and situation. She needs redirection with perseveration on calling her primary care doctor to "Get me out of here and home." Patient and daughter report she has suffered multiple falls at home. She lives in [facility], currently only receiving assist with meals. She followed one step command 75% of time and two step commands 25% of time. She required mod (A) with posterior lean for transfer from BSC to bed. EOB sitting with posterior lean min (A), mod (A) with sit to stand with RW, with mobility assist to manage and propel RW and one therapist to provide balance assist. LE dress max (A), UE dress with robe mod (A). Grooming at EOB with no assist.     Rehab Prognosis: Good; patient would benefit from acute skilled OT services to address these deficits and reach maximum level of function.       Plan:     Patient to be seen 3 x/week " "to address the above listed problems via self-care/home management, therapeutic activities, therapeutic exercises, neuromuscular re-education, cognitive retraining  · Plan of Care Expires: 08/20/22  · Plan of Care Reviewed with: patient    Subjective     Chief Complaint: "I want to go home today. Call my doctor he will get  Me out of here."   Patient/Family Comments/goals: Daughter in agreement with SNF for further therapy.     Occupational Profile:  Living Environment: lives in [place] with assist for meals, she has suffered multiple falls with RW in apartment. She states she goes to the bathroom and showers with no assist. Patient is poor historian. Daughter states she has been declining over last two weeks and not safe in apartment at this time.   Equipment Used at Home:  walker, rolling  Assistance upon Discharge: staff limited     Pain/Comfort:  · Pain Rating 1: 0/10  · Pain Rating Post-Intervention 1: 0/10    Patients cultural, spiritual, Voodoo conflicts given the current situation: no    Objective:     Communicated with: RN prior to session.  Patient found on BSC with nurse with telemetry, bed alarm upon OT entry to room.    General Precautions: Standard, fall   Orthopedic Precautions:N/A   Braces: N/A  Respiratory Status: Room air    Occupational Performance:    Bed Mobility:    · Patient completed Scooting/Bridging with minimum assistance  · Patient completed Sit to Supine with minimum assistance    Functional Mobility/Transfers:  · Patient completed Sit <> Stand Transfer with moderate assistance  with  rolling walker   · Patient completed Bed <> Chair Transfer using Step Transfer technique with moderate assistance with rolling walker  · Patient completed Toilet Transfer Step Transfer technique with moderate assistance with  hand-held assist  · Functional Mobility: two person assist one to manage and propel RW and one to assist with posterior lean and balance.     Activities of Daily Living:  · Grooming: " stand by assistance at EOB  · Upper Body Dressing: minimum assistance    · Lower Body Dressing: maximal assistance    · Toileting: minimum assistance from BSC    Cognitive/Visual Perceptual:  Cognitive/Psychosocial Skills:     -       Oriented to: Person   -       Follows Commands/attention:Follows one-step commands  -       Memory: Impaired STM  -       Safety awareness/insight to disability: impaired     Physical Exam:  Upper Extremity Range of Motion:     -       Right Upper Extremity: WFL  -       Left Upper Extremity: WFL  Upper Extremity Strength:    -       Right Upper Extremity: WFL  -       Left Upper Extremity: WFL    AMPAC 6 Click ADL:  AMPAC Total Score: 16    Treatment & Education:   Pt educated on role of OT, POC, and goals for therapy.     POC was dicussed with patient/caregiver, who was included in its development and is in agreement with the identified goals and treatment plan.    Patient and family aware of patient's deficits and therapy progression.    Time provided for therapeutic counseling and discussion of health disposition.    Educated on importance of EOB/OOB mobility, maintaining routine, sitting up in chair, and maximizing independence with ADLs during admission    Pt completed ADLs and functional mobility for treatment session as noted above    Pt/caregiver verbalized understanding and expressed no further concerns/questions.   Updated communication board with level of assist required mod (A)  & educated RN/patient that pt is appropriate for BS with RN/PCT.     Education:    Patient left HOB elevated with all lines intact, call button in reach, bed alarm on and RN notified    GOALS:   Multidisciplinary Problems     Occupational Therapy Goals        Problem: Occupational Therapy    Goal Priority Disciplines Outcome Interventions   Occupational Therapy Goal     OT, PT/OT Ongoing, Progressing    Description: Goals to be met by: 08/20/22    Patient will increase functional  independence with ADLs by performing:    UE Dressing with Set-up Assistance.  LE Dressing with Set-up Assistance.  Grooming while standing at sink with Stand-by Assistance.  Toileting from toilet with Contact Guard Assistance for hygiene and clothing management.   Toilet transfer to toilet with Contact Guard Assistance.                     History:     Past Medical History:   Diagnosis Date    Abnormal stress test 11/18/2015    Arthritis     Bladder cancer     Cataract     Coronary artery disease involving native coronary artery 1/6/2016    Depression     Exudative age-related macular degeneration of left eye with active choroidal neovascularization 10/1/2020    GERD (gastroesophageal reflux disease)     HTN (hypertension), benign 11/18/2015    Hx of bladder infections     Hyperlipemia     OP (osteoporosis)     Positive cardiac stress test 1/6/2016    Renal cancer     Syncope 1/6/2016    TMJ (dislocation of temporomandibular joint)     Upper back pain 12/1/2015    Ureteral cancer     Venous insufficiency 2017    Villous adenoma of colon 5/31/2013       Past Surgical History:   Procedure Laterality Date    APPENDECTOMY      BACK SURGERY      CATARACT EXTRACTION W/  INTRAOCULAR LENS IMPLANT Left 3/16/15    kristy    CATARACT EXTRACTION W/  INTRAOCULAR LENS IMPLANT Right 4/6/15    kristy    COLONOSCOPY  10/21/2013    CYSTOSCOPY      ESOPHAGOGASTRODUODENOSCOPY N/A 1/30/2019    Procedure: EGD (ESOPHAGOGASTRODUODENOSCOPY);  Surgeon: Diony Dey MD;  Location: [address]);  Service: Endoscopy;  Laterality: N/A;    EYE SURGERY      NEPHRECTOMY      L    SPINE SURGERY      TONSILLECTOMY, ADENOIDECTOMY         Time Tracking:     OT Date of Treatment: 07/20/22  OT Start Time: 1006  OT Stop Time: 1045  OT Total Time (min): 39 min    Billable Minutes:Evaluation 5  Self Care/Home Management 34    7/20/2022

## 2022-07-20 NOTE — PLAN OF CARE
Eval and POC in place.  Problem: Occupational Therapy  Goal: Occupational Therapy Goal  Description: Goals to be met by: 08/20/22    Patient will increase functional independence with ADLs by performing:    UE Dressing with Set-up Assistance.  LE Dressing with Set-up Assistance.  Grooming while standing at sink with Stand-by Assistance.  Toileting from toilet with Contact Guard Assistance for hygiene and clothing management.   Toilet transfer to toilet with Contact Guard Assistance.    Outcome: Ongoing, Progressing      Letter recvd from [facility] that Lidocane 5% were approved.  Pt informed.    Juana Connelly LPN

## 2022-07-20 NOTE — PLAN OF CARE
07/20/22 1559   Post-Acute Status   Post-Acute Authorization Placement   Post-Acute Placement Status Referrals Sent       Referral to OSNF per daughter in law's request

## 2022-07-20 NOTE — ED NOTES
Attempted to call report to floor RN, floor RN unable to take report at this time. Will call back in 15 mins.

## 2022-07-20 NOTE — PT/OT/SLP EVAL
Physical Therapy Co-Evaluation and Co-Treatment with OT    Patient Name:  Elen Peters   MRN:  [id number]    Recent Surgery: * No surgery found *      *Co-treatment with OT due to suspected patient complexity and need for two skilled therapists to ensure safe mobilization.    Recommendations:     Discharge Recommendations:  nursing facility, skilled   Discharge Equipment Recommendations:  (tbd @ SNF)   Barriers to discharge: Increased level of assist and Decreased caregiver support    Highest Level of Mobility: Gait 80'  Assistance Required: Mod(A) w/ RW    Assessment:     Elen Peters is a 89 y.o. female admitted with a medical diagnosis of Subdural hematoma, post-traumatic. She presents with the following impairments/functional limitations:  weakness, impaired balance, decreased safety awareness, impaired endurance, impaired cognition, impaired self care skills, impaired functional mobility, decreased lower extremity function, gait instability    Pt met with HOB elevated and agreeable to PT session. Pt is A&Ox1 and forgetful at time of eval. Pt requires frequent redirection to task and perseverates on calling her PCP during session. She has very poor safety awareness and requires moderate assist for gait training with RW. She has very poor RW management (despite using RW at baseline) and requires assist from therapist for RW advancement. Pt's daughter-in-law arrived at the end of session and provided background info regarding PLOF. Pt is functioning below her baseline and is a high fall risk at this time.    Pt would benefit from continued skilled acute PT 3x/wk to address above listed functional deficits, provide patient/caregiver education, reduce fall risk, and maximize (I) and safety with functional mobility. After hospital discharge, pt would benefit from SNF to maximize rehab potential.    Rehab Prognosis: Good; patient would benefit from acute skilled PT services to address these deficits and reach maximum  "level of function.      Plan:     During this hospitalization, patient to be seen 3 x/week to address the identified rehab impairments via gait training, therapeutic activities, therapeutic exercises, neuromuscular re-education and progress toward the following goals:    · Plan of Care Expires:  08/20/22    This plan of care has been discussed with the patient/caregiver, who was included in its development and is in agreement with the identified goals and treatment plan.     Subjective     Communicated with RN prior to session.  Patient agreeable to participate.     Chief Complaint: Subdural hematoma    Patient/Family Comments/goals: "Get me out of here. I need to go home"    Pain/Comfort:  · Pain Rating 1: 0/10  · Pain Rating Post-Intervention 1: 0/10    Patients cultural, spiritual, Mosque conflicts given the current situation: no    Patient's living environment is as follows:  Living Environment: Pt lives at [facility].   Prior Level of Function: She typically gets staff assist for meals, but is otherwise modified independent with RW. Pt's daughter-in-law reports multiple falls and states pt has been declining over the past few weeks  DME used: walker, rolling  DME owned (not currently used): rolling walker  Upon discharge, patient will have assistance from: Staff, however assistance is limited    Objective:     Patient found on BSC with telemetry (no active lines)  upon PT entry to room.    General Precautions: Standard, fall   Orthopedic Precautions:N/A   Braces: N/A   BP (!) 158/74   Pulse 72   Temp 98 °F (36.7 °C)   Resp 18   Ht 5' 4" (1.626 m)   Wt 59.9 kg (132 lb)   SpO2 (!) 93%   BMI 22.66 kg/m²   Oxygen Device: room air      Exams:     Cognition:  o Patient is oriented to Person, follows simple 1-step commands 75% of the time  - Follows 2-step commands 25% of time     Edema: None present     Postural examination/scapula alignment: Rounded shoulder and Head forward     Lower " Extremity Range of Motion:  o Right Lower Extremity: WNL  o Left Lower Extremity: WNL     Lower Extremity Strength:      Iliopsoas Quadriceps Knee  Flexion (HS) Tibialis  anterior Gastro- cnemius EHL   R LE 4/5 4/5 4/5 4/5 4/5 NT   L LE 4/5 4/5 4/5 4/5 4/5 NT       Sensation:   o Light touch sensation: Intact BLEs    Functional Mobility:    Bed Mobility:  · Sit to Supine: Minimal Assistance    Transfers:   · Sit to Stand Transfer:  ·  Minimal Assistance  from BSC with B HHA   · Minimal assistance from EOB with B HHA  · BSC to bed: Moderate Assistance with B HHA   · Increased knee FL B             Gait:  · Patient received gait training in hallway 80 feet with Moderate Assistance and rolling walker  · Gait Assessment: unsteady gait, decreased step length, narrow base of support, decreased weight shift, ambulates outside LIBIA of RW, flexed posture and decreased jackie  · Gait Pattern Observed: Step-to shuffled gait  · Comments: All lines remained intact throughout ambulation trial, gait belt utilized. Pt struggled with RW management and ambulation sequencing. When PT assisted with guiding RW, pt able to take larger steps with improved LE clearance. When PT did not assist with guiding RW, pt became increasingly unstable. She completed 1 turn and attempted to ambulate outside RW LIBIA    Balance:  · Static Sit:   · Min Assist at EOB  · Fair: Patient able to maintain balance with handhold support; may require occasional minimal assistance.   · Posterior lean  · Static Stand:   · Min Assist with Hand-held assist x 2  · Fair: Patient able to maintain balance with handhold support; may require occasional minimal assistance.  · Dynamic Stand:  · Mod Assist with Rolling walker        Therapeutic Activities/Exercises      Patient assisted with functional mobility as noted above   Patient reoriented throughout session   Discussed POC with pt's daughter-in-law, agreeable to SNF placement   Patient educated on the importance  of early mobility, OOB to prevent functional decline during hospital stay   Patient was instructed to utilize staff assistance for mobility/transfers.  o Patient is appropriate to transfer with mod(A) and RN/PCT assist   Patient educated on PT POC and role of PT in acute care   White board updated to include patient's safest level of mobility with staff assistance, RN also updated    AM-PAC 6 CLICK MOBILITY  Turning over in bed (including adjusting bedclothes, sheets and blankets)?: 3  Sitting down on and standing up from a chair with arms (e.g., wheelchair, bedside commode, etc.): 2  Moving from lying on back to sitting on the side of the bed?: 3  Moving to and from a bed to a chair (including a wheelchair)?: 2  Need to walk in hospital room?: 2  Climbing 3-5 steps with a railing?: 1  Basic Mobility Total Score: 13      Patient left HOB elevated with all lines intact, call button in reach, bed alarm on, RN notified and daughter-in-law present.      History/Goals:     PAST MEDICAL HISTORY:  Past Medical History:   Diagnosis Date    Abnormal stress test 11/18/2015    Arthritis     Bladder cancer     Cataract     Coronary artery disease involving native coronary artery 1/6/2016    Depression     Exudative age-related macular degeneration of left eye with active choroidal neovascularization 10/1/2020    GERD (gastroesophageal reflux disease)     HTN (hypertension), benign 11/18/2015    Hx of bladder infections     Hyperlipemia     OP (osteoporosis)     Positive cardiac stress test 1/6/2016    Renal cancer     Syncope 1/6/2016    TMJ (dislocation of temporomandibular joint)     Upper back pain 12/1/2015    Ureteral cancer     Venous insufficiency 2017    Villous adenoma of colon 5/31/2013       Past Surgical History:   Procedure Laterality Date    APPENDECTOMY      BACK SURGERY      CATARACT EXTRACTION W/  INTRAOCULAR LENS IMPLANT Left 3/16/15    wagner    CATARACT EXTRACTION W/  INTRAOCULAR  LENS IMPLANT Right 4/6/15    wagner    COLONOSCOPY  10/21/2013    CYSTOSCOPY      ESOPHAGOGASTRODUODENOSCOPY N/A 2019    Procedure: EGD (ESOPHAGOGASTRODUODENOSCOPY);  Surgeon: Diony Dey MD;  Location: [facility] ([address] [place] [address]);  Service: Endoscopy;  Laterality: N/A;    EYE SURGERY      NEPHRECTOMY      L    SPINE SURGERY      TONSILLECTOMY, ADENOIDECTOMY         GOALS:   Multidisciplinary Problems     Physical Therapy Goals        Problem: Physical Therapy    Goal Priority Disciplines Outcome Goal Variances Interventions   Physical Therapy Goal     PT, PT/OT Ongoing, Progressing     Description: Goals to be met by: 8/3/22     Patient will increase functional independence with mobility by performin. Supine to sit with Stand-by Assistance  2. Sit to supine with Stand-by Assistance  3. Sit to stand transfer with Contact Guard Assistance  4. Bed to chair transfer with Minimal Assistance using RW  5. Gait  x 100 feet with Minimal Assistance using RW.   6. Lower extremity exercise program x15 reps per handout, with assistance as needed                     Time Tracking:     PT Received On: 22  PT Start Time: 1007     PT Stop Time: 1045  PT Total Time (min): 38 min     Billable Minutes: Evaluation 10, Gait Training 20 and Therapeutic Activity 8      Mariana Frankel, PT  2022  Pager# [phone]

## 2022-07-21 LAB
ANION GAP SERPL CALC-SCNC: 7 MMOL/L (ref 8–16)
BASOPHILS # BLD AUTO: 0.03 K/UL (ref 0–0.2)
BASOPHILS NFR BLD: 0.5 % (ref 0–1.9)
BUN SERPL-MCNC: 6 MG/DL (ref 10–30)
CALCIUM SERPL-MCNC: 9.2 MG/DL (ref 8.7–10.5)
CHLORIDE SERPL-SCNC: 94 MMOL/L (ref 95–110)
CO2 SERPL-SCNC: 29 MMOL/L (ref 23–29)
CREAT SERPL-MCNC: 0.7 MG/DL (ref 0.5–1.4)
DIFFERENTIAL METHOD: ABNORMAL
EOSINOPHIL # BLD AUTO: 0.1 K/UL (ref 0–0.5)
EOSINOPHIL NFR BLD: 1.4 % (ref 0–8)
ERYTHROCYTE [DISTWIDTH] IN BLOOD BY AUTOMATED COUNT: 13.3 % (ref 11.5–14.5)
EST. GFR  (AFRICAN AMERICAN): >60 ML/MIN/1.73 M^2
EST. GFR  (NON AFRICAN AMERICAN): >60 ML/MIN/1.73 M^2
GLUCOSE SERPL-MCNC: 89 MG/DL (ref 70–110)
HCT VFR BLD AUTO: 33.2 % (ref 37–48.5)
HGB BLD-MCNC: 11.6 G/DL (ref 12–16)
IMM GRANULOCYTES # BLD AUTO: 0.03 K/UL (ref 0–0.04)
IMM GRANULOCYTES NFR BLD AUTO: 0.5 % (ref 0–0.5)
LYMPHOCYTES # BLD AUTO: 1.2 K/UL (ref 1–4.8)
LYMPHOCYTES NFR BLD: 19.1 % (ref 18–48)
MCH RBC QN AUTO: 32.5 PG (ref 27–31)
MCHC RBC AUTO-ENTMCNC: 34.9 G/DL (ref 32–36)
MCV RBC AUTO: 93 FL (ref 82–98)
MONOCYTES # BLD AUTO: 0.6 K/UL (ref 0.3–1)
MONOCYTES NFR BLD: 9.9 % (ref 4–15)
NEUTROPHILS # BLD AUTO: 4.3 K/UL (ref 1.8–7.7)
NEUTROPHILS NFR BLD: 68.6 % (ref 38–73)
NRBC BLD-RTO: 0 /100 WBC
PLATELET # BLD AUTO: 280 K/UL (ref 150–450)
PMV BLD AUTO: 9.2 FL (ref 9.2–12.9)
POTASSIUM SERPL-SCNC: 3.7 MMOL/L (ref 3.5–5.1)
RBC # BLD AUTO: 3.57 M/UL (ref 4–5.4)
SODIUM SERPL-SCNC: 130 MMOL/L (ref 136–145)
WBC # BLD AUTO: 6.27 K/UL (ref 3.9–12.7)

## 2022-07-21 PROCEDURE — 36415 COLL VENOUS BLD VENIPUNCTURE: CPT | Performed by: PHYSICIAN ASSISTANT

## 2022-07-21 PROCEDURE — 85025 COMPLETE CBC W/AUTO DIFF WBC: CPT | Performed by: PHYSICIAN ASSISTANT

## 2022-07-21 PROCEDURE — G0378 HOSPITAL OBSERVATION PER HR: HCPCS

## 2022-07-21 PROCEDURE — 99225 PR SUBSEQUENT OBSERVATION CARE,LEVEL II: CPT | Mod: ,,, | Performed by: NEUROLOGICAL SURGERY

## 2022-07-21 PROCEDURE — 97530 THERAPEUTIC ACTIVITIES: CPT

## 2022-07-21 PROCEDURE — 25000003 PHARM REV CODE 250: Performed by: PHYSICIAN ASSISTANT

## 2022-07-21 PROCEDURE — 80048 BASIC METABOLIC PNL TOTAL CA: CPT | Performed by: PHYSICIAN ASSISTANT

## 2022-07-21 PROCEDURE — 99225 PR SUBSEQUENT OBSERVATION CARE,LEVEL II: ICD-10-PCS | Mod: ,,, | Performed by: NEUROLOGICAL SURGERY

## 2022-07-21 PROCEDURE — 97535 SELF CARE MNGMENT TRAINING: CPT

## 2022-07-21 PROCEDURE — 63600175 PHARM REV CODE 636 W HCPCS: Performed by: PHYSICIAN ASSISTANT

## 2022-07-21 PROCEDURE — 96376 TX/PRO/DX INJ SAME DRUG ADON: CPT

## 2022-07-21 RX ADMIN — ATORVASTATIN CALCIUM 40 MG: 20 TABLET, FILM COATED ORAL at 09:07

## 2022-07-21 RX ADMIN — LOSARTAN POTASSIUM 50 MG: 50 TABLET, FILM COATED ORAL at 10:07

## 2022-07-21 RX ADMIN — CITALOPRAM HYDROBROMIDE 20 MG: 10 TABLET ORAL at 09:07

## 2022-07-21 RX ADMIN — ONDANSETRON 4 MG: 2 INJECTION INTRAMUSCULAR; INTRAVENOUS at 09:07

## 2022-07-21 RX ADMIN — CARVEDILOL 6.25 MG: 6.25 TABLET, FILM COATED ORAL at 09:07

## 2022-07-21 RX ADMIN — LOSARTAN POTASSIUM 50 MG: 50 TABLET, FILM COATED ORAL at 09:07

## 2022-07-21 RX ADMIN — CARVEDILOL 6.25 MG: 6.25 TABLET, FILM COATED ORAL at 10:07

## 2022-07-21 RX ADMIN — SUCRALFATE 1 G: 1 TABLET ORAL at 12:07

## 2022-07-21 RX ADMIN — SUCRALFATE 1 G: 1 TABLET ORAL at 10:07

## 2022-07-21 RX ADMIN — SUCRALFATE 1 G: 1 TABLET ORAL at 04:07

## 2022-07-21 RX ADMIN — SUCRALFATE 1 G: 1 TABLET ORAL at 06:07

## 2022-07-21 RX ADMIN — PANTOPRAZOLE SODIUM 40 MG: 40 TABLET, DELAYED RELEASE ORAL at 08:07

## 2022-07-21 NOTE — NURSING
Pt complaining of stomach pain after breakfast, stating she might need to throw up. PRN zofran IV given, moderate relief oobtained. VSS, Neuro asx at baseline. WCTM.

## 2022-07-21 NOTE — ASSESSMENT & PLAN NOTE
Elen Peters is a 89 y.o. female on ASA 81 mg daily who presents after recent fall with head trauma and subsequent confusion, found to have thin SDH along the left inferior falx.    - Admit to NSGY for observation  - Neuro checks and vitals q4h  - Imaging reviewed:   - CTH 7/19: small 2-3 mm SDH along left aspect of inferior falx   -CTH 7/20: stable bleed  - Hold home ASA  - HTN: Goal SBP < 160. Continue home regimen. Hydralazine/labetalol PRN  - Headache: Tylenol PRN  - Hyponatremia: Noted to have Na of 125 on previous ED visit 7/13. Na on admission 127, rpx 127 today.   - Fluid restriction to 1L daily   - Follow up urine osm, urine Na, serum osm              - calc serum osm 261.5  - Debility, Falls: PT/OT, fall precautions  - Notify NSGY with any acute decline in exam    Dispo: Pending SNF

## 2022-07-21 NOTE — PT/OT/SLP PROGRESS
"Occupational Therapy   Treatment    Name: Elen Peters  MRN: [id number]  Admitting Diagnosis:  Subdural hematoma, post-traumatic       Recommendations:     Discharge Recommendations: nursing facility, skilled  Discharge Equipment Recommendations:  none  Barriers to discharge:  Decreased caregiver support    Assessment:     Elen Peters is a 89 y.o. female with a medical diagnosis of Subdural hematoma, post-traumatic.  She presents with decreased confusion, oriented to 3, daughter in law present. Performance deficits affecting function are weakness, gait instability, impaired endurance, impaired balance, impaired functional mobility, impaired self care skills, impaired cardiopulmonary response to activity, decreased safety awareness.   Patient very pleasant and easy to redirect. She understands upcoming discharge to SNF for further therapies. Found on BSC with PCT, Sit to stand from BSC with min (A) reports feeling off balance when no LOB noted. Transfer to RW, donned robe with min (A) in standing. Functional mobility with ' with CGA and cues for RW utilization and length of step. Noted short steps with narrow base of support.   Scooting in bed with verbal cues.   Rehab Prognosis:  Good; patient would benefit from acute skilled OT services to address these deficits and reach maximum level of function.       Plan:     Patient to be seen 3 x/week to address the above listed problems via self-care/home management, therapeutic activities, therapeutic exercises, neuromuscular re-education  · Plan of Care Expires: 08/20/22  · Plan of Care Reviewed with: patient, family    Subjective   "I am leaving tomorrow, to go up the road." O-SNF  Pain/Comfort:  · Pain Rating 1: 0/10  · Pain Rating Post-Intervention 1: 0/10    Objective:     Communicated with: RN prior to session.  Patient found BSC with PCT with  (no active lines) upon OT entry to room.    General Precautions: Standard, fall   Orthopedic Precautions:N/A "   Braces: N/A  Respiratory Status: Room air     Occupational Performance:     Bed Mobility:    · Patient completed Scooting/Bridging with contact guard assistance  · Patient completed Sit to Supine with contact guard assistance     Functional Mobility/Transfers:  · Patient completed Sit <> Stand Transfer with minimum assistance  with  rolling walker   · Patient completed Toilet Transfer Step Transfer technique with minimum assistance with  rolling walker  · Functional Mobility: in room and smallwood with RW and gait belt with CGA cues for RW utilization and to increase step length.    Activities of Daily Living:  · Feeding:  independence    · Grooming: supervision at EOB/chair  · Upper Body Dressing: minimum assistance to don gown standing with RW  · Lower Body Dressing: moderate assistance with socks  · Toileting: minimum assistance BSC      AMPA 6 Click ADL: 18    Treatment & Education:   Pt educated on role of OT, POC, and goals for therapy.     POC was dicussed with patient/caregiver, who was included in its development and is in agreement with the identified goals and treatment plan.    Patient and family aware of patient's deficits and therapy progression.    Time provided for therapeutic counseling and discussion of health disposition.    Educated on importance of EOB/OOB mobility, maintaining routine, sitting up in chair, and maximizing independence with ADLs during admission    Pt completed ADLs and functional mobility for treatment session as noted above    Pt/caregiver verbalized understanding and expressed no further concerns/questions.   Updated communication board with level of assist required min/CGA with RW & educated RN/patient that pt is appropriate for bathroom, BSC or smallwood with RN/PCT.       Patient left HOB elevated with all lines intact, call button in reach, Rn notified and daughter in law presentEducation:      GOALS:   Multidisciplinary Problems     Occupational Therapy Goals         Problem: Occupational Therapy    Goal Priority Disciplines Outcome Interventions   Occupational Therapy Goal     OT, PT/OT Ongoing, Progressing    Description: Goals to be met by: 08/20/22    Patient will increase functional independence with ADLs by performing:    UE Dressing with Set-up Assistance.  LE Dressing with Set-up Assistance.  Grooming while standing at sink with Stand-by Assistance.  Toileting from toilet with Contact Guard Assistance for hygiene and clothing management.   Toilet transfer to toilet with Contact Guard Assistance.                     Time Tracking:     OT Date of Treatment: 07/21/22  OT Start Time: 1440  OT Stop Time: 1503  OT Total Time (min): 23 min    Billable Minutes:Self Care/Home Management 12  Therapeutic Activity 11    OT/DAVID: OT          7/21/2022

## 2022-07-21 NOTE — PLAN OF CARE
Problem: Adult Inpatient Plan of Care  Goal: Plan of Care Review  7/21/2022 1747 by Eugene Jordan RN  Outcome: Ongoing, Progressing  Flowsheets (Taken 7/21/2022 1747)  Plan of Care Reviewed With:   patient   family  7/21/2022 1727 by Eugene Jordan RN  Outcome: Ongoing, Progressing  Flowsheets (Taken 7/21/2022 1727)  Plan of Care Reviewed With:   patient   family  Goal: Patient-Specific Goal (Individualized)  7/21/2022 1747 by Eugene Jordan RN  Outcome: Ongoing, Progressing  7/21/2022 1727 by Eugene Jordan RN  Outcome: Ongoing, Progressing     Problem: Fall Injury Risk  Goal: Absence of Fall and Fall-Related Injury  Outcome: Ongoing, Progressing     POC reviewed with the patient/family and they verbalized understanding. All comments and concerns addressed. Bed locked in lowest position with bed alarm set, call light within reach. Safety precautions maintained. Visimobile -- VSS, see flowsheets. No events this shift. Will continue to monitor for changes to POC and clinical condition.

## 2022-07-21 NOTE — PROGRESS NOTES
[facility] - Neurosurgery ([facility])  Neurosurgery  Progress Note    Subjective:     History of Present Illness: Elen Peters is a 89 y.o. female with PMH of TMJ, syncope, renal cancer, hyperlipidemia, hypertension, depression, CAD who presents to the ED for AMS following recent fall with head trauma. Daughter reports pt had a fall on 7/13 when getting up from the commode, hitting her head. She presented to the ED, CTH at that time appeared negative for bleed. Since the fall, daughter states patient has been more confused than usual. States she has some mild confusion at baseline, but noted her to be more irritable and not quite acting herself. She thought it was a UTI as she has had similar presentation with this in the past, she was prescribed Cipro by her PCP but failed to improved, so PCP recommended returning to the ED. Repeat CTH on arrival to the ED revealed thin SDH along the left side of the posterior falx. Patient denies any additional falls and none witnessed, but daughter reports patient had mentioned possibly falling again since 7/13. Patient reports she has been having increased headaches past several days. She also endorses nausea and dizziness, but states these have been chronic. Denies any vomiting. Patient take ASA 81 mg daily, believes she last took this yesterday.        Post-Op Info:  * No surgery found *         Interval History: 7/21: PRATIK. AFVSS. Na 130 . Pending SNF    Medications:  Continuous Infusions:  Scheduled Meds:   atorvastatin  40 mg Oral Daily    carvediloL  6.25 mg Oral BID    citalopram  20 mg Oral Daily    losartan  50 mg Oral BID    pantoprazole  40 mg Oral Daily    sucralfate  1 g Oral QID (AC & HS)     PRN Meds:acetaminophen, dextrose 10%, dextrose 10%, glucagon (human recombinant), glucose, glucose, glucose, hydrALAZINE, labetalol, ondansetron     Review of Systems  Objective:     Weight: 59.9 kg (132 lb)  Body mass index is 22.66 kg/m².  Vital Signs (Most  Recent):  Temp: 97.6 °F (36.4 °C) (07/21/22 1100)  Pulse: 72 (07/21/22 1100)  Resp: 20 (07/21/22 1100)  BP: (!) 141/79 (07/21/22 1100)  SpO2: 97 % (07/21/22 1100)   Vital Signs (24h Range):  Temp:  [97.6 °F (36.4 °C)-98.4 °F (36.9 °C)] 97.6 °F (36.4 °C)  Pulse:  [64-82] 72  Resp:  [17-20] 20  SpO2:  [92 %-97 %] 97 %  BP: (129-176)/(60-79) 141/79                          Physical Exam    Neurosurgery Physical Exam    E4V4M6, Aox2  Fcx4, 5/5 BUE BLE  SILT  No pathologic reflex  PERRL, EOMi, CN intact    Significant Labs:  Recent Labs   Lab 07/20/22  0715 07/21/22  0721   GLU 99 89   * 130*   K 3.4* 3.7   CL 92* 94*   CO2 27 29   BUN 6* 6*   CREATININE 0.7 0.7   CALCIUM 9.2 9.2     Recent Labs   Lab 07/20/22  0715 07/21/22  0721   WBC 6.60 6.27   HGB 12.1 11.6*   HCT 34.5* 33.2*    280     No results for input(s): LABPT, INR, APTT in the last 48 hours.  Microbiology Results (last 7 days)       ** No results found for the last 168 hours. **          All pertinent labs from the last 24 hours have been reviewed.    Significant Diagnostics:  I have reviewed and interpreted all pertinent imaging results/findings within the past 24 hours.  CT Head Without Contrast    Result Date: 7/20/2022  Stable appearance of left acute subdural hematoma.  No mass effect or midline shift. Chronic senescent and microvascular ischemic disease. Stable small left parietal scalp hematoma. Electronically signed by resident: Du Brambila Date:    07/20/2022 Time:    23:10 Electronically signed by: Lexa Shabazz MD Date:    07/20/2022 Time:    23:27       Assessment/Plan:     * Subdural hematoma, post-traumatic  Elen Peters is a 89 y.o. female on ASA 81 mg daily who presents after recent fall with head trauma and subsequent confusion, found to have thin SDH along the left inferior falx.    - Admit to NSGY for observation  - Neuro checks and vitals q4h  - Imaging reviewed:   - CTH 7/19: small 2-3 mm SDH along left aspect of  inferior falx   -CTH 7/20: stable bleed  - Hold home ASA  - HTN: Goal SBP < 160. Continue home regimen. Hydralazine/labetalol PRN  - Headache: Tylenol PRN  - Hyponatremia: Noted to have Na of 125 on previous ED visit 7/13. Na on admission 127, rpx 127 today.   - Fluid restriction to 1L daily   - Follow up urine osm, urine Na, serum osm              - calc serum osm 261.5  - Debility, Falls: PT/OT, fall precautions  - Notify NSGY with any acute decline in exam    Dispo: Pending SNF        Ken Neumann MD  Neurosurgery  [facility] - Neurosurgery ([facility])

## 2022-07-21 NOTE — PLAN OF CARE
Problem: Adult Inpatient Plan of Care  Goal: Plan of Care Review  Outcome: Ongoing, Progressing  Goal: Absence of Hospital-Acquired Illness or Injury  Outcome: Ongoing, Progressing  Goal: Readiness for Transition of Care  Outcome: Ongoing, Progressing     Problem: Fall Injury Risk  Goal: Absence of Fall and Fall-Related Injury  Outcome: Ongoing, Progressing  Intervention: Identify and Manage Contributors  Flowsheets (Taken 7/21/2022 0527)  Self-Care Promotion: independence encouraged  Intervention: Promote Injury-Free Environment  Flowsheets (Taken 7/21/2022 0527)  Safety Promotion/Fall Prevention:   Fall Risk reviewed with patient/family   commode/urinal/bedpan at bedside   bed alarm set   bedside commode chair   assistive device/personal item within reach   medications reviewed   nonskid shoes/socks when out of bed   lighting adjusted   side rails raised x 3     Problem: Skin Injury Risk Increased  Goal: Skin Health and Integrity  Outcome: Ongoing, Progressing

## 2022-07-21 NOTE — SUBJECTIVE & OBJECTIVE
Interval History: 7/21: PRATIK. AFVSS. Na 130 . Pending SNF    Medications:  Continuous Infusions:  Scheduled Meds:   atorvastatin  40 mg Oral Daily    carvediloL  6.25 mg Oral BID    citalopram  20 mg Oral Daily    losartan  50 mg Oral BID    pantoprazole  40 mg Oral Daily    sucralfate  1 g Oral QID (AC & HS)     PRN Meds:acetaminophen, dextrose 10%, dextrose 10%, glucagon (human recombinant), glucose, glucose, glucose, hydrALAZINE, labetalol, ondansetron     Review of Systems  Objective:     Weight: 59.9 kg (132 lb)  Body mass index is 22.66 kg/m².  Vital Signs (Most Recent):  Temp: 97.6 °F (36.4 °C) (07/21/22 1100)  Pulse: 72 (07/21/22 1100)  Resp: 20 (07/21/22 1100)  BP: (!) 141/79 (07/21/22 1100)  SpO2: 97 % (07/21/22 1100)   Vital Signs (24h Range):  Temp:  [97.6 °F (36.4 °C)-98.4 °F (36.9 °C)] 97.6 °F (36.4 °C)  Pulse:  [64-82] 72  Resp:  [17-20] 20  SpO2:  [92 %-97 %] 97 %  BP: (129-176)/(60-79) 141/79                          Physical Exam    Neurosurgery Physical Exam    E4V4M6, Aox2  Fcx4, 5/5 BUE BLE  SILT  No pathologic reflex  PERRL, EOMi, CN intact    Significant Labs:  Recent Labs   Lab 07/20/22  0715 07/21/22 0721   GLU 99 89   * 130*   K 3.4* 3.7   CL 92* 94*   CO2 27 29   BUN 6* 6*   CREATININE 0.7 0.7   CALCIUM 9.2 9.2     Recent Labs   Lab 07/20/22  0715 07/21/22  0721   WBC 6.60 6.27   HGB 12.1 11.6*   HCT 34.5* 33.2*    280     No results for input(s): LABPT, INR, APTT in the last 48 hours.  Microbiology Results (last 7 days)       ** No results found for the last 168 hours. **          All pertinent labs from the last 24 hours have been reviewed.    Significant Diagnostics:  I have reviewed and interpreted all pertinent imaging results/findings within the past 24 hours.  CT Head Without Contrast    Result Date: 7/20/2022  Stable appearance of left acute subdural hematoma.  No mass effect or midline shift. Chronic senescent and microvascular ischemic disease. Stable small left  parietal scalp hematoma. Electronically signed by resident: Du Brambila Date:    07/20/2022 Time:    23:10 Electronically signed by: Lexa Shabazz MD Date:    07/20/2022 Time:    23:27

## 2022-07-22 ENCOUNTER — HOSPITAL ENCOUNTER (INPATIENT)
Facility: HOSPITAL | Age: 87
LOS: 17 days | Discharge: HOSPICE | DRG: 083 | End: 2022-08-08
Attending: HOSPITALIST | Admitting: HOSPITALIST
Payer: MEDICARE

## 2022-07-22 VITALS
BODY MASS INDEX: 22.53 KG/M2 | HEIGHT: 64 IN | WEIGHT: 132 LBS | TEMPERATURE: 98 F | OXYGEN SATURATION: 98 % | HEART RATE: 63 BPM | SYSTOLIC BLOOD PRESSURE: 146 MMHG | RESPIRATION RATE: 18 BRPM | DIASTOLIC BLOOD PRESSURE: 73 MMHG

## 2022-07-22 DIAGNOSIS — R53.81 DEBILITY: Primary | ICD-10-CM

## 2022-07-22 DIAGNOSIS — S06.5XAA: ICD-10-CM

## 2022-07-22 LAB
ANION GAP SERPL CALC-SCNC: 8 MMOL/L (ref 8–16)
BASOPHILS # BLD AUTO: 0.04 K/UL (ref 0–0.2)
BASOPHILS NFR BLD: 0.6 % (ref 0–1.9)
BUN SERPL-MCNC: 8 MG/DL (ref 10–30)
CALCIUM SERPL-MCNC: 9.2 MG/DL (ref 8.7–10.5)
CHLORIDE SERPL-SCNC: 93 MMOL/L (ref 95–110)
CO2 SERPL-SCNC: 28 MMOL/L (ref 23–29)
CREAT SERPL-MCNC: 0.7 MG/DL (ref 0.5–1.4)
DIFFERENTIAL METHOD: ABNORMAL
EOSINOPHIL # BLD AUTO: 0.1 K/UL (ref 0–0.5)
EOSINOPHIL NFR BLD: 2 % (ref 0–8)
ERYTHROCYTE [DISTWIDTH] IN BLOOD BY AUTOMATED COUNT: 13.3 % (ref 11.5–14.5)
EST. GFR  (AFRICAN AMERICAN): >60 ML/MIN/1.73 M^2
EST. GFR  (NON AFRICAN AMERICAN): >60 ML/MIN/1.73 M^2
GLUCOSE SERPL-MCNC: 91 MG/DL (ref 70–110)
HCT VFR BLD AUTO: 33.5 % (ref 37–48.5)
HGB BLD-MCNC: 11.5 G/DL (ref 12–16)
IMM GRANULOCYTES # BLD AUTO: 0.02 K/UL (ref 0–0.04)
IMM GRANULOCYTES NFR BLD AUTO: 0.3 % (ref 0–0.5)
LYMPHOCYTES # BLD AUTO: 1.4 K/UL (ref 1–4.8)
LYMPHOCYTES NFR BLD: 19.8 % (ref 18–48)
MCH RBC QN AUTO: 31.9 PG (ref 27–31)
MCHC RBC AUTO-ENTMCNC: 34.3 G/DL (ref 32–36)
MCV RBC AUTO: 93 FL (ref 82–98)
MONOCYTES # BLD AUTO: 0.8 K/UL (ref 0.3–1)
MONOCYTES NFR BLD: 10.9 % (ref 4–15)
NEUTROPHILS # BLD AUTO: 4.7 K/UL (ref 1.8–7.7)
NEUTROPHILS NFR BLD: 66.4 % (ref 38–73)
NRBC BLD-RTO: 0 /100 WBC
PLATELET # BLD AUTO: 303 K/UL (ref 150–450)
PMV BLD AUTO: 9.5 FL (ref 9.2–12.9)
POTASSIUM SERPL-SCNC: 3.4 MMOL/L (ref 3.5–5.1)
RBC # BLD AUTO: 3.61 M/UL (ref 4–5.4)
SODIUM SERPL-SCNC: 129 MMOL/L (ref 136–145)
WBC # BLD AUTO: 7.07 K/UL (ref 3.9–12.7)

## 2022-07-22 PROCEDURE — 25000003 PHARM REV CODE 250: Performed by: HOSPITALIST

## 2022-07-22 PROCEDURE — 36415 COLL VENOUS BLD VENIPUNCTURE: CPT | Performed by: PHYSICIAN ASSISTANT

## 2022-07-22 PROCEDURE — 25000003 PHARM REV CODE 250: Performed by: NURSE PRACTITIONER

## 2022-07-22 PROCEDURE — G0378 HOSPITAL OBSERVATION PER HR: HCPCS

## 2022-07-22 PROCEDURE — 63600175 PHARM REV CODE 636 W HCPCS: Performed by: PHYSICIAN ASSISTANT

## 2022-07-22 PROCEDURE — 85025 COMPLETE CBC W/AUTO DIFF WBC: CPT | Performed by: PHYSICIAN ASSISTANT

## 2022-07-22 PROCEDURE — 80048 BASIC METABOLIC PNL TOTAL CA: CPT | Performed by: PHYSICIAN ASSISTANT

## 2022-07-22 PROCEDURE — 25000003 PHARM REV CODE 250: Performed by: PHYSICIAN ASSISTANT

## 2022-07-22 PROCEDURE — 11000004 HC SNF PRIVATE

## 2022-07-22 PROCEDURE — 96376 TX/PRO/DX INJ SAME DRUG ADON: CPT

## 2022-07-22 RX ORDER — ONDANSETRON 4 MG/1
4 TABLET, FILM COATED ORAL EVERY 8 HOURS PRN
Status: DISCONTINUED | OUTPATIENT
Start: 2022-07-22 | End: 2022-08-08 | Stop reason: HOSPADM

## 2022-07-22 RX ORDER — PANTOPRAZOLE SODIUM 40 MG/1
40 TABLET, DELAYED RELEASE ORAL DAILY
Status: DISCONTINUED | OUTPATIENT
Start: 2022-07-23 | End: 2022-07-29

## 2022-07-22 RX ORDER — CITALOPRAM 20 MG/1
20 TABLET, FILM COATED ORAL DAILY
Status: DISCONTINUED | OUTPATIENT
Start: 2022-07-23 | End: 2022-07-28

## 2022-07-22 RX ORDER — HYDRALAZINE HYDROCHLORIDE 25 MG/1
25 TABLET, FILM COATED ORAL EVERY 8 HOURS PRN
Status: DISCONTINUED | OUTPATIENT
Start: 2022-07-22 | End: 2022-08-08 | Stop reason: HOSPADM

## 2022-07-22 RX ORDER — SUCRALFATE 1 G/1
1 TABLET ORAL
Status: DISCONTINUED | OUTPATIENT
Start: 2022-07-22 | End: 2022-08-08 | Stop reason: HOSPADM

## 2022-07-22 RX ORDER — TALC
6 POWDER (GRAM) TOPICAL NIGHTLY PRN
Status: DISCONTINUED | OUTPATIENT
Start: 2022-07-22 | End: 2022-08-08 | Stop reason: HOSPADM

## 2022-07-22 RX ORDER — LANOLIN ALCOHOL/MO/W.PET/CERES
400 CREAM (GRAM) TOPICAL DAILY
Status: DISCONTINUED | OUTPATIENT
Start: 2022-07-23 | End: 2022-07-29

## 2022-07-22 RX ORDER — DICLOFENAC SODIUM 10 MG/G
2 GEL TOPICAL 2 TIMES DAILY
Status: DISCONTINUED | OUTPATIENT
Start: 2022-07-22 | End: 2022-08-08 | Stop reason: HOSPADM

## 2022-07-22 RX ORDER — ZOLPIDEM TARTRATE 5 MG/1
5 TABLET ORAL NIGHTLY PRN
Status: DISCONTINUED | OUTPATIENT
Start: 2022-07-22 | End: 2022-07-25

## 2022-07-22 RX ORDER — ACETAMINOPHEN 325 MG/1
650 TABLET ORAL EVERY 6 HOURS PRN
Status: DISCONTINUED | OUTPATIENT
Start: 2022-07-22 | End: 2022-07-26

## 2022-07-22 RX ORDER — FERROUS SULFATE, DRIED 160(50) MG
2 TABLET, EXTENDED RELEASE ORAL DAILY
Status: DISCONTINUED | OUTPATIENT
Start: 2022-07-23 | End: 2022-07-29

## 2022-07-22 RX ORDER — ATORVASTATIN CALCIUM 20 MG/1
80 TABLET, FILM COATED ORAL NIGHTLY
Status: DISCONTINUED | OUTPATIENT
Start: 2022-07-22 | End: 2022-07-28

## 2022-07-22 RX ORDER — CARVEDILOL 3.12 MG/1
6.25 TABLET ORAL 2 TIMES DAILY
Status: DISCONTINUED | OUTPATIENT
Start: 2022-07-22 | End: 2022-08-08 | Stop reason: HOSPADM

## 2022-07-22 RX ORDER — AMOXICILLIN 250 MG
1 CAPSULE ORAL 2 TIMES DAILY
Status: DISCONTINUED | OUTPATIENT
Start: 2022-07-22 | End: 2022-07-29

## 2022-07-22 RX ORDER — ZINC SULFATE 50(220)MG
220 CAPSULE ORAL DAILY
Status: DISCONTINUED | OUTPATIENT
Start: 2022-07-23 | End: 2022-07-29

## 2022-07-22 RX ORDER — CALCIUM CARBONATE 200(500)MG
500 TABLET,CHEWABLE ORAL 2 TIMES DAILY PRN
Status: DISCONTINUED | OUTPATIENT
Start: 2022-07-22 | End: 2022-08-08 | Stop reason: HOSPADM

## 2022-07-22 RX ORDER — FOLIC ACID 1 MG/1
1 TABLET ORAL DAILY
Status: DISCONTINUED | OUTPATIENT
Start: 2022-07-23 | End: 2022-07-29

## 2022-07-22 RX ORDER — ASPIRIN 81 MG/1
81 TABLET ORAL DAILY
Status: DISCONTINUED | OUTPATIENT
Start: 2022-07-23 | End: 2022-08-08 | Stop reason: HOSPADM

## 2022-07-22 RX ORDER — LOSARTAN POTASSIUM 50 MG/1
50 TABLET ORAL 2 TIMES DAILY
Status: DISCONTINUED | OUTPATIENT
Start: 2022-07-22 | End: 2022-07-28

## 2022-07-22 RX ADMIN — ATORVASTATIN CALCIUM 40 MG: 20 TABLET, FILM COATED ORAL at 10:07

## 2022-07-22 RX ADMIN — ONDANSETRON 4 MG: 2 INJECTION INTRAMUSCULAR; INTRAVENOUS at 10:07

## 2022-07-22 RX ADMIN — SENNOSIDES AND DOCUSATE SODIUM 1 TABLET: 50; 8.6 TABLET ORAL at 08:07

## 2022-07-22 RX ADMIN — HYDRALAZINE HYDROCHLORIDE 25 MG: 25 TABLET, FILM COATED ORAL at 06:07

## 2022-07-22 RX ADMIN — SUCRALFATE 1 G: 1 TABLET ORAL at 08:07

## 2022-07-22 RX ADMIN — CARVEDILOL 6.25 MG: 6.25 TABLET, FILM COATED ORAL at 10:07

## 2022-07-22 RX ADMIN — SUCRALFATE 1 G: 1 TABLET ORAL at 11:07

## 2022-07-22 RX ADMIN — ATORVASTATIN CALCIUM 80 MG: 40 TABLET, FILM COATED ORAL at 08:07

## 2022-07-22 RX ADMIN — CITALOPRAM HYDROBROMIDE 20 MG: 10 TABLET ORAL at 10:07

## 2022-07-22 RX ADMIN — SUCRALFATE 1 G: 1 TABLET ORAL at 05:07

## 2022-07-22 RX ADMIN — LOSARTAN POTASSIUM 50 MG: 50 TABLET, FILM COATED ORAL at 10:07

## 2022-07-22 RX ADMIN — CARVEDILOL 6.25 MG: 3.12 TABLET, FILM COATED ORAL at 08:07

## 2022-07-22 RX ADMIN — LOSARTAN POTASSIUM 50 MG: 50 TABLET, FILM COATED ORAL at 08:07

## 2022-07-22 RX ADMIN — PANTOPRAZOLE SODIUM 40 MG: 40 TABLET, DELAYED RELEASE ORAL at 10:07

## 2022-07-22 NOTE — ASSESSMENT & PLAN NOTE
Elen Peters is a 89 y.o. female on ASA 81 mg daily who presents after recent fall with head trauma and subsequent confusion, found to have thin SDH along the left inferior falx.    - Admit to NSGY for observation  - Neuro checks and vitals q4h  - Imaging reviewed:   - CTH 7/19: small 2-3 mm SDH along left aspect of inferior falx   -CTH 7/20: stable bleed  - Hold home ASA  - HTN: Goal SBP < 160. Continue home regimen. Hydralazine/labetalol PRN  - Headache: Tylenol PRN  - Hyponatremia: Noted to have Na of 125 on previous ED visit 7/13. Na on admission 127, rpx 127 today.   - Fluid restriction to 1L daily   - Follow up urine osm, urine Na, serum osm              - calc serum osm 261.5  - Debility, Falls: PT/OT, fall precautions  - Notify NSGY with any acute decline in exam    Dispo: SNF Today

## 2022-07-22 NOTE — DISCHARGE SUMMARY
[facility] - Neurosurgery ([facility])  Neurosurgery  Discharge Summary      Patient Name: Elen Peters  MRN: [id number]  Admission Date: 7/19/2022  Hospital Length of Stay: 0 days  Discharge Date and Time:  07/22/2022 12:43 PM  Attending Physician: Cresencio Reyez MD   Discharging Provider: Ken Neumann MD  Primary Care Provider: Jim Treadwell MD    HPI:   Elen Peters is a 89 y.o. female with PMH of TMJ, syncope, renal cancer, hyperlipidemia, hypertension, depression, CAD who presents to the ED for AMS following recent fall with head trauma. Daughter reports pt had a fall on 7/13 when getting up from the commode, hitting her head. She presented to the ED, CTH at that time appeared negative for bleed. Since the fall, daughter states patient has been more confused than usual. States she has some mild confusion at baseline, but noted her to be more irritable and not quite acting herself. She thought it was a UTI as she has had similar presentation with this in the past, she was prescribed Cipro by her PCP but failed to improved, so PCP recommended returning to the ED. Repeat CTH on arrival to the ED revealed thin SDH along the left side of the posterior falx. Patient denies any additional falls and none witnessed, but daughter reports patient had mentioned possibly falling again since 7/13. Patient reports she has been having increased headaches past several days. She also endorses nausea and dizziness, but states these have been chronic. Denies any vomiting. Patient take ASA 81 mg daily, believes she last took this yesterday.        * No surgery found *     Hospital Course: 7/18: NAEON. AF, VSS. Na 127 (127 7/19). No complaints of HA   7/21: NAPASHA. AFVSS. Na 130 . Pending SNF  7/22: PRATIK. AFVSS. Pending authorization to SNF today      Goals of Care Treatment Preferences:  Code Status: Full Code      Consults:   Consults (From admission, onward)        Status Ordering Provider     Inpatient consult to Neurosurgery   Once        Provider:  (Not yet assigned)    Completed CINTIA LUNDY          Significant Diagnostic Studies: Labs: All labs within the past 24 hours have been reviewed    Pending Diagnostic Studies:     None        Final Active Diagnoses:    Diagnosis Date Noted POA    PRINCIPAL PROBLEM:  Subdural hematoma, post-traumatic [S06.5X9A] 07/19/2022 Yes      Problems Resolved During this Admission:      Discharged Condition: stable     Disposition: Skilled Nursing Facility    Follow Up:   Follow-up Information     Cresencio Reyez MD Follow up in 6 week(s).    Specialty: Neurosurgery  Contact information:  Wanda MOYER  [facility] [id number] [phone]                       Patient Instructions:   No discharge procedures on file.  Medications:  Reconciled Home Medications: HOLD FOSAMAX WHILE IN SNF     Medication List      CONTINUE taking these medications    alendronate 70 MG tablet  Commonly known as: FOSAMAX  Take 1 tablet (70 mg total) by mouth every 7 days.     aspirin 81 MG EC tablet  Commonly known as: ECOTRIN  Take 81 mg by mouth once daily.     calcium-magnesium-zinc Tab  Take 2 tablets by mouth once daily at 6am. 1 Tablet Oral Every day     carvediloL 6.25 MG tablet  Commonly known as: COREG  Take 1 tablet (6.25 mg total) by mouth 2 (two) times daily.     citalopram 20 MG tablet  Commonly known as: CeleXA  Take 1 tablet (20 mg total) by mouth once daily.     diclofenac sodium 1 % Gel  Commonly known as: VOLTAREN  Apply 2 g topically 2 (two) times daily.     losartan 50 MG tablet  Commonly known as: COZAAR  Take 1 tablet (50 mg total) by mouth 2 (two) times a day.     mv-min-folic acid-lutein 0.4-250 mg-mcg Tab  Take by mouth. 1 Tablet Oral Every day     omega-3 fatty acids 1,000 mg Cap  Take 1 capsule by mouth once daily. 2  Capsule Oral Every day     ondansetron 4 MG tablet  Commonly known as: ZOFRAN  Take 1 tablet (4 mg total) by mouth every 8 (eight) hours as needed for Nausea.     pantoprazole  40 MG tablet  Commonly known as: PROTONIX  Take 1 tablet (40 mg total) by mouth once daily.     rosuvastatin 20 MG tablet  Commonly known as: CRESTOR  Take 1 tablet (20 mg total) by mouth once daily.     sucralfate 1 gram tablet  Commonly known as: CARAFATE  TAKE 1 TABLET(1 GRAM) BY MOUTH FOUR TIMES DAILY BEFORE MEALS AND AT NIGHT     ULTRA-LIGHT ROLLATOR Misc  Generic drug: walker  1 Units by Misc.(Non-Drug; Combo Route) route once daily at 6am.     zolpidem 5 MG Tab  Commonly known as: AMBIEN  Take 1 tablet (5 mg total) by mouth nightly as needed (insomnia).        STOP taking these medications    AVASTIN 25 mg/mL injection  Generic drug: bevacizumab     EYLEA 2 mg/0.05 mL Syrg  Generic drug: aflibercept            Ken Neumann MD  Neurosurgery  [facility] - Neurosurgery [facility])

## 2022-07-22 NOTE — NURSING
AVS reviewed w/ patient/family. Patient verbalized understanding of education. All comments and concerns addressed. PIV x 1 & visimobile removed. VSS at discharge. All belongings sent with pt via wheelchair van. SARKIS.

## 2022-07-22 NOTE — PLAN OF CARE
[facility]    FACILITY TRANSFER ORDERS      Patient Name: Elen Peters  YOB: 1929    PCP: Jim Treadwell MD   PCP Address: [facility] / [place] [address]  PCP Phone Number: [phone]  PCP Fax: [phone]    Encounter Date: 07/22/2022    Admit to: SNF    Vital Signs:  Routine    Diagnoses:   Active Hospital Problems    Diagnosis  POA    *Subdural hematoma, post-traumatic [S06.5X9A]  Yes      Resolved Hospital Problems   No resolved problems to display.       Allergies:  Review of patient's allergies indicates:   Allergen Reactions    Amlodipine Swelling    Sulfa (sulfonamide antibiotics)      Unknown as child    Codeine      Other reaction(s): Unknown    Maxzide [triamterene-hydrochlorothiazid]     Sulfamethoxazole-trimethoprim        Diet: regular diet    Activities: Activity as tolerated    Goals of Care Treatment Preferences:  Code Status: Full Code      Nursing: SNF     Labs: n/a    CONSULTS:    Physical Therapy to evaluate and treat.     MISCELLANEOUS CARE:  n/a    WOUND CARE ORDERS  None    Medications: Review discharge medications with patient and family and provide education.      Current Discharge Medication List      CONTINUE these medications which have NOT CHANGED    Details   alendronate (FOSAMAX) 70 MG tablet Take 1 tablet (70 mg total) by mouth every 7 days.  Qty: 4 tablet, Refills: 11    Associated Diagnoses: Osteoporosis with current pathological fracture with routine healing, unspecified osteoporosis type, subsequent encounter     ON HOLD WHILE PATIENT IS AT SNF     aspirin (ECOTRIN) 81 MG EC tablet Take 81 mg by mouth once daily.      calcium-magnesium-zinc Tab Take 2 tablets by mouth once daily at 6am. 1 Tablet Oral Every day      carvediloL (COREG) 6.25 MG tablet Take 1 tablet (6.25 mg total) by mouth 2 (two) times daily.  Qty: 180 tablet, Refills: 3    Comments: .  Associated Diagnoses: HTN (hypertension), benign      citalopram (CELEXA) 20 MG  tablet Take 1 tablet (20 mg total) by mouth once daily.  Qty: 90 tablet, Refills: 3    Associated Diagnoses: Situational stress      diclofenac sodium (VOLTAREN) 1 % Gel Apply 2 g topically 2 (two) times daily.  Qty: 20 g, Refills: 3    Comments: Please dispense the largest Vol that is affordable and reimbursable with her insurance.      losartan (COZAAR) 50 MG tablet Take 1 tablet (50 mg total) by mouth 2 (two) times a day.  Qty: 180 tablet, Refills: 3    Comments: .      mv-min-folic acid-lutein 0.4-250 mg-mcg Tab Take by mouth. 1 Tablet Oral Every day      omega-3 fatty acids 1,000 mg Cap Take 1 capsule by mouth once daily. 2  Capsule Oral Every day      ondansetron (ZOFRAN) 4 MG tablet Take 1 tablet (4 mg total) by mouth every 8 (eight) hours as needed for Nausea.  Qty: 30 tablet, Refills: 0      pantoprazole (PROTONIX) 40 MG tablet Take 1 tablet (40 mg total) by mouth once daily.  Qty: 90 tablet, Refills: 3      rosuvastatin (CRESTOR) 20 MG tablet Take 1 tablet (20 mg total) by mouth once daily.  Qty: 90 tablet, Refills: 3    Associated Diagnoses: HTN (hypertension), benign; Mixed hyperlipidemia      sucralfate (CARAFATE) 1 gram tablet TAKE 1 TABLET(1 GRAM) BY MOUTH FOUR TIMES DAILY BEFORE MEALS AND AT NIGHT  Qty: 30 tablet, Refills: 1      walker (ULTRA-LIGHT ROLLATOR) Misc 1 Units by Misc.(Non-Drug; Combo Route) route once daily at 6am.  Qty: 1 each, Refills: 0    Comments: R29.6, R53.81, R26.81 Z85.528      zolpidem (AMBIEN) 5 MG Tab Take 1 tablet (5 mg total) by mouth nightly as needed (insomnia).  Qty: 30 tablet, Refills: 0    Associated Diagnoses: Insomnia, unspecified type         STOP taking these medications       AVASTIN 25 mg/mL injection Comments:   Reason for Stopping:         EYLEA 2 mg/0.05 mL Syrg Comments:   Reason for Stopping:                  Immunizations Administered as of 7/22/2022     Name Date Dose VIS Date Route Exp Date    COVID-19, MRNA, LN-S, PF (Pfizer) (Purple Cap) 10/4/2021 12:16  PM 0.3 mL 12/12/2020 Intramuscular 10/31/2021    Site: Right deltoid     Given By: Aurora Calvo LPN     : [employer]     Lot: [id number]     Comment: Third dose     COVID-19, MRNA, LN-S, PF (Pfizer) (Purple Cap) 1/31/2021 11:45 AM 0.3 mL 12/12/2020 Intramuscular 5/31/2021    Site: Left deltoid     Given By: Francia Alas     : [employer]     Lot: [id number]     COVID-19, MRNA, LN-S, PF (Pfizer) (Purple Cap) 1/10/2021 11:49 AM 0.3 mL 12/12/2020 Intramuscular 4/30/2021    Site: Left deltoid     Given By: Janet Katz RN     : [employer]     Lot: [id number]           This patient has had both covid vaccinations    Some patients may experience side effects after vaccination.  These may include fever, headache, muscle or joint aches.  Most symptoms resolve with 24-48 hours and do not require urgent medical evaluation unless they persist for more than 72 hours or symptoms are concerning for an unrelated medical condition.          _________________________________  Ken Neumann MD  07/22/2022

## 2022-07-22 NOTE — PLAN OF CARE
Patient accepted by [facility].  DEONNA called son Addy who was on the summary page as contact. Addy stated he was not in agreement with this plan and that the patient should return to [facility].  CM had communicated with patient yesterday that we were working on plan to get her to [facility].  CM had advised Addy that she did not advise patient today  But the PT note reflected that the patient stated she was going to [facility].     CM went to room and patient daughter in law was at bedside who stated that they were in agreement to plan.  DEONNA asked who the POA was and was told Tavo, although there is no POA paperwork in system.  Tavo called and advised they wanted the patient to go to [facility].  DEONNA also spoke with patient who is in agreement with the plan.  DEONNA asked Tavo to call brother Addy to advise him the patient would go to [facility] today.

## 2022-07-22 NOTE — PLAN OF CARE
[facility] - Neurosurgery (Hospital)  Discharge Final Note    Primary Care Provider: Jim Treadwell MD    Expected Discharge Date: 7/22/2022     Patient to be discharged to [facility].  Patient and son Tavo (POA) in agreement to patient going to [facility].  Care deferred to [facility].  PFC to provide wheelchair transportation.  Neurosurgery clinic to schedule follow up appointment.    Nurse to call report to [phone].  Wheelchair requested for 3:30 which is not a guaranteed arrival time.      Future Appointments   Date Time Provider Department Center   7/26/2022  8:50 AM MONIE Hanna MD [facility]   7/27/2022 11:00 AM LAB, APPOINTMENT [facility] V[facility]   7/27/2022  1:30 PM [facility]   8/11/2022  8:00 AM Anita Alcalas, NP [address]   10/26/2022 10:00 AM LAB, [facility] [facility] [place]     Final Discharge Note (most recent)     Final Note - 07/22/22 1439        Final Note    Assessment Type Final Discharge Note     Anticipated Discharge Disposition Skilled Nursing Facility        Post-Acute Status    Post-Acute Authorization Placement     Post-Acute Placement Status Set-up Complete/Auth obtained     Discharge Delays None known at this time                 Important Message from Medicare             Contact Info     Cresencio Reyez MD   Specialty: Neurosurgery    [address]  [place] [address]   Phone: [phone]       Next Steps: Follow up in 6 week(s)

## 2022-07-22 NOTE — DISCHARGE INSTRUCTIONS
--Patient stable for discharge to SNF    --Please take prescriptions as detailed in medication list    --All questions/concerns addressed and answered    --Please followup with neurosurgery clinic in 6 weeks with to be arranged by Neurosurgery Clinic   --Please call immediately for any new onset nausea/vomiting/fever/chills, wound breakdown, numbness/tingling/weakness

## 2022-07-22 NOTE — PT/OT/SLP PROGRESS
Physical Therapy      Patient Name:  Elen Peters   MRN:  [id number]    Patient not seen today secondary to pt with discharge orders to [facility]. Will follow-up if pt remains in house.

## 2022-07-22 NOTE — NURSING
[phone]. Tried to call report x 3, number not connecting through, busy tone/disconnected tone only. Notified Suki with case management. Pending wheelchair van . [facility].

## 2022-07-23 PROCEDURE — 97535 SELF CARE MNGMENT TRAINING: CPT

## 2022-07-23 PROCEDURE — 97165 OT EVAL LOW COMPLEX 30 MIN: CPT

## 2022-07-23 PROCEDURE — 11000004 HC SNF PRIVATE

## 2022-07-23 PROCEDURE — 25000003 PHARM REV CODE 250: Performed by: HOSPITALIST

## 2022-07-23 PROCEDURE — 97530 THERAPEUTIC ACTIVITIES: CPT

## 2022-07-23 PROCEDURE — 97162 PT EVAL MOD COMPLEX 30 MIN: CPT

## 2022-07-23 RX ADMIN — CARVEDILOL 6.25 MG: 3.12 TABLET, FILM COATED ORAL at 10:07

## 2022-07-23 RX ADMIN — ATORVASTATIN CALCIUM 80 MG: 40 TABLET, FILM COATED ORAL at 10:07

## 2022-07-23 RX ADMIN — PANTOPRAZOLE SODIUM 40 MG: 40 TABLET, DELAYED RELEASE ORAL at 09:07

## 2022-07-23 RX ADMIN — SENNOSIDES AND DOCUSATE SODIUM 1 TABLET: 50; 8.6 TABLET ORAL at 10:07

## 2022-07-23 RX ADMIN — FOLIC ACID 1 MG: 1 TABLET ORAL at 09:07

## 2022-07-23 RX ADMIN — DICLOFENAC SODIUM 2 G: 10 GEL TOPICAL at 09:07

## 2022-07-23 RX ADMIN — SUCRALFATE 1 G: 1 TABLET ORAL at 09:07

## 2022-07-23 RX ADMIN — ZINC SULFATE 220 MG (50 MG) CAPSULE 220 MG: CAPSULE at 06:07

## 2022-07-23 RX ADMIN — THERA TABS 1 TABLET: TAB at 09:07

## 2022-07-23 RX ADMIN — SUCRALFATE 1 G: 1 TABLET ORAL at 05:07

## 2022-07-23 RX ADMIN — LOSARTAN POTASSIUM 50 MG: 50 TABLET, FILM COATED ORAL at 09:07

## 2022-07-23 RX ADMIN — Medication 2 TABLET: at 06:07

## 2022-07-23 RX ADMIN — ONDANSETRON HYDROCHLORIDE 4 MG: 4 TABLET, FILM COATED ORAL at 09:07

## 2022-07-23 RX ADMIN — SUCRALFATE 1 G: 1 TABLET ORAL at 06:07

## 2022-07-23 RX ADMIN — CITALOPRAM HYDROBROMIDE 20 MG: 20 TABLET ORAL at 09:07

## 2022-07-23 RX ADMIN — SENNOSIDES AND DOCUSATE SODIUM 1 TABLET: 50; 8.6 TABLET ORAL at 09:07

## 2022-07-23 RX ADMIN — Medication 400 MG: at 06:07

## 2022-07-23 RX ADMIN — SUCRALFATE 1 G: 1 TABLET ORAL at 11:07

## 2022-07-23 RX ADMIN — ASPIRIN 81 MG: 81 TABLET, COATED ORAL at 09:07

## 2022-07-23 RX ADMIN — LOSARTAN POTASSIUM 50 MG: 50 TABLET, FILM COATED ORAL at 10:07

## 2022-07-23 RX ADMIN — Medication 2 CAPSULE: at 09:07

## 2022-07-23 RX ADMIN — CARVEDILOL 6.25 MG: 3.12 TABLET, FILM COATED ORAL at 09:07

## 2022-07-23 NOTE — PLAN OF CARE
Problem: Occupational Therapy  Goal: Occupational Therapy Goal  Description: Goals to be met by: 08/23/22     Patient will increase functional independence with ADLs by performing:    UE Dressing with Modified Dell.  LE Dressing with Modified Dell.  Grooming while standing at sink with Supervision.  Toileting from toilet with Supervision for hygiene and clothing management.   Bathing from  shower chair/bench with Supervision.  Toilet transfer to toilet with Supervision.  Upper extremity exercise program 3 x15 reps per handout, with supervision to increase UE strength/endurance for improved func mobility skills  Stand during functional task for 10' with supervision in preparation for standing ADLs.    Outcome: Ongoing, Progressing     Pt was agreeable to and participated in OT evaluation.  Pt reports that she was mod I with mobility and ADls using AD at [facility].  Pt also reports that she has had a few falls in the recent weeks.  Pt currently completed func mobility with SBA - min A and ADLs with set up to min A.  Pt requires close supervision for safety due to cognitive deficits.  Goals established to assist pt with returning to [facility] regarding ADLs and func mobility.  Pt will benefit from skilled OT services in order to increase his level of safety and independence with ADLs and mobility.      Kerrie Harden, OT  7/23/2022

## 2022-07-23 NOTE — PLAN OF CARE
Problem: Physical Therapy  Goal: Physical Therapy Goal  Description: Goals to be met by: 8/22/22    Patient will increase functional independence with mobility by performing:    . Supine to sit with Stand-by Assistance  . Sit to supine with Stand-by Assistance  . Rolling to Left and Right with Supervision.  . Sit to stand transfer with Supervision  . Bed to chair transfer with Supervision using No Assistive Device/ LRAD  . Gait  x 100 feet with Stand-by Assistance using Rolling Walker.   . Wheelchair propulsion x100 feet with Stand-by Assistance using bilateral uppper extremities  . Ascend/Descend 2 inch curb step with Minimal Assistance using Rolling Walker.  . Retrieve object off floor in standing with RW and reacher and Emma    Outcome: Ongoing, Progressing

## 2022-07-23 NOTE — PT/OT/SLP EVAL
"Occupational Therapy   Evaluation & Tx    Name: Elen Peters  MRN: [id number]  Admit Date: 7/22/2022  Admitting Diagnosis:  <principal problem not specified>   Recent Surgeries: NA    General Precautions: Standard, fall   Orthopedic Precautions:N/A   Braces: N/A     Recommendations:     Discharge Recommendations: home health OT, assisted living facility  Level of Assistance Recommended: 24 hours light assistance and 24 hours supervision  Discharge Equipment Recommendations:  bedside commode  Barriers to discharge:  Decreased caregiver support    Assessment:     Elen Peters is a 89 y.o. female with a medical diagnosis of <principal problem not specified>.  She presents with the following performance deficits affecting function: weakness, impaired endurance, impaired self care skills, impaired functional mobility, gait instability, impaired balance, impaired cognition, decreased coordination, decreased safety awareness, decreased lower extremity function.  Pt was agreeable to and participated in OT evaluation.  Pt reports that she was mod I with mobility and ADls using AD at [facility].  Pt also reports that she has had a few falls in the recent weeks.  Pt currently completed func mobility with SBA - min A and ADLs with set up to min A.  Pt requires close supervision for safety due to cognitive deficits.  Goals established to assist pt with returning to [facility] regarding ADLs and func mobility.  Pt will benefit from skilled OT services in order to increase his level of safety and independence with ADLs and mobility.      Rehab Potential is good    Activity Tolerance: Good    Plan:     Patient to be seen 5 x/week to address the above listed problems via self-care/home management, therapeutic activities, therapeutic exercises  · Plan of Care Expires: 08/22/22  · Plan of Care Reviewed with: patient    Subjective     Chief Complaint: fatigue  Patient/Family Comments/goals: "stand up better"    Occupational Profile:  Living " Environment: pt lives in an [place] where she receives A for meals - pt has a WIS with GB by toilet and shower  Previous level of function: mod I with mobility and ADLs using AD  Roles and Routines: mother  Equipment Used at Home:  walker, rolling, grab bar, shower chair  Assistance upon Discharge: may vary with services selected at [facility]    Pain/Comfort:  Pain Rating 1: 0/10  Pain Rating Post-Intervention 1: 0/10    Patients cultural, spiritual, Evangelical conflicts given the current situation: no    Objective:     Communicated with: nurse prior to session.  Patient found HOB elevated with  (no active lines) upon OT entry to room.    Occupational Performance:     Be   07/23/22 0900   Oral Hygiene   Assistance Needed Set-up A seated at sink in w/c - also washed face and combed hair   Toileting Hygiene   Assistance Needed Physical assistance - CGA in stand and A to straighten clothes   Physical Assistance Level 25% or less   Shower/Bathe Self   Assistance Needed Physical assistance - sponge bath at sink   Physical Assistance Level 25% or less   Upper Body Dressing   Assistance Needed Set-up A with bra and pull over shirt   Lower Body Dressing   Assistance Needed Physical assistance - A to insert L LE into pants and underwear - CGA when standing to pull up to waist - initially attempted to liset pants in standing but noted with 2 episodes of LOB before sitting to perform task   Physical Assistance Level 26%-50%   Putting On/Taking Off Footwear   Assistance Needed Physical assistance - adjust R sock   Physical Assistance Level 25% or less   Roll Left and Right   Assistance Needed Supervision   Comment using bedrail   Lying to Sitting on Side of Bed   Assistance Needed Supervision   Comment using bed rails - increased time to initiate   Sit to Stand   Assistance Needed Physical assistance - CGA to stand using RW and min A to sit (A to lower slowly)   Physical Assistance Level 25% or less   Toilet Transfer   Assistance Needed  Physical assistance - lowering assist   Physical Assistance Level 25% or less     Cognitive/Visual Perceptual:  Cognitive/Psychosocial Skills:     -       Oriented to: Person, Place, Time and Situation   -       Follows Commands/attention:Easily distracted and Follows one-step commands  -       Communication: clear/fluent  -       Memory: Impaired STM  -       Safety awareness/insight to disability: impaired   -       Mood/Affect/Coping skills/emotional control: Appropriate to situation  Visual/Perceptual:  -wears glasses      Physical Exam:  Balance: -       sitting = good; standing = requires CGA - min A;  During standing to liset pants, pt noted with LOB x2 - instructed to liset pants in sitting and only stand to pull up to waist  Postural examination/scapula alignment:    -       Rounded shoulders  -       Forward head  -       Posterior pelvic tilt  Skin integrity: Visible skin intact  Edema:  None noted  Sensation: -       Intact  Dominant hand: -       right  Upper Extremity Range of Motion:     -       Right Upper Extremity: WFL  -       Left Upper Extremity: WFL  Upper Extremity Strength:    -       Right Upper Extremity: WFL  -       Left Upper Extremity: WFL   Strength:    -       Right Upper Extremity: WFL  -       Left Upper Extremity: WFL    AMPAC 6 Click ADL:  AMPAC Total Score: 19    Treatment & Education:  · Pt completed ADLs and func mobility activities for tx session as noted above  · Whiteboard updated  · Pt educated on role of OT and POC    Education:    Patient left up in chair with call button in reach    GOALS:   Multidisciplinary Problems     Occupational Therapy Goals        Problem: Occupational Therapy    Goal Priority Disciplines Outcome Interventions   Occupational Therapy Goal     OT, PT/OT Ongoing, Progressing    Description: Goals to be met by: 08/23/22     Patient will increase functional independence with ADLs by performing:    UE Dressing with Modified [place].  LE Dressing  with Modified [place].  Grooming while standing at sink with Supervision.  Toileting from toilet with Supervision for hygiene and clothing management.   Bathing from  shower chair/bench with Supervision.  Toilet transfer to toilet with Supervision.  Upper extremity exercise program 3 x15 reps per handout, with supervision to increase UE strength/endurance for improved func mobility skills  Stand during functional task for 10' with supervision in preparation for standing ADLs.                     History:     Past Medical History:   Diagnosis Date    Abnormal stress test 11/18/2015    Arthritis     Bladder cancer     Cataract     Coronary artery disease involving native coronary artery 1/6/2016    Depression     Exudative age-related macular degeneration of left eye with active choroidal neovascularization 10/1/2020    GERD (gastroesophageal reflux disease)     HTN (hypertension), benign 11/18/2015    Hx of bladder infections     Hyperlipemia     OP (osteoporosis)     Positive cardiac stress test 1/6/2016    Renal cancer     Syncope 1/6/2016    TMJ (dislocation of temporomandibular joint)     Upper back pain 12/1/2015    Ureteral cancer     Venous insufficiency 2017    Villous adenoma of colon 5/31/2013         Past Surgical History:   Procedure Laterality Date    APPENDECTOMY      BACK SURGERY      CATARACT EXTRACTION W/  INTRAOCULAR LENS IMPLANT Left 3/16/15    wagner    CATARACT EXTRACTION W/  INTRAOCULAR LENS IMPLANT Right 4/6/15    kristy    COLONOSCOPY  10/21/2013    CYSTOSCOPY      ESOPHAGOGASTRODUODENOSCOPY N/A 1/30/2019    Procedure: EGD (ESOPHAGOGASTRODUODENOSCOPY);  Surgeon: Diony Dey MD;  Location: [address];  Service: Endoscopy;  Laterality: N/A;    EYE SURGERY      NEPHRECTOMY      L    SPINE SURGERY      TONSILLECTOMY, ADENOIDECTOMY         Time Tracking:     OT Date of Treatment: 07/23/22  OT Start Time: 0802  OT Stop Time: 0855  OT Total Time (min):  53 min    Billable Minutes:Evaluation 20  Self Care/Home Management 33    7/23/2022

## 2022-07-23 NOTE — PT/OT/SLP EVAL
Physical Therapy Evaluation    Patient Name:  Elen Peters   MRN:  [id number]  Admit Date: 7/22/2022  Admitting Diagnosis: Subdural hematoma, post-traumatic  Recent Surgeries: N/A    General Precautions: Standard, fall   Orthopedic Precautions:N/A   Braces: N/A     Recommendations:     Discharge Recommendations:  assisted living facility   Level of Assistance Recommended: 24 hours light assistance  Discharge Equipment Recommendations: bedside commode, wheelchair, walker, rolling, shower chair   Barriers to discharge: Decreased caregiver support    Assessment:     Elen Peters is a 89 y.o. female admitted with a medical diagnosis of Subdural hematoma, post-traumatic.  Performance deficits affecting function  weakness, impaired endurance, impaired self care skills, impaired functional mobility, gait instability, impaired balance, decreased upper extremity function, decreased lower extremity function, impaired cardiopulmonary response to activity. Pt appeared very fearful of falling and c/o of nausea towards the end of her PT eval. Pt's nurse notified. Pt also presented with posterior lean during transfers and GT, requiring Emma/ModA to correct. Pt was Mod I PTA and will therefore benefit from continued snf rehab to help pt. Improve her overall functional mobility and safety.    Rehab Potential is good     Activity Tolerance: Good    Plan:     Patient to be seen 6 x/week to address the above listed problems via gait training, therapeutic activities, therapeutic exercises, neuromuscular re-education, wheelchair management/training     Plan of Care Expires: 08/22/22  Plan of Care Reviewed with: patient    Subjective     Chief Complaint: pt. Agreeable to work with PT  Patient/Family Comments/goals: to gain (I)  Pain/Comfort:  · Pain Rating 1: 0/10  · Pain Rating Post-Intervention 1: 0/10    Living Environment: (pt confirmed information to be correct on acute PT eval note)  Pt [facility] at [facility].   Prior Level  of Function: She typically gets staff assist for meals, but is otherwise modified independent with RW. Pt's daughter-in-law reports multiple falls and states pt has been declining over the past few weeks  DME used: walker, rolling  DME owned (not currently used): rolling walker  Upon discharge, patient will have assistance from: Staff, however assistance is limited    Patients cultural, spiritual, Buddhism conflicts given the current situation: no    Objective:     Communicated with pt's nurse prior to session.  Patient found up in chair with    upon PT entry to room.    Exams:  · Cognitive Exam:  Patient is oriented to Person, Place and Situation  · Gross Motor Coordination:  WFL  · Sensation: -       Intact  · Skin Integrity/Edema:      · -       Skin integrity: Visible skin intact  · RLE ROM: WFL  · RLE Strength: grossly 4/5  · LLE ROM: WFL  · LLE Strength: grossly 4/5    Functional Mobility:     07/23/22 0947   Prior Functioning: Everyday Activities   Self Care Independent   Indoor Mobility (Ambulation) Independent   Stairs Unknown   Functional Cognition Independent   Prior Device Use Walker   Roll Left and Right   Assistance Needed Supervision   Comment per OT eval using BSrail   CARE Score - Roll Left and Right 4   Sit to Lying   Physical Assistance Level 25% or less   Comment not using BSrail   CARE Score - Sit to Lying 3   Lying to Sitting on Side of Bed   Physical Assistance Level 25% or less   Comment not using BS rail   CARE Score - Lying to Sitting on Side of Bed 3   Sit to Stand   Physical Assistance Level 26%-50%   Comment from w/c and BSC with RW and no AD and Mod A d.t pt with post lean and fearful of falling   CARE Score - Sit to Stand 3   Chair/Bed-to-Chair Transfer   Physical Assistance Level 26%-50%   Comment SPT, no AD   CARE Score - Chair/Bed-to-Chair Transfer 3   Toilet Transfer   Physical Assistance Level 26%-50%   Comment SPT w/c <>BSC with no AD   CARE Score - Toilet Transfer 3   Car  Transfer   Reason if not Attempted Environmental limitations   CARE Score - Car Transfer 10   Walk 10 Feet   Physical Assistance Level 26%-50%   Comment with RW, decrease step length and jackie, and pt tending to flex B knees when getting fatigued. pt ambulated 22ft +16ft   CARE Score - Walk 10 Feet 3   Walk 10 Feet Discharge Goal   Discharge Goal 4   Walk 50 Feet with Two Turns   Reason if not Attempted Safety concerns   CARE Score - Walk 50 Feet with Two Turns 88   Walk 150 Feet   Reason if not Attempted Safety concerns   CARE Score - Walk 150 Feet 88   Walking 10 Feet on Uneven Surfaces   Reason if not Attempted Safety concerns   CARE Score - Walking 10 Feet on Uneven Surfaces 88   1 Step (Curb)   Reason if not Attempted Safety concerns   CARE Score - 1 Step (Curb) 88   4 Steps   Reason if not Attempted Safety concerns   CARE Score - 4 Steps 88   12 Steps   Reason if not Attempted Safety concerns   CARE Score - 12 Steps 88   Picking Up Object   Reason if not Attempted Safety concerns   CARE Score - Picking Up Object 88   Uses a Wheelchair/Scooter?   Uses a Wheelchair/Scooter? Yes   Wheel 50 Feet with Two Turns   Physical Assistance Level 26%-50%   Comment using B U/E's   CARE Score - Wheel 50 Feet with Two Turns 3   Type of Wheelchair/Scooter Manual   Wheel 150 Feet   Reason if not Attempted Safety concerns   CARE Score - Wheel 150 Feet 88   Type of Wheelchair/Scooter Manual       Therapeutic Activities and Exercises: Additional time spent completing toileting with pt and pt. Education.  Education:   Patient provided with daily orientation and goals of this PT session.   Patient educated to transfer to bedside chair/bedside commode/bathroom with RN/PCT present.    Patient educated on Fall risk, home safety and plan of care by explanation.    Patient Verbalized Understanding and Needs Reinforcement.   Time provided for therapeutic counseling and discussion of current health disposition. All questions  answered to satisfaction, within scope of PT practice; voiced no other concerns. White board updated in patient's room, RN notified of session.        AM-PAC 6 CLICK MOBILITY  Total Score:14     Patient left up in chair with call button in reach.    GOALS:   Multidisciplinary Problems     Physical Therapy Goals        Problem: Physical Therapy    Goal Priority Disciplines Outcome Goal Variances Interventions   Physical Therapy Goal     PT, PT/OT Ongoing, Progressing     Description: Goals to be met by: 8/22/22    Patient will increase functional independence with mobility by performing:    . Supine to sit with Stand-by Assistance  . Sit to supine with Stand-by Assistance  . Rolling to Left and Right with Supervision.  . Sit to stand transfer with Supervision  . Bed to chair transfer with Supervision using No Assistive Device/ LRAD  . Gait  x 100 feet with Stand-by Assistance using Rolling Walker.   . Wheelchair propulsion x100 feet with Stand-by Assistance using bilateral uppper extremities  . Ascend/Descend 2 inch curb step with Minimal Assistance using Rolling Walker.  . Retrieve object off floor in standing with RW and reacher and Emma                     History:     Past Medical History:   Diagnosis Date    Abnormal stress test 11/18/2015    Arthritis     Bladder cancer     Cataract     Coronary artery disease involving native coronary artery 1/6/2016    Depression     Exudative age-related macular degeneration of left eye with active choroidal neovascularization 10/1/2020    GERD (gastroesophageal reflux disease)     HTN (hypertension), benign 11/18/2015    Hx of bladder infections     Hyperlipemia     OP (osteoporosis)     Positive cardiac stress test 1/6/2016    Renal cancer     Syncope 1/6/2016    TMJ (dislocation of temporomandibular joint)     Upper back pain 12/1/2015    Ureteral cancer     Venous insufficiency 2017    Villous adenoma of colon 5/31/2013       Past Surgical History:    Procedure Laterality Date    APPENDECTOMY      BACK SURGERY      CATARACT EXTRACTION W/  INTRAOCULAR LENS IMPLANT Left 3/16/15    kristy    CATARACT EXTRACTION W/  INTRAOCULAR LENS IMPLANT Right 4/6/15    kristy    COLONOSCOPY  10/21/2013    CYSTOSCOPY      ESOPHAGOGASTRODUODENOSCOPY N/A 1/30/2019    Procedure: EGD (ESOPHAGOGASTRODUODENOSCOPY);  Surgeon: Diony Dey MD;  Location: [address];  Service: Endoscopy;  Laterality: N/A;    EYE SURGERY      NEPHRECTOMY      L    SPINE SURGERY      TONSILLECTOMY, ADENOIDECTOMY         Time Tracking:     PT Received On: 07/23/22  PT Start Time: 0855     PT Stop Time: 0926  PT Total Time (min): 31 min     Billable Minutes: Evaluation 20mins and Therapeutic Activity 11mins      07/23/2022

## 2022-07-23 NOTE — PLAN OF CARE
Problem: Adult Inpatient Plan of Care  Goal: Plan of Care Review  Outcome: Ongoing, Progressing  Goal: Patient-Specific Goal (Individualized)  Outcome: Ongoing, Progressing  Goal: Absence of Hospital-Acquired Illness or Injury  Outcome: Ongoing, Progressing  Goal: Optimal Comfort and Wellbeing  Outcome: Ongoing, Progressing     Problem: Impaired Wound Healing  Goal: Optimal Wound Healing  Outcome: Ongoing, Progressing     Problem: Skin Injury Risk Increased  Goal: Skin Health and Integrity  Outcome: Ongoing, Progressing     Problem: Fall Injury Risk  Goal: Absence of Fall and Fall-Related Injury  Outcome: Ongoing, Progressing

## 2022-07-24 PROCEDURE — 25000003 PHARM REV CODE 250: Performed by: HOSPITALIST

## 2022-07-24 PROCEDURE — 11000004 HC SNF PRIVATE

## 2022-07-24 RX ORDER — POLYETHYLENE GLYCOL 3350 17 G/17G
17 POWDER, FOR SOLUTION ORAL DAILY
Status: DISCONTINUED | OUTPATIENT
Start: 2022-07-24 | End: 2022-08-08 | Stop reason: HOSPADM

## 2022-07-24 RX ADMIN — FOLIC ACID 1 MG: 1 TABLET ORAL at 10:07

## 2022-07-24 RX ADMIN — PANTOPRAZOLE SODIUM 40 MG: 40 TABLET, DELAYED RELEASE ORAL at 10:07

## 2022-07-24 RX ADMIN — POLYETHYLENE GLYCOL 3350 17 G: 17 POWDER, FOR SOLUTION ORAL at 10:07

## 2022-07-24 RX ADMIN — SUCRALFATE 1 G: 1 TABLET ORAL at 06:07

## 2022-07-24 RX ADMIN — ONDANSETRON HYDROCHLORIDE 4 MG: 4 TABLET, FILM COATED ORAL at 07:07

## 2022-07-24 RX ADMIN — CARVEDILOL 6.25 MG: 3.12 TABLET, FILM COATED ORAL at 10:07

## 2022-07-24 RX ADMIN — CITALOPRAM HYDROBROMIDE 20 MG: 20 TABLET ORAL at 10:07

## 2022-07-24 RX ADMIN — ASPIRIN 81 MG: 81 TABLET, COATED ORAL at 10:07

## 2022-07-24 RX ADMIN — LOSARTAN POTASSIUM 50 MG: 50 TABLET, FILM COATED ORAL at 10:07

## 2022-07-24 RX ADMIN — Medication 2 TABLET: at 06:07

## 2022-07-24 RX ADMIN — ZINC SULFATE 220 MG (50 MG) CAPSULE 220 MG: CAPSULE at 06:07

## 2022-07-24 RX ADMIN — THERA TABS 1 TABLET: TAB at 10:07

## 2022-07-24 RX ADMIN — SENNOSIDES AND DOCUSATE SODIUM 1 TABLET: 50; 8.6 TABLET ORAL at 10:07

## 2022-07-24 RX ADMIN — SUCRALFATE 1 G: 1 TABLET ORAL at 09:07

## 2022-07-24 RX ADMIN — DICLOFENAC SODIUM 2 G: 10 GEL TOPICAL at 09:07

## 2022-07-24 RX ADMIN — DICLOFENAC SODIUM 2 G: 10 GEL TOPICAL at 10:07

## 2022-07-24 RX ADMIN — ATORVASTATIN CALCIUM 80 MG: 40 TABLET, FILM COATED ORAL at 10:07

## 2022-07-24 RX ADMIN — LOSARTAN POTASSIUM 50 MG: 50 TABLET, FILM COATED ORAL at 09:07

## 2022-07-24 RX ADMIN — SUCRALFATE 1 G: 1 TABLET ORAL at 11:07

## 2022-07-24 RX ADMIN — Medication 400 MG: at 06:07

## 2022-07-24 RX ADMIN — SUCRALFATE 1 G: 1 TABLET ORAL at 04:07

## 2022-07-24 NOTE — PLAN OF CARE
Problem: Adult Inpatient Plan of Care  Goal: Plan of Care Review  7/24/2022 0203 by Rosemary Gary LPN  Outcome: Ongoing, Progressing  7/24/2022 0201 by Rosemary Gary LPN  Outcome: Ongoing, Progressing  Goal: Patient-Specific Goal (Individualized)  7/24/2022 0203 by Rosemary Gary LPN  Outcome: Ongoing, Progressing  7/24/2022 0201 by Rosemary Gary LPN  Outcome: Ongoing, Progressing  Goal: Absence of Hospital-Acquired Illness or Injury  7/24/2022 0203 by Rosemary Gary LPN  Outcome: Ongoing, Progressing  7/24/2022 0201 by Rosemary Gary LPN  Outcome: Ongoing, Progressing  Goal: Optimal Comfort and Wellbeing  7/24/2022 0203 by Rosemary Gary LPN  Outcome: Ongoing, Progressing  7/24/2022 0201 by Rosemary Gary LPN  Outcome: Ongoing, Progressing     Problem: Impaired Wound Healing  Goal: Optimal Wound Healing  7/24/2022 0203 by Rosemary Gary LPN  Outcome: Ongoing, Progressing  7/24/2022 0201 by Rosemary Gary LPN  Outcome: Ongoing, Progressing     Problem: Skin Injury Risk Increased  Goal: Skin Health and Integrity  7/24/2022 0203 by Rosemary Gary LPN  Outcome: Ongoing, Progressing  7/24/2022 0201 by Rosemary Gary LPN  Outcome: Ongoing, Progressing     Problem: Fall Injury Risk  Goal: Absence of Fall and Fall-Related Injury  7/24/2022 0203 by Rosemary Gary LPN  Outcome: Ongoing, Progressing  7/24/2022 0201 by Rosemary Gary LPN  Outcome: Ongoing, Progressing

## 2022-07-24 NOTE — NURSING
Secure chatted the team and the on call NP, Kristin regarding a concern of patient not having a BM since 7/20 and has been receiving senna BID. Awaiting review.

## 2022-07-25 PROBLEM — R53.81 DEBILITY: Status: ACTIVE | Noted: 2022-07-25

## 2022-07-25 LAB
ANION GAP SERPL CALC-SCNC: 8 MMOL/L (ref 8–16)
BASOPHILS # BLD AUTO: 0.04 K/UL (ref 0–0.2)
BASOPHILS NFR BLD: 0.5 % (ref 0–1.9)
BUN SERPL-MCNC: 9 MG/DL (ref 10–30)
CALCIUM SERPL-MCNC: 9.7 MG/DL (ref 8.7–10.5)
CHLORIDE SERPL-SCNC: 97 MMOL/L (ref 95–110)
CO2 SERPL-SCNC: 29 MMOL/L (ref 23–29)
CREAT SERPL-MCNC: 0.7 MG/DL (ref 0.5–1.4)
DIFFERENTIAL METHOD: ABNORMAL
EOSINOPHIL # BLD AUTO: 0.1 K/UL (ref 0–0.5)
EOSINOPHIL NFR BLD: 1.3 % (ref 0–8)
ERYTHROCYTE [DISTWIDTH] IN BLOOD BY AUTOMATED COUNT: 13.5 % (ref 11.5–14.5)
EST. GFR  (AFRICAN AMERICAN): >60 ML/MIN/1.73 M^2
EST. GFR  (NON AFRICAN AMERICAN): >60 ML/MIN/1.73 M^2
GLUCOSE SERPL-MCNC: 82 MG/DL (ref 70–110)
HCT VFR BLD AUTO: 33.1 % (ref 37–48.5)
HGB BLD-MCNC: 11.4 G/DL (ref 12–16)
IMM GRANULOCYTES # BLD AUTO: 0.02 K/UL (ref 0–0.04)
IMM GRANULOCYTES NFR BLD AUTO: 0.3 % (ref 0–0.5)
LYMPHOCYTES # BLD AUTO: 1 K/UL (ref 1–4.8)
LYMPHOCYTES NFR BLD: 12.6 % (ref 18–48)
MAGNESIUM SERPL-MCNC: 1.9 MG/DL (ref 1.6–2.6)
MCH RBC QN AUTO: 32.3 PG (ref 27–31)
MCHC RBC AUTO-ENTMCNC: 34.4 G/DL (ref 32–36)
MCV RBC AUTO: 94 FL (ref 82–98)
MONOCYTES # BLD AUTO: 0.7 K/UL (ref 0.3–1)
MONOCYTES NFR BLD: 8.6 % (ref 4–15)
NEUTROPHILS # BLD AUTO: 5.8 K/UL (ref 1.8–7.7)
NEUTROPHILS NFR BLD: 76.7 % (ref 38–73)
NRBC BLD-RTO: 0 /100 WBC
PHOSPHATE SERPL-MCNC: 3.5 MG/DL (ref 2.7–4.5)
PLATELET # BLD AUTO: 295 K/UL (ref 150–450)
PMV BLD AUTO: 9.8 FL (ref 9.2–12.9)
POTASSIUM SERPL-SCNC: 3.8 MMOL/L (ref 3.5–5.1)
RBC # BLD AUTO: 3.53 M/UL (ref 4–5.4)
SODIUM SERPL-SCNC: 134 MMOL/L (ref 136–145)
WBC # BLD AUTO: 7.52 K/UL (ref 3.9–12.7)

## 2022-07-25 PROCEDURE — 80048 BASIC METABOLIC PNL TOTAL CA: CPT | Performed by: HOSPITALIST

## 2022-07-25 PROCEDURE — 25000003 PHARM REV CODE 250: Performed by: HOSPITALIST

## 2022-07-25 PROCEDURE — 84100 ASSAY OF PHOSPHORUS: CPT | Performed by: HOSPITALIST

## 2022-07-25 PROCEDURE — 83735 ASSAY OF MAGNESIUM: CPT | Performed by: HOSPITALIST

## 2022-07-25 PROCEDURE — 97110 THERAPEUTIC EXERCISES: CPT | Mod: CQ

## 2022-07-25 PROCEDURE — 85025 COMPLETE CBC W/AUTO DIFF WBC: CPT | Performed by: HOSPITALIST

## 2022-07-25 PROCEDURE — 36415 COLL VENOUS BLD VENIPUNCTURE: CPT | Performed by: HOSPITALIST

## 2022-07-25 PROCEDURE — 97116 GAIT TRAINING THERAPY: CPT | Mod: CQ

## 2022-07-25 PROCEDURE — 11000004 HC SNF PRIVATE

## 2022-07-25 RX ADMIN — CITALOPRAM HYDROBROMIDE 20 MG: 20 TABLET ORAL at 09:07

## 2022-07-25 RX ADMIN — LOSARTAN POTASSIUM 50 MG: 50 TABLET, FILM COATED ORAL at 08:07

## 2022-07-25 RX ADMIN — FOLIC ACID 1 MG: 1 TABLET ORAL at 09:07

## 2022-07-25 RX ADMIN — CARVEDILOL 6.25 MG: 3.12 TABLET, FILM COATED ORAL at 08:07

## 2022-07-25 RX ADMIN — ZINC SULFATE 220 MG (50 MG) CAPSULE 220 MG: CAPSULE at 06:07

## 2022-07-25 RX ADMIN — SUCRALFATE 1 G: 1 TABLET ORAL at 06:07

## 2022-07-25 RX ADMIN — CARVEDILOL 6.25 MG: 3.12 TABLET, FILM COATED ORAL at 09:07

## 2022-07-25 RX ADMIN — PANTOPRAZOLE SODIUM 40 MG: 40 TABLET, DELAYED RELEASE ORAL at 09:07

## 2022-07-25 RX ADMIN — ASPIRIN 81 MG: 81 TABLET, COATED ORAL at 09:07

## 2022-07-25 RX ADMIN — SENNOSIDES AND DOCUSATE SODIUM 1 TABLET: 50; 8.6 TABLET ORAL at 09:07

## 2022-07-25 RX ADMIN — SUCRALFATE 1 G: 1 TABLET ORAL at 08:07

## 2022-07-25 RX ADMIN — SUCRALFATE 1 G: 1 TABLET ORAL at 05:07

## 2022-07-25 RX ADMIN — ATORVASTATIN CALCIUM 80 MG: 40 TABLET, FILM COATED ORAL at 08:07

## 2022-07-25 RX ADMIN — SUCRALFATE 1 G: 1 TABLET ORAL at 12:07

## 2022-07-25 RX ADMIN — THERA TABS 1 TABLET: TAB at 09:07

## 2022-07-25 RX ADMIN — Medication 400 MG: at 05:07

## 2022-07-25 RX ADMIN — Medication 2 TABLET: at 06:07

## 2022-07-25 RX ADMIN — LOSARTAN POTASSIUM 50 MG: 50 TABLET, FILM COATED ORAL at 09:07

## 2022-07-25 RX ADMIN — SENNOSIDES AND DOCUSATE SODIUM 1 TABLET: 50; 8.6 TABLET ORAL at 08:07

## 2022-07-25 RX ADMIN — DICLOFENAC SODIUM 2 G: 10 GEL TOPICAL at 09:07

## 2022-07-25 NOTE — PROGRESS NOTES
[facility]                                  Skilled Nursing Facility                   Progress Note     Admit Date: 7/22/2022  DIANA 8/10/2022  Principal Problem:  Subdural hematoma, post-traumatic   HPI obtained from patient interview and chart review     Chief Complaint: Establish Care/ Initial Visit     HPI:   Elen Peters is a 89 y.o. female with PMH of TMJ, syncope, renal cancer, hyperlipidemia, hypertension, depression, CAD who presents to the ED for AMS following recent fall with head trauma. CTH on arrival to the ED revealed thin SDH along the left side of the posterior falx. Admitted to NS for observation.  Aspirin held.  No surgical intervention required.  Neurosurgery recommending repeat CTH in 4 weeks.    Patient will be treated at [facility] with PT and OT to improve functional status and ability to perform ADLs.     Past Medical History: Patient has a past medical history of Abnormal stress test (11/18/2015), Arthritis, Bladder cancer, Cataract, Coronary artery disease involving native coronary artery (1/6/2016), Depression, Exudative age-related macular degeneration of left eye with active choroidal neovascularization (10/1/2020), GERD (gastroesophageal reflux disease), HTN (hypertension), benign (11/18/2015), bladder infections, Hyperlipemia, OP (osteoporosis), Positive cardiac stress test (1/6/2016), Renal cancer, Syncope (1/6/2016), TMJ (dislocation of temporomandibular joint), Upper back pain (12/1/2015), Ureteral cancer, Venous insufficiency (2017), and Villous adenoma of colon (5/31/2013).    Past Surgical History: Patient has a past surgical history that includes Nephrectomy; TONSILLECTOMY, ADENOIDECTOMY; Back surgery; Appendectomy; Colonoscopy (10/21/2013); Cystoscopy; Cataract extraction w/  intraocular lens implant (Left, 3/16/15); Cataract extraction w/  intraocular lens implant (Right, 4/6/15); Eye surgery;  Spine surgery; and Esophagogastroduodenoscopy (N/A, 1/30/2019).    Social History: Patient reports that she quit smoking about 73 years ago. She has never used smokeless tobacco. She reports that she does not drink alcohol and does not use drugs.    Family History: family history includes Cancer in her father, mother, and son; Goiter in her mother; Heart attack in her mother; Heart disease in her maternal grandfather and mother; Leukemia in her father and mother; No Known Problems in her brother, maternal aunt, maternal grandmother, maternal uncle, paternal aunt, paternal grandfather, paternal grandmother, paternal uncle, sister, son, son, son, son, and son.    Allergies: Patient is allergic to amlodipine, sulfa (sulfonamide antibiotics), codeine, maxzide [triamterene-hydrochlorothiazid], and sulfamethoxazole-trimethoprim.    ROS  Constitutional: Negative for fever   Eyes: Negative for blurred vision, double vision   Respiratory: Negative for cough, shortness of breath   Cardiovascular: Negative for chest pain, palpitations, and leg swelling.   Gastrointestinal: Negative for abdominal pain, constipation, diarrhea, nausea, vomiting.   Genitourinary: Negative for dysuria, frequency   Musculoskeletal:  + generalized weakness. Negative for back pain and myalgias.   Skin: Negative for itching and rash.   Neurological: Negative for dizziness, headaches.   Psychiatric/Behavioral: Negative for depression. The patient is not nervous/anxious.      24 hour Vital Sign Range   Temp:  [97.6 °F (36.4 °C)-98.4 °F (36.9 °C)]   Pulse:  [77-84]   Resp:  [18]   BP: (132-136)/(63-74)   SpO2:  [94 %-96 %]     PEx  Constitutional: Patient appears debilitated.  No distress noted  HENT:   Head: Normocephalic and atraumatic.   Eyes: Pupils are equal, round  Neck: Normal range of motion. Neck supple.   Cardiovascular: Normal rate, regular rhythm and normal heart sounds.    Pulmonary/Chest: Effort normal and breath sounds are clear  Abdominal:  Soft. Bowel sounds are normal.   Musculoskeletal: Normal range of motion.   Neurological: Alert and oriented to person, place, and time.   Psychiatric: Normal mood and affect. Behavior is normal.   Skin: Skin is warm and dry. Skin intact         Recent Labs   Lab 07/25/22 0414   *   K 3.8   CL 97   CO2 29   BUN 9*   CREATININE 0.7   MG 1.9       Recent Labs   Lab 07/25/22 0414   WBC 7.52   RBC 3.53*   HGB 11.4*   HCT 33.1*      MCV 94   MCH 32.3*   MCHC 34.4         Assessment and Plan:    Subdural hematoma, posttraumatic  No surgical intervention required.  Neurosurgery recommending repeat CTH in 4 weeks.  Fall precautions    Gastroesophageal reflux disease  Protonix 40 mg daily  Carafate 1 g a.c. and HS    Mixed hyperlipidemia  Atorvastatin 80 mg nightly    Depression, major, in remission  citalopram 20 mg daily    Hypertension  Carvedilol 6.25 mg b.i.d.  Losartan 50 mg b.i.d.    Coronary artery disease involving native coronary artery  Continue aspirin and statin    Hyponatremia  Sodium today is 134, is trending upwards  Continue to trend on regular labs    Exudative age-related macular degeneration of both eyes with active choroidal neovascularization  Sees Dr. Cyndi Velasquez injections q 6 weeks, scheduled for injections tomorrow 7/26    Anticipate disposition:  Home with home health      Follow-up needed during SNF stay-    Follow-up needed after discharge from SNF: PCP,     Future Appointments   Date Time Provider Department [place]   7/26/2022  8:50 AM MONIE Hanna MD [facility]   7/27/2022 11:00 AM LAB, APPOINTMENT [facility] V[facility]   7/27/2022  1:30 PM Jim Treadwell MD [facility]   8/11/2022  8:00 AM Anita Alcalas, NP [address]   10/26/2022 10:00 AM LAB, [facility] [place]         I certify that SNF services are required to be given on an inpatient basis because Elen Peters needs for skilled nursing care and/or skilled  rehabilitation are required on a daily basis and such services can only practically be provided in a skilled nursing facility setting and are for an ongoing condition for which she received inpatient care in the hospital.     Total time of the visit 113 minutes    Non physical exam/ non charting time: 98 minutes   Description of non physical exam/non charting time: counseling patient on clinical conditions and therapies provided regarding plan of care.  Extensive chart review completed including all consultation notes.  All pertinent laboratory and radiographical images reviewed.        Lizbeth Ugarte NP  Department of Hospital Medicine   [facility]     DOS: 7/25/2022       Patient note was created using MModal Dictation.  Any errors in syntax or even information may not have been identified and edited on initial review prior to signing this note.

## 2022-07-25 NOTE — PLAN OF CARE
Recommendation/Intervention: Con tinue regular diet, recommend boost plus BID,RD following  Goals: PO to meet 50% of needs with ONS by next RD fu  Nutrition Goal Status: new     Assessment and Plan  Inadequate oral intake related to depression, reduced appetite as evidenced by PO 25-50% and 4% weight loss.     New        Plan  Commercial beverage( calories, protein) boost plus BID  Collaboration with other providers  General diet

## 2022-07-25 NOTE — CLINICAL REVIEW
Clinical Pharmacy Chart Review Note      Admit Date: 7/22/2022   LOS: 3 days       Elen Peters is a 89 y.o. female admitted to SNF for PT/OT after hospitalization for subdural hematoma.    Review of patient's allergies indicates:   Allergen Reactions    Amlodipine Swelling    Sulfa (sulfonamide antibiotics)      Unknown as child    Codeine      Other reaction(s): Unknown    Maxzide [triamterene-hydrochlorothiazid]     Sulfamethoxazole-trimethoprim      Patient Active Problem List    Diagnosis Date Noted    Subdural hematoma, post-traumatic 07/19/2022    Situational stress 02/17/2022    Senile purpura 05/26/2021    Exudative age-related macular degeneration of both eyes with active choroidal neovascularization 10/01/2020    Nonexudative age-related macular degeneration, bilateral, intermediate dry stage 10/01/2020    Posterior vitreous detachment, bilateral 10/01/2020    Closed pelvic ring fracture 06/28/2020    Hyponatremia 06/28/2020    Fibromuscular dysplasia of renal artery 04/01/2019    Hiatal hernia 03/21/2019    Nonspecific abnormal electrocardiogram (ECG) (EKG) 10/22/2018    Personal history of renal cell cancer 09/26/2018    Anorexia 11/29/2016    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    Coronary artery disease involving native coronary artery 01/06/2016    Gait instability 12/01/2015    HTN (hypertension), benign 11/18/2015    Aortic atherosclerosis 08/19/2015    Depression, major, in remission 01/23/2014    GERD (gastroesophageal reflux disease)     Mixed hyperlipidemia     Age-related osteoporosis with current pathological fracture        Scheduled Meds:    aspirin  81 mg Oral Daily    atorvastatin  80 mg Oral QHS    calcium-vitamin D3  2 tablet Oral Daily    carvediloL  6.25 mg Oral BID    citalopram  20 mg Oral Daily    diclofenac sodium  2 g Topical (Top) BID    folic acid  1 mg Oral Daily    losartan  50 mg Oral BID    magnesium oxide  400 mg Oral Daily     multivitamin  1 tablet Oral Daily    omega-3 fatty acids-fish oil  2 capsule Oral Daily    pantoprazole  40 mg Oral Daily    polyethylene glycol  17 g Oral Daily    senna-docusate 8.6-50 mg  1 tablet Oral BID    sucralfate  1 g Oral QID (AC & HS)    zinc sulfate  220 mg Oral Daily     Continuous Infusions:   PRN Meds: acetaminophen, calcium carbonate, hydrALAZINE, melatonin, ondansetron, zolpidem    OBJECTIVE:     Vital Signs (Last 24H)  Temp:  [97.6 °F (36.4 °C)-98.4 °F (36.9 °C)]   Pulse:  [77-84]   Resp:  [18]   BP: (132-136)/(63-74)   SpO2:  [94 %-96 %]     Laboratory:  CBC:   Recent Labs   Lab 07/21/22  0721 07/22/22  0250 07/25/22  0414   WBC 6.27 7.07 7.52   RBC 3.57* 3.61* 3.53*   HGB 11.6* 11.5* 11.4*   HCT 33.2* 33.5* 33.1*    303 295   MCV 93 93 94   MCH 32.5* 31.9* 32.3*   MCHC 34.9 34.3 34.4     BMP:   Recent Labs   Lab 07/21/22  0721 07/22/22  0250 07/25/22  0414   GLU 89 91 82   * 129* 134*   K 3.7 3.4* 3.8   CL 94* 93* 97   CO2 29 28 29   BUN 6* 8* 9*   CREATININE 0.7 0.7 0.7   CALCIUM 9.2 9.2 9.7   MG  --   --  1.9     CMP:   Recent Labs   Lab 07/19/22  1211 07/20/22  0715 07/21/22  0721 07/22/22  0250 07/25/22  0414      < > 89 91 82   CALCIUM 9.8   < > 9.2 9.2 9.7   ALBUMIN 3.5  --   --   --   --    PROT 6.6  --   --   --   --    *   < > 130* 129* 134*   K 3.8   < > 3.7 3.4* 3.8   CO2 24   < > 29 28 29   CL 91*   < > 94* 93* 97   BUN 7*   < > 6* 8* 9*   CREATININE 0.7   < > 0.7 0.7 0.7   ALKPHOS 58  --   --   --   --    ALT 17  --   --   --   --    AST 19  --   --   --   --    BILITOT 0.5  --   --   --   --     < > = values in this interval not displayed.     LFTs:   Recent Labs   Lab 07/19/22  1211   ALT 17   AST 19   ALKPHOS 58   BILITOT 0.5   PROT 6.6   ALBUMIN 3.5         ASSESSMENT/PLAN:     I have reviewed the medications in compliance with CMS Regulation F756 of the [place]. Based on information gathered, the following items may need to be  addressed:    **According to PMH and home medication list, patient takes the following medications at home. These medications are not currently ordered at [facility]:  · Alendronate 70 mg weekly  · Rosuvastatin 40 mg daily (non-formulary, currently taking atorvastatin)    **Patient is taking citalopram (home med) for depression. A gradual dose reduction is not recommended at this time. Patient to follow-up with prescribing MD as outpatient.  Monitor: mental status for depression, suicide ideation, anxiety, serotonin syndrome, hyponatremia, dizziness, drowsiness, somnolence    **Zolpidem (home med)  is ordered as needed for insomnia. Per CMS guidelines: The timeframe is limited for PRN sedative hypnotics medications to 14 days, unless a longer timeframe is deemed appropriate by the attending physician or the prescribing practitioner. Monitor use and associated side effects. Consider discontinuation if patient is not using or adverse effects observed.      Medications reviewed by PharmD, please re-consult if needed.      Nicky Yang, Pharm. D.  Clinical Pharmacist  [facility]

## 2022-07-25 NOTE — CONSULTS
"  [facility] - Skilled Nursing  Adult Nutrition  Consult Note    SUMMARY   Recommendations  Recommendation/Intervention: Con tinue regular diet, recommend boost plus BID,RD following  Goals: PO to meet 50% of needs with ONS by next RD fu  Nutrition Goal Status: new    Assessment and Plan  Inadequate oral intake related to depression, reduced appetite as evidenced by PO 25-50% and 4% weight loss.    New      Plan  Commercial beverage( calories, protein) boost plus BID  Collaboration with other providers  General diet      Malnutrition Assessment 7/25/22                                       Reason for Assessment    Reason For Assessment: consult  Diagnosis:  (subdral hematoma s/p fall)  Relevant Medical History: recent altered mental status,  Hiatal hernia, anorexia, HTN, CAF, HLD, renal cancer, depression, osteo  Interdisciplinary Rounds: attended  General Information Comments: NFPE completed, Asst living resident who fell at home. recently on 1L fluid restriction.  Nutrition Discharge Planning: DC on regular diet  Nutrition/Diet History    Patient Reported Diet/Restrictions/Preferences: general  Spiritual, Cultural Beliefs, Hoahaoism Practices, Values that Affect Care: no  Food Allergies: NKFA  Factors Affecting Nutritional Intake: impaired cognitive status/motor control, decreased appetite, depression    Anthropometrics    Temp: 98.4 °F (36.9 °C)  Height: 5' 4" (162.6 cm)  Height (inches): 64 in  Weight Method: Bed Scale  Weight: 58.7 kg (129 lb 6.6 oz)  Weight (lb): 129.41 lb  Ideal Body Weight (IBW), Female: 120 lb  % Ideal Body Weight, Female (lb): 107.84 %  BMI (Calculated): 22.2  BMI Grade: 18.5-24.9 - normal  Weight Change Amount:  (4% wt loss ( not significant))       Lab/Procedures/Meds    Pertinent Labs Comments: Hg 11.4, Hct 331, Na 124,  Pertinent Medications Comments: statin, ASA, zinc,Ca/Vit D, senna-docusate, folic acid, pantoprazole, MVI, Mg, polyethylene glycol, Omega 3,         Estimated/Assessed " Needs    Weight Used For Calorie Calculations: 58.7 kg (129 lb 6.6 oz)  Energy Calorie Requirements (kcal): 0315-9963  Energy Need Method: [place]-St Jeor (x 1.3(PAL))  Protein Requirements: 70  Weight Used For Protein Calculations: 58.7 kg (129 lb 6.6 oz) (x 1.2 g/kg)  Fluid Requirements (mL): 1276 or per MD  Estimated Fluid Requirement Method: RDA Method  RDA Method (mL): 1276  CHO Requirement: -      Nutrition Prescription Ordered    Current Diet Order: regular    Evaluation of Received Nutrient/Fluid Intake    I/O: +90  Energy Calories Required: not meeting needs  Protein Required: not meeting needs  Fluid Required: not meeting needs  Comments: LBM 7/24  Tolerance: tolerating  % Intake of Estimated Energy Needs: 25 - 50 %  % Meal Intake: 25 - 50 %    Nutrition Risk    Level of Risk/Frequency of Follow-up: high       Monitor and Evaluation    Food and Nutrient Adminstration: diet order  Physical Activity and Function: nutrition-related ADLs and IADLs  Anthropometric Measurements: weight change  Biochemical Data, Medical Tests and Procedures: electrolyte and renal panel, glucose/endocrine profile, gastrointestinal profile  Nutrition-Focused Physical Findings: overall appearance       Nutrition Follow-Up    RD Follow-up?: Yes

## 2022-07-25 NOTE — PT/OT/SLP PROGRESS
"Physical Therapy Treatment    Patient Name:  Elen Peters   MRN:  [id number]  Admit Date: 7/22/2022  Admitting Diagnosis: Subdural hematoma, post-traumatic  Recent Surgeries:     General Precautions: Standard, fall   Orthopedic Precautions:N/A   Braces:       Recommendations:     Discharge Recommendations:  assisted living facility   Level of Assistance Recommended at Discharge: 24 hours light assistance  Discharge Equipment Recommendations: bedside commode, wheelchair, walker, rolling, shower chair   Barriers to discharge: Decreased caregiver support    Assessment:     Elen Peters is a 89 y.o. female admitted with a medical diagnosis ofSubdural hematoma, post-traumatic . Pt tolerated well, pt is pleasant, cooperative, demo good effort, pt would continue to benefit from skilled PT services to improve overall functional mobility, strength and endurance.  .      Performance deficits affecting function:  weakness, impaired endurance, impaired self care skills, impaired functional mobility, gait instability, impaired balance, decreased upper extremity function, decreased lower extremity function, impaired cardiopulmonary response to activity .    Rehab Potential is good    Activity Tolerance: Fair    Plan:     Patient to be seen 6 x/week to address the above listed problems via gait training, therapeutic activities, therapeutic exercises, neuromuscular re-education, wheelchair management/training    · Plan of Care Expires: 08/22/22  · Plan of Care Reviewed with: patient    Subjective     "Horrible night but better now, I was dreaming I was on the toilet and urinated on myself" during session pt reported her vagina was burning, not presently,  nsg notified    Pain/Comfort:  · Pain Rating 1: 0/10  · Pain Rating Post-Intervention 1: 0/10    Patient's cultural, spiritual, Scientologist conflicts given the current situation:  · no    Objective:       Patient found with  (in wc) upon PT entry to room.     Therapeutic Activities " "and Exercises: 2x10 reps AP,LAQ,hip flex mini elliptical x 5 minutes with 4 brief rest breaks    Functional Mobility:  · Transfers:     · Sit to Stand:  minimum assistance and moderate assistance with rolling walker and vcs for tech/hand pklacement ,post lean (added pillow to WC comfort and inc surface heiight)  · Gait: amb with RW min A ~ 22 ft 1 standing rest break then 16 ft with RW min A wc follow close pt reported I'm going down B knees semi buckled and second trial "gonwilber colapse" feels her legs get weak, ed on safety and fall risk, rest as needed and to keep staff informed if unable to continue with gait, pt verbalized understanding    AM-PAC 6 CLICK MOBILITY  15    Patient left up in chair with call button in reach and belonging sin reach.    GOALS:   Multidisciplinary Problems     Physical Therapy Goals        Problem: Physical Therapy    Goal Priority Disciplines Outcome Goal Variances Interventions   Physical Therapy Goal     PT, PT/OT Ongoing, Progressing     Description: Goals to be met by: 8/22/22    Patient will increase functional independence with mobility by performing:    . Supine to sit with Stand-by Assistance  . Sit to supine with Stand-by Assistance  . Rolling to Left and Right with Supervision.  . Sit to stand transfer with Supervision  . Bed to chair transfer with Supervision using No Assistive Device/ LRAD  . Gait  x 100 feet with Stand-by Assistance using Rolling Walker.   . Wheelchair propulsion x100 feet with Stand-by Assistance using bilateral uppper extremities  . Ascend/Descend 2 inch curb step with Minimal Assistance using Rolling Walker.  . Retrieve object off floor in standing with RW and reacher and Emma                     Time Tracking:     PT Received On: 07/25/22  PT Start Time: 0840  PT Stop Time: 0906  PT Total Time (min): 26 min    Billable Minutes: Gait Training 13 and Therapeutic Exercise 13    Treatment Type: Treatment  PT/PTA: PTA     PTA Visit Number: 1 "     07/25/2022

## 2022-07-25 NOTE — PT/OT/SLP PROGRESS
Occupational Therapy      Patient Name:  Elen Peters   MRN:  [id number]    Patient not seen today secondary to pt getting her hair cut on 1st attempt and in activity room on the 2nd attempt.  Pt's caregiver had expressed wanting to get pt to the activity room.  Will follow-up as scheduled per OT POC.    7/25/2022

## 2022-07-26 ENCOUNTER — PROCEDURE VISIT (OUTPATIENT)
Dept: OPHTHALMOLOGY | Facility: CLINIC | Age: 87
End: 2022-07-26
Payer: MEDICARE

## 2022-07-26 ENCOUNTER — PATIENT MESSAGE (OUTPATIENT)
Dept: INTERNAL MEDICINE | Facility: CLINIC | Age: 87
End: 2022-07-26
Payer: MEDICARE

## 2022-07-26 ENCOUNTER — PATIENT MESSAGE (OUTPATIENT)
Dept: OPHTHALMOLOGY | Facility: CLINIC | Age: 87
End: 2022-07-26

## 2022-07-26 DIAGNOSIS — H35.3231 EXUDATIVE AGE-RELATED MACULAR DEGENERATION OF BOTH EYES WITH ACTIVE CHOROIDAL NEOVASCULARIZATION: Primary | ICD-10-CM

## 2022-07-26 PROCEDURE — 97116 GAIT TRAINING THERAPY: CPT | Mod: CQ

## 2022-07-26 PROCEDURE — 25000003 PHARM REV CODE 250: Performed by: HOSPITALIST

## 2022-07-26 PROCEDURE — 97110 THERAPEUTIC EXERCISES: CPT | Mod: CO

## 2022-07-26 PROCEDURE — 11000004 HC SNF PRIVATE

## 2022-07-26 PROCEDURE — 97530 THERAPEUTIC ACTIVITIES: CPT | Mod: CQ

## 2022-07-26 PROCEDURE — 25000003 PHARM REV CODE 250: Performed by: NURSE PRACTITIONER

## 2022-07-26 RX ORDER — ACETAMINOPHEN 325 MG/1
650 TABLET ORAL EVERY 6 HOURS PRN
Status: DISCONTINUED | OUTPATIENT
Start: 2022-07-26 | End: 2022-08-02

## 2022-07-26 RX ADMIN — ATORVASTATIN CALCIUM 80 MG: 40 TABLET, FILM COATED ORAL at 08:07

## 2022-07-26 RX ADMIN — SENNOSIDES AND DOCUSATE SODIUM 1 TABLET: 50; 8.6 TABLET ORAL at 11:07

## 2022-07-26 RX ADMIN — THERA TABS 1 TABLET: TAB at 11:07

## 2022-07-26 RX ADMIN — SUCRALFATE 1 G: 1 TABLET ORAL at 06:07

## 2022-07-26 RX ADMIN — CARVEDILOL 6.25 MG: 3.12 TABLET, FILM COATED ORAL at 11:07

## 2022-07-26 RX ADMIN — SUCRALFATE 1 G: 1 TABLET ORAL at 08:07

## 2022-07-26 RX ADMIN — ZINC SULFATE 220 MG (50 MG) CAPSULE 220 MG: CAPSULE at 06:07

## 2022-07-26 RX ADMIN — SUCRALFATE 1 G: 1 TABLET ORAL at 12:07

## 2022-07-26 RX ADMIN — PANTOPRAZOLE SODIUM 40 MG: 40 TABLET, DELAYED RELEASE ORAL at 11:07

## 2022-07-26 RX ADMIN — ACETAMINOPHEN 650 MG: 325 TABLET ORAL at 07:07

## 2022-07-26 RX ADMIN — ASPIRIN 81 MG: 81 TABLET, COATED ORAL at 11:07

## 2022-07-26 RX ADMIN — Medication 3 MG: at 07:07

## 2022-07-26 RX ADMIN — SUCRALFATE 1 G: 1 TABLET ORAL at 05:07

## 2022-07-26 RX ADMIN — DICLOFENAC SODIUM 2 G: 10 GEL TOPICAL at 11:07

## 2022-07-26 RX ADMIN — SENNOSIDES AND DOCUSATE SODIUM 1 TABLET: 50; 8.6 TABLET ORAL at 08:07

## 2022-07-26 RX ADMIN — LOSARTAN POTASSIUM 50 MG: 50 TABLET, FILM COATED ORAL at 11:07

## 2022-07-26 RX ADMIN — CARVEDILOL 6.25 MG: 3.12 TABLET, FILM COATED ORAL at 08:07

## 2022-07-26 RX ADMIN — FOLIC ACID 1 MG: 1 TABLET ORAL at 11:07

## 2022-07-26 RX ADMIN — LOSARTAN POTASSIUM 50 MG: 50 TABLET, FILM COATED ORAL at 08:07

## 2022-07-26 RX ADMIN — Medication 2 TABLET: at 06:07

## 2022-07-26 RX ADMIN — Medication 400 MG: at 06:07

## 2022-07-26 RX ADMIN — CITALOPRAM HYDROBROMIDE 20 MG: 20 TABLET ORAL at 11:07

## 2022-07-26 RX ADMIN — POLYETHYLENE GLYCOL 3350 17 G: 17 POWDER, FOR SOLUTION ORAL at 11:07

## 2022-07-26 RX ADMIN — Medication 2 CAPSULE: at 11:07

## 2022-07-26 NOTE — PT/OT/SLP PROGRESS
"Physical Therapy Treatment    Patient Name:  Elen Peters   MRN:  [id number]  Admit Date: 7/22/2022  Admitting Diagnosis: Subdural hematoma, post-traumatic  Recent Surgeries:     General Precautions: Standard, fall   Orthopedic Precautions:N/A   Braces:       Recommendations:     Discharge Recommendations:  assisted living facility   Level of Assistance Recommended at Discharge: 24 hours light assistance  Discharge Equipment Recommendations: bedside commode, wheelchair, walker, rolling, shower chair   Barriers to discharge: Decreased caregiver support    Assessment:     Elen Peters is a 89 y.o. female admitted with a medical diagnosis of Subdural hematoma, post-traumatic . Pt tolerated well, however limited session 2* to fatigue, MD appt in AM, pt would continue to benefit from skilled PT services to improve overall functional mobility, strength and endurance.  .      Performance deficits affecting function:  weakness, impaired endurance, impaired self care skills, impaired functional mobility, gait instability, impaired balance, decreased upper extremity function, decreased lower extremity function, impaired cardiopulmonary response to activity .    Rehab Potential is good    Activity Tolerance: Fair    Plan:     Patient to be seen 6 x/week to address the above listed problems via gait training, therapeutic activities, therapeutic exercises, neuromuscular re-education, wheelchair management/training    · Plan of Care Expires: 08/22/22  · Plan of Care Reviewed with: patient    Subjective     "sure" .     Pain/Comfort:  Pain Rating 1:  (did not rate)  Location - Orientation 1: generalized  Location 1:  (tooth ache)  Pain Addressed 1: Pre-medicate for activity, Distraction, Nurse notified (nsg aware)  Pain Rating Post-Intervention 1:  (off/on during session, better post session)    Patient's cultural, spiritual, Presybeterian conflicts given the current situation:  no    Objective:     Communicated with nsg Mita olson pt " "occ would make a grunt sound during session pt stated "she does it for energy"  Patient found with  (in WC, family present) upon PT entry to room.     Therapeutic Activities and Exercises: mini elliptical x 5 min occ rest breaks and vcs to reinitiate, limited by fatigue MD appt in AM    Functional Mobility:  · Bed Mobility:     · Sit to Supine: minimum assistance and simple vcs  · Transfers:     · Sit to Stand:  minimum assistance with rolling walker  · Bed to Chair: minimum assistance with  rolling walker  using  Stand Pivot and vcs for sequencing/RW mgmt  · Gait: amb with RW min A wc follow close ~ 38 ft tight grasp on  pt occ semi ellen twice on first trial , then 38 ft with no ellen    AM-PAC 6 CLICK MOBILITY  17    Patient left supine with call button in reach and belongings inreach.    GOALS:   Multidisciplinary Problems     Physical Therapy Goals        Problem: Physical Therapy    Goal Priority Disciplines Outcome Goal Variances Interventions   Physical Therapy Goal     PT, PT/OT Ongoing, Progressing     Description: Goals to be met by: 8/22/22    Patient will increase functional independence with mobility by performing:    . Supine to sit with Stand-by Assistance  . Sit to supine with Stand-by Assistance  . Rolling to Left and Right with Supervision.  . Sit to stand transfer with Supervision  . Bed to chair transfer with Supervision using No Assistive Device/ LRAD  . Gait  x 100 feet with Stand-by Assistance using Rolling Walker.   . Wheelchair propulsion x100 feet with Stand-by Assistance using bilateral uppper extremities  . Ascend/Descend 2 inch curb step with Minimal Assistance using Rolling Walker.  . Retrieve object off floor in standing with RW and reacher and Emma                     Time Tracking:     PT Received On: 07/26/22  PT Start Time: 1454  PT Stop Time: 1528  PT Total Time (min): 34 min    Billable Minutes: Gait Training 15 and Therapeutic Activity 19    Treatment Type: " Treatment  PT/PTA: PTA     PTA Visit Number: 2     07/26/2022

## 2022-07-26 NOTE — PT/OT/SLP PROGRESS
"Occupational Therapy   Treatment    Name: Elen Peters  MRN: [id number]  Admit Date: 7/22/2022  Admitting Diagnosis:  Subdural hematoma, post-traumatic    General Precautions: Standard, fall   Orthopedic Precautions:N/A   Braces:       Recommendations:     Discharge Recommendations: home health OT, assisted living facility  Level of Assistance Recommended at Discharge: 24 hours light assistance for ADL's and homemaking tasks  Discharge Equipment Recommendations:  bedside commode  Barriers to discharge:  Decreased caregiver support    Assessment:     Elen Peters is a 89 y.o. female with a medical diagnosis of Subdural hematoma, post-traumatic . Performance deficits affecting function are weakness, impaired endurance, impaired self care skills, impaired functional mobility, gait instability, impaired balance, impaired cognition, decreased coordination, decreased safety awareness, decreased lower extremity function   . Pt. participated well with session on this day. Pt is progressing well with session on this day still continues to requires cues with aspects of safety . BP checked during session and reported as 148/69 HR-80 O2-96.Pt. Will continue to benefit from continued OT to progress towards goals    Rehab Potential is good    Activity tolerance:  Good    Plan:     Patient to be seen 5 x/week to address the above listed problems via self-care/home management, therapeutic activities, therapeutic exercises    · Plan of Care Expires: 08/22/22  · Plan of Care Reviewed with: patient    Subjective     Communicated with: christian prior to session. "I have a toothache and they wont give me meds for it"  Nurse notified of Pt. With tooth ache pain    Pain/Comfort:  · Pain Rating 1:  (not rated "tooth ache")  · Location - Orientation 1: generalized  · Location 1:  (mouth "tooth ache")  · Pain Rating Post-Intervention 1:  (not rated)    Patient's cultural, spiritual, Muslim conflicts given the current " situation:  · no    Objective:     Patient found up in chair with daughter in law upon OT entry to room.    Functional Mobility/Transfers:  · Patient completed Sit <> Stand Transfer with minimum assistance  with  rolling walker   · Functional Mobility: not tested    Activities of Daily Living:  · Feeding:  supervision with remander of lunch    [facility] 6 Click ADL: 19    OT Exercises: UE Ergometer 10 mins with moderate resistance    Treatment & Education:  Pt. With standing act on this day with task. Pt. With CGA/SBA for balance aspects with task with no AD at raised counter Pt with fine motor task x 4 min and 30 secs with standing bal and min cues throught out. Pt. Not able to complete finish task but seated rest break required.    Pt. With 1# dowel activity with 2x15 reps with  shd flex, bicep curls horz adb/add and forward flex motion to increase BUE ROM and strength,.     Pt. With standing and therex performed to increase ROM, endurance selfcare task and fxl mobility for independence     Patient left up in chair with all lines intact, call button in reach and daughter in law presentEducation:      GOALS:   Multidisciplinary Problems     Occupational Therapy Goals        Problem: Occupational Therapy    Goal Priority Disciplines Outcome Interventions   Occupational Therapy Goal     OT, PT/OT Ongoing, Progressing    Description: Goals to be met by: 08/23/22     Patient will increase functional independence with ADLs by performing:    UE Dressing with Modified [place].  LE Dressing with Modified [place].  Grooming while standing at sink with Supervision.  Toileting from toilet with Supervision for hygiene and clothing management.   Bathing from  shower chair/bench with Supervision.  Toilet transfer to toilet with Supervision.  Upper extremity exercise program 3 x15 reps per handout, with supervision to increase UE strength/endurance for improved func mobility skills  Stand during functional task for 10' with  supervision in preparation for standing ADLs.                     Time Tracking:     OT Date of Treatment: 07/26/22  OT Start Time: 0104    OT Stop Time: 0145  OT Total Time (min): 41 min    Billable Minutes:Therapeutic Exercise 41    7/26/2022   OT and OMER have discussed the above patients goals and status in collaboration with Plan of Care.

## 2022-07-26 NOTE — PROGRESS NOTES
Pt. Daughter in law refused the tylenol for pain.daughter in law said she gave the pt.her personal tylenol.I told the daughter in law please don't give the pt. any medications.she said she gave it to her because she was yelling and fussing with her.charge nurse made aware and Pratmia Woods NP made aware.will continue to monitor.

## 2022-07-26 NOTE — PROGRESS NOTES
ROS     Negative for: Constitutional, Gastrointestinal, Neurological, Skin,   Genitourinary, Musculoskeletal, HENT, Endocrine, Cardiovascular, Eyes,   Respiratory, Psychiatric, Allergic/Imm, Heme/Lymph    Last edited by MONIE Hanna MD on 7/26/2022  1:24 PM. (History)                HPI     DLS 3/22/22    Patient here today with c/o decreasing VA ou x 2 weeks at near. No pain.   No f/f.    Ats ou prn    Last edited by Danii Raymond on 5/3/2022  2:37 PM. (History)                OCT - OD drusen - SRF resolved  OS - cnvm with SRF and heme - improving      A/P    1. 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 Dry AMD OD    3. PVD OU    4. PCIOL OU    5. HTN Ret OU  BP control      Me PRN

## 2022-07-27 ENCOUNTER — PATIENT MESSAGE (OUTPATIENT)
Dept: INTERNAL MEDICINE | Facility: CLINIC | Age: 87
End: 2022-07-27
Payer: MEDICARE

## 2022-07-27 LAB
ALBUMIN SERPL BCP-MCNC: 3.1 G/DL (ref 3.5–5.2)
ALP SERPL-CCNC: 106 U/L (ref 55–135)
ALT SERPL W/O P-5'-P-CCNC: 14 U/L (ref 10–44)
ANION GAP SERPL CALC-SCNC: 8 MMOL/L (ref 8–16)
AST SERPL-CCNC: 22 U/L (ref 10–40)
BASOPHILS # BLD AUTO: 0.03 K/UL (ref 0–0.2)
BASOPHILS NFR BLD: 0.5 % (ref 0–1.9)
BILIRUB SERPL-MCNC: 0.6 MG/DL (ref 0.1–1)
BUN SERPL-MCNC: 9 MG/DL (ref 10–30)
CALCIUM SERPL-MCNC: 9.1 MG/DL (ref 8.7–10.5)
CHLORIDE SERPL-SCNC: 96 MMOL/L (ref 95–110)
CO2 SERPL-SCNC: 27 MMOL/L (ref 23–29)
CREAT SERPL-MCNC: 0.8 MG/DL (ref 0.5–1.4)
DIFFERENTIAL METHOD: ABNORMAL
EOSINOPHIL # BLD AUTO: 0 K/UL (ref 0–0.5)
EOSINOPHIL NFR BLD: 0.6 % (ref 0–8)
ERYTHROCYTE [DISTWIDTH] IN BLOOD BY AUTOMATED COUNT: 13.5 % (ref 11.5–14.5)
EST. GFR  (AFRICAN AMERICAN): >60 ML/MIN/1.73 M^2
EST. GFR  (NON AFRICAN AMERICAN): >60 ML/MIN/1.73 M^2
GLUCOSE SERPL-MCNC: 109 MG/DL (ref 70–110)
HCT VFR BLD AUTO: 31.3 % (ref 37–48.5)
HGB BLD-MCNC: 11.6 G/DL (ref 12–16)
IMM GRANULOCYTES # BLD AUTO: 0.02 K/UL (ref 0–0.04)
IMM GRANULOCYTES NFR BLD AUTO: 0.3 % (ref 0–0.5)
LYMPHOCYTES # BLD AUTO: 0.8 K/UL (ref 1–4.8)
LYMPHOCYTES NFR BLD: 12.5 % (ref 18–48)
MAGNESIUM SERPL-MCNC: 1.7 MG/DL (ref 1.6–2.6)
MCH RBC QN AUTO: 34.1 PG (ref 27–31)
MCHC RBC AUTO-ENTMCNC: 37.1 G/DL (ref 32–36)
MCV RBC AUTO: 92 FL (ref 82–98)
MONOCYTES # BLD AUTO: 0.7 K/UL (ref 0.3–1)
MONOCYTES NFR BLD: 10.7 % (ref 4–15)
NEUTROPHILS # BLD AUTO: 4.9 K/UL (ref 1.8–7.7)
NEUTROPHILS NFR BLD: 75.4 % (ref 38–73)
NRBC BLD-RTO: 0 /100 WBC
PHOSPHATE SERPL-MCNC: 3.3 MG/DL (ref 2.7–4.5)
PLATELET # BLD AUTO: 302 K/UL (ref 150–450)
PMV BLD AUTO: 10.3 FL (ref 9.2–12.9)
POTASSIUM SERPL-SCNC: 3.6 MMOL/L (ref 3.5–5.1)
PROT SERPL-MCNC: 5.6 G/DL (ref 6–8.4)
RBC # BLD AUTO: 3.4 M/UL (ref 4–5.4)
SODIUM SERPL-SCNC: 131 MMOL/L (ref 136–145)
VIT B12 SERPL-MCNC: 970 PG/ML (ref 210–950)
WBC # BLD AUTO: 6.47 K/UL (ref 3.9–12.7)

## 2022-07-27 PROCEDURE — 85025 COMPLETE CBC W/AUTO DIFF WBC: CPT | Performed by: NURSE PRACTITIONER

## 2022-07-27 PROCEDURE — 25000003 PHARM REV CODE 250: Performed by: HOSPITALIST

## 2022-07-27 PROCEDURE — 80053 COMPREHEN METABOLIC PANEL: CPT | Performed by: NURSE PRACTITIONER

## 2022-07-27 PROCEDURE — 83735 ASSAY OF MAGNESIUM: CPT | Performed by: NURSE PRACTITIONER

## 2022-07-27 PROCEDURE — 36415 COLL VENOUS BLD VENIPUNCTURE: CPT | Performed by: NURSE PRACTITIONER

## 2022-07-27 PROCEDURE — 84100 ASSAY OF PHOSPHORUS: CPT | Performed by: NURSE PRACTITIONER

## 2022-07-27 PROCEDURE — 25000003 PHARM REV CODE 250: Performed by: NURSE PRACTITIONER

## 2022-07-27 PROCEDURE — 82607 VITAMIN B-12: CPT | Performed by: NURSE PRACTITIONER

## 2022-07-27 PROCEDURE — 11000004 HC SNF PRIVATE

## 2022-07-27 RX ADMIN — ASPIRIN 81 MG: 81 TABLET, COATED ORAL at 08:07

## 2022-07-27 RX ADMIN — DICLOFENAC SODIUM 2 G: 10 GEL TOPICAL at 09:07

## 2022-07-27 RX ADMIN — CITALOPRAM HYDROBROMIDE 20 MG: 20 TABLET ORAL at 08:07

## 2022-07-27 RX ADMIN — POLYETHYLENE GLYCOL 3350 17 G: 17 POWDER, FOR SOLUTION ORAL at 08:07

## 2022-07-27 RX ADMIN — Medication 400 MG: at 06:07

## 2022-07-27 RX ADMIN — LOSARTAN POTASSIUM 50 MG: 50 TABLET, FILM COATED ORAL at 08:07

## 2022-07-27 RX ADMIN — PANTOPRAZOLE SODIUM 40 MG: 40 TABLET, DELAYED RELEASE ORAL at 08:07

## 2022-07-27 RX ADMIN — CARVEDILOL 6.25 MG: 3.12 TABLET, FILM COATED ORAL at 08:07

## 2022-07-27 RX ADMIN — ATORVASTATIN CALCIUM 80 MG: 40 TABLET, FILM COATED ORAL at 08:07

## 2022-07-27 RX ADMIN — SUCRALFATE 1 G: 1 TABLET ORAL at 06:07

## 2022-07-27 RX ADMIN — Medication 2 TABLET: at 06:07

## 2022-07-27 RX ADMIN — ONDANSETRON HYDROCHLORIDE 4 MG: 4 TABLET, FILM COATED ORAL at 08:07

## 2022-07-27 RX ADMIN — SUCRALFATE 1 G: 1 TABLET ORAL at 08:07

## 2022-07-27 RX ADMIN — THERA TABS 1 TABLET: TAB at 08:07

## 2022-07-27 RX ADMIN — BENZOCAINE: 0.1 GEL TOPICAL at 03:07

## 2022-07-27 RX ADMIN — SENNOSIDES AND DOCUSATE SODIUM 1 TABLET: 50; 8.6 TABLET ORAL at 08:07

## 2022-07-27 RX ADMIN — Medication 6 MG: at 10:07

## 2022-07-27 RX ADMIN — SUCRALFATE 1 G: 1 TABLET ORAL at 11:07

## 2022-07-27 RX ADMIN — ACETAMINOPHEN 650 MG: 325 TABLET ORAL at 01:07

## 2022-07-27 RX ADMIN — FOLIC ACID 1 MG: 1 TABLET ORAL at 08:07

## 2022-07-27 RX ADMIN — SUCRALFATE 1 G: 1 TABLET ORAL at 05:07

## 2022-07-27 RX ADMIN — Medication 2 CAPSULE: at 08:07

## 2022-07-27 RX ADMIN — DICLOFENAC SODIUM 2 G: 10 GEL TOPICAL at 08:07

## 2022-07-27 RX ADMIN — ZINC SULFATE 220 MG (50 MG) CAPSULE 220 MG: CAPSULE at 06:07

## 2022-07-27 NOTE — PROGRESS NOTES
Pt. Transported to [facility] for CT scan of brain per [facility] ambulance via stretcher.pt. awake and alert prior to leaving.

## 2022-07-27 NOTE — PROGRESS NOTES
Called into room by OT the patient up in w/c with uncontrollable jerking movement in upper extremities and  grunting sounds.charge nurse aware and she notified JEANNE Andujar.pt. assisted back in bed.pt. denies any pain at this time.will continue to monitor.

## 2022-07-27 NOTE — PROGRESS NOTES
[facility]                                  Skilled Nursing Facility                   Progress Note     Admit Date: 7/22/2022  DIANA 8/10/2022  Principal Problem:  Subdural hematoma, post-traumatic   HPI obtained from patient interview and chart review     Chief Complaint:   Re-evaluation of medical treatment and therapy status: new jerking movement and grunting    Interval history  All of today's labs reviewed and are listed below.  24 hr vital sign ranges listed below.  Patient denies shortness of breath, abdominal discomfort, nausea, or vomiting.  Patient reports an adequate appetite.  Patient denies dysuria.  Patient reports having regular bowel movements.  Patient progessing with PT/OT. Continuing to follow and treat all acute and chronic conditions.    HPI:   Elen Peters is a 89 y.o. female with PMH of TMJ, syncope, renal cancer, hyperlipidemia, hypertension, depression, CAD who presents to the ED for AMS following recent fall with head trauma. CTH on arrival to the ED revealed thin SDH along the left side of the posterior falx. Admitted to [facility] for observation.  Aspirin held.  No surgical intervention required.  Neurosurgery recommending repeat CTH in 4 weeks.    Patient will be treated at [facility] with PT and OT to improve functional status and ability to perform ADLs.     Interval history  Patient reports grunting and neck jerking forward which started on Sunday and has worsened since. Today the grunting and neck jerking is every few minutes or so while awake. Able to follow finger, follows commands, oriented to person, place, and year but poor historian regarding symptoms. CT Head on 7/19/22 showed stable appearance of left acute subdural hematoma. CT head results and treatment options for involuntary movement discussed-Clonidine. Initiate Clonidine 0.1mg BID and stop Losartan. Son expressed he does not want any procedures and  would like to continue conservative measures with medications.        Past Medical History: Patient has a past medical history of Abnormal stress test (11/18/2015), Arthritis, Bladder cancer, Cataract, Coronary artery disease involving native coronary artery (1/6/2016), Depression, Exudative age-related macular degeneration of left eye with active choroidal neovascularization (10/1/2020), GERD (gastroesophageal reflux disease), HTN (hypertension), benign (11/18/2015), bladder infections, Hyperlipemia, OP (osteoporosis), Positive cardiac stress test (1/6/2016), Renal cancer, Syncope (1/6/2016), TMJ (dislocation of temporomandibular joint), Upper back pain (12/1/2015), Ureteral cancer, Venous insufficiency (2017), and Villous adenoma of colon (5/31/2013).    Past Surgical History: Patient has a past surgical history that includes Nephrectomy; TONSILLECTOMY, ADENOIDECTOMY; Back surgery; Appendectomy; Colonoscopy (10/21/2013); Cystoscopy; Cataract extraction w/  intraocular lens implant (Left, 3/16/15); Cataract extraction w/  intraocular lens implant (Right, 4/6/15); Eye surgery; Spine surgery; and Esophagogastroduodenoscopy (N/A, 1/30/2019).    Social History: Patient reports that she quit smoking about 73 years ago. She has never used smokeless tobacco. She reports that she does not drink alcohol and does not use drugs.    Family History: family history includes Cancer in her father, mother, and son; Goiter in her mother; Heart attack in her mother; Heart disease in her maternal grandfather and mother; Leukemia in her father and mother; No Known Problems in her brother, maternal aunt, maternal grandmother, maternal uncle, paternal aunt, paternal grandfather, paternal grandmother, paternal uncle, sister, son, son, son, son, and son.    Allergies: Patient is allergic to amlodipine, sulfa (sulfonamide antibiotics), codeine, maxzide [triamterene-hydrochlorothiazid], and  sulfamethoxazole-trimethoprim.    ROS  Constitutional: Negative for fever   Eyes: Negative for blurred vision, double vision   Respiratory: Negative for cough, shortness of breath   Cardiovascular: Negative for chest pain, palpitations, and leg swelling.   Gastrointestinal: Negative for abdominal pain, constipation, diarrhea, nausea, vomiting.   Genitourinary: Negative for dysuria, frequency   Musculoskeletal:  + generalized weakness. Negative for back pain and myalgias.   Skin: Negative for itching and rash.   Neurological: Negative for dizziness, headaches. +involuntary movement  Psychiatric/Behavioral: Negative for depression. The patient is not nervous/anxious.      24 hour Vital Sign Range   Temp:  [97.9 °F (36.6 °C)-98.8 °F (37.1 °C)]   Pulse:  [76-97]   Resp:  [18-20]   BP: (131-170)/(59-80)   SpO2:  [94 %-97 %]     PEx  Constitutional: Patient appears debilitated.  No distress noted  HENT:   Head: Normocephalic and atraumatic.   Eyes: Pupils are equal, round  Neck: Normal range of motion. Neck supple.   Cardiovascular: Normal rate, regular rhythm and normal heart sounds.    Pulmonary/Chest: Effort normal and breath sounds are clear  Abdominal: Soft. Bowel sounds are normal.   Musculoskeletal: Normal range of motion. Forward jerking motion and grunting  Neurological: Alert and oriented to person, place, and time.   Psychiatric: Normal mood and affect. Behavior is normal.   Skin: Skin is warm and dry. Skin intact         No results for input(s): GLUCOSE, NA, K, CL, CO2, BUN, CREATININE, MG in the last 24 hours.    Invalid input(s):  CALCIUM    No results for input(s): WBC, RBC, HGB, HCT, PLT, MCV, MCH, MCHC in the last 24 hours.      Assessment and Plan:    Involuntary movement, NEW  grunting and neck jerking forward which started on Sunday and has worsened since.   CT Head on 7/19/22 showed stable appearance of left acute subdural hematoma.   CT head results and treatment options for involuntary movement  discussed-Clonidine.   Initiate Clonidine 0.1mg BID and stop Losartan.     Subdural hematoma, posttraumatic  No surgical intervention required.  Neurosurgery recommending repeat CTH in 4 weeks.  Fall precautions    Gastroesophageal reflux disease  Protonix 40 mg daily  Carafate 1 g a.c. and HS    Mixed hyperlipidemia  Atorvastatin 80 mg nightly    Depression, major, in remission  citalopram 20 mg daily    Hypertension  Carvedilol 6.25 mg b.i.d.  Losartan 50 mg b.i.d.    Coronary artery disease involving native coronary artery  Continue aspirin and statin    Hyponatremia  Sodium today is 134, is trending upwards  Continue to trend on regular labs    Exudative age-related macular degeneration of both eyes with active choroidal neovascularization  Sees Dr. Cyndi Velasquez injections q 6 weeks, scheduled for injections tomorrow 7/26    Anticipate disposition:  Home with home health      Follow-up needed during SNF stay-    Follow-up needed after discharge from SNF: PCP,     Future Appointments   Date Time Provider Department Center   8/11/2022  8:00 AM Anita Mahan, JEANNE [facility] [place]   10/26/2022 10:00 AM LAB, [facility] [place]         I certify that SNF services are required to be given on an inpatient basis because Elen Peters needs for skilled nursing care and/or skilled rehabilitation are required on a daily basis and such services can only practically be provided in a skilled nursing facility setting and are for an ongoing condition for which she received inpatient care in the hospital.         Pratima Woods NP  Department of Hospital Medicine   [facility]     DOS: 7/27/2022       Patient note was created using MModal Dictation.  Any errors in syntax or even information may not have been identified and edited on initial review prior to signing this note.

## 2022-07-27 NOTE — PROGRESS NOTES
Pt. returned to facility [facility]  via stretcher the patient. assisted into bed per EMT.will continue to monitor.

## 2022-07-27 NOTE — PT/OT/SLP PROGRESS
"Physical Therapy      Patient Name:  Elen Peters   MRN:  [id number]    Patient not seen today secondary to pt reporting that she was "not doing to good, trying to get back to normal" nsg notified/assessed and assisted nsg to return back to bed, mod/min trf no AD stand pivot, BM max/mod with trunk support/BLE mgmt, pt alert  . Will follow-up next PT session with PT.        "

## 2022-07-27 NOTE — PLAN OF CARE
SW met with patient in room for Discharge Planning Assessment. Pt lives alone in an Independent Living Facility and states s/he was previously using a rolling walker for ambulation. Patient will have transportation assistance at discharge from her Daughter-in-law, Jo Ann Peters or one of her sons. Her caregiver, her family, and facility aides will help patient after d/c.  SW is following this Pt for DC planning needs. There are no identified needs at this time.     Patient's preferred pharmacy is [facility].      Kerrie Kemp [facility]  Case Management Department  [facility]  [email or web address]  Initial Discharge Assessment       Primary Care Provider: Jim Treadwell MD    Admission Diagnosis: Subdural hematoma, post-traumatic [S06.5X9A]    Admission Date: 7/22/2022  Expected Discharge Date: 8/10/2022    Discharge Barriers Identified: None    Payor: HU[employer] MANAGED MEDICARE / Plan: HUMANA TOTAL CARE ADVANTAGE / Product Type: Medicare Advantage /     Extended Emergency Contact Information  Primary Emergency Contact: Segundo Peters  Address: UNKNOWN   [place]  Work Phone: [phone]  Mobile Phone: [phone]  Relation: Son  Secondary Emergency Contact: Jo Ann Peters  Address: UNKNOWN   [place]  Mobile Phone: [phone]  Relation: Daughter    Discharge Plan A: Independent living facility, Home Health  Discharge Plan B: Assisted Living, Home Health      [facility] ([facility]) - [place], [place] - [address]  [address] [address]  Phone: [phone] Fax: [phone]    [id number] - [place] - [address] [place] [address] & [facility]  [address] [place] [id number]  Phone: [phone] Fax: [phone]      Initial Assessment (most recent)     Adult Discharge Assessment - 07/25/22 1500        Discharge  Assessment    Assessment Type Discharge Planning Assessment     Communicated DIANA with patient/caregiver Date not available/Unable to determine     Lives With facility resident;alone     Do you expect to return to your current living situation? Yes   With a higher level of care.    Do you have help at home or someone to help you manage your care at home? Yes     Who are your caregiver(s) and their phone number(s)? family has hired a sitter     Prior to hospitilization cognitive status: Unable to Assess     Current cognitive status: Unable to Assess     Home Layout Able to live on 1st floor     Equipment Currently Used at Home walker, rolling;bedside commode;shower chair     Readmission within 30 days? No     Patient currently being followed by outpatient case management? Unable to determine (comments)     Do you currently have service(s) that help you manage your care at home? Yes     Do you take prescription medications? Yes     Do you have prescription coverage? Yes     Do you have any problems affording any of your prescribed medications? No     Is the patient taking medications as prescribed? yes     Who is going to help you get home at discharge? Family     How do you get to doctors appointments? family or friend will provide     Are you on dialysis? No     Do you take coumadin? No     Discharge Plan A Independent living facility;Home Health     Discharge Plan B Assisted Living;Home Health     DME Needed Upon Discharge  other (see comments)   TBD    Discharge Plan discussed with: Patient;Adult children     Discharge Barriers Identified None

## 2022-07-28 ENCOUNTER — PATIENT MESSAGE (OUTPATIENT)
Dept: INTERNAL MEDICINE | Facility: CLINIC | Age: 87
End: 2022-07-28
Payer: MEDICARE

## 2022-07-28 LAB
ANION GAP SERPL CALC-SCNC: 6 MMOL/L (ref 8–16)
BASOPHILS # BLD AUTO: 0.04 K/UL (ref 0–0.2)
BASOPHILS NFR BLD: 0.8 % (ref 0–1.9)
BUN SERPL-MCNC: 9 MG/DL (ref 10–30)
CALCIUM SERPL-MCNC: 9.1 MG/DL (ref 8.7–10.5)
CHLORIDE SERPL-SCNC: 95 MMOL/L (ref 95–110)
CO2 SERPL-SCNC: 28 MMOL/L (ref 23–29)
CREAT SERPL-MCNC: 0.7 MG/DL (ref 0.5–1.4)
DIFFERENTIAL METHOD: ABNORMAL
EOSINOPHIL # BLD AUTO: 0.2 K/UL (ref 0–0.5)
EOSINOPHIL NFR BLD: 4.2 % (ref 0–8)
ERYTHROCYTE [DISTWIDTH] IN BLOOD BY AUTOMATED COUNT: 13.5 % (ref 11.5–14.5)
EST. GFR  (AFRICAN AMERICAN): >60 ML/MIN/1.73 M^2
EST. GFR  (NON AFRICAN AMERICAN): >60 ML/MIN/1.73 M^2
GLUCOSE SERPL-MCNC: 101 MG/DL (ref 70–110)
HCT VFR BLD AUTO: 28.9 % (ref 37–48.5)
HGB BLD-MCNC: 10.1 G/DL (ref 12–16)
IMM GRANULOCYTES # BLD AUTO: 0.01 K/UL (ref 0–0.04)
IMM GRANULOCYTES NFR BLD AUTO: 0.2 % (ref 0–0.5)
LYMPHOCYTES # BLD AUTO: 1.1 K/UL (ref 1–4.8)
LYMPHOCYTES NFR BLD: 20.8 % (ref 18–48)
MAGNESIUM SERPL-MCNC: 1.9 MG/DL (ref 1.6–2.6)
MCH RBC QN AUTO: 32.4 PG (ref 27–31)
MCHC RBC AUTO-ENTMCNC: 34.9 G/DL (ref 32–36)
MCV RBC AUTO: 93 FL (ref 82–98)
MONOCYTES # BLD AUTO: 0.7 K/UL (ref 0.3–1)
MONOCYTES NFR BLD: 12.9 % (ref 4–15)
NEUTROPHILS # BLD AUTO: 3.2 K/UL (ref 1.8–7.7)
NEUTROPHILS NFR BLD: 61.1 % (ref 38–73)
NRBC BLD-RTO: 0 /100 WBC
PHOSPHATE SERPL-MCNC: 3.4 MG/DL (ref 2.7–4.5)
PLATELET # BLD AUTO: 266 K/UL (ref 150–450)
PMV BLD AUTO: 9.9 FL (ref 9.2–12.9)
POTASSIUM SERPL-SCNC: 3.7 MMOL/L (ref 3.5–5.1)
RBC # BLD AUTO: 3.12 M/UL (ref 4–5.4)
SODIUM SERPL-SCNC: 129 MMOL/L (ref 136–145)
WBC # BLD AUTO: 5.28 K/UL (ref 3.9–12.7)

## 2022-07-28 PROCEDURE — 36415 COLL VENOUS BLD VENIPUNCTURE: CPT | Performed by: HOSPITALIST

## 2022-07-28 PROCEDURE — 25000003 PHARM REV CODE 250: Performed by: HOSPITALIST

## 2022-07-28 PROCEDURE — 80048 BASIC METABOLIC PNL TOTAL CA: CPT | Performed by: HOSPITALIST

## 2022-07-28 PROCEDURE — 25000003 PHARM REV CODE 250: Performed by: NURSE PRACTITIONER

## 2022-07-28 PROCEDURE — 85025 COMPLETE CBC W/AUTO DIFF WBC: CPT | Performed by: HOSPITALIST

## 2022-07-28 PROCEDURE — 97110 THERAPEUTIC EXERCISES: CPT

## 2022-07-28 PROCEDURE — 83735 ASSAY OF MAGNESIUM: CPT | Performed by: HOSPITALIST

## 2022-07-28 PROCEDURE — 84100 ASSAY OF PHOSPHORUS: CPT | Performed by: HOSPITALIST

## 2022-07-28 PROCEDURE — 97535 SELF CARE MNGMENT TRAINING: CPT | Mod: CO

## 2022-07-28 PROCEDURE — 11000004 HC SNF PRIVATE

## 2022-07-28 RX ORDER — CIPROFLOXACIN 500 MG/1
500 TABLET ORAL EVERY 12 HOURS
Status: DISCONTINUED | OUTPATIENT
Start: 2022-07-28 | End: 2022-08-01

## 2022-07-28 RX ORDER — CLONIDINE HYDROCHLORIDE 0.1 MG/1
0.1 TABLET ORAL 2 TIMES DAILY
Status: DISCONTINUED | OUTPATIENT
Start: 2022-07-28 | End: 2022-08-08 | Stop reason: HOSPADM

## 2022-07-28 RX ADMIN — CARVEDILOL 6.25 MG: 3.12 TABLET, FILM COATED ORAL at 09:07

## 2022-07-28 RX ADMIN — Medication 2 CAPSULE: at 09:07

## 2022-07-28 RX ADMIN — SENNOSIDES AND DOCUSATE SODIUM 1 TABLET: 50; 8.6 TABLET ORAL at 09:07

## 2022-07-28 RX ADMIN — SUCRALFATE 1 G: 1 TABLET ORAL at 06:07

## 2022-07-28 RX ADMIN — CARVEDILOL 6.25 MG: 3.12 TABLET, FILM COATED ORAL at 08:07

## 2022-07-28 RX ADMIN — DICLOFENAC SODIUM 2 G: 10 GEL TOPICAL at 08:07

## 2022-07-28 RX ADMIN — CLONIDINE HYDROCHLORIDE 0.1 MG: 0.1 TABLET ORAL at 08:07

## 2022-07-28 RX ADMIN — ONDANSETRON HYDROCHLORIDE 4 MG: 4 TABLET, FILM COATED ORAL at 08:07

## 2022-07-28 RX ADMIN — SUCRALFATE 1 G: 1 TABLET ORAL at 03:07

## 2022-07-28 RX ADMIN — SUCRALFATE 1 G: 1 TABLET ORAL at 12:07

## 2022-07-28 RX ADMIN — PANTOPRAZOLE SODIUM 40 MG: 40 TABLET, DELAYED RELEASE ORAL at 09:07

## 2022-07-28 RX ADMIN — FOLIC ACID 1 MG: 1 TABLET ORAL at 09:07

## 2022-07-28 RX ADMIN — Medication 6 MG: at 08:07

## 2022-07-28 RX ADMIN — Medication 2 TABLET: at 06:07

## 2022-07-28 RX ADMIN — CLONIDINE HYDROCHLORIDE 0.1 MG: 0.1 TABLET ORAL at 09:07

## 2022-07-28 RX ADMIN — THERA TABS 1 TABLET: TAB at 09:07

## 2022-07-28 RX ADMIN — SENNOSIDES AND DOCUSATE SODIUM 1 TABLET: 50; 8.6 TABLET ORAL at 08:07

## 2022-07-28 RX ADMIN — CIPROFLOXACIN 500 MG: 500 TABLET, FILM COATED ORAL at 08:07

## 2022-07-28 RX ADMIN — ZINC SULFATE 220 MG (50 MG) CAPSULE 220 MG: CAPSULE at 06:07

## 2022-07-28 RX ADMIN — ASPIRIN 81 MG: 81 TABLET, COATED ORAL at 09:07

## 2022-07-28 RX ADMIN — DICLOFENAC SODIUM 2 G: 10 GEL TOPICAL at 09:07

## 2022-07-28 RX ADMIN — SUCRALFATE 1 G: 1 TABLET ORAL at 08:07

## 2022-07-28 RX ADMIN — POLYETHYLENE GLYCOL 3350 17 G: 17 POWDER, FOR SOLUTION ORAL at 09:07

## 2022-07-28 RX ADMIN — Medication 400 MG: at 06:07

## 2022-07-28 NOTE — PT/OT/SLP PROGRESS
Physical Therapy Treatment    Patient Name:  Elen Peters   MRN:  [id number]  Admit Date: 7/22/2022  Admitting Diagnosis: Subdural hematoma, post-traumatic  Recent Surgeries: N/A    General Precautions: Standard, fall   Orthopedic Precautions:N/A   Braces: N/A     Recommendations:     Discharge Recommendations:  assisted living facility   Level of Assistance Recommended at Discharge: 24 hours significant assistance  Discharge Equipment Recommendations: bedside commode, wheelchair, walker, rolling, shower chair   Barriers to discharge: Decreased caregiver support (increased assistance needed)    Assessment:     Elen Peters is a 89 y.o. female admitted with a medical diagnosis of Subdural hematoma, post-traumatic. Patient tolerated today's session fairly, with nursing okay with therapy session. She was oriented to person but disoriented to place, time and situation. Once sitting on EOB patient continued to demonstrate uncontrollable jerky movements forward and backward requiring Emma for safety. Patient reports having to urinate but not able to. At this time, patient is unsafe to leave up in wc due to uncontrollable jerky movements forward and backward. Caregiver educated not to leave patient in wc. Nurse, , charge nurse, and NP aware of new status. She remains limited in bed mobility, transfers, static/dynamic sitting and standing balance, and lower extremity strength. Patient continues to require skilled physical therapy services to address deficits and return patient to OF with no limitations.     Performance deficits affecting function:  weakness, impaired endurance, impaired self care skills, impaired functional mobility, gait instability, impaired balance, impaired cognition, decreased safety awareness, decreased ROM, abnormal tone, impaired cardiopulmonary response to activity .    Rehab Potential is fair    Activity Tolerance: Fair    Plan:     Patient to be seen 5 x/week to address the above  listed problems via gait training, therapeutic activities, therapeutic exercises, neuromuscular re-education, wheelchair management/training    · Plan of Care Expires: 08/10/22  · Plan of Care Reviewed with: patient, caregiver    Subjective     Patient reports no pain and is agreeable to participate in therapy today.    Pain/Comfort:  · Pain Rating 1: 0/10  · Pain Rating Post-Intervention 1: 0/10    Patient's cultural, spiritual, Religion conflicts given the current situation:  · no    Objective:     Communicated with nursing staff prior to session. Patient found HOB elevated with  (no lines) upon PT entry to room. Co treatment with OMER due to patients new status.     Functional Mobility:  Bed Mobility:     · Supine to Sit: maximal assistance  · Sit to Supine: maximal assistance    Transfers:     · Sit to Stand x multiple trials: maximal assistance and 2nd person for safety with no AD  · Toilet Transfer: maximal assistance with no AD using Stand Pivot. Caregiver transferred to toilet to practice when patient is discharged, therapist SBA as needed. Patient required CGA on BSC for safety due to frequent trunk movements. SPT transferred back to bed with MaxA and no AD with stand pivot transfer.    Balance: She tolerated x 5 minutes sitting EOB. She required Emma due to uncontrollable jerky forward and backward movements in her trunk.    AM-PAC 6 CLICK MOBILITY  12    Patient left HOB elevated with call button in reach and caregiver present.    GOALS:   Multidisciplinary Problems     Physical Therapy Goals        Problem: Physical Therapy    Goal Priority Disciplines Outcome Goal Variances Interventions   Physical Therapy Goal     PT, PT/OT Ongoing, Progressing     Description: Goals to be met by: 8/22/22    Patient will increase functional independence with mobility by performing:    . Supine to sit with Stand-by Assistance  . Sit to supine with Stand-by Assistance  . Rolling to Left and Right with Supervision.  .  Sit to stand transfer with Supervision  . Bed to chair transfer with Supervision using No Assistive Device/ LRAD  . Gait  x 100 feet with Stand-by Assistance using Rolling Walker.   . Wheelchair propulsion x100 feet with Stand-by Assistance using bilateral uppper extremities  . Ascend/Descend 2 inch curb step with Minimal Assistance using Rolling Walker.  . Retrieve object off floor in standing with RW and reacher and Emma                     Time Tracking:     PT Received On: 07/28/22  PT Start Time: 1402  PT Stop Time: 1419  PT Total Time (min): 17 min    Billable Minutes: Therapeutic Activity 17    Treatment Type: Treatment  PT/PTA: PT     PTA Visit Number: 0     07/28/2022

## 2022-07-28 NOTE — PT/OT/SLP PROGRESS
Occupational Therapy   Treatment    Name: Elen Peters  MRN: [id number]  Admit Date: 7/22/2022  Admitting Diagnosis:  Subdural hematoma, post-traumatic    General Precautions: Standard, fall   Orthopedic Precautions:N/A   Braces:       Recommendations:     Discharge Recommendations: home health OT, assisted living facility  Level of Assistance Recommended at Discharge: 24 hours light assistance for ADL's and homemaking tasks  Discharge Equipment Recommendations:  bedside commode  Barriers to discharge:  Decreased caregiver support    Assessment:     Elen Peters is a 89 y.o. female with a medical diagnosis of Subdural hematoma, post-traumatic . Performance deficits affecting function are weakness, impaired endurance, impaired self care skills, impaired functional mobility, gait instability, impaired balance, impaired cognition, decreased coordination, decreased safety awareness, decreased lower extremity function. Pt. participated fair with session on this day. Nursing OK'd therapy session. Session limited 2* to involuntary jerking movements. Nurse, social workers, charge nurse and NP aware of new involuntary jerking movements. Pt.unsafe to leave in wheelchair due to jerky movements. Caregiver educated on not leaving Pt. In w/c due to jerky movements back and forward. Pt. Unable to keep upright posture when seated in chair. Caregiver present and aware. Pt. Will continue to benefit from continued OT to progress towards goals    Co-treatment performed due to patient's multiple deficits requiring two skilled therapists to appropriately and safely assess patient's strength and endurance while facilitating functional tasks in addition to accommodating for patient's activity tolerance.         Rehab Potential is fair    Activity tolerance:  Fair    Plan:     Patient to be seen 5 x/week to address the above listed problems via self-care/home management, therapeutic activities, therapeutic exercises    · Plan of Care  "Expires: 08/22/22  · Plan of Care Reviewed with: patient    Subjective     Communicated with: nsg prior to session. "Who are you"    Pain/Comfort:  · Pain Rating 1:  (not rated)  · Location - Orientation 1: generalized  · Location 1: mouth  · Pain Rating Post-Intervention 1:  (not rated)    Patient's cultural, spiritual, Mandaen conflicts given the current situation:  · no    Objective:     Patient found HOB elevated with sitter upon OT entry to room.    Bed Mobility:    · Patient completed Supine to Sit with maximal assistance  · Patient completed Sit to Supine with maximal assistance     Functional Mobility/Transfers:  · Patient completed Toilet Transfer Stand Pivot technique with maximal assistance with  no AD Caregiver performed transfer to toilet and therapist transfers Pt. Back to bed. 2 person (A) for safety due to involuntary jerky movements and buckling of knees  · Functional Mobility: not tested    Activities of Daily Living:  · Toileting: total assistance with hygiene and depends management 2 persons (A) for safety aspects    [facility] 6 Click ADL: 19      Treatment & Education:  Pt. Tolerated sitting on BSC for approx 8 mins with CGA due to jerking movements. Pt. Noted with strong posterior trunk lean.    Pt. Educated on safety with ADL tasks as well as functional mobility and need for staff assist.     Patient left HOB elevated with all lines intact, call button in reach and sitter presentEducation:      GOALS:   Multidisciplinary Problems     Occupational Therapy Goals        Problem: Occupational Therapy    Goal Priority Disciplines Outcome Interventions   Occupational Therapy Goal     OT, PT/OT Ongoing, Progressing    Description: Goals to be met by: 08/23/22     Patient will increase functional independence with ADLs by performing:    UE Dressing with Modified [place].  LE Dressing with Modified [place].  Grooming while standing at sink with Supervision.  Toileting from toilet with " Supervision for hygiene and clothing management.   Bathing from  shower chair/bench with Supervision.  Toilet transfer to toilet with Supervision.  Upper extremity exercise program 3 x15 reps per handout, with supervision to increase UE strength/endurance for improved func mobility skills  Stand during functional task for 10' with supervision in preparation for standing ADLs.                     Time Tracking:     OT Date of Treatment: 07/28/22  OT Start Time: 0203    OT Stop Time: 0218  OT Total Time (min): 15 min    Billable Minutes:Self Care/Home Management 15    7/28/2022   OT and OMER have discussed the above patients goals and status in collaboration with Plan of Care.

## 2022-07-28 NOTE — PLAN OF CARE
Interdisciplinary team, Kerrie Kemp LM, and Bella Poole, PT, spoke to patient and patient's daughter-in-law for care plan conference, weekly status update, and therapy progress update. Tentative discharge date set for 8/10/22. Daughter-in-law with some concerns, nursing is aware.

## 2022-07-29 LAB
BILIRUB UR QL STRIP: NEGATIVE
CLARITY UR REFRACT.AUTO: ABNORMAL
COLOR UR AUTO: YELLOW
GLUCOSE UR QL STRIP: NEGATIVE
HGB UR QL STRIP: NEGATIVE
KETONES UR QL STRIP: NEGATIVE
LEUKOCYTE ESTERASE UR QL STRIP: NEGATIVE
NITRITE UR QL STRIP: NEGATIVE
PH UR STRIP: 8 [PH] (ref 5–8)
PROT UR QL STRIP: NEGATIVE
SP GR UR STRIP: 1 (ref 1–1.03)
URN SPEC COLLECT METH UR: ABNORMAL

## 2022-07-29 PROCEDURE — 25000003 PHARM REV CODE 250: Performed by: HOSPITALIST

## 2022-07-29 PROCEDURE — 25000003 PHARM REV CODE 250: Performed by: NURSE PRACTITIONER

## 2022-07-29 PROCEDURE — 92610 EVALUATE SWALLOWING FUNCTION: CPT

## 2022-07-29 PROCEDURE — 81003 URINALYSIS AUTO W/O SCOPE: CPT | Performed by: NURSE PRACTITIONER

## 2022-07-29 PROCEDURE — 92523 SPEECH SOUND LANG COMPREHEN: CPT

## 2022-07-29 PROCEDURE — 97530 THERAPEUTIC ACTIVITIES: CPT

## 2022-07-29 PROCEDURE — 11000004 HC SNF PRIVATE

## 2022-07-29 PROCEDURE — 97116 GAIT TRAINING THERAPY: CPT

## 2022-07-29 PROCEDURE — 97535 SELF CARE MNGMENT TRAINING: CPT

## 2022-07-29 RX ORDER — PANTOPRAZOLE SODIUM 40 MG/1
40 TABLET, DELAYED RELEASE ORAL
Status: DISCONTINUED | OUTPATIENT
Start: 2022-07-29 | End: 2022-07-30

## 2022-07-29 RX ORDER — CLONIDINE HYDROCHLORIDE 0.1 MG/1
0.1 TABLET ORAL DAILY PRN
Status: DISCONTINUED | OUTPATIENT
Start: 2022-07-29 | End: 2022-08-08 | Stop reason: HOSPADM

## 2022-07-29 RX ORDER — NYSTATIN 100000 [USP'U]/ML
500000 SUSPENSION ORAL
Status: DISCONTINUED | OUTPATIENT
Start: 2022-07-29 | End: 2022-08-03

## 2022-07-29 RX ADMIN — SUCRALFATE 1 G: 1 TABLET ORAL at 05:07

## 2022-07-29 RX ADMIN — SENNOSIDES AND DOCUSATE SODIUM 1 TABLET: 50; 8.6 TABLET ORAL at 08:07

## 2022-07-29 RX ADMIN — DICLOFENAC SODIUM 2 G: 10 GEL TOPICAL at 09:07

## 2022-07-29 RX ADMIN — PANTOPRAZOLE SODIUM 40 MG: 40 TABLET, DELAYED RELEASE ORAL at 05:07

## 2022-07-29 RX ADMIN — NYSTATIN 500000 UNITS: 500000 SUSPENSION ORAL at 09:07

## 2022-07-29 RX ADMIN — PANTOPRAZOLE SODIUM 40 MG: 40 TABLET, DELAYED RELEASE ORAL at 08:07

## 2022-07-29 RX ADMIN — ONDANSETRON HYDROCHLORIDE 4 MG: 4 TABLET, FILM COATED ORAL at 09:07

## 2022-07-29 RX ADMIN — CIPROFLOXACIN 500 MG: 500 TABLET, FILM COATED ORAL at 08:07

## 2022-07-29 RX ADMIN — ONDANSETRON HYDROCHLORIDE 4 MG: 4 TABLET, FILM COATED ORAL at 03:07

## 2022-07-29 RX ADMIN — CLONIDINE HYDROCHLORIDE 0.1 MG: 0.1 TABLET ORAL at 08:07

## 2022-07-29 RX ADMIN — ZINC SULFATE 220 MG (50 MG) CAPSULE 220 MG: CAPSULE at 06:07

## 2022-07-29 RX ADMIN — ASPIRIN 81 MG: 81 TABLET, COATED ORAL at 08:07

## 2022-07-29 RX ADMIN — Medication 2 TABLET: at 06:07

## 2022-07-29 RX ADMIN — Medication 400 MG: at 06:07

## 2022-07-29 RX ADMIN — Medication 6 MG: at 09:07

## 2022-07-29 RX ADMIN — CIPROFLOXACIN 500 MG: 500 TABLET, FILM COATED ORAL at 09:07

## 2022-07-29 RX ADMIN — DICLOFENAC SODIUM 2 G: 10 GEL TOPICAL at 08:07

## 2022-07-29 RX ADMIN — CARVEDILOL 6.25 MG: 3.12 TABLET, FILM COATED ORAL at 08:07

## 2022-07-29 RX ADMIN — NYSTATIN 500000 UNITS: 500000 SUSPENSION ORAL at 05:07

## 2022-07-29 RX ADMIN — CARVEDILOL 6.25 MG: 3.12 TABLET, FILM COATED ORAL at 09:07

## 2022-07-29 RX ADMIN — ACETAMINOPHEN 350 MG: 325 TABLET ORAL at 06:07

## 2022-07-29 RX ADMIN — SUCRALFATE 1 G: 1 TABLET ORAL at 10:07

## 2022-07-29 RX ADMIN — SUCRALFATE 1 G: 1 TABLET ORAL at 09:07

## 2022-07-29 RX ADMIN — CLONIDINE HYDROCHLORIDE 0.1 MG: 0.1 TABLET ORAL at 09:07

## 2022-07-29 RX ADMIN — POLYETHYLENE GLYCOL 3350 17 G: 17 POWDER, FOR SOLUTION ORAL at 08:07

## 2022-07-29 RX ADMIN — SUCRALFATE 1 G: 1 TABLET ORAL at 06:07

## 2022-07-29 NOTE — NURSING
Patient's sitter instructed not to transfer patient to the bedside commode or the chair without assistance from staff.

## 2022-07-29 NOTE — PT/OT/SLP PROGRESS
"Physical Therapy Treatment    Patient Name:  Elen Peters   MRN:  [id number]  Admit Date: 7/22/2022  Admitting Diagnosis: Subdural hematoma, post-traumatic  Recent Surgeries: N/A    General Precautions: Standard, fall   Orthopedic Precautions:N/A   Braces:   N/A    Recommendations:     Discharge Recommendations:  assisted living facility   Level of Assistance Recommended at Discharge: 24 hours significant assistance  Discharge Equipment Recommendations: bedside commode, wheelchair, walker, rolling, shower chair   Barriers to discharge: Decreased caregiver support (increased assistance needed)    Assessment:     Elen Peters is a 89 y.o. female admitted with a medical diagnosis of Subdural hematoma, post-traumatic . Pt still with impaired cognition but willing to work with PT. Pt able to complete bed mobility with ModA/MaxA and 2nd person standing by for safety as pt with uncontrollable jerking movements anteriorly and posterior and at  times making "grunting" noises. Pt also tending to keep B arms by her side and not using them to complete all bed mobility tasks. Pt was able to complete ~120ft of GT with HHA x 2 with occasional B knee flexion and posterior/anterior jerk movements throughout her walking. Pt will benefit from continued snf rehab to help improve pt's overall functional mobility and safety.    Performance deficits affecting function:  weakness, impaired endurance, impaired self care skills, impaired functional mobility, gait instability, impaired balance, decreased upper extremity function, decreased lower extremity function, impaired cardiopulmonary response to activity, decreased safety awareness, impaired cognition .    Rehab Potential is good    Activity Tolerance: Fair    Plan:     Patient to be seen 5 x/week to address the above listed problems via gait training, therapeutic activities, therapeutic exercises, neuromuscular re-education, wheelchair management/training    · Plan of Care Expires: " "08/10/22  · Plan of Care Reviewed with: patient, family    Subjective     Pt. Agreeable to work with PT.     Pain/Comfort:  · Pain Rating 1: 0/10  · Pain Rating Post-Intervention 1: 0/10    Patient's cultural, spiritual, Voodoo conflicts given the current situation:  · no    Objective:     Communicated with pt's nurse prior to session.  Patient found supine with   upon PT entry to room.         Functional Mobility:  · Bed Mobility:     · Supine to Sit: maximal assistance and 2nd person for safety  · Sit to Supine: maximal assistance and 2nd person for safety  · Transfers:     · Sit to Stand:  moderate assistance and of 2 persons with hand-held assist  · Gait: ~120ft with HHAx 2people and Mod A to maintain safe standing balance. pt initially with narrow LIBIA and with scissoring, shuffling GT pattern, but with VCs was able to correct GT pattern ~50%.  · Balance: ModA/Maxa while seated at the EOB 2* to pt "flinging" trunk posterioly    AM-PAC 6 CLICK MOBILITY  12    Patient left HOB elevated with call button in reach and pt's family present.    GOALS:   Multidisciplinary Problems     Physical Therapy Goals        Problem: Physical Therapy    Goal Priority Disciplines Outcome Goal Variances Interventions   Physical Therapy Goal     PT, PT/OT Ongoing, Progressing     Description: Goals to be met by: 8/22/22    Patient will increase functional independence with mobility by performing:    . Supine to sit with Stand-by Assistance  . Sit to supine with Stand-by Assistance  . Rolling to Left and Right with Supervision.  . Sit to stand transfer with Supervision  . Bed to chair transfer with Supervision using No Assistive Device/ LRAD  . Gait  x 100 feet with Stand-by Assistance using Rolling Walker.   . Wheelchair propulsion x100 feet with Stand-by Assistance using bilateral uppper extremities  . Ascend/Descend 2 inch curb step with Minimal Assistance using Rolling Walker.  . Retrieve object off floor in standing with RW and " Thien                     Time Tracking:     PT Received On: 07/29/22  PT Start Time: 1435  PT Stop Time: 1449  PT Total Time (min): 14 min    Billable Minutes: Gait Training 14mins    Treatment Type: Treatment  PT/PTA: PT     PTA Visit Number: 0     07/29/2022

## 2022-07-29 NOTE — PLAN OF CARE
Problem: Adult Inpatient Plan of Care  Goal: Plan of Care Review  Outcome: Ongoing, Progressing  Goal: Absence of Hospital-Acquired Illness or Injury  Outcome: Ongoing, Progressing  Goal: Optimal Comfort and Wellbeing  Outcome: Ongoing, Progressing     Problem: Skin Injury Risk Increased  Goal: Skin Health and Integrity  Outcome: Ongoing, Progressing     Problem: Fall Injury Risk  Goal: Absence of Fall and Fall-Related Injury  Outcome: Ongoing, Progressing

## 2022-07-29 NOTE — PT/OT/SLP PROGRESS
Occupational Therapy   Treatment    Name: Elen Peters  MRN: [id number]  Admit Date: 7/22/2022  Admitting Diagnosis:  Subdural hematoma, post-traumatic    General Precautions: Standard, aspiration, fall   Orthopedic Precautions:N/A   Braces: N/A     Recommendations:     Discharge Recommendations: home health OT ([facility] with 24/7 SPV)  Level of Assistance Recommended at Discharge: 24 hours light assistance for ADL's and homemaking tasks  Discharge Equipment Recommendations:  bedside commode  Barriers to discharge:  Decreased caregiver support (Increased (A) required)    Assessment:     Elen Peters is a 89 y.o. female with a medical diagnosis of Subdural hematoma, post-traumatic. Pt continues to be limited in self-care, functional mobility, and ADLs and is currently not performing tasks at her PLOF. Pt is at risk for falls and family educated that pt is not safe to sit upright in W/C due to involuntary jerky movements in trunk. Performance deficits affecting function are weakness, impaired endurance, impaired self care skills, impaired functional mobility, gait instability, impaired balance, impaired cognition, decreased coordination, decreased safety awareness, pain, impaired cardiopulmonary response to activity, abnormal tone, decreased ROM.     Rehab Potential is fair    Activity tolerance:  Fair    Plan:     Patient to be seen 5 x/week to address the above listed problems via self-care/home management, therapeutic activities, therapeutic exercises    · Plan of Care Expires: 08/22/22  · Plan of Care Reviewed with: patient, family    Subjective     Communicated with: RN prior to session.    Pain/Comfort:  Pain Rating 1:  (Pt did not rate)  Location - Side 1: Right  Location - Orientation 1: lateral  Location 1: wrist  Pain Addressed 1: Reposition, Distraction, Cessation of Activity  Pain Rating Post-Intervention 1:  (Pt did not rate)    Patient's cultural, spiritual, Confucianism conflicts given the current  situation:  no    Objective:     Patient found HOB elevated with  (no lines) upon OT entry to room.    Bed Mobility:    · Pt performed scooting/bridging in supine toward HOB x3 trials with SBA and Mod (A)  · Patient completed Scooting on EOB to bring feet flat on floor with contact guard assistance for balance   · Cues for initiation  · Patient completed Supine to Sit with moderate assistance for trunk management  · HOB flat  · Patient completed Sit to Supine with maximal assistance for trunk and BLE management  · HOB flat  · Pt tolerated sitting EOB with CGA-Max (A)   · Involuntary, jerky movements of trunk anteriorly/posteriorly observed intermittently requiring mod-max (A) to recover  · Cues to maintain B UE on bedside/bedrail for external support and upright posture    Functional Mobility/Transfers:  · Sit <> Stand Transfer with minimum assistance and moderate assistance  with  rolling walker   · x3 trials from EOB with min-mod (A) and x1 trial from W/C with max (A)  · Ongoing cues t/o for hand/foot placement  · Noted posterior lean with sit>stand from W/C towards end of session with tactile cues to promote upright posture and hip extension  · Patient completed Bed <> WheelChair Transfer using Step Transfer technique with moderate-maximal assistance with rolling walker   · Cues for RW management, sequencing, and upright posture  · Pt perseverating on R radial wrist bruising during mobility and required cues for redirection to task to ensure safety  · Decreased LIBIA observed    Activities of Daily Living:  · Grooming: Stand-By Assistance to brush hair utilizing RUE. Verbal/tactile cues required to brush all areas due to perseverating on R lateral side. Pt then required set up (A) to apply toothpaste to toothbrush and perform oral hygiene seated in W/C at sinkside  · Upper Body Dressing: moderate assistance to manipulate pull over shirt over head and posteriorly seated in W/C. (A) to unbutton gown anteriorly  · Pt  able to thread B UE and pull down anteriorly with cueing    · Lower Body Dressing: maximal assistance to don B socks and thread BLEs through pant holes due to impaired trunk stability and coordination seated EOB   · Pt (A) in manipulating pants above R hip in standing with RW    [facility] 6 Click ADL: 17    OT Exercises: N/A    Treatment & Education:  -Education on energy conservation and task modification to maximize safety and (I) during ADLs and mobility  -Pt educated to call for assistance and to transfer with hospital staff only  -Provided education regarding role of OT, POC, & discharge recommendations with pt & family verbalizing understanding.  Pt had no further questions & when asked whether there were any concerns pt reported none.    Patient left HOB elevated with call button in reach and family presentEducation:      GOALS:   Multidisciplinary Problems     Occupational Therapy Goals        Problem: Occupational Therapy    Goal Priority Disciplines Outcome Interventions   Occupational Therapy Goal     OT, PT/OT Ongoing, Progressing    Description: Goals to be met by: 08/23/22     Patient will increase functional independence with ADLs by performing:    UE Dressing with Modified [place].  LE Dressing with Modified [place].  Grooming while standing at sink with Supervision.  Toileting from toilet with Supervision for hygiene and clothing management.   Bathing from  shower chair/bench with Supervision.  Toilet transfer to toilet with Supervision.  Upper extremity exercise program 3 x15 reps per handout, with supervision to increase UE strength/endurance for improved func mobility skills  Stand during functional task for 10' with supervision in preparation for standing ADLs.                     Time Tracking:     OT Date of Treatment: 07/29/22  OT Start Time: 1335    OT Stop Time: 1400  OT Total Time (min): 25 min    Billable Minutes:Self Care/Home Management 15  Therapeutic Activity 10    7/29/2022

## 2022-07-29 NOTE — PLAN OF CARE
Problem: SLP  Goal: SLP Goal  Description: Speech Language Pathology Goals  Goals expected to be met by 8/5:  1. Pt will tolerate regular diet with thin liquids without overt s/s aspiration or airway compromise.   2. Pt will participate in safety awareness/problem solving tasks with 75% acc given mod cues.  3. Pt will attend to structured therapy task for ~2 minutes given minimal redirection.  Outcome: Ongoing, Progressing  SLP plan to return to monitor diet tolerance and address safety awareness/problem solving skills. Confusion evident. Pt presents with impaired memory, attention, and safety awareness skills. See note for details.   7/29/2022

## 2022-07-29 NOTE — PROGRESS NOTES
"[facility] - Skilled Nursing  Adult Nutrition  Progress Note    SUMMARY   Recommendations  Continue regular diet, no straws, boost plus BID, encourage PO intake  Goals: PO to meet 50% of needs with ONS by next RD fu  Nutrition Goal Status: goal not met    Assessment and Plan  Inadequate oral intake related to depression, reduced appetite as evidenced by PO 25-50% and 4% weight loss.     Continues        Plan  Commercial beverage( calories, protein) boost plus BID  Collaboration with other providers  General diet     Malnutrition Assessment to be completed                                       Reason for Assessment    Reason For Assessment: RD follow-up  Diagnosis:  (subdral hematoma s/p fall)  Relevant Medical History: recent altered mental status,  Hiatal hernia, anorexia, HTN, CAF, HLD, renal cancer, depression, osteo  Interdisciplinary Rounds: attended  General Information Comments: PO remains poor, refused breakfast this am, still with some altered mental status, regular diet continues, no straws , watch for pocketing continue boost plus BID  Nutrition Discharge Planning: DC on regular diet    Nutrition/Diet History    Patient Reported Diet/Restrictions/Preferences: general  Spiritual, Cultural Beliefs, Faith Practices, Values that Affect Care: no  Food Allergies: NKFA  Factors Affecting Nutritional Intake: impaired cognitive status/motor control, decreased appetite, depression    Anthropometrics    Temp: 98 °F (36.7 °C)  Height: 5' 4" (162.6 cm)  Height (inches): 64 in  Weight Method: Bed Scale  Weight: 58.7 kg (129 lb 6.6 oz)  Weight (lb): 129.41 lb  Ideal Body Weight (IBW), Female: 120 lb  % Ideal Body Weight, Female (lb): 107.84 %  BMI (Calculated): 22.2  BMI Grade: 18.5-24.9 - normal  Weight Change Amount:  (4% wt loss ( not significant))       Lab/Procedures/Meds    Pertinent Labs Reviewed: reviewed  Pertinent Labs Comments: Hg 10.1, Hct 28.9, Na 129, BUN 9  Pertinent Medications Reviewed: " reviewed  Pertinent Medications Comments: pantoprazole,Abx        Estimated/Assessed Needs    Weight Used For Calorie Calculations: 58.7 kg (129 lb 6.6 oz)  Energy Calorie Requirements (kcal): 6559-0509  Energy Need Method: Lysite-St Jeor (x 1.3(PAL))  Protein Requirements: 70  Weight Used For Protein Calculations: 58.7 kg (129 lb 6.6 oz) (x 1.2 g/kg)  Fluid Requirements (mL): 1276 or per MD  Estimated Fluid Requirement Method: RDA Method  RDA Method (mL): 1276  CHO Requirement: -      Nutrition Prescription Ordered    Current Diet Order: Regular  Nutrition Order Comments: PO 25%  Oral Nutrition Supplement: Boost plus BID    Evaluation of Received Nutrient/Fluid Intake    I/O: no data  Energy Calories Required: not meeting needs  Protein Required: not meeting needs  Fluid Required: meeting needs  Comments: LBM 7/29  Tolerance: tolerating  % Intake of Estimated Energy Needs: 0 - 25 %  % Meal Intake: 0 - 25 %    Nutrition Risk    Level of Risk/Frequency of Follow-up: low (one time per week)     Monitor and Evaluation    Food and Nutrient Adminstration: diet order  Physical Activity and Function: nutrition-related ADLs and IADLs  Anthropometric Measurements: weight change  Biochemical Data, Medical Tests and Procedures: electrolyte and renal panel, glucose/endocrine profile, gastrointestinal profile  Nutrition-Focused Physical Findings: overall appearance     Nutrition Follow-Up    RD Follow-up?: Yes

## 2022-07-29 NOTE — NURSING
Patient refused breakfast this morning. Patient stated that the meal was leftover from the day before. Redirected patient that the meal is freshly done today. PCT informed me that she tried to assist patient with her breakfast and refused a second time.

## 2022-07-29 NOTE — PT/OT/SLP EVAL
Speech Language Pathology  Evaluation    Elen Peters   MRN: [id number]   Admitting Diagnosis: Subdural hematoma, post-traumatic    Diet recommendations: Solid Diet Level: Regular  Liquid Diet Level: Thin (NO STRAWS) 1 bite/sip at a time, Assistance with meals, Avoid talking while eating, Check for pocketing/oral residue, Eliminate distractions, Feed only when awake/alert, HOB to 90 degrees, Meds whole 1 at a time, No straws, Small bites/sips and Strict aspiration precautions    SLP Treatment Date: 07/29/22  Speech Start Time: 0800     Speech Stop Time: 0841     Speech Total (min): 41 min       TREATMENT BILLABLE MINUTES:  Eval 26  and Eval Swallow and Oral Function 15    Diagnosis: Subdural hematoma, post-traumatic    Past Medical History:   Diagnosis Date    Abnormal stress test 11/18/2015    Arthritis     Bladder cancer     Cataract     Coronary artery disease involving native coronary artery 1/6/2016    Depression     Exudative age-related macular degeneration of left eye with active choroidal neovascularization 10/1/2020    GERD (gastroesophageal reflux disease)     HTN (hypertension), benign 11/18/2015    Hx of bladder infections     Hyperlipemia     OP (osteoporosis)     Positive cardiac stress test 1/6/2016    Renal cancer     Syncope 1/6/2016    TMJ (dislocation of temporomandibular joint)     Upper back pain 12/1/2015    Ureteral cancer     Venous insufficiency 2017    Villous adenoma of colon 5/31/2013     Past Surgical History:   Procedure Laterality Date    APPENDECTOMY      BACK SURGERY      CATARACT EXTRACTION W/  INTRAOCULAR LENS IMPLANT Left 3/16/15    kristy    CATARACT EXTRACTION W/  INTRAOCULAR LENS IMPLANT Right 4/6/15    kristy    COLONOSCOPY  10/21/2013    CYSTOSCOPY      ESOPHAGOGASTRODUODENOSCOPY N/A 1/30/2019    Procedure: EGD (ESOPHAGOGASTRODUODENOSCOPY);  Surgeon: Diony Dey MD;  Location: [address];  Service: Endoscopy;  Laterality: N/A;    EYE  SURGERY      NEPHRECTOMY      L    SPINE SURGERY      TONSILLECTOMY, ADENOIDECTOMY         Has the patient been evaluated by SLP for swallowing? : Yes  Keep patient NPO?: No   General Precautions: Standard, fall, aspiration        Social Hx: Patient lives alone in [facility]. Per chart review, pt required min assistance for ADLs at [facility].     Prior diet: reg/thin    Subjective:  Upon SLP entry, pt with ongoing complaints re: needing to urinate, 10/10 headache, and 10/10 leg pain. SLP notified RN and student RN, of which both arrived to bedside to assist with pt transfer to bedside commode. During transfer, pt with noted bowel movement. Pt cleaned and returned to upright seated position in bed. Confusion evident. Pt initially declined to participate with SLP though eventually agreeable.      Objective:      Oral Musculature Evaluation  Oral Musculature: WFL  Dentition: present and adequate  Secretion Management: adequate  Mucosal Quality: dry  Oral Labial Strength and Mobility: WFL  Lingual Strength and Mobility: WFL  Volitional Cough: strong  Volitional Swallow: timely  Voice Prior to PO Intake: clear     Cognitive Status:  Behavioral Observations: confused, cooperative and distractible  Memory and Orientation: Pt oriented to self ind'ly. Incorrect upon initial responses to time, location, and situation, though correctly oriented given min cue and re-attempt. Pt demonstrated poor short term memory recall.  Attention: sustained attention deficit  Problem Solving: Pt required frequent redirections to participate in problem solving tasks. She identified 2 items in concrete category given mod cues/redirections. She was unable to identify safe responses to safety awareness scenarios.   Executive Function: insight/awareness and organization impaired    Auditory Comprehension: able to identify objects, answers yes/no questions and able to follow commands    Verbal Expression: WFL across naming, automatic speech, repetition. Poor  word fluency given impaired attention and constant need for redirection    Motor Speech: WFL    Voice: WFL    Augmentative Alternative Communication: no    Reading: did not assess    Writing: did not assess    Visual-Spatial: did not assess    Bedside Swallow Eval:  Consistencies Assessed: Thin liquids - x1 pt led straw sip water, x6 SLP-led straw sips water  Solids - x3 bites lópez cracker  Oral Phase: no anterior loss noted, mildly prolonged mastication of lópez cracker, minimal oral residue of cracker noted  Pharyngeal Phase: Given involuntary jerking movements, pt demonstrated sharp involuntary inhalations. During straw sips, pt demonstrated sharp, large inhalations of significant amounts of thin liquids following by a loud, audible gulp. Otherwise no overt clinical signs/symptoms of pharyngeal dysphagia    Assessment:  Elen Peters is a 89 y.o. female with a medical diagnosis of Subdural hematoma, post-traumatic and presents with significant confusion and impairments involving memory, problem solving, safety awareness, and attention. Based on current impressions, pt would be unsafe to return to [facility] without 24/7 care. SLP will continue to follow to monitor diet tolerance and cognitive presentation/provide appropriate treatment per POC. NP aware of findings via secure chat.     Spiritual, Cultural Beliefs, Church Practices, Values that Affect Care: no    Discharge recommendations: Discharge Facility/Level of Care Needs:  (from safety awareness standpoint, pt requires 24/7 care)     Diet recommendations: Solid Diet Level: Regular  Liquid Diet Level: Thin (NO STRAWS)     Goals:   Multidisciplinary Problems     SLP Goals        Problem: SLP    Goal Priority Disciplines Outcome   SLP Goal     SLP Ongoing, Progressing   Description: Speech Language Pathology Goals  Goals expected to be met by 8/5:  1. Pt will tolerate regular diet with thin liquids without overt s/s aspiration or airway compromise.   2. Pt will  participate in safety awareness/problem solving tasks with 75% acc given mod cues.  3. Pt will attend to structured therapy task for ~2 minutes given minimal redirection.                    Plan:   Patient to be seen Therapy Frequency: 3 x/week   Planned Interventions: Dysphagia Therapy, Cognitive-Linguistic Therapy (check diet tolerance)  Plan of Care reviewed with: patient  SLP Follow-up?: Yes  SLP - Next Visit Date: 08/01/22 07/29/2022

## 2022-07-30 PROCEDURE — 25000003 PHARM REV CODE 250: Performed by: HOSPITALIST

## 2022-07-30 PROCEDURE — 11000004 HC SNF PRIVATE

## 2022-07-30 PROCEDURE — 97110 THERAPEUTIC EXERCISES: CPT | Mod: CO

## 2022-07-30 PROCEDURE — 97535 SELF CARE MNGMENT TRAINING: CPT | Mod: CO

## 2022-07-30 PROCEDURE — 25000003 PHARM REV CODE 250: Performed by: NURSE PRACTITIONER

## 2022-07-30 RX ORDER — VALPROIC ACID 250 MG/5ML
500 SOLUTION ORAL NIGHTLY
Status: DISCONTINUED | OUTPATIENT
Start: 2022-07-30 | End: 2022-08-08 | Stop reason: HOSPADM

## 2022-07-30 RX ORDER — ALUMINUM HYDROXIDE, MAGNESIUM HYDROXIDE, AND SIMETHICONE 2400; 240; 2400 MG/30ML; MG/30ML; MG/30ML
30 SUSPENSION ORAL EVERY 6 HOURS PRN
Status: DISCONTINUED | OUTPATIENT
Start: 2022-07-30 | End: 2022-08-08 | Stop reason: HOSPADM

## 2022-07-30 RX ORDER — VALPROIC ACID 250 MG/5ML
250 SOLUTION ORAL NIGHTLY
Status: DISCONTINUED | OUTPATIENT
Start: 2022-07-30 | End: 2022-07-30

## 2022-07-30 RX ADMIN — ACETAMINOPHEN 650 MG: 325 TABLET ORAL at 06:07

## 2022-07-30 RX ADMIN — NYSTATIN 500000 UNITS: 500000 SUSPENSION ORAL at 09:07

## 2022-07-30 RX ADMIN — SUCRALFATE 1 G: 1 TABLET ORAL at 12:07

## 2022-07-30 RX ADMIN — SUCRALFATE 1 G: 1 TABLET ORAL at 04:07

## 2022-07-30 RX ADMIN — ONDANSETRON HYDROCHLORIDE 4 MG: 4 TABLET, FILM COATED ORAL at 09:07

## 2022-07-30 RX ADMIN — DICLOFENAC SODIUM 2 G: 10 GEL TOPICAL at 09:07

## 2022-07-30 RX ADMIN — NYSTATIN 500000 UNITS: 500000 SUSPENSION ORAL at 12:07

## 2022-07-30 RX ADMIN — CLONIDINE HYDROCHLORIDE 0.1 MG: 0.1 TABLET ORAL at 10:07

## 2022-07-30 RX ADMIN — SUCRALFATE 1 G: 1 TABLET ORAL at 06:07

## 2022-07-30 RX ADMIN — CARVEDILOL 6.25 MG: 3.12 TABLET, FILM COATED ORAL at 09:07

## 2022-07-30 RX ADMIN — Medication 6 MG: at 09:07

## 2022-07-30 RX ADMIN — CIPROFLOXACIN 500 MG: 500 TABLET, FILM COATED ORAL at 09:07

## 2022-07-30 RX ADMIN — PANTOPRAZOLE SODIUM 40 MG: 40 TABLET, DELAYED RELEASE ORAL at 06:07

## 2022-07-30 RX ADMIN — ASPIRIN 81 MG: 81 TABLET, COATED ORAL at 10:07

## 2022-07-30 RX ADMIN — NYSTATIN 500000 UNITS: 500000 SUSPENSION ORAL at 04:07

## 2022-07-30 RX ADMIN — SUCRALFATE 1 G: 1 TABLET ORAL at 09:07

## 2022-07-30 RX ADMIN — CLONIDINE HYDROCHLORIDE 0.1 MG: 0.1 TABLET ORAL at 09:07

## 2022-07-30 RX ADMIN — HYDRALAZINE HYDROCHLORIDE 25 MG: 25 TABLET, FILM COATED ORAL at 09:07

## 2022-07-30 RX ADMIN — NYSTATIN 500000 UNITS: 500000 SUSPENSION ORAL at 06:07

## 2022-07-30 NOTE — PROGRESS NOTES
Charge nurse assessed patient who is refusing to eat, jerking her neck/head, patient verbalized mouth discomfort , tylenol prn given .

## 2022-07-30 NOTE — PROGRESS NOTES
Skilled Nursing Facility                                                                          Progress Note      Admit Date: 7/22/2022  DIANA 8/10/2022  Principal Problem:  Subdural hematoma, post-traumatic   HPI obtained from patient interview and chart review      Chief Complaint: Involuntary jerking motions, re-eval GERD     HPI:   Elen Peters is a 89 y.o. female with PMH of TMJ, syncope, renal cancer, hyperlipidemia, hypertension, depression, CAD who presents to the ED for AMS following recent fall with head trauma. CTH on arrival to the ED revealed thin SDH along the left side of the posterior falx. Admitted to NS for observation.  Aspirin held.  No surgical intervention required.  Neurosurgery recommending repeat CTH in 4 weeks.     Patient will be treated at [facility] with PT and OT to improve functional status and ability to perform ADLs.      Interval history  Patient reports grunting and neck jerking forward which started on Friday and has worsened since. Most recent CT showed no changes. Today the grunting and neck jerking is every few minutes or so while so is awake. Able to follow finger, follows commands, oriented to person, place, and year but poor historian regarding symptoms. Had a talk with family today (two sons) and ok with starting Depakote nightly and change protonix to maalox. Family requesting palliative care consult for Monday.    CT Head on 7/19/22 showed Stable appearance of left acute subdural hematoma.  No mass effect or midline shift.     Chronic senescent and microvascular ischemic disease.     Stable small left parietal scalp hematoma.      Discussed with patient's son regarding ct head     Past Medical History: Patient has a past medical history of Abnormal stress test (11/18/2015), Arthritis, Bladder cancer, Cataract, Coronary artery disease involving native coronary artery (1/6/2016), Depression, Exudative age-related macular degeneration of left eye with active choroidal  neovascularization (10/1/2020), GERD (gastroesophageal reflux disease), HTN (hypertension), benign (11/18/2015), bladder infections, Hyperlipemia, OP (osteoporosis), Positive cardiac stress test (1/6/2016), Renal cancer, Syncope (1/6/2016), TMJ (dislocation of temporomandibular joint), Upper back pain (12/1/2015), Ureteral cancer, Venous insufficiency (2017), and Villous adenoma of colon (5/31/2013).     Past Surgical History: Patient has a past surgical history that includes Nephrectomy; TONSILLECTOMY, ADENOIDECTOMY; Back surgery; Appendectomy; Colonoscopy (10/21/2013); Cystoscopy; Cataract extraction w/  intraocular lens implant (Left, 3/16/15); Cataract extraction w/  intraocular lens implant (Right, 4/6/15); Eye surgery; Spine surgery; and Esophagogastroduodenoscopy (N/A, 1/30/2019).     Social History: Patient reports that she quit smoking about 73 years ago. She has never used smokeless tobacco. She reports that she does not drink alcohol and does not use drugs.     Family History: family history includes Cancer in her father, mother, and son; Goiter in her mother; Heart attack in her mother; Heart disease in her maternal grandfather and mother; Leukemia in her father and mother; No Known Problems in her brother, maternal aunt, maternal grandmother, maternal uncle, paternal aunt, paternal grandfather, paternal grandmother, paternal uncle, sister, son, son, son, son, and son.     Allergies: Patient is allergic to amlodipine, sulfa (sulfonamide antibiotics), codeine, maxzide [triamterene-hydrochlorothiazid], and sulfamethoxazole-trimethoprim.     ROS  Constitutional: Negative for fever   Eyes: Negative for blurred vision, double vision   Respiratory: Negative for cough, shortness of breath   Cardiovascular: Negative for chest pain, palpitations, and leg swelling.   Gastrointestinal: Negative for abdominal pain, constipation, diarrhea, nausea, vomiting.   Genitourinary: Negative for dysuria, frequency    Musculoskeletal:  + generalized weakness. Negative for back pain and myalgias.   Skin: Negative for itching and rash.   Neurological: Negative for dizziness, headaches.   Psychiatric/Behavioral: Negative for depression. The patient is not nervous/anxious.       24 hour Vital Sign Range   Temp:  [97.9 °F (36.6 °C)-98.8 °F (37.1 °C)]   Pulse:  [76-97]   Resp:  [18-20]   BP: (131-170)/(59-80)   SpO2:  [94 %-97 %]      PEx  Constitutional: Patient appears debilitated.  No distress noted  HENT:   Head: Normocephalic and atraumatic.   Eyes: Pupils are equal, round  Neck: Normal range of motion. Neck supple.   Cardiovascular: Normal rate, regular rhythm and normal heart sounds.    Pulmonary/Chest: Effort normal and breath sounds are clear  Abdominal: Soft. Bowel sounds are normal.   Musculoskeletal: Normal range of motion.   Neurological: Alert and oriented to person, place, and time.   Psychiatric: Normal mood and affect. Behavior is normal.   Skin: Skin is warm and dry. Skin intact           No results for input(s): GLUCOSE, NA, K, CL, CO2, BUN, CREATININE, MG in the last 24 hours.     Invalid input(s):  CALCIUM     No results for input(s): WBC, RBC, HGB, HCT, PLT, MCV, MCH, MCHC in the last 24 hours.   CT Head Without Contrast  Order: [id number]   Status: Final result     Visible to patient: Yes (seen)     Next appt: 08/11/2022 at 08:00 AM in [facility] (Anita Mahan, JEANNE)     0 Result Notes    Details    Reading Physician Reading Date Result Priority   Lebron Brewster MD  [phone]  [phone] 7/27/2022 Routine   Allyson Mcwilliams MD  [phone] 7/27/2022      Narrative & Impression  EXAMINATION:  CT HEAD WITHOUT CONTRAST     CLINICAL HISTORY:  New involuntary neck movement;     TECHNIQUE:  Low dose axial CT images obtained throughout the head without intravenous contrast. Sagittal and coronal reconstructions were performed.     COMPARISON:  07/20/2022     FINDINGS:  Mild patient motion artifact.     Intracranial  compartment:     Ventricles and sulci are normal in size for age without evidence of hydrocephalus.  Thin subdural hematoma along the posterior falx and tentorium has continued to decrease in size when compared to prior now measuring 1-2 mm.  No significant mass effect on the adjacent brain parenchyma.  No new intracranial hemorrhage or acute major vascular territory infarct.  There are basal ganglia calcifications and mild chronic microvascular ischemic changes.  There is no calvarial fracture.  Mild mucosal membrane thickening within the posterior ethmoid air cells stable from prior.  Mastoid air cells are clear.     Impression:     Continued interval decrease in size of small subdural hematoma along the left posterior falx/tentorium.  No new intracranial hemorrhage or significant detrimental changes from prior exams.     Electronically signed by resident: Allyson Mcwilliams  Date:                                            07/27/2022  Time:                                           16:45     Electronically signed by: Lerbon Brewster MD  Date:                                            07/27/2022  Time:                                           17:31             Exam Ended: 07/27/22 16:29 Last Resulted: 07/27/22 17:31                Assessment and Plan:     Subdural hematoma, posttraumatic  No surgical intervention required.  Neurosurgery recommending repeat CTH in 4 weeks.  Fall precautions  07/30/2022  CT done on 7/27 after a worsening of jerky movements, no changes on CT  Initiate order to start Depakote 500mg po nightly     Gastroesophageal reflux disease  Protonix 40 mg daily  Carafate 1 g a.c. and HS  07/30/2022  Initiate order to discontinue protonix  Initiate order to start Maalox po 30ml q6 hrs indigestion      Mixed hyperlipidemia  Atorvastatin 80 mg nightly     Depression, major, in remission  citalopram 20 mg daily     Hypertension  Carvedilol 6.25 mg b.i.d.  Losartan 50 mg b.i.d.     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 Cyndi Velasquez injections q 6 weeks, scheduled for injections  7/26 07/30/2022  Injections held by Opthalmology      Anticipate disposition:  Home with home health             Follow-up needed after discharge from SNF: PCP,             Future Appointments   Date Time Provider Department Center   8/11/2022  8:00 AM Anita Mahan, NP [address]   10/26/2022 10:00 AM [facility], [facility] [place]            I certify that SNF services are required to be given on an inpatient basis because Elen Peters needs for skilled nursing care and/or skilled rehabilitation are required on a daily basis and such services can only practically be provided in a skilled nursing facility setting and are for an ongoing condition for which she received inpatient care in the hospital.

## 2022-07-30 NOTE — PT/OT/SLP PROGRESS
"Occupational Therapy   Treatment    Name: Elen Peters  MRN: [id number]  Admit Date: 7/22/2022  Admitting Diagnosis:  Subdural hematoma, post-traumatic    General Precautions: Standard, aspiration, fall   Orthopedic Precautions:N/A   Braces:       Recommendations:     Discharge Recommendations: home health OT (group home with 24/7 SPV)  Level of Assistance Recommended at Discharge: 24 hours light assistance for ADL's and homemaking tasks  Discharge Equipment Recommendations:  bedside commode  Barriers to discharge:  Decreased caregiver support (Increased (A) required)    Assessment:     Elen Peters is a 89 y.o. female with a medical diagnosis of Subdural hematoma, post-traumatic .  She presents with. Performance deficits affecting function are weakness, impaired endurance, impaired self care skills, impaired functional mobility, gait instability, impaired balance, impaired cognition, decreased coordination, decreased safety awareness, decreased lower extremity function. Pt. participated fair with session on this day. The patient will continue to benefit from skilled OT intervention to improve towards goals    Rehab Potential is good    Activity tolerance:  Good    Plan:     Patient to be seen 5 x/week to address the above listed problems via self-care/home management, therapeutic activities, therapeutic exercises    · Plan of Care Expires: 08/22/22  · Plan of Care Reviewed with: patient    Subjective     Communicated with: Tyron prior to session. "I cant see anything" . A client care conference was performed between the YAMINI and NEGRA, prior to treatment to discuss the patient's status, treatment plan and established goals.     Pain/Comfort:  Pain Rating 1: 0/10  Pain Rating Post-Intervention 1: 0/10    Patient's cultural, spiritual, Evangelical conflicts given the current situation:  no    Objective:     Patient found supine with   upon OT entry to room.    Bed Mobility:    · Patient completed Rolling/Turning to Left with  " stand by assistance  · Patient completed Scooting/Bridging with stand by assistance  · Patient completed Supine to Sit with stand by assistance  · Patient completed Sit to Supine with stand by assistance     Functional Mobility/Transfers:  · Patient completed Sit <> Stand Transfer with minimum assistance  with  hand-held assist   · Patient completed Bed <> Chair Transfer using Stand Pivot technique with minimum assistance with hand-held assist  · Patient completed Toilet Transfer Stand Pivot technique with minimum assistance with  hand-held assist  · Functional Mobility: Pt propelled WC throughout room with SBA  For     Activities of Daily Living:  · Grooming: supervision to performed hair care and washing hands seated at sink. Pt required increase timw with task  · Toileting: stand by assistance to perform hygiene and clothing management. Pt required increase time with task on today    [facility] 6 Click ADL: 17    OT Exercises: AROM . 2# weight 2x15 reps of shd flex, bicep curls horz adb/add and forward flex motion to increase BUE ROM and strength,.     Treatment & Education:    · Pt completed ADLs and func mobility activities for tx session as noted above    · Pt educated on role of OT and POC    Patient left up in chair with call button in reachEducation:      GOALS:   Multidisciplinary Problems     Occupational Therapy Goals        Problem: Occupational Therapy    Goal Priority Disciplines Outcome Interventions   Occupational Therapy Goal     OT, PT/OT Ongoing, Progressing    Description: Goals to be met by: 08/23/22     Patient will increase functional independence with ADLs by performing:    UE Dressing with Modified [place].  LE Dressing with Modified [place].  Grooming while standing at sink with Supervision.  Toileting from toilet with Supervision for hygiene and clothing management.   Bathing from  shower chair/bench with Supervision.  Toilet transfer to toilet with Supervision.  Upper extremity  exercise program 3 x15 reps per handout, with supervision to increase UE strength/endurance for improved func mobility skills  Stand during functional task for 10' with supervision in preparation for standing ADLs.                     Time Tracking:     OT Date of Treatment: 07/30/22  OT Start Time: 1331    OT Stop Time: 1355  OT Total Time (min): 24 min    Billable Minutes:Self Care/Home Management .14  Therapeutic Exercise .10    7/30/2022

## 2022-07-31 PROCEDURE — 25000003 PHARM REV CODE 250: Performed by: HOSPITALIST

## 2022-07-31 PROCEDURE — 99306 1ST NF CARE HIGH MDM 50: CPT | Mod: AI,,, | Performed by: HOSPITALIST

## 2022-07-31 PROCEDURE — 99306 PR NURSING FACILITY CARE, INIT, HIGH SEVERITY: ICD-10-PCS | Mod: AI,,, | Performed by: HOSPITALIST

## 2022-07-31 PROCEDURE — 25000003 PHARM REV CODE 250: Performed by: NURSE PRACTITIONER

## 2022-07-31 PROCEDURE — 11000004 HC SNF PRIVATE

## 2022-07-31 RX ADMIN — NYSTATIN 500000 UNITS: 500000 SUSPENSION ORAL at 12:07

## 2022-07-31 RX ADMIN — CARVEDILOL 6.25 MG: 3.12 TABLET, FILM COATED ORAL at 08:07

## 2022-07-31 RX ADMIN — NYSTATIN 500000 UNITS: 500000 SUSPENSION ORAL at 09:07

## 2022-07-31 RX ADMIN — BENZOCAINE: 0.1 GEL TOPICAL at 04:07

## 2022-07-31 RX ADMIN — SUCRALFATE 1 G: 1 TABLET ORAL at 10:07

## 2022-07-31 RX ADMIN — DICLOFENAC SODIUM 2 G: 10 GEL TOPICAL at 08:07

## 2022-07-31 RX ADMIN — CIPROFLOXACIN 500 MG: 500 TABLET, FILM COATED ORAL at 09:07

## 2022-07-31 RX ADMIN — CARVEDILOL 6.25 MG: 3.12 TABLET, FILM COATED ORAL at 09:07

## 2022-07-31 RX ADMIN — SUCRALFATE 1 G: 1 TABLET ORAL at 09:07

## 2022-07-31 RX ADMIN — DICLOFENAC SODIUM 2 G: 10 GEL TOPICAL at 09:07

## 2022-07-31 RX ADMIN — ACETAMINOPHEN 650 MG: 325 TABLET ORAL at 08:07

## 2022-07-31 RX ADMIN — CLONIDINE HYDROCHLORIDE 0.1 MG: 0.1 TABLET ORAL at 08:07

## 2022-07-31 RX ADMIN — SUCRALFATE 1 G: 1 TABLET ORAL at 04:07

## 2022-07-31 RX ADMIN — SUCRALFATE 1 G: 1 TABLET ORAL at 06:07

## 2022-07-31 RX ADMIN — CIPROFLOXACIN 500 MG: 500 TABLET, FILM COATED ORAL at 08:07

## 2022-07-31 RX ADMIN — CLONIDINE HYDROCHLORIDE 0.1 MG: 0.1 TABLET ORAL at 09:07

## 2022-07-31 RX ADMIN — VALPROIC ACID 500 MG: 250 SOLUTION ORAL at 09:07

## 2022-07-31 RX ADMIN — ASPIRIN 81 MG: 81 TABLET, COATED ORAL at 08:07

## 2022-07-31 RX ADMIN — NYSTATIN 500000 UNITS: 500000 SUSPENSION ORAL at 08:07

## 2022-07-31 RX ADMIN — NYSTATIN 500000 UNITS: 500000 SUSPENSION ORAL at 05:07

## 2022-07-31 NOTE — H&P
"Hospital Medicine  Skilled Nursing Facility   History and Physical Exam      Date of Service: 07/31/2022      Patient Name: Elen Peters  MRN: [id number]  Admission Date: 7/22/2022  Attending Physician: Yvon Javier MD  Primary Care Provider: Jim Treadwell MD  Code Status: Full Code      Principal problem:  Subdural hematoma, post-traumatic      Chief Complaint:   Chief Complaint   Patient presents with    Fall     Admitted to [facility] for rehabilitation following hospital stay for fall with traumatic subdural hematoma.          HPI:   "Elen Peters is a 89 y.o. female with PMH of TMJ, syncope, renal cancer, hyperlipidemia, hypertension, depression, CAD who presents to the ED for AMS following recent fall with head trauma. CTH on arrival to the ED revealed thin SDH along the left side of the posterior falx. Admitted to [facility] for observation.  Aspirin held.  No surgical intervention required.  Neurosurgery recommending repeat CTH in 4 weeks." per Lizbeth Ugarte NP on 7/26/22.     She is doing okay today. She was sleeping when I came to her room without any abnormal movements. After I awoke her, she was mainly focused on not being able to watch mass today because she could not find it on the television. She did not have any abnormal movements during my interview with her. She reported having a decreased appetite and a dry mouth. Denies any nausea. She also reported getting a little lightheaded at times, but no passing out. She is working more with therapy.     Patient admitted with skilled services with PT and OT to improve functional status and ability to perform ADLs.       Past Medical History:   Past Medical History:   Diagnosis Date    Abnormal stress test 11/18/2015    Arthritis     Bladder cancer     Cataract     Coronary artery disease involving native coronary artery 1/6/2016    Depression     Exudative age-related macular degeneration of left eye with active choroidal neovascularization " 10/1/2020    GERD (gastroesophageal reflux disease)     HTN (hypertension), benign 2015    Hx of bladder infections     Hyperlipemia     OP (osteoporosis)     Positive cardiac stress test 2016    Renal cancer     Syncope 2016    TMJ (dislocation of temporomandibular joint)     Upper back pain 2015    Ureteral cancer     Venous insufficiency 2017    Villous adenoma of colon 2013       Past Surgical History:   Past Surgical History:   Procedure Laterality Date    APPENDECTOMY      BACK SURGERY      CATARACT EXTRACTION W/  INTRAOCULAR LENS IMPLANT Left 3/16/15    kristy    CATARACT EXTRACTION W/  INTRAOCULAR LENS IMPLANT Right 4/6/15    kristy    COLONOSCOPY  10/21/2013    CYSTOSCOPY      ESOPHAGOGASTRODUODENOSCOPY N/A 2019    Procedure: EGD (ESOPHAGOGASTRODUODENOSCOPY);  Surgeon: Diony Dey MD;  Location: [address];  Service: Endoscopy;  Laterality: N/A;    EYE SURGERY      NEPHRECTOMY      L    SPINE SURGERY      TONSILLECTOMY, ADENOIDECTOMY         Social History:   Tobacco Use    Smoking status: Former Smoker     Quit date:      Years since quittin.6    Smokeless tobacco: Never Used    Tobacco comment: in college   Substance and Sexual Activity    Alcohol use: No    Drug use: No    Sexual activity: Not Currently       Family History:   Family History     Problem Relation (Age of Onset)    Cancer Mother, Father, Son    Goiter Mother    Heart attack Mother    Heart disease Mother, Maternal Grandfather    Leukemia Mother, Father    No Known Problems Brother, Son, Son, Son, Son, Son, Maternal Aunt, Maternal Uncle, Paternal Aunt, Paternal Uncle, Maternal Grandmother, Paternal Grandmother, Paternal Grandfather, Sister          No current facility-administered medications on file prior to encounter.     Current Outpatient Medications on File Prior to Encounter   Medication Sig    alendronate (FOSAMAX) 70 MG tablet Take 1 tablet (70 mg total)  by mouth every 7 days.    aspirin (ECOTRIN) 81 MG EC tablet Take 81 mg by mouth once daily.    calcium-magnesium-zinc Tab Take 2 tablets by mouth once daily at 6am. 1 Tablet Oral Every day    carvediloL (COREG) 6.25 MG tablet Take 1 tablet (6.25 mg total) by mouth 2 (two) times daily.    citalopram (CELEXA) 20 MG tablet Take 1 tablet (20 mg total) by mouth once daily.    diclofenac sodium (VOLTAREN) 1 % Gel Apply 2 g topically 2 (two) times daily.    losartan (COZAAR) 50 MG tablet Take 1 tablet (50 mg total) by mouth 2 (two) times a day.    mv-min-folic acid-lutein 0.4-250 mg-mcg Tab Take by mouth. 1 Tablet Oral Every day    omega-3 fatty acids 1,000 mg Cap Take 1 capsule by mouth once daily. 2  Capsule Oral Every day    ondansetron (ZOFRAN) 4 MG tablet Take 1 tablet (4 mg total) by mouth every 8 (eight) hours as needed for Nausea.    pantoprazole (PROTONIX) 40 MG tablet Take 1 tablet (40 mg total) by mouth once daily.    rosuvastatin (CRESTOR) 20 MG tablet Take 1 tablet (20 mg total) by mouth once daily.    sucralfate (CARAFATE) 1 gram tablet TAKE 1 TABLET(1 GRAM) BY MOUTH FOUR TIMES DAILY BEFORE MEALS AND AT NIGHT    walker (ULTRA-LIGHT ROLLATOR) Misc 1 Units by Misc.(Non-Drug; Combo Route) route once daily at 6am.    zolpidem (AMBIEN) 5 MG Tab Take 1 tablet (5 mg total) by mouth nightly as needed (insomnia).       Allergies:   Review of patient's allergies indicates:   Allergen Reactions    Amlodipine Swelling    Sulfa (sulfonamide antibiotics)      Unknown as child    Codeine      Other reaction(s): Unknown    Maxzide [triamterene-hydrochlorothiazid]     Sulfamethoxazole-trimethoprim        ROS:  Review of Systems   Constitutional: Positive for appetite change. Negative for chills and fever.   HENT: Negative for congestion and sore throat.    Eyes: Negative for redness and visual disturbance.   Respiratory: Negative for cough and shortness of breath.    Cardiovascular: Negative for chest pain  and palpitations.   Gastrointestinal: Positive for nausea. Negative for abdominal pain and vomiting.   Genitourinary: Negative for difficulty urinating and dysuria.   Musculoskeletal: Negative for arthralgias and back pain.   Skin: Negative for pallor and rash.   Allergic/Immunologic: Negative for environmental allergies and food allergies.   Neurological: Positive for weakness, light-headedness (occasionally) and headaches. Negative for dizziness.   Hematological: Does not bruise/bleed easily.   Psychiatric/Behavioral: Negative for sleep disturbance. The patient is not nervous/anxious.          Objective:  Temp:  [98.2 °F (36.8 °C)-98.7 °F (37.1 °C)]   Pulse:  [60-70]   Resp:  [18-19]   BP: (147-152)/(62-70)   SpO2:  [94 %-96 %]     Body mass index is 22.21 kg/m².      Physical Exam  Vitals and nursing note reviewed.   Constitutional:       General: She is not in acute distress.     Appearance: She is well-developed.   HENT:      Head: Normocephalic and atraumatic.      Right Ear: External ear normal.      Left Ear: External ear normal.      Nose: No nasal deformity or mucosal edema.      Mouth/Throat:      Mouth: Mucous membranes are moist.      Pharynx: Uvula midline. No oropharyngeal exudate.   Eyes:      General: No scleral icterus.     Conjunctiva/sclera: Conjunctivae normal.      Pupils: Pupils are equal, round, and reactive to light.   Neck:      Trachea: Phonation normal.   Cardiovascular:      Rate and Rhythm: Normal rate and regular rhythm.      Heart sounds: S1 normal and S2 normal. No murmur heard.  Pulmonary:      Effort: Pulmonary effort is normal. No respiratory distress.      Breath sounds: Normal breath sounds. No wheezing or rales.   Chest:   Breasts:      Right: No supraclavicular adenopathy.      Left: No supraclavicular adenopathy.       Abdominal:      General: Bowel sounds are normal. There is no distension.      Palpations: Abdomen is soft.      Tenderness: There is no abdominal tenderness.  There is no guarding or rebound.   Musculoskeletal:         General: No tenderness.      Cervical back: Neck supple. No muscular tenderness.      Right lower leg: No edema.      Left lower leg: No edema.   Lymphadenopathy:      Cervical:      Right cervical: No superficial cervical adenopathy.     Left cervical: No superficial cervical adenopathy.      Upper Body:      Right upper body: No supraclavicular adenopathy.      Left upper body: No supraclavicular adenopathy.   Skin:     General: Skin is warm and dry.      Findings: Bruising present. No rash.   Neurological:      Mental Status: She is alert.      Motor: Weakness present. No tremor or seizure activity.   Psychiatric:         Mood and Affect: Affect is flat.         Behavior: Behavior normal. Behavior is cooperative.         Thought Content: Thought content normal.         Significant Labs:   TSH:   Recent Labs   Lab 07/13/22  0916   TSH 0.772     BMP  Lab Results   Component Value Date     (L) 07/28/2022    K 3.7 07/28/2022    CL 95 07/28/2022    CO2 28 07/28/2022    BUN 9 (L) 07/28/2022    CREATININE 0.7 07/28/2022    CALCIUM 9.1 07/28/2022    ANIONGAP 6 (L) 07/28/2022    ESTGFRAFRICA >60.0 07/28/2022    EGFRNONAA >60.0 07/28/2022     Lab Results   Component Value Date    WBC 5.28 07/28/2022    HGB 10.1 (L) 07/28/2022    HCT 28.9 (L) 07/28/2022    MCV 93 07/28/2022     07/28/2022       Significant Imaging: I have reviewed all pertinent imaging results/findings completed during prior hospitalization.      Assessment and Plan:  Active Diagnoses:    Diagnosis Date Noted POA    PRINCIPAL PROBLEM:  Subdural hematoma, post-traumatic [S06.5X9A] 07/19/2022 Yes    Debility [R53.81] 07/25/2022 Yes    Exudative age-related macular degeneration of both eyes with active choroidal neovascularization [H35.3231] 10/01/2020 Yes    Hyponatremia [E87.1] 06/28/2020 Yes    Coronary artery disease involving native coronary artery [I25.10] 01/06/2016 Yes     HTN (hypertension), benign [I10] 11/18/2015 Yes    Depression, major, in remission [F32.5] 01/23/2014 Yes    Mixed hyperlipidemia [E78.2]  Yes    GERD (gastroesophageal reflux disease) [K21.9]  Yes      Problems Resolved During this Admission:       Subdural hematoma, posttraumatic  No surgical intervention required. Neurosurgery recommending repeat CTH in 4 weeks.  Fall precautions  PT/OT    Involuntary movements  Continue valproic acid 500 mg qHS.  Seems to be improved.   Continue to monitor.     Oral thrush  Continue nystatin swish and swallow.      Gastroesophageal reflux disease  Continue pantoprazole 40 mg BID and sucralfate 1 gram qAC and HS     Mixed hyperlipidemia  Atorvastatin 80 mg nightly on hold presently.      Depression, major, in remission  Holding citalopram 20 mg daily due to low sodium.      Hypertension  Continue carvedilol 6.25 mg BID.  Continue clonidine 0.1 mg BID.   Holding Losartan 50 mg BID     Coronary artery disease involving native coronary artery  Continue aspirin 81 mg daily     Hyponatremia  Sodium is 129 most recently.   Holding SSRI and ARB for now.  Continue to trend on regular labs     Exudative age-related macular degeneration of both eyes with active choroidal neovascularization  Sees Dr. Cyndi Velasquez injections q 6 weeks.   Went for scheduled injection on 7/26    Debility   - Continue with PT/OT for gait training and strengthening and restoration of ADL's   - Encourage mobility, OOB in chair, and early ambulation as appropriate  - Fall precautions   - Monitor for bowel and bladder dysfunction  - Monitor for and prevent skin breakdown and pressure ulcers  - Continue DVT prophylaxis with ambulation due to recent SDH.     Anticipated Disposition:  Home with home health      Future Appointments   Date Time Provider Department Center   8/11/2022  8:00 AM Anita Mahan, NP [facility] [place]   10/26/2022 10:00 AM LAB, [facility] [place]       I certify that SNF services  are required to be given on an inpatient basis because Elen Peters needs for skilled nursing care and/or skilled rehabilitation are required on a daily basis and such services can only practically be provided in a skilled nursing facility setting and are for an ongoing condition for which she received inpatient care in the hospital.       Yvon Javier MD  Department of Hospital Medicine   [facility] - Skilled Nursing

## 2022-08-01 LAB
ANION GAP SERPL CALC-SCNC: 9 MMOL/L (ref 8–16)
BASOPHILS # BLD AUTO: 0.02 K/UL (ref 0–0.2)
BASOPHILS NFR BLD: 0.4 % (ref 0–1.9)
BUN SERPL-MCNC: 7 MG/DL (ref 10–30)
CALCIUM SERPL-MCNC: 9.1 MG/DL (ref 8.7–10.5)
CHLORIDE SERPL-SCNC: 100 MMOL/L (ref 95–110)
CO2 SERPL-SCNC: 26 MMOL/L (ref 23–29)
CREAT SERPL-MCNC: 0.7 MG/DL (ref 0.5–1.4)
DIFFERENTIAL METHOD: ABNORMAL
EOSINOPHIL # BLD AUTO: 0.1 K/UL (ref 0–0.5)
EOSINOPHIL NFR BLD: 2.1 % (ref 0–8)
ERYTHROCYTE [DISTWIDTH] IN BLOOD BY AUTOMATED COUNT: 13.7 % (ref 11.5–14.5)
EST. GFR  (NO RACE VARIABLE): >60 ML/MIN/1.73 M^2
GLUCOSE SERPL-MCNC: 107 MG/DL (ref 70–110)
HCT VFR BLD AUTO: 32.7 % (ref 37–48.5)
HGB BLD-MCNC: 11.2 G/DL (ref 12–16)
IMM GRANULOCYTES # BLD AUTO: 0.01 K/UL (ref 0–0.04)
IMM GRANULOCYTES NFR BLD AUTO: 0.2 % (ref 0–0.5)
LYMPHOCYTES # BLD AUTO: 1.1 K/UL (ref 1–4.8)
LYMPHOCYTES NFR BLD: 20.6 % (ref 18–48)
MAGNESIUM SERPL-MCNC: 1.9 MG/DL (ref 1.6–2.6)
MCH RBC QN AUTO: 32.2 PG (ref 27–31)
MCHC RBC AUTO-ENTMCNC: 34.3 G/DL (ref 32–36)
MCV RBC AUTO: 94 FL (ref 82–98)
MONOCYTES # BLD AUTO: 0.6 K/UL (ref 0.3–1)
MONOCYTES NFR BLD: 11 % (ref 4–15)
NEUTROPHILS # BLD AUTO: 3.5 K/UL (ref 1.8–7.7)
NEUTROPHILS NFR BLD: 65.7 % (ref 38–73)
NRBC BLD-RTO: 0 /100 WBC
PHOSPHATE SERPL-MCNC: 2.8 MG/DL (ref 2.7–4.5)
PLATELET # BLD AUTO: 271 K/UL (ref 150–450)
PMV BLD AUTO: 10.1 FL (ref 9.2–12.9)
POTASSIUM SERPL-SCNC: 3.3 MMOL/L (ref 3.5–5.1)
RBC # BLD AUTO: 3.48 M/UL (ref 4–5.4)
SODIUM SERPL-SCNC: 135 MMOL/L (ref 136–145)
WBC # BLD AUTO: 5.34 K/UL (ref 3.9–12.7)

## 2022-08-01 PROCEDURE — 97535 SELF CARE MNGMENT TRAINING: CPT

## 2022-08-01 PROCEDURE — 25000003 PHARM REV CODE 250: Performed by: NURSE PRACTITIONER

## 2022-08-01 PROCEDURE — 83735 ASSAY OF MAGNESIUM: CPT | Performed by: HOSPITALIST

## 2022-08-01 PROCEDURE — 36415 COLL VENOUS BLD VENIPUNCTURE: CPT | Performed by: HOSPITALIST

## 2022-08-01 PROCEDURE — 92526 ORAL FUNCTION THERAPY: CPT

## 2022-08-01 PROCEDURE — 84100 ASSAY OF PHOSPHORUS: CPT | Performed by: HOSPITALIST

## 2022-08-01 PROCEDURE — 25000003 PHARM REV CODE 250: Performed by: HOSPITALIST

## 2022-08-01 PROCEDURE — 11000004 HC SNF PRIVATE

## 2022-08-01 PROCEDURE — 92507 TX SP LANG VOICE COMM INDIV: CPT

## 2022-08-01 PROCEDURE — 85025 COMPLETE CBC W/AUTO DIFF WBC: CPT | Performed by: HOSPITALIST

## 2022-08-01 PROCEDURE — 80048 BASIC METABOLIC PNL TOTAL CA: CPT | Performed by: HOSPITALIST

## 2022-08-01 RX ORDER — POLYETHYLENE GLYCOL 3350 17 G/17G
17 POWDER, FOR SOLUTION ORAL DAILY
Refills: 0
Start: 2022-08-02

## 2022-08-01 RX ORDER — ACETAMINOPHEN 325 MG/1
650 TABLET ORAL EVERY 6 HOURS PRN
Refills: 0
Start: 2022-08-01

## 2022-08-01 RX ORDER — LORAZEPAM 0.5 MG/1
0.5 TABLET ORAL EVERY 6 HOURS PRN
Start: 2022-08-01 | End: 2022-08-08 | Stop reason: HOSPADM

## 2022-08-01 RX ORDER — TALC
6 POWDER (GRAM) TOPICAL NIGHTLY
Refills: 0
Start: 2022-08-01

## 2022-08-01 RX ORDER — ONDANSETRON 4 MG/1
4 TABLET, ORALLY DISINTEGRATING ORAL 3 TIMES DAILY
Start: 2022-08-01

## 2022-08-01 RX ORDER — VALPROIC ACID 250 MG/5ML
500 SOLUTION ORAL NIGHTLY
Qty: 300 ML | Refills: 11
Start: 2022-08-01 | End: 2023-08-01

## 2022-08-01 RX ORDER — NYSTATIN 100000 [USP'U]/ML
500000 SUSPENSION ORAL
Qty: 200 ML | Refills: 0
Start: 2022-08-01 | End: 2022-08-11

## 2022-08-01 RX ORDER — CLONIDINE HYDROCHLORIDE 0.1 MG/1
0.1 TABLET ORAL 2 TIMES DAILY
Qty: 60 TABLET | Refills: 11
Start: 2022-08-01 | End: 2023-08-01

## 2022-08-01 RX ORDER — ONDANSETRON 4 MG/1
4 TABLET, ORALLY DISINTEGRATING ORAL 3 TIMES DAILY
Status: DISCONTINUED | OUTPATIENT
Start: 2022-08-01 | End: 2022-08-08 | Stop reason: HOSPADM

## 2022-08-01 RX ORDER — LORAZEPAM 0.5 MG/1
0.5 TABLET ORAL EVERY 6 HOURS PRN
Status: DISCONTINUED | OUTPATIENT
Start: 2022-08-01 | End: 2022-08-02

## 2022-08-01 RX ORDER — CALCIUM CARBONATE 200(500)MG
500 TABLET,CHEWABLE ORAL 2 TIMES DAILY PRN
Start: 2022-08-01 | End: 2023-08-01

## 2022-08-01 RX ORDER — ALUMINUM HYDROXIDE, MAGNESIUM HYDROXIDE, AND SIMETHICONE 2400; 240; 2400 MG/30ML; MG/30ML; MG/30ML
30 SUSPENSION ORAL EVERY 6 HOURS PRN
Refills: 0
Start: 2022-08-01 | End: 2023-08-01

## 2022-08-01 RX ORDER — LORAZEPAM 0.5 MG/1
0.5 TABLET ORAL ONCE
Status: COMPLETED | OUTPATIENT
Start: 2022-08-01 | End: 2022-08-01

## 2022-08-01 RX ADMIN — NYSTATIN 500000 UNITS: 500000 SUSPENSION ORAL at 08:08

## 2022-08-01 RX ADMIN — CARVEDILOL 6.25 MG: 3.12 TABLET, FILM COATED ORAL at 08:08

## 2022-08-01 RX ADMIN — NYSTATIN 500000 UNITS: 500000 SUSPENSION ORAL at 04:08

## 2022-08-01 RX ADMIN — NYSTATIN 500000 UNITS: 500000 SUSPENSION ORAL at 12:08

## 2022-08-01 RX ADMIN — CIPROFLOXACIN 500 MG: 500 TABLET, FILM COATED ORAL at 09:08

## 2022-08-01 RX ADMIN — SUCRALFATE 1 G: 1 TABLET ORAL at 11:08

## 2022-08-01 RX ADMIN — CLONIDINE HYDROCHLORIDE 0.1 MG: 0.1 TABLET ORAL at 09:08

## 2022-08-01 RX ADMIN — SUCRALFATE 1 G: 1 TABLET ORAL at 08:08

## 2022-08-01 RX ADMIN — Medication 6 MG: at 08:08

## 2022-08-01 RX ADMIN — ASPIRIN 81 MG: 81 TABLET, COATED ORAL at 09:08

## 2022-08-01 RX ADMIN — CLONIDINE HYDROCHLORIDE 0.1 MG: 0.1 TABLET ORAL at 08:08

## 2022-08-01 RX ADMIN — CARVEDILOL 6.25 MG: 3.12 TABLET, FILM COATED ORAL at 09:08

## 2022-08-01 RX ADMIN — NYSTATIN 500000 UNITS: 500000 SUSPENSION ORAL at 09:08

## 2022-08-01 RX ADMIN — ONDANSETRON 4 MG: 4 TABLET, ORALLY DISINTEGRATING ORAL at 04:08

## 2022-08-01 RX ADMIN — DICLOFENAC SODIUM 2 G: 10 GEL TOPICAL at 09:08

## 2022-08-01 RX ADMIN — SUCRALFATE 1 G: 1 TABLET ORAL at 04:08

## 2022-08-01 RX ADMIN — POLYETHYLENE GLYCOL 3350 17 G: 17 POWDER, FOR SOLUTION ORAL at 09:08

## 2022-08-01 RX ADMIN — NYSTATIN 500000 UNITS: 500000 SUSPENSION ORAL at 06:08

## 2022-08-01 RX ADMIN — ONDANSETRON 4 MG: 4 TABLET, ORALLY DISINTEGRATING ORAL at 08:08

## 2022-08-01 RX ADMIN — LORAZEPAM 0.5 MG: 0.5 TABLET ORAL at 04:08

## 2022-08-01 RX ADMIN — VALPROIC ACID 500 MG: 250 SOLUTION ORAL at 08:08

## 2022-08-01 RX ADMIN — SUCRALFATE 1 G: 1 TABLET ORAL at 05:08

## 2022-08-01 NOTE — PT/OT/SLP PROGRESS
Speech Language Pathology  Treatment    Elen Peters   MRN: [id number]   Admitting Diagnosis: Subdural hematoma, post-traumatic    Diet recommendations: Solid Diet Level: Regular  Liquid Diet Level: Thin Standard aspiration precautions    SLP Treatment Date: 08/01/22  Speech Start Time: 0822     Speech Stop Time: 0850     Speech Total (min): 28 min       TREATMENT BILLABLE MINUTES:  Speech Therapy Individual 12, Treatment Swallowing Dysfunction 8 and Self Care/Home Management Training 8    Has the patient been evaluated by SLP for swallowing? : Yes  Keep patient NPO?: No   General Precautions: Standard, fall, aspiration      Subjective:  Pt awake/alert reclined in bed, repositioned her to be sitting upright prior to PO intake. She was very pleasant, cooperative and agreeable to ST.     Objective:    Pt seen for ongoing dysphagia and cognitive therapy. She was able to orient x3 independently today and improve to x4 with min assist. She was able to answer general information questions with 100% acc and answer problem solving/safety awareness questions with 100%acc  with ease. Worked on intake of regular solids and thin liquids, She continues to have reduce appetite, but was agreeable to 2 bites of cracker and sips of coffee from meal tray. She was able to self present and demonstrate adequate mastication , tranit and clearance. Status post transfer of the bolus, she demonstrated a timely swallow trigger and no overt s/sx of airway compromise was appreciated. Recommend she continue with a regular diet at this time. Encouraged pt to eat a little from her breakfast tray. Educated pt on role of SLP, diet recs, aspiration precautions and ST POC. She verbalized understanding and was in agreement.     Assessment:  Elen Peters is a 89 y.o. female with a medical diagnosis of Subdural hematoma, post-traumatic and presents with a largely functional oropharyngeal swallow and cognitive skills that are WFL for her age. No further  ST needs at this time.     Diet recommendations:        Liquid Diet Level: Thin    Standard aspiration precautions    Discharge recommendations: Discharge Facility/Level of Care Needs: home     Goals:   Multidisciplinary Problems     SLP Goals        Problem: SLP    Goal Priority Disciplines Outcome   SLP Goal     SLP Ongoing, Progressing   Description: Speech Language Pathology Goals  Goals expected to be met by 8/5:  1. Pt will tolerate regular diet with thin liquids without overt s/s aspiration or airway compromise.   2. Pt will participate in safety awareness/problem solving tasks with 75% acc given mod cues.  3. Pt will attend to structured therapy task for ~2 minutes given minimal redirection.                    Plan:   Patient to be seen Follow up no indicated   Planned Interventions: n/a  Plan of Care reviewed with: patient  SLP Follow-up?: No  SLP - Next Visit Date: 08/01/22 08/01/2022

## 2022-08-01 NOTE — PLAN OF CARE
[facility]  Department of Hospital Medicine  [address] [place] [address]  [phone] after hours  [phone] fax    HOSPICE  ORDERS    08/01/2022    Admit to Hospice:  Home Service    Diagnoses:   Active Hospital Problems    Diagnosis  POA    *Subdural hematoma, post-traumatic [S06.5X9A]  Yes    Debility [R53.81]  Yes    Exudative age-related macular degeneration of both eyes with active choroidal neovascularization [H35.3231]  Yes    Hyponatremia [E87.1]  Yes    Coronary artery disease involving native coronary artery [I25.10]  Yes    HTN (hypertension), benign [I10]  Yes    Depression, major, in remission [F32.5]  Yes     doing much better on Celexa-  now making managememt plans for ill       Mixed hyperlipidemia [E78.2]  Yes    GERD (gastroesophageal reflux disease) [K21.9]  Yes      Resolved Hospital Problems   No resolved problems to display.       Hospice Qualifying Diagnoses:        Patient has a life expectancy < 6 months due to:  1) Primary Hospice Diagnosis: Failure to thrive, malnutrition   Comorbid Conditions Contributing to Decline: falls, advanced age    Vital Signs: Routine per Hospice Protocol.    Code Status: full code    Allergies:   Review of patient's allergies indicates:   Allergen Reactions    Amlodipine Swelling    Sulfa (sulfonamide antibiotics)      Unknown as child    Codeine      Other reaction(s): Unknown    Maxzide [triamterene-hydrochlorothiazid]     Sulfamethoxazole-trimethoprim        Diet: regular     Activities: As tolerated    Goals of Care Treatment Preferences:  Code Status: Full Code      Nursing: Per Hospice Routine.   Routine Skin for Bedridden Patients: Apply moisture barrier cream to all skin folds and   wet areas in perineal area daily and after baths and all bowel movements.         Medication List      ASK your doctor about these medications    alendronate 70 MG tablet  Commonly known as:  FOSAMAX  Take 1 tablet (70 mg total) by mouth every 7 days.     aspirin 81 MG EC tablet  Commonly known as: ECOTRIN  Take 81 mg by mouth once daily.     AVASTIN 25 mg/mL injection  Generic drug: bevacizumab     calcium-magnesium-zinc Tab  Take 2 tablets by mouth once daily at 6am. 1 Tablet Oral Every day     carvediloL 6.25 MG tablet  Commonly known as: COREG  Take 1 tablet (6.25 mg total) by mouth 2 (two) times daily.     citalopram 20 MG tablet  Commonly known as: CeleXA  Take 1 tablet (20 mg total) by mouth once daily.     diclofenac sodium 1 % Gel  Commonly known as: VOLTAREN  Apply 2 g topically 2 (two) times daily.     EYLEA 2 mg/0.05 mL Syrg  Generic drug: aflibercept     losartan 50 MG tablet  Commonly known as: COZAAR  Take 1 tablet (50 mg total) by mouth 2 (two) times a day.     mv-min-folic acid-lutein 0.4-250 mg-mcg Tab  Take by mouth. 1 Tablet Oral Every day     omega-3 fatty acids 1,000 mg Cap  Take 1 capsule by mouth once daily. 2  Capsule Oral Every day     ondansetron 4 MG tablet  Commonly known as: ZOFRAN  Take 1 tablet (4 mg total) by mouth every 8 (eight) hours as needed for Nausea.     pantoprazole 40 MG tablet  Commonly known as: PROTONIX  Take 1 tablet (40 mg total) by mouth once daily.     rosuvastatin 20 MG tablet  Commonly known as: CRESTOR  Take 1 tablet (20 mg total) by mouth once daily.     sucralfate 1 gram tablet  Commonly known as: CARAFATE  TAKE 1 TABLET(1 GRAM) BY MOUTH FOUR TIMES DAILY BEFORE MEALS AND AT NIGHT     ULTRA-LIGHT ROLLATOR Misc  Generic drug: walker  1 Units by Misc.(Non-Drug; Combo Route) route once daily at 6am.     zolpidem 5 MG Tab  Commonly known as: AMBIEN  Take 1 tablet (5 mg total) by mouth nightly as needed (insomnia).              Future Orders:  Hospice Medical Director may dictate new orders for comfortable care measures & sign death certificate.    _________________________________  Pratima Woods NP  08/01/2022

## 2022-08-01 NOTE — PROGRESS NOTES
[facility]                                  Skilled Nursing Facility                   Progress Note     Admit Date: 7/22/2022  DIANA 8/10/2022  Principal Problem:  Subdural hematoma, post-traumatic   HPI obtained from patient interview and chart review     Chief Complaint:   Re-evaluation of medical treatment and therapy status: discuss palliative care options, lab review      Interval history  All of today's labs reviewed and are listed below.  24 hr vital sign ranges listed below.  Patient denies shortness of breath, abdominal discomfort, nausea, or vomiting.  Patient reports an adequate appetite.  Patient denies dysuria.  Patient reports having regular bowel movements.  Patient progessing with PT/OT. Continuing to follow and treat all acute and chronic conditions.    HPI:   Elen Peters is a 89 y.o. female with PMH of TMJ, syncope, renal cancer, hyperlipidemia, hypertension, depression, CAD who presents to the ED for AMS following recent fall with head trauma. CTH on arrival to the ED revealed thin SDH along the left side of the posterior falx. Admitted to [facility] for observation.  Aspirin held.  No surgical intervention required.  Neurosurgery recommending repeat CTH in 4 weeks.    Patient will be treated at [facility] with PT and OT to improve functional status and ability to perform ADLs.     Interval history  Grunting and forward motion involuntary neck movement less frequent but still uncomfortable   Reporting a sensation in her throat and nausea today  Vital signs stable, afebrile  Differential dx-- episodic focal seizures-continue Depakote 500mg qhs started 7/30/22    Patient discussed with Segundo and his wife. Family has good understanding of patient medical complexity, declining mental state, severe debility, and advanced age. Segundo says his mother has declined since hospital admission and would like to explore palliative care  options. His wife has already contacted [facility] (patient's residence) to discuss placing patient in memory care unit with [facility]. Segundo expressed his goal is to keep his mom comfortable. Plan of care discussed with interdisciplinary team--Anna,  Aldo Sy, and attending Dr. Javier. Med rec and plan of care completed. Initiate order for Ativan 0.5mg oral every 6 hours PRN anxiety/involuntary mo first dose now. Initiate order for scheduled Zofran 4mg TID. Son reports he is POA and will send documents. Family to discuss code status. Discussed not sending patient back to the hospital if she has new decline. Son in agreement.        Past Medical History: Patient has a past medical history of Abnormal stress test (11/18/2015), Arthritis, Bladder cancer, Cataract, Coronary artery disease involving native coronary artery (1/6/2016), Depression, Exudative age-related macular degeneration of left eye with active choroidal neovascularization (10/1/2020), GERD (gastroesophageal reflux disease), HTN (hypertension), benign (11/18/2015), bladder infections, Hyperlipemia, OP (osteoporosis), Positive cardiac stress test (1/6/2016), Renal cancer, Syncope (1/6/2016), TMJ (dislocation of temporomandibular joint), Upper back pain (12/1/2015), Ureteral cancer, Venous insufficiency (2017), and Villous adenoma of colon (5/31/2013).    Past Surgical History: Patient has a past surgical history that includes Nephrectomy; TONSILLECTOMY, ADENOIDECTOMY; Back surgery; Appendectomy; Colonoscopy (10/21/2013); Cystoscopy; Cataract extraction w/  intraocular lens implant (Left, 3/16/15); Cataract extraction w/  intraocular lens implant (Right, 4/6/15); Eye surgery; Spine surgery; and Esophagogastroduodenoscopy (N/A, 1/30/2019).    Social History: Patient reports that she quit smoking about 73 years ago. She has never used smokeless tobacco. She reports that she does not drink alcohol and does not  use drugs.    Family History: family history includes Cancer in her father, mother, and son; Goiter in her mother; Heart attack in her mother; Heart disease in her maternal grandfather and mother; Leukemia in her father and mother; No Known Problems in her brother, maternal aunt, maternal grandmother, maternal uncle, paternal aunt, paternal grandfather, paternal grandmother, paternal uncle, sister, son, son, son, son, and son.    Allergies: Patient is allergic to amlodipine, sulfa (sulfonamide antibiotics), codeine, maxzide [triamterene-hydrochlorothiazid], and sulfamethoxazole-trimethoprim.    ROS  Constitutional: Negative for fever, white coating on tongue    Eyes: Negative for blurred vision, double vision   Respiratory: Negative for cough, shortness of breath   Cardiovascular: Negative for chest pain, palpitations, and leg swelling.   Gastrointestinal: Negative for abdominal pain, constipation, diarrhea, nausea, vomiting.   Genitourinary: Negative for dysuria, frequency   Musculoskeletal:  + generalized weakness. Negative for back pain and myalgias.   Skin: Negative for itching and rash.   Neurological: Negative for dizziness, headaches. +involuntary movement  Psychiatric/Behavioral: Negative for depression. The patient is not nervous/anxious.      24 hour Vital Sign Range   Temp:  [98.1 °F (36.7 °C)-98.7 °F (37.1 °C)]   Pulse:  [65-74]   Resp:  [18]   BP: (142-150)/(65-78)   SpO2:  [94 %-96 %]     PEx  Constitutional: Patient appears debilitated.  No distress noted  HENT: oral thrush   Head: Normocephalic and atraumatic.   Eyes: Pupils are equal, round  Neck: Normal range of motion. Neck supple.   Cardiovascular: Normal rate, regular rhythm and normal heart sounds.    Pulmonary/Chest: Effort normal and breath sounds are clear  Abdominal: Soft. Bowel sounds are normal.   Musculoskeletal: Normal range of motion. Forward jerking motion and grunting  Neurological: Alert and oriented to person, place, and time.    Psychiatric: Normal mood and affect. Behavior is normal.   Skin: Skin is warm and dry. Skin intact         Recent Labs   Lab 08/01/22  0413   *   K 3.3*      CO2 26   BUN 7*   CREATININE 0.7   MG 1.9       Recent Labs   Lab 08/01/22  0413   WBC 5.34   RBC 3.48*   HGB 11.2*   HCT 32.7*      MCV 94   MCH 32.2*   MCHC 34.3         Assessment and Plan:    Involuntary movement, NEW  grunting and neck jerking forward which started on Sunday and has worsened since.   CT Head on 7/19/22 showed stable appearance of left acute subdural hematoma.   CT head results and treatment options for involuntary movement discussed-Clonidine.   08/01/2022  Continue Clonidine   Labs reviewed no critical results  Reporting a sensation in her throat.   Speech therapy who recommended to continue diet and aspiration precautions.   UA bland, CXR neg pna or effusion  Vital signs stable, afebrile    Focal seizure, suspected   08/01/2022  Differential dx-- episodic focal seizures-continue Depakote 500mg qhs started 7/30/22  Initiate order for Ativan 0.5mg oral every 6 hours PRN anxiety/involuntary mo first dose now. Initiate order for scheduled Zofran 4mg TID.     Goals of care discussion  08/01/2022  Patient discussed with Segundo and his wife. Family has good understanding of patient medical complexity, declining mental state, severe debility, and advanced age. Segundo says his mother has declined since hospital admission and would like to explore palliative care options. His wife has already contacted [facility] (patient's residence) to discuss placing patient in memory care unit with hospice [facility] [employer]. Segundo expressed his goal is to keep his mom comfortable. Plan of care discussed with interdisciplinary team--Anna,  Aldo Sy, and attending Dr. Javier. Med rec and plan of care completed.     Oral thrush  08/01/2022  Continue Nystatin x 7 days    Subdural hematoma, posttraumatic  No  surgical intervention required.  Neurosurgery recommending repeat CTH in 4 weeks.  Fall precautions    Gastroesophageal reflux disease  Carafate 1 g a.c. and HS    Mixed hyperlipidemia  Atorvastatin 80 mg nightly    Depression, major, in remission  citalopram 20 mg daily    Hypertension  Carvedilol 6.25 mg b.i.d.  08/01/2022  sbp stable    Coronary artery disease involving native coronary artery  Continue aspirin and statin    Hyponatremia  Continue to trend on regular labs    Exudative age-related macular degeneration of both eyes with active choroidal neovascularization  Sees Dr. Cyndi Velasquez injections q 6 weeks, scheduled for injections tomorrow 7/26    Anticipate disposition:  Home with home health      Follow-up needed during SNF stay-    Follow-up needed after discharge from SNF: PCP,     Future Appointments   Date Time Provider Department Center   8/11/2022  8:00 AM Anita Mahan, NP [facility] [place]   10/26/2022 10:00 AM LAB, [facility] [place]         I certify that SNF services are required to be given on an inpatient basis because Elen Peters needs for skilled nursing care and/or skilled rehabilitation are required on a daily basis and such services can only practically be provided in a skilled nursing facility setting and are for an ongoing condition for which she received inpatient care in the hospital.         Pratima Woods NP  Department of Hospital Medicine   [facility]     DOS: 08/01/2022    Advance Care Planning / Goals of Care Discussion  Total time spent discussing / coordinating advanced directives and goals of care:  21 minutes    Extended Visit:   Total time spent: 52 minutes  Non Face to Face Time: 40 minutes   Description of Time: counseling patient, son Segundo, and his wife on clinical condition, therapies provided, plan of care, emotional support, coordinating patient care with other care team members, reviewing and interpreting labs  and imaging, collaboration with physician, initiating new orders, chart review, and documentation. See interval hx.       Patient note was created using MModal Dictation.  Any errors in syntax or even information may not have been identified and edited on initial review prior to signing this note.

## 2022-08-01 NOTE — PROGRESS NOTES
"Occupational Therapy       Elen Peters  MRN: [id number]  Room/Bed: [id number]/[id number] A    Pt not seen this day by OT secondary to Pt's sitter indicating that Pt was nauseated and that " she isn't supposed to get out of bed until she gets the medicine that was ordered for her". Sitter was informed that therapy would be checking with Pt's doctor to discuss patient's plan of care and therapy instructions.     Jay Castillo, SRIDEVIR/OLEKSANDR  8/1/2022     "

## 2022-08-01 NOTE — PROGRESS NOTES
[facility]                                  Skilled Nursing Facility                   Progress Note     Admit Date: 7/22/2022  DIANA 8/10/2022  Principal Problem:  Subdural hematoma, post-traumatic   HPI obtained from patient interview and chart review     Chief Complaint:   Re-evaluation of medical treatment and therapy status: UA and CXR review, rule out infection    Interval history  All of today's labs reviewed and are listed below.  24 hr vital sign ranges listed below.  Patient denies shortness of breath, abdominal discomfort, nausea, or vomiting.  Patient reports an adequate appetite.  Patient denies dysuria.  Patient reports having regular bowel movements.  Patient progessing with PT/OT. Continuing to follow and treat all acute and chronic conditions.    HPI:   Elen Peters is a 89 y.o. female with PMH of TMJ, syncope, renal cancer, hyperlipidemia, hypertension, depression, CAD who presents to the ED for AMS following recent fall with head trauma. CTH on arrival to the ED revealed thin SDH along the left side of the posterior falx. Admitted to [facility] for observation.  Aspirin held.  No surgical intervention required.  Neurosurgery recommending repeat CTH in 4 weeks.    Patient will be treated at [facility] with PT and OT to improve functional status and ability to perform ADLs.     Interval history  Grunting and forward motion involuntary neck movement less frequent at times   Reporting a sensation in her throat. Consulted speech therapy who recommended to continue diet and aspiration precautions.   UA bland, CXR neg pna or effusion  Vital signs stable, afebrile  Discussed patient status with women family member at the beside.   Increase protonix daily to bid for suspicion of GERD flare up  Initiate nystatin elixir for thrush   Differential dx episodic focal seizures-consider Depakote 500mg qhs if no improvement tomrrow  Continue  conservative measures only per son request       Past Medical History: Patient has a past medical history of Abnormal stress test (11/18/2015), Arthritis, Bladder cancer, Cataract, Coronary artery disease involving native coronary artery (1/6/2016), Depression, Exudative age-related macular degeneration of left eye with active choroidal neovascularization (10/1/2020), GERD (gastroesophageal reflux disease), HTN (hypertension), benign (11/18/2015), bladder infections, Hyperlipemia, OP (osteoporosis), Positive cardiac stress test (1/6/2016), Renal cancer, Syncope (1/6/2016), TMJ (dislocation of temporomandibular joint), Upper back pain (12/1/2015), Ureteral cancer, Venous insufficiency (2017), and Villous adenoma of colon (5/31/2013).    Past Surgical History: Patient has a past surgical history that includes Nephrectomy; TONSILLECTOMY, ADENOIDECTOMY; Back surgery; Appendectomy; Colonoscopy (10/21/2013); Cystoscopy; Cataract extraction w/  intraocular lens implant (Left, 3/16/15); Cataract extraction w/  intraocular lens implant (Right, 4/6/15); Eye surgery; Spine surgery; and Esophagogastroduodenoscopy (N/A, 1/30/2019).    Social History: Patient reports that she quit smoking about 73 years ago. She has never used smokeless tobacco. She reports that she does not drink alcohol and does not use drugs.    Family History: family history includes Cancer in her father, mother, and son; Goiter in her mother; Heart attack in her mother; Heart disease in her maternal grandfather and mother; Leukemia in her father and mother; No Known Problems in her brother, maternal aunt, maternal grandmother, maternal uncle, paternal aunt, paternal grandfather, paternal grandmother, paternal uncle, sister, son, son, son, son, and son.    Allergies: Patient is allergic to amlodipine, sulfa (sulfonamide antibiotics), codeine, maxzide [triamterene-hydrochlorothiazid], and sulfamethoxazole-trimethoprim.    ROS  Constitutional: Negative for  fever, white coating on tongue    Eyes: Negative for blurred vision, double vision   Respiratory: Negative for cough, shortness of breath   Cardiovascular: Negative for chest pain, palpitations, and leg swelling.   Gastrointestinal: Negative for abdominal pain, constipation, diarrhea, nausea, vomiting.   Genitourinary: Negative for dysuria, frequency   Musculoskeletal:  + generalized weakness. Negative for back pain and myalgias.   Skin: Negative for itching and rash.   Neurological: Negative for dizziness, headaches. +involuntary movement  Psychiatric/Behavioral: Negative for depression. The patient is not nervous/anxious.      24 hour Vital Sign Range   Temp:  [98.1 °F (36.7 °C)-98.7 °F (37.1 °C)]   Pulse:  [65-74]   Resp:  [18]   BP: (142-150)/(65-78)   SpO2:  [94 %-96 %]     PEx  Constitutional: Patient appears debilitated.  No distress noted  HENT: oral thrush   Head: Normocephalic and atraumatic.   Eyes: Pupils are equal, round  Neck: Normal range of motion. Neck supple.   Cardiovascular: Normal rate, regular rhythm and normal heart sounds.    Pulmonary/Chest: Effort normal and breath sounds are clear  Abdominal: Soft. Bowel sounds are normal.   Musculoskeletal: Normal range of motion. Forward jerking motion and grunting  Neurological: Alert and oriented to person, place, and time.   Psychiatric: Normal mood and affect. Behavior is normal.   Skin: Skin is warm and dry. Skin intact         Recent Labs   Lab 08/01/22  0413   *   K 3.3*      CO2 26   BUN 7*   CREATININE 0.7   MG 1.9       Recent Labs   Lab 08/01/22  0413   WBC 5.34   RBC 3.48*   HGB 11.2*   HCT 32.7*      MCV 94   MCH 32.2*   MCHC 34.3         Assessment and Plan:    Involuntary movement, NEW  grunting and neck jerking forward which started on Sunday and has worsened since.   CT Head on 7/19/22 showed stable appearance of left acute subdural hematoma.   CT head results and treatment options for involuntary movement  discussed-Clonidine.   7/29/22  Continue Clonidine add PRN dose   Labs reviewed no critical results  Reporting a sensation in her throat. Consulted speech therapy who recommended to continue diet and aspiration precautions.   UA bland, CXR neg pna or effusion  Vital signs stable, afebrile  Discussed patient status with women family member at the beside.   Increase protonix daily to bid for suspicion of GERD flare up  Initiate nystatin elixir for thrush   Differential dx episodic focal seizures-consider Depakote 500mg qhs if no improvement tomrrow    Subdural hematoma, posttraumatic  No surgical intervention required.  Neurosurgery recommending repeat CTH in 4 weeks.  7/29/22  Fall precautions    Gastroesophageal reflux disease  7/29/22  Protonix 40 mg daily increase to bid  Carafate 1 g a.c. and HS    Mixed hyperlipidemia  Atorvastatin 80 mg nightly    Depression, major, in remission  citalopram 20 mg daily    Hypertension  Carvedilol 6.25 mg b.i.d.  7/29/22  sbp stable    Coronary artery disease involving native coronary artery  Continue aspirin and statin    Hyponatremia  Continue to trend on regular labs    Exudative age-related macular degeneration of both eyes with active choroidal neovascularization  Sees Dr. Cyndi Velasquez injections q 6 weeks, scheduled for injections tomorrow 7/26    Anticipate disposition:  Home with home health      Follow-up needed during SNF stay-    Follow-up needed after discharge from SNF: PCP,     Future Appointments   Date Time Provider Department Center   8/11/2022  8:00 AM Anita Mahan, NP [facility] [place]   10/26/2022 10:00 AM LAB, [facility] [place]         I certify that SNF services are required to be given on an inpatient basis because Elen Peters needs for skilled nursing care and/or skilled rehabilitation are required on a daily basis and such services can only practically be provided in a skilled nursing facility setting and are for an ongoing condition for  which she received inpatient care in the hospital.         Pratima Woods NP  Department of Hospital Medicine   [facility]     DOS: 7/29/22       Patient note was created using MModal Dictation.  Any errors in syntax or even information may not have been identified and edited on initial review prior to signing this note.

## 2022-08-01 NOTE — PROGRESS NOTES
[facility]                                  Skilled Nursing Facility                   Progress Note     Admit Date: 7/22/2022  DIANA 8/10/2022  Principal Problem:  Subdural hematoma, post-traumatic   HPI obtained from patient interview and chart review     Chief Complaint:   Re-evaluation of medical treatment and therapy status: follow up on response to Clonidine, discuss plan of care with son Segundo, lab review    Interval history  All of today's labs reviewed and are listed below.  24 hr vital sign ranges listed below.  Patient denies shortness of breath, abdominal discomfort, nausea, or vomiting.  Patient reports an adequate appetite.  Patient denies dysuria.  Patient reports having regular bowel movements.  Patient progessing with PT/OT. Continuing to follow and treat all acute and chronic conditions.    HPI:   Elen Peters is a 89 y.o. female with PMH of TMJ, syncope, renal cancer, hyperlipidemia, hypertension, depression, CAD who presents to the ED for AMS following recent fall with head trauma. CTH on arrival to the ED revealed thin SDH along the left side of the posterior falx. Admitted to NS for observation.  Aspirin held.  No surgical intervention required.  Neurosurgery recommending repeat CTH in 4 weeks.    Patient will be treated at [facility] with PT and OT to improve functional status and ability to perform ADLs.     Interval history  Patient reports grunting and forward motion neck jerking mild improvement.   Labs reviewed vit b12 970, no critical results  Vital signs stable, afebrile  Discussed patient status with Segundo over the phone. Will discontinue high dose statin, celexa, and multiple vitamins. Add PRN dose of Clonidine for movements.   Continue conservative measures only per son request       Past Medical History: Patient has a past medical history of Abnormal stress test (11/18/2015), Arthritis, Bladder cancer,  Cataract, Coronary artery disease involving native coronary artery (1/6/2016), Depression, Exudative age-related macular degeneration of left eye with active choroidal neovascularization (10/1/2020), GERD (gastroesophageal reflux disease), HTN (hypertension), benign (11/18/2015), bladder infections, Hyperlipemia, OP (osteoporosis), Positive cardiac stress test (1/6/2016), Renal cancer, Syncope (1/6/2016), TMJ (dislocation of temporomandibular joint), Upper back pain (12/1/2015), Ureteral cancer, Venous insufficiency (2017), and Villous adenoma of colon (5/31/2013).    Past Surgical History: Patient has a past surgical history that includes Nephrectomy; TONSILLECTOMY, ADENOIDECTOMY; Back surgery; Appendectomy; Colonoscopy (10/21/2013); Cystoscopy; Cataract extraction w/  intraocular lens implant (Left, 3/16/15); Cataract extraction w/  intraocular lens implant (Right, 4/6/15); Eye surgery; Spine surgery; and Esophagogastroduodenoscopy (N/A, 1/30/2019).    Social History: Patient reports that she quit smoking about 73 years ago. She has never used smokeless tobacco. She reports that she does not drink alcohol and does not use drugs.    Family History: family history includes Cancer in her father, mother, and son; Goiter in her mother; Heart attack in her mother; Heart disease in her maternal grandfather and mother; Leukemia in her father and mother; No Known Problems in her brother, maternal aunt, maternal grandmother, maternal uncle, paternal aunt, paternal grandfather, paternal grandmother, paternal uncle, sister, son, son, son, son, and son.    Allergies: Patient is allergic to amlodipine, sulfa (sulfonamide antibiotics), codeine, maxzide [triamterene-hydrochlorothiazid], and sulfamethoxazole-trimethoprim.    ROS  Constitutional: Negative for fever   Eyes: Negative for blurred vision, double vision   Respiratory: Negative for cough, shortness of breath   Cardiovascular: Negative for chest pain, palpitations, and  leg swelling.   Gastrointestinal: Negative for abdominal pain, constipation, diarrhea, nausea, vomiting.   Genitourinary: Negative for dysuria, frequency   Musculoskeletal:  + generalized weakness. Negative for back pain and myalgias.   Skin: Negative for itching and rash.   Neurological: Negative for dizziness, headaches. +involuntary movement  Psychiatric/Behavioral: Negative for depression. The patient is not nervous/anxious.      24 hour Vital Sign Range   Temp:  [98.1 °F (36.7 °C)-98.7 °F (37.1 °C)]   Pulse:  [65-74]   Resp:  [18]   BP: (142-150)/(65-78)   SpO2:  [94 %-96 %]     PEx  Constitutional: Patient appears debilitated.  No distress noted  HENT:   Head: Normocephalic and atraumatic.   Eyes: Pupils are equal, round  Neck: Normal range of motion. Neck supple.   Cardiovascular: Normal rate, regular rhythm and normal heart sounds.    Pulmonary/Chest: Effort normal and breath sounds are clear  Abdominal: Soft. Bowel sounds are normal.   Musculoskeletal: Normal range of motion. Forward jerking motion and grunting  Neurological: Alert and oriented to person, place, and time.   Psychiatric: Normal mood and affect. Behavior is normal.   Skin: Skin is warm and dry. Skin intact         Recent Labs   Lab 08/01/22  0413   *   K 3.3*      CO2 26   BUN 7*   CREATININE 0.7   MG 1.9       Recent Labs   Lab 08/01/22  0413   WBC 5.34   RBC 3.48*   HGB 11.2*   HCT 32.7*      MCV 94   MCH 32.2*   MCHC 34.3         Assessment and Plan:    Involuntary movement, NEW  grunting and neck jerking forward which started on Sunday and has worsened since.   CT Head on 7/19/22 showed stable appearance of left acute subdural hematoma.   CT head results and treatment options for involuntary movement discussed-Clonidine.   7/28/22  Continue Clonidine add PRN dose   Labs reviewed no critical results    Subdural hematoma, posttraumatic  No surgical intervention required.  Neurosurgery recommending repeat CTH in 4  weeks.  Fall precautions    Gastroesophageal reflux disease  Protonix 40 mg daily  Carafate 1 g a.c. and HS    Mixed hyperlipidemia  Atorvastatin 80 mg nightly    Depression, major, in remission  citalopram 20 mg daily    Hypertension  Carvedilol 6.25 mg b.i.d.  7/28/22  sbp stable    Coronary artery disease involving native coronary artery  Continue aspirin and statin    Hyponatremia  Continue to trend on regular labs    Exudative age-related macular degeneration of both eyes with active choroidal neovascularization  Sees Dr. Cyndi Velasquez injections q 6 weeks, scheduled for injections tomorrow 7/26    Anticipate disposition:  Home with home health      Follow-up needed during SNF stay-    Follow-up needed after discharge from SNF: PCP,     Future Appointments   Date Time Provider Department Center   8/11/2022  8:00 AM Anita Mahan, NP [facility] [place]   10/26/2022 10:00 AM LAB, [facility] [place]         I certify that SNF services are required to be given on an inpatient basis because Elen Peters needs for skilled nursing care and/or skilled rehabilitation are required on a daily basis and such services can only practically be provided in a skilled nursing facility setting and are for an ongoing condition for which she received inpatient care in the hospital.         Pratima Woods NP  Department of Hospital Medicine   [facility]     DOS: 7/28/22       Patient note was created using MModal Dictation.  Any errors in syntax or even information may not have been identified and edited on initial review prior to signing this note.

## 2022-08-01 NOTE — PT/OT/SLP PROGRESS
Physical Therapy      Patient Name:  Elen Peters   MRN:  [id number]    Patient not seen today secondary to per OT pt with reports of nausea, awaiting for meds and that we were not to get the pt out bed per sitter,  OT sent message to NP   . Will follow-up next PT session with PT.

## 2022-08-02 ENCOUNTER — PATIENT MESSAGE (OUTPATIENT)
Dept: CARDIOLOGY | Facility: CLINIC | Age: 87
End: 2022-08-02
Payer: MEDICARE

## 2022-08-02 PROCEDURE — 11000004 HC SNF PRIVATE

## 2022-08-02 PROCEDURE — 25000003 PHARM REV CODE 250: Performed by: HOSPITALIST

## 2022-08-02 PROCEDURE — 97116 GAIT TRAINING THERAPY: CPT | Mod: CQ

## 2022-08-02 PROCEDURE — 25000003 PHARM REV CODE 250: Performed by: NURSE PRACTITIONER

## 2022-08-02 PROCEDURE — 97535 SELF CARE MNGMENT TRAINING: CPT | Mod: CO

## 2022-08-02 PROCEDURE — 97530 THERAPEUTIC ACTIVITIES: CPT | Mod: CQ

## 2022-08-02 PROCEDURE — 97530 THERAPEUTIC ACTIVITIES: CPT | Mod: CO

## 2022-08-02 RX ORDER — LORAZEPAM 0.5 MG/1
0.5 TABLET ORAL NIGHTLY
Status: DISCONTINUED | OUTPATIENT
Start: 2022-08-02 | End: 2022-08-03

## 2022-08-02 RX ORDER — ACETAMINOPHEN 325 MG/1
650 TABLET ORAL EVERY 8 HOURS
Status: DISCONTINUED | OUTPATIENT
Start: 2022-08-02 | End: 2022-08-03

## 2022-08-02 RX ORDER — LORAZEPAM 0.5 MG/1
0.5 TABLET ORAL DAILY PRN
Status: DISCONTINUED | OUTPATIENT
Start: 2022-08-02 | End: 2022-08-08 | Stop reason: HOSPADM

## 2022-08-02 RX ADMIN — LORAZEPAM 0.5 MG: 0.5 TABLET ORAL at 01:08

## 2022-08-02 RX ADMIN — CLONIDINE HYDROCHLORIDE 0.1 MG: 0.1 TABLET ORAL at 08:08

## 2022-08-02 RX ADMIN — DICLOFENAC SODIUM 2 G: 10 GEL TOPICAL at 08:08

## 2022-08-02 RX ADMIN — NYSTATIN 500000 UNITS: 500000 SUSPENSION ORAL at 08:08

## 2022-08-02 RX ADMIN — ONDANSETRON 4 MG: 4 TABLET, ORALLY DISINTEGRATING ORAL at 09:08

## 2022-08-02 RX ADMIN — NYSTATIN 500000 UNITS: 500000 SUSPENSION ORAL at 04:08

## 2022-08-02 RX ADMIN — CARVEDILOL 6.25 MG: 3.12 TABLET, FILM COATED ORAL at 08:08

## 2022-08-02 RX ADMIN — ASPIRIN 81 MG: 81 TABLET, COATED ORAL at 08:08

## 2022-08-02 RX ADMIN — ACETAMINOPHEN 650 MG: 325 TABLET ORAL at 01:08

## 2022-08-02 RX ADMIN — LORAZEPAM 0.5 MG: 0.5 TABLET ORAL at 09:08

## 2022-08-02 RX ADMIN — CARVEDILOL 6.25 MG: 3.12 TABLET, FILM COATED ORAL at 09:08

## 2022-08-02 RX ADMIN — NYSTATIN 500000 UNITS: 500000 SUSPENSION ORAL at 11:08

## 2022-08-02 RX ADMIN — POLYETHYLENE GLYCOL 3350 17 G: 17 POWDER, FOR SOLUTION ORAL at 08:08

## 2022-08-02 RX ADMIN — ONDANSETRON 4 MG: 4 TABLET, ORALLY DISINTEGRATING ORAL at 03:08

## 2022-08-02 RX ADMIN — CLONIDINE HYDROCHLORIDE 0.1 MG: 0.1 TABLET ORAL at 09:08

## 2022-08-02 RX ADMIN — SUCRALFATE 1 G: 1 TABLET ORAL at 11:08

## 2022-08-02 RX ADMIN — ACETAMINOPHEN 650 MG: 325 TABLET ORAL at 09:08

## 2022-08-02 RX ADMIN — NYSTATIN 500000 UNITS: 500000 SUSPENSION ORAL at 09:08

## 2022-08-02 RX ADMIN — SUCRALFATE 1 G: 1 TABLET ORAL at 06:08

## 2022-08-02 RX ADMIN — VALPROIC ACID 500 MG: 250 SOLUTION ORAL at 09:08

## 2022-08-02 RX ADMIN — ONDANSETRON 4 MG: 4 TABLET, ORALLY DISINTEGRATING ORAL at 08:08

## 2022-08-02 RX ADMIN — DICLOFENAC SODIUM 2 G: 10 GEL TOPICAL at 09:08

## 2022-08-02 RX ADMIN — SUCRALFATE 1 G: 1 TABLET ORAL at 09:08

## 2022-08-02 RX ADMIN — SUCRALFATE 1 G: 1 TABLET ORAL at 04:08

## 2022-08-02 NOTE — PROGRESS NOTES
Pt. Had jerking movement of head and upper extremities most of the day.pt. received ativan this pm and pt. became relaxed and went to sleep.the movement of the head and upper  extremities ceased at this time.family at bedside.will continue to monitor.

## 2022-08-02 NOTE — PT/OT/SLP PROGRESS
Occupational Therapy   Treatment    Name: Elen Peters  MRN: [id number]  Admit Date: 7/22/2022  Admitting Diagnosis:  Subdural hematoma, post-traumatic    General Precautions: Standard, aspiration, fall   Orthopedic Precautions:N/A   Braces:       Recommendations:     Discharge Recommendations: home health OT (skilled nursing with 24/7 SPV)  Level of Assistance Recommended at Discharge: 24 hours physical assistance for all ADL's and home management tasks  Discharge Equipment Recommendations:  bedside commode  Barriers to discharge:  Decreased caregiver support (Increased (A) required)    Assessment:     Elen Peters is a 89 y.o. female with a medical diagnosis of Subdural hematoma, post-traumatic  Performance deficits affecting function are weakness, impaired endurance, impaired self care skills, impaired functional mobility, gait instability, impaired balance, impaired cognition, decreased coordination, decreased safety awareness, pain, impaired cardiopulmonary response to activity, abnormal tone, decreased ROM.Pt. participated well with session on this day. Pt. And son educated that Pt. Is not safe to sit upright in w/c alone due to involuntary jerky movements. Pt. Son in room during co-treat session and aware of involuntary movements. Pt. Nurse present during parts of therapy session. Pt. Required verbal cues to keep eyes open during session. Pt. With blank stare and jerky involuntary movements. Pt. Required redirection with all ADLs. Pt. Returned to upright position in bed at end of session for safety aspects. Pt. Will continue to benefit from continued OT to progress towards goals    Co-treatment performed due to patient's multiple deficits requiring two skilled therapists to appropriately and safely assess patient's strength and endurance while facilitating functional tasks in addition to accommodating for patient's activity tolerance.         Rehab Potential is fair    Activity tolerance:  Fair    Plan:     Patient to  "be seen 5 x/week to address the above listed problems via self-care/home management, therapeutic activities, therapeutic exercises    · Plan of Care Expires: 08/22/22  · Plan of Care Reviewed with: patient, son    Subjective     Communicated with: christian prior to session. "Hello"    Pain/Comfort:  · Pain Rating 1: 0/10  · Pain Rating Post-Intervention 1: 0/10    Patient's cultural, spiritual, Alevism conflicts given the current situation:  · no    Objective:     Patient found right sidelying upon OT entry to room.    Bed Mobility:    · Patient completed Supine to Sit with maximal assistance  · Patient completed Sit to Supine with maximal assistance     Functional Mobility/Transfers:  · Patient completed Sit <> Stand Transfer x4 trials with ranging between minimum assistance, total assistance and of 2 persons  with  hand-held assist   · Functional Mobility: Pt. With fxl mobility throughout room with Total(A) x2 persons with HHA approx 28ft. Pt. Noted with periods of involuntary jerky movements and shuffling gait with ambulation     Activities of Daily Living:  · Upper Body Dressing: maximal assistance to doff/liset pullover shirt with MAX verbal cues and Pt. requiring redirection with task  · Lower Body Dressing: maximal assistance to doff/liset pants over hips instance. Pt. with (A) x2 persons with standing to manage pants    AMPA 6 Click ADL: 17      Treatment & Education:  Pt. Engaged in dynamic seated task at EOB focusing on sitting balance and trunk control while performing ADLs.    Pt. Educated on safety with ADL tasks as well as functional mobility and need for staff assist. Pt. Son educated on assistance needs.    Patient left HOB elevated with all lines intact, call button in reach and son presentEducation:      GOALS:   Multidisciplinary Problems     Occupational Therapy Goals        Problem: Occupational Therapy    Goal Priority Disciplines Outcome Interventions   Occupational Therapy Goal     OT, PT/OT " Ongoing, Progressing    Description: Goals to be met by: 08/23/22     Patient will increase functional independence with ADLs by performing:    UE Dressing with Modified [place].  LE Dressing with Modified [place].  Grooming while standing at sink with Supervision.  Toileting from toilet with Supervision for hygiene and clothing management.   Bathing from  shower chair/bench with Supervision.  Toilet transfer to toilet with Supervision.  Upper extremity exercise program 3 x15 reps per handout, with supervision to increase UE strength/endurance for improved func mobility skills  Stand during functional task for 10' with supervision in preparation for standing ADLs.                     Time Tracking:     OT Date of Treatment: 08/02/22  OT Start Time: 0828    OT Stop Time: 0902  OT Total Time (min): 34 min    Billable Minutes:Self Care/Home Management 20  Therapeutic Activity 14    8/2/2022   OT and OMER have discussed the above patients goals and status in collaboration with Plan of Care.

## 2022-08-02 NOTE — PROGRESS NOTES
[facility]                                  Skilled Nursing Facility                   Progress Note     Admit Date: 7/22/2022  DIANA 8/10/2022  Principal Problem:  Subdural hematoma, post-traumatic   HPI obtained from patient interview and chart review     Chief Complaint:   Re-evaluation of medical treatment and therapy status: discuss palliative care options, lab review      Interval history  All of today's labs reviewed and are listed below.  24 hr vital sign ranges listed below.  Patient denies shortness of breath, abdominal discomfort, nausea, or vomiting.  Patient reports an adequate appetite.  Patient denies dysuria.  Patient reports having regular bowel movements.  Patient progessing with PT/OT. Continuing to follow and treat all acute and chronic conditions.    HPI:   Elen Peters is a 89 y.o. female with PMH of TMJ, syncope, renal cancer, hyperlipidemia, hypertension, depression, CAD who presents to the ED for AMS following recent fall with head trauma. CTH on arrival to the ED revealed thin SDH along the left side of the posterior falx. [facility].  Aspirin held.  No surgical intervention required.  Neurosurgery recommending repeat CTH in 4 weeks.    Patient will be treated at [facility] with PT and OT to improve functional status and ability to perform ADLs.     Interval history  Evaluated patient this morning at 1015am with son Harpal at bedside. Patient is awake, alert, and oriented x 3. No jerking movements appreciated throughout my exam and conversation with patient and son.   Eating some with encouragement  Pt participated with PT as well this morning.    Able to perform body dressing, standing, short distance ambulation within her room. Patient returned to sitting position upright in bed.    Spoke with son Tavo's wife this morning as well.  I explained patient's jerking movements significantly improved  since receiving Ativan dose last night.  She is making progress with therapy this morning. I am recommending to continue Depakote and Ativan, therapy and revaluate patient over the next few days.       8/1/22  Patient discussed with Segundo and his wife. Family has good understanding of patient medical complexity, declining mental state, severe debility, and advanced age. Segundo says his mother has declined since hospital admission and would like to explore palliative care options. His wife has already contacted [facility] (patient's residence) to discuss placing patient in memory care unit with [facility]. Segundo expressed his goal is to keep his mom comfortable. Plan of care discussed with interdisciplinary team--Anna,  Aldo Sy, and attending Dr. Javier. Med rec and plan of care completed. Initiate order for Ativan 0.5mg oral every 6 hours PRN anxiety/involuntary mo first dose now. Initiate order for scheduled Zofran 4mg TID. Son reports he is POA and will send documents. Family to discuss code status. Discussed not sending patient back to the hospital if she has new decline. Son in agreement.       Past Medical History: Patient has a past medical history of Abnormal stress test (11/18/2015), Arthritis, Bladder cancer, Cataract, Coronary artery disease involving native coronary artery (1/6/2016), Depression, Exudative age-related macular degeneration of left eye with active choroidal neovascularization (10/1/2020), GERD (gastroesophageal reflux disease), HTN (hypertension), benign (11/18/2015), bladder infections, Hyperlipemia, OP (osteoporosis), Positive cardiac stress test (1/6/2016), Renal cancer, Syncope (1/6/2016), TMJ (dislocation of temporomandibular joint), Upper back pain (12/1/2015), Ureteral cancer, Venous insufficiency (2017), and Villous adenoma of colon (5/31/2013).    Past Surgical History: Patient has a past surgical history that includes Nephrectomy;  TONSILLECTOMY, ADENOIDECTOMY; Back surgery; Appendectomy; Colonoscopy (10/21/2013); Cystoscopy; Cataract extraction w/  intraocular lens implant (Left, 3/16/15); Cataract extraction w/  intraocular lens implant (Right, 4/6/15); Eye surgery; Spine surgery; and Esophagogastroduodenoscopy (N/A, 1/30/2019).    Social History: Patient reports that she quit smoking about 73 years ago. She has never used smokeless tobacco. She reports that she does not drink alcohol and does not use drugs.    Family History: family history includes Cancer in her father, mother, and son; Goiter in her mother; Heart attack in her mother; Heart disease in her maternal grandfather and mother; Leukemia in her father and mother; No Known Problems in her brother, maternal aunt, maternal grandmother, maternal uncle, paternal aunt, paternal grandfather, paternal grandmother, paternal uncle, sister, son, son, son, son, and son.    Allergies: Patient is allergic to amlodipine, sulfa (sulfonamide antibiotics), codeine, maxzide [triamterene-hydrochlorothiazid], and sulfamethoxazole-trimethoprim.    ROS  Constitutional: Negative for fever, white coating on tongue, resolving    Respiratory: Negative for cough, shortness of breath   Cardiovascular: Negative for chest pain, leg swelling.   Gastrointestinal: Negative for abdominal pain, constipation, nausea, vomiting.   Musculoskeletal:  + generalized weakness.   Neurological: Negative for dizziness. +involuntary movement  Psychiatric/Behavioral: The patient is not nervous/anxious.      24 hour Vital Sign Range   Temp:  [97.4 °F (36.3 °C)]   Pulse:  [74-78]   Resp:  [18]   BP: (134-135)/(60-74)   SpO2:  [96 %]     PEx  Constitutional: Patient appears debilitated.  No distress noted  HENT: oral thrush, resolving   Neck: Normal range of motion. Neck supple.   Cardiovascular: Normal rate, regular rhythm and normal heart sounds.    Pulmonary/Chest: Effort normal and breath sounds are clear  Abdominal: Soft.  Bowel sounds are normal.   Musculoskeletal: Normal range of motion. Forward jerking motion and grunting significant decrease  Neurological: Alert and oriented to person, place, and time.   Psychiatric: Normal mood and affect. Behavior is normal.   Skin: Skin is warm and dry. Skin intact       Assessment and Plan:    Focal seizure, suspected   Involuntary movement, NEW  CT Head on 7/19/22 showed stable appearance of left acute subdural hematoma.   CT head results and treatment options for involuntary movement discussed-Clonidine.   UA bland, CXR neg pna or effusion  08/02/2022  Differential dx-- episodic focal seizures-continue Depakote 500mg qhs started 7/30/22  Continue Ativan 0.5mg oral nightly and PRN   Nausea improving with scheduled Zofran 4mg TID.   Continue Clonidine   Continue diet and aspiration precautions.   Vital signs stable, afebrile    Goals of care discussion  8/1/22  Patient discussed with Segundo and his wife. Family has good understanding of patient medical complexity, declining mental state, severe debility, and advanced age. Segundo says his mother has declined since hospital admission and would like to explore palliative care options. His wife has already contacted [facility] (patient's residence) to discuss placing patient in memory care unit with [facility]. Segundo expressed his goal is to keep his mom comfortable. Plan of care discussed with interdisciplinary team--Anna,  Aldo Sy, and attending Dr. Javier. Med rec and plan of care completed.   8/2/22  Patient improving  Continue pt/ot  Hold off on hospice  Family discussing code status     Oral thrush  08/02/2022  Continue Nystatin x 7 days    Subdural hematoma, posttraumatic  No surgical intervention required.  Neurosurgery recommending repeat CTH in 4 weeks.  Fall precautions    Gastroesophageal reflux disease  Carafate 1 g a.c. and HS    Mixed hyperlipidemia  Atorvastatin 80 mg  nightly    Depression, major, in remission  citalopram 20 mg daily    Hypertension  Carvedilol 6.25 mg b.i.d.  08/02/2022  sbp stable    Coronary artery disease involving native coronary artery  Continue aspirin and statin    Hyponatremia  Continue to trend on regular labs    Exudative age-related macular degeneration of both eyes with active choroidal neovascularization  Sees Dr. Cyndi Velasquez injections q 6 weeks, scheduled for injections tomorrow 7/26    Anticipate disposition:  Home with home health      Follow-up needed during SNF stay-    Follow-up needed after discharge from SNF: PCP,     Future Appointments   Date Time Provider Department Center   8/11/2022  8:00 AM Anita Mahan, NP [facility] [place]   10/26/2022 10:00 AM LAB, [facility] [place]         I certify that SNF services are required to be given on an inpatient basis because Elen Peters needs for skilled nursing care and/or skilled rehabilitation are required on a daily basis and such services can only practically be provided in a skilled nursing facility setting and are for an ongoing condition for which she received inpatient care in the hospital.         Pratima Woods NP  Department of Hospital Medicine   [facility]     DOS: 08/02/2022      Patient note was created using MModal Dictation.  Any errors in syntax or even information may not have been identified and edited on initial review prior to signing this note.

## 2022-08-02 NOTE — PT/OT/SLP PROGRESS
Physical Therapy Treatment  Co-treatment with OT d/t medical complexity and safety of pt/staff with functional mobility    Patient Name:  Elen Peters   MRN:  [id number]  Admit Date: 7/22/2022  Admitting Diagnosis: Subdural hematoma, post-traumatic    General Precautions: Standard, fall   Orthopedic Precautions:N/A   Braces:   N/A    Recommendations:     Discharge Recommendations:  assisted living facility   Level of Assistance Recommended at Discharge: 24 hours significant assistance  Discharge Equipment Recommendations: bedside commode, wheelchair, walker, rolling, shower chair   Barriers to discharge: Decreased caregiver support (increased assistance needed)    Assessment:     Elen Peters is a 89 y.o. female admitted with a medical diagnosis of Subdural hematoma, post-traumatic.      Performance deficits affecting function:  weakness, impaired endurance, impaired self care skills, impaired functional mobility, gait instability, impaired balance, decreased upper extremity function, decreased lower extremity function, impaired cardiopulmonary response to activity, decreased safety awareness, impaired cognition.    Pt tolerates session fairly with focus on bed mobility, transfers, and gait training. Pt cleared for treatment by medical staff despite ongoing concerns for involuntary movements with a differential diagnosis of suspected focalized seizures. Pt RN present for most of session providing medicines and AM vitals assessment. Throughout entirety of session, pt waxes/wanes with periods of alert, attentiveness with pt following commands and conversant opposing periods of pt with blank stare, writhing/jerking involuntary movements and BUE rigidly held in ~90 degrees of shoulder flexion. Pt appears dazed as UE rigidity and trunk jerking calms, pt requiring frequent reorientation to situation task. Pt seen in co-treatment d/t medical complexity and for pt and staff safety while attempting to mobilize this patient.  "Pt assisted with bed mobility, full body dressing, multiple stands, and short gait trial in the room. Pt returned to sitting upright in bed with concern for pt falling from chair if left up d/t her involuntary movements. Pt will continue to benefit from therapy services to address impairments listed above.     Rehab Potential is good    Activity Tolerance: Fair    Plan:     Patient to be seen 5 x/week to address the above listed problems via gait training, therapeutic activities, therapeutic exercises, neuromuscular re-education, wheelchair management/training    · Plan of Care Expires: 08/10/22  · Plan of Care Reviewed with: patient, son    Subjective     "That's my son Harpal."     Pain/Comfort:  · Pain Rating 1: 0/10  · Location - Side 1: Bilateral  · Location - Orientation 1: generalized  · Location 1: leg  · Pain Addressed 1: Distraction, Reposition  · Pain Rating Post-Intervention 1: other (see comments) (pt with several complaints of unrated leg pain throughout session both while seated and while standing/ambulating)    Patient's cultural, spiritual, L[employer] conflicts given the current situation:  · no    Objective:     Communicated with RN prior to session.  Patient found right sidelying with  (no active lines attached) upon PTA entry to room.     Therapeutic Activities and Exercises:   Pt assisted with sitting balance and provided cues/assist for core/trunk control to maintain midline balance and upright posture while pt engages with OT for self-care/dressing.   Sit<>stand x 4 trials with BHHA and Min to Total A of 2 persons. Pt with periods of involuntary movement, staring episodes, and poor alertness leading to variable assist levels needed.   Pt and son educated on assistance needed, variable assist needed d/t involuntary movements, PT POC.     Functional Mobility:  · Bed Mobility:     · Rolling Left:  moderate assistance  · Scooting: minimum assistance and total assistance  · Supine to Sit: maximal " assistance  · Sit to Supine: maximal assistance  · Transfers:     · Sit to Stand:  minimum assistance, total assistance and of 2 persons with hand-held assist  · Gait: Pt ambulates ~28 ft in room with HA and Min to Total A of 2 persons d/t periods of involuntary jerking movements while ambulating. Pts gait pattern mostly shuffling gait with periods of scissoring d/t involuntary movements while in swing phase.     AM-PAC 6 CLICK MOBILITY  12    Patient left HOB elevated with all lines intact, call button in reach and son present.    GOALS:   Multidisciplinary Problems     Physical Therapy Goals        Problem: Physical Therapy    Goal Priority Disciplines Outcome Goal Variances Interventions   Physical Therapy Goal     PT, PT/OT Ongoing, Progressing     Description: Goals to be met by: 8/22/22    Patient will increase functional independence with mobility by performing:    . Supine to sit with Stand-by Assistance  . Sit to supine with Stand-by Assistance  . Rolling to Left and Right with Supervision.  . Sit to stand transfer with Supervision  . Bed to chair transfer with Supervision using No Assistive Device/ LRAD  . Gait  x 100 feet with Stand-by Assistance using Rolling Walker.   . Wheelchair propulsion x100 feet with Stand-by Assistance using bilateral uppper extremities  . Ascend/Descend 2 inch curb step with Minimal Assistance using Rolling Walker.  . Retrieve object off floor in standing with RW and reacher and Emma                     Time Tracking:     PT Received On: 08/02/22  PT Start Time: 0828  PT Stop Time: 0902  PT Total Time (min): 34 min    Billable Minutes: Gait Training 14 and Therapeutic Activity 20    Treatment Type: Treatment  PT/PTA: PTA     PTA Visit Number: 1     08/02/2022

## 2022-08-03 PROCEDURE — 25000003 PHARM REV CODE 250: Performed by: HOSPITALIST

## 2022-08-03 PROCEDURE — 25000003 PHARM REV CODE 250: Performed by: NURSE PRACTITIONER

## 2022-08-03 PROCEDURE — 11000004 HC SNF PRIVATE

## 2022-08-03 RX ORDER — LORAZEPAM 0.5 MG/1
0.5 TABLET ORAL 2 TIMES DAILY
Status: DISCONTINUED | OUTPATIENT
Start: 2022-08-03 | End: 2022-08-08 | Stop reason: HOSPADM

## 2022-08-03 RX ADMIN — ONDANSETRON 4 MG: 4 TABLET, ORALLY DISINTEGRATING ORAL at 03:08

## 2022-08-03 RX ADMIN — DICLOFENAC SODIUM 2 G: 10 GEL TOPICAL at 10:08

## 2022-08-03 RX ADMIN — CARVEDILOL 6.25 MG: 3.12 TABLET, FILM COATED ORAL at 09:08

## 2022-08-03 RX ADMIN — CLONIDINE HYDROCHLORIDE 0.1 MG: 0.1 TABLET ORAL at 10:08

## 2022-08-03 RX ADMIN — SUCRALFATE 1 G: 1 TABLET ORAL at 11:08

## 2022-08-03 RX ADMIN — ACETAMINOPHEN 650 MG: 325 TABLET ORAL at 03:08

## 2022-08-03 RX ADMIN — NYSTATIN 500000 UNITS: 500000 SUSPENSION ORAL at 12:08

## 2022-08-03 RX ADMIN — ASPIRIN 81 MG: 81 TABLET, COATED ORAL at 10:08

## 2022-08-03 RX ADMIN — SUCRALFATE 1 G: 1 TABLET ORAL at 04:08

## 2022-08-03 RX ADMIN — CARVEDILOL 6.25 MG: 3.12 TABLET, FILM COATED ORAL at 10:08

## 2022-08-03 RX ADMIN — CLONIDINE HYDROCHLORIDE 0.1 MG: 0.1 TABLET ORAL at 09:08

## 2022-08-03 RX ADMIN — LORAZEPAM 0.5 MG: 0.5 TABLET ORAL at 12:08

## 2022-08-03 RX ADMIN — ACETAMINOPHEN 650 MG: 325 TABLET ORAL at 06:08

## 2022-08-03 RX ADMIN — SUCRALFATE 1 G: 1 TABLET ORAL at 09:08

## 2022-08-03 RX ADMIN — POLYETHYLENE GLYCOL 3350 17 G: 17 POWDER, FOR SOLUTION ORAL at 10:08

## 2022-08-03 RX ADMIN — SUCRALFATE 1 G: 1 TABLET ORAL at 06:08

## 2022-08-03 RX ADMIN — POTASSIUM BICARBONATE 40 MEQ: 391 TABLET, EFFERVESCENT ORAL at 03:08

## 2022-08-03 RX ADMIN — ONDANSETRON 4 MG: 4 TABLET, ORALLY DISINTEGRATING ORAL at 09:08

## 2022-08-03 RX ADMIN — ONDANSETRON 4 MG: 4 TABLET, ORALLY DISINTEGRATING ORAL at 10:08

## 2022-08-03 RX ADMIN — NYSTATIN 500000 UNITS: 500000 SUSPENSION ORAL at 10:08

## 2022-08-03 RX ADMIN — LORAZEPAM 0.5 MG: 0.5 TABLET ORAL at 09:08

## 2022-08-03 RX ADMIN — VALPROIC ACID 500 MG: 250 SOLUTION ORAL at 09:08

## 2022-08-03 NOTE — PLAN OF CARE
Problem: Adult Inpatient Plan of Care  Goal: Plan of Care Review  Outcome: Ongoing, Progressing  Goal: Patient-Specific Goal (Individualized)  Outcome: Ongoing, Progressing     Problem: Impaired Wound Healing  Goal: Optimal Wound Healing  Outcome: Ongoing, Progressing     Problem: Skin Injury Risk Increased  Goal: Skin Health and Integrity  Outcome: Ongoing, Progressing     Problem: Fall Injury Risk  Goal: Absence of Fall and Fall-Related Injury  Outcome: Ongoing, Progressing

## 2022-08-03 NOTE — PLAN OF CARE
[facility] - Skilled Nursing      HOME HEALTH ORDERS  FACE TO FACE ENCOUNTER    Patient Name: Elen Peters  YOB: 1929    PCP: Jim Treadwell MD   PCP Address: [address] [facility] / [place] [address]  PCP Phone Number: [phone]  PCP Fax: [phone]    Encounter Date: 7/22/22    Date of service: 8/3/22    Admit to Home Health    Diagnoses:  Active Hospital Problems    Diagnosis  POA    *Subdural hematoma, post-traumatic [S06.5X9A]  Yes    Debility [R53.81]  Yes    Exudative age-related macular degeneration of both eyes with active choroidal neovascularization [H35.3231]  Yes    Hyponatremia [E87.1]  Yes    Coronary artery disease involving native coronary artery [I25.10]  Yes    HTN (hypertension), benign [I10]  Yes    Depression, major, in remission [F32.5]  Yes     doing much better on Celexa-  now making managememt plans for ill       Mixed hyperlipidemia [E78.2]  Yes    GERD (gastroesophageal reflux disease) [K21.9]  Yes      Resolved Hospital Problems   No resolved problems to display.       Follow Up Appointments:  Future Appointments   Date Time Provider Department Center   8/11/2022  8:00 AM Anita Mahan, [facility]   10/26/2022 10:00 AM [facility], [facility] [place]       Allergies:  Review of patient's allergies indicates:   Allergen Reactions    Amlodipine Swelling    Sulfa (sulfonamide antibiotics)      Unknown as child    Codeine      Other reaction(s): Unknown    Maxzide [triamterene-hydrochlorothiazid]     Sulfamethoxazole-trimethoprim        Medications: Review discharge medications with patient and family and provide education.      I have seen and examined this patient within the last 30 days. My clinical findings that support the need for the home health skilled services and home bound status are the following:no   Weakness/numbness causing balance and gait disturbance due to Weakness/Debility making it taxing to leave home.  Requiring  assistive device to leave home due to unsteady gait caused by  Weakness/Debility.  Patient with medication mismanagement issues requiring home bound status as evidenced by  Poor understanding of medication regimen/dosage and Poor adherence to medication regimen/dosage.  Medical restrictions requiring assistance of another human to leave home due to  Unstable ambulation and Frequent Falls.     Diet:   regular diet    Labs:  Per facility protocol     Referrals/ Consults  Physical Therapy to evaluate and treat. Evaluate for home safety and equipment needs; Establish/upgrade home exercise program. Perform / instruct on therapeutic exercises, gait training, transfer training, and Range of Motion.  Occupational Therapy to evaluate and treat. Evaluate home environment for safety and equipment needs. Perform/Instruct on transfers, ADL training, ROM, and therapeutic exercises.  Speech Therapy  to evaluate and treat for  Swallowing and Cognition.    Activities:   activity as tolerated    Nursing:   Agency to admit patient within 24 hours of hospital discharge unless specified on physician order or at patient request    SN to complete comprehensive assessment including routine vital signs. Instruct on disease process and s/s of complications to report to MD. Review/verify medication list sent home with the patient at time of discharge  and instruct patient/caregiver as needed. Frequency may be adjusted depending on start of care date.     Skilled nurse to perform up to 3 visits PRN for symptoms related to diagnosis    Notify MD if SBP > 160 or < 90; DBP > 90 or < 50; HR > 120 or < 50; Temp > 101; O2 < 88%    Ok to schedule additional visits based on staff availability and patient request on consecutive days within the home health episode.    Miscellaneous   Routine Skin for Bedridden Patients: Instruct patient/caregiver to apply moisture barrier cream to all skin folds and wet areas in perineal area daily and after baths and  all bowel movements.      I certify that this patient is confined to her home and needs intermittent skilled nursing care, physical therapy, speech therapy and occupational therapy.

## 2022-08-03 NOTE — TREATMENT PLAN
"Rehab Services' DME recommendations    Elen Peters  MRN: [id number]    [x] Walker Adult (5'1"-6"6")      Wheels Yes     [x] Wheelchair  Number of hours up in a wheelchair per day 6+       Style Reclining        Justification for light weight w/c: the home provides adequate access between rooms for a wheelchair, a wheelchair will significantly improve the ability to participate in MRADLs and will be used in the home on a regular basis, the patient is willing to use a wheelchair in the home and the patient has a caregiver who is available, willing, and able to provide assistance with the wheelchair    Seat Width 18 (Standard adult)    Seat Depth 18    Back Height Standard    Leg Support Swing Away    Arm Height Swing Away    Lap Belt Buckle    Accessories Front Brakes, Brake Extensions and Safety belt    Cushion Basic    [x] 3 in 1 commode Standard       [x] Shower Chair With back    [x] Hospital bed Electric    Patient requires a bed height different than a fixed height hospital bed to permit transfers to chair, wheelchair or standing. Yes    Accessories Gel overlay mattress        [x] Home health PT, OT, SLP, Aide and Nurse        DAVID Ang 8/3/2022      "

## 2022-08-03 NOTE — PLAN OF CARE
JASBIR spoke with Nicky at [facility] ([phone]). [facility] needs HH orders sent to [facility] and hospital bed to be ordered and delivered prior to pt arriving. Nicky also requested palliative care ref be sent to [facility].     JASBIR spoke with pts dtg in law, Gertrudis ([phone])-she confirmed pt will dc to [facility] with HH and hospital bed. Gertrudis requested SW hold off on ref to [facility] at this time. Gertrudis also stated the family would like pt to dc sooner than 8/10 as long at [facility] is ready to accept her and hospital bed is in place.    HH Ref sent to [facility].    JASBIR updated Nicky at [facility] after speaking with Gertrudis-Nicky stated the facility would like to plan on an admission on Mon 8/8. JASBIR updated Pratima ANGEL.    Zo Angulo, MAYTEW  PRN

## 2022-08-04 LAB
ANION GAP SERPL CALC-SCNC: 6 MMOL/L (ref 8–16)
BASOPHILS # BLD AUTO: 0.03 K/UL (ref 0–0.2)
BASOPHILS NFR BLD: 0.6 % (ref 0–1.9)
BUN SERPL-MCNC: 11 MG/DL (ref 10–30)
CALCIUM SERPL-MCNC: 9.4 MG/DL (ref 8.7–10.5)
CHLORIDE SERPL-SCNC: 98 MMOL/L (ref 95–110)
CO2 SERPL-SCNC: 29 MMOL/L (ref 23–29)
CREAT SERPL-MCNC: 0.7 MG/DL (ref 0.5–1.4)
DIFFERENTIAL METHOD: ABNORMAL
EOSINOPHIL # BLD AUTO: 0.2 K/UL (ref 0–0.5)
EOSINOPHIL NFR BLD: 3.5 % (ref 0–8)
ERYTHROCYTE [DISTWIDTH] IN BLOOD BY AUTOMATED COUNT: 14 % (ref 11.5–14.5)
EST. GFR  (NO RACE VARIABLE): >60 ML/MIN/1.73 M^2
GLUCOSE SERPL-MCNC: 92 MG/DL (ref 70–110)
HCT VFR BLD AUTO: 31.2 % (ref 37–48.5)
HGB BLD-MCNC: 10.9 G/DL (ref 12–16)
IMM GRANULOCYTES # BLD AUTO: 0.02 K/UL (ref 0–0.04)
IMM GRANULOCYTES NFR BLD AUTO: 0.4 % (ref 0–0.5)
LYMPHOCYTES # BLD AUTO: 1.6 K/UL (ref 1–4.8)
LYMPHOCYTES NFR BLD: 29 % (ref 18–48)
MAGNESIUM SERPL-MCNC: 2.1 MG/DL (ref 1.6–2.6)
MCH RBC QN AUTO: 31.7 PG (ref 27–31)
MCHC RBC AUTO-ENTMCNC: 34.9 G/DL (ref 32–36)
MCV RBC AUTO: 91 FL (ref 82–98)
MONOCYTES # BLD AUTO: 0.6 K/UL (ref 0.3–1)
MONOCYTES NFR BLD: 10.3 % (ref 4–15)
NEUTROPHILS # BLD AUTO: 3.1 K/UL (ref 1.8–7.7)
NEUTROPHILS NFR BLD: 56.2 % (ref 38–73)
NRBC BLD-RTO: 0 /100 WBC
PHOSPHATE SERPL-MCNC: 3 MG/DL (ref 2.7–4.5)
PLATELET # BLD AUTO: 258 K/UL (ref 150–450)
PMV BLD AUTO: 9.8 FL (ref 9.2–12.9)
POTASSIUM SERPL-SCNC: 3.8 MMOL/L (ref 3.5–5.1)
RBC # BLD AUTO: 3.44 M/UL (ref 4–5.4)
SODIUM SERPL-SCNC: 133 MMOL/L (ref 136–145)
WBC # BLD AUTO: 5.44 K/UL (ref 3.9–12.7)

## 2022-08-04 PROCEDURE — 97112 NEUROMUSCULAR REEDUCATION: CPT

## 2022-08-04 PROCEDURE — 25000003 PHARM REV CODE 250: Performed by: HOSPITALIST

## 2022-08-04 PROCEDURE — 80048 BASIC METABOLIC PNL TOTAL CA: CPT | Performed by: HOSPITALIST

## 2022-08-04 PROCEDURE — 83735 ASSAY OF MAGNESIUM: CPT | Performed by: HOSPITALIST

## 2022-08-04 PROCEDURE — 25000003 PHARM REV CODE 250: Performed by: FAMILY MEDICINE

## 2022-08-04 PROCEDURE — 85025 COMPLETE CBC W/AUTO DIFF WBC: CPT | Performed by: HOSPITALIST

## 2022-08-04 PROCEDURE — 25000003 PHARM REV CODE 250: Performed by: NURSE PRACTITIONER

## 2022-08-04 PROCEDURE — 97530 THERAPEUTIC ACTIVITIES: CPT

## 2022-08-04 PROCEDURE — 97535 SELF CARE MNGMENT TRAINING: CPT

## 2022-08-04 PROCEDURE — 11000004 HC SNF PRIVATE

## 2022-08-04 PROCEDURE — 97116 GAIT TRAINING THERAPY: CPT

## 2022-08-04 PROCEDURE — 36415 COLL VENOUS BLD VENIPUNCTURE: CPT | Performed by: HOSPITALIST

## 2022-08-04 PROCEDURE — 84100 ASSAY OF PHOSPHORUS: CPT | Performed by: HOSPITALIST

## 2022-08-04 RX ORDER — TRIAMCINOLONE ACETONIDE 0.25 MG/G
CREAM TOPICAL 2 TIMES DAILY
Status: DISCONTINUED | OUTPATIENT
Start: 2022-08-04 | End: 2022-08-08 | Stop reason: HOSPADM

## 2022-08-04 RX ORDER — CETIRIZINE HYDROCHLORIDE 5 MG/1
10 TABLET ORAL DAILY
Status: DISCONTINUED | OUTPATIENT
Start: 2022-08-04 | End: 2022-08-08 | Stop reason: HOSPADM

## 2022-08-04 RX ORDER — AMMONIUM LACTATE 12 G/100G
LOTION TOPICAL 2 TIMES DAILY
Status: DISCONTINUED | OUTPATIENT
Start: 2022-08-04 | End: 2022-08-04

## 2022-08-04 RX ORDER — AMMONIUM LACTATE 12 G/100G
LOTION TOPICAL 2 TIMES DAILY PRN
Status: DISCONTINUED | OUTPATIENT
Start: 2022-08-04 | End: 2022-08-08 | Stop reason: HOSPADM

## 2022-08-04 RX ORDER — SODIUM BICARBONATE 650 MG/1
650 TABLET ORAL DAILY
Status: COMPLETED | OUTPATIENT
Start: 2022-08-05 | End: 2022-08-06

## 2022-08-04 RX ORDER — DIPHENHYDRAMINE HCL 25 MG
25 CAPSULE ORAL EVERY 6 HOURS PRN
Status: DISCONTINUED | OUTPATIENT
Start: 2022-08-04 | End: 2022-08-08 | Stop reason: HOSPADM

## 2022-08-04 RX ADMIN — LORAZEPAM 0.5 MG: 0.5 TABLET ORAL at 09:08

## 2022-08-04 RX ADMIN — ONDANSETRON 4 MG: 4 TABLET, ORALLY DISINTEGRATING ORAL at 09:08

## 2022-08-04 RX ADMIN — SUCRALFATE 1 G: 1 TABLET ORAL at 04:08

## 2022-08-04 RX ADMIN — ASPIRIN 81 MG: 81 TABLET, COATED ORAL at 09:08

## 2022-08-04 RX ADMIN — CLONIDINE HYDROCHLORIDE 0.1 MG: 0.1 TABLET ORAL at 08:08

## 2022-08-04 RX ADMIN — DICLOFENAC SODIUM 2 G: 10 GEL TOPICAL at 08:08

## 2022-08-04 RX ADMIN — LORAZEPAM 0.5 MG: 0.5 TABLET ORAL at 08:08

## 2022-08-04 RX ADMIN — LORAZEPAM 0.5 MG: 0.5 TABLET ORAL at 04:08

## 2022-08-04 RX ADMIN — TRIAMCINOLONE ACETONIDE: 0.25 CREAM TOPICAL at 10:08

## 2022-08-04 RX ADMIN — AMMONIUM LACTATE: 12 LOTION TOPICAL at 11:08

## 2022-08-04 RX ADMIN — VALPROIC ACID 500 MG: 250 SOLUTION ORAL at 08:08

## 2022-08-04 RX ADMIN — DICLOFENAC SODIUM 2 G: 10 GEL TOPICAL at 09:08

## 2022-08-04 RX ADMIN — ONDANSETRON 4 MG: 4 TABLET, ORALLY DISINTEGRATING ORAL at 04:08

## 2022-08-04 RX ADMIN — CARVEDILOL 6.25 MG: 3.12 TABLET, FILM COATED ORAL at 09:08

## 2022-08-04 RX ADMIN — SUCRALFATE 1 G: 1 TABLET ORAL at 08:08

## 2022-08-04 RX ADMIN — SUCRALFATE 1 G: 1 TABLET ORAL at 05:08

## 2022-08-04 RX ADMIN — SUCRALFATE 1 G: 1 TABLET ORAL at 11:08

## 2022-08-04 RX ADMIN — CLONIDINE HYDROCHLORIDE 0.1 MG: 0.1 TABLET ORAL at 09:08

## 2022-08-04 RX ADMIN — CARVEDILOL 6.25 MG: 3.12 TABLET, FILM COATED ORAL at 08:08

## 2022-08-04 RX ADMIN — ONDANSETRON 4 MG: 4 TABLET, ORALLY DISINTEGRATING ORAL at 08:08

## 2022-08-04 RX ADMIN — CETIRIZINE HYDROCHLORIDE 10 MG: 5 TABLET, FILM COATED ORAL at 04:08

## 2022-08-04 NOTE — PROGRESS NOTES
[facility]                                  Skilled Nursing Facility                   Progress Note     Admit Date: 7/22/2022  DIANA 8/10/2022  Principal Problem:  Subdural hematoma, post-traumatic   HPI obtained from patient interview and chart review     Chief Complaint:   Re-evaluation of medical treatment and therapy status: evaluate response to ativan, discuss discharge plan    HPI:   Elen Peters is a 89 y.o. female with PMH of TMJ, syncope, renal cancer, hyperlipidemia, hypertension, depression, CAD who presents to the ED for AMS following recent fall with head trauma. CTH on arrival to the ED revealed thin SDH along the left side of the posterior falx. Admitted to [facility] for observation.  Aspirin held.  No surgical intervention required.  Neurosurgery recommending repeat CTH in 4 weeks.    Patient will be treated at [facility] with PT and OT to improve functional status and ability to perform ADLs.     Interval history  No involuntary movements during my assessment  Gertrudis at bedside. Says patient has significantly improved since starting Ativan.   Says it wears off by the morning and some jerking returns. Asked if Ativan can be scheduled bid.  Initiate Ativan 0.5mg bid and reduce PRN dose to daily  Family would like patient to discharge with home health instead of palliative care  Plan of care completed for discharge to assisted living facility [facility] bed ordered.   Anticipated discharge scheduled for Monday.       8/1/22  Patient discussed with Segundo and his wife. Family has good understanding of patient medical complexity, declining mental state, severe debility, and advanced age. Segundo says his mother has declined since hospital admission and would like to explore palliative care options. His wife has already contacted [facility] (patient's residence) to discuss placing patient in memory care unit with  hospice company [facility] expressed his goal is to keep his mom comfortable. Plan of care discussed with interdisciplinary team--Anna,  Aldo Sy, and attending Dr. Javier. Med rec and plan of care completed. Initiate order for Ativan 0.5mg oral every 6 hours PRN anxiety/involuntary mo first dose now. Initiate order for scheduled Zofran 4mg TID. Son reports he is POA and will send documents. Family to discuss code status. Discussed not sending patient back to the hospital if she has new decline. Son in agreement.     Past Medical History: Patient has a past medical history of Abnormal stress test (11/18/2015), Arthritis, Bladder cancer, Cataract, Coronary artery disease involving native coronary artery (1/6/2016), Depression, Exudative age-related macular degeneration of left eye with active choroidal neovascularization (10/1/2020), GERD (gastroesophageal reflux disease), HTN (hypertension), benign (11/18/2015), bladder infections, Hyperlipemia, OP (osteoporosis), Positive cardiac stress test (1/6/2016), Renal cancer, Syncope (1/6/2016), TMJ (dislocation of temporomandibular joint), Upper back pain (12/1/2015), Ureteral cancer, Venous insufficiency (2017), and Villous adenoma of colon (5/31/2013).    Past Surgical History: Patient has a past surgical history that includes Nephrectomy; TONSILLECTOMY, ADENOIDECTOMY; Back surgery; Appendectomy; Colonoscopy (10/21/2013); Cystoscopy; Cataract extraction w/  intraocular lens implant (Left, 3/16/15); Cataract extraction w/  intraocular lens implant (Right, 4/6/15); Eye surgery; Spine surgery; and Esophagogastroduodenoscopy (N/A, 1/30/2019).    Social History: Patient reports that she quit smoking about 73 years ago. She has never used smokeless tobacco. She reports that she does not drink alcohol and does not use drugs.    Family History: family history includes Cancer in her father, mother, and son; Goiter in her mother; Heart attack in  her mother; Heart disease in her maternal grandfather and mother; Leukemia in her father and mother; No Known Problems in her brother, maternal aunt, maternal grandmother, maternal uncle, paternal aunt, paternal grandfather, paternal grandmother, paternal uncle, sister, son, son, son, son, and son.    Allergies: Patient is allergic to amlodipine, sulfa (sulfonamide antibiotics), codeine, maxzide [triamterene-hydrochlorothiazid], and sulfamethoxazole-trimethoprim.    ROS  Constitutional: Negative for fever, white coating on tongue, resolving    Respiratory: Negative for cough, shortness of breath   Cardiovascular: Negative for chest pain, leg swelling.   Gastrointestinal: Negative for abdominal pain, constipation, nausea, vomiting.   Musculoskeletal:  + generalized weakness.   Neurological: Negative for dizziness. +involuntary movement, mild in the morning   Psychiatric/Behavioral: The patient is not nervous/anxious.      24 hour Vital Sign Range   Temp:  [96.6 °F (35.9 °C)-97.9 °F (36.6 °C)]   Pulse:  [74-79]   Resp:  [16-18]   BP: (135-139)/(64-65)   SpO2:  [94 %-97 %]     PEx  Constitutional: Patient appears debilitated.  No distress noted  HENT: oral thrush, resolving   Neck: Normal range of motion. Neck supple.   Cardiovascular: Normal rate, regular rhythm and normal heart sounds.    Pulmonary/Chest: Effort normal and breath sounds are clear  Abdominal: Soft. Bowel sounds are normal.   Musculoskeletal: Normal range of motion. No jerking or grunting today  Neurological: Alert and oriented to person, place, and time.   Psychiatric: Normal mood and affect. Behavior is normal.   Skin: Skin is warm and dry. Skin intact       Assessment and Plan:    Focal seizure, suspected   Involuntary movement, NEW  CT Head on 7/19/22 showed stable appearance of left acute subdural hematoma.   CT head results and treatment options for involuntary movement discussed-Clonidine.   UA bland, CXR neg pna or  effusion  08/03/2022  Differential dx-- episodic focal seizures-continue Depakote 500mg qhs started 7/30/22  Nausea resolved continue scheduled Zofran 4mg TID.   Continue Clonidine   Continue diet and aspiration precautions.   Vital signs stable, afebrile  Initiate Ativan 0.5mg bid and reduce PRN dose to daily    Discharge planning   8/3/22  Family would like patient to discharge with home health instead of palliative care  Plan of care completed for discharge to assisted living facility [facility].   Hospital bed ordered.   Anticipated discharge scheduled for Monday.    Goals of care discussion  8/1/22  Patient discussed with Segundo and his wife. Family has good understanding of patient medical complexity, declining mental state, severe debility, and advanced age. Segundo says his mother has declined since hospital admission and would like to explore palliative care options. His wife has already contacted [facility] (patient's residence) to discuss placing patient in memory care unit with hospice [facility]. Segundo expressed his goal is to keep his mom comfortable. Plan of care discussed with interdisciplinary team--Anna,  Aldo Sy, and attending Dr. Javier. Med rec and plan of care completed.   8/3/22  Patient improving  Continue pt/ot  Hold off on hospice  Family discussing code status   Plan to dc with home health at [facility]     Subdural hematoma, posttraumatic  No surgical intervention required.  Neurosurgery recommending repeat CTH in 4 weeks.  Fall precautions    Gastroesophageal reflux disease  Carafate 1 g a.c. and HS    Mixed hyperlipidemia  Atorvastatin 80 mg nightly    Depression, major, in remission  citalopram 20 mg daily    Hypertension  Carvedilol 6.25 mg b.i.d.  08/03/2022  sbp stable    Coronary artery disease involving native coronary artery  Continue aspirin and statin    Hyponatremia  Continue to trend on regular labs    Exudative age-related  macular degeneration of both eyes with active choroidal neovascularization  Sees Dr. Cyndi Velasquez injections q 6 weeks, scheduled for injections tomorrow 7/26    Anticipate disposition:  Home with home health      Follow-up needed during SNF stay-    Follow-up needed after discharge from SNF: PCP,     Future Appointments   Date Time Provider Department Center   8/11/2022  8:00 AM Anita Mahan, NP [facility] [place]   10/26/2022 10:00 AM LAB, [facility] [place]         I certify that SNF services are required to be given on an inpatient basis because Elen Peters needs for skilled nursing care and/or skilled rehabilitation are required on a daily basis and such services can only practically be provided in a skilled nursing facility setting and are for an ongoing condition for which she received inpatient care in the hospital.         Pratima Woods NP  Department of Hospital Medicine   [facility]     DOS: 08/03/2022      Patient note was created using MModal Dictation.  Any errors in syntax or even information may not have been identified and edited on initial review prior to signing this note.

## 2022-08-04 NOTE — PLAN OF CARE
JASBIR spoke with Gertrudis Fran-she reports family would like hospice care at [place] through [facility]. SW confirmed with Gertrudis that [facility] has a scheduled delivery for hospital bed on 8/5.    Hospice ref sent to [facility].    Zo Angulo, MAYTEW  PRN

## 2022-08-04 NOTE — PROGRESS NOTES
[facility]                                  Skilled Nursing Facility                   Progress Note     Admit Date: 7/22/2022  DIANA 08/08/2022  Principal Problem:  Subdural hematoma, post-traumatic   HPI obtained from patient interview and chart review     Chief Complaint:   Re-evaluation of medical treatment and therapy status: evaluate response to ativan, discuss discharge plan    HPI:   Elen Peters is a 89 y.o. female with PMH of TMJ, syncope, renal cancer, hyperlipidemia, hypertension, depression, CAD who presents to the ED for AMS following recent fall with head trauma. CTH on arrival to the ED revealed thin SDH along the left side of the posterior falx. Admitted to [facility] for observation.  Aspirin held.  No surgical intervention required.  Neurosurgery recommending repeat CTH in 4 weeks.    Patient will be treated at [facility] with PT and OT to improve functional status and ability to perform ADLs.     Interval history  All of today's labs reviewed and are listed below. Na 133, initiate order for sodium  tab daily x 2 days.  Monitor with routine lab. 24 hr vital sign ranges listed below.    No involuntary movements during exam  Nursing reports improvement since starting Ativan. Continue with Ativan 0.5mg bid and daily dose PRN.  Family reported today (8/4/22) that they have decided upon hospice care for patient at discharge. SW is facilitating. Planning for discharge to assisted living facility, [facility], with [facility]. Hospital bed ordered and scheduled for delivery 8/5..   Anticipated discharge scheduled for Monday, 8/8.         8/1/22  Patient discussed with Segundo and his wife. Family has good understanding of patient medical complexity, declining mental state, severe debility, and advanced age. Segundo says his mother has declined since hospital admission and would like to explore palliative care options. His wife has  already contacted [facility] (patient's residence) to discuss placing patient in memory care unit with hospice company [employer]. Segundo expressed his goal is to keep his mom comfortable. Plan of care discussed with interdisciplinary team--Anna,  Aldo Sy, and attending Dr. Javier. Med rec and plan of care completed. Initiate order for Ativan 0.5mg oral every 6 hours PRN anxiety/involuntary mo first dose now. Initiate order for scheduled Zofran 4mg TID. Son reports he is POA and will send documents. Family to discuss code status. Discussed not sending patient back to the hospital if she has new decline. Son in agreement.     Past Medical History: Patient has a past medical history of Abnormal stress test (11/18/2015), Arthritis, Bladder cancer, Cataract, Coronary artery disease involving native coronary artery (1/6/2016), Depression, Exudative age-related macular degeneration of left eye with active choroidal neovascularization (10/1/2020), GERD (gastroesophageal reflux disease), HTN (hypertension), benign (11/18/2015), bladder infections, Hyperlipemia, OP (osteoporosis), Positive cardiac stress test (1/6/2016), Renal cancer, Syncope (1/6/2016), TMJ (dislocation of temporomandibular joint), Upper back pain (12/1/2015), Ureteral cancer, Venous insufficiency (2017), and Villous adenoma of colon (5/31/2013).    Past Surgical History: Patient has a past surgical history that includes Nephrectomy; TONSILLECTOMY, ADENOIDECTOMY; Back surgery; Appendectomy; Colonoscopy (10/21/2013); Cystoscopy; Cataract extraction w/  intraocular lens implant (Left, 3/16/15); Cataract extraction w/  intraocular lens implant (Right, 4/6/15); Eye surgery; Spine surgery; and Esophagogastroduodenoscopy (N/A, 1/30/2019).    Social History: Patient reports that she quit smoking about 73 years ago. She has never used smokeless tobacco. She reports that she does not drink alcohol and does not use drugs.    Family  History: family history includes Cancer in her father, mother, and son; Goiter in her mother; Heart attack in her mother; Heart disease in her maternal grandfather and mother; Leukemia in her father and mother; No Known Problems in her brother, maternal aunt, maternal grandmother, maternal uncle, paternal aunt, paternal grandfather, paternal grandmother, paternal uncle, sister, son, son, son, son, and son.    Allergies: Patient is allergic to amlodipine, sulfa (sulfonamide antibiotics), codeine, maxzide [triamterene-hydrochlorothiazid], and sulfamethoxazole-trimethoprim.    ROS  Constitutional: Negative for fever, white coating on tongue, resolving    Respiratory: Negative for cough, shortness of breath   Cardiovascular: Negative for chest pain, leg swelling.   Gastrointestinal: Negative for abdominal pain, constipation, nausea, vomiting.   Musculoskeletal:  + generalized weakness.   Neurological: Negative for dizziness. +involuntary movement, mild in the morning   Psychiatric/Behavioral: The patient is not nervous/anxious.      24 hour Vital Sign Range   Temp:  [97.6 °F (36.4 °C)-97.9 °F (36.6 °C)]   Pulse:  [65-74]   Resp:  [16-18]   BP: (139)/(63-65)   SpO2:  [94 %-95 %]     PEx  Constitutional: Patient appears debilitated.  No distress noted  HENT: oral thrush, resolving   Neck: Normal range of motion. Neck supple.   Cardiovascular: Normal rate, regular rhythm and normal heart sounds.    Pulmonary/Chest: Effort normal and breath sounds are clear  Abdominal: Soft. Bowel sounds are normal.   Musculoskeletal: Normal range of motion. No jerking or grunting today  Neurological: Alert and oriented to person, place, and time.   Psychiatric: Normal mood and affect. Behavior is normal.   Skin: Skin is warm and dry. Skin intact       Assessment and Plan:    Focal seizure, suspected   Involuntary movement, NEW  CT Head on 7/19/22 showed stable appearance of left acute subdural hematoma.   CT head results and treatment  options for involuntary movement discussed-Clonidine.   UA bland, CXR neg pna or effusion  08/04/2022  Differential dx-- episodic focal seizures-continue Depakote 500mg qhs started 7/30/22  Nausea resolved continue scheduled Zofran 4mg TID.   Continue Clonidine   Continue diet and aspiration precautions.   Vital signs stable, afebrile  Continue Ativan 0.5mg bid and daily PRN dose    Discharge planning   8/3/22:Family would like patient to discharge with home health instead of palliative care  Plan of care completed for discharge to assisted living facility [facility].   Hospital bed ordered.   8/4/22: family have now decided upon home (to the [facility]) with hospice.   Hospital bed scheduled for delivery 8/5  Anticipated discharge: Monday 8/8.    Goals of care discussion  8/1/22  Patient discussed with Segundo and his wife. Family has good understanding of patient medical complexity, declining mental state, severe debility, and advanced age. Segundo says his mother has declined since hospital admission and would like to explore palliative care options. His wife has already contacted [facility] (patient's residence) to discuss placing patient in memory care unit with hospice [facility]. Segundo expressed his goal is to keep his mom comfortable. Plan of care discussed with interdisciplinary team--Anna,  Aldo Sy, and attending Dr. Javier. Med rec and plan of care completed.   8/4/22  Continue pt/ot  Discharge planning now with hospice at the [facility]     Subdural hematoma, posttraumatic  No surgical intervention required.  Neurosurgery recommending repeat CTH in 4 weeks.  Fall precautions    Gastroesophageal reflux disease  Carafate 1 g a.c. and HS    Mixed hyperlipidemia  Atorvastatin 80 mg nightly    Depression, major, in remission  citalopram 20 mg daily    Hypertension  Carvedilol 6.25 mg b.i.d.  08/04/2022  sbp stable    Coronary artery disease involving native coronary  artery  Continue aspirin and statin    Hyponatremia, unstable  8/4/22: Na 133  Initiate order for Na tab daily x 2 days  Continue to trend on regular labs    Exudative age-related macular degeneration of both eyes with active choroidal neovascularization  Sees Dr. Cyndi Velasquez injections q 6 weeks, scheduled for injections 7/26    Anticipate disposition:  Home with hospice  -returning to the [facility] with [facility]  -Discharge date set for 8/8/22      Follow-up needed during SNF stay-    Follow-up needed after discharge from SNF: PCP,     Future Appointments   Date Time Provider Department Center   8/11/2022  8:00 AM Anita Mahan, NP [facility] [place]   10/26/2022 10:00 AM LAB, [facility] [place]         I certify that SNF services are required to be given on an inpatient basis because Elen Peters needs for skilled nursing care and/or skilled rehabilitation are required on a daily basis and such services can only practically be provided in a skilled nursing facility setting and are for an ongoing condition for which she received inpatient care in the hospital.         Kristin Hanks NP  Department of Hospital Medicine   [facility]     DOS: 08/04/2022      Patient note was created using MModal Dictation.  Any errors in syntax or even information may not have been identified and edited on initial review prior to signing this note.

## 2022-08-04 NOTE — PLAN OF CARE
Pt will dc Monday to assisted living at [facility]. Pts family has decided to pursue hospice rather than HH. Pts son will meet with Gisel from [facility] tomorrow to complete consents. Gisel [phone]) confirmed pts DME including hospital bed will be ordered through [facility] and delivered to the [facility]. JASBIR updated Nicky at the [facility] with this info. JASBIR informed [facility] that hospital bed order can be canceled.         08/04/22 1636   Post-Acute Status   Post-Acute Authorization Placement;Hospice   Post-Acute Placement Status Set-up Complete/Auth obtained   Hospice Status Pending equipment/medication delivery   Discharge Delays None known at this time   Discharge Plan   Discharge Plan A Assisted Living     Zo Angulo LCSW  PRN

## 2022-08-04 NOTE — PLAN OF CARE
Interdisciplinary team, Aldo Retana, Unit Director, Danii Mcleod, RN MDS Coordinator, Jay Castillo, OT, Zo Angulo, INGE, Ivana Block, Nu, and  Ade Phipps, Dietician, spoke to patient and patient's sitter for care plan conference, weekly status update, and therapy progress update. Sitter concerned with patient's medications and rash to upper back, NP notified. Discharge date set for 8/8/22. Patient is returning to the [facility] with [facility] services.

## 2022-08-04 NOTE — PT/OT/SLP PROGRESS
"Occupational Therapy   Treatment    Name: Elen Peters  MRN: [id number]  Admit Date: 7/22/2022  Admitting Diagnosis:  Subdural hematoma, post-traumatic    General Precautions: Standard, fall, aspiration   Orthopedic Precautions:N/A   Braces: N/A     Recommendations:     Discharge Recommendations: assisted living facility  Level of Assistance Recommended at Discharge: 24 hours physical assistance for all ADL's and home management tasks  Discharge Equipment Recommendations:  walker, rolling, wheelchair, 3-in-1 commode, shower chair, hospital bed  Barriers to discharge:  Decreased caregiver support (increased skilled assistance required)    Assessment:     Elen Peters is a 89 y.o. female with a medical diagnosis of Subdural hematoma, post-traumatic.  Pt had limited tolerance of session due to pain, fatigue, and convulsions.  She requires significant assistance to perform self-care tasks and mobility at this time.  Pt would continue to benefit from skilled OT services at SNF to maximize her gains in functional independence.   She presents with the following. Performance deficits affecting function are weakness, impaired endurance, impaired self care skills, impaired functional mobility, gait instability, impaired balance, impaired cognition, decreased safety awareness, pain, impaired coordination, decreased lower extremity function.     Rehab Potential is fair    Activity tolerance:  Fair    Plan:     Patient to be seen 5 x/week to address the above listed problems via self-care/home management, therapeutic activities, therapeutic exercises, neuromuscular re-education    · Plan of Care Expires: 08/22/22  · Plan of Care Reviewed with: patient, caregiver    Subjective   "I hurt everywhere."  Communicated with: nursing prior to session.     Pain/Comfort:  Pain Rating 1: other (see comments) (not rated but pt said, "I'm hurting everywhere.")  Pain Addressed 1: Reposition, Distraction, Nurse notified  Pain Rating " Post-Intervention 1: other (see comments) (not rated)    Patient's cultural, spiritual, Buddhist conflicts given the current situation:  no    Objective:     Patient found up in chair with  (no active lines) with her sitter present upon OT entry to room.    Bed Mobility:    · Patient completed Scooting to HOB while supine via draw sheet with total assistance and 2 persons.  She performed bridging during therex while supine with SBA.  · Patient completed Sit to Supine from L side of bed with maximal assistance and 2 persons     Functional Mobility/Transfers:  · Patient completed Sit <> Stand Transfer from w/c with total assistance and of 2 persons with hand-held assist.  Increased assistance due to jerking and convulsions.  She performed sit to stand from 3-in-1 commode with Min A of 2 persons with B HHA.  · Patient completed Wheelchair <> Commode Transfer using Stand Pivot technique with total assistance and of 2 persons with hand-held assist.  Increased assistance due to jerking and convulsions.  · Patient completed Commode <> Bed Transfer using Step Transfer technique with minimal assistance and of 2 persons with hand-held assist.    · Functional Mobility: Pt ambulated ~2 ft from commode to EOB and later ~20 ft in her room from EOB to her bedroom door and back with Min A of 2 people with B HHA.      Activities of Daily Living:  · Lower Body Dressing: total assistance and of 2 persons to liset her underwear and pants while sitting/standing at the commode  · Toileting: total assistance of 2 persons for perineal care while seated on the commode.  Pt's sitter cleaned her while therapist supported pt anteriorly for safety to prevent her from sliding off the commode.    [facility] 6 Click ADL: 13    OT Exercises: AROM BUE and BLE exercises performed while pt was supine to maintain her functional ROM of her UE/LE that's required to perform daily tasks.  UE exercises included shoulder flex/ext, elbow flex/ext, shoulder  abduction/adduction, arm punches, and composit digital flex/ext.  LE exercises included hip and knee flex/ext, leg lifts, and ankle pumps.  Pt also performed bridging.    Treatment & Education:  Pt and her sitter edu on role of OT, POC, benefit of performing UE/LE exercises in the bed, safety when performing self care tasks, benefit of performing OOB activity, and safety when performing functional transfers and mobility.    - Self care tasks completed-- as noted above       Patient left HOB elevated with call button in reach and her sitter Education:  present    GOALS:   Multidisciplinary Problems     Occupational Therapy Goals        Problem: Occupational Therapy    Goal Priority Disciplines Outcome Interventions   Occupational Therapy Goal     OT, PT/OT Ongoing, Progressing    Description: Goals to be met by: 08/23/22     Patient will increase functional independence with ADLs by performing:    UE Dressing with Modified [place].  LE Dressing with Modified [place].  Grooming while standing at sink with Supervision.  Toileting from toilet with Supervision for hygiene and clothing management.   Bathing from  shower chair/bench with Supervision.  Toilet transfer to toilet with Supervision.  Upper extremity exercise program 3 x15 reps per handout, with supervision to increase UE strength/endurance for improved func mobility skills  Stand during functional task for 10' with supervision in preparation for standing ADLs.                     Time Tracking:     OT Date of Treatment: 08/04/22  OT Start Time: 1058    OT Stop Time: 1132  OT Total Time (min): 34 min    Billable Minutes:Self Care/Home Management 15 min  Therapeutic Activity 19 min    8/4/2022

## 2022-08-04 NOTE — PT/OT/SLP PROGRESS
"Physical Therapy Treatment    Patient Name:  Elen Peters   MRN:  [id number]  Admit Date: 7/22/2022  Admitting Diagnosis: Subdural hematoma, post-traumatic    General Precautions: Standard, fall   Orthopedic Precautions:N/A   Braces:       Recommendations:     Discharge Recommendations:  assisted living facility   Level of Assistance Recommended at Discharge: 24 hours light assistance  Discharge Equipment Recommendations: bedside commode, wheelchair, walker, rolling, shower chair   Barriers to discharge: Decreased caregiver support (increased assistance needed)    Assessment:     Elen Peters is a 89 y.o. female admitted with a medical diagnosis of Subdural hematoma, post-traumatic . Patient tolerated treatment session well this morning. She was most limited by fatigue and convulsions. She was able to practice bed mobility, standing, transfers, and gait training. She also worked on standing balance. Patient demonstrated jerking and convulsions throughout the session. She is progressing well. See detailed treatment note below:      Performance deficits affecting function:  weakness, impaired endurance, impaired functional mobility, impaired self care skills, gait instability, impaired balance, impaired cognition, decreased safety awareness, impaired coordination, impaired cardiopulmonary response to activity .    Rehab Potential is fair    Activity Tolerance: Fair    Plan:     Patient to be seen 5 x/week to address the above listed problems via gait training, therapeutic activities, therapeutic exercises, neuromuscular re-education, wheelchair management/training    · Plan of Care Expires: 08/10/22  · Plan of Care Reviewed with: patient, son    Subjective     "This itching is going to do me in".     Pain/Comfort:  · Pain Rating 1: 0/10  · Pain Rating Post-Intervention 1: 0/10    Patient's cultural, spiritual, Moravian conflicts given the current situation:  · no    Objective:     Communicated with rn prior to " session.  Patient found HOB elevated with   upon PT entry to room.     Neuro Re-ed: Patient performed standing balance activity - lateral weight shifting in standing. She required min A and stood with a RW.     Functional Mobility:  · Bed Mobility:     · Supine to Sit: minimum assistance  · Transfers:     · Sit to Stand:  minimum assistance with rolling walker  · Very poor safety awareness in standing - required max cueing to maintain standing.  · Bed to Chair: minimum assistance with  rolling walker  using  Step Transfer  · Toilet Transfer: minimum assistance with  rolling walker  using  Step Transfer  · Gait: ~6 feet with min A using RW. Very narrow base of support and shuffling steps.    AM-PAC 6 CLICK MOBILITY  15    Patient left in wheelchair with call button in reach and family present.    GOALS:   Multidisciplinary Problems     Physical Therapy Goals        Problem: Physical Therapy    Goal Priority Disciplines Outcome Goal Variances Interventions   Physical Therapy Goal     PT, PT/OT Ongoing, Progressing     Description: Goals to be met by: 8/22/22    Patient will increase functional independence with mobility by performing:    . Supine to sit with Stand-by Assistance  . Sit to supine with Stand-by Assistance  . Rolling to Left and Right with Supervision.  . Sit to stand transfer with Supervision  . Bed to chair transfer with Supervision using No Assistive Device/ LRAD  . Gait  x 100 feet with Stand-by Assistance using Rolling Walker.   . Wheelchair propulsion x100 feet with Stand-by Assistance using bilateral uppper extremities  . Ascend/Descend 2 inch curb step with Minimal Assistance using Rolling Walker.  . Retrieve object off floor in standing with RW and reacher and Emma                     Time Tracking:     PT Received On: 08/04/22  PT Start Time: 0905  PT Stop Time: 0951  PT Total Time (min): 46 min    Billable Minutes: Gait Training 15, Therapeutic Activity 15 and Neuromuscular Re-education  16    Treatment Type: Treatment  PT/PTA: PT     PTA Visit Number: 0     08/04/2022

## 2022-08-05 PROCEDURE — 25000003 PHARM REV CODE 250: Performed by: NURSE PRACTITIONER

## 2022-08-05 PROCEDURE — 25000003 PHARM REV CODE 250: Performed by: HOSPITALIST

## 2022-08-05 PROCEDURE — 97535 SELF CARE MNGMENT TRAINING: CPT | Mod: CO

## 2022-08-05 PROCEDURE — 97530 THERAPEUTIC ACTIVITIES: CPT | Mod: CQ

## 2022-08-05 PROCEDURE — 25000003 PHARM REV CODE 250: Performed by: FAMILY MEDICINE

## 2022-08-05 PROCEDURE — 11000004 HC SNF PRIVATE

## 2022-08-05 RX ADMIN — TRIAMCINOLONE ACETONIDE: 0.25 CREAM TOPICAL at 08:08

## 2022-08-05 RX ADMIN — LORAZEPAM 0.5 MG: 0.5 TABLET ORAL at 03:08

## 2022-08-05 RX ADMIN — DICLOFENAC SODIUM 2 G: 10 GEL TOPICAL at 09:08

## 2022-08-05 RX ADMIN — CARVEDILOL 6.25 MG: 3.12 TABLET, FILM COATED ORAL at 08:08

## 2022-08-05 RX ADMIN — SODIUM BICARBONATE 650 MG TABLET 650 MG: at 08:08

## 2022-08-05 RX ADMIN — ONDANSETRON 4 MG: 4 TABLET, ORALLY DISINTEGRATING ORAL at 08:08

## 2022-08-05 RX ADMIN — SUCRALFATE 1 G: 1 TABLET ORAL at 06:08

## 2022-08-05 RX ADMIN — ONDANSETRON 4 MG: 4 TABLET, ORALLY DISINTEGRATING ORAL at 03:08

## 2022-08-05 RX ADMIN — DICLOFENAC SODIUM 2 G: 10 GEL TOPICAL at 08:08

## 2022-08-05 RX ADMIN — SUCRALFATE 1 G: 1 TABLET ORAL at 08:08

## 2022-08-05 RX ADMIN — SUCRALFATE 1 G: 1 TABLET ORAL at 04:08

## 2022-08-05 RX ADMIN — VALPROIC ACID 500 MG: 250 SOLUTION ORAL at 08:08

## 2022-08-05 RX ADMIN — LORAZEPAM 0.5 MG: 0.5 TABLET ORAL at 08:08

## 2022-08-05 RX ADMIN — ASPIRIN 81 MG: 81 TABLET, COATED ORAL at 08:08

## 2022-08-05 RX ADMIN — CLONIDINE HYDROCHLORIDE 0.1 MG: 0.1 TABLET ORAL at 08:08

## 2022-08-05 RX ADMIN — CETIRIZINE HYDROCHLORIDE 10 MG: 5 TABLET, FILM COATED ORAL at 08:08

## 2022-08-05 NOTE — PLAN OF CARE
JASBIR spoke with pts son and POA, Segundo Peters. Via telephone SW served [employer], and offered to provide [employer] phone number. Pts son declined. [facility] faxed to [facility]. Copy placed in pts room.    JASBIR spoke with Gertrudis Peters-she is requesting call from NP regarding dc meds. JASBIR sent message to Pratima via secure chat.    JASBIR updated Nicky at the [facility]-informed her of paperwork for hospice was completed and [facility] would be delivering DME to the facility. Nicky reported the are ready to admit pt on Monday.    Zo Angulo, MAYTEW  PRN

## 2022-08-05 NOTE — PLAN OF CARE
Problem: Adult Inpatient Plan of Care  Goal: Plan of Care Review  Outcome: Ongoing, Progressing  Goal: Patient-Specific Goal (Individualized)  Outcome: Ongoing, Progressing  Goal: Optimal Comfort and Wellbeing  Outcome: Ongoing, Progressing     Problem: Skin Injury Risk Increased  Goal: Skin Health and Integrity  Outcome: Ongoing, Progressing     Problem: Fall Injury Risk  Goal: Absence of Fall and Fall-Related Injury  Outcome: Ongoing, Progressing     POC reviewed. Address questions and concerns. AAOX3. VVS. Up to bsc with asst of sitter. Jerking like movement, prn Ativan. Poor appetite. No s/s of discomfort/pain. WCTM

## 2022-08-05 NOTE — PROGRESS NOTES
[facility]                                  Skilled Nursing Facility                   Progress Note     Admit Date: 7/22/2022  DIANA 08/08/2022  Principal Problem:  Subdural hematoma, post-traumatic   HPI obtained from patient interview and chart review     Chief Complaint:   Re-evaluation of medical treatment and therapy status: evaluate response to ativan, discuss discharge plan    HPI:   Elen Peters is a 89 y.o. female with PMH of TMJ, syncope, renal cancer, hyperlipidemia, hypertension, depression, CAD who presents to the ED for AMS following recent fall with head trauma. CTH on arrival to the ED revealed thin SDH along the left side of the posterior falx. Admitted to NS for observation.  Aspirin held.  No surgical intervention required.  Neurosurgery recommending repeat CTH in 4 weeks.    Patient will be treated at [facility] with PT and OT to improve functional status and ability to perform ADLs.     Interval history  No new labs today.   24 hr vital sign ranges listed below.    No involuntary movements during exam. Nursing reports improvement since starting Ativan on 8/2, it was increased on 8/3. New pruritic rash with raised red bumps to upper right back reported by patient and nursing 8/4 and persisting, unchanged, today. Possible ADR, however benefit of cessation of involuntary movements greater than adverse effect of limited pruritic rash. Kenalog 0.025% cream topically to rash BID. Benadryl 25 mg q 6 hours PRN itching.   Planning for discharge to assisted living facility, [facility], with [facility] per family directive. Hospital bed ordered and scheduled for delivery 8/5. Anticipated discharge scheduled for Monday, 8/8.         8/1/22:  Patient discussed with Segundo and his wife. Family has good understanding of patient medical complexity, declining mental state, severe debility, and advanced age. Segundo says his  mother has declined since hospital admission and would like to explore palliative care options. His wife has already contacted [facility] (patient's residence) to discuss placing patient in memory care unit with hospice company [facility]. Segundo expressed his goal is to keep his mom comfortable. Plan of care discussed with interdisciplinary team--Anna,  Aldo Sy, and attending Dr. Javier. Med rec and plan of care completed. Initiate order for Ativan 0.5mg oral every 6 hours PRN anxiety/involuntary mo first dose now. Initiate order for scheduled Zofran 4mg TID. Son reports he is POA and will send documents. Family to discuss code status. Discussed not sending patient back to the hospital if she has new decline. Son in agreement.     Past Medical History: Patient has a past medical history of Abnormal stress test (11/18/2015), Arthritis, Bladder cancer, Cataract, Coronary artery disease involving native coronary artery (1/6/2016), Depression, Exudative age-related macular degeneration of left eye with active choroidal neovascularization (10/1/2020), GERD (gastroesophageal reflux disease), HTN (hypertension), benign (11/18/2015), bladder infections, Hyperlipemia, OP (osteoporosis), Positive cardiac stress test (1/6/2016), Renal cancer, Syncope (1/6/2016), TMJ (dislocation of temporomandibular joint), Upper back pain (12/1/2015), Ureteral cancer, Venous insufficiency (2017), and Villous adenoma of colon (5/31/2013).    Past Surgical History: Patient has a past surgical history that includes Nephrectomy; TONSILLECTOMY, ADENOIDECTOMY; Back surgery; Appendectomy; Colonoscopy (10/21/2013); Cystoscopy; Cataract extraction w/  intraocular lens implant (Left, 3/16/15); Cataract extraction w/  intraocular lens implant (Right, 4/6/15); Eye surgery; Spine surgery; and Esophagogastroduodenoscopy (N/A, 1/30/2019).    Social History: Patient reports that she quit smoking about 73 years ago. She has never  used smokeless tobacco. She reports that she does not drink alcohol and does not use drugs.    Family History: family history includes Cancer in her father, mother, and son; Goiter in her mother; Heart attack in her mother; Heart disease in her maternal grandfather and mother; Leukemia in her father and mother; No Known Problems in her brother, maternal aunt, maternal grandmother, maternal uncle, paternal aunt, paternal grandfather, paternal grandmother, paternal uncle, sister, son, son, son, son, and son.    Allergies: Patient is allergic to amlodipine, sulfa (sulfonamide antibiotics), codeine, maxzide [triamterene-hydrochlorothiazid], and sulfamethoxazole-trimethoprim.    ROS  Constitutional: Negative for fever, white coating on tongue, resolving    Respiratory: Negative for cough, shortness of breath   Cardiovascular: Negative for chest pain, leg swelling.   Gastrointestinal: Negative for abdominal pain, constipation, nausea, vomiting.   Musculoskeletal:  + generalized weakness.   Neurological: Negative for dizziness. +involuntary movement, mild in the morning   Skin: positive for rash  Psychiatric/Behavioral: The patient is not nervous/anxious.      24 hour Vital Sign Range   Temp:  [96.1 °F (35.6 °C)-98 °F (36.7 °C)]   Pulse:  [67-71]   Resp:  [16-18]   BP: (141-161)/(66-69)   SpO2:  [95 %]     PEx  Constitutional: Patient appears debilitated.  No distress noted  HENT: oral thrush, resolving   Neck: Normal range of motion. Neck supple.   Cardiovascular: Normal rate, regular rhythm and normal heart sounds.    Pulmonary/Chest: Effort normal and breath sounds are clear  Abdominal: Soft. Bowel sounds are normal.   Musculoskeletal: Normal range of motion. No jerking or grunting today  Neurological: Alert and oriented to person, place, and time.   Psychiatric: Normal mood and affect. Behavior is normal.   Skin: Skin is warm and dry. Skin intact. Raised red rash right upper back.       Assessment and Plan:    Focal  seizure, suspected   Involuntary movement, NEW  CT Head on 7/19/22 showed stable appearance of left acute subdural hematoma.   CT head results and treatment options for involuntary movement discussed-Clonidine.   UA bland, CXR neg pna or effusion  08/05/2022  Differential dx-- episodic focal seizures-continue Depakote 500mg qhs started 7/30/22  Nausea resolved continue scheduled Zofran 4mg TID.   Continue Clonidine   Continue diet and aspiration precautions.   Vital signs stable, afebrile  Continue Ativan 0.5mg bid and daily PRN dose    Discharge planning   8/3/22:Family would like patient to discharge with home health instead of palliative care  Plan of care completed for discharge to assisted living facility [facility].   Hospital bed ordered.   8/4/22: family have now decided upon home (to the [facility]) with hospice.   Hospital bed scheduled for delivery 8/5  Anticipated discharge: Monday 8/8.    Goals of care discussion  8/1/22  Patient discussed with Segundo and his wife. Family has good understanding of patient medical complexity, declining mental state, severe debility, and advanced age. Segundo says his mother has declined since hospital admission and would like to explore palliative care options. His wife has already contacted [facility] (patient's residence) to discuss placing patient in memory care unit with hosp[facility]. Segundo expressed his goal is to keep his mom comfortable. Plan of care discussed with interdisciplinary team--Anna,  Aldo Sy, and attending Dr. Javier. Med rec and plan of care completed.   8/4/22  Continue pt/ot  Discharge planning now with hospice at the [facility] assisted living     Subdural hematoma, posttraumatic  No surgical intervention required.  Neurosurgery recommending repeat CTH in 4 weeks.  Fall precautions    Involuntary Movements  -depakot 500 mg PO nightly (started 7/30/22)  -Ativan 0.5 mg PO started on 8/2, increased to BID on  8/3.    Urticaria / Pruritic rash : NEW 8/4/22  Possible ADR to Ativan  benefit > adverse effect of limited pruritic rash  - Kenalog 0.025% cream topically to rash BID  - Benadryl 25 mg q 6 hours PRN itching.    Gastroesophageal reflux disease  Carafate 1 g a.c. and HS    Mixed hyperlipidemia  Atorvastatin 80 mg nightly    Depression, major, in remission  citalopram 20 mg daily    Hypertension  Carvedilol 6.25 mg b.i.d.  08/05/2022  sbp stable    Coronary artery disease involving native coronary artery  Continue aspirin and statin    Hyponatremia, unstable  8/4/22: Na 133  Initiate order for Na tab daily x 2 days  Continue to trend on regular labs    Exudative age-related macular degeneration of both eyes with active choroidal neovascularization  Sees Dr. Cyndi Velasquez injections q 6 weeks, scheduled for injections 7/26    Anticipate disposition:  Home with hospice  -returning to the [facility] with [facility]  -Discharge date set for 8/8/22      Follow-up needed during SNF stay-    Follow-up needed after discharge from SNF: PCP,     Future Appointments   Date Time Provider Department Center   8/11/2022  8:00 AM Anita Mahan, NP [facility] [place]   10/26/2022 10:00 AM LAB, [place] [facility] [place]         I certify that SNF services are required to be given on an inpatient basis because Elen Peters needs for skilled nursing care and/or skilled rehabilitation are required on a daily basis and such services can only practically be provided in a skilled nursing facility setting and are for an ongoing condition for which she received inpatient care in the hospital.         Kristin Hanks NP  Department of Hospital Medicine   [facility]     DOS: 08/05/2022      Patient note was created using MModal Dictation.  Any errors in syntax or even information may not have been identified and edited on initial review prior to signing this note.

## 2022-08-05 NOTE — PT/OT/SLP PROGRESS
Occupational Therapy   Treatment    Name: Elen Peters  MRN: [id number]  Admit Date: 7/22/2022  Admitting Diagnosis:  Subdural hematoma, post-traumatic    General Precautions: Standard, fall, aspiration   Orthopedic Precautions:N/A   Braces:       Recommendations:     Discharge Recommendations: assisted living facility  Level of Assistance Recommended at Discharge: 24 hours physical assistance for all ADL's and home management tasks  Discharge Equipment Recommendations:  walker, rolling, wheelchair, 3-in-1 commode, shower chair, hospital bed  Barriers to discharge:  Decreased caregiver support (increased skilled assistance required)    Assessment:     Elen Peters is a 89 y.o. female with a medical diagnosis of Subdural hematoma, post-traumatic . . Performance deficits affecting function are weakness, impaired endurance, impaired self care skills, impaired functional mobility, gait instability, impaired balance, impaired cognition, decreased safety awareness, pain, impaired coordination, decreased lower extremity function. Pt. participated well with session on this day. Session limited 2* to convulsions throughout therapy session. Session terminated 2* to unsafe to continue after 1 trial of ambulation. Pt. Unable to maintain upright position for gait. No progress made towards goals on this day.   Co-treatment performed due to patient's multiple deficits requiring two skilled therapists to appropriately and safely assess patient's strength and endurance while facilitating functional tasks in addition to accommodating for patient's activity tolerance.       Rehab Potential is fair    Activity tolerance:  Fair    Plan:     Patient to be seen 5 x/week to address the above listed problems via self-care/home management, therapeutic activities, therapeutic exercises, neuromuscular re-education    · Plan of Care Expires: 08/22/22  · Plan of Care Reviewed with: patient    Subjective     Communicated with: christian prior to  "session. "Hello:"    Pain/Comfort:  · Pain Rating 1: 0/10  · Pain Rating Post-Intervention 1: 0/10    Patient's cultural, spiritual, Rastafarian conflicts given the current situation:  · no    Objective:     Patient found right sidelying upon OT entry to room.    Bed Mobility:    · Patient completed Rolling/Turning to Left with  moderate assistance  · Patient completed Rolling/Turning to Right with moderate assistance  · Patient completed Scooting/Bridging with moderate assistance  · Patient completed Supine to Sit with maximal assistance  · Patient completed Sit to Supine with total assistance and 2 persons     Functional Mobility/Transfers:  · Patient completed Sit <> Stand Transfer with moderate assistance, total assistance and of 2 persons  with  hand-held assist   · Functional Mobility: unable. Pt. Not alert or following commands on this day 2* to frequency of convulsions. Pt. Deemed unsafe to continue treatment and Pt. Returned to supine    Activities of Daily Living:  · Upper Body Dressing: maximal assistance to doff/liset pullover shirt while seated EOB with 2 persons (A) with trunk control  · Lower Body Dressing: total assistance to liset pants over hips in supine     [facility] 6 Click ADL: 13    Treatment & Education:  Pt. Required (A) with all ADLS while seated EOB with help from PT for trunk control/balance. However, session ended 2* to high frequency of convulsions. Nurse notified.      Pt. Educated on safety with ADL tasks as well as functional mobility and need for staff assist.     Patient left HOB elevated with all lines intact, call button in reach and nurse notifiedEducation:   and all bed rails up for safety concern 2* to convulsions    GOALS:   Multidisciplinary Problems     Occupational Therapy Goals        Problem: Occupational Therapy    Goal Priority Disciplines Outcome Interventions   Occupational Therapy Goal     OT, PT/OT Ongoing, Progressing    Description: Goals to be met by: 08/23/22 "     Patient will increase functional independence with ADLs by performing:    UE Dressing with Modified [place].  LE Dressing with Modified [place].  Grooming while standing at sink with Supervision.  Toileting from toilet with Supervision for hygiene and clothing management.   Bathing from  shower chair/bench with Supervision.  Toilet transfer to toilet with Supervision.  Upper extremity exercise program 3 x15 reps per handout, with supervision to increase UE strength/endurance for improved func mobility skills  Stand during functional task for 10' with supervision in preparation for standing ADLs.                     Time Tracking:     OT Date of Treatment: 08/05/22  OT Start Time: 0730    OT Stop Time: 0800  OT Total Time (min): 30 min    Billable Minutes:Self Care/Home Management 30    8/5/2022   OT and OMER have discussed the above patients goals and status in collaboration with Plan of Care.

## 2022-08-05 NOTE — PT/OT/SLP PROGRESS
Physical Therapy Treatment  -cotx with OT d/t pts medical complexity warranting assistance of 2 skilled hands for pt/staff safety    Patient Name:  Elen Peters   MRN:  [id number]  Admit Date: 7/22/2022  Admitting Diagnosis: Subdural hematoma, post-traumatic    General Precautions: Standard, fall   Orthopedic Precautions:N/A   Braces:       Recommendations:     Discharge Recommendations:  assisted living facility   Level of Assistance Recommended at Discharge: 24 hours significant assistance  Discharge Equipment Recommendations: bedside commode, wheelchair, walker, rolling, shower chair   Barriers to discharge: Decreased caregiver support (increased assistance needed)    Assessment:     Elen Peters is a 89 y.o. female admitted with a medical diagnosis of Subdural hematoma, post-traumatic.   Pt tolerates treatment poorly with focus on bed mobility, transfers, and attempts to initiate gait training. Pt with high frequency of involuntary/convulsive movements that impact pts safety and ability to actively participate. Pt non-verbal during episodes and pt notably unable to follow most commands throughout session. Pt deemed unsafe for continued treatment after single attempted trial to initiate gait from EOB with pt convulsive and inattentive/poorly alert while in standing. Pt returned to supine. Pt delayed and speaks sparingly once supine with HOB elevated. RN alerted to pt status and pt left with all bed rails up and bed alarm set to assure pt safety with convulsive movements. No progress towards therapy goals.     Performance deficits affecting function:  weakness, impaired endurance, impaired functional mobility, impaired self care skills, gait instability, impaired balance, impaired cognition, decreased safety awareness, impaired coordination, impaired cardiopulmonary response to activity .    Rehab Potential is fair    Activity Tolerance: Fair    Plan:     Patient to be seen 5 x/week to address the above listed  "problems via gait training, therapeutic activities, therapeutic exercises, neuromuscular re-education, wheelchair management/training    · Plan of Care Expires: 08/10/22  · Plan of Care Reviewed with: patient    Subjective     "I'm sick. I'm sick."     Pain/Comfort:  · Pain Rating 1: 0/10  · Pain Rating Post-Intervention 1: 0/10    Patient's cultural, spiritual, Hindu conflicts given the current situation:  · no    Objective:     Communicated with RN prior to session.  Patient found supine with OT already in session  (no lines attached) upon PTA entry to room.     Functional Mobility:  · Bed Mobility:     · Rolling Left:  moderate assistance  · Rolling Right: moderate assistance  · Scooting: moderate assistance  · Supine to Sit: maximal assistance  · Sit to Supine: total assistance and of 2 persons  · Transfers:     · Sit to Stand:  moderate assistance - total assistance and of 2 persons with hand-held assist; assistance waxes/wanes with pts convulsions and alertness/command following; pt pulling RLE up from ground and leaning posterior while trunk convulsing  · Gait: Pt unable to initiate steps on single attempt for gait training, pt not alert or following commands with high frequency of convulsions, deemed unsafe to continue treatment and pt returned to supine    Therapeutic Activity:  Pt assisted with trunk balance while attempting to engage in self-care at EOB with OT. High frequency of convulsions with pt requiring Min to Total A for sitting balance and safety at EOB.      AM-PAC 6 CLICK MOBILITY  10    Patient left HOB elevated with all bed rails up for safety with concern for falling from bed d/t convulsions with call button in reach, bed alarm on and RN notified.    GOALS:   Multidisciplinary Problems     Physical Therapy Goals        Problem: Physical Therapy    Goal Priority Disciplines Outcome Goal Variances Interventions   Physical Therapy Goal     PT, PT/OT Ongoing, Progressing     Description: " Goals to be met by: 8/22/22    Patient will increase functional independence with mobility by performing:    . Supine to sit with Stand-by Assistance  . Sit to supine with Stand-by Assistance  . Rolling to Left and Right with Supervision.  . Sit to stand transfer with Supervision  . Bed to chair transfer with Supervision using No Assistive Device/ LRAD  . Gait  x 100 feet with Stand-by Assistance using Rolling Walker.   . Wheelchair propulsion x100 feet with Stand-by Assistance using bilateral uppper extremities  . Ascend/Descend 2 inch curb step with Minimal Assistance using Rolling Walker.  . Retrieve object off floor in standing with RW and reacher and Emma                     Time Tracking:     PT Received On: 08/05/22  PT Start Time: 0737  PT Stop Time: 0800  PT Total Time (min): 23 min    Billable Minutes: Therapeutic Activity 23    Treatment Type: Treatment  PT/PTA: PTA     PTA Visit Number: 1     08/05/2022

## 2022-08-06 PROCEDURE — 25000003 PHARM REV CODE 250: Performed by: NURSE PRACTITIONER

## 2022-08-06 PROCEDURE — 25000003 PHARM REV CODE 250: Performed by: HOSPITALIST

## 2022-08-06 PROCEDURE — 11000004 HC SNF PRIVATE

## 2022-08-06 PROCEDURE — 25000003 PHARM REV CODE 250: Performed by: FAMILY MEDICINE

## 2022-08-06 RX ADMIN — TRIAMCINOLONE ACETONIDE: 0.25 CREAM TOPICAL at 09:08

## 2022-08-06 RX ADMIN — CARVEDILOL 6.25 MG: 3.12 TABLET, FILM COATED ORAL at 08:08

## 2022-08-06 RX ADMIN — SUCRALFATE 1 G: 1 TABLET ORAL at 09:08

## 2022-08-06 RX ADMIN — SUCRALFATE 1 G: 1 TABLET ORAL at 12:08

## 2022-08-06 RX ADMIN — Medication 6 MG: at 09:08

## 2022-08-06 RX ADMIN — SODIUM BICARBONATE 650 MG TABLET 650 MG: at 08:08

## 2022-08-06 RX ADMIN — ASPIRIN 81 MG: 81 TABLET, COATED ORAL at 08:08

## 2022-08-06 RX ADMIN — CLONIDINE HYDROCHLORIDE 0.1 MG: 0.1 TABLET ORAL at 09:08

## 2022-08-06 RX ADMIN — ONDANSETRON 4 MG: 4 TABLET, ORALLY DISINTEGRATING ORAL at 03:08

## 2022-08-06 RX ADMIN — CLONIDINE HYDROCHLORIDE 0.1 MG: 0.1 TABLET ORAL at 08:08

## 2022-08-06 RX ADMIN — DICLOFENAC SODIUM 2 G: 10 GEL TOPICAL at 09:08

## 2022-08-06 RX ADMIN — SUCRALFATE 1 G: 1 TABLET ORAL at 05:08

## 2022-08-06 RX ADMIN — LORAZEPAM 0.5 MG: 0.5 TABLET ORAL at 09:08

## 2022-08-06 RX ADMIN — CETIRIZINE HYDROCHLORIDE 10 MG: 5 TABLET, FILM COATED ORAL at 08:08

## 2022-08-06 RX ADMIN — CARVEDILOL 6.25 MG: 3.12 TABLET, FILM COATED ORAL at 09:08

## 2022-08-06 RX ADMIN — ONDANSETRON 4 MG: 4 TABLET, ORALLY DISINTEGRATING ORAL at 08:08

## 2022-08-06 RX ADMIN — ONDANSETRON 4 MG: 4 TABLET, ORALLY DISINTEGRATING ORAL at 09:08

## 2022-08-06 RX ADMIN — POLYETHYLENE GLYCOL 3350 17 G: 17 POWDER, FOR SOLUTION ORAL at 08:08

## 2022-08-06 RX ADMIN — SUCRALFATE 1 G: 1 TABLET ORAL at 04:08

## 2022-08-06 RX ADMIN — VALPROIC ACID 500 MG: 250 SOLUTION ORAL at 09:08

## 2022-08-06 NOTE — PROGRESS NOTES
[facility] - Skilled Nursing  Adult Nutrition  Progress Note    SUMMARY   Recommendations  Continue regular diet as tolerated , caregiver asking for boost plus to be increased to TID  Goals: PO to meet 50% of needs with ONS by next RD fu  Nutrition Goal Status: goal not met    Assessment and Plan  Inadequate oral intake related to depression, reduced appetite as evidenced by PO 25-50% and 4% weight loss.     Continues        Plan  Commercial beverage( calories, protein) boost plus TID  Collaboration with other providers  General diet  Assistance with meals    Malnutrition Assessment 8/4/22     Skin (Micronutrient): dry, thinned, bruised           Orbital Region (Subcutaneous Fat Loss): moderate depletion  Upper Arm Region (Subcutaneous Fat Loss): moderate depletion  Thoracic and Lumbar Region: well nourished   [place] Region (Muscle Loss): moderate depletion  Clavicle Bone Region (Muscle Loss): moderate depletion  Clavicle and Acromion Bone Region (Muscle Loss): moderate depletion  Scapular Bone Region (Muscle Loss): moderate depletion  Dorsal Hand (Muscle Loss): severe depletion                 Reason for Assessment  Reason For Assessment: RD follow-up  Diagnosis:  (subdral hematoma s/p fall)  Relevant Medical History: recent altered mental status,  Hiatal hernia, anorexia, HTN, CAF, HLD, renal cancer, depression, osteo  Interdisciplinary Rounds: attended  General Information Comments: Visited patient with IDT and patient did not want to hear about how she had to eat more. She has always been a small eater she reports. PO 25%. Caregiver reports that she is taking the boost and ate a banana she eats saltines. She has had a smoothie as well from outside. Pt was observed belching after lunch.       Nutrition Discharge Planning: Regular diet as tolerated ONS of choice    Nutrition/Diet History    Patient Reported Diet/Restrictions/Preferences: general  Spiritual, Cultural Beliefs, Religion Practices, Values that  "Affect Care: no  Food Allergies: NKFA  Factors Affecting Nutritional Intake: impaired cognitive status/motor control, decreased appetite, depression    Anthropometrics    Temp: 96.1 °F (35.6 °C)  Height: 5' 4" (162.6 cm)  Height (inches): 64 in  Weight Method: Bed Scale  Weight: 58.7 kg (129 lb 6.6 oz)  Weight (lb): 129.41 lb  Ideal Body Weight (IBW), Female: 120 lb  % Ideal Body Weight, Female (lb): 107.84 %  BMI (Calculated): 22.2  BMI Grade: 18.5-24.9 - normal  Weight Change Amount:  (4% wt loss ( not significant))     Lab/Procedures/Meds    Pertinent Labs Reviewed: reviewed  Pertinent Labs Comments: Hg 10.9, Hct 31.2, Na 133  Pertinent Medications Reviewed: reviewed  Pertinent Medications Comments: pantoprazole,Abx    Estimated/Assessed Needs  Weight Used For Calorie Calculations: 58.7 kg (129 lb 6.6 oz)  Energy Calorie Requirements (kcal): 0349-4734  Energy Need Method: Sealy-St Jeor (x 1.3(PAL))  Protein Requirements: 70  Weight Used For Protein Calculations: 58.7 kg (129 lb 6.6 oz) (x 1.2 g/kg)  Fluid Requirements (mL): 1276 or per MD  Estimated Fluid Requirement Method: RDA Method  RDA Method (mL): 1276  CHO Requirement: -      Nutrition Prescription Ordered    Current Diet Order: Regular  Nutrition Order Comments: PO 25%  Oral Nutrition Supplement: Boost plus TID    Evaluation of Received Nutrient/Fluid Intake  I/O: no data  Energy Calories Required: not meeting needs  Protein Required: not meeting needs  Fluid Required: not meeting needs  Comments: LBM 8/5  Tolerance: tolerating  % Intake of Estimated Energy Needs: 25 - 50 %  % Meal Intake: 0 - 25 %    Nutrition Risk    Level of Risk/Frequency of Follow-up: comfort care     Monitor and Evaluation    Food and Nutrient Adminstration: diet order  Physical Activity and Function: nutrition-related ADLs and IADLs  Anthropometric Measurements: weight change  Biochemical Data, Medical Tests and Procedures: electrolyte and renal panel, glucose/endocrine profile, " gastrointestinal profile  Nutrition-Focused Physical Findings: overall appearance     Nutrition Follow-Up    RD Follow-up?: Yes

## 2022-08-06 NOTE — NURSING
Patient in bed with hob elevated, eyes closed resting quietly.  Repsonds and open eyes to name call.  No complains of pain or discomfort.  Scheduled medications administered whole in apple sauce.  Some difficulty with swallowing water and medications.  Bed low/ lock, side rails up, call bell present.  Safety maintained.  Will monitor.

## 2022-08-06 NOTE — NURSING
"Patient in bed with eyes closed.  Observed patient moving head back and forth (twitching).  Patient responds to name call.  Asked patient is she okay.  Patient stated," yes. "  Sitter at bedside stated that she does this moving of head at times.  Will continue to monitor.      "

## 2022-08-06 NOTE — PLAN OF CARE
Continue regular diet as tolerated , caregiver asking for boost plus to be increased to TID  Goals: PO to meet 50% of needs with ONS by next RD fu  Nutrition Goal Status: goal not met     Assessment and Plan  Inadequate oral intake related to depression, reduced appetite as evidenced by PO 25-50% and 4% weight loss.     Continues        Plan  Commercial beverage( calories, protein) boost plus TID  Collaboration with other providers  General diet  Assistance with meals

## 2022-08-07 PROCEDURE — 25000003 PHARM REV CODE 250: Performed by: NURSE PRACTITIONER

## 2022-08-07 PROCEDURE — 25000003 PHARM REV CODE 250: Performed by: HOSPITALIST

## 2022-08-07 PROCEDURE — 11000004 HC SNF PRIVATE

## 2022-08-07 PROCEDURE — 25000003 PHARM REV CODE 250: Performed by: FAMILY MEDICINE

## 2022-08-07 RX ADMIN — DICLOFENAC SODIUM 2 G: 10 GEL TOPICAL at 09:08

## 2022-08-07 RX ADMIN — CARVEDILOL 6.25 MG: 3.12 TABLET, FILM COATED ORAL at 09:08

## 2022-08-07 RX ADMIN — SUCRALFATE 1 G: 1 TABLET ORAL at 06:08

## 2022-08-07 RX ADMIN — ONDANSETRON 4 MG: 4 TABLET, ORALLY DISINTEGRATING ORAL at 09:08

## 2022-08-07 RX ADMIN — LORAZEPAM 0.5 MG: 0.5 TABLET ORAL at 03:08

## 2022-08-07 RX ADMIN — POLYETHYLENE GLYCOL 3350 17 G: 17 POWDER, FOR SOLUTION ORAL at 09:08

## 2022-08-07 RX ADMIN — CETIRIZINE HYDROCHLORIDE 10 MG: 5 TABLET, FILM COATED ORAL at 09:08

## 2022-08-07 RX ADMIN — CLONIDINE HYDROCHLORIDE 0.1 MG: 0.1 TABLET ORAL at 09:08

## 2022-08-07 RX ADMIN — LORAZEPAM 0.5 MG: 0.5 TABLET ORAL at 09:08

## 2022-08-07 RX ADMIN — SUCRALFATE 1 G: 1 TABLET ORAL at 09:08

## 2022-08-07 RX ADMIN — VALPROIC ACID 500 MG: 250 SOLUTION ORAL at 09:08

## 2022-08-07 RX ADMIN — ONDANSETRON 4 MG: 4 TABLET, ORALLY DISINTEGRATING ORAL at 03:08

## 2022-08-07 RX ADMIN — ASPIRIN 81 MG: 81 TABLET, COATED ORAL at 09:08

## 2022-08-07 RX ADMIN — TRIAMCINOLONE ACETONIDE: 0.25 CREAM TOPICAL at 09:08

## 2022-08-07 RX ADMIN — SUCRALFATE 1 G: 1 TABLET ORAL at 04:08

## 2022-08-07 RX ADMIN — SUCRALFATE 1 G: 1 TABLET ORAL at 11:08

## 2022-08-07 RX ADMIN — Medication 6 MG: at 09:08

## 2022-08-08 VITALS
HEART RATE: 68 BPM | HEIGHT: 64 IN | SYSTOLIC BLOOD PRESSURE: 132 MMHG | RESPIRATION RATE: 16 BRPM | OXYGEN SATURATION: 94 % | BODY MASS INDEX: 22.1 KG/M2 | WEIGHT: 129.44 LBS | TEMPERATURE: 98 F | DIASTOLIC BLOOD PRESSURE: 61 MMHG

## 2022-08-08 PROCEDURE — 25000003 PHARM REV CODE 250: Performed by: FAMILY MEDICINE

## 2022-08-08 PROCEDURE — 25000003 PHARM REV CODE 250: Performed by: HOSPITALIST

## 2022-08-08 PROCEDURE — 25000003 PHARM REV CODE 250: Performed by: NURSE PRACTITIONER

## 2022-08-08 RX ORDER — CETIRIZINE HYDROCHLORIDE 10 MG/1
10 TABLET ORAL DAILY
Refills: 0
Start: 2022-08-09 | End: 2023-08-09

## 2022-08-08 RX ORDER — AMMONIUM LACTATE 12 G/100G
LOTION TOPICAL 2 TIMES DAILY PRN
Refills: 0
Start: 2022-08-08

## 2022-08-08 RX ORDER — DIPHENHYDRAMINE HCL 25 MG
25 CAPSULE ORAL EVERY 6 HOURS PRN
Refills: 0
Start: 2022-08-08

## 2022-08-08 RX ORDER — LORAZEPAM 0.5 MG/1
0.5 TABLET ORAL 2 TIMES DAILY
Qty: 60 TABLET | Refills: 0
Start: 2022-08-08 | End: 2022-09-07

## 2022-08-08 RX ORDER — LORAZEPAM 0.5 MG/1
0.5 TABLET ORAL DAILY PRN
Start: 2022-08-08 | End: 2022-09-07

## 2022-08-08 RX ORDER — TRIAMCINOLONE ACETONIDE 0.25 MG/G
CREAM TOPICAL 2 TIMES DAILY
Start: 2022-08-08

## 2022-08-08 RX ADMIN — ASPIRIN 81 MG: 81 TABLET, COATED ORAL at 08:08

## 2022-08-08 RX ADMIN — POLYETHYLENE GLYCOL 3350 17 G: 17 POWDER, FOR SOLUTION ORAL at 08:08

## 2022-08-08 RX ADMIN — SUCRALFATE 1 G: 1 TABLET ORAL at 05:08

## 2022-08-08 RX ADMIN — CLONIDINE HYDROCHLORIDE 0.1 MG: 0.1 TABLET ORAL at 08:08

## 2022-08-08 RX ADMIN — ONDANSETRON 4 MG: 4 TABLET, ORALLY DISINTEGRATING ORAL at 08:08

## 2022-08-08 RX ADMIN — DICLOFENAC SODIUM 2 G: 10 GEL TOPICAL at 08:08

## 2022-08-08 RX ADMIN — LORAZEPAM 0.5 MG: 0.5 TABLET ORAL at 08:08

## 2022-08-08 RX ADMIN — TRIAMCINOLONE ACETONIDE: 0.25 CREAM TOPICAL at 08:08

## 2022-08-08 RX ADMIN — CARVEDILOL 6.25 MG: 3.12 TABLET, FILM COATED ORAL at 08:08

## 2022-08-08 RX ADMIN — CETIRIZINE HYDROCHLORIDE 10 MG: 5 TABLET, FILM COATED ORAL at 08:08

## 2022-08-08 RX ADMIN — SUCRALFATE 1 G: 1 TABLET ORAL at 11:08

## 2022-08-08 NOTE — PLAN OF CARE
Problem: Adult Inpatient Plan of Care  Goal: Plan of Care Review  Outcome: Met  Goal: Patient-Specific Goal (Individualized)  Outcome: Met  Goal: Absence of Hospital-Acquired Illness or Injury  Outcome: Met  Goal: Optimal Comfort and Wellbeing  Outcome: Met  Goal: Readiness for Transition of Care  Outcome: Met     Problem: Impaired Wound Healing  Goal: Optimal Wound Healing  Outcome: Met     Problem: Skin Injury Risk Increased  Goal: Skin Health and Integrity  Outcome: Met     Problem: Fall Injury Risk  Goal: Absence of Fall and Fall-Related Injury  Outcome: Met

## 2022-08-08 NOTE — PT/OT/SLP PROGRESS
Physical Therapy      Patient Name:  Elen Peters   MRN:  [id number]    Patient not seen today secondary to pt awaiting discharge later today. No charges made.

## 2022-08-08 NOTE — PLAN OF CARE
Patient discharged to [facility] and Hospice services provided by [facility] . S/he was transported by [facility].  [facility] took over care of patient's nursing and medical needs.    Kerrie Kemp, [facility]  Case Management Department  [facility]  [email or web address] - Skilled Nursing  Discharge Final Note    Primary Care Provider: Jim Treadwell MD    Expected Discharge Date: 8/8/2022    Final Discharge Note (most recent)     Final Note - 08/08/22 1608        Final Note    Assessment Type Final Discharge Note     Anticipated Discharge Disposition Hospice/Home     Hospital Resources/Appts/Education Provided Provided patient/caregiver with written discharge plan information        Post-Acute Status    Post-Acute Authorization Hospice;Placement     Post-Acute Placement Status Set-up Complete/Auth obtained     Hospice Status Set-up Complete/Auth obtained     Discharge Delays None known at this time                 Important Message from Medicare

## 2022-08-08 NOTE — NURSING
Patient transfer via stretcher by [facility] ambulance services to [facility].  No distress noted.  Voices no complaints.   Family present.  Copy of discharge instructions given to family.   All personal belongings taken by family.  Safety maintained.

## 2022-08-11 ENCOUNTER — CARE AT HOME (OUTPATIENT)
Dept: HOME HEALTH SERVICES | Facility: CLINIC | Age: 87
End: 2022-08-11
Payer: MEDICARE

## 2022-08-11 DIAGNOSIS — Z51.5 PALLIATIVE CARE ENCOUNTER: Primary | ICD-10-CM

## 2022-08-11 PROCEDURE — 99349 HOME/RES VST EST MOD MDM 40: CPT | Mod: ,,, | Performed by: NURSE PRACTITIONER

## 2022-08-11 PROCEDURE — 1111F DSCHRG MED/CURRENT MED MERGE: CPT | Mod: CPTII,S$GLB,, | Performed by: NURSE PRACTITIONER

## 2022-08-11 PROCEDURE — 1111F PR DISCHARGE MEDS RECONCILED W/ CURRENT OUTPATIENT MED LIST: ICD-10-PCS | Mod: CPTII,S$GLB,, | Performed by: NURSE PRACTITIONER

## 2022-08-11 PROCEDURE — 99349 PR HOME VISIT,ESTAB PATIENT,LEVEL III: ICD-10-PCS | Mod: ,,, | Performed by: NURSE PRACTITIONER

## 2022-08-16 NOTE — DISCHARGE SUMMARY
"[facility]   Skilled Nursing Facility   Discharge Summary  Patient Name: Elen Peters  : 1929  MRN: [id number]  Attending Physician: No att. providers found  Nurse Practitioner: Pratima Woods NP    Admit Date: 2022   Date of Discharge:  2022  Length of Stay:  LOS: 17 days     Date of Service: 22    Principal Problem: Subdural hematoma, post-traumatic    HPI  Elen Peters is a 89 y.o. female with PMH of TMJ, syncope, renal cancer, hyperlipidemia, hypertension, depression, CAD who presents to the ED for AMS following recent fall with head trauma. CTH on arrival to the ED revealed thin SDH along the left side of the posterior falx. Admitted to [facility] for observation.  Aspirin held.  No surgical intervention required.  Neurosurgery recommending repeat CTH in 4 weeks.     Patient will be treated at [facility] with PT and OT to improve functional status and ability to perform ADLs.     Hospital Course  Patient progressed well with PT and OT- last PT note states "trunk balance while attempting to engage in self-care at EOB with OT. High frequency of convulsions with pt requiring Min to Total A for sitting balance and safety at EOB".  Patient had no significant events during their stay at SNF. Home health was set up. DME was ordered if needed. Follow up appointment to be made by patient within one week. All prescriptions and discharge instructions were ordered to be given to the patient prior to discharge. Patient discharged to assisted living facility, [facility], with [facility] per family directive.     Focal seizure, suspected   Involuntary movement, NEW  CT Head on 22 showed stable appearance of left acute subdural hematoma.   CT head results and treatment options for involuntary movement discussed-Clonidine.   UA bland, CXR neg pna or effusion  2022  Differential dx-- episodic focal seizures-continue Depakote 500mg qhs started 22  Nausea resolved continue " scheduled Zofran 4mg TID.   Continue Clonidine   Continue diet and aspiration precautions.   Continue Ativan 0.5mg bid and daily PRN dose     Discharge planning   8/3/22:Family would like patient to discharge with home health instead of palliative care  Plan of care completed for discharge to assisted living facility [facility].   Hospital bed ordered.   8/4/22: family have now decided upon home (to the [facility]) with hospice.   Hospital bed scheduled for delivery 8/5  Anticipated discharge: Monday 8/8.     Goals of care discussion  8/1/22  Patient discussed with Segundo and his wife. Family has good understanding of patient medical complexity, declining mental state, severe debility, and advanced age. Segundo says his mother has declined since hospital admission and would like to explore palliative care options. His wife has already contacted [facility] (patient's residence) to discuss placing patient in memory care unit with hospice [facility]. Segundo expressed his goal is to keep his mom comfortable. Plan of care discussed with interdisciplinary team--Anna,  Aldo Sy, and attending Dr. Javier. Med rec and plan of care completed.   Discharge planning now with hospice at [facility]      Subdural hematoma, posttraumatic  No surgical intervention required.  Neurosurgery recommending repeat CTH in 4 weeks.  Fall precautions     Involuntary Movements  -depakot 500 mg PO nightly (started 7/30/22)  -Ativan 0.5 mg PO started on 8/2, increased to BID on 8/3.     Urticaria / Pruritic rash : NEW 8/4/22  Possible ADR to Ativan  benefit > adverse effect of limited pruritic rash  - Kenalog 0.025% cream topically to rash BID  - Benadryl 25 mg q 6 hours PRN itching.     Gastroesophageal reflux disease  Carafate 1 g a.c. and HS     Mixed hyperlipidemia  Atorvastatin 80 mg nightly     Depression, major, in remission  citalopram 20 mg daily     Hypertension  Carvedilol 6.25 mg  b.i.d.  08/05/2022  sbp stable     Coronary artery disease involving native coronary artery  Continue aspirin and statin     Hyponatremia, unstable  8/4/22: Na 133  Initiate order for Na tab daily x 2 days  Continue to trend on regular labs     Exudative age-related macular degeneration of both eyes with active choroidal neovascularization  Sees Dr. Cynid Velasquez injections q 6 weeks, scheduled for injections 7/26      Physical Exam  Constitutional: Patient appears debilitated.  No distress noted  HENT: oral thrush, resolving   Neck: Normal range of motion. Neck supple.   Cardiovascular: Normal rate, regular rhythm and normal heart sounds.    Pulmonary/Chest: Effort normal and breath sounds are clear  Abdominal: Soft. Bowel sounds are normal.   Musculoskeletal: Normal range of motion. No jerking or grunting today  Neurological: Alert and oriented to person, place, and time.   Psychiatric: Normal mood and affect. Behavior is normal.   Skin: Skin is warm and dry. Skin intact. Raised red rash right upper back.      Discharge Medication List as of 8/8/2022 10:53 AM      START taking these medications    Details   acetaminophen (TYLENOL) 325 MG tablet Take 2 tablets (650 mg total) by mouth every 6 (six) hours as needed for Pain., Starting Mon 8/1/2022, No Print      aluminum & magnesium hydroxide-simethicone (MYLANTA MAX STRENGTH) 400-400-40 mg/5 mL suspension Take 30 mLs by mouth every 6 (six) hours as needed for Indigestion., Starting Mon 8/1/2022, Until Tue 8/1/2023 at 2359, No Print      ammonium lactate (LAC-HYDRIN) 12 % lotion Apply topically 2 (two) times daily as needed for Dry Skin., Starting Mon 8/8/2022, No Print      calcium carbonate (TUMS) 200 mg calcium (500 mg) chewable tablet Take 1 tablet (500 mg total) by mouth 2 (two) times daily as needed for Heartburn., Starting Mon 8/1/2022, Until Tue 8/1/2023 at 2359, No Print      cetirizine (ZYRTEC) 10 MG tablet Take 1 tablet (10 mg total) by mouth once  daily., Starting Tue 8/9/2022, Until Wed 8/9/2023, No Print      cloNIDine (CATAPRES) 0.1 MG tablet Take 1 tablet (0.1 mg total) by mouth 2 (two) times daily., Starting Mon 8/1/2022, Until Tue 8/1/2023, No Print      diphenhydrAMINE (BENADRYL) 25 mg capsule Take 1 capsule (25 mg total) by mouth every 6 (six) hours as needed for Itching., Starting Mon 8/8/2022, No Print      melatonin (MELATIN) 3 mg tablet Take 2 tablets (6 mg total) by mouth nightly., Starting Mon 8/1/2022, No Print      nystatin (MYCOSTATIN) 100,000 unit/mL suspension Take 5 mLs (500,000 Units total) by mouth 4 (four) times daily with meals and nightly. for 10 days, Starting Mon 8/1/2022, Until Thu 8/11/2022, No Print      ondansetron (ZOFRAN-ODT) 4 MG TbDL Take 1 tablet (4 mg total) by mouth 3 (three) times daily., Starting Mon 8/1/2022, No Print      polyethylene glycol (GLYCOLAX) 17 gram PwPk Take 17 g by mouth once daily., Starting Tue 8/2/2022, No Print      triamcinolone acetonide 0.025% (KENALOG) 0.025 % cream Apply topically 2 (two) times daily., Starting Mon 8/8/2022, No Print      valproic acid, as sodium salt, (DEPAKENE) 250 mg/5 mL (5 mL) Soln Take 10 mLs (500 mg total) by mouth every evening., Starting Mon 8/1/2022, Until Tue 8/1/2023, No Print         CONTINUE these medications which have CHANGED    Details   !! LORazepam (ATIVAN) 0.5 MG tablet Take 1 tablet (0.5 mg total) by mouth daily as needed for Anxiety., Starting Mon 8/8/2022, Until Wed 9/7/2022 at 2359, No Print      !! LORazepam (ATIVAN) 0.5 MG tablet Take 1 tablet (0.5 mg total) by mouth 2 (two) times daily., Starting Mon 8/8/2022, Until Wed 9/7/2022, No Print       !! - Potential duplicate medications found. Please discuss with provider.      CONTINUE these medications which have NOT CHANGED    Details   aspirin (ECOTRIN) 81 MG EC tablet Take 81 mg by mouth once daily., Historical Med      carvediloL (COREG) 6.25 MG tablet Take 1 tablet (6.25 mg total) by mouth 2 (two)  times daily., Starting Fri 7/15/2022, Normal      ondansetron (ZOFRAN) 4 MG tablet Take 1 tablet (4 mg total) by mouth every 8 (eight) hours as needed for Nausea., Starting Tue 7/5/2022, Normal      sucralfate (CARAFATE) 1 gram tablet TAKE 1 TABLET(1 GRAM) BY MOUTH FOUR TIMES DAILY BEFORE MEALS AND AT NIGHT, Normal         STOP taking these medications       alendronate (FOSAMAX) 70 MG tablet Comments:   Reason for Stopping:         AVASTIN 25 mg/mL injection Comments:   Reason for Stopping:         calcium-magnesium-zinc Tab Comments:   Reason for Stopping:         citalopram (CELEXA) 20 MG tablet Comments:   Reason for Stopping:         diclofenac sodium (VOLTAREN) 1 % Gel Comments:   Reason for Stopping:         EYLEA 2 mg/0.05 mL Syrg Comments:   Reason for Stopping:         losartan (COZAAR) 50 MG tablet Comments:   Reason for Stopping:         mv-min-folic acid-lutein 0.4-250 mg-mcg Tab Comments:   Reason for Stopping:         omega-3 fatty acids 1,000 mg Cap Comments:   Reason for Stopping:         pantoprazole (PROTONIX) 40 MG tablet Comments:   Reason for Stopping:         rosuvastatin (CRESTOR) 20 MG tablet Comments:   Reason for Stopping:         walker (ULTRA-LIGHT ROLLATOR) Misc Comments:   Reason for Stopping:         zolpidem (AMBIEN) 5 MG Tab Comments:   Reason for Stopping:               Discharge Diet:regular diet with Normal Fluid intake of 1500 - 2000 mL per day    Activity: activity as tolerated    Discharge Condition: Good     Disposition: Hospice/Home at [facility]   Date Time Provider Department [place]   10/26/2022 10:00 AM LAB, [facility] [place]       38 minutes total time spent on the discharge of the patient including review of hospital course with the patient, reviewing discharge medications, and arranging follow-up care.      Pratima Woods NP  [facility]   Skilled Nursing [facility]

## 2023-04-14 NOTE — PROGRESS NOTES
"Digital Medicine: Health  Follow-Up        Follow Up  Follow-up reason(s): reading review      Readings are trending up due to stress.  Patient states that she is stressed out by the holidays and believes it will "stay that way" until the holiday season is over. Patient has been busy trying to get gifts for her 22 grandchildren. Patient confirms that she is resting before readings.     Patient wonders if her stress is higher in the evenings. She agrees to try a morning reading to see if there is a difference.     Thanks me for calling to continue to check in.      INTERVENTION(S)  encouragement/support    PLAN  continue monitoring          Topic    Lipid (Cholesterol) Test        Last 5 Patient Entered Readings                                      Current 30 Day Average: 156/68     Recent Readings 12/9/2019 12/8/2019 12/7/2019 12/7/2019 12/6/2019    SBP (mmHg) 160 166 168 176 160    DBP (mmHg) 69 70 76 70 71    Pulse 63 58 59 59 59                  Screenings    SDOH  " within normal limits

## 2023-06-27 ENCOUNTER — PATIENT MESSAGE (OUTPATIENT)
Dept: CARDIOLOGY | Facility: CLINIC | Age: 88
End: 2023-06-27
Payer: MEDICARE

## 2023-08-09 NOTE — TELEPHONE ENCOUNTER
Notified of results.   Dr Ceballos prescribed Premarin vag cream for vag burning but she refused to use it because it causes cancer. Dr Treadwell recommended GYN but she is not able to get around due to her pelvic ring fracture. Can you recommend anything over the counter?   What Type Of Note Output Would You Prefer (Optional)?: Standard Output How Severe Are Your Spot(S)?: mild Have Your Spot(S) Been Treated In The Past?: has not been treated Hpi Title: Evaluation of a Skin Lesion

## 2023-12-04 NOTE — TELEPHONE ENCOUNTER
She was told by gastro to call and speak with you asap in regards to the results of ct and R artery. She is very concerned and would like advice.     0